# Patient Record
Sex: FEMALE | Race: WHITE
[De-identification: names, ages, dates, MRNs, and addresses within clinical notes are randomized per-mention and may not be internally consistent; named-entity substitution may affect disease eponyms.]

---

## 2020-10-30 ENCOUNTER — HOSPITAL ENCOUNTER (INPATIENT)
Dept: HOSPITAL 75 - IRF | Age: 75
LOS: 11 days | Discharge: HOME HEALTH | DRG: 92 | End: 2020-11-10
Attending: INTERNAL MEDICINE | Admitting: INTERNAL MEDICINE
Payer: MEDICARE

## 2020-10-30 VITALS — DIASTOLIC BLOOD PRESSURE: 72 MMHG | SYSTOLIC BLOOD PRESSURE: 137 MMHG

## 2020-10-30 VITALS — HEIGHT: 62.01 IN | BODY MASS INDEX: 25 KG/M2 | WEIGHT: 137.57 LBS

## 2020-10-30 VITALS — SYSTOLIC BLOOD PRESSURE: 123 MMHG | DIASTOLIC BLOOD PRESSURE: 65 MMHG

## 2020-10-30 DIAGNOSIS — Z87.891: ICD-10-CM

## 2020-10-30 DIAGNOSIS — I48.91: ICD-10-CM

## 2020-10-30 DIAGNOSIS — Z93.2: ICD-10-CM

## 2020-10-30 DIAGNOSIS — E11.9: ICD-10-CM

## 2020-10-30 DIAGNOSIS — Z79.4: ICD-10-CM

## 2020-10-30 DIAGNOSIS — Q60.0: ICD-10-CM

## 2020-10-30 DIAGNOSIS — R19.7: ICD-10-CM

## 2020-10-30 DIAGNOSIS — I10: ICD-10-CM

## 2020-10-30 DIAGNOSIS — E03.9: ICD-10-CM

## 2020-10-30 DIAGNOSIS — Z85.43: ICD-10-CM

## 2020-10-30 DIAGNOSIS — G72.89: Primary | ICD-10-CM

## 2020-10-30 DIAGNOSIS — E78.5: ICD-10-CM

## 2020-10-30 PROCEDURE — 80053 COMPREHEN METABOLIC PANEL: CPT

## 2020-10-30 PROCEDURE — 82962 GLUCOSE BLOOD TEST: CPT

## 2020-10-30 PROCEDURE — 87324 CLOSTRIDIUM AG IA: CPT

## 2020-10-30 PROCEDURE — 93005 ELECTROCARDIOGRAM TRACING: CPT

## 2020-10-30 PROCEDURE — 87449 NOS EACH ORGANISM AG IA: CPT

## 2020-10-30 PROCEDURE — 85025 COMPLETE CBC W/AUTO DIFF WBC: CPT

## 2020-10-30 PROCEDURE — 36415 COLL VENOUS BLD VENIPUNCTURE: CPT

## 2020-10-30 RX ADMIN — INSULIN ASPART SCH UNIT: 100 INJECTION, SOLUTION INTRAVENOUS; SUBCUTANEOUS at 21:46

## 2020-10-30 RX ADMIN — POLYETHYLENE GLYCOL (3350) SCH GM: 17 POWDER, FOR SOLUTION ORAL at 13:14

## 2020-10-30 RX ADMIN — DOCUSATE SODIUM SCH MG: 100 CAPSULE ORAL at 21:46

## 2020-10-30 RX ADMIN — SIMETHICONE SCH MG: 80 TABLET, CHEWABLE ORAL at 21:14

## 2020-10-30 RX ADMIN — SIMETHICONE SCH MG: 80 TABLET, CHEWABLE ORAL at 13:15

## 2020-10-30 RX ADMIN — DOCUSATE SODIUM SCH MG: 100 CAPSULE ORAL at 13:14

## 2020-10-30 RX ADMIN — ENOXAPARIN SODIUM SCH MG: 100 INJECTION SUBCUTANEOUS at 13:14

## 2020-10-30 RX ADMIN — INSULIN ASPART SCH UNIT: 100 INJECTION, SOLUTION INTRAVENOUS; SUBCUTANEOUS at 16:10

## 2020-10-30 RX ADMIN — CARVEDILOL SCH MG: 12.5 TABLET, FILM COATED ORAL at 21:15

## 2020-10-30 RX ADMIN — DOCUSATE SODIUM AND SENNOSIDES SCH EA: 8.6; 5 TABLET, FILM COATED ORAL at 21:46

## 2020-10-30 RX ADMIN — DOCUSATE SODIUM AND SENNOSIDES SCH EA: 8.6; 5 TABLET, FILM COATED ORAL at 13:14

## 2020-10-30 RX ADMIN — AMIODARONE HYDROCHLORIDE SCH MG: 200 TABLET ORAL at 21:15

## 2020-10-30 RX ADMIN — POLYETHYLENE GLYCOL (3350) SCH GM: 17 POWDER, FOR SOLUTION ORAL at 21:46

## 2020-10-30 NOTE — NUR
CM/SS ADMISSION

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  Patient 
presented to [facility] 9/21/20, diagnosed with colitis.  Patient was CODE BLUE 9/22 and 
reportedly pulseless for approximately 2 minutes, chest compressions performed.  Surgery for 
subtotal colectomy and ileostomy was completed with post op complications, a-fib with RVR, 
cardioversion secondary to hemodynamic instability, C. difficile colitis.  TPN for nutrition 
supplement, discontinued.



Patient resides home alone and her hope is to return there at discharge.  



PCP:  Lorenzo Leyva, CHIQUI

[facility]

[phone]



PHARMACY:  Not confirmed



INSURANCE:  Medicare and [employer]



DME:  Patient has 2 FWW, Rollator, and her shower has a built in seat.



BARRIERS TO DISCHARGE PLANNING:  Patient appears adequately insured for post hospital care 
needs.  She has a new ostomy, will pursue supplies through [employer].  Patient has 3 
children in close proximity.  Patient motivated to regain health and strength and return 
home.



CONTACTS:

Patient has 3 children and also listed her sister.



Liseth Falcon, Daughter, DPOA

[address] [place], [place]

[phone]



Gerardo Joy, Son

[address] [place], [place]  [address] [phone]



Albina Gomez, Daughter

[address] [place]  [address]

[phone]



Lora Middleton, Sister

[address] [place], [place]  

[phone]



Patient understands the purpose and process of the weekly patient care conference and that 
her first review will be Wednesday, November 4, 2020.

## 2020-10-30 NOTE — NUR
PT. IN SPECIAL ISOLATION FOR C-DIFF. 1ST C-DIFF RESULTS NEGATIVE.  NEED 2ND STOOL FOR C-DIFF 
COLLECTED(IN 48 HRS) 11/1/20 IN ORDER TO REMOVE PT. FROM SPECIAL ISOLATION AS STATED PER 
VICTORIA BOJORQUEZ RN (INFECTIOUS  CONTROL NURSE).

## 2020-10-30 NOTE — PHYSICAL THERAPY EVALUATION
PT Evaluation-General


Medical Diagnosis


Admission Date


Oct 30, 2020 at 10:40


Medical Diagnosis:  Myopathy


Onset Date:  Oct 30, 2020





Therapy Diagnosis


Therapy Diagnosis:  Impaired mobility and strength





Precautions


Precautions/Isolations:  Contact Isolation (c. diff)





Weight Bear Status


Right Lower Extremity:  Right


Full Weight Bearing


Left Lower Extremity:  Left


Full Weight Bearing





Referral


Physician:  Ronnie


Reason for Referral:  Evaluation/Treatment





Medical History


Pertinent Medical History:  Atrial Fib (with RVR), DM, HTN, Hypothroidism, Renal

Insufficiency (single kidney)


Additional Medical History


Ovarian Cancer





sub-total colectomy


kidney removal


appendectomy


cholecystectomy


hysterectomy


Current History


[facility] with abdominal pain; pt diagnosed 

with colitis and this has started a prolonged hospital stay. During 

hosptilization pt has coded and received subtotal colectomy and ileostomy and 

stefano-splint thick biopsy; postoperatively pt went into A. fib with RVR and 

underwent cardioversion secondary to hemodynamic instability; pt also diagnosed 

with C.diff. Pt has been seen by [facility] wound care for abdominal incision.


Reviewed History:  Yes





Social History


Home:  Single Level


Current Living Status:  Alone (children live nearby)


Entry Into Home:  Stairs With Railing


PT Steps Into Home:  3


PT Steps Inside Home:  0





Prior


Prior Level of Function


SCALE: Activities may be completed with or without assistive devices.





6-Indepedent-patient completes the activity by him/herself with no assistance 

from a helper.


5-Set-up or Clean-up Assistance-helper sets up or cleans up; patient completes 

activity. [place] assists only prior to or  


    following the activity.


4-Supervision or Touching Assistance-helper provides verbal cues and/or t

ouching/steadying and/or contact guard assistance as patient completes activity.

Assistance may be provided   


    throughout the activity or intermittently.


3-Partial/Moderate Assistance-helper does LESS THAN HALF the effort. [place] 

lifts, holds or supports trunk or limbs, but provides less than half the effort.


2-Substantial/Maximal Assistance-helper does MORE THAN HALF the effort. [place] 

lifts or holds trunk or limbs and provides more than half the effort.


0-Fjptzrwve-tnvcri does ALL the effort. Patient does none of the effort to 

complete the activity. Or, the assistance of 2 or more helpers is required for 

the patient to complete the  


    activity.


If activity was not attempted, code reason:


7-Patient Refused.


9-Not Applicable-not attempted and the patient did not perform the activity 

before the current illness, exacerbation or injury.


10-Not Attempted due to Environmental Limitations-(lack of equipment, weather 

restraints, etc.).


88-Not Attempted due to Medical Conditions or Safety Concerns.


Bed Mobility:  6


Transfers (B,C,W/C):  6


Gait:  6


Stairs:  6


Wheelchair Mobility:  9


Indoor Mobility (Ambulation):  Independent


Stairs:  Independent


Prior Devices Use:  None





PT Evaluation-Current


Subjective


Pt presents sitting upright in wheelchair. Pt agrees to PT. Pt reports no pain.





Pt/Family Goals


Gain strength to return home





Objective


Patient Orientation:  Person, Place, Time, Eyes Open, Situation


Attachments:  Colostomy/Ileostomy





ROM/Strength


ROM Lower Extremities


WFL


Strength Lower Extremities


R hip flexion 4/5


L hip flexion 3+/5


R knee ext 5/5


L knee ext 5/5


R knee flex 5/5


R knee flex 4+/5





Integumentary/Posture


Integumentary


refer to nursing notes


Bowel Incontinence:  No


Bladder Incontinence:  No


Posture


WFL





Neuromuscular


(Tone, Coordination, Reflexes)


No noticeable impairments





Sensory


Vision:  Wears Glasses


Hearing:  Functional


Sensation Right Lower Extremit:  Intact


Sensation Left Lower Extremity:  Intact


Sensation Lower Extremities


BLE sensation intact to light touch L2-S2





Transfers


Roll Left & Right (QC):  4


Sit to Lying (QC):  4


Lying to Sitting/Side of Bed(Q:  4


Sit to Stand (QC):  4


Chair/Bed-to-Chair Xfer(QC):  4


Toilet Transfer (QC):  4


Car Transfer (QC):  4


Pt able to complete bed mobility with SBA. Pt given CGA for sit to stand, chair 

to chair, toilet, and car transfers. Pt completes car transfers with step in-sit

down-bring other leg into car method; pt struggled to lift initial leg into 

vehicle and was instructed on the sit and then BLE swing in method to make 

things easier.





Gait


Does the Patient Walk?:  Yes


Mode of Locomotion:  Walk


Anticipated Mode of Locomotion:  Walk


Walk 10 feet (QC):  4


Walk 50 ft with 2 Turns(QC):  4


Walk 150 ft (QC):  4


Walking 10ft/uneven surface-QC:  4


Distance:  150'


Gait Assistive Device:  FWW


Comments/Gait Description


Pt is ambulating with step through pattern and receives CGA for 150' and over 

uneven surface.





Wheelchair Training


Does the Pt Use a Wheelchair?:  No


Wheel 50 ft with 2 turns (QC):  9


Wheel 150 ft (QC):  9





Stairs


#of Steps:  1


1 Step (curb) (QC):  4


4 Steps (QC):  88


12 Steps (QC):  88


Walking Assistive Device:  Walker


Pt able to ascend/descend one step steadily; pt utilized walker for UE weight 

support and therapist provided SBA.





Balance


Sitting Static:  Normal


Sitting Dynamic:  Normal


Standing Static:  Normal


 Standing Dynamic:  Normal


Picking up an Object (QC):  88





Treatment


Co-treated [id number] due to patient's limitation in strength, mobility, and 

coordination of LEs and UEs. PT focuses on patient's ambulation, mobility and 

transfers while OT focused on dressing, bathing, and ADLs.





Assessment/Needs


Pt is weak and fatigues easily with activity. Pt has large incision on abdomen 

but reports no precautions to bending or twisting. Pt has not used walker in 

PLOF but is able to maneuver AD in safe manner.


Rehab Potential:  Good





PT Short Term Goals


Short Term Goals


Time Frame:  2020


Roll Left & Right:  6


Sit to lyin


Lying to sitting on side of be:  6


Sit to stand:  6


Chair/bed-to-chair transfer:  5


4 steps:  4


Picking up objects:  4





PT Long Term Goals


Long Term Goals


PT Long Term Goals Time Frame:  2020


Roll Left & Right (QC):  6


Sit to Lying (QC):  6


Lying-Sitting on Side/Bed(QC):  6


Sit to Stand (QC):  6


Chair/Bed-to-Chair Xfer(QC):  6


Toilet Transfer (QC):  6


Car Transfer (QC):  6


Does the Patient Walk:  Yes


Walk 10 feet (QC):  6


Walk 50ft with 2 Turns (QC):  6


Walk 150 ft (QC):  6


Walking 10ft on Uneven Surface:  6


1 Step (curb) (QC):  6


4 Steps (QC):  6


12 Steps (QC):  6


Picking up an Object (QC):  6


Does the Pt use WC or Scooter?:  No


Wheel 50 feet with 2 turns (QC:  9


Wheel 150 feet:  9





PT Plan


Problem List


Problem List:  Activity Tolerance, Functional Strength, Safety, Balance, Gait, 

Transfer, Bed Mobility, ROM





Treatment/Plan


Treatment Plan:  Continue Plan of Care


Treatment Plan:  Bed Mobility, Education, Functional Activity Cherise, Functional 

Strength, Group Therapy, Gait, Safety, Therapeutic Exercise, Transfers


Treatment Duration:  2020


Frequency:  At least 5 of 7 days/Wk (IRF)


Estimated Hrs Per Day:  1.5 hours per day


Patient and/or Family Agrees t:  Yes





Safety Risks/Education


Patient Education:  Gait Training, Transfer Techniques, Steps, Correct Pos

itioning, Safety Issues


Teaching Recipient:  Patient


Teaching Methods:  Demonstration, Discussion


Response to Teaching:  Reinforcement Needed





Discharge Recommendations


Plan


Pt will work on bed mobility, balance and gait training, transfers, and 

therapeutic exercises.


Therapy Discharge Recommendati:  Home & Family





Time/GCodes


Time In:  1040


Time Out:  1150


Total Billed Treatment Time:  60


Total Billed Treatment


1 visit


EVL 10'


FA 50'





Pt eval [id number]; OT eval [id number]; Co-treated [id number] due to patient's 

limitation in strength, mobility, and coordination of LEs and UEs. PT focuses on

patient's ambulation, mobility and transfers while OT focused on dressing, 

bathing, and ADLs.











ERIC VALLE PT              Oct 30, 2020 12:03

## 2020-10-30 NOTE — NUR
THE MED REC WAS ENTERED USING THE DISCHARGE ORDERS FROM [facility] 



WHEN I GET DISCHARGE ORDERS FROM [facility] THEY PHYSICIAN WILL JANNET NEXT TO EACH MEDICATION 
IF THEY WANT THEM TO BE CONTINUED WITH A YES OR NO. THE FOLLOWING MEDICATIONS ARE LISTED ON 
THE DISCHARGE WITH A "NO" NEXT TO THEM SO THEY WERE NOT INCLUDED ON THE MED REC: ENOXAPARIN 
40MG, LANTUS (THERE IS AN ORDER CONTINUED FOR 8UNITS HS WHICH I DID INCLUDE), PROTONIX INJ, 
DEXTROSE 50% PRN.



AFTER THE MEDICATIONS ARE CONTINUED I WILL SPEAK WITH THE PT AND MAKE ANY CHANGES TO THE MED 
REC/NOTES IF NEEDED 

-------------------------------------------------------------------------------

Addendum: 11/03/20 at 1445 by GAGE LAU [facility]

-------------------------------------------------------------------------------

SPOKE WITH THE PT, WENT THRU THE EXT MED HISTORY, GOT A MEDICATION LIST FROM [facility] AND CALLED 
[facility] TO COMPLETE THE MED REC



MEDICATIONS THAT HAVE BEEN REMOVE DUE TO PT NOT TAKING PRIOR TO [facility]:

AMIODARONE 200MG

VITAMIN C 500MG

GENTAMICIN 0.1% OINT (SCHEDULED OR PRN)

HUMALOG 

LANTUS

MTV

NORCO 5/325MG



MEDICATIONS THAT WERE ADDED TO THE MED REC DUE TO PT TAKING PRIOR TO [facility]:

ACTOS 30MG

VICTOZA

PILOCARPINE 5MG

LEFLUNOMIDE 20MG

LEVOTHYROXINE 112MCG

FENOFIBRATE 160MG

AMITRIPTYLINE 100MG

ATORVASTATIN 40MG & 20MG (PT TAKES BOTH TO EQUAL 60MG DAILY)

PANTOPRAZOLE 40MG

OXYBUTYNIN 5MG- DIRECTIONS ARE 5MG TID HOWEVER PT ONLY TAKES 5MG BID



OTC MEDS:

ASPIRIN 81MG

ZYRTEC 10MG PRN

SIMETHICONE- [facility] HAS THIS AS A SCHEDULED MEDICATION HOWEVER AT HOME PT JUST USES PRN

CALCIUM W/ VIT D

FISH OIL

VITAMIN 

-------------------------------------------------------------------------------

Addendum: 11/04/20 at 1255 by GAGE LUA CPhT

-------------------------------------------------------------------------------

ON THE EVENING OF 11- MONICA FROM [facility] LEFT A MESSAGE LETTING ME KNOW THE PT WAS 
REQUESTING THAT I SPEAK WITH HER AGAIN BECAUSE SHE WAS AFRAID SHE GAVE ME INCORRECT 
INFORMATION.

TODAY WHEN I WENT AND SPOKE WITH THE PT SHE LET ME KNOW OF THE FOLLOWING CHANGES:

LEVOTHYROXINE- ACCORDING TO THE PT SHE TAKES 1 TAB DAILY EXCEPT ON MONDAYS SHE DOESNT TAKE A 
DOSE

ZYRTEC 10MG- PT SAYS SHE TAKES THIS BID, WHEN QUESTIONED TO MAKE SURE OF THESE DIRECTIONS, 
SHE BECAME ADAMANT THAT SHE TOOK IT TWICE DAILY



PT MENTIONED SHE TAKES ATORVASTATIN 20MG EVERY OTHER DAY, I LET HER KNOW THAT [facility] 
HAS THE DIRECTIONS AS ONCE DAILY- PT SHRUGGED AND ONCE AGAIN SAID SHE TAKES IT EVERY OTHER 
DAY. WHEN I VERIFIED LEFLUNOMIDE 20MG (DIRECTIONS PER JOE ARE 1 TAB EVERY OTHER DAY) PT 
SAYS SHE TAKES 1 TAB DAILY. WHEN I EXPLAIN THE DIRECTIONS FROM [facility] WITH THE QUAINTLY 
AND DAYS SUPPLIES GIVEN, PT ADMITS SHE MAY HAVE THEM MIXED UP. WHEN I ASKED THE PT ABOUT THE 
2 DIFFERENT STRENGTHS OF ATORVASTATIN SHE WAS UNAWARE OF THIS AND SAYS SHE JUST TAKES THE 
20MG. 

THE MED REC HAS BEEN UPDATED WITH ALL THE ABOVE CHANGES

## 2020-10-30 NOTE — OCCUPATIONAL THERAPY EVAL
OT Evaluation-General/PLF


Medical Diagnosis


Admission Date


Oct 30, 2020 at 10:40


Medical Diagnosis:  colitis with colectomy, debility


Onset Date:  Sep 21, 2020





Therapy Diagnosis


Therapy Diagnosis:  Decreased ADL status





Precautions


Precautions/Isolations:  Contact Isolation (C-diff hx), Standard Precautions





Referral


Physician:  Ronnie


Referral Reason:  Activity Tolerance, Self Care, Evaluation/Treatment, 

Strengthening/ROM





Medical History


Additional Medical History


HTN, DM, a fib with RVR, hypothyroidism, single kidney, ovarian Ca


Current History


 admits with abdominal pain and colitis


 code blue status with cardiac arrest/ resp failure during subtotal 

colectomy/ ileostomy; intubated with PNA


10/1 extubated


Reviewed History:  Yes





Social History


Home:  Single Level


Current Living Status:  Alone


Entry Into Home:  Stairs With Railing


 Steps Into Home:  1


 Steps Inside Home:  0





ADL-Prior Level of Function


SCALE: Activities may be completed with or without assistive devices.





6-Indepedent-patient completes the activity by him/herself with no assistance 

from a helper.


5-Set-up or Clean-up Assistance-helper sets up or cleans up; patient completes 

activity. [place] assists only prior to or  


    following the activity.


4-Supervision or Touching Assistance-helper provides verbal cues and/or 

touching/steadying and/or contact guard assistance as patient completes 

activity. Assistance may be provided   


    throughout the activity or intermittently.


3-Partial/Moderate Assistance-helper does LESS THAN HALF the effort. [place] 

lifts, holds or supports trunk or limbs, but provides less than half the effort.


2-Substantial/Maximal Assistance-helper does MORE THAN HALF the effort. [place] 

lifts or holds trunk or limbs and provides more than half the effort.


1-Crxsciqfy-ipsxee does ALL the effort. Patient does none of the effort to 

complete the activity. Or, the assistance of 2 or more helpers is required for 

the patient to complete the  


    activity.


If activity was not attempted, code reason:


7-Patient Refused.


9-Not Applicable-not attempted and the patient did not perform the activity 

before the current illness, exacerbation or injury.


10-Not Attempted due to Environmental Limitations-(lack of equipment, weather 

restraints, etc.).


88-Not Attempted due to Medical Conditions or Safety Concerns.


ADL PLOF Comments


Pt was IND without use of AE during I/ADLs.


Self Care:  Independent


Functional Cognition:  Independent


DME/Equipment:  Bath Chair, Grab Bars, Shower


Occupation:  retired


Drive Self:  Yes





OT Current Status


Subjective


Pt admits from outside hospital. Pt is accompanied by OT/ PT to ARU. Pt alert/ 

oriented, very pleasant, denies pain. Pt agreeable to OT/ PT co-treat. 


PT eval: [id number]


OT eval: [id number]


OT/ PT co-treat: [id number]: OT addresses UE movement, ADL status, problem 

solving; PT addresses LE movement, balance/ transfers. 





[id number] (25): Pt in recliner. States /10 pain abdomen. Pt states pain meds 

already administered. Continued at 7/10 post session.





Mental Status/Objective


Patient Orientation:  Normal For Age


Attachments:  Colostomy/Ileostomy





Current


Glasses/Contacts:  Yes


Hearing Aids:  No


Dentures/Partials:  Yes


Hand Dominance:  Right


Upper Extremity ROM


WFL BUE


Upper Extremity Coordination


WFL BUE


Upper Extremity Sensation


WFL BUE


Upper Extremity Strength


WFL BUE (4/5)


Edema:  none noted.





ADL-Treatment


Eating (QC):  6 (drinks with IND. per clinical judgment pt is IND with eating 

tasks.)


Oral Hygiene (QC):  6 (IND per clinical judgement)


Shower/Bathe Self (QC):  4 (CGA and cues for shower chair transfer. Pt able to 

reach all areas. SBA during showering/ CGA in stance. )


Upper Body Dressing (QC):  5 (s/u, denies bra donning. )


Lower Body Dressing (QC):  4 (threads BLE in sit witout AE, stands with CGA and 

hikes over hips.)


On/Off Footwear (QC):  3 (min A doffing knee high socks, pt completes sock 

donning with IND.)


Toileting Hygiene (QC):  4 (CGA in stance for bottom hygiene.)





Other Treatments


Pt completes sit to stand from w/c level with CGA, ambulates ~3 steps to w/c 

without AE/ with CGA. Pt is pushed to ARU, educated on OT role and ARU 

expectations. Pt completes PT eval/ OT eval. Pt's nurse notified of large 

abdominal wound. Nursing cares for and pt is wrapped completely prior to shower,

requires 3 minute rest break prior to stand/ showering tasks. Pt completes bed 

mob and sit to stand SBA, CGA ambulation and competes showering/ dressing as 

outlined. Pt states fatigue post-shower. Pt has good problem solving. Sits in 

recliner end of session, all needs met, call light in reach. 





Pt seen for individual tx in recliner post-PT. Pt states fatigue and 7/10 pain 

in abdomen. Pt states has already had pain pills. Pt agrees to UE fx activity 

tolerance/ strengthening activities. Pt completes 10 minutes continuous 

reaching/ fine motor task. No SOB. Pt given hand out and educated on UE 

theraband ex with yellow theraband. Pt completes 10 reps bilaterally of 

following ex: shoulder scaption, back flies, shoulder int/ext rotation, triceps/

biceps. Pt completes with 2 rest breaks with minimal SOB. pt is encouraged to 

take rest breaks and to complete ex 2-3 x per day over weekend with 10-15 reps 

each UE. Pt agrees, requests back to bed. Pt sit to stand with SBA and ambulates

to bed, sit to supine SBA. Pt adjusted in bed, all needs met, call light in 

reach.





Education


OT Patient Education:  Correct positioning, Exercise program, Home exercise 

program, Purpose of tx/functional activities, Rehab process, Safety issues, 

Transfer techniques


Teaching Recipient:  Patient


Teaching Methods:  Demonstration, Discussion


Response to Teaching:  Verbalize Understanding, Return Demonstration





OT Short Term Goals


Short Term Goals


Time Frame:  2020


Lower body dressin


Putting on/taking off footwear:  6





OT Long Term Goals


Long Term Goals


Time Frame:  2020


Eating (QC):  6


Oral Hygiene (QC):  6


Toileting Hygiene (QC):  6


Shower/Bathe Self (QC):  6


Upper Body Dressing (QC):  6


Lower Body Dressing (QC):  6


On/Off Footwear (QC):  6


Demonstrate ability to change colonoscopy bag with s/u-IND.


Additional Goals:  1-Demonstrate ADL Tasks, 2-Verbalize Understanding, 3-

ImproveStrength/Cherise


1=Demonstrate adherence to instructed precautions during ADL tasks.


2=Patient will verbalize/demonstrate understanding of assistive 

devices/modifications for ADL.


3=Patient will improve strength/tolerance for activity to enable patient to 

perform ADL's.





OT Education/Plan


Problem List/Assessment


Assessment:  Decreased Activ Tolerance, Impaired Funct Balance, Impaired I 

ADL's, Impaired Self-Care Skills





Discharge Recommendations


Plan/Recommendations:  Continue POC


Therapy Discharge Recommendati:  Home & Family





Treatment Plan/Plan of Care


Treatment,Training & Education:  Yes


Patient would benefit from OT for education, treatment and training to promote 

independence in ADL's, mobility, safety and/or upper extremity function for 

ADL's.


Plan of Care:  ADL Retraining, Functional Mobility, Group Exercise/Act as Ind, 

UE Funct Exercise/Act, UE Neuromus Re-Ed/Coord


Treatment Duration:  2020


Frequency:  At least 5 of 7 days/Wk (IRF)


Estimated Hrs Per Day:  1.5 hours per day


Agreement:  Yes


Rehab Potential:  Good





Time/GCodes


Start Time:  10:50 (1325)


Stop Time:  11:50 (1350)


Total Time Billed (hr/min):  85 (60+25)


Billed Treatment Time


PT eval: [id number]


OT eval: [id number]


OT/ PT co-treat: [id number]: OT addresses UE movement, ADL status, problem 

solving; PT addresses LE movement, balance/ transfers. 





1, EVM (10), ADL 3 (50)= 60





1, EX 2 = 25





Total time: 85











MARCIA KANG OTR                Oct 30, 2020 12:04

## 2020-10-30 NOTE — PM&R POST ADMISSION ASSESSMENT
PM&R HP


Date of Visit:  Oct 30, 2020


Time of Visit:  12:20


History of Present Illness


CC: Debility following colitis episode s/p ileostomy s/p cardiac arrest 20





HPI: This is a 75yoWF clinic patient of Steven Leyva who presented from [place] 

stay from 10/10/20 until today 10/30/20 to recover from long stay at [facility] 

following subtotal colectomy and placement of ileostomy and stefano-splint thick 

biopsy s/p AF w/RVR episode s/p cardioversion due to instability. C diff colitis

was diagnosed receiving Vanc IV and rectal enema and Flagyl. Patient had been on

TPN. Patient now eating a bit more but more loose stools so C diff sent out 

today which was negative so will monitor only. Abdominal wound will require 

wound care. PLOF independent and driving.








[facility] GINGER note:


Luke Crain MD


Date of Admission:2020


Date of Discharge:10/9/2020








Diagnosis  


 


 S/P colectomy 


 


 Debility 


 


 On total parenteral nutrition 


 


 Ileostomy status 


 


 Type 2 diabetes mellitus with hyperglycemia 


 


 Pleural effusion on right 


 


 Thrombocytopenia 


 


 Abnormal LFTs (liver function tests) 


 


 Pulmonary congestion 


 


 CKD (chronic kidney disease) stage 3, GFR 30-59 ml/min 


 


 Benign hypertension 


 


 Primary hypothyroidism 


 


 Mixed hyperlipidemia 


 


  


 


Resolved Hospital Problems  


 


 Diagnosis Date Resolved


 


 C. difficile colitis 10/09/2020


 


 Accelerated hypertension 10/09/2020


 


 Acute metabolic encephalopathy 10/09/2020


 


 HAP (hospital-acquired pneumonia) 10/09/2020


 


 Ischemia, bowel; distal small bowel and colon 10/09/2020


 


 Ischemic colitis 10/09/2020


 


 Ventilator dependence 10/09/2020


 


 Acute respiratory failure 10/09/2020


 


 Hyponatremia 10/09/2020


 


 Paroxysmal atrial fibrillation with rapid ventricular response 10/09/2020


 


 Hemodynamic instability 10/09/2020


 w


 Cardiac arrest 10/09/2020


 


 Severe sepsis with septic shock 10/09/2020








Dipti Palomino a 75 y.o.femalewho was admitted to [facility] on 

2020for evaluation and management of abdominal pain and diarrhea. She was

admitted for further evaluation. On the day of admission in the morning, 

patient fainted and CODE BLUE was called. Patient was found to be pulseless for

approximately 2 minutes, chest compressions were started. ABG showed metabolic 

acidosis, 1 ampoule of bicarb was given with 1 L of normal saline bolus. 

Patient was then transferred to ICU/TCU. ROSCwas achieved. Patient kept 

complaining of persistent abdominal pain and imaging showed edematous small 

bowel with free intraperitoneal fluid. She was hypotensive and was requiring 

vasopressor. General surgery, GI and infectious disease were consulted. She 

underwent subtotal colectomy, and ileostomy and perisplenic biopsy. In the 

postoperative phase she went into A. fib with RVR and then was cardioverted by 

Dr. Beth because of hemodynamic instability. During her time in the hospital she

was treated with IV meropenem, IV ciprofloxacin, IV Flagyl, vancomycin enemas 

for perirectal colitis secondary to C. difficile. During her hospital course 

she had worsening thrombocytopenia which was thought to be likely secondary to 

antibiotic use versus worsening infection. She progressed well and then was 

downgraded to general floor. Her stay was uneventful on the general floorand 

her confusion also resolved. She was then subsequently discharged to [facility] and was recommended to continue micafungin till 10/14, rectal 

vancomycin, IV Flagyl till 10/10 and doxycycline till 10/10 for possible 

pneumonia. She was recommended to follow-up with PCP, ID, general surgery and 

hematology after discharge. Repeat CBC, CMP, ESR and CRP weekly while on 

antibiotics. At the time of discharge patient was at her baseline 

mentation.It is recommended that thefollow-up provider at [facility] 

continue TPN and consult in-house nutritionist/dietitian for further 

recommendation. Amiodarone prescription was given as per cardiology and 

antibiotics/antifungal medications as per infectious disease service. 

Antibiotic medications were prescribed by infectious disease as per culture 

results.





Past Medical-Social-Family Hx


Past Med/Social Hx:  Reviewed Nursing Past Med/Soc Hx, Reviewed and Corrections 

made


Patient Social History


Marrital Status:  


Employed/Student:  retired


Alcohol Use:  Denies Use


Recreational Drug Use:  No


Smoking Status:  Former Smoker


Former Smoker, Quit:  Mar 30, 1985


Type Used:  Cigarettes


Physical Abuse Screen:  No


Sexual Abuse:  No


Recent Foreign Travel:  No


Contact w/other who traveled:  No


Recent Infectious Disease Expo:  No





Immunizations Up To Date


Date of Pneumonia Vaccine:  Oct 8, 2020


Date of Influenza Vaccine:  Oct 7, 2020





Past Medical History


Cardiac:  Atrial Fibrillation, Hypertension


Reproductive:  No


solitary kidney


Gastrointestinal:  Colitis


Endocrine:  Diabetes, Insulin dep, Hypothyroidsim


Cancer:  Ovarian


Did You Recieve Any Treatments:  Yes


What Type of Treatment Did You:  Surgical Intervention





Family History





Arthritis


  19 MOTHER


Cardiovascular disease


  19 MOTHER


Diabetes mellitus


  19 FATHER


  19 MOTHER





Self Care:  Independent


Functional Cognition:  Independent


Occupation:  retired


Drive Self:  Yes


Eatin (drinks with IND. per clinical judgment pt is IND with eating tasks.)


Oral Hygiene:  6 (IND per clinical judgement)


Shower/Bathe Self:  4 (CGA and cues for shower chair transfer. Pt able to reach 

all areas. SBA during showering/ CGA in stance. )


Upper Body Dressin (s/u, denies bra donning. )


Lower Body Dressin (threads BLE in sit witout AE, stands with CGA and hikes

over hips.)


On/Off Footwear:  3 (min A doffing knee high socks, pt completes sock donning 

with IND.)


Toileting Hygiene:  4 (CGA in stance for bottom hygiene.)





PM&R Allergy/Meds/Data Review


Allergies


Coded Allergies:  


     Penicillins (Verified  Allergy, Unknown, 10/30/20)


     raspberry (Verified  Allergy, Unknown, 10/30/20)





Home Medications


Scheduled


Amiodarone HCl (Amiodarone HCl), 200 MG PO BID, (Reported)


Amlodipine Besylate (Amlodipine Besylate), 10 MG PO DAILY, (Reported)


Ascorbate Calcium (Vitamin C), 500 MG PO DAILY, (Reported)


Aspirin (Aspirin), 81 MG PO DAILY, (Reported)


Carvedilol (Coreg), 25 MG PO BID, (Reported)


Cetirizine HCl (Cetirizine HCl), 10 MG PO DAILY, (Reported)


Gentamicin Sulfate (Gentamicin Sulfate), 1 APPLIC TP DAILY, (Reported)


Insulin Glargine,Hum.rec.anlog (Lantus), 8 UNIT SQ HS, (Reported)


Insulin Lispro (Insulin Lispro), 0-14 UNIT SQ ACHS, (Reported)


Multivitamin with Minerals (One Daily Plus Minerals), 1 EACH PO DAILY, 

(Reported)


Simethicone (Simethicone), 80 MG PO TID, (Reported)





Scheduled PRN


Gentamicin Sulfate (Gentamicin Sulfate), 1 APPLIC TP BID PRN for 

WOUNDS/INFECTIONS, (Reported)


Hydrocodone/Acetaminophen (Hydrocodone-Acetamin 5-325 mg), 1 EACH PO QID PRN for

PAIN-SEVERE (8-10), (Reported)


Tramadol HCl (Tramadol HCl), 100 MG PO TID PRN for PAIN-MODERATE (5-7), 

(Reported)





Discontinued Medications


Amitriptyline Hcl (Amitriptyline Hcl), 1 EACH PO HS, (Reported)


   Discontinued Reason: No Longer Taking


Calcium Carbonate/Vitamin D3 (Calcium 600 + D Caplet), 2 EACH PO DAILY, 

(Reported)


   Discontinued Reason: No Longer Taking


Carvedilol Phosphate (Coreg Cr), 25 MG PO DAILY, (Reported)


   Discontinued Reason: No Longer Taking


Cholecalciferol (Vitamin D), 2,000 UNIT PO BID, (Reported)


   Discontinued Reason: No Longer Taking


Ezetimibe/Simvastatin (Vytorin 10-20 Mg Tablet), 1 EACH PO DAILY, (Reported)


   Discontinued Reason: No Longer Taking


Ferrous Sulfate (Iron), 1 TAB PO DAILY, (Reported)


   Discontinued Reason: No Longer Taking


Hydrochlorothiazide (Hydrochlorothiazide), 1 EACH PO DAILY, (Reported)


   Discontinued Reason: No Longer Taking


Hydrocodone Bit/Acetaminophen (Vicodin 5-500 Tablet), 1-2 EACH PO Q4H-6H PRN, 

(Reported)


   Discontinued Reason: No Longer Taking


Insulin Determir (Levemir Pen), 15 UNITS SQ HS, (Reported)


   Discontinued Reason: No Longer Taking


Leflunomide (Leflunomide), 20 MG PO BID, (Reported)


   Discontinued Reason: No Longer Taking


Levothyroxine Sodium (Levothyroxine 112 Mcg Tab), 1 EACH PO DAILY, (Reported)


   Discontinued Reason: No Longer Taking


Liraglutide (Victoza 3-Devin), 18 UNITS SQ DAILY, (Reported)


   Discontinued Reason: No Longer Taking


Multivitamins (Multiple Vitamin), 1 TAB PO DAILY, (Reported)


   Discontinued Reason: No Longer Taking


Omega-3 Fatty Acids/Fish Oil (Fish Oil 1,200 Mg Softgel), 1 EACH PO DAILY, 

(Reported)


   Discontinued Reason: No Longer Taking


Pantoprazole Sodium (Protonix), 1 TAB PO DAILY, (Reported)


   Discontinued Reason: No Longer Taking


Pilocarpine (Salagen (Non-Formulary)), 5 MG PO QID, (Reported)


   Discontinued Reason: No Longer Taking


Pioglitazone Hcl/Metformin Hcl (Actoplus Met Xr 30-1,000 Mg Tb), 1 EACH PO 

DAILY, (Reported)


   Discontinued Reason: No Longer Taking


Potassium Chloride (Klor-Con 10), 10 MEQ PO TID, (Reported)


   Discontinued Reason: No Longer Taking


Terbinafine Hcl (Terbinafine Hcl), 250 MG PO DAILY, (Reported)


   Discontinued Reason: No Longer Taking


Tramadol Hcl (Tramadol Hcl), 50 MG PO Q4 PRN, (Reported)


   Discontinued Reason: No Longer Taking





Current Medications


Current Medications


Reviewed





Review of Systems


Constitutional:  see HPI, malaise, weakness


EENTM:  no symptoms reported


Respiratory:  no symptoms reported


Cardiovascular:  no symptoms reported


Gastrointestinal:  abdominal pain, diarrhea


Genitourinary:  no symptoms reported


Musculoskeletal:  no symptoms reported


Skin:  no symptoms reported


Psychiatric/Neurological:  No Symptoms Reported





Physical Exam


Physical Exam


Vital Signs





Vital Signs - First Documented








 10/30/20





 11:56


 


Temp 35.7


 


Pulse 71


 


Resp 18


 


B/P (MAP) 137/72


 


O2 Delivery Room Air


 


O2 Flow Rate 96.00





Capillary Refill :


Height, Weight, BMI


Height: '"


Weight: lbs. oz. kg; 161.29 BMI


Method:Stated


General Appearance:  No Apparent Distress, WD/WN, Chronically ill


Eyes:  Bilateral Eye Normal Inspection, Bilateral Eye PERRL


HEENT:  PERRL/EOMI, Normal ENT Inspection, Pharynx Normal


Neck:  Full Range of Motion, Normal Inspection, Non Tender, Supple, Carotid 

Bruit


Respiratory:  Chest Non Tender, Lungs Clear, Normal Breath Sounds, No Accessory 

Muscle Use, No Respiratory Distress


Cardiovascular:  Regular Rate, Rhythm, No Edema, No Gallop, No JVD, No Murmur, 

Normal Peripheral Pulses


Gastrointestinal:  Normal Bowel Sounds, No Organomegaly, No Pulsatile Mass, Non 

Tender, Soft


Back:  Normal Inspection, No CVA Tenderness, No Vertebral Tenderness


Extremity:  Normal Capillary Refill, Normal Inspection, Normal Range of Motion, 

Non Tender, No Calf Tenderness, No Pedal Edema


Neurologic/Psychiatric:  Alert, Oriented x3, No Motor/Sensory Deficits, Normal 

Mood/Affect, Abnormal Gait, Motor Weakness (generalized)


Skin:  Normal Color, Warm/Dry


Lymphatic:  No Adenopathy





PM&R Medical Assessment & Plan


REHAB/MEDICAL ASSESSMENT AND PLAN:





REHAB IMPAIRMENT GROUP: 


Debility





ETIOLOGIC DIAGNOSIS: 


Debility





The comorbidities that impact the patients function and/or functional outcome 

by: poor reserve, diarrhea, AF, Single kidney, h/o ovarian cancer





REHAB PLAN:


The patient is being admitted to our comprehensive inpatient rehabilitation 

facility and can tolerate the intensity of service consisting of at least:


      180 minutes of therapy a day, 5 out of 7 days a week 


    


Rehab treatment will consist of:  PT OT will focus on regaining function to 

return home with independence in ADL's and ambulatory ability





The patient/family has a good understanding of our discharge process and will 

benefit from an interdisciplinary inpatient rehabilitation program. The patient 

has potential to make improvement and is in need of at least two of the 

following multidisciplinary therapies including but not limited to physical, 

occupational, speech, and prosthetics and orthotics.  Additionally the patient 

will need services from respiratory, nutritional services, wound care, 

psychology, etc. (Customize this to each patient). Given the patients complex 

condition and risk of further medical complications, rehabilitation services 

cannot be safely or effectively provided at a lower level of care such as a 

skilled nursing facility.





BARRIERS TO DISCHARGE:


Poor reserve





ESTIMATED LOS:


7 days





DISPOSITION:


Home





RELEVANT CHANGES SINCE PREADMISSION SCREENING: 


I have compared the patients medical and functional status at the time of the 

preadmission screening and there are: 


      no changes





PROGNOSIS:


Good





REHABILITATION GOALS:  


1. PT OT will focus on regaining function to return home with independence in 

ADL's and ambulatory ability








All the above goals were reviewed with the patient and he/she is in agreement.


By signing this document, I acknowledge that I have personally performed a full 

physical examination on this patient within 24 hours of admission to this 

inpatient rehabilitation facility and have determined the patient to be able to 

tolerate the above course of treatment at an intensive level for a reasonable 

period of time. I will be completing a detailed individualized Plan of Care for 

this patient by day #4 of the patients stay based upon the Preadmission Screen,

the Post-Admission Evaluation, and the therapy evaluations.


Admission Dx/Comorbidities:  


(1) Myopathy


ICD Codes:  G72.9 - Myopathy, unspecified


(2) C. difficile colitis


ICD Codes:  A04.72 - Enterocolitis due to Clostridium difficile, not specified 

as recurrent


(3) Atrial fibrillation


ICD Codes:  I48.91 - Unspecified atrial fibrillation


(4) History of cardioversion


ICD Codes:  Z98.890 - Other specified postprocedural states


(5) Hypertension


ICD Codes:  I10 - Essential (primary) hypertension


(6) Diabetes mellitus


ICD Codes:  E11.9 - Type 2 diabetes mellitus without complications


(7) Hypothyroidism


ICD Codes:  E03.9 - Hypothyroidism, unspecified


(8) Solitary kidney


ICD Codes:  Q60.0 - Renal agenesis, unilateral


(9) Ovarian cancer in remission


ICD Codes:  Z85.43 - Personal history of malignant neoplasm of ovary


(10) Ileostomy in place


ICD Codes:  Z93.2 - Ileostomy status





Assessment/Plan


Assessment and Plan


Assess & Plan/Chief Complaint


Assessment:


Myopathy


AF w/RVR s/p cardioversion


Hypothyroidism


C diff colitis hx


Ovarian cancer hx


DM


HTN


Solitary kidney





Plan:


Home meds


IRF protocol


Insulin


Monitor stools











TAMMY HURST DO                Oct 30, 2020 12:18

## 2020-10-30 NOTE — ST COGNITIVE LINGUISTIC EVAL
Speech Evaluation-General


Medical Diagnosis


Myopathy


Onset Date:  Oct 30, 2020





Therapy Diagnosis


Therapy Diagnosis:  Cognitive-communication





Referral


Referring Physician:  Dr. Trujillo





Medical History


Pertinent Medical History:  Atrial Fib (with RVR), DM, HTN, Hypothroidism, Renal

Insufficiency (single kidney)


Reviewed History:  Yes





Social History


Current Living Status:  Alone (children live nearby)





Speech PLF-Current Status


Prior Level of Function





Patient lived home alone where she was independent. She has family who


live near by for support as needed.





Subjective


Patient was pleasant and cooperative with the cognitive assessment.





Language Eval: Auditory


Comprehends Simple Yes/No Ques:  Functional


Indent/Objects Multiple Fields:  Functional


Ident/Pics in Multiple Fields:  Functional


Follows 1-Step Commands:  Functional


Follows Complex Directions:  Functional


Follows General Conversations:  Functional





Language Eval: Verbal Language


Completes Spontaneous Greeting:  Functional


Produces Auto, Serial Info:  Functional


Imitates Simple Words/Phrases:  Functional


Word Finding:  Functional


Requests Basic Needs:  Functional


States Basic Personal Info:  Functional


Expresses Complex Ideas:  Functional





Objective Cognitive Domain


Attention:  WNL


Memory:  WNL


Problem Solving:  Functional


Executive Functions:  WNL


Visuospatial Skills:  WNL


Composite Severity Rating:  WNL


Clock Drawing Severity Rating:  WNL





Objective


Formal/Standardized Tests


[facility] Mental Status ([facility])


Results


28/30, within normal limits


Oral Motor/Speech Production


Within Normal Limits


Impression


Patient is a pleasant 74 y/o female who was admitted to the ARU for 

strengthening prior to returning home. The patient was given the SLUMS at 

bedside with a score of 28/30 obtained. The patient's score is within the normal

range of function. No further ST services are warranted at this time.





Speech Patient Assess


Expression of Ideas/Wants:  Expression (4)


Understanding Verbal Content:  Understands (4)


Brief Interview-Mental Status:  Yes


Repetition of Three Words:  Three (3)


Temporal Orientation: Year:  Correct (3)


Temporal Orientation: Month:  Accurate within 5 days(2)


Temporal Orientation: Day:  Correct (1)


Recall : Wear to say "Sock":  Yes,after cueing (1)


Recall : Color:  Yes, after cueing (1)


Recall : Bed:  Yes, no cue required (2)


Memory/Recall Ability:  Current season, That he or she is in a hsp/hsp unit





Speech-Plan


Patient/Family Goals


Patient/Family Goals:  


Patient plans on returning to her home with family support upon rehab


discharge.





Treatment Plan


Speech Therapy Treatment Plan:  Discontinue ST


Treatment Duration:  Oct 30, 2020


Frequency:  1 time per week


Estimated Hrs Per Day:  .25 hour per day


Rehab Potential:  Good


Barriers to Learning:  


None identified


Pt/Family Agrees to Plan:  Yes





Safety Risks/Education


Teaching Recipient:  Patient


Teaching Methods:  Discussion


Response to Teaching:  Verbalize Understanding


Education Topics Provided:  


Safety within her room and communication of wants/needs





Time


Speech Therapy Time In:  14:45


Speech Therapy Time Out:  15:00


Total Billed Time:  15


Billed Treatment Time


1, SPSNDCOMDEANN Rao            Oct 30, 2020 15:27

## 2020-10-30 NOTE — NUR
JOSE BARBER admitted to room 223-1, with an admitting diagnosis of MYOPATHY, on 
10/30/20 from [facility]  via [facility], accompanied by STAFF.JOSE BARBER 
introduced to surroundings, call light, bed controls, phone, TV, temperature control, 
lights, meal times, smoking policy, visitor policy, side rail policy, bathrooms and showers. 
 Patient Rights given to patient in the handbook.JOSE BARBER verbalizes understanding 
that [facility] is not responsible for the loss or damage to any personal effects or 
valuables that are kept in the patients posession during their hospitalization.  The 
following Patient Care Plans were discussed with the PT: Discharge Planning, PAIN 
CONTROL,PT/OT/ST, and WOUND CARE. JOSE BARBER verbalizes understanding of 
Interdisciplinary Patient Education. Patient and/or family were informed about the Rapid 
Response Team and its purpose. Patient received Patient Rights Booklet, which includes 
Privacy Act Statement and Data Collection Information Summary.

## 2020-10-30 NOTE — PHYSICAL THERAPY DAILY NOTE
PT Daily Note-Current


Subjective


Pt presents in recliner. PT agrees to PT. Pt reports 7/10 pain at abdominal 

incision.





Appearance


At conclusion of PT treatment pt returns to recliner; she has access to tray, 

call button, and all needs have been met.





Mental Status


Patient Orientation:  Person, Place, Time, Eyes Open, Situation


Attachments:  Colostomy/Ileostomy





Transfers


SCALE: Activities may be completed with or without assistive devices.





6-Indepedent-patient completes the activity by him/herself with no assistance 

from a helper.


5-Set-up or Clean-up Assistance-helper sets up or cleans up; patient completes 

activity. Troy assists only prior to or  


    following the activity.


4-Supervision or Touching Assistance-helper provides verbal cues and/or 

touching/steadying and/or contact guard assistance as patient completes 

activity. Assistance may be provided   


    throughout the activity or intermittently.


3-Partial/Moderate Assistance-helper does LESS THAN HALF the effort. Troy 

lifts, holds or supports trunk or limbs, but provides less than half the effort.


2-Substantial/Maximal Assistance-helper does MORE THAN HALF the effort. Troy 

lifts or holds trunk or limbs and provides more than half the effort.


5-Khomibyow-rjtprk does ALL the effort. Patient does none of the effort to 

complete the activity. Or, the assistance of 2 or more helpers is required for 

the patient to complete the  


    activity.


If activity was not attempted, code reason:


7-Patient Refused.


9-Not Applicable-not attempted and the patient did not perform the activity 

before the current illness, exacerbation or injury.


10-Not Attempted due to Environmental Limitations-(lack of equipment, weather 

restraints, etc.).


88-Not Attempted due to Medical Conditions or Safety Concerns.


Sit to Stand (QC):  4


Chair/Bed-to-Chair Xfer(QC):  4





Weight Bearing


Right Lower Extremity:  Right


Full Weight Bearing


Left Lower Extremity:  Left


Full Weight Bearing





Gait Training


Does the Patient Walk?:  Yes


Distance:  150'x2


Walk 10 feet (QC):  4


Walk 50 ft with 2 Turns(QC):  4


Walk 150 ft (QC):  4


Gait Persons Needed:  1


Gait Assistive Device:  FWW


SBA. Pt felt fatigued and required a seated rest break after approx 150'. Pt 

slight  LLE lag instead of fully clearly ground in swing stance.





Exercises


Seated Therapy Exercises:  Ankle pumps (HR/TR), Long arc quads, Hip flexion, Hip

abd/add


Seated Reps:  20





Treatments


Gait Training and LE strengthening





Assessment


Current Status:  Good Progress


Pt is fatigued by gait training but able to continue with seated exercises 

afterwards. Pt slightly drug LLE with ambulation but this did not cause any 

unsteadiness.





PT Short Term Goals


Short Term Goals


Time Frame:  2020


Roll Left & Right:  6


Sit to lyin


Lying to sitting on side of be:  6


Sit to stand:  6


Chair/bed-to-chair transfer:  5


4 steps:  4


Picking up objects:  4





PT Long Term Goals


Long Term Goals


PT Long Term Goals Time Frame:  2020


Roll Left & Right (QC):  6


Sit to Lying (QC):  6


Lying-Sitting on Side/Bed(QC):  6


Sit to Stand (QC):  6


Chair/Bed-to-Chair Xfer(QC):  6


Toilet Transfer (QC):  6


Car Transfer (QC):  6


Does the Patient Walk:  Yes


Walk 10 feet (QC):  6


Walk 50ft with 2 Turns (QC):  6


Walk 150 ft (QC):  6


Walking 10ft on Uneven Surface:  6


1 Step (curb) (QC):  6


4 Steps (QC):  6


12 Steps (QC):  6


Picking up an Object (QC):  6


Does the Pt use WC or Scooter?:  No


Wheel 50 feet with 2 turns (QC:  9


Wheel 150 feet:  9





PT Plan


Problem List


Problem List:  Activity Tolerance, Functional Strength, Safety, Balance, Gait, 

Transfer, Bed Mobility, ROM





Treatment/Plan


Treatment Plan:  Continue Plan of Care


Treatment Plan:  Bed Mobility, Education, Functional Activity Cherise, Functional 

Strength, Group Therapy, Gait, Safety, Therapeutic Exercise, Transfers


Treatment Duration:  2020


Frequency:  At least 5 of 7 days/Wk (IRF)


Estimated Hrs Per Day:  1.5 hours per day


Patient and/or Family Agrees t:  Yes





Safety Risks/Education


Patient Education:  Gait Training, Transfer Techniques, Correct Positioning, 

Safety Issues


Teaching Recipient:  Patient


Teaching Methods:  Demonstration, Discussion


Response to Teaching:  Reinforcement Needed





Time/GCodes


Time In:  1255


Time Out:  1320


Total Billed Treatment Time:  25


Total Billed Treatment


1 visit


GT 15'


EX 10'











ERIC VALLE PT              Oct 30, 2020 13:32

## 2020-10-31 VITALS — SYSTOLIC BLOOD PRESSURE: 155 MMHG | DIASTOLIC BLOOD PRESSURE: 68 MMHG

## 2020-10-31 VITALS — SYSTOLIC BLOOD PRESSURE: 125 MMHG | DIASTOLIC BLOOD PRESSURE: 59 MMHG

## 2020-10-31 VITALS — SYSTOLIC BLOOD PRESSURE: 137 MMHG | DIASTOLIC BLOOD PRESSURE: 67 MMHG

## 2020-10-31 LAB
ALBUMIN SERPL-MCNC: 3.7 GM/DL (ref 3.2–4.5)
ALP SERPL-CCNC: 110 U/L (ref 40–136)
ALT SERPL-CCNC: 30 U/L (ref 0–55)
BASOPHILS # BLD AUTO: 0.1 10^3/UL (ref 0–0.1)
BASOPHILS NFR BLD AUTO: 1 % (ref 0–10)
BILIRUB SERPL-MCNC: 0.3 MG/DL (ref 0.1–1)
BUN/CREAT SERPL: 26
CALCIUM SERPL-MCNC: 9.5 MG/DL (ref 8.5–10.1)
CHLORIDE SERPL-SCNC: 106 MMOL/L (ref 98–107)
CO2 SERPL-SCNC: 18 MMOL/L (ref 21–32)
CREAT SERPL-MCNC: 1.17 MG/DL (ref 0.6–1.3)
EOSINOPHIL # BLD AUTO: 0.7 10^3/UL (ref 0–0.3)
EOSINOPHIL NFR BLD AUTO: 6 % (ref 0–10)
GFR SERPLBLD BASED ON 1.73 SQ M-ARVRAT: 45 ML/MIN
GLUCOSE SERPL-MCNC: 107 MG/DL (ref 70–105)
HCT VFR BLD CALC: 32 % (ref 35–52)
HGB BLD-MCNC: 10.3 G/DL (ref 11.5–16)
LYMPHOCYTES # BLD AUTO: 1.7 10^3/UL (ref 1–4)
LYMPHOCYTES NFR BLD AUTO: 15 % (ref 12–44)
MANUAL DIFFERENTIAL PERFORMED BLD QL: NO
MCH RBC QN AUTO: 30 PG (ref 25–34)
MCHC RBC AUTO-ENTMCNC: 32 G/DL (ref 32–36)
MCV RBC AUTO: 94 FL (ref 80–99)
MONOCYTES # BLD AUTO: 0.7 10^3/UL (ref 0–1)
MONOCYTES NFR BLD AUTO: 6 % (ref 0–12)
NEUTROPHILS # BLD AUTO: 7.9 10^3/UL (ref 1.8–7.8)
NEUTROPHILS NFR BLD AUTO: 71 % (ref 42–75)
PLATELET # BLD: 309 10^3/UL (ref 130–400)
PMV BLD AUTO: 9.5 FL (ref 9–12.2)
POTASSIUM SERPL-SCNC: 4.7 MMOL/L (ref 3.6–5)
PROT SERPL-MCNC: 6.7 GM/DL (ref 6.4–8.2)
SODIUM SERPL-SCNC: 135 MMOL/L (ref 135–145)
WBC # BLD AUTO: 11.1 10^3/UL (ref 4.3–11)

## 2020-10-31 RX ADMIN — DOCUSATE SODIUM AND SENNOSIDES SCH EA: 8.6; 5 TABLET, FILM COATED ORAL at 20:04

## 2020-10-31 RX ADMIN — POLYETHYLENE GLYCOL (3350) SCH GM: 17 POWDER, FOR SOLUTION ORAL at 20:02

## 2020-10-31 RX ADMIN — OXYCODONE HYDROCHLORIDE AND ACETAMINOPHEN SCH MG: 500 TABLET ORAL at 06:25

## 2020-10-31 RX ADMIN — DOCUSATE SODIUM SCH MG: 100 CAPSULE ORAL at 09:19

## 2020-10-31 RX ADMIN — INSULIN ASPART SCH UNIT: 100 INJECTION, SOLUTION INTRAVENOUS; SUBCUTANEOUS at 06:46

## 2020-10-31 RX ADMIN — AMIODARONE HYDROCHLORIDE SCH MG: 200 TABLET ORAL at 21:31

## 2020-10-31 RX ADMIN — CARVEDILOL SCH MG: 12.5 TABLET, FILM COATED ORAL at 21:30

## 2020-10-31 RX ADMIN — CARVEDILOL SCH APPLIC: 25 TABLET, FILM COATED ORAL at 11:00

## 2020-10-31 RX ADMIN — INSULIN ASPART SCH UNIT: 100 INJECTION, SOLUTION INTRAVENOUS; SUBCUTANEOUS at 11:00

## 2020-10-31 RX ADMIN — POLYETHYLENE GLYCOL (3350) SCH GM: 17 POWDER, FOR SOLUTION ORAL at 09:20

## 2020-10-31 RX ADMIN — DOCUSATE SODIUM AND SENNOSIDES SCH EA: 8.6; 5 TABLET, FILM COATED ORAL at 09:20

## 2020-10-31 RX ADMIN — INSULIN ASPART SCH UNIT: 100 INJECTION, SOLUTION INTRAVENOUS; SUBCUTANEOUS at 21:41

## 2020-10-31 RX ADMIN — SIMETHICONE SCH MG: 80 TABLET, CHEWABLE ORAL at 13:59

## 2020-10-31 RX ADMIN — Medication SCH EA: at 06:25

## 2020-10-31 RX ADMIN — AMIODARONE HYDROCHLORIDE SCH MG: 200 TABLET ORAL at 09:20

## 2020-10-31 RX ADMIN — INSULIN ASPART SCH UNIT: 100 INJECTION, SOLUTION INTRAVENOUS; SUBCUTANEOUS at 16:19

## 2020-10-31 RX ADMIN — DOCUSATE SODIUM SCH MG: 100 CAPSULE ORAL at 20:02

## 2020-10-31 RX ADMIN — Medication SCH MG: at 09:20

## 2020-10-31 RX ADMIN — ENOXAPARIN SODIUM SCH MG: 100 INJECTION SUBCUTANEOUS at 06:25

## 2020-10-31 RX ADMIN — CARVEDILOL SCH MG: 12.5 TABLET, FILM COATED ORAL at 09:20

## 2020-10-31 RX ADMIN — ASPIRIN 81 MG CHEWABLE TABLET SCH MG: 81 TABLET CHEWABLE at 09:20

## 2020-10-31 RX ADMIN — SIMETHICONE SCH MG: 80 TABLET, CHEWABLE ORAL at 21:31

## 2020-10-31 RX ADMIN — LORATADINE SCH MG: 10 TABLET ORAL at 09:20

## 2020-10-31 RX ADMIN — SIMETHICONE SCH MG: 80 TABLET, CHEWABLE ORAL at 09:19

## 2020-10-31 NOTE — PHYSICAL THERAPY DAILY NOTE
PT Daily Note-Current


Subjective


Pt in bed upon arrival; agrees to PT tx. Pt refuses to sit up in chair d/t not 

wanting to sit up too long waiting for lunch.





Pain





   Numeric Pain Scale:  0-No Pain


   Location:  No Pain Reported





Mental Status


Patient Orientation:  Person, Place, Time, Situation


Attachments:  Colostomy/Ileostomy





Transfers


SCALE: Activities may be completed with or without assistive devices.





6-Indepedent-patient completes the activity by him/herself with no assistance 

from a helper.


5-Set-up or Clean-up Assistance-helper sets up or cleans up; patient completes a

ctivity. [place] assists only prior to or  


    following the activity.


4-Supervision or Touching Assistance-helper provides verbal cues and/or 

touching/steadying and/or contact guard assistance as patient completes 

activity. Assistance may be provided   


    throughout the activity or intermittently.


3-Partial/Moderate Assistance-helper does LESS THAN HALF the effort. [place] 

lifts, holds or supports trunk or limbs, but provides less than half the effort.


2-Substantial/Maximal Assistance-helper does MORE THAN HALF the effort. [place] 

lifts or holds trunk or limbs and provides more than half the effort.


3-Sunxyykxe-qlgqur does ALL the effort. Patient does none of the effort to 

complete the activity. Or, the assistance of 2 or more helpers is required for 

the patient to complete the  


    activity.


If activity was not attempted, code reason:


7-Patient Refused.


9-Not Applicable-not attempted and the patient did not perform the activity 

before the current illness, exacerbation or injury.


10-Not Attempted due to Environmental Limitations-(lack of equipment, weather 

restraints, etc.).


88-Not Attempted due to Medical Conditions or Safety Concerns.


Roll Left & Right (QC):  6


Sit to Lying (QC):  6


Lying to Sitting/Side of Bed(Q:  6


Sit to Stand (QC):  6


Toilet Transfer (QC):  6





Weight Bearing


Right Lower Extremity:  Right


Full Weight Bearing


Left Lower Extremity:  Left


Full Weight Bearing





Gait Training


Does the Patient Walk?:  Yes


Distance:  10'


Walk 10 feet (QC):  4


Gait Assistive Device:  FWW





Exercises


Supine Ex:  Ankle pumps, Quad Set, Glut sets, Heel Slides, Short Arc Quads, 

Straight leg raise, Hip abd/add


Supine Reps:  15 (x2)





Treatments


Supine ex completed. Pt requests to use the bathroom and empty colostomy bag. Pt

in bed w/ call light and bedside table w/in reach and all needs met at end of 

tx.





Assessment


Current Status:  Good Progress


Pt completes all ex w/out issue.





PT Short Term Goals


Short Term Goals


Time Frame:  2020


Roll Left & Right:  6


Sit to lyin


Lying to sitting on side of be:  6


Sit to stand:  6


Chair/bed-to-chair transfer:  5


4 steps:  4


Picking up objects:  4





PT Long Term Goals


Long Term Goals


PT Long Term Goals Time Frame:  2020


Roll Left & Right (QC):  6


Sit to Lying (QC):  6


Lying-Sitting on Side/Bed(QC):  6


Sit to Stand (QC):  6


Chair/Bed-to-Chair Xfer(QC):  6


Toilet Transfer (QC):  6


Car Transfer (QC):  6


Does the Patient Walk:  Yes


Walk 10 feet (QC):  6


Walk 50ft with 2 Turns (QC):  6


Walk 150 ft (QC):  6


Walking 10ft on Uneven Surface:  6


1 Step (curb) (QC):  6


4 Steps (QC):  6


12 Steps (QC):  6


Picking up an Object (QC):  6


Does the Pt use WC or Scooter?:  No


Wheel 50 feet with 2 turns (QC:  9


Wheel 150 feet:  9





PT Plan


Problem List


Problem List:  Activity Tolerance, Functional Strength, Safety, Gait, Transfer, 

Bed Mobility, ROM





Treatment/Plan


Treatment Plan:  Continue Plan of Care


Treatment Plan:  Bed Mobility, Education, Functional Activity Cherise, Functional 

Strength, Group Therapy, Gait, Safety, Therapeutic Exercise, Transfers


Treatment Duration:  2020


Frequency:  At least 5 of 7 days/Wk (IRF)


Estimated Hrs Per Day:  1.5 hours per day


Patient and/or Family Agrees t:  Yes





Safety Risks/Education


Patient Education:  Safety Issues


Teaching Recipient:  Patient


Teaching Methods:  Discussion


Response to Teaching:  Verbalize Understanding





Time/GCodes


Time In:  0951


Time Out:  1014


Total Billed Treatment Time:  23


Total Billed Treatment


1, FAx1 (8m), EX x1 (15)











SELENA ENRIQUE PTA           Oct 31, 2020 10:24

## 2020-10-31 NOTE — INDIVIDUALIZED PLAN OF CARE
Individualized Plan of Care


Rehab Nursing IPOC Order


Admission Date


Oct 30, 2020 at 10:40


Current Orders





Orders


Admission Order(Inpt,Obs,Sdc) (10/30/20 06:40)


Vital Signs: Per Unit Policy ( 08,16,00 (10/30/20 06:40)


Shahab Aguilar 09,21 (10/30/20 06:40)


Sequential Compression Device Q4H (10/30/20 06:40)


-Inpt Rehab Con (10/30/20 06:40)


Rehab Nursing Orders-Ipoc (10/30/20 06:40)


Physical Therapy Rehab Orders (10/30/20 06:40)


Occupational Therapy Rehab Ord (10/30/20 06:40)


Speech Therapy Rehab Orders (10/30/20 06:40)


Cbc With Automated Diff (10/31/20 06:00)


Comprehensive Metabolic Panel (10/31/20 06:00)


General/Regular (10/30/20 Breakfast)


Intake & Output 06,14,22 (10/30/20 06:40)


Precautions (Aru) (10/30/20 06:40)


Rehab-Intensity Of Therapy (10/30/20 06:40)


Alprazolam Tablet (Xanax Tablet) (10/30/20 06:45)


Calcium Carbonate Chew Tablet (Antacid C (10/30/20 06:45)


Diphenhydramine Tablet (Benadryl Tablet) (10/30/20 06:45)


Docusate Sodium Capsule (Colace Capsule) (10/30/20 09:00)


Docusate Sodium Capsule (Colace Capsule) (10/30/20 06:45)


Bisacodyl Suppository (Dulcolax Supposit (10/30/20 06:45)


Lactulose Oral Solution (Enulose Oral So (10/30/20 06:45)


Na Phos/Na Biphos Enema (Fleet Enema Guilherme (10/30/20 06:45)


Guaifenesin/Codeine Syrup (Robitussin Ac (10/30/20 06:45)


Loperamide Tablet (Imodium Tablet) (10/30/20 06:45)


Enoxaparin Injection (Lovenox Injection) (10/30/20 06:45)


Melatonin  Tablet (Melatonin Tablet) (10/30/20 06:45)


Polyethylene Glycol Powder Pkt (Miralax (10/30/20 09:00)


Ondansetron  Oral Dissolve Tab (Zofran (10/30/20 06:45)


Senna S Tablet (Senokot S Tablet) (10/30/20 09:00)


Initiate Admission Nursing Pro .admission (10/30/20 06:40)


Cho 60g/M 3snack ( [place]) (10/30/20 Lunch)


Admission Arrival Bed Request (10/30/20 10:40)


C Difficile Ag + Toxin A/B. (10/30/20 10:47)


Isolation Central Supply Req (10/30/20 10:47)


Amiodarone Tablet (Cordarone Tablet) (10/30/20 21:00)


Amlodipine Tablet (Norvasc Tablet) (10/31/20 09:00)


Aspirin Chewable Tablet (Baby Aspirin Ch (10/31/20 09:00)


Hydrocodone/Apap 5/325 Tablet (Lortab 5 (10/30/20 12:15)


Simethicone Tablet (Mylicon Chewable Tab (10/30/20 13:00)


Ascorbic Acid Tablet (Vitamin C Tablet) (10/31/20 07:00)


Carvedilol  Tablet (Coreg  Tablet) (10/30/20 21:00)


Loratadine Tablet (Claritin Tablet) (10/31/20 09:00)


(Nf) Gentamicin Sulfate (10/30/20 12:15)


(Nf) Gentamicin Sulfate (10/31/20 09:00)


Insulin Determir (Per Unit) (Levemir (Pe (10/30/20 21:00)


Therapeutic Multivitamin Tab (Vitamins, (10/31/20 07:00)


Insulin Aspart (Novolog) (Novolog (Charg (10/30/20 16:00)


Accucheck Achs ACHS (10/30/20 12:17)


Tramadol Tablet (Ultram Tablet) (10/30/20 12:23)


Tramadol Tablet (Ultram Tablet) (10/30/20 12:26)


Consult Cardiology (10/30/20 12:36)


Acetaminophen Tablet/Caplet (Tylenol  T (10/30/20 12:45)


Tramadol Tablet (Ultram Tablet) (10/30/20 12:45)


Ambulate 08,12,20 (10/30/20 12:47)


Sequential Compression Device Q4H (10/30/20 12:47)


Dvt/Vte Risk - Notifiy Physici Q4H (10/30/20 12:47)


Consult Wound Care Physician (10/30/20 13:34)


Ostomy Care (10/30/20 13:49)


Patient Visit (10/30/20 )


Pt Eval Low Complexity (10/30/20 )


Functional Activities, Ea 15 (10/30/20 )


Gait Training, Ea 15 Min (10/30/20 )


Exercise Therap, Ea 15 Min (10/30/20 )


Patient Visit (10/30/20 )


Speech Sound Lang Comp (10/30/20 )


Patient Visit (10/31/20 )


Functional Activities, Ea 15 (10/31/20 )


Exercise Therap, Ea 15 Min (10/31/20 )


Ekg Tracing (10/31/20 11:53)


Telemetry (10/31/20 11:53)


Telemetry Nursing Assessment ( (10/31/20 11:53)


Dressing Order (Intervention) BID (10/31/20 17:32)





Rehab Nursing Orders:  Ongoing Assess. of Function Status, Bowel Management, 

Disease Management & Educaiton, DVT Prophylaxis, Fall Prevention, 

Fluid/Electrolyte/Nutrition Mgmt, Infection Prevention, Medication Management & 

Education, Management of Risks & Complications, Management of Skin Intergrity, 

Nutrition Management, Pain Management, Patient/Family Support, Safety 

Management, Wound Management





Intensity of Therapy to be met


Patient to be seen:  Min.3h per day/5 of 7d





PT IPOC


Problem List:  Activity Tolerance, Functional Strength, Safety, Gait, Transfer, 

Bed Mobility, ROM


Treatment Plan:  Continue Plan of Care


Bed Mobility, Education, Functional Activity Cherise, Functional Strength, Group 

Therapy, Gait, Safety, Therapeutic Exercise, Transfers


Treatment Duration:  Nov 13, 2020


Frequency:  At least 5 of 7 days/Wk (IRF)


Estimated Hrs Per Day:  1.5 hours per day





OT IPOC


Problems:  Decreased Activ Tolerance, Impaired Funct Balance, Impaired I ADL's, 

Impaired Self-Care Skills


OT Treatment, Training and Edu:  Yes


Plan of Care:  ADL Retraining, Functional Mobility, Group Exercise/Act as Ind, 

UE Funct Exercise/Act, UE Neuromus Re-Ed/Coord


Treatment Duration:  Nov 13, 2020


Frequency:  At least 5 of 7 days/Wk (IRF)


Estimated Hrs Per Day:  1.5 hours per day





ST IPOC


Speech Therapy Treatment Plan:  Discontinue ST


Treatment Duration:  Oct 30, 2020


Frequency:  1 time per week


Estimated Hrs Per Day:  .25 hour per day





/Case Mgmt


/Case Managemen:  Discharge Planning





Dietitian/Nutritionist


Dietitian/Nutritionist to monitor nutritional status and make changes and/or 

recommendations as needed and work with speech pathology on dietary upgrades as 

the occur.





Physician IPOC


Medical Issues being managed closely and that require the 24 hour availability 

of a physician:





Recent severe medical issues requiring emergent colon resection now with AF and 

increased risk of decompensation


Medical Issues:  Bowel/Bladder Function, DVT Prophylaxis, Falls Precautions, 

Infection Protection, Pain Management


Brief Synthesis of Preadmission Screen, Post-Admission Evaluation, and Therapy 

Evaluations:





PT OT will focus on regaining function in order to handle all ADL's to go home 

and increase stamina


Medical Prognosis:  Good


Anticipated Length of Stay:  7 days











TAMMY HURST DO                Oct 31, 2020 11:25

## 2020-10-31 NOTE — WOUND CARE ASSESSMENT
Wound Care Assessment


Date Seen by Provider:  Oct 31, 2020


Time Seen by Provider:  13:40


Chief Complaint


Midline abdominal wound.


HPI


The patient is a 75 year old female with large open midline wound s/p 

exploratory laparotomy for peritonitis due to Clostridium difficile colitis.  

The wound base is clean, with some hypergranulation.  Will observe on saline 

dressings.  may benefit from Wound VAC.  Will follow.


Past Medical History:  Admits Diabetes Type II, Admits Heart Disease ([place] 

fibrillation, disuse myopathy.)


Smoking Status:  Former Smoker


Recreational Drug Use:  No


Alcohol Use:  Denies Use


Review of Systems


Pulmonary:  No Dyspnea


Cardiovascular:  No: Chest Pain





Exam





Vital Signs








  Date Time  Temp Pulse Resp B/P (MAP) Pulse Ox O2 Delivery O2 Flow Rate FiO2


 


10/31/20 12:48  58      


 


10/31/20 09:17    137/67 (90) 98 Room Air  


 


10/31/20 05:53 36.4  18     


 


10/30/20 11:56       96.00 





Capillary Refill : Less Than 3 Seconds


General Appearance:  no apparent distress


Cardiovascular:  regular rate, rhythm


Respiratory:  normal breath sounds


Gastrointestinal:  other (Midline abdominal wound  --  16.0 x 2.0 x 1.5 cm, base

100% granulating with hypergranulation.)





Results


Laboratory Tests


10/30/20 15:46: Glucometer 146H


10/30/20 21:12: Glucometer 147H


10/31/20 06:25: Glucometer 120H


10/31/20 06:43: 


White Blood Count 11.1H, Red Blood Count 3.43L, Hemoglobin 10.3L, Hematocrit 32L

, Mean Corpuscular Volume 94, Mean Corpuscular Hemoglobin 30, Mean Corpuscular 

Hemoglobin Concent 32, Red Cell Distribution Width 17.8H, Platelet Count 309, 

Mean Platelet Volume 9.5, Immature Granulocyte % (Auto) 1, Neutrophils (%) 

(Auto) 71, Lymphocytes (%) (Auto) 15, Monocytes (%) (Auto) 6, Eosinophils (%) 

(Auto) 6, Basophils (%) (Auto) 1, Neutrophils # (Auto) 7.9H, Lymphocytes # (Auto

) 1.7, Monocytes # (Auto) 0.7, Eosinophils # (Auto) 0.7H, Basophils # (Auto) 

0.1, Immature Granulocyte # (Auto) 0.1, Sodium Level 135, Potassium Level 4.7, 

Chloride Level 106, Carbon Dioxide Level 18L, Anion Gap 11, Blood Urea Nitrogen 

30H, Creatinine 1.17, Estimat Glomerular Filtration Rate 45, BUN/Creatinine 

Ratio 26, Glucose Level 107H, Calcium Level 9.5, Corrected Calcium 9.7, Total 

Bilirubin 0.3, Aspartate Amino Transf (AST/SGOT) 20, Alanine Aminotransferase 

(ALT/SGPT) 30, Alkaline Phosphatase 110, Total Protein 6.7, Albumin 3.7


10/31/20 12:01: Glucometer 93





Microbiology


10/30/20 C. difficile GDH Antigen & Toxins - Final, Complete


           





Microbiology


10/30/20 C. difficile GDH Antigen & Toxins - Final, Complete





Assessment/Plan/Dx


1.  Midline abdominal wound, s/p exploratory laparotomy for peritonitis.





2.  Diabetes mellitus.





3.  Atrial fibrillation.





4.  Disuse myopathy.





Plan: Continue saline dressings, may benefit from wound VAC.











TAM LEON MD             Oct 31, 2020 14:03

## 2020-10-31 NOTE — PM&R PROGRESS NOTE
Subjective


HPI/CC On Admission


Date Seen by Provider:  Oct 31, 2020


Time Seen by Provider:  11:30


Subjective/Events-last exam


Patient doing well


C diff negative and will recheck tomorrow then DC isolation


BM today


Dr Doyle consulted for wound care of abdomen


WBC 11


Dr Birch consulted for AF and OAC question


Checked meds and labs


Conferred with RN


Reviewed therapy notes





Review of Systems


General:  Fatigue





Objective


Exam


Vital Signs





Vital Signs








  Date Time  Temp Pulse Resp B/P (MAP) Pulse Ox O2 Delivery O2 Flow Rate FiO2


 


11/1/20 05:12 36.4 57 18 139/70 (93) 99 Room Air  


 


10/30/20 11:56       96.00 





Capillary Refill : Less Than 3 Seconds


General Appearance:  No Apparent Distress, WD/WN, Chronically ill


HEENT:  PERRL/EOMI, Normal ENT Inspection, Pharynx Normal


Neck:  Full Range of Motion, Normal Inspection, Non Tender, Supple, Carotid 

Bruit


Respiratory:  Chest Non Tender, Lungs Clear, Normal Breath Sounds, No Accessory 

Muscle Use, No Respiratory Distress


Cardiovascular:  Regular Rate, Rhythm, No Edema, No Gallop, No JVD, No Murmur, 

Normal Peripheral Pulses


Gastrointestinal:  Normal Bowel Sounds, No Organomegaly, No Pulsatile Mass, Non 

Tender, Soft


Back:  Normal Inspection, No CVA Tenderness, No Vertebral Tenderness


Extremity:  Normal Capillary Refill, Normal Inspection, Normal Range of Motion, 

Non Tender, No Calf Tenderness, No Pedal Edema


Neurologic/Psychiatric:  Alert, Oriented x3, No Motor/Sensory Deficits, Normal 

Mood/Affect, Abnormal Gait, Motor Weakness (generalized)


Skin:  Normal Color, Warm/Dry


Lymphatic:  No Adenopathy





Results/Procedures


Lab


Laboratory Tests


10/31/20 06:43








Patient resulted labs reviewed.





FIM


Transfers


Therapy Code Descriptions/Definitions 





Functional [place] Measure:


0=Not Assessed/NA        4=Minimal Assistance


1=Total Assistance        5=Supervision or Setup


2=Maximal Assistance  6=Modified [place]


3=Moderate Assistance 7=Complete [place]SCALE: Activities may be completed 

with or without assistive devices.





6-Indepedent-patient completes the activity by him/herself with no assistance 

from a helper.


5-Set-up or Clean-up Assistance-helper sets up or cleans up; patient completes 

activity. [place] assists only prior to or  


    following the activity.


4-Supervision or Touching Assistance-helper provides verbal cues and/or 

touching/steadying and/or contact guard assistance as patient completes 

activity. Assistance may be provided   


    throughout the activity or intermittently.


3-Partial/Moderate Assistance-helper does LESS THAN HALF the effort. [place] 

lifts, holds or supports trunk or limbs, but provides less than half the effort.


2-Substantial/Maximal Assistance-helper does MORE THAN HALF the effort. [place] 

lifts or holds trunk or limbs and provides more than half the effort.


7-Bmboapfpj-azdbbr does ALL the effort. Patient does none of the effort to compl

ete the activity. Or, the assistance of 2 or more helpers is required for the 

patient to complete the  


    activity.


If activity was not attempted, code reason:


7-Patient Refused.


9-Not Applicable-not attempted and the patient did not perform the activity 

before the current illness, exacerbation or injury.


10-Not Attempted due to Environmental Limitations-(lack of equipment, weather 

restraints, etc.).


88-Not Attempted due to Medical Conditions or Safety Concerns.


Roll Left to Right (QC):  6


Sit to Lying (QC):  6


Sit to Stand (QC):  6


Chair/Bed-to-Chair Xfer(QC):  4


Car Transfer (QC):  4





Gait Training


Does the Patient Walk?:  Yes


Distance:  10'


Walk 10 feet (QC):  4


Walk 50 ft with 2 Turns(QC):  4


Walk 150 ft (QC):  4


Walking 10ft/uneven surface-QC:  4


Gait Persons Needed:  1


Gait Assistive Device:  FWW





Wheelchair Training


Does the Pt Use a Wheelchair?:  No


Wheel 50 ft with 2 turns (QC):  9


Wheel 150 ft (QC):  9





Stair Training


#of Steps:  1


1 Step (curb) (QC):  4


4 Steps (QC):  88


12 Steps (QC):  88





Balance


Picking up an Object (QC):  88





ADL-Treatment


Eating (QC):  6 (drinks with IND. per clinical judgment pt is IND with eating 

tasks.)


Oral Hygiene (QC):  6 (IND per clinical judgement)


Shower/Bathe Self (QC):  4 (CGA and cues for shower chair transfer. Pt able to 

reach all areas. SBA during showering/ CGA in stance. )


Upper Body Dressing (QC):  5 (s/u, denies bra donning. )


Lower Body Dressing (QC):  4 (threads BLE in sit witout AE, stands with CGA and 

hikes over hips.)


On/Off Footwear (QC):  3 (min A doffing knee high socks, pt completes sock don

[place] with IND.)


Toileting Hygiene (QC):  4 (CGA in stance for bottom hygiene.)





Assessment/Plan


Assessment and Plan


Assess & Plan/Chief Complaint


Assessment:


Myopathy


AF w/RVR s/p cardioversion


Hypothyroidism


C diff colitis hx


Ovarian cancer hx


DM


HTN


Solitary kidney





Plan:


Home meds


IRF protocol


Insulin


Monitor stools





10/31/20:


Monitor loose stools


OAC question?


Dr Birch consult





(1) Myopathy


(2) C. difficile colitis


(3) Atrial fibrillation


(4) History of cardioversion


(5) Hypertension


(6) Diabetes mellitus


(7) Hypothyroidism


(8) Solitary kidney


(9) Ovarian cancer in remission


(10) Ileostomy in place











TAMMY HURST DO                Oct 31, 2020 11:25

## 2020-11-01 VITALS — SYSTOLIC BLOOD PRESSURE: 139 MMHG | DIASTOLIC BLOOD PRESSURE: 70 MMHG

## 2020-11-01 VITALS — SYSTOLIC BLOOD PRESSURE: 111 MMHG | DIASTOLIC BLOOD PRESSURE: 58 MMHG

## 2020-11-01 RX ADMIN — INSULIN ASPART SCH UNIT: 100 INJECTION, SOLUTION INTRAVENOUS; SUBCUTANEOUS at 21:21

## 2020-11-01 RX ADMIN — ENOXAPARIN SODIUM SCH MG: 100 INJECTION SUBCUTANEOUS at 06:16

## 2020-11-01 RX ADMIN — Medication SCH EA: at 06:16

## 2020-11-01 RX ADMIN — INSULIN ASPART SCH UNIT: 100 INJECTION, SOLUTION INTRAVENOUS; SUBCUTANEOUS at 15:18

## 2020-11-01 RX ADMIN — AMIODARONE HYDROCHLORIDE SCH MG: 200 TABLET ORAL at 21:20

## 2020-11-01 RX ADMIN — POLYETHYLENE GLYCOL (3350) SCH GM: 17 POWDER, FOR SOLUTION ORAL at 09:18

## 2020-11-01 RX ADMIN — ASPIRIN 81 MG CHEWABLE TABLET SCH MG: 81 TABLET CHEWABLE at 09:51

## 2020-11-01 RX ADMIN — LORATADINE SCH MG: 10 TABLET ORAL at 09:52

## 2020-11-01 RX ADMIN — SIMETHICONE SCH MG: 80 TABLET, CHEWABLE ORAL at 21:20

## 2020-11-01 RX ADMIN — Medication SCH MG: at 09:52

## 2020-11-01 RX ADMIN — POLYETHYLENE GLYCOL (3350) SCH GM: 17 POWDER, FOR SOLUTION ORAL at 21:15

## 2020-11-01 RX ADMIN — OXYCODONE HYDROCHLORIDE AND ACETAMINOPHEN SCH MG: 500 TABLET ORAL at 06:16

## 2020-11-01 RX ADMIN — SIMETHICONE SCH MG: 80 TABLET, CHEWABLE ORAL at 13:56

## 2020-11-01 RX ADMIN — DOCUSATE SODIUM SCH MG: 100 CAPSULE ORAL at 09:19

## 2020-11-01 RX ADMIN — CARVEDILOL SCH MG: 12.5 TABLET, FILM COATED ORAL at 09:52

## 2020-11-01 RX ADMIN — INSULIN ASPART SCH UNIT: 100 INJECTION, SOLUTION INTRAVENOUS; SUBCUTANEOUS at 11:52

## 2020-11-01 RX ADMIN — DOCUSATE SODIUM AND SENNOSIDES SCH EA: 8.6; 5 TABLET, FILM COATED ORAL at 21:15

## 2020-11-01 RX ADMIN — DOCUSATE SODIUM SCH MG: 100 CAPSULE ORAL at 21:15

## 2020-11-01 RX ADMIN — AMIODARONE HYDROCHLORIDE SCH MG: 200 TABLET ORAL at 09:52

## 2020-11-01 RX ADMIN — SIMETHICONE SCH MG: 80 TABLET, CHEWABLE ORAL at 09:51

## 2020-11-01 RX ADMIN — CARVEDILOL SCH MG: 12.5 TABLET, FILM COATED ORAL at 21:20

## 2020-11-01 RX ADMIN — DOCUSATE SODIUM AND SENNOSIDES SCH EA: 8.6; 5 TABLET, FILM COATED ORAL at 09:18

## 2020-11-01 RX ADMIN — INSULIN ASPART SCH UNIT: 100 INJECTION, SOLUTION INTRAVENOUS; SUBCUTANEOUS at 06:16

## 2020-11-01 NOTE — CONSULTATION-CARDIOLOGY
HPI-Cardiology


Cardiology Consultation


Date of Consultation


20


Date of Admission





Time Seen by Provider:  09:16


Indication:  transient atrial fibrillation





HPI


75-year-old lady with history of C. difficile colitis, had colectomy and 

ileostomy.  Had prolonged hospital stay at [facility] and a landmark.  

Transferred for rehabilitation.  During her hospital stay she was noted to have 

unstable atrial fibrillation underwent electrical cardioversion.  Did not 

receive oral anticoagulation.  Was transferred here and I was called for 

evaluation and management.  She is laying down in bed comfortably, denied any 

previous cardiac history.





Home Medications & Allergies


Allergies:  


Coded Allergies:  


     Penicillins (Verified  Allergy, Unknown, 10/30/20)


     raspberry (Verified  Allergy, Unknown, 10/30/20)


Home Medication List Reviewed:  Yes





PMH-Social-Family Hx


Patient Social History


Marital Status:  


Employed/Student:  retired


Alcohol Use:  Denies Use


Recreational Drug Use:  No


Smoking Status:  Former Smoker


Type Used:  Cigarettes


Recent Foreign Travel:  No


Recent Infectious Disease Expo:  No


Physical Abuse Screen:  No


Sexual Abuse:  No





Immunizations Up To Date


Date of Pneumonia Vaccine:  Oct 8, 2020


Date of Influenza Vaccine:  Oct 7, 2020





Past Medical History


Discussed below





Family Medical History


Family History:  


Arthritis


  19 MOTHER


Cardiovascular disease


  19 MOTHER


Diabetes mellitus


  19 FATHER


  19 MOTHER





Review of Systems-General


Review of Systems


Constitutional:  see HPI, malaise, weakness


EENTM:  no symptoms reported


Respiratory:  no symptoms reported, see HPI; No cough, No dyspnea on exertion, 

No hemoptysis, No orthopnea, No phlegm, No short of breath, No stridor, No 

wheezing, No other


Cardiovascular:  no symptoms reported, see HPI; No chest pain, No edema, No Hx 

of Intervention, No palpitations, No syncope, No vascular heart diseas, No other


Gastrointestinal:  abdominal pain, diarrhea


Genitourinary:  no symptoms reported, see HPI


Musculoskeletal:  no symptoms reported, see HPI


Skin:  no symptoms reported, see HPI


Psychiatric/Neurological:  No Symptoms Reported, See HPI





Reviewed Test Results


Reviewed Test Results


Lab





Laboratory Tests








Test


 10/31/20


12:01 10/31/20


15:53 10/31/20


21:23 20


06:15 Range/Units


 


 


Glucometer 93  179 H 131 H 117 H   MG/DL











Physical Exam


Physical Exam


Vital Signs





Vital Signs - First Documented








 10/30/20 10/30/20





 11:56 12:54


 


Temp 35.7 


 


Pulse 71 


 


Resp 18 


 


B/P (MAP) 137/72 


 


Pulse Ox  98


 


O2 Delivery Room Air 


 


O2 Flow Rate 96.00 





Capillary Refill : Less Than 3 Seconds


Height, Weight, BMI


Height: '"


Weight: lbs. oz. kg; 161.29 BMI


Method:Stated


General Appearance:  No Apparent Distress, WD/WN, Chronically ill


Eyes:  Bilateral Eye Normal Inspection, Bilateral Eye PERRL


HEENT:  PERRL/EOMI, Normal ENT Inspection, Pharynx Normal


Neck:  Full Range of Motion, Normal Inspection, Non Tender, Supple, Carotid 

Bruit


Respiratory:  Chest Non Tender, Lungs Clear, Normal Breath Sounds, No Accessory 

Muscle Use, No Respiratory Distress


Cardiovascular:  Regular Rate, Rhythm, No Edema, No Gallop, No JVD, No Murmur, 

Normal Peripheral Pulses


Gastrointestinal:  Normal Bowel Sounds, No Organomegaly, No Pulsatile Mass, Non 

Tender, Soft


Back:  Normal Inspection, No CVA Tenderness, No Vertebral Tenderness


Extremity:  Normal Capillary Refill, Normal Inspection, Normal Range of Motion, 

Non Tender, No Calf Tenderness, No Pedal Edema


Neurologic/Psychiatric:  Alert, Oriented x3, No Motor/Sensory Deficits, Normal 

Mood/Affect, Abnormal Gait, Motor Weakness (generalized)


Skin:  Normal Color, Warm/Dry


Lymphatic:  No Adenopathy





A/P-Cardiology


Admission Diagnosis


Atrial fibrillation


C. difficile colitis


Hypertension


Hyperlipidemia





Assessment/Plan


Transient atrial fibrillation during an acute illness required cardioversion due

to instability.  No other reported or documented history of atrial fibrillation.

 Not maintained on oral anticoagulation.  I will place her on telemetry and mon

itor for any other episodes of arrhythmia.  EKG showed normal sinus rhythm.





C. difficile colitis, history of colectomy and ileostomy.  Followed and managed 

by primary care team





Myopathy, receiving physical therapy





Hypertension, monitor blood pressure





Hyperlipidemia, monitor lipids next





Hypothyroidism, continue to monitor





Diabetes mellitus, followed and managed by primary care physician





History of solitary kidney, history of ovarian cancer





Clinical Quality Measures


DVT/VTE Risk/Contraindication:


Risk Factor Score Per Nursin


RFS Level Per Nursing on Admit:  4+=Very High











MELI BRITO MD              2020 9:19 am

## 2020-11-01 NOTE — PM&R PROGRESS NOTE
Subjective


HPI/CC On Admission


Date Seen by Provider:  Nov 1, 2020


Time Seen by Provider:  10:30


Subjective/Events-last exam


11/2/20:


C diff second specimen sent to lab


Pain pill given today and it helped


Lovenox maintained


No OAC recommended


Dr Birch appreciated








Patient doing well


C diff negative and will recheck tomorrow then DC isolation


BM today


Dr Doyle consulted for wound care of abdomen


WBC 11


Dr Birch consulted for AF and OAC question


Checked meds and labs


Conferred with RN


Reviewed therapy notes





Review of Systems


General:  Fatigue, Malaise





Objective


Exam


Vital Signs





Vital Signs








  Date Time  Temp Pulse Resp B/P (MAP) Pulse Ox O2 Delivery O2 Flow Rate FiO2


 


11/1/20 19:00  68      


 


11/1/20 17:32 37.0  18 111/58 (75) 98 Room Air  


 


10/30/20 11:56       96.00 





Capillary Refill : Less Than 3 Seconds


General Appearance:  No Apparent Distress, WD/WN, Chronically ill


HEENT:  PERRL/EOMI, Normal ENT Inspection, Pharynx Normal


Neck:  Full Range of Motion, Normal Inspection, Non Tender, Supple, Carotid 

Bruit


Respiratory:  Chest Non Tender, Lungs Clear, Normal Breath Sounds, No Accessory 

Muscle Use, No Respiratory Distress


Cardiovascular:  Regular Rate, Rhythm, No Edema, No Gallop, No JVD, No Murmur, 

Normal Peripheral Pulses


Gastrointestinal:  Normal Bowel Sounds, No Organomegaly, No Pulsatile Mass, Non 

Tender, Soft


Back:  Normal Inspection, No CVA Tenderness, No Vertebral Tenderness


Extremity:  Normal Capillary Refill, Normal Inspection, Normal Range of Motion, 

Non Tender, No Calf Tenderness, No Pedal Edema


Neurologic/Psychiatric:  Alert, Oriented x3, No Motor/Sensory Deficits, Normal 

Mood/Affect, Abnormal Gait, Motor Weakness (generalized)


Skin:  Normal Color, Warm/Dry


Lymphatic:  No Adenopathy





Results/Procedures


Lab


Patient resulted labs reviewed.





FIM


Transfers


Therapy Code Descriptions/Definitions 





Functional [place] Measure:


0=Not Assessed/NA        4=Minimal Assistance


1=Total Assistance        5=Supervision or Setup


2=Maximal Assistance  6=Modified [place]


3=Moderate Assistance 7=Complete IndependenceSCALE: Activities may be completed 

with or without assistive devices.





6-Indepedent-patient completes the activity by him/herself with no assistance 

from a helper.


5-Set-up or Clean-up Assistance-helper sets up or cleans up; patient completes 

activity. [place] assists only prior to or  


    following the activity.


4-Supervision or Touching Assistance-helper provides verbal cues and/or 

touching/steadying and/or contact guard assistance as patient completes activi

ty. Assistance may be provided   


    throughout the activity or intermittently.


3-Partial/Moderate Assistance-helper does LESS THAN HALF the effort. [place] 

lifts, holds or supports trunk or limbs, but provides less than half the effort.


2-Substantial/Maximal Assistance-helper does MORE THAN HALF the effort. [place] 

lifts or holds trunk or limbs and provides more than half the effort.


6-Gaozhotjd-qjcebf does ALL the effort. Patient does none of the effort to 

complete the activity. Or, the assistance of 2 or more helpers is required for 

the patient to complete the  


    activity.


If activity was not attempted, code reason:


7-Patient Refused.


9-Not Applicable-not attempted and the patient did not perform the activity 

before the current illness, exacerbation or injury.


10-Not Attempted due to Environmental Limitations-(lack of equipment, weather 

restraints, etc.).


88-Not Attempted due to Medical Conditions or Safety Concerns.


Roll Left to Right (QC):  6


Sit to Lying (QC):  6


Sit to Stand (QC):  6


Chair/Bed-to-Chair Xfer(QC):  4


Car Transfer (QC):  4





Gait Training


Does the Patient Walk?:  Yes


Distance:  10'


Walk 10 feet (QC):  4


Walk 50 ft with 2 Turns(QC):  4


Walk 150 ft (QC):  4


Walking 10ft/uneven surface-QC:  4


Gait Persons Needed:  1


Gait Assistive Device:  FWW





Wheelchair Training


Does the Pt Use a Wheelchair?:  No


Wheel 50 ft with 2 turns (QC):  9


Wheel 150 ft (QC):  9





Stair Training


#of Steps:  1


1 Step (curb) (QC):  4


4 Steps (QC):  88


12 Steps (QC):  88





Balance


Picking up an Object (QC):  88





ADL-Treatment


Eating (QC):  6 (drinks with IND. per clinical judgment pt is IND with eating 

tasks.)


Oral Hygiene (QC):  6 (IND per clinical judgement)


Shower/Bathe Self (QC):  4 (CGA and cues for shower chair transfer. Pt able to 

reach all areas. SBA during showering/ CGA in stance. )


Upper Body Dressing (QC):  5 (s/u, denies bra donning. )


Lower Body Dressing (QC):  4 (threads BLE in sit witout AE, stands with CGA and 

hikes over hips.)


On/Off Footwear (QC):  3 (min A doffing knee high socks, pt completes sock 

donning with IND.)


Toileting Hygiene (QC):  4 (CGA in stance for bottom hygiene.)





Assessment/Plan


Assessment and Plan


Assess & Plan/Chief Complaint


Assessment:


Myopathy


AF w/RVR s/p cardioversion


Hypothyroidism


C diff colitis hx


Ovarian cancer hx


DM


HTN


Solitary kidney





Plan:


Home meds


IRF protocol


Insulin


Monitor stools





10/31/20:


Monitor loose stools


OAC question?


Dr Birch consult





11/1/20:


Monitor pain


C diff last specimen sent to lab


Dr Birch appreciated





(1) Myopathy


(2) C. difficile colitis


(3) Atrial fibrillation


(4) History of cardioversion


(5) Hypertension


(6) Diabetes mellitus


(7) Hypothyroidism


(8) Solitary kidney


(9) Ovarian cancer in remission


(10) Ileostomy in place











TAMMY HURST DO                 Nov 1, 2020 12:19

## 2020-11-02 VITALS — SYSTOLIC BLOOD PRESSURE: 111 MMHG | DIASTOLIC BLOOD PRESSURE: 63 MMHG

## 2020-11-02 VITALS — DIASTOLIC BLOOD PRESSURE: 60 MMHG | SYSTOLIC BLOOD PRESSURE: 112 MMHG

## 2020-11-02 VITALS — SYSTOLIC BLOOD PRESSURE: 136 MMHG | DIASTOLIC BLOOD PRESSURE: 63 MMHG

## 2020-11-02 LAB
ALBUMIN SERPL-MCNC: 3.4 GM/DL (ref 3.2–4.5)
ALP SERPL-CCNC: 128 U/L (ref 40–136)
ALT SERPL-CCNC: 35 U/L (ref 0–55)
BASOPHILS # BLD AUTO: 0.1 10^3/UL (ref 0–0.1)
BASOPHILS NFR BLD AUTO: 1 % (ref 0–10)
BILIRUB SERPL-MCNC: 0.3 MG/DL (ref 0.1–1)
BUN/CREAT SERPL: 25
CALCIUM SERPL-MCNC: 9.3 MG/DL (ref 8.5–10.1)
CHLORIDE SERPL-SCNC: 110 MMOL/L (ref 98–107)
CO2 SERPL-SCNC: 18 MMOL/L (ref 21–32)
CREAT SERPL-MCNC: 1.12 MG/DL (ref 0.6–1.3)
EOSINOPHIL # BLD AUTO: 0.6 10^3/UL (ref 0–0.3)
EOSINOPHIL NFR BLD AUTO: 6 % (ref 0–10)
GFR SERPLBLD BASED ON 1.73 SQ M-ARVRAT: 48 ML/MIN
GLUCOSE SERPL-MCNC: 111 MG/DL (ref 70–105)
HCT VFR BLD CALC: 32 % (ref 35–52)
HGB BLD-MCNC: 10.1 G/DL (ref 11.5–16)
LYMPHOCYTES # BLD AUTO: 1.3 10^3/UL (ref 1–4)
LYMPHOCYTES NFR BLD AUTO: 13 % (ref 12–44)
MANUAL DIFFERENTIAL PERFORMED BLD QL: NO
MCH RBC QN AUTO: 30 PG (ref 25–34)
MCHC RBC AUTO-ENTMCNC: 32 G/DL (ref 32–36)
MCV RBC AUTO: 95 FL (ref 80–99)
MONOCYTES # BLD AUTO: 0.7 10^3/UL (ref 0–1)
MONOCYTES NFR BLD AUTO: 7 % (ref 0–12)
NEUTROPHILS # BLD AUTO: 7.6 10^3/UL (ref 1.8–7.8)
NEUTROPHILS NFR BLD AUTO: 73 % (ref 42–75)
PLATELET # BLD: 332 10^3/UL (ref 130–400)
PMV BLD AUTO: 9.8 FL (ref 9–12.2)
POTASSIUM SERPL-SCNC: 4.6 MMOL/L (ref 3.6–5)
PROT SERPL-MCNC: 6.2 GM/DL (ref 6.4–8.2)
SODIUM SERPL-SCNC: 138 MMOL/L (ref 135–145)
WBC # BLD AUTO: 10.3 10^3/UL (ref 4.3–11)

## 2020-11-02 RX ADMIN — MELATONIN 3 MG ORAL TABLET PRN MG: 3 TABLET ORAL at 21:20

## 2020-11-02 RX ADMIN — INSULIN ASPART SCH UNIT: 100 INJECTION, SOLUTION INTRAVENOUS; SUBCUTANEOUS at 17:11

## 2020-11-02 RX ADMIN — SIMETHICONE SCH MG: 80 TABLET, CHEWABLE ORAL at 08:49

## 2020-11-02 RX ADMIN — DOCUSATE SODIUM SCH MG: 100 CAPSULE ORAL at 09:14

## 2020-11-02 RX ADMIN — ASPIRIN 81 MG CHEWABLE TABLET SCH MG: 81 TABLET CHEWABLE at 08:49

## 2020-11-02 RX ADMIN — ENOXAPARIN SODIUM SCH MG: 100 INJECTION SUBCUTANEOUS at 06:24

## 2020-11-02 RX ADMIN — OXYCODONE HYDROCHLORIDE AND ACETAMINOPHEN SCH MG: 500 TABLET ORAL at 06:21

## 2020-11-02 RX ADMIN — INSULIN ASPART SCH UNIT: 100 INJECTION, SOLUTION INTRAVENOUS; SUBCUTANEOUS at 21:20

## 2020-11-02 RX ADMIN — INSULIN ASPART SCH UNIT: 100 INJECTION, SOLUTION INTRAVENOUS; SUBCUTANEOUS at 11:26

## 2020-11-02 RX ADMIN — LORATADINE SCH MG: 10 TABLET ORAL at 08:49

## 2020-11-02 RX ADMIN — CARVEDILOL SCH MG: 12.5 TABLET, FILM COATED ORAL at 21:20

## 2020-11-02 RX ADMIN — AMIODARONE HYDROCHLORIDE SCH MG: 200 TABLET ORAL at 08:49

## 2020-11-02 RX ADMIN — INSULIN ASPART SCH UNIT: 100 INJECTION, SOLUTION INTRAVENOUS; SUBCUTANEOUS at 06:00

## 2020-11-02 RX ADMIN — Medication SCH EA: at 06:21

## 2020-11-02 RX ADMIN — POLYETHYLENE GLYCOL (3350) SCH GM: 17 POWDER, FOR SOLUTION ORAL at 09:14

## 2020-11-02 RX ADMIN — POLYETHYLENE GLYCOL (3350) SCH GM: 17 POWDER, FOR SOLUTION ORAL at 21:20

## 2020-11-02 RX ADMIN — DOCUSATE SODIUM AND SENNOSIDES SCH EA: 8.6; 5 TABLET, FILM COATED ORAL at 09:14

## 2020-11-02 RX ADMIN — SIMETHICONE SCH MG: 80 TABLET, CHEWABLE ORAL at 14:18

## 2020-11-02 RX ADMIN — DOCUSATE SODIUM SCH MG: 100 CAPSULE ORAL at 21:19

## 2020-11-02 RX ADMIN — Medication SCH MG: at 08:49

## 2020-11-02 RX ADMIN — DOCUSATE SODIUM AND SENNOSIDES SCH EA: 8.6; 5 TABLET, FILM COATED ORAL at 21:19

## 2020-11-02 RX ADMIN — CARVEDILOL SCH MG: 12.5 TABLET, FILM COATED ORAL at 08:49

## 2020-11-02 RX ADMIN — SIMETHICONE SCH MG: 80 TABLET, CHEWABLE ORAL at 21:20

## 2020-11-02 RX ADMIN — AMIODARONE HYDROCHLORIDE SCH MG: 200 TABLET ORAL at 21:20

## 2020-11-02 NOTE — THERAPY GROUP DAILY NOTE
Therapy Daily Group Note


Patient Education Topic


Home Safety





Exercises


LE Seated Exercise, UE Exercise





Session


Ratio (pt:therapist):  4:1


Goal of Session:  Education on ARU Expectations, Home Safety Strategies, UE/LE 

Strengthing


Goal Met for this Session:  Yes


Pt Benefit of Group:  Contributions to Others, F/U Use of Strategies @Home, 

Increased Functional Safety, Increased Functional Strength, Improved Cognition, 

Recognition of Peers, Socialization





Other/Notes


Pt ambulated to therapy gym using FWW for OT/PT group.  Group consisted of 

introductions (name, place living, favorite thanksgiving food), socialization, 

seated UE/LE exercises, home safety trivia, ARU description and home safety 

education.  Pt introduced self appropriately and activity listened to peers.  Pt

was able to acknowledge understanding of educational topics by own personal 

experiences and strategies.  Pt tolerated UE/LE seated exercises and completed 1

set of 10 reps each.  Pt able to answer one question about home safety during 

trivia.  After session, pt lying in bed with call light/phone in reach.  All 

needs met in room.


Start Time:  13:00


Stop Time:  14:10


Total Billed Treatment Time:  70


Total Billed Treatment


1-GRP











RONEL DAILEY                Nov 2, 2020 14:24

## 2020-11-02 NOTE — CARDIOLOGY PROGRESS NOTE
Subjective


Date Seen by Provider:  2020


Time Seen by Provider:  08:39


Subjective/Events-last exam


Patient is sitting up in bed, denies any chest pain or palpitations.


Review of Systems


General:  No Chills, No Night Sweats, No Fatigue, No Malaise, No Appetite, No 

Other


HEENT:  No Head Aches, No Visual Changes, No Eye Pain, No Ear Pain, No 

Dysphasia, No Sinus Congestion, No Post Nasal Drip, No Sore Throat, No Other


Pulmonary:  No Dyspnea, No Cough, No Pleuritic Chest Pain, No Other


Cardiovascular:  No: Chest Pain, Palpitations, Orthopnea, Paroxysmal Noc. 

Dyspnea, Edema, Lt Headedness, Other





Objective-Cardiology


Exam


Last Set of Vital Signs





Vital Signs








 20





 21:15 06:15 06:35


 


Temp  36.6 


 


Pulse   60


 


Resp  20 


 


B/P (MAP)  111/63 (79) 


 


Pulse Ox  98 


 


O2 Delivery Room Air  


 


O2 Flow Rate 96.00  





Capillary Refill : Less Than 3 Seconds


I&O











Intake and Output 


 


 20





 00:00


 


Intake Total 1390 ml


 


Output Total 625 ml


 


Balance 765 ml


 


 


 


Intake Oral 1390 ml


 


Stool Total 625 ml


 


# Voids 8








General:  Alert, Oriented X3, Cooperative


HEENT:  Atraumatic, PERRLA


Neck:  Supple, No JVD, No Thyromegaly


Lungs:  Clear to Auscultation, Normal Air Movement


Heart:  Regular Rate, Normal S1, Normal S2, No Murmurs


Abdomen:  Normal Bowel Sounds, Soft, No Tenderness, No Hepatosplenomegaly, No 

Masses


Extremities:  No Clubbing, No Cyanosis, No Edema, Normal Pulses, No 

Tenderness/Swelling


Skin:  No Rashes, No Breakdown, No Significant Lesion


Neuro:  Normal Gait, Normal Speech, Strength at 5/5 X4 Ext, Normal Tone, 

Sensation Intact


Psych/Mental Status:  Mental Status NL, Mood NL





Results


Lab


Laboratory Tests


20 05:05














A/P-Cardiology


Admission Diagnosis


Atrial fibrillation


C. difficile colitis


Hypertension


Hyperlipidemia





Assessment/Plan


Transient atrial fibrillation during an acute illness required cardioversion due

to instability.  No other reported or documented history of atrial fibrillation.

 Not maintained on oral anticoagulation.  EKG showed normal sinus rhythm. 

Telemetry revealing SR. Continue on Amiodarone and continue to monitor. 





C. difficile colitis, history of colectomy and ileostomy.  Followed and managed 

by primary care team





Myopathy, receiving physical therapy





Hypertension, controlled, continue to monitor blood pressure





Hyperlipidemia, monitor lipids 





Hypothyroidism, continue to monitor





Diabetes mellitus, followed and managed by primary care physician





History of solitary kidney, history of ovarian cancer





Patient was seen and evaluated with Milagros, examination performed, management 

plan was discussed, agree with the current scribed note, I made few changes to 

the note using Italic font


Patient was seen at bedside, sitting comfortably, no new complaint


Continue to monitor telemetry.  No change in medication at this time





Clinical Quality Measures


DVT/VTE Risk/Contraindication:


Risk Factor Score Per Nursin


RFS Level Per Nursing on Admit:  4+=Very High











MILAGROS FARIAS   2020 08:41


MELI BRITO MD               2020 11:57

## 2020-11-02 NOTE — PM&R PROGRESS NOTE
Subjective


HPI/CC On Admission


Date Seen by Provider:  Nov 2, 2020


Time Seen by Provider:  10:00


Subjective/Events-last exam


11/2/20:


Hgb 10.1 


Will discontinue Benadryl and will take Claritin instead 


Only has one kidney, she is very particular about medications 


Isolation was discontinued because C-Diff was negative again 


Bowels are moving through the ileostomy 








11/1/20:


C diff second specimen sent to lab


Pain pill given today and it helped


Lovenox maintained


No OAC recommended


Dr Birch appreciated








Patient doing well


C diff negative and will recheck tomorrow then DC isolation


BM today


Dr Doyle consulted for wound care of abdomen


WBC 11


Dr Birch consulted for AF and OAC question


Checked meds and labs


Conferred with RN


Reviewed therapy notes





Review of Systems


General:  Fatigue, Malaise


Neurological:  Weakness





Objective


Exam


Vital Signs





Vital Signs








  Date Time  Temp Pulse Resp B/P (MAP) Pulse Ox O2 Delivery O2 Flow Rate FiO2


 


11/2/20 19:00  66      


 


11/2/20 17:39 37.0  18 136/63 (87) 98 Room Air  


 


11/1/20 21:15       96.00 





Capillary Refill : Less Than 3 Seconds


General Appearance:  No Apparent Distress, WD/WN, Chronically ill


HEENT:  PERRL/EOMI, Normal ENT Inspection, Pharynx Normal


Neck:  Full Range of Motion, Normal Inspection, Non Tender, Supple, Carotid 

Bruit


Respiratory:  Chest Non Tender, Lungs Clear, Normal Breath Sounds, No Accessory 

Muscle Use, No Respiratory Distress


Cardiovascular:  Regular Rate, Rhythm, No Edema, No Gallop, No JVD, No Murmur, 

Normal Peripheral Pulses


Gastrointestinal:  Normal Bowel Sounds, No Organomegaly, No Pulsatile Mass, Non 

Tender, Soft


Back:  Normal Inspection, No CVA Tenderness, No Vertebral Tenderness


Extremity:  Normal Capillary Refill, Normal Inspection, Normal Range of Motion, 

Non Tender, No Calf Tenderness, No Pedal Edema


Neurologic/Psychiatric:  Alert, Oriented x3, No Motor/Sensory Deficits, Normal 

Mood/Affect, Abnormal Gait, Motor Weakness (generalized)


Skin:  Normal Color, Warm/Dry


Lymphatic:  No Adenopathy





Results/Procedures


Lab


Laboratory Tests


11/2/20 05:05








Patient resulted labs reviewed.





FIM


Transfers


Therapy Code Descriptions/Definitions 





Functional [place] Measure:


0=Not Assessed/NA        4=Minimal Assistance


1=Total Assistance        5=Supervision or Setup


2=Maximal Assistance  6=Modified [place]


3=Moderate Assistance 7=Complete IndependenceSCALE: Activities may be completed 

with or without assistive devices.





6-Indepedent-patient completes the activity by him/herself with no assistance 

from a helper.


5-Set-up or Clean-up Assistance-helper sets up or cleans up; patient completes 

activity. [place] assists only prior to or  


    following the activity.


4-Supervision or Touching Assistance-helper provides verbal cues and/or 

touching/steadying and/or contact guard assistance as patient completes 

activity. Assistance may be provided   


    throughout the activity or intermittently.


3-Partial/Moderate Assistance-helper does LESS THAN HALF the effort. [place] 

lifts, holds or supports trunk or limbs, but provides less than half the effort.


2-Substantial/Maximal Assistance-helper does MORE THAN HALF the effort. [place] 

lifts or holds trunk or limbs and provides more than half the effort.


3-Mjdxukavq-hpqtds does ALL the effort. Patient does none of the effort to 

complete the activity. Or, the assistance of 2 or more helpers is required for 

the patient to complete the  


    activity.


If activity was not attempted, code reason:


7-Patient Refused.


9-Not Applicable-not attempted and the patient did not perform the activity 

before the current illness, exacerbation or injury.


10-Not Attempted due to Environmental Limitations-(lack of equipment, weather 

restraints, etc.).


88-Not Attempted due to Medical Conditions or Safety Concerns.


Roll Left to Right (QC):  6


Sit to Lying (QC):  6


Sit to Stand (QC):  6


Chair/Bed-to-Chair Xfer(QC):  4


Car Transfer (QC):  4





Gait Training


Does the Patient Walk?:  Yes


Distance:  10'


Walk 10 feet (QC):  4


Walk 50 ft with 2 Turns(QC):  4


Walk 150 ft (QC):  4


Walking 10ft/uneven surface-QC:  4


Gait Persons Needed:  1


Gait Assistive Device:  FWW





Wheelchair Training


Does the Pt Use a Wheelchair?:  No


Wheel 50 ft with 2 turns (QC):  9


Wheel 150 ft (QC):  9





Stair Training


#of Steps:  1


1 Step (curb) (QC):  4


4 Steps (QC):  88


12 Steps (QC):  88





Balance


Picking up an Object (QC):  88





ADL-Treatment


Eating (QC):  6 (drinks with IND. per clinical judgment pt is IND with eating 

tasks.)


Oral Hygiene (QC):  6 (IND per clinical judgement)


Shower/Bathe Self (QC):  4 (CGA and cues for shower chair transfer. Pt able to 

reach all areas. SBA during showering/ CGA in stance. )


Upper Body Dressing (QC):  5 (s/u, denies bra donning. )


Lower Body Dressing (QC):  4 (threads BLE in sit witout AE, stands with CGA and 

hikes over hips.)


On/Off Footwear (QC):  3 (min A doffing knee high socks, pt completes sock 

donning with IND.)


Toileting Hygiene (QC):  4 (CGA in stance for bottom hygiene.)





Assessment/Plan


Assessment and Plan


Assess & Plan/Chief Complaint


Assessment:


Myopathy


AF w/RVR s/p cardioversion


Hypothyroidism


C diff colitis hx


Ovarian cancer hx


DM


HTN


Solitary kidney





Plan:


Home meds


IRF protocol


Insulin


Monitor stools





10/31/20:


Monitor loose stools


OAC question?


Dr Birch consult





11/1/20:


Monitor pain


C diff last specimen sent to lab


Dr Birch appreciated





11/2/20:


Take out of isolation since C.diff negative


Monitor bowel function 


Pain control





(1) Myopathy


(2) C. difficile colitis


(3) Atrial fibrillation


(4) History of cardioversion


(5) Hypertension


(6) Diabetes mellitus


(7) Hypothyroidism


(8) Solitary kidney


(9) Ovarian cancer in remission


(10) Ileostomy in place











TAMMY HURST DO                 Nov 2, 2020 08:32

## 2020-11-02 NOTE — OCCUPATIONAL THER DAILY NOTE
OT Current Status-Daily Note


Subjective


Pt alert, lying in bed.  Pt stated that she was waiting on pain pill, c/o 8/10 

pain.  Nrsg brought meds and assessed pt.





Mental Status/Objective


Patient Orientation:  Person, Place, Time, Situation


Attachments:  Colostomy/Ileostomy, IV (midline)





ADL-Treatment


Pt agrees to shower.  Pt able to complete bed mobility independently using bed 

rails and HOB slightly raised.  Sit <--> stand is SBA.  Pt able to stand without

LOB while abdomen and IV site is covered for shower. Pt was able to don/doff 

UE/LE clothing. Pt ambulated to bathroom using FWW to perform upper and lower 

body washing with set up. Pt sit to stand to using grab bars to stabilize, 

cleanse buttocks and perineal area. Pt requires set up for dressing, used grab 

bars to stabilize while hiking briefs over hips. Pt ambulated to sink using FWW,

sink to stabilize while performing oral care, safety issue due to fatigue. Pt 

ambulated back to room using FWW, SBA. Pt stated wanting in bed call light/phone

in reach. All needs met.


Therapy Code Descriptions/Definitions 





Functional [place] Measure:


0=Not Assessed/NA        4=Minimal Assistance


1=Total Assistance        5=Supervision or Setup


2=Maximal Assistance  6=Modified [place]


3=Moderate Assistance 7=Complete IndependenceSCALE: Activities may be completed 

with or without assistive devices.





6-Indepedent-patient completes the activity by him/herself with no assistance 

from a helper.


5-Set-up or Clean-up Assistance-helper sets up or cleans up; patient completes 

activity. [place] assists only prior to or  


    following the activity.


4-Supervision or Touching Assistance-helper provides verbal cues and/or 

touching/steadying and/or contact guard assistance as patient completes 

activity. Assistance may be provided   


    throughout the activity or intermittently.


3-Partial/Moderate Assistance-helper does LESS THAN HALF the effort. [place] 

lifts, holds or supports trunk or limbs, but provides less than half the effort.


2-Substantial/Maximal Assistance-helper does MORE THAN HALF the effort. [place] 

lifts or holds trunk or limbs and provides more than half the effort.


3-Hnszxjpla-pmtrpp does ALL the effort. Patient does none of the effort to 

complete the activity. Or, the assistance of 2 or more helpers is required for 

the patient to complete the  


    activity.


If activity was not attempted, code reason:


7-Patient Refused.


9-Not Applicable-not attempted and the patient did not perform the activity 

before the current illness, exacerbation or injury.


10-Not Attempted due to Environmental Limitations-(lack of equipment, weather 

restraints, etc.).


88-Not Attempted due to Medical Conditions or Safety Concerns.


Oral Hygiene (QC):  6


Bathing Location:  L Arm, R Arm, L Upper Leg, R Upper Leg, L Lower Leg 

(including foot), R Lower Leg (including foot), Chest, Abdomen, Buttocks, 

Perineal Area


Shower/Bathe Self (QC):  5


Upper Body Dressing (QC):  5


Lower Body Dressing (QC):  5


On/Off Footwear:  5





OT Short Term Goals


Short Term Goals


Time Frame:  2020


Lower body dressin


Putting on/taking off footwear:  6





OT Long Term Goals


Long Term Goals


Time Frame:  2020


Eating (QC):  6


Oral Hygiene (QC):  6


Toileting Hygiene (QC):  6


Shower/Bathe Self (QC):  6


Upper Body Dressing (QC):  6


Lower Body Dressing (QC):  6


On/Off Footwear (QC):  6


Demonstrate ability to change colonoscopy bag with s/u-IND.


Additional Goals:  1-Demonstrate ADL Tasks, 2-Verbalize Understanding, 

3-ImproveStrength/[place]


1=Demonstrate adherence to instructed precautions during ADL tasks.


2=Patient will verbalize/demonstrate understanding of assistive 

devices/modifications for ADL.


3=Patient will improve strength/tolerance for activity to enable patient to perf

orm ADL's.





OT Education/Plan


Problem List/Assessment


Assessment:  Decreased Activ Tolerance, Impaired Self-Care Skills





Discharge Recommendations


Plan/Recommendations:  Continue POC





Treatment Plan/Plan of Care


Patient would benefit from OT for education, treatment and training to promote 

independence in ADL's, mobility, safety and/or upper extremity function for 

ADL's.


Plan of Care:  ADL Retraining, Functional Mobility, Group Exercise/Act as Ind, 

UE Funct Exercise/Act, UE Neuromus Re-Ed/Coord


Treatment Duration:  2020


Frequency:  At least 5 of 7 days/Wk (IRF)


Estimated Hrs Per Day:  1.5 hours per day


Agreement:  Yes


Rehab Potential:  Good





Time/GCodes


Start Time:  08:30


Stop Time:  09:30


Total Time Billed (hr/min):  60


Billed Treatment Time


1 visit- ADL 3 ( 40 mins), FA (20 mins)











RONEL DAILEY                2020 09:21

## 2020-11-03 VITALS — SYSTOLIC BLOOD PRESSURE: 120 MMHG | DIASTOLIC BLOOD PRESSURE: 62 MMHG

## 2020-11-03 VITALS — SYSTOLIC BLOOD PRESSURE: 133 MMHG | DIASTOLIC BLOOD PRESSURE: 64 MMHG

## 2020-11-03 VITALS — DIASTOLIC BLOOD PRESSURE: 54 MMHG | SYSTOLIC BLOOD PRESSURE: 103 MMHG

## 2020-11-03 RX ADMIN — SIMETHICONE SCH MG: 80 TABLET, CHEWABLE ORAL at 21:12

## 2020-11-03 RX ADMIN — LORATADINE SCH MG: 10 TABLET ORAL at 09:28

## 2020-11-03 RX ADMIN — MELATONIN 3 MG ORAL TABLET PRN MG: 3 TABLET ORAL at 21:12

## 2020-11-03 RX ADMIN — INSULIN ASPART SCH UNIT: 100 INJECTION, SOLUTION INTRAVENOUS; SUBCUTANEOUS at 11:00

## 2020-11-03 RX ADMIN — OXYCODONE HYDROCHLORIDE AND ACETAMINOPHEN SCH MG: 500 TABLET ORAL at 06:32

## 2020-11-03 RX ADMIN — CARVEDILOL SCH APPLIC: 25 TABLET, FILM COATED ORAL at 17:26

## 2020-11-03 RX ADMIN — INSULIN ASPART SCH UNIT: 100 INJECTION, SOLUTION INTRAVENOUS; SUBCUTANEOUS at 05:47

## 2020-11-03 RX ADMIN — INSULIN ASPART SCH UNIT: 100 INJECTION, SOLUTION INTRAVENOUS; SUBCUTANEOUS at 17:24

## 2020-11-03 RX ADMIN — AMIODARONE HYDROCHLORIDE SCH MG: 200 TABLET ORAL at 09:28

## 2020-11-03 RX ADMIN — CARVEDILOL SCH MG: 12.5 TABLET, FILM COATED ORAL at 09:28

## 2020-11-03 RX ADMIN — ENOXAPARIN SODIUM SCH MG: 100 INJECTION SUBCUTANEOUS at 06:32

## 2020-11-03 RX ADMIN — DOCUSATE SODIUM AND SENNOSIDES SCH EA: 8.6; 5 TABLET, FILM COATED ORAL at 19:37

## 2020-11-03 RX ADMIN — SIMETHICONE SCH MG: 80 TABLET, CHEWABLE ORAL at 09:28

## 2020-11-03 RX ADMIN — CARVEDILOL SCH APPLIC: 25 TABLET, FILM COATED ORAL at 17:27

## 2020-11-03 RX ADMIN — CARVEDILOL SCH APPLIC: 25 TABLET, FILM COATED ORAL at 17:28

## 2020-11-03 RX ADMIN — POLYETHYLENE GLYCOL (3350) SCH GM: 17 POWDER, FOR SOLUTION ORAL at 09:30

## 2020-11-03 RX ADMIN — CARVEDILOL SCH MG: 12.5 TABLET, FILM COATED ORAL at 21:12

## 2020-11-03 RX ADMIN — DOCUSATE SODIUM SCH MG: 100 CAPSULE ORAL at 19:37

## 2020-11-03 RX ADMIN — AMIODARONE HYDROCHLORIDE SCH MG: 200 TABLET ORAL at 21:12

## 2020-11-03 RX ADMIN — Medication SCH MG: at 09:28

## 2020-11-03 RX ADMIN — SIMETHICONE SCH MG: 80 TABLET, CHEWABLE ORAL at 13:32

## 2020-11-03 RX ADMIN — ASPIRIN 81 MG CHEWABLE TABLET SCH MG: 81 TABLET CHEWABLE at 09:28

## 2020-11-03 RX ADMIN — INSULIN ASPART SCH UNIT: 100 INJECTION, SOLUTION INTRAVENOUS; SUBCUTANEOUS at 21:13

## 2020-11-03 RX ADMIN — Medication SCH EA: at 06:32

## 2020-11-03 RX ADMIN — POLYETHYLENE GLYCOL (3350) SCH GM: 17 POWDER, FOR SOLUTION ORAL at 19:36

## 2020-11-03 RX ADMIN — DOCUSATE SODIUM AND SENNOSIDES SCH EA: 8.6; 5 TABLET, FILM COATED ORAL at 09:30

## 2020-11-03 RX ADMIN — DOCUSATE SODIUM SCH MG: 100 CAPSULE ORAL at 09:30

## 2020-11-03 NOTE — PHYSICAL THERAPY DAILY NOTE
PT Daily Note-Current


Subjective


Pt laying Supine in bed upon arrival.  Pt agrees to PT but reports fatigue this 

afternoon.





Pain





   Numeric Pain Scale:  4


   Location:  Right, Left


   Location Body Site:  Thigh


   Pain Description:  Tightness





Mental Status


Patient Orientation:  Person, Place, Time, Situation





Transfers


SCALE: Activities may be completed with or without assistive devices.





6-Indepedent-patient completes the activity by him/herself with no assistance 

from a helper.


5-Set-up or Clean-up Assistance-helper sets up or cleans up; patient completes 

activity. [place] assists only prior to or  


    following the activity.


4-Supervision or Touching Assistance-helper provides verbal cues and/or 

touching/steadying and/or contact guard assistance as patient completes 

activity. Assistance may be provided   


    throughout the activity or intermittently.


3-Partial/Moderate Assistance-helper does LESS THAN HALF the effort. [place] lift

s, holds or supports trunk or limbs, but provides less than half the effort.


2-Substantial/Maximal Assistance-helper does MORE THAN HALF the effort. [place] 

lifts or holds trunk or limbs and provides more than half the effort.


0-Fkznqszko-nesecl does ALL the effort. Patient does none of the effort to 

complete the activity. Or, the assistance of 2 or more helpers is required for 

the patient to complete the  


    activity.


If activity was not attempted, code reason:


7-Patient Refused.


9-Not Applicable-not attempted and the patient did not perform the activity 

before the current illness, exacerbation or injury.


10-Not Attempted due to Environmental Limitations-(lack of equipment, weather 

restraints, etc.).


88-Not Attempted due to Medical Conditions or Safety Concerns.





Weight Bearing


Right Lower Extremity:  Right


Full Weight Bearing


Left Lower Extremity:  Left


Full Weight Bearing





Exercises


Supine Ex:  Ankle pumps, Quad Set, Glut sets, Heel Slides, Straight leg raise, 

Hip abd/add


Supine Reps:  20





Treatments


Completes Supine Ex as well as PTA stretches tight hamstrings.  Pt resting with 

all needs met at end of tx, call light next to pt.





Assessment


Current Status:  Good Progress


Pt is gaining strength but is limited by tight hamstrings.





PT Short Term Goals


Short Term Goals


Time Frame:  2020


Roll Left & Right:  6


Sit to lyin


Lying to sitting on side of be:  6


Sit to stand:  6


Chair/bed-to-chair transfer:  5


4 steps:  4


Picking up objects:  4





PT Long Term Goals


Long Term Goals


PT Long Term Goals Time Frame:  2020


Roll Left & Right (QC):  6


Sit to Lying (QC):  6


Lying-Sitting on Side/Bed(QC):  6


Sit to Stand (QC):  6


Chair/Bed-to-Chair Xfer(QC):  6


Toilet Transfer (QC):  6


Car Transfer (QC):  6


Does the Patient Walk:  Yes


Walk 10 feet (QC):  6


Walk 50ft with 2 Turns (QC):  6


Walk 150 ft (QC):  6


Walking 10ft on Uneven Surface:  6


1 Step (curb) (QC):  6


4 Steps (QC):  6


12 Steps (QC):  6


Picking up an Object (QC):  6


Does the Pt use WC or Scooter?:  No


Wheel 50 feet with 2 turns (QC:  9


Wheel 150 feet:  9





PT Plan


Problem List


Problem List:  Activity Tolerance





Treatment/Plan


Treatment Plan:  Continue Plan of Care


Treatment Plan:  Bed Mobility, Education, Functional Activity Cherise, Functional 

Strength, Group Therapy, Gait, Safety, Therapeutic Exercise, Transfers


Treatment Duration:  2020


Frequency:  At least 5 of 7 days/Wk (IRF)


Estimated Hrs Per Day:  1.5 hours per day


Patient and/or Family Agrees t:  Yes





Safety Risks/Education


Patient Education:  Correct Positioning, Safety Issues


Teaching Recipient:  Patient


Teaching Methods:  Discussion


Response to Teaching:  Verbalize Understanding





Time/GCodes


Time In:  1300


Time Out:  1330


Total Billed Treatment Time:  30


Total Billed Treatment


1, EX x2 (30m)











MIRZA FRANCOIS PTA               Nov 3, 2020 13:29

## 2020-11-03 NOTE — PM&R PROGRESS NOTE
Subjective


HPI/CC On Admission


Date Seen by Provider:  Nov 3, 2020


Time Seen by Provider:  10:00


Subjective/Events-last exam


11/3/20:


Pt doing very well 


Dr. Doyle saw the wound and it is healing well 


Overall seems to be doing very well 


DC planned soon








11/2/20:


Hgb 10.1 


Will discontinue Benadryl and will take Claritin instead 


Only has one kidney, she is very particular about medications 


Isolation was discontinued because C-Diff was negative again 


Bowels are moving through the ileostomy 








11/1/20:


C diff second specimen sent to lab


Pain pill given today and it helped


Lovenox maintained


No OAC recommended


Dr Birch appreciated








Patient doing well


C diff negative and will recheck tomorrow then DC isolation


BM today


Dr Doyle consulted for wound care of abdomen


WBC 11


Dr Birch consulted for AF and OAC question


Checked meds and labs


Conferred with RN


Reviewed therapy notes





Review of Systems


General:  Fatigue, Malaise


Gastrointestinal:  Abdominal Pain





Objective


Exam


Vital Signs





Vital Signs








  Date Time  Temp Pulse Resp B/P (MAP) Pulse Ox O2 Delivery O2 Flow Rate FiO2


 


11/3/20 19:00  70      


 


11/3/20 16:30 37.0  16 120/62 (81) 100 Room Air  


 


11/1/20 21:15       96.00 





Capillary Refill : Less Than 3 Seconds


General Appearance:  No Apparent Distress, WD/WN, Chronically ill


HEENT:  PERRL/EOMI, Normal ENT Inspection, Pharynx Normal


Neck:  Full Range of Motion, Normal Inspection, Non Tender, Supple, Carotid 

Bruit


Respiratory:  Chest Non Tender, Lungs Clear, Normal Breath Sounds, No Accessory 

Muscle Use, No Respiratory Distress


Cardiovascular:  Regular Rate, Rhythm, No Edema, No Gallop, No JVD, No Murmur, 

Normal Peripheral Pulses


Gastrointestinal:  Normal Bowel Sounds, No Organomegaly, No Pulsatile Mass, Non 

Tender, Soft


Back:  Normal Inspection, No CVA Tenderness, No Vertebral Tenderness


Extremity:  Normal Capillary Refill, Normal Inspection, Normal Range of Motion, 

Non Tender, No Calf Tenderness, No Pedal Edema


Neurologic/Psychiatric:  Alert, Oriented x3, No Motor/Sensory Deficits, Normal 

Mood/Affect, Abnormal Gait, Motor Weakness (generalized)


Skin:  Normal Color, Warm/Dry


Lymphatic:  No Adenopathy





Results/Procedures


Lab


Patient resulted labs reviewed.





FIM


Transfers


Therapy Code Descriptions/Definitions 





Functional [place] Measure:


0=Not Assessed/NA        4=Minimal Assistance


1=Total Assistance        5=Supervision or Setup


2=Maximal Assistance  6=Modified [place]


3=Moderate Assistance 7=Complete IndependenceSCALE: Activities may be completed 

with or without assistive devices.





6-Indepedent-patient completes the activity by him/herself with no assistance 

from a helper.


5-Set-up or Clean-up Assistance-helper sets up or cleans up; patient completes 

activity. [place] assists only prior to or  


    following the activity.


4-Supervision or Touching Assistance-helper provides verbal cues and/or 

touching/steadying and/or contact guard assistance as patient completes 

activity. Assistance may be provided   


    throughout the activity or intermittently.


3-Partial/Moderate Assistance-helper does LESS THAN HALF the effort. [place] 

lifts, holds or supports trunk or limbs, but provides less than half the effort.


2-Substantial/Maximal Assistance-helper does MORE THAN HALF the effort. [place] 

lifts or holds trunk or limbs and provides more than half the effort.


4-Ykbprpegr-fmzqlg does ALL the effort. Patient does none of the effort to co

mplete the activity. Or, the assistance of 2 or more helpers is required for the

patient to complete the  


    activity.


If activity was not attempted, code reason:


7-Patient Refused.


9-Not Applicable-not attempted and the patient did not perform the activity 

before the current illness, exacerbation or injury.


10-Not Attempted due to Environmental Limitations-(lack of equipment, weather 

restraints, etc.).


88-Not Attempted due to Medical Conditions or Safety Concerns.


Roll Left to Right (QC):  6


Sit to Lying (QC):  6


Sit to Stand (QC):  6


Chair/Bed-to-Chair Xfer(QC):  6


Car Transfer (QC):  4





Gait Training


Does the Patient Walk?:  Yes


Distance:  10'


Walk 10 feet (QC):  4


Walk 50 ft with 2 Turns(QC):  4


Walk 150 ft (QC):  4


Walking 10ft/uneven surface-QC:  4


Gait Persons Needed:  1


Gait Assistive Device:  FWW





Wheelchair Training


Does the Pt Use a Wheelchair?:  No


Wheel 50 ft with 2 turns (QC):  9


Wheel 150 ft (QC):  9





Stair Training


#of Steps:  1


1 Step (curb) (QC):  4


4 Steps (QC):  88


12 Steps (QC):  88





Balance


Picking up an Object (QC):  88





ADL-Treatment


Eating (QC):  6 (drinks with IND. per clinical judgment pt is IND with eating 

tasks.)


Oral Hygiene (QC):  6


Bathing Location:  L Arm, R Arm, L Upper Leg, R Upper Leg, L Lower Leg 

(including foot), R Lower Leg (including foot), Chest, Abdomen, Buttocks, 

Perineal Area


Shower/Bathe Self (QC):  5


Upper Body Dressing (QC):  5


Lower Body Dressing (QC):  5


On/Off Footwear (QC):  5


Toileting Hygiene (QC):  4 (CGA in stance for bottom hygiene.)





Assessment/Plan


Assessment and Plan


Assess & Plan/Chief Complaint


Assessment:


Myopathy


AF w/RVR s/p cardioversion


Hypothyroidism


C diff colitis hx


Ovarian cancer hx


DM


HTN


Solitary kidney





Plan:


Home meds


IRF protocol


Insulin


Monitor stools





10/31/20:


Monitor loose stools


OAC question?


Dr Birch consult





11/1/20:


Monitor pain


C diff last specimen sent to lab


Dr Birch appreciated





11/2/20:


Take out of isolation since C.diff negative


Monitor bowel function 


Pain control





11/3/20:


Pain control


Lovenox


IR protocol





(1) Myopathy


(2) C. difficile colitis


(3) Atrial fibrillation


(4) History of cardioversion


(5) Hypertension


(6) Diabetes mellitus


(7) Hypothyroidism


(8) Solitary kidney


(9) Ovarian cancer in remission


(10) Ileostomy in place











TAMMY HURST DO                 Nov 3, 2020 08:56

## 2020-11-03 NOTE — CARDIOLOGY PROGRESS NOTE
Subjective


Date Seen by Provider:  Nov 3, 2020


Time Seen by Provider:  08:09


Subjective/Events-last exam


Patient is laying down in bed.  No new complaint


Review of Systems


General:  No Chills, No Night Sweats, No Fatigue, No Malaise, No Appetite, No 

Other


HEENT:  No Head Aches, No Visual Changes, No Eye Pain, No Ear Pain, No 

Dysphasia, No Sinus Congestion, No Post Nasal Drip, No Sore Throat, No Other


Pulmonary:  No Dyspnea, No Cough, No Pleuritic Chest Pain, No Other


Cardiovascular:  No: Chest Pain, Palpitations, Orthopnea, Paroxysmal Noc. 

Dyspnea, Edema, Lt Headedness, Other





Objective-Cardiology


Exam


Last Set of Vital Signs





Vital Signs








 11/1/20 11/3/20





 21:15 05:30


 


Temp  36.4


 


Pulse  60


 


Resp  16


 


B/P (MAP)  103/54 (70)


 


Pulse Ox  97


 


O2 Delivery  Room Air


 


O2 Flow Rate 96.00 





Capillary Refill : Less Than 3 Seconds


I&O











Intake and Output 


 


 11/3/20





 00:00


 


Intake Total 1520 ml


 


Output Total 300 ml


 


Balance 1220 ml


 


 


 


Intake Oral 1520 ml


 


Stool Total 300 ml


 


# Voids 7


 


# Bowel Movements 4








General:  Alert, Oriented X3, Cooperative


HEENT:  Atraumatic, PERRLA


Neck:  Supple, No JVD, No Thyromegaly


Lungs:  Clear to Auscultation, Normal Air Movement


Heart:  Regular Rate, Normal S1, Normal S2, No Murmurs


Abdomen:  Normal Bowel Sounds, Soft, No Tenderness, No Hepatosplenomegaly, No 

Masses


Extremities:  No Clubbing, No Cyanosis, No Edema, Normal Pulses, No 

Tenderness/Swelling


Skin:  No Rashes, No Breakdown, No Significant Lesion


Neuro:  Normal Gait, Normal Speech, Strength at 5/5 X4 Ext, Normal Tone, 

Sensation Intact


Psych/Mental Status:  Mental Status NL, Mood NL





A/P-Cardiology


Admission Diagnosis


Atrial fibrillation


C. difficile colitis


Hypertension


Hyperlipidemia





Assessment/Plan


Transient atrial fibrillation during an acute illness required cardioversion due

to instability.  No other reported or documented history of atrial fibrillation.

 Not maintained on oral anticoagulation.  EKG showed normal sinus rhythm. 

Telemetry revealing SR. Continue on Amiodarone and continue to monitor. 





C. difficile colitis, history of colectomy and ileostomy.  Followed and managed 

by primary care team





Myopathy, receiving physical therapy





Hypertension, controlled, continue to monitor blood pressure





Hyperlipidemia, monitor lipids 





Hypothyroidism, continue to monitor





Diabetes mellitus, followed and managed by primary care physician





History of solitary kidney, history of ovarian cancer





Clinical Quality Measures


DVT/VTE Risk/Contraindication:


Risk Factor Score Per Nursin


RFS Level Per Nursing on Admit:  4+=Very High











MELI BRITO MD               Nov 3, 2020 08:09

## 2020-11-03 NOTE — OCCUPATIONAL THER DAILY NOTE
OT Current Status-Daily Note


Subjective


Pt was in recliner upon arrival. Pt no c/o pain. Pt agreed to therapy.





Mental Status/Objective


Patient Orientation:  Person, Place, Time, Situation


Attachments:  Colostomy/Ileostomy, IV





ADL-Treatment


Pt refused sponge bath. Pt stated she wanted to brush teeth. Pt transferred to 

bathroom FWW to perform oral care, brushing hair using sink to stabilize. Pt 

transferred back to recliner to perform upper body dressing with set up.  Pt 

demonstrated threading feet into pants/briefs sit to stand using FWW to 

stabilize while hiking pants/briefs over hip by self after set up. Pt 

transferred back to restroom using FWW to use restroom, cleansed perineal area 

by self.


Therapy Code Descriptions/Definitions 





Functional [place] Measure:


0=Not Assessed/NA        4=Minimal Assistance


1=Total Assistance        5=Supervision or Setup


2=Maximal Assistance  6=Modified [place]


3=Moderate Assistance 7=Complete IndependenceSCALE: Activities may be completed 

with or without assistive devices.





6-Indepedent-patient completes the activity by him/herself with no assistance 

from a helper.


5-Set-up or Clean-up Assistance-helper sets up or cleans up; patient completes 

activity. [place] assists only prior to or  


    following the activity.


4-Supervision or Touching Assistance-helper provides verbal cues and/or 

touching/steadying and/or contact guard assistance as patient completes 

activity. Assistance may be provided   


    throughout the activity or intermittently.


3-Partial/Moderate Assistance-helper does LESS THAN HALF the effort. [place] 

lifts, holds or supports trunk or limbs, but provides less than half the effort.


2-Substantial/Maximal Assistance-helper does MORE THAN HALF the effort. [place] 

lifts or holds trunk or limbs and provides more than half the effort.


4-Ombxwljzu-mdcxlv does ALL the effort. Patient does none of the effort to 

complete the activity. Or, the assistance of 2 or more helpers is required for 

the patient to complete the  


    activity.


If activity was not attempted, code reason:


7-Patient Refused.


9-Not Applicable-not attempted and the patient did not perform the activity 

before the current illness, exacerbation or injury.


10-Not Attempted due to Environmental Limitations-(lack of equipment, weather 

restraints, etc.).


88-Not Attempted due to Medical Conditions or Safety Concerns.


Oral Hygiene (QC):  6


Upper Body Dressing (QC):  5


Lower Body Dressing (QC):  5


On/Off Footwear:  5





Other Treatment


Pt performed UE medium theraband exercise to work on B UE strengthening 

performing 2 sets of 10 reps completing 5/5 exercises, able to remember 2/5 

exercises by self. Rest breaks due to fatigue. Skilled instruction required for 

correct technique and modifications when needed.  Call light/phone in reach. All

needs met.





OT Short Term Goals


Short Term Goals


Time Frame:  2020


Lower body dressin


Putting on/taking off footwear:  6





OT Long Term Goals


Long Term Goals


Time Frame:  2020


Eating (QC):  6


Oral Hygiene (QC):  6


Toileting Hygiene (QC):  6


Shower/Bathe Self (QC):  6


Upper Body Dressing (QC):  6


Lower Body Dressing (QC):  6


On/Off Footwear (QC):  6


Demonstrate ability to change colonoscopy bag with s/u-IND.


Additional Goals:  1-Demonstrate ADL Tasks, 2-Verbalize Understanding, 3-

ImproveStrength/Cherise


1=Demonstrate adherence to instructed precautions during ADL tasks.


2=Patient will verbalize/demonstrate understanding of assistive 

devices/modifications for ADL.


3=Patient will improve strength/tolerance for activity to enable patient to 

perform ADL's.





OT Education/Plan


Problem List/Assessment


Assessment:  Decreased Activ Tolerance, Decreased UE Strength, Impaired Funct 

Balance, Impaired Self-Care Skills





Discharge Recommendations


Plan/Recommendations:  Continue POC





Treatment Plan/Plan of Care


Patient would benefit from OT for education, treatment and training to promote 

independence in ADL's, mobility, safety and/or upper extremity function for 

ADL's.


Plan of Care:  ADL Retraining, Functional Mobility, Group Exercise/Act as Ind, 

UE Funct Exercise/Act, UE Neuromus Re-Ed/Coord


Treatment Duration:  2020


Frequency:  At least 5 of 7 days/Wk (IRF)


Estimated Hrs Per Day:  1.5 hours per day


Agreement:  Yes


Rehab Potential:  Good





Time/GCodes


Start Time:  09:00


Stop Time:  10:00


Total Time Billed (hr/min):  60


Billed Treatment Time


1 visit- ADL 3 (40 mins), EX 2 (20 mins)











RONEL DAILEY                Nov 3, 2020 10:00

## 2020-11-03 NOTE — NUR
RD ASSESSMENT 



PMHx: afib; HTN; colitis; DM; hypothyroidism; CA(ovarian)



PT INTERACTION: Pt was awake and pleasant during nutrition assessment. Pt states current 
appetite is "off and on." Note avg PO intake 71% x4d, per chart review. Pt states following 
a low-potassium/intermittent fasting type of diet at home, and has no issues with 
chewing/swallowing food. Pt states no recent issues with nausea, vomiting, constipation, or 
diarrhea. Note pt currently has ileostomy, and has no current issues with output. Note last 
BM was was 11/3, and pt currently on bowel regimen of colace BID; senna BID; and miralax 
BID, per chart review. Pt states recent wt of 144# 2months ago. Note current wt of 136#, per 
chart review. Pt states current DM control is good. Note unable to determine recent HbA1c, 
per chart review. Pt states having questions about appropriate foods for her to eat. 



ABNORMAL NUTRITION-RELATED LAB VALUES

LOW:  Ca 6.2; 

HIGH: Cl 110; BUN 28; glu 111



Est. kcal needs: 1550 kcal | 25 kcal/kg  

Est. Pro needs:  62 g Pro | 1.0 g Pro/kg 



PES STATEMENT: Inadequate oral intake (NI-2.1) related to loss of appetite as evidenced by 
pt interview and avg PO intake 71% x4d. 



Food- and nutrition-related knowledge deficit (NB-1.1) related to lack of prior 
nutrition-related education as evidenced by pt interview. 



INTERVENTION:  

Continue with current diet order of CHO 60g/m 3snack diet. 

Pt may benefit from nutrition supplementation if PO intake declines. 

Discussed and provided diet education on DM management. Pt had questions about specific 
foods in her current diet. Discussed CHO counting, fiber content, and portion control. Pt 
verbalized understanding of information provided. Will follow-up on reinforcement of 
education. 

Will continue to follow and reassess as pt needs, intake, and status change. 





RAMOS Lew, MS RD LD

## 2020-11-03 NOTE — PHYSICAL THERAPY DAILY NOTE
PT Daily Note-Current


Subjective


Pt laying Supine in bed upon arrival.  Pt agrees to PT.  Pt reports not sleeping

well.





Pain





   Location:  No Pain Reported





Mental Status


Patient Orientation:  Person, Place, Situation





Transfers


SCALE: Activities may be completed with or without assistive devices.





6-Indepedent-patient completes the activity by him/herself with no assistance 

from a helper.


5-Set-up or Clean-up Assistance-helper sets up or cleans up; patient completes 

activity. [place] assists only prior to or  


    following the activity.


4-Supervision or Touching Assistance-helper provides verbal cues and/or 

touching/steadying and/or contact guard assistance as patient completes 

activity. Assistance may be provided   


    throughout the activity or intermittently.


3-Partial/Moderate Assistance-helper does LESS THAN HALF the effort. [place] 

lifts, holds or supports trunk or limbs, but provides less than half the effort.


2-Substantial/Maximal Assistance-helper does MORE THAN HALF the effort. [place] 

lifts or holds trunk or limbs and provides more than half the effort.


9-Imjhgzwjs-llzlvt does ALL the effort. Patient does none of the effort to 

complete the activity. Or, the assistance of 2 or more helpers is required for 

the patient to complete the  


    activity.


If activity was not attempted, code reason:


7-Patient Refused.


9-Not Applicable-not attempted and the patient did not perform the activity 

before the current illness, exacerbation or injury.


10-Not Attempted due to Environmental Limitations-(lack of equipment, weather 

restraints, etc.).


88-Not Attempted due to Medical Conditions or Safety Concerns.


Sit to Stand (QC):  5


Toilet Transfer (QC):  5





Weight Bearing


Right Lower Extremity:  Right


Full Weight Bearing


Left Lower Extremity:  Left


Full Weight Bearing





Gait Training


Does the Patient Walk?:  Yes


Distance:  150' x2


Walk 10 feet (QC):  5


Walk 50 ft with 2 Turns(QC):  5


Walk 150 ft (QC):  5


Gait Persons Needed:  1


Gait Assistive Device:  FWW





Wheelchair Training


Does the Pt Use a Wheelchair?:  No





Exercises


Seated Therapy Exercises:  Ankle pumps, Long arc quads, Hip flexion, Kicking 

activity, Hip abd/add


Seated Reps:  15


NuStep Minutes:  12


 NuStep Workload:  1





Treatments


TF to standing and uses BR.  Pt amb in hallway then uses NuStep for 12m at WL 1.

 After short RB, pt completes Seated EX before returning to room.  Pt resting in

recliner with all needs met, call light in hand.





Assessment


Current Status:  Good Progress


Pt walks with more normalized gait and reports decreased tightness of HC.





PT Short Term Goals


Short Term Goals


Time Frame:  2020


Roll Left & Right:  6


Sit to lyin


Lying to sitting on side of be:  6


Sit to stand:  6


Chair/bed-to-chair transfer:  5


4 steps:  4


Picking up objects:  4





PT Long Term Goals


Long Term Goals


PT Long Term Goals Time Frame:  2020


Roll Left & Right (QC):  6


Sit to Lying (QC):  6


Lying-Sitting on Side/Bed(QC):  6


Sit to Stand (QC):  6


Chair/Bed-to-Chair Xfer(QC):  6


Toilet Transfer (QC):  6


Car Transfer (QC):  6


Does the Patient Walk:  Yes


Walk 10 feet (QC):  6


Walk 50ft with 2 Turns (QC):  6


Walk 150 ft (QC):  6


Walking 10ft on Uneven Surface:  6


1 Step (curb) (QC):  6


4 Steps (QC):  6


12 Steps (QC):  6


Picking up an Object (QC):  6


Does the Pt use WC or Scooter?:  No


Wheel 50 feet with 2 turns (QC:  9


Wheel 150 feet:  9





PT Plan


Problem List


Problem List:  Activity Tolerance, Functional Strength





Treatment/Plan


Treatment Plan:  Continue Plan of Care


Treatment Plan:  Bed Mobility, Education, Functional Activity Cherise, Functional 

Strength, Group Therapy, Gait, Safety, Therapeutic Exercise, Transfers


Treatment Duration:  2020


Frequency:  At least 5 of 7 days/Wk (IRF)


Estimated Hrs Per Day:  1.5 hours per day


Patient and/or Family Agrees t:  Yes





Safety Risks/Education


Patient Education:  Gait Training, Transfer Techniques, Correct Positioning, 

Safety Issues


Teaching Recipient:  Patient


Teaching Methods:  Discussion


Response to Teaching:  Verbalize Understanding





Time/GCodes


Time In:  800


Time Out:  900


Total Billed Treatment Time:  60


Total Billed Treatment


1, GT (15m), FA (15m) & EX x2 (30m)











MIRZA FRANCOIS PTA               Nov 3, 2020 08:59

## 2020-11-03 NOTE — OCCUPATIONAL THER DAILY NOTE
OT Current Status-Daily Note


Subjective


Pt was in bed. Pt no c/o pain. Pt agreed to therapy.





Mental Status/Objective


Patient Orientation:  Person, Place, Time, Situation


Attachments:  Colostomy/Ileostomy, IV





ADL-Treatment


Therapy Code Descriptions/Definitions 





Functional [place] Measure:


0=Not Assessed/NA        4=Minimal Assistance


1=Total Assistance        5=Supervision or Setup


2=Maximal Assistance  6=Modified [place]


3=Moderate Assistance 7=Complete IndependenceSCALE: Activities may be completed 

with or without assistive devices.





6-Indepedent-patient completes the activity by him/herself with no assistance 

from a helper.


5-Set-up or Clean-up Assistance-helper sets up or cleans up; patient completes 

activity. [place] assists only prior to or  


    following the activity.


4-Supervision or Touching Assistance-helper provides verbal cues and/or 

touching/steadying and/or contact guard assistance as patient completes 

activity. Assistance may be provided   


    throughout the activity or intermittently.


3-Partial/Moderate Assistance-helper does LESS THAN HALF the effort. [place] 

lifts, holds or supports trunk or limbs, but provides less than half the effort.


2-Substantial/Maximal Assistance-helper does MORE THAN HALF the effort. [place] 

lifts or holds trunk or limbs and provides more than half the effort.


7-Alwktafyn-uvbdmq does ALL the effort. Patient does none of the effort to 

complete the activity. Or, the assistance of 2 or more helpers is required for 

the patient to complete the  


    activity.


If activity was not attempted, code reason:


7-Patient Refused.


9-Not Applicable-not attempted and the patient did not perform the activity 

before the current illness, exacerbation or injury.


10-Not Attempted due to Environmental Limitations-(lack of equipment, weather 

restraints, etc.).


88-Not Attempted due to Medical Conditions or Safety Concerns.





Other Treatment


Pt agreed to washing clothing. Pt used FWW to ambulate to closet to gather 

clothing, placed into basket on front of FWW. Pt ambulated with FWW down to 

laundry room performed washing clothing, education on safety in the laundry room

and how to use adaptive equipment. Pt ambulated back to room using FWW to work 

on finger strengthening for daily functional tasks. Pt had 1 lb weights placed 

on wrist while performing activity with weights close pins, completing 2 sets. 

Pt got back into bed call light/phone in reach. All needs met.





OT Short Term Goals


Short Term Goals


Time Frame:  2020


Lower body dressin


Putting on/taking off footwear:  6





OT Long Term Goals


Long Term Goals


Time Frame:  2020


Eating (QC):  6


Oral Hygiene (QC):  6


Toileting Hygiene (QC):  6


Shower/Bathe Self (QC):  6


Upper Body Dressing (QC):  6


Lower Body Dressing (QC):  6


On/Off Footwear (QC):  6


Demonstrate ability to change colonoscopy bag with s/u-IND.


Additional Goals:  1-Demonstrate ADL Tasks, 2-Verbalize Understanding, 3-

ImproveStrength/Cherise


1=Demonstrate adherence to instructed precautions during ADL tasks.


2=Patient will verbalize/demonstrate understanding of assistive devices

/modifications for ADL.


3=Patient will improve strength/tolerance for activity to enable patient to 

perform ADL's.





OT Education/Plan


Problem List/Assessment


Assessment:  Decreased Activ Tolerance, Decreased UE Strength, Impaired Funct 

Balance





Discharge Recommendations


Plan/Recommendations:  Continue POC





Treatment Plan/Plan of Care


Patient would benefit from OT for education, treatment and training to promote 

independence in ADL's, mobility, safety and/or upper extremity function for 

ADL's.


Plan of Care:  ADL Retraining, Functional Mobility, Group Exercise/Act as Ind, 

UE Funct Exercise/Act, UE Neuromus Re-Ed/Coord


Treatment Duration:  2020


Frequency:  At least 5 of 7 days/Wk (IRF)


Estimated Hrs Per Day:  1.5 hours per day


Agreement:  Yes


Rehab Potential:  Good





Time/GCodes


Start Time:  11:30


Stop Time:  12:00


Total Time Billed (hr/min):  30


Billed Treatment Time


1 visit- FA (15mins), EX (15mins)











RONEL DAILEY                Nov 3, 2020 12:01

## 2020-11-03 NOTE — WOUND CARE ASSESSMENT
Wound Care Assessment


Date Seen by Provider:  Nov 3, 2020


Time Seen by Provider:  07:45


Chief Complaint


Midline abdominal wound.


HPI


The patient is a 75 year old female with large open midline wound s/p 

exploratory laparotomy for peritonitis due to Clostridium difficile colitis.  

The wound base is clean, with some hypergranulation.  Will observe on saline 

dressings.  may benefit from Wound VAC.  Will follow.





11/3/20  Interval Note:  No c/o related to abdominal wound.  The dressing 

changes are not painful.  The wound is clean and closing.  Continue W->D saline 

dressings.


Past Medical History:  Admits Diabetes Type II, Admits Heart Disease ([place] 

fibrillation, disuse myopathy.)


Smoking Status:  Former Smoker


Recreational Drug Use:  No


Alcohol Use:  Denies Use


Review of Systems


Pulmonary:  No Dyspnea


Cardiovascular:  No: Chest Pain





Exam





Vital Signs








  Date Time  Temp Pulse Resp B/P (MAP) Pulse Ox O2 Delivery O2 Flow Rate FiO2


 


11/3/20 06:44  65      


 


11/3/20 05:30 36.4  16 103/54 (70) 97 Room Air  


 


11/1/20 21:15       96.00 





Capillary Refill : Less Than 3 Seconds


HEENT:  normal ENT inspection


Cardiovascular:  regular rate, rhythm


Respiratory:  lungs clear


Gastrointestinal:  other (Midline abdominal wall wound  --  14.0 x 2.2 x 2.0 cm,

base 100% granulation, clean, mod.s.s. drainage.)





Results


Laboratory Tests


11/2/20 16:54: Glucometer 137H


11/2/20 21:17: Glucometer 125H


11/3/20 05:23: Glucometer 134H





Microbiology


11/1/20 C. difficile GDH Antigen & Toxins - Final, Complete





Assessment/Plan/Dx


1.  Midline abdominal wound, s/p exploratory laparotomy for peritonitis.





2.  Diabetes mellitus.





3.  Atrial fibrillation.





4.  Disuse myopathy.





Plan: Improving, continue saline dressings.











TAM LEON MD              Nov 3, 2020 13:26

## 2020-11-04 VITALS — SYSTOLIC BLOOD PRESSURE: 129 MMHG | DIASTOLIC BLOOD PRESSURE: 59 MMHG

## 2020-11-04 VITALS — DIASTOLIC BLOOD PRESSURE: 68 MMHG | SYSTOLIC BLOOD PRESSURE: 144 MMHG

## 2020-11-04 RX ADMIN — SIMETHICONE SCH MG: 80 TABLET, CHEWABLE ORAL at 14:55

## 2020-11-04 RX ADMIN — Medication SCH EA: at 06:25

## 2020-11-04 RX ADMIN — CARVEDILOL SCH MG: 12.5 TABLET, FILM COATED ORAL at 20:28

## 2020-11-04 RX ADMIN — POLYETHYLENE GLYCOL (3350) SCH GM: 17 POWDER, FOR SOLUTION ORAL at 19:39

## 2020-11-04 RX ADMIN — POLYETHYLENE GLYCOL (3350) SCH GM: 17 POWDER, FOR SOLUTION ORAL at 11:09

## 2020-11-04 RX ADMIN — SIMETHICONE SCH MG: 80 TABLET, CHEWABLE ORAL at 20:28

## 2020-11-04 RX ADMIN — AMIODARONE HYDROCHLORIDE SCH MG: 200 TABLET ORAL at 20:28

## 2020-11-04 RX ADMIN — INSULIN ASPART SCH UNIT: 100 INJECTION, SOLUTION INTRAVENOUS; SUBCUTANEOUS at 20:28

## 2020-11-04 RX ADMIN — OXYCODONE HYDROCHLORIDE AND ACETAMINOPHEN SCH MG: 500 TABLET ORAL at 06:25

## 2020-11-04 RX ADMIN — MELATONIN 3 MG ORAL TABLET PRN MG: 3 TABLET ORAL at 22:43

## 2020-11-04 RX ADMIN — DOCUSATE SODIUM SCH MG: 100 CAPSULE ORAL at 19:39

## 2020-11-04 RX ADMIN — DOCUSATE SODIUM SCH MG: 100 CAPSULE ORAL at 11:08

## 2020-11-04 RX ADMIN — INSULIN ASPART SCH UNIT: 100 INJECTION, SOLUTION INTRAVENOUS; SUBCUTANEOUS at 11:09

## 2020-11-04 RX ADMIN — INSULIN ASPART SCH UNIT: 100 INJECTION, SOLUTION INTRAVENOUS; SUBCUTANEOUS at 06:49

## 2020-11-04 RX ADMIN — ASPIRIN 81 MG CHEWABLE TABLET SCH MG: 81 TABLET CHEWABLE at 08:11

## 2020-11-04 RX ADMIN — CARVEDILOL SCH MG: 12.5 TABLET, FILM COATED ORAL at 08:12

## 2020-11-04 RX ADMIN — DOCUSATE SODIUM AND SENNOSIDES SCH EA: 8.6; 5 TABLET, FILM COATED ORAL at 11:09

## 2020-11-04 RX ADMIN — DOCUSATE SODIUM AND SENNOSIDES SCH EA: 8.6; 5 TABLET, FILM COATED ORAL at 19:40

## 2020-11-04 RX ADMIN — INSULIN ASPART SCH UNIT: 100 INJECTION, SOLUTION INTRAVENOUS; SUBCUTANEOUS at 16:18

## 2020-11-04 RX ADMIN — AMIODARONE HYDROCHLORIDE SCH MG: 200 TABLET ORAL at 08:11

## 2020-11-04 RX ADMIN — Medication SCH MG: at 08:11

## 2020-11-04 RX ADMIN — CARVEDILOL SCH APPLIC: 25 TABLET, FILM COATED ORAL at 11:09

## 2020-11-04 RX ADMIN — ENOXAPARIN SODIUM SCH MG: 100 INJECTION SUBCUTANEOUS at 06:25

## 2020-11-04 RX ADMIN — LORATADINE SCH MG: 10 TABLET ORAL at 08:19

## 2020-11-04 RX ADMIN — SIMETHICONE SCH MG: 80 TABLET, CHEWABLE ORAL at 08:18

## 2020-11-04 NOTE — OCCUPATIONAL THER DAILY NOTE
OT Current Status-Daily Note


Subjective


Pt was in recliner upon arrival. Pt no c/o pain. Pt agreed to therapy.





Mental Status/Objective


Patient Orientation:  Person, Place, Time, Situation


Attachments:  Colostomy/Ileostomy, IV, Telemetry





ADL-Treatment


Pt agreed to taking shower. Pt ambulated independently with FWW to restroom. Pt 

stated to needed to use restroom, using grab bars to stabilize while cleansed 

perineal area by self. Pt used FWW to ambulate to shower using grab bars to 

stabilize while getting into shower. Pt able to wash upper/lower body. Pt used 

grab bars to stabilize in standing while cleansing buttocks and perineal area. 

Pt transferred from shower to chair in bathroom using FWW to don clothing. Pt 

was able to perform upper body dressing with set up. Pt threaded legs into 

pants/underwear using FWW to stabilize while hiking pants/underwear over hips by

self. Pt ambulated to sink using FWW using sink to stabilize while performing 

oral care, brushing hair needed rest break due to fatigue. Pt ambulated back to 

bed call light/phone in reach. All needs met.


Therapy Code Descriptions/Definitions 





Functional [place] Measure:


0=Not Assessed/NA        4=Minimal Assistance


1=Total Assistance        5=Supervision or Setup


2=Maximal Assistance  6=Modified [place]


3=Moderate Assistance 7=Complete IndependenceSCALE: Activities may be completed 

with or without assistive devices.





6-Indepedent-patient completes the activity by him/herself with no assistance 

from a helper.


5-Set-up or Clean-up Assistance-helper sets up or cleans up; patient completes 

activity. [place] assists only prior to or  


    following the activity.


4-Supervision or Touching Assistance-helper provides verbal cues and/or to

uching/steadying and/or contact guard assistance as patient completes activity. 

Assistance may be provided   


    throughout the activity or intermittently.


3-Partial/Moderate Assistance-helper does LESS THAN HALF the effort. [place] 

lifts, holds or supports trunk or limbs, but provides less than half the effort.


2-Substantial/Maximal Assistance-helper does MORE THAN HALF the effort. [place] 

lifts or holds trunk or limbs and provides more than half the effort.


3-Tepvxfxho-cjshpr does ALL the effort. Patient does none of the effort to 

complete the activity. Or, the assistance of 2 or more helpers is required for 

the patient to complete the  


    activity.


If activity was not attempted, code reason:


7-Patient Refused.


9-Not Applicable-not attempted and the patient did not perform the activity 

before the current illness, exacerbation or injury.


10-Not Attempted due to Environmental Limitations-(lack of equipment, weather 

restraints, etc.).


88-Not Attempted due to Medical Conditions or Safety Concerns.


Oral Hygiene (QC):  6


Bathing Location:  L Arm, R Arm, L Upper Leg, R Upper Leg, L Lower Leg (incl

uding foot), R Lower Leg (including foot), Chest, Abdomen, Buttocks, Perineal 

Area


Shower/Bathe Self (QC):  6


Upper Body Dressing (QC):  5


Lower Body Dressing (QC):  5


On/Off Footwear:  5


Toileting Hygiene (QC):  5


Toilet Transfer (QC):  5





OT Short Term Goals


Short Term Goals


Time Frame:  2020


Lower body dressin


Putting on/taking off footwear:  6





OT Long Term Goals


Long Term Goals


Time Frame:  2020


Eating (QC):  6


Oral Hygiene (QC):  6


Toileting Hygiene (QC):  6


Shower/Bathe Self (QC):  6


Upper Body Dressing (QC):  6


Lower Body Dressing (QC):  6


On/Off Footwear (QC):  6


Demonstrate ability to change colonoscopy bag with s/u-IND.


Additional Goals:  1-Demonstrate ADL Tasks, 2-Verbalize Understanding, 3-

ImproveStrength/Cherise


1=Demonstrate adherence to instructed precautions during ADL tasks.


2=Patient will verbalize/demonstrate understanding of assistive 

devices/modifications for ADL.


3=Patient will improve strength/tolerance for activity to enable patient to 

perform ADL's.





OT Education/Plan


Problem List/Assessment


Assessment:  Decreased Activ Tolerance, Decreased UE Strength, Impaired Funct 

Balance, Impaired Self-Care Skills





Discharge Recommendations


Plan/Recommendations:  Continue POC





Treatment Plan/Plan of Care


Patient would benefit from OT for education, treatment and training to promote 

independence in ADL's, mobility, safety and/or upper extremity function for 

ADL's.


Plan of Care:  ADL Retraining, Functional Mobility, Group Exercise/Act as Ind, 

UE Funct Exercise/Act, UE Neuromus Re-Ed/Coord


Treatment Duration:  2020


Frequency:  At least 5 of 7 days/Wk (IRF)


Estimated Hrs Per Day:  1.5 hours per day


Agreement:  Yes


Rehab Potential:  Good





Time/GCodes


Start Time:  09:00


Stop Time:  10:00


Total Time Billed (hr/min):  60


Billed Treatment Time


1 visit- ADL 4 (60 mins)











RONEL DAILEY                2020 10:00

## 2020-11-04 NOTE — PHYSICAL THERAPY DAILY NOTE
PT Daily Note-Current


Subjective


Pt laying Supine in bed upon arrival.  Nurse present give morning meds and 

addressing Col. bag that had leaked.  Pt agrees to PT.





Pain





   Location:  No Pain Reported





Mental Status


Patient Orientation:  Person, Place, Situation


Attachments:  Colostomy/Ileostomy





Transfers


SCALE: Activities may be completed with or without assistive devices.





6-Indepedent-patient completes the activity by him/herself with no assistance 

from a helper.


5-Set-up or Clean-up Assistance-helper sets up or cleans up; patient completes 

activity. [place] assists only prior to or  


    following the activity.


4-Supervision or Touching Assistance-helper provides verbal cues and/or 

touching/steadying and/or contact guard assistance as patient completes 

activity. Assistance may be provided   


    throughout the activity or intermittently.


3-Partial/Moderate Assistance-helper does LESS THAN HALF the effort. [place] 

lifts, holds or supports trunk or limbs, but provides less than half the effort.


2-Substantial/Maximal Assistance-helper does MORE THAN HALF the effort. [place] 

lifts or holds trunk or limbs and provides more than half the effort.


0-[place]uz[place]-egmgsy does ALL the effort. Patient does none of the effort to 

complete the activity. Or, the assistance of 2 or more helpers is required for 

the patient to complete the  


    activity.


If activity was not attempted, code reason:


7-Patient Refused.


9-Not Applicable-not attempted and the patient did not perform the activity 

before the current illness, exacerbation or injury.


10-Not Attempted due to Environmental Limitations-(lack of equipment, weather 

restraints, etc.).


88-Not Attempted due to Medical Conditions or Safety Concerns.


Lying to Sitting/Side of Bed(Q:  5


Sit to Stand (QC):  5


Chair/Bed-to-Chair Xfer(QC):  5





Weight Bearing


Right Lower Extremity:  Right


Full Weight Bearing


Left Lower Extremity:  Left


Full Weight Bearing





Gait Training


Does the Patient Walk?:  Yes


Distance:  150' x2


Walk 10 feet (QC):  5


Walk 50 ft with 2 Turns(QC):  5


Walk 150 ft (QC):  5


Gait Persons Needed:  1


Gait Assistive Device:  FWW





Wheelchair Training


Does the Pt Use a Wheelchair?:  No





Exercises


Supine Ex:  Ankle pumps, Quad Set, Glut sets, Heel Slides, Straight leg raise, 

Hip abd/add


Supine Reps:  15


NuStep Minutes:  15


 NuStep Workload:  1





Treatments


Pt takes morning meds and Col. bag is cleaned and and disposed of with new one 

attached.  Pt doffs/dons undergarment & gown.  Pt transfers to standing at sink 

to watch hands then amb. in hallway.  Pt uses NuStep for 15m at WL then takes 

short RB.  Pt completes Ex then returns to room at end of tx with all needs met,

call light in hand.





Assessment


Current Status:  Good Progress


Pt has improved with strength and activity tolerance.





PT Short Term Goals


Short Term Goals


Time Frame:  2020


Roll Left & Right:  6


Sit to lyin


Lying to sitting on side of be:  6


Sit to stand:  6


Chair/bed-to-chair transfer:  5


4 steps:  4


Picking up objects:  4





PT Long Term Goals


Long Term Goals


PT Long Term Goals Time Frame:  2020


Roll Left & Right (QC):  6


Sit to Lying (QC):  6


Lying-Sitting on Side/Bed(QC):  6


Sit to Stand (QC):  6


Chair/Bed-to-Chair Xfer(QC):  6


Toilet Transfer (QC):  6


Car Transfer (QC):  6


Does the Patient Walk:  Yes


Walk 10 feet (QC):  6


Walk 50ft with 2 Turns (QC):  6


Walk 150 ft (QC):  6


Walking 10ft on Uneven Surface:  6


1 Step (curb) (QC):  6


4 Steps (QC):  6


12 Steps (QC):  6


Picking up an Object (QC):  6


Does the Pt use WC or Scooter?:  No


Wheel 50 feet with 2 turns (QC:  9


Wheel 150 feet:  9





PT Plan


Treatment/Plan


Treatment Plan:  Continue Plan of Care


Treatment Plan:  Bed Mobility, Education, Functional Activity Cherise, Functional 

Strength, Group Therapy, Gait, Safety, Therapeutic Exercise, Transfers


Treatment Duration:  2020


Frequency:  At least 5 of 7 days/Wk (IRF)


Estimated Hrs Per Day:  1.5 hours per day


Patient and/or Family Agrees t:  Yes





Safety Risks/Education


Patient Education:  Correct Positioning, Safety Issues


Teaching Recipient:  Patient


Teaching Methods:  Discussion


Response to Teaching:  Verbalize Understanding





Time/GCodes


Time In:  800


Time Out:  900


Total Billed Treatment Time:  60


Total Billed Treatment


1, FA x2 (25m), EX (20m) & GT (15m)











MIRZA FRANCOIS PTA               2020 10:01

## 2020-11-04 NOTE — PM&R PROGRESS NOTE
Subjective


HPI/CC On Admission


Date Seen by Provider:  Nov 4, 2020


Time Seen by Provider:  08:45


Subjective/Events-last exam


11/4/20:


Discharge is planned on 11/10 


Wound care seeing her 


Redness around the stoma will be addressed 


Changing laxatives to prn 








11/3/20:


Pt doing very well 


Dr. Doyle saw the wound and it is healing well 


Overall seems to be doing very well 


DC planned soon








11/2/20:


Hgb 10.1 


Will discontinue Benadryl and will take Claritin instead 


Only has one kidney, she is very particular about medications 


Isolation was discontinued because C-Diff was negative again 


Bowels are moving through the ileostomy 








11/1/20:


C diff second specimen sent to lab


Pain pill given today and it helped


Lovenox maintained


No OAC recommended


Dr Birch appreciated








Patient doing well


C diff negative and will recheck tomorrow then DC isolation


BM today


Dr Doyle consulted for wound care of abdomen


WBC 11


Dr Birch consulted for AF and OAC question


Checked meds and labs


Conferred with RN


Reviewed therapy notes





Review of Systems


Gastrointestinal:  Abdominal Pain, Diarrhea





Objective


Exam


Vital Signs





Vital Signs








  Date Time  Temp Pulse Resp B/P (MAP) Pulse Ox O2 Delivery O2 Flow Rate FiO2


 


11/5/20 01:00  60      


 


11/4/20 20:10      Room Air  


 


11/4/20 17:29 37.0  16 144/68 (93) 98   


 


11/1/20 21:15       96.00 





Capillary Refill : Less Than 3 Seconds


General Appearance:  No Apparent Distress, WD/WN, Chronically ill


HEENT:  PERRL/EOMI, Normal ENT Inspection, Pharynx Normal


Neck:  Full Range of Motion, Normal Inspection, Non Tender, Supple, Carotid 

Bruit


Respiratory:  Chest Non Tender, Lungs Clear, Normal Breath Sounds, No Accessory 

Muscle Use, No Respiratory Distress


Cardiovascular:  Regular Rate, Rhythm, No Edema, No Gallop, No JVD, No Murmur, 

Normal Peripheral Pulses


Gastrointestinal:  Normal Bowel Sounds, No Organomegaly, No Pulsatile Mass, Non 

Tender, Soft


Back:  Normal Inspection, No CVA Tenderness, No Vertebral Tenderness


Extremity:  Normal Capillary Refill, Normal Inspection, Normal Range of Motion, 

Non Tender, No Calf Tenderness, No Pedal Edema


Neurologic/Psychiatric:  Alert, Oriented x3, No Motor/Sensory Deficits, Normal 

Mood/Affect, Abnormal Gait, Motor Weakness (generalized)


Skin:  Normal Color, Warm/Dry


Lymphatic:  No Adenopathy





Results/Procedures


Lab


Patient resulted labs reviewed.





FIM


Transfers


Therapy Code Descriptions/Definitions 





Functional [place] Measure:


0=Not Assessed/NA        4=Minimal Assistance


1=Total Assistance        5=Supervision or Setup


2=Maximal Assistance  6=Modified [place]


3=Moderate Assistance 7=Complete IndependenceSCALE: Activities may be completed 

with or without assistive devices.





6-Indepedent-patient completes the activity by him/herself with no assistance 

from a helper.


5-Set-up or Clean-up Assistance-helper sets up or cleans up; patient completes 

activity. [place] assists only prior to or  


    following the activity.


4-Supervision or Touching Assistance-helper provides verbal cues and/or 

touching/steadying and/or contact guard assistance as patient completes 

activity. Assistance may be provided   


    throughout the activity or intermittently.


3-Partial/Moderate Assistance-helper does LESS THAN HALF the effort. [place] 

lifts, holds or supports trunk or limbs, but provides less than half the effort.


2-Substantial/Maximal Assistance-helper does MORE THAN HALF the effort. [place] 

lifts or holds trunk or limbs and provides more than half the effort.


7-Kqcdhdynu-zjwgmq does ALL the effort. Patient does none of the effort to 

complete the activity. Or, the assistance of 2 or more helpers is required for 

the patient to complete the  


    activity.


If activity was not attempted, code reason:


7-Patient Refused.


9-Not Applicable-not attempted and the patient did not perform the activity bef

ore the current illness, exacerbation or injury.


10-Not Attempted due to Environmental Limitations-(lack of equipment, weather r

estraints, etc.).


88-Not Attempted due to Medical Conditions or Safety Concerns.


Roll Left to Right (QC):  6


Sit to Lying (QC):  6


Sit to Stand (QC):  5


Chair/Bed-to-Chair Xfer(QC):  6


Car Transfer (QC):  4





Gait Training


Does the Patient Walk?:  Yes


Distance:  150' x2


Walk 10 feet (QC):  5


Walk 50 ft with 2 Turns(QC):  5


Walk 150 ft (QC):  5


Walking 10ft/uneven surface-QC:  4


Gait Persons Needed:  1


Gait Assistive Device:  FWW





Wheelchair Training


Does the Pt Use a Wheelchair?:  No


Wheel 50 ft with 2 turns (QC):  9


Wheel 150 ft (QC):  9





Stair Training


#of Steps:  1


1 Step (curb) (QC):  4


4 Steps (QC):  88


12 Steps (QC):  88





Balance


Picking up an Object (QC):  88





ADL-Treatment


Eating (QC):  6 (drinks with IND. per clinical judgment pt is IND with eating 

tasks.)


Oral Hygiene (QC):  6


Bathing Location:  L Arm, R Arm, L Upper Leg, R Upper Leg, L Lower Leg 

(including foot), R Lower Leg (including foot), Chest, Abdomen, Buttocks, 

Perineal Area


Shower/Bathe Self (QC):  5


Upper Body Dressing (QC):  5


Lower Body Dressing (QC):  5


On/Off Footwear (QC):  5


Toileting Hygiene (QC):  4 (CGA in stance for bottom hygiene.)





Assessment/Plan


Assessment and Plan


Assess & Plan/Chief Complaint


Assessment:


Myopathy


AF w/RVR s/p cardioversion


Hypothyroidism


C diff colitis hx


Ovarian cancer hx


DM


HTN


Solitary kidney





Plan:


Home meds


IRF protocol


Insulin


Monitor stools





10/31/20:


Monitor loose stools


OAC question?


Dr Birch consult





11/1/20:


Monitor pain


C diff last specimen sent to lab


Dr Birch appreciated





11/2/20:


Take out of isolation since C.diff negative


Monitor bowel function 


Pain control





11/3/20:


Pain control


Lovenox


IRF protocol





11/4/20:


Laxatives prn


Monitor closely





(1) Myopathy


(2) C. difficile colitis


(3) Atrial fibrillation


(4) History of cardioversion


(5) Hypertension


(6) Diabetes mellitus


(7) Hypothyroidism


(8) Solitary kidney


(9) Ovarian cancer in remission


(10) Ileostomy in place











TAMMY HURST DO                 Nov 4, 2020 08:44

## 2020-11-04 NOTE — NUR
Discussion with Pt about colostomy. Gave handout and demonstrated 2-peice colostomy 
appliance.  questions answered. Pt and verbalize understanding. Explained to patient how to 
empty ostomy bag by herself.  Explained we will practice emptying it and taking care of the 
ostomy application this afternoon, when she is done with lunch. [employer] form filled out, faxed to [phone].

## 2020-11-04 NOTE — THERAPY GROUP DAILY NOTE
Therapy Daily Group Note


Patient Education Topic


Other List Below (transfers and adaptive equipment )





Exercises


LE Seated Exercise, UE Exercise





Session


Ratio (pt:therapist):  3:1


Goal of Session:  UE/LE Strengthing, Safety with Transfers, Use of Adaptive 

Equipment


Goal Met for this Session:  Yes


Pt Benefit of Group:  Contributions to Others, F/U Use of Strategies @Home, 

Increased Functional Safety, Increased Functional Strength, Improved Cognition, 

Recognition of Peers, Socialization





Other/Notes


Pt ambulated to [facility] using FWW. Introduction ( person, living now, 

what they looked forward to once leaving) socialization, seated UE/LE exercises,

educational topics adaptive equipment and  transfers. Pt was alert and 

participated giving response to group discussion. Pt was educated on safe 

transfers and how to use adaptive equipment properly by responding to discussion

giving feedback. Pt performed UE/LE exercises completing 8/8 exercises. Pt 

ambulated back to room using FWW in bed call light/phone in reach. All needs 

met.


Start Time:  13:00


Stop Time:  14:00


Total Billed Treatment Time:  60


Total Billed Treatment


1-GRP











RONEL DAILEY                Nov 4, 2020 14:18

## 2020-11-05 VITALS — DIASTOLIC BLOOD PRESSURE: 61 MMHG | SYSTOLIC BLOOD PRESSURE: 109 MMHG

## 2020-11-05 VITALS — SYSTOLIC BLOOD PRESSURE: 121 MMHG | DIASTOLIC BLOOD PRESSURE: 64 MMHG

## 2020-11-05 RX ADMIN — DOCUSATE SODIUM SCH MG: 100 CAPSULE ORAL at 19:48

## 2020-11-05 RX ADMIN — OXYCODONE HYDROCHLORIDE AND ACETAMINOPHEN SCH MG: 500 TABLET ORAL at 06:21

## 2020-11-05 RX ADMIN — INSULIN ASPART SCH UNIT: 100 INJECTION, SOLUTION INTRAVENOUS; SUBCUTANEOUS at 16:10

## 2020-11-05 RX ADMIN — MELATONIN 3 MG ORAL TABLET PRN MG: 3 TABLET ORAL at 20:41

## 2020-11-05 RX ADMIN — SIMETHICONE SCH MG: 80 TABLET, CHEWABLE ORAL at 20:41

## 2020-11-05 RX ADMIN — DOCUSATE SODIUM AND SENNOSIDES SCH EA: 8.6; 5 TABLET, FILM COATED ORAL at 08:30

## 2020-11-05 RX ADMIN — Medication SCH MG: at 08:29

## 2020-11-05 RX ADMIN — POLYETHYLENE GLYCOL (3350) SCH GM: 17 POWDER, FOR SOLUTION ORAL at 08:30

## 2020-11-05 RX ADMIN — DOCUSATE SODIUM SCH MG: 100 CAPSULE ORAL at 08:30

## 2020-11-05 RX ADMIN — LORATADINE SCH MG: 10 TABLET ORAL at 08:29

## 2020-11-05 RX ADMIN — AMIODARONE HYDROCHLORIDE SCH MG: 200 TABLET ORAL at 08:29

## 2020-11-05 RX ADMIN — ASPIRIN 81 MG CHEWABLE TABLET SCH MG: 81 TABLET CHEWABLE at 08:29

## 2020-11-05 RX ADMIN — DOCUSATE SODIUM AND SENNOSIDES SCH EA: 8.6; 5 TABLET, FILM COATED ORAL at 19:49

## 2020-11-05 RX ADMIN — POLYETHYLENE GLYCOL (3350) SCH GM: 17 POWDER, FOR SOLUTION ORAL at 19:48

## 2020-11-05 RX ADMIN — SODIUM HYPOCHLORITE SCH ML: 2.5 SOLUTION TOPICAL at 20:52

## 2020-11-05 RX ADMIN — CARVEDILOL SCH APPLIC: 25 TABLET, FILM COATED ORAL at 09:14

## 2020-11-05 RX ADMIN — SIMETHICONE SCH MG: 80 TABLET, CHEWABLE ORAL at 13:00

## 2020-11-05 RX ADMIN — INSULIN ASPART SCH UNIT: 100 INJECTION, SOLUTION INTRAVENOUS; SUBCUTANEOUS at 06:16

## 2020-11-05 RX ADMIN — INSULIN ASPART SCH UNIT: 100 INJECTION, SOLUTION INTRAVENOUS; SUBCUTANEOUS at 20:33

## 2020-11-05 RX ADMIN — Medication SCH EA: at 06:21

## 2020-11-05 RX ADMIN — AMIODARONE HYDROCHLORIDE SCH MG: 200 TABLET ORAL at 20:41

## 2020-11-05 RX ADMIN — ENOXAPARIN SODIUM SCH MG: 100 INJECTION SUBCUTANEOUS at 06:22

## 2020-11-05 RX ADMIN — SIMETHICONE SCH MG: 80 TABLET, CHEWABLE ORAL at 08:29

## 2020-11-05 RX ADMIN — CARVEDILOL SCH MG: 12.5 TABLET, FILM COATED ORAL at 08:29

## 2020-11-05 RX ADMIN — INSULIN ASPART SCH UNIT: 100 INJECTION, SOLUTION INTRAVENOUS; SUBCUTANEOUS at 11:22

## 2020-11-05 RX ADMIN — CARVEDILOL SCH MG: 12.5 TABLET, FILM COATED ORAL at 20:41

## 2020-11-05 NOTE — PM&R PROGRESS NOTE
Subjective


HPI/CC On Admission


Date Seen by Provider:  Nov 5, 2020


Time Seen by Provider:  11:00


Subjective/Events-last exam


11/5/20:


Ileostomy functioning well


Palak is trying an inverted wafer to prevent bag from falling off


Pain controlled








11/4/20:


Discharge is planned on 11/10 


Wound care seeing her 


Redness around the stoma will be addressed 


Changing laxatives to prn 








11/3/20:


Pt doing very well 


Dr. Doyle saw the wound and it is healing well 


Overall seems to be doing very well 


DC planned soon








11/2/20:


Hgb 10.1 


Will discontinue Benadryl and will take Claritin instead 


Only has one kidney, she is very particular about medications 


Isolation was discontinued because C-Diff was negative again 


Bowels are moving through the ileostomy 








11/1/20:


C diff second specimen sent to lab


Pain pill given today and it helped


Lovenox maintained


No OAC recommended


Dr Birch appreciated








Patient doing well


C diff negative and will recheck tomorrow then DC isolation


BM today


Dr Doyle consulted for wound care of abdomen


WBC 11


Dr Birch consulted for AF and OAC question


Checked meds and labs


Conferred with RN


Reviewed therapy notes





Review of Systems


Gastrointestinal:  Abdominal Pain





Objective


Exam


Vital Signs





Vital Signs








  Date Time  Temp Pulse Resp B/P (MAP) Pulse Ox O2 Delivery O2 Flow Rate FiO2


 


11/6/20 05:57 36.3 64 18 116/54 (74) 97 Room Air  


 


11/1/20 21:15       96.00 





Capillary Refill : Less Than 3 Seconds


General Appearance:  No Apparent Distress, WD/WN, Chronically ill


HEENT:  PERRL/EOMI, Normal ENT Inspection, Pharynx Normal


Neck:  Full Range of Motion, Normal Inspection, Non Tender, Supple, Carotid 

Bruit


Respiratory:  Chest Non Tender, Lungs Clear, Normal Breath Sounds, No Accessory 

Muscle Use, No Respiratory Distress


Cardiovascular:  Regular Rate, Rhythm, No Edema, No Gallop, No JVD, No Murmur, 

Normal Peripheral Pulses


Gastrointestinal:  Normal Bowel Sounds, No Organomegaly, No Pulsatile Mass, Non 

Tender, Soft


Back:  Normal Inspection, No CVA Tenderness, No Vertebral Tenderness


Extremity:  Normal Capillary Refill, Normal Inspection, Normal Range of Motion, 

Non Tender, No Calf Tenderness, No Pedal Edema


Neurologic/Psychiatric:  Alert, Oriented x3, No Motor/Sensory Deficits, Normal 

Mood/Affect, Abnormal Gait, Motor Weakness (generalized)


Skin:  Normal Color, Warm/Dry


Lymphatic:  No Adenopathy





Results/Procedures


Lab


Patient resulted labs reviewed.





FIM


Transfers


Therapy Code Descriptions/Definitions 





Functional [place] Measure:


0=Not Assessed/NA        4=Minimal Assistance


1=Total Assistance        5=Supervision or Setup


2=Maximal Assistance  6=Modified [place]


3=Moderate Assistance 7=Complete IndependenceSCALE: Activities may be completed 

with or without assistive devices.





6-Indepedent-patient completes the activity by him/herself with no assistance 

from a helper.


5-Set-up or Clean-up Assistance-helper sets up or cleans up; patient completes 

activity. [place] assists only prior to or  


    following the activity.


4-Supervision or Touching Assistance-helper provides verbal cues and/or 

touching/steadying and/or contact guard assistance as patient completes 

activity. Assistance may be provided   


    throughout the activity or intermittently.


3-Partial/Moderate Assistance-helper does LESS THAN HALF the effort. [place] 

lifts, holds or supports trunk or limbs, but provides less than half the effort.


2-Substantial/Maximal Assistance-helper does MORE THAN HALF the effort. [place] 

lifts or holds trunk or limbs and provides more than half the effort.


6-Egicxccfc-iataxe does ALL the effort. Patient does none of the effort to 

complete the activity. Or, the assistance of 2 or more helpers is required for 

the patient to complete the  


    activity.


If activity was not attempted, code reason:


7-Patient Refused.


9-Not Applicable-not attempted and the patient did not perform the activity 

before the current illness, exacerbation or injury.


10-Not Attempted due to Environmental Limitations-(lack of equipment, weather 

restraints, etc.).


88-Not Attempted due to Medical Conditions or Safety Concerns.


Roll Left to Right (QC):  6


Sit to Lying (QC):  6


Sit to Stand (QC):  5


Chair/Bed-to-Chair Xfer(QC):  5


Car Transfer (QC):  4





Gait Training


Does the Patient Walk?:  Yes


Distance:  150' x2


Walk 10 feet (QC):  5


Walk 50 ft with 2 Turns(QC):  5


Walk 150 ft (QC):  5


Walking 10ft/uneven surface-QC:  4


Gait Persons Needed:  1


Gait Assistive Device:  FWW





Wheelchair Training


Does the Pt Use a Wheelchair?:  No


Wheel 50 ft with 2 turns (QC):  9


Wheel 150 ft (QC):  9





Stair Training


#of Steps:  1


1 Step (curb) (QC):  4


4 Steps (QC):  88


12 Steps (QC):  88





Balance


Picking up an Object (QC):  88





ADL-Treatment


Eating (QC):  6 (drinks with IND. per clinical judgment pt is IND with eating 

tasks.)


Oral Hygiene (QC):  6


Bathing Location:  L Arm, R Arm, L Upper Leg, R Upper Leg, L Lower Leg 

(including foot), R Lower Leg (including foot), Chest, Abdomen, Buttocks, 

Perineal Area


Shower/Bathe Self (QC):  6


Upper Body Dressing (QC):  5


Lower Body Dressing (QC):  5


On/Off Footwear (QC):  5


Toileting Hygiene (QC):  5


Toilet Transfer (QC):  5





Assessment/Plan


Assessment and Plan


Assess & Plan/Chief Complaint


Assessment:


Myopathy


AF w/RVR s/p cardioversion


Hypothyroidism


C diff colitis hx


Ovarian cancer hx


DM


HTN


Solitary kidney





Plan:


Home meds


IRF protocol


Insulin


Monitor stools





10/31/20:


Monitor loose stools


OAC question?


Dr Birch consult





11/1/20:


Monitor pain


C diff last specimen sent to lab


Dr Birch appreciated





11/2/20:


Take out of isolation since C.diff negative


Monitor bowel function 


Pain control





11/3/20:


Pain control


Lovenox


IRF protocol





11/4/20:


Laxatives prn


Monitor closely





11/5/20:


Ileostomy bag ostomy care


Pain meds





(1) Myopathy


(2) C. difficile colitis


(3) Atrial fibrillation


(4) History of cardioversion


(5) Hypertension


(6) Diabetes mellitus


(7) Hypothyroidism


(8) Solitary kidney


(9) Ovarian cancer in remission


(10) Ileostomy in place











TAMMY HURST DO                 Nov 5, 2020 05:52

## 2020-11-05 NOTE — OCCUPATIONAL THER DAILY NOTE
OT Current Status-Daily Note


Subjective


Pt was in bed upon arrival. Pt stated colostomy bag was leaking. Pt no c/o pain.

Pt agreed to therapy.





Mental Status/Objective


Patient Orientation:  Person, Place, Time, Situation


Attachments:  Colostomy/Ileostomy, IV, Telemetry





ADL-Treatment


Pt ambulated using FWW to bathroom. Pt used grab bars to stabilize while getting

onto toilet by self. Pt used grab bars to stabilize while standing to cleanse 

perineal area. Pt ambulated back to bed, needing rest break. Pt colostomy bag 

was leaking nursing was notified. Pt was educated on colostomy bag care and how 

to change own bag out. Pt sat on EOB to demonstrating ability to complete 

colostomy bag care with verbal directions and cues. Pt took increase time due to

education throughout treatment. Nursing was in when finished with therapy. Call 

light/phone in reach. All needs met.


Therapy Code Descriptions/Definitions 





Functional [place] Measure:


0=Not Assessed/NA        4=Minimal Assistance


1=Total Assistance        5=Supervision or Setup


2=Maximal Assistance  6=Modified [place]


3=Moderate Assistance 7=Complete IndependenceSCALE: Activities may be completed 

with or without assistive devices.





6-Indepedent-patient completes the activity by him/herself with no assistance 

from a helper.


5-Set-up or Clean-up Assistance-helper sets up or cleans up; patient completes 

activity. [place] assists only prior to or  


    following the activity.


4-Supervision or Touching Assistance-helper provides verbal cues and/or 

touching/steadying and/or contact guard assistance as patient completes 

activity. Assistance may be provided   


    throughout the activity or intermittently.


3-Partial/Moderate Assistance-helper does LESS THAN HALF the effort. [place] 

lifts, holds or supports trunk or limbs, but provides less than half the effort.


2-Substantial/Maximal Assistance-helper does MORE THAN HALF the effort. [place] 

lifts or holds trunk or limbs and provides more than half the effort.


8-Rdmpulhtj-blngpk does ALL the effort. Patient does none of the effort to 

complete the activity. Or, the assistance of 2 or more helpers is required for 

the patient to complete the  


    activity.


If activity was not attempted, code reason:


7-Patient Refused.


9-Not Applicable-not attempted and the patient did not perform the activity 

before the current illness, exacerbation or injury.


10-Not Attempted due to Environmental Limitations-(lack of equipment, weather 

restraints, etc.).


88-Not Attempted due to Medical Conditions or Safety Concerns.


Toileting Hygiene (QC):  3


Toilet Transfer (QC):  6





Education


OT Patient Education:  Modified ADL techniques


Teaching Recipient:  Patient


Teaching Methods:  Demonstration, Discussion


Response to Teaching:  Verbalize Understanding, Return Demonstration, 

Reinforcement Needed





OT Short Term Goals


Short Term Goals


Time Frame:  2020


Lower body dressin


Putting on/taking off footwear:  6





OT Long Term Goals


Long Term Goals


Time Frame:  2020


Eating (QC):  6


Oral Hygiene (QC):  6


Toileting Hygiene (QC):  6


Shower/Bathe Self (QC):  6


Upper Body Dressing (QC):  6


Lower Body Dressing (QC):  6


On/Off Footwear (QC):  6


Demonstrate ability to change colonoscopy bag with s/u-IND.


Additional Goals:  1-Demonstrate ADL Tasks, 2-Verbalize Understanding, 3-

ImproveStrength/Cherise


1=Demonstrate adherence to instructed precautions during ADL tasks.


2=Patient will verbalize/demonstrate understanding of assistive 

devices/modifications for ADL.


3=Patient will improve strength/tolerance for activity to enable patient to 

perform ADL's.





OT Education/Plan


Problem List/Assessment


Assessment:  Decreased Activ Tolerance





Discharge Recommendations


Plan/Recommendations:  Continue POC





Treatment Plan/Plan of Care


Patient would benefit from OT for education, treatment and training to promote 

independence in ADL's, mobility, safety and/or upper extremity function for 

ADL's.


Plan of Care:  ADL Retraining, Functional Mobility, Group Exercise/Act as Ind, 

UE Funct Exercise/Act, UE Neuromus Re-Ed/Coord


Treatment Duration:  2020


Frequency:  At least 5 of 7 days/Wk (IRF)


Estimated Hrs Per Day:  1.5 hours per day


Agreement:  Yes


Rehab Potential:  Good





Time/GCodes


Start Time:  09:00


Stop Time:  10:00


Total Time Billed (hr/min):  60


Billed Treatment Time


1 visit- ADL (20 mins), FA 3 (40 mins)











RONEL DAILEY                2020 11:09

## 2020-11-05 NOTE — NUR
Discharge Planning/Care Team Conference



Discussed care team conference with patient and team's recommendation to discharge home 
Tuesday, 11/10/2020. Patient is in agreement with discharge plan and is willing to accept 
home health care nursing, PT and OT at discharge. Discharge plan for Tuesday, 11/10/2020.

## 2020-11-05 NOTE — OCCUPATIONAL THER DAILY NOTE
OT Current Status-Daily Note


Subjective


Pt was in bed. Pt no c/o pain. Pt agreed to therapy.





Mental Status/Objective


Patient Orientation:  Person, Place, Time, Situation


Attachments:  Colostomy/Ileostomy, IV, Telemetry





ADL-Treatment


Pt agreed to taking sponge bath. Pt ambulated to recliner using FWW. Pt washed 

upper/lower body with set up. Pt sit to stand using FWW to stabilize while 

standing to cleanse buttocks, perineal area. Pt ambulated to restroom to perform

oral care, brushing hair, rest break due to fatigue. Pt ambulated to recliner 

using FWW call light/phone in reach. All needs met.


Therapy Code Descriptions/Definitions 





Functional [place] Measure:


0=Not Assessed/NA        4=Minimal Assistance


1=Total Assistance        5=Supervision or Setup


2=Maximal Assistance  6=Modified [place]


3=Moderate Assistance 7=Complete IndependenceSCALE: Activities may be completed 

with or without assistive devices.





6-Indepedent-patient completes the activity by him/herself with no assistance 

from a helper.


5-Set-up or Clean-up Assistance-helper sets up or cleans up; patient completes 

activity. [place] assists only prior to or  


    following the activity.


4-Supervision or Touching Assistance-helper provides verbal cues and/or 

touching/steadying and/or contact guard assistance as patient completes 

activity. Assistance may be provided   


    throughout the activity or intermittently.


3-Partial/Moderate Assistance-helper does LESS THAN HALF the effort. [place] 

lifts, holds or supports trunk or limbs, but provides less than half the effort.


2-Substantial/Maximal Assistance-helper does MORE THAN HALF the effort. [place] 

lifts or holds trunk or limbs and provides more than half the effort.


3-Actxkpbme-rhfolk does ALL the effort. Patient does none of the effort to 

complete the activity. Or, the assistance of 2 or more helpers is required for 

the patient to complete the  


    activity.


If activity was not attempted, code reason:


7-Patient Refused.


9-Not Applicable-not attempted and the patient did not perform the activity 

before the current illness, exacerbation or injury.


10-Not Attempted due to Environmental Limitations-(lack of equipment, weather 

restraints, etc.).


88-Not Attempted due to Medical Conditions or Safety Concerns.


Oral Hygiene (QC):  6


Bathing Location:  L Arm, R Arm, L Upper Leg, R Upper Leg, L Lower Leg 

(including foot), R Lower Leg (including foot), Chest, Abdomen, Buttocks, 

Perineal Area


Shower/Bathe Self (QC):  5





OT Short Term Goals


Short Term Goals


Time Frame:  2020


Lower body dressin


Putting on/taking off footwear:  6





OT Long Term Goals


Long Term Goals


Time Frame:  2020


Eating (QC):  6


Oral Hygiene (QC):  6


Toileting Hygiene (QC):  6


Shower/Bathe Self (QC):  6


Upper Body Dressing (QC):  6


Lower Body Dressing (QC):  6


On/Off Footwear (QC):  6


Demonstrate ability to change colonoscopy bag with s/u-IND.


Additional Goals:  1-Demonstrate ADL Tasks, 2-Verbalize Understanding, 3-

ImproveStrength/Cherise


1=Demonstrate adherence to instructed precautions during ADL tasks.


2=Patient will verbalize/demonstrate understanding of assistive 

devices/modifications for ADL.


3=Patient will improve strength/tolerance for activity to enable patient to 

perform ADL's.





OT Education/Plan


Problem List/Assessment


Assessment:  Decreased Activ Tolerance, Decreased UE Strength, Impaired Funct 

Balance, Impaired Self-Care Skills





Discharge Recommendations


Plan/Recommendations:  Continue POC





Treatment Plan/Plan of Care


Patient would benefit from OT for education, treatment and training to promote 

independence in ADL's, mobility, safety and/or upper extremity function for 

ADL's.


Plan of Care:  ADL Retraining, Functional Mobility, Group Exercise/Act as Ind, 

UE Funct Exercise/Act, UE Neuromus Re-Ed/Coord


Treatment Duration:  2020


Frequency:  At least 5 of 7 days/Wk (IRF)


Estimated Hrs Per Day:  1.5 hours per day


Agreement:  Yes


Rehab Potential:  Good





Time/GCodes


Start Time:  11:30


Stop Time:  12:00


Total Time Billed (hr/min):  30


Billed Treatment Time


1 visit- ADL 2 (30 mins)











RONEL DAILEY                2020 12:03

## 2020-11-05 NOTE — NUR
ostomy leaking.  overnight RN states "leaked off and on throughout night."  previous wafer 
and bag removed. area cleansed.  rt side of stoma skin reddened with induration, stoma 
appears to be retracting.  periwound area cleansed with warm, soapy water, rinsed. dried.  
stoma powder applied to reddened, indurated area.  skin prep applied over.  large pedro luis ring 
applied close to stoma, smaller pedro luis ring applied to right outer stoma area that retracts.  
large pedro luis ring applied over both previous rings.  Convex wafer applied with slight 
pressure applied to skin at edge of stoma for producing protraction. upper, left and lower 
sides protracting well, right side continues to retract.  bag applied to wafer.  Education 
given throughout.  Ostomy education video shared with patient.  will con't to monitor.

## 2020-11-05 NOTE — PHYSICAL THERAPY DAILY NOTE
PT Daily Note-Current


Subjective


Pt laying Supine in bed with Wound Care Nurse upon arrival.  WC Nurse reviewing 

care for ILEO. bag.





Pain





   Location:  No Pain Reported





Mental Status


Patient Orientation:  Person, Place, Situation


Attachments:  Colostomy/Ileostomy, Other-See Comments (Telemetry)





Transfers


SCALE: Activities may be completed with or without assistive devices.





6-Indepedent-patient completes the activity by him/herself with no assistance 

from a helper.


5-Set-up or Clean-up Assistance-helper sets up or cleans up; patient completes 

activity. [place] assists only prior to or  


    following the activity.


4-Supervision or Touching Assistance-helper provides verbal cues and/or 

touching/steadying and/or contact guard assistance as patient completes 

activity. Assistance may be provided   


    throughout the activity or intermittently.


3-Partial/Moderate Assistance-helper does LESS THAN HALF the effort. [place] 

lifts, holds or supports trunk or limbs, but provides less than half the effort.


2-Substantial/Maximal Assistance-helper does MORE THAN HALF the effort. [place] 

lifts or holds trunk or limbs and provides more than half the effort.


3-Dwvrzbuau-aiwwyz does ALL the effort. Patient does none of the effort to 

complete the activity. Or, the assistance of 2 or more helpers is required for 

the patient to complete the  


    activity.


If activity was not attempted, code reason:


7-Patient Refused.


9-Not Applicable-not attempted and the patient did not perform the activity 

before the current illness, exacerbation or injury.


10-Not Attempted due to Environmental Limitations-(lack of equipment, weather 

restraints, etc.).


88-Not Attempted due to Medical Conditions or Safety Concerns.


Lying to Sitting/Side of Bed(Q:  5


Sit to Stand (QC):  5





Weight Bearing


Right Lower Extremity:  Right


Full Weight Bearing


Left Lower Extremity:  Left


Full Weight Bearing





Gait Training


Does the Patient Walk?:  Yes


Distance:  150' x2


Walk 10 feet (QC):  5


Walk 50 ft with 2 Turns(QC):  5


Walk 150 ft (QC):  5


Gait Persons Needed:  1


Gait Assistive Device:  FWW





Wheelchair Training


Does the Pt Use a Wheelchair?:  No





Exercises


Supine Ex:  Ankle pumps, Rolling, Hip abd/add


Supine Reps:  10


NuStep Minutes:  10


 NuStep Workload:  3





Treatments


WC Nurse finishes reviewing care for ILEO. bag.  TF to EOB then to standing.  

Amb in hallway followed by short RB.  Pt uses NuStep for 10m at [facility] 3 then short 

RB.  Amb. in hallway before returning to room to rest Supine in bed.





Assessment


Current Status:  Good Progress


Pt fatigues and needs occasional RB.  Pt is encouraged to widen ANETTE & point toes

forward instead of out.





PT Short Term Goals


Short Term Goals


Time Frame:  2020


Roll Left & Right:  6


Sit to lyin


Lying to sitting on side of be:  6


Sit to stand:  6


Chair/bed-to-chair transfer:  5


4 steps:  4


Picking up objects:  4





PT Long Term Goals


Long Term Goals


PT Long Term Goals Time Frame:  2020


Roll Left & Right (QC):  6


Sit to Lying (QC):  6


Lying-Sitting on Side/Bed(QC):  6


Sit to Stand (QC):  6


Chair/Bed-to-Chair Xfer(QC):  6


Toilet Transfer (QC):  6


Car Transfer (QC):  6


Does the Patient Walk:  Yes


Walk 10 feet (QC):  6


Walk 50ft with 2 Turns (QC):  6


Walk 150 ft (QC):  6


Walking 10ft on Uneven Surface:  6


1 Step (curb) (QC):  6


4 Steps (QC):  6


12 Steps (QC):  6


Picking up an Object (QC):  6


Does the Pt use WC or Scooter?:  No


Wheel 50 feet with 2 turns (QC:  9


Wheel 150 feet:  9





PT Plan


Problem List


Problem List:  Activity Tolerance, Functional Strength





Treatment/Plan


Treatment Plan:  Continue Plan of Care


Treatment Plan:  Bed Mobility, Education, Functional Activity Cherise, Functional 

Strength, Group Therapy, Gait, Safety, Therapeutic Exercise, Transfers


Treatment Duration:  2020


Frequency:  At least 5 of 7 days/Wk (IRF)


Estimated Hrs Per Day:  1.5 hours per day


Patient and/or Family Agrees t:  Yes





Safety Risks/Education


Patient Education:  Gait Training, Correct Positioning, Safety Issues


Teaching Recipient:  Patient


Teaching Methods:  Discussion


Response to Teaching:  Verbalize Understanding





Time/GCodes


Time In:  1000


Time Out:  1100


Total Billed Treatment Time:  60


Total Billed Treatment


1, FA (15m), GT (15m), EX x2 (30m)











MIRZA FRANCOIS PTA               2020 11:04

## 2020-11-05 NOTE — PHYSICAL THERAPY DAILY NOTE
PT Daily Note-Current


Subjective


Pt in BR upon arrival.  Pt agrees to PT.





Pain





   Numeric Pain Scale:  2


   Location:  Right


   Location Body Site:  Knee


   Pain Description:  Ache





Mental Status


Patient Orientation:  Person, Place, Time, Situation


Attachments:  Colostomy/Ileostomy





Transfers


SCALE: Activities may be completed with or without assistive devices.





6-Indepedent-patient completes the activity by him/herself with no assistance 

from a helper.


5-Set-up or Clean-up Assistance-helper sets up or cleans up; patient completes 

activity. [place] assists only prior to or  


    following the activity.


4-Supervision or Touching Assistance-helper provides verbal cues and/or 

touching/steadying and/or contact guard assistance as patient completes 

activity. Assistance may be provided   


    throughout the activity or intermittently.


3-Partial/Moderate Assistance-helper does LESS THAN HALF the effort. [place] 

lifts, holds or supports trunk or limbs, but provides less than half the effort.


2-Substantial/Maximal Assistance-helper does MORE THAN HALF the effort. [place] 

lifts or holds trunk or limbs and provides more than half the effort.


3-Aornmsphu-sskgmo does ALL the effort. Patient does none of the effort to 

complete the activity. Or, the assistance of 2 or more helpers is required for 

the patient to complete the  


    activity.


If activity was not attempted, code reason:


7-Patient Refused.


9-Not Applicable-not attempted and the patient did not perform the activity 

before the current illness, exacerbation or injury.


10-Not Attempted due to Environmental Limitations-(lack of equipment, weather 

restraints, etc.).


88-Not Attempted due to Medical Conditions or Safety Concerns.


Sit to Lying (QC):  5


Sit to Stand (QC):  5





Weight Bearing


Right Lower Extremity:  Right


Full Weight Bearing


Left Lower Extremity:  Left


Full Weight Bearing





Exercises


Supine Ex:  Ankle pumps, Quad Set, Glut sets, Heel Slides, Straight leg raise, 

Hip abd/add


Supine Reps:  20





Treatments


Pt finishes using BR and returns to bed.  TF to supine & completes Supine Ex.  

Pt resting with all needs met, call light in hand.





Assessment


Current Status:  Good Progress


Pt sohan. tx well.





PT Short Term Goals


Short Term Goals


Time Frame:  2020


Roll Left & Right:  6


Sit to lyin


Lying to sitting on side of be:  6


Sit to stand:  6


Chair/bed-to-chair transfer:  5


4 steps:  4


Picking up objects:  4





PT Long Term Goals


Long Term Goals


PT Long Term Goals Time Frame:  2020


Roll Left & Right (QC):  6


Sit to Lying (QC):  6


Lying-Sitting on Side/Bed(QC):  6


Sit to Stand (QC):  6


Chair/Bed-to-Chair Xfer(QC):  6


Toilet Transfer (QC):  6


Car Transfer (QC):  6


Does the Patient Walk:  Yes


Walk 10 feet (QC):  6


Walk 50ft with 2 Turns (QC):  6


Walk 150 ft (QC):  6


Walking 10ft on Uneven Surface:  6


1 Step (curb) (QC):  6


4 Steps (QC):  6


12 Steps (QC):  6


Picking up an Object (QC):  6


Does the Pt use WC or Scooter?:  No


Wheel 50 feet with 2 turns (QC:  9


Wheel 150 feet:  9





PT Plan


Problem List


Problem List:  Activity Tolerance





Treatment/Plan


Treatment Plan:  Continue Plan of Care


Treatment Plan:  Bed Mobility, Education, Functional Activity Cherise, Functional 

Strength, Group Therapy, Gait, Safety, Therapeutic Exercise, Transfers


Treatment Duration:  2020


Frequency:  At least 5 of 7 days/Wk (IRF)


Estimated Hrs Per Day:  1.5 hours per day


Patient and/or Family Agrees t:  Yes





Time/GCodes


Time In:  1300


Time Out:  1330


Total Billed Treatment Time:  30


Total Billed Treatment


1, FA (10m) & EX (20m)











MIRZA FRANCOIS PTA               2020 13:33

## 2020-11-05 NOTE — NUR
WOUND CARE RN IN ROOM CHANGING ILEOSTOMY WAFER AND BAG. 

-------------------------------------------------------------------------------

Addendum: 11/05/20 at 1312 by MADI MELGAR RN

-------------------------------------------------------------------------------

WOUND CARE RN ALSO CHANGED MID ABDOMINAL DRESSING.

## 2020-11-05 NOTE — NUR
CALL FROM DR. LEON WITH ORDERS TO CHANGE ABDOMINAL WOUND DRESSING CHANGE ORDERS TO AS 
FOLLOWS- PACK WOUND WITH GAUZE MOISTENED WITH DAKIN'S SOLUTION, COVER WITH ABD PAD, SECURE 
WITH MEDIPORE TAPE. CHANGE Q SHIFT!

## 2020-11-06 VITALS — SYSTOLIC BLOOD PRESSURE: 135 MMHG | DIASTOLIC BLOOD PRESSURE: 78 MMHG

## 2020-11-06 VITALS — DIASTOLIC BLOOD PRESSURE: 71 MMHG | SYSTOLIC BLOOD PRESSURE: 113 MMHG

## 2020-11-06 VITALS — DIASTOLIC BLOOD PRESSURE: 54 MMHG | SYSTOLIC BLOOD PRESSURE: 116 MMHG

## 2020-11-06 RX ADMIN — SIMETHICONE SCH MG: 80 TABLET, CHEWABLE ORAL at 20:57

## 2020-11-06 RX ADMIN — SODIUM HYPOCHLORITE SCH ML: 2.5 SOLUTION TOPICAL at 21:01

## 2020-11-06 RX ADMIN — AMIODARONE HYDROCHLORIDE SCH MG: 200 TABLET ORAL at 20:57

## 2020-11-06 RX ADMIN — INSULIN ASPART SCH UNIT: 100 INJECTION, SOLUTION INTRAVENOUS; SUBCUTANEOUS at 20:59

## 2020-11-06 RX ADMIN — LORATADINE SCH MG: 10 TABLET ORAL at 08:00

## 2020-11-06 RX ADMIN — ASPIRIN 81 MG CHEWABLE TABLET SCH MG: 81 TABLET CHEWABLE at 08:00

## 2020-11-06 RX ADMIN — POLYETHYLENE GLYCOL (3350) SCH GM: 17 POWDER, FOR SOLUTION ORAL at 09:34

## 2020-11-06 RX ADMIN — DOCUSATE SODIUM AND SENNOSIDES SCH EA: 8.6; 5 TABLET, FILM COATED ORAL at 09:34

## 2020-11-06 RX ADMIN — SODIUM HYPOCHLORITE SCH ML: 2.5 SOLUTION TOPICAL at 08:09

## 2020-11-06 RX ADMIN — CARVEDILOL SCH APPLIC: 25 TABLET, FILM COATED ORAL at 09:35

## 2020-11-06 RX ADMIN — ENOXAPARIN SODIUM SCH MG: 100 INJECTION SUBCUTANEOUS at 06:15

## 2020-11-06 RX ADMIN — Medication SCH EA: at 06:15

## 2020-11-06 RX ADMIN — SIMETHICONE SCH MG: 80 TABLET, CHEWABLE ORAL at 08:00

## 2020-11-06 RX ADMIN — SIMETHICONE SCH MG: 80 TABLET, CHEWABLE ORAL at 13:05

## 2020-11-06 RX ADMIN — MELATONIN 3 MG ORAL TABLET PRN MG: 3 TABLET ORAL at 20:57

## 2020-11-06 RX ADMIN — INSULIN ASPART SCH UNIT: 100 INJECTION, SOLUTION INTRAVENOUS; SUBCUTANEOUS at 06:03

## 2020-11-06 RX ADMIN — INSULIN ASPART SCH UNIT: 100 INJECTION, SOLUTION INTRAVENOUS; SUBCUTANEOUS at 11:20

## 2020-11-06 RX ADMIN — DOCUSATE SODIUM SCH MG: 100 CAPSULE ORAL at 08:00

## 2020-11-06 RX ADMIN — OXYCODONE HYDROCHLORIDE AND ACETAMINOPHEN SCH MG: 500 TABLET ORAL at 06:15

## 2020-11-06 RX ADMIN — AMIODARONE HYDROCHLORIDE SCH MG: 200 TABLET ORAL at 08:00

## 2020-11-06 RX ADMIN — CARVEDILOL SCH MG: 12.5 TABLET, FILM COATED ORAL at 20:56

## 2020-11-06 RX ADMIN — INSULIN ASPART SCH UNIT: 100 INJECTION, SOLUTION INTRAVENOUS; SUBCUTANEOUS at 16:01

## 2020-11-06 RX ADMIN — Medication SCH MG: at 07:58

## 2020-11-06 RX ADMIN — CARVEDILOL SCH MG: 12.5 TABLET, FILM COATED ORAL at 07:59

## 2020-11-06 NOTE — PM&R PROGRESS NOTE
Subjective


HPI/CC On Admission


Date Seen by Provider:  Nov 6, 2020


Time Seen by Provider:  11:45


Subjective/Events-last exam


11/6/20:


Pt doing pretty well


Ostomy stays in placed with inverted waver


Overall doing pretty well otherwise








11/5/20:


Ileostomy functioning well


Palak is trying an inverted wafer to prevent bag from falling off


Pain controlled








11/4/20:


Discharge is planned on 11/10 


Wound care seeing her 


Redness around the stoma will be addressed 


Changing laxatives to prn 








11/3/20:


Pt doing very well 


Dr. Doyle saw the wound and it is healing well 


Overall seems to be doing very well 


DC planned soon








11/2/20:


Hgb 10.1 


Will discontinue Benadryl and will take Claritin instead 


Only has one kidney, she is very particular about medications 


Isolation was discontinued because C-Diff was negative again 


Bowels are moving through the ileostomy 








11/1/20:


C diff second specimen sent to lab


Pain pill given today and it helped


Lovenox maintained


No OAC recommended


Dr Birch appreciated








Patient doing well


C diff negative and will recheck tomorrow then DC isolation


BM today


Dr Doyle consulted for wound care of abdomen


WBC 11


Dr Birch consulted for AF and OAC question


Checked meds and labs


Conferred with RN


Reviewed therapy notes





Review of Systems


Gastrointestinal:  Abdominal Pain





Objective


Exam


Vital Signs





Vital Signs








  Date Time  Temp Pulse Resp B/P (MAP) Pulse Ox O2 Delivery O2 Flow Rate FiO2


 


11/7/20 05:15 36.4 58 18 154/65 (94) 98 Room Air  


 


11/1/20 21:15       96.00 





Capillary Refill : Less Than 3 Seconds


General Appearance:  No Apparent Distress, WD/WN, Chronically ill


HEENT:  PERRL/EOMI, Normal ENT Inspection, Pharynx Normal


Neck:  Full Range of Motion, Normal Inspection, Non Tender, Supple, Carotid 

Bruit


Respiratory:  Chest Non Tender, Lungs Clear, Normal Breath Sounds, No Accessory 

Muscle Use, No Respiratory Distress


Cardiovascular:  Regular Rate, Rhythm, No Edema, No Gallop, No JVD, No Murmur, 

Normal Peripheral Pulses


Gastrointestinal:  Normal Bowel Sounds, No Organomegaly, No Pulsatile Mass, Non 

Tender, Soft


Back:  Normal Inspection, No CVA Tenderness, No Vertebral Tenderness


Extremity:  Normal Capillary Refill, Normal Inspection, Normal Range of Motion, 

Non Tender, No Calf Tenderness, No Pedal Edema


Neurologic/Psychiatric:  Alert, Oriented x3, No Motor/Sensory Deficits, Normal 

Mood/Affect, Abnormal Gait, Motor Weakness (generalized)


Skin:  Normal Color, Warm/Dry


Lymphatic:  No Adenopathy





Results/Procedures


Lab


Patient resulted labs reviewed.





FIM


Transfers


Therapy Code Descriptions/Definitions 





Functional [place] Measure:


0=Not Assessed/NA        4=Minimal Assistance


1=Total Assistance        5=Supervision or Setup


2=Maximal Assistance  6=Modified Dane


3=Moderate Assistance 7=Complete IndependenceSCALE: Activities may be completed 

with or without assistive devices.





6-Indepedent-patient completes the activity by him/herself with no assistance 

from a helper.


5-Set-up or Clean-up Assistance-helper sets up or cleans up; patient completes 

activity. [place] assists only prior to or  


    following the activity.


4-Supervision or Touching Assistance-helper provides verbal cues and/or 

touching/steadying and/or contact guard assistance as patient completes 

activity. Assistance may be provided   


    throughout the activity or intermittently.


3-Partial/Moderate Assistance-helper does LESS THAN HALF the effort. [place] 

lifts, holds or supports trunk or limbs, but provides less than half the effort.


2-Substantial/Maximal Assistance-helper does MORE THAN HALF the effort. [place] 

lifts or holds trunk or limbs and provides more than half the effort.


7-Cdyqlfqma-emurwe does ALL the effort. Patient does none of the effort to 

complete the activity. Or, the assistance of 2 or more helpers is required for 

the patient to complete the  


    activity.


If activity was not attempted, code reason:


7-Patient Refused.


9-Not Applicable-not attempted and the patient did not perform the activity 

before the current illness, exacerbation or injury.


10-Not Attempted due to Environmental Limitations-(lack of equipment, weather 

restraints, etc.).


88-Not Attempted due to Medical Conditions or Safety Concerns.


Roll Left to Right (QC):  6


Sit to Lying (QC):  5


Sit to Stand (QC):  5


Chair/Bed-to-Chair Xfer(QC):  5


Car Transfer (QC):  4





Gait Training


Does the Patient Walk?:  Yes


Distance:  150' x2


Walk 10 feet (QC):  5


Walk 50 ft with 2 Turns(QC):  5


Walk 150 ft (QC):  5


Walking 10ft/uneven surface-QC:  4


Gait Persons Needed:  1


Gait Assistive Device:  FWW





Wheelchair Training


Does the Pt Use a Wheelchair?:  No


Wheel 50 ft with 2 turns (QC):  9


Wheel 150 ft (QC):  9





Stair Training


#of Steps:  1


1 Step (curb) (QC):  4


4 Steps (QC):  88


12 Steps (QC):  88





Balance


Picking up an Object (QC):  88





ADL-Treatment


Eating (QC):  6 (drinks with IND. per clinical judgment pt is IND with eating 

tasks.)


Oral Hygiene (QC):  6


Bathing Location:  L Arm, R Arm, L Upper Leg, R Upper Leg, L Lower Leg 

(including foot), R Lower Leg (including foot), Chest, Abdomen, Buttocks, 

Perineal Area


Shower/Bathe Self (QC):  5


Upper Body Dressing (QC):  5


Lower Body Dressing (QC):  5


On/Off Footwear (QC):  5


Toileting Hygiene (QC):  3


Toilet Transfer (QC):  6





Assessment/Plan


Assessment and Plan


Assess & Plan/Chief Complaint


Assessment:


Myopathy


AF w/RVR s/p cardioversion


Hypothyroidism


C diff colitis hx


Ovarian cancer hx


DM


HTN


Solitary kidney





Plan:


Home meds


IRF protocol


Insulin


Monitor stools





10/31/20:


Monitor loose stools


OAC question?


Dr Birch consult





11/1/20:


Monitor pain


C diff last specimen sent to lab


Dr Birch appreciated





11/2/20:


Take out of isolation since C.diff negative


Monitor bowel function 


Pain control





11/3/20:


Pain control


Lovenox


IRF protocol





11/4/20:


Laxatives prn


Monitor closely





11/5/20:


Ileostomy bag ostomy care


Pain meds





11/6/20:


Ileostomy bag working well now


Pain control





(1) Myopathy


(2) C. difficile colitis


(3) Atrial fibrillation


(4) History of cardioversion


(5) Hypertension


(6) Diabetes mellitus


(7) Hypothyroidism


(8) Solitary kidney


(9) Ovarian cancer in remission


(10) Ileostomy in place











TAMMY HURST DO                 Nov 6, 2020 07:10

## 2020-11-06 NOTE — PHYSICAL THERAPY DAILY NOTE
PT Daily Note-Current


Subjective


Pt. feels better and hopes to go home Tues. Wants to try steps in the afternoon.

 Pt. comments on her left foot/toe sticking to the floor as she walks , this was

previous to this admit.





Pain





   Location:  No Pain Reported





Appearance


noted that Left foot in foot drop and right is also at nuetral and does not DF 

past that,





Mental Status


Patient Orientation:  Normal For Age


Attachments:  Other-See Comments (mask while out of room)





Transfers


SCALE: Activities may be completed with or without assistive devices.





6-Indepedent-patient completes the activity by him/herself with no assistance 

from a helper.


5-Set-up or Clean-up Assistance-helper sets up or cleans up; patient completes 

activity. [place] assists only prior to or  


    following the activity.


4-Supervision or Touching Assistance-helper provides verbal cues and/or 

touching/steadying and/or contact guard assistance as patient completes 

activity. Assistance may be provided   


    throughout the activity or intermittently.


3-Partial/Moderate Assistance-helper does LESS THAN HALF the effort. [place] 

lifts, holds or supports trunk or limbs, but provides less than half the effort.


2-Substantial/Maximal Assistance-helper does MORE THAN HALF the effort. [place] 

lifts or holds trunk or limbs and provides more than half the effort.


0-Jsvcwaoku-rjjzyx does ALL the effort. Patient does none of the effort to 

complete the activity. Or, the assistance of 2 or more helpers is required for 

the patient to complete the  


    activity.


If activity was not attempted, code reason:


7-Patient Refused.


9-Not Applicable-not attempted and the patient did not perform the activity 

before the current illness, exacerbation or injury.


10-Not Attempted due to Environmental Limitations-(lack of equipment, weather 

restraints, etc.).


88-Not Attempted due to Medical Conditions or Safety Concerns.


Roll Left & Right (QC):  6


Sit to Lying (QC):  6


Lying to Sitting/Side of Bed(Q:  6


Sit to Stand (QC):  6


Chair/Bed-to-Chair Xfer(QC):  6


Toilet Transfer (QC):  6





Weight Bearing


Right Lower Extremity:  Right


Full Weight Bearing


Left Lower Extremity:  Left


Full Weight Bearing





Gait Training


Does the Patient Walk?:  Yes


Walk 10 feet (QC):  6


Walk 50 ft with 2 Turns(QC):  6


Walk 150 ft (QC):  6


Gait Persons Needed:  1





Wheelchair Training


Does the Pt Use a Wheelchair?:  No





Exercises


Supine Ex:  Bridging, Ankle pumps, Quad Set, Rolling, Glut sets, Heel Slides, 

Short Arc Quads, Scooting, Straight leg raise, Hip abd/add


Supine Reps:  15


Seated Therapy Exercises:  Ankle pumps, Sit to stand, Long arc quads, Hip 

flexion, Hip abd/add


Seated Reps:  15


Pt. educated in use of gait belt for HC stretching in supine and sit position 1 

sec hold x 5. passive HC stretches done by this PTA as well as pt. stretches 

with gait belt, improved after


NuStep Minutes:  8


 NuStep Workload:  2





Assessment


Current Status:  Good Progress


pt. c/o discomfort of wearing mask, much of Rx done in room





PT Short Term Goals


Short Term Goals


Time Frame:  2020


Roll Left & Right:  6


Sit to lyin


Lying to sitting on side of be:  6


Sit to stand:  6


Chair/bed-to-chair transfer:  5


4 steps:  4


Picking up objects:  4





PT Long Term Goals


Long Term Goals


PT Long Term Goals Time Frame:  2020


Roll Left & Right (QC):  6


Sit to Lying (QC):  6


Lying-Sitting on Side/Bed(QC):  6


Sit to Stand (QC):  6


Chair/Bed-to-Chair Xfer(QC):  6


Toilet Transfer (QC):  6


Car Transfer (QC):  6


Does the Patient Walk:  Yes


Walk 10 feet (QC):  6


Walk 50ft with 2 Turns (QC):  6


Walk 150 ft (QC):  6


Walking 10ft on Uneven Surface:  6


1 Step (curb) (QC):  6


4 Steps (QC):  6


12 Steps (QC):  6


Picking up an Object (QC):  6


Does the Pt use WC or Scooter?:  No


Wheel 50 feet with 2 turns (QC:  9


Wheel 150 feet:  9





PT Plan


Treatment/Plan


Treatment Plan:  Continue Plan of Care


Treatment Plan:  Bed Mobility, Education, Functional Activity Cherise, Functional 

Strength, Group Therapy, Gait, Safety, Therapeutic Exercise, Transfers


Treatment Duration:  2020


Frequency:  At least 5 of 7 days/Wk (IRF)


Estimated Hrs Per Day:  1.5 hours per day


Patient and/or Family Agrees t:  Yes





Safety Risks/Education


Patient Education:  Gait Training, Transfer Techniques, Correct Positioning, 

Disease Process, Safety Issues


Teaching Recipient:  Patient


Teaching Methods:  Demonstration, Discussion


Response to Teaching:  Verbalize Understanding, Return Demonstration, 

Reinforcement Needed





Time/GCodes


Time In:  900


Time Out:  1000


Total Billed Treatment Time:  60


Total Billed Treatment


1,EX35m,GT25m











CAESAR EDWARD PTA              2020 11:07

## 2020-11-06 NOTE — PHYSICAL THERAPY DAILY NOTE
PT Daily Note-Current


Subjective


Agrees to Rx.





Pain





   Location:  No Pain Reported





Mental Status


Patient Orientation:  Normal For Age


Attachments:  Other-See Comments (mask while out of room, telemetry)





Transfers


SCALE: Activities may be completed with or without assistive devices.





6-Indepedent-patient completes the activity by him/herself with no assistance 

from a helper.


5-Set-up or Clean-up Assistance-helper sets up or cleans up; patient completes 

activity. [place] assists only prior to or  


    following the activity.


4-Supervision or Touching Assistance-helper provides verbal cues and/or touc

daisy/steadying and/or contact guard assistance as patient completes activity. 

Assistance may be provided   


    throughout the activity or intermittently.


3-Partial/Moderate Assistance-helper does LESS THAN HALF the effort. [place] 

lifts, holds or supports trunk or limbs, but provides less than half the effort.


2-Substantial/Maximal Assistance-helper does MORE THAN HALF the effort. [place] 

lifts or holds trunk or limbs and provides more than half the effort.


0-Ytrjzwfhf-ldyxmo does ALL the effort. Patient does none of the effort to 

complete the activity. Or, the assistance of 2 or more helpers is required for 

the patient to complete the  


    activity.


If activity was not attempted, code reason:


7-Patient Refused.


9-Not Applicable-not attempted and the patient did not perform the activity 

before the current illness, exacerbation or injury.


10-Not Attempted due to Environmental Limitations-(lack of equipment, weather 

restraints, etc.).


88-Not Attempted due to Medical Conditions or Safety Concerns.


in out bed and chair all indep





Weight Bearing


Right Lower Extremity:  Right


Full Weight Bearing


Left Lower Extremity:  Left


Full Weight Bearing





Gait Training


Gait Assistive Device:  FWW


175ft x 2 FWW CGA to SBA, better DF noted





Stair Training


 Stair Training: Handrails/:  2 handrails


#of Steps:  4


4 Steps (QC):  4


Stairs:  Pattern:  Step to


instructed in sequence with no LOB or incident





Exercises


Standing:  Hip Abduction, Hamstring curls, Heel/toe raises, Marching, Mini 

squats


Standing Reps:  15





Assessment


Current Status:  Good Progress





PT Short Term Goals


Short Term Goals


Time Frame:  2020


Roll Left & Right:  6


Sit to lyin


Lying to sitting on side of be:  6


Sit to stand:  6


Chair/bed-to-chair transfer:  5


4 steps:  4


Picking up objects:  4





PT Long Term Goals


Long Term Goals


PT Long Term Goals Time Frame:  2020


Roll Left & Right (QC):  6


Sit to Lying (QC):  6


Lying-Sitting on Side/Bed(QC):  6


Sit to Stand (QC):  6


Chair/Bed-to-Chair Xfer(QC):  6


Toilet Transfer (QC):  6


Car Transfer (QC):  6


Does the Patient Walk:  Yes


Walk 10 feet (QC):  6


Walk 50ft with 2 Turns (QC):  6


Walk 150 ft (QC):  6


Walking 10ft on Uneven Surface:  6


1 Step (curb) (QC):  6


4 Steps (QC):  6


12 Steps (QC):  6


Picking up an Object (QC):  6


Does the Pt use WC or Scooter?:  No


Wheel 50 feet with 2 turns (QC:  9


Wheel 150 feet:  9





PT Plan


Treatment/Plan


Treatment Plan:  Continue Plan of Care


Treatment Plan:  Bed Mobility, Education, Functional Activity Cherise, Functional 

Strength, Group Therapy, Gait, Safety, Therapeutic Exercise, Transfers


Treatment Duration:  2020


Frequency:  At least 5 of 7 days/Wk (IRF)


Estimated Hrs Per Day:  1.5 hours per day


Patient and/or Family Agrees t:  Yes





Safety Risks/Education


Patient Education:  Gait Training, Steps, Correct Positioning, Disease Process, 

Safety Issues


Teaching Recipient:  Patient


Teaching Methods:  Demonstration, Discussion


Response to Teaching:  Verbalize Understanding, Return Demonstration, 

Reinforcement Needed





Time/GCodes


Time In:  1340


Time Out:  1410


Total Billed Treatment Time:  30


Total Billed Treatment


1,FA15m,EX15m











CAESAR EDWARD PTA              2020 14:14

## 2020-11-06 NOTE — OCCUPATIONAL THER DAILY NOTE
OT Current Status-Daily Note


Subjective


Pt alert, lying in bed.  Pt agrees to therapy.  No c/o pain at this time.





Mental Status/Objective


Patient Orientation:  Person, Place, Time, Situation


Attachments:  Colostomy/Ileostomy, IV, Telemetry





ADL-Treatment


Pt declines shower.  Pt declines to don regular clothing, stating that she is 

worried about her colostomy bag will leak and get her clothing dirty.  Supine 

<--> EOB, independent.  Ambulating to bathroom using FWW, independent.  Transfer

to toilet using FWW and grabbars, independent.  Completing clothing 

manipulation, hygiene and emptying colostomy bag, independent using grabbar for 

stabilization when needed.  Pt stood at sink to complete oral care, 

independently using counter to stabilize.  Pt sat at sink, to complete sponge ba

th independently. Pt ambulated with FWW to bed to don shoes/socks.


Therapy Code Descriptions/Definitions 





Functional [place] Measure:


0=Not Assessed/NA        4=Minimal Assistance


1=Total Assistance        5=Supervision or Setup


2=Maximal Assistance  6=Modified [place]


3=Moderate Assistance 7=Complete IndependenceSCALE: Activities may be completed 

with or without assistive devices.





6-Indepedent-patient completes the activity by him/herself with no assistance 

from a helper.


5-Set-up or Clean-up Assistance-helper sets up or cleans up; patient completes 

activity. [place] assists only prior to or  


    following the activity.


4-Supervision or Touching Assistance-helper provides verbal cues and/or 

touching/steadying and/or contact guard assistance as patient completes 

activity. Assistance may be provided   


    throughout the activity or intermittently.


3-Partial/Moderate Assistance-helper does LESS THAN HALF the effort. [place] 

lifts, holds or supports trunk or limbs, but provides less than half the effort.


2-Substantial/Maximal Assistance-helper does MORE THAN HALF the effort. [place] 

lifts or holds trunk or limbs and provides more than half the effort.


9-Bxqfplgld-yfruyv does ALL the effort. Patient does none of the effort to 

complete the activity. Or, the assistance of 2 or more helpers is required for 

the patient to complete the  


    activity.


If activity was not attempted, code reason:


7-Patient Refused.


9-Not Applicable-not attempted and the patient did not perform the activity 

before the current illness, exacerbation or injury.


10-Not Attempted due to Environmental Limitations-(lack of equipment, weather 

restraints, etc.).


88-Not Attempted due to Medical Conditions or Safety Concerns.


Oral Hygiene (QC):  6


Shower/Bathe Self (QC):  5


Upper Body Dressing (QC):  5


Lower Body Dressing (QC):  5


On/Off Footwear:  6


Toileting Hygiene (QC):  6


Toilet Transfer (QC):  6





Other Treatment


Pt used FWW to ambulate to therapy gym.  Arm bike completed for 14 minutes with 

no resistance to increase activity tolerance for daily functional tasks.  Pt 

required multiple recovery breaks during arm bike exercise.  Pt completed 1 min 

stands 2x's during arm bike exercise. Pt played memory game while performing arm

bike to work on cognition giving verbal cues back of understanding, p

articipation. Pt ambulated back to room using FWW. Pt in bed call light/phone in

reach. All needs met.





OT Short Term Goals


Short Term Goals


Time Frame:  2020


Lower body dressin


Putting on/taking off footwear:  6





OT Long Term Goals


Long Term Goals


Time Frame:  2020


Eating (QC):  6


Oral Hygiene (QC):  6


Toileting Hygiene (QC):  6


Shower/Bathe Self (QC):  6


Upper Body Dressing (QC):  6


Lower Body Dressing (QC):  6


On/Off Footwear (QC):  6


Demonstrate ability to change colonoscopy bag with s/u-IND.


Additional Goals:  1-Demonstrate ADL Tasks, 2-Verbalize Understanding, 3-Imp

roveStrength/Cherise


1=Demonstrate adherence to instructed precautions during ADL tasks.


2=Patient will verbalize/demonstrate understanding of assistive 

devices/modifications for ADL.


3=Patient will improve strength/tolerance for activity to enable patient to 

perform ADL's.





OT Education/Plan


Problem List/Assessment


Assessment:  Decreased Activ Tolerance, Decreased UE Strength, Impaired Self-

Care Skills





Discharge Recommendations


Plan/Recommendations:  Continue POC





Treatment Plan/Plan of Care


Patient would benefit from OT for education, treatment and training to promote 

independence in ADL's, mobility, safety and/or upper extremity function for 

ADL's.


Plan of Care:  ADL Retraining, Functional Mobility, Group Exercise/Act as Ind, 

UE Funct Exercise/Act, UE Neuromus Re-Ed/Coord


Treatment Duration:  2020


Frequency:  At least 5 of 7 days/Wk (IRF)


Estimated Hrs Per Day:  1.5 hours per day


Agreement:  Yes


Rehab Potential:  Good





Time/GCodes


Start Time:  07:30


Stop Time:  09:00


Total Time Billed (hr/min):  90


Billed Treatment Time


1 visit- ADL 3 (40 mins), EX 2 (30mins), FA (20mins)











RONEL DAILEY                2020 07:55

## 2020-11-06 NOTE — NUR
ileostomy assessment today, covex wafer continues to hold in place without leaking.  Patient 
accepting of her new dynamic, emptying and cleaning ostomy bag as needed.  explained I will 
assist her in changing her wafer on Monday, so she will have some practice with that before 
going home on Tuesday.  Patient voices, "I've been watching videos about this on my phone."  




I contacted [facility] services to update patient's need for a convex wafer and 
adapt barrier rings to help seal ostomy contents away from skin.  Message left for return 
call.  Will reach out to [employer] on Monday again.

## 2020-11-07 VITALS — DIASTOLIC BLOOD PRESSURE: 61 MMHG | SYSTOLIC BLOOD PRESSURE: 123 MMHG

## 2020-11-07 VITALS — DIASTOLIC BLOOD PRESSURE: 65 MMHG | SYSTOLIC BLOOD PRESSURE: 154 MMHG

## 2020-11-07 VITALS — SYSTOLIC BLOOD PRESSURE: 156 MMHG | DIASTOLIC BLOOD PRESSURE: 70 MMHG

## 2020-11-07 RX ADMIN — CARVEDILOL SCH MG: 12.5 TABLET, FILM COATED ORAL at 08:45

## 2020-11-07 RX ADMIN — AMIODARONE HYDROCHLORIDE SCH MG: 200 TABLET ORAL at 08:45

## 2020-11-07 RX ADMIN — SIMETHICONE SCH MG: 80 TABLET, CHEWABLE ORAL at 21:08

## 2020-11-07 RX ADMIN — SIMETHICONE SCH MG: 80 TABLET, CHEWABLE ORAL at 08:44

## 2020-11-07 RX ADMIN — INSULIN ASPART SCH UNIT: 100 INJECTION, SOLUTION INTRAVENOUS; SUBCUTANEOUS at 11:00

## 2020-11-07 RX ADMIN — Medication SCH MG: at 08:45

## 2020-11-07 RX ADMIN — HYDROCODONE BITARTRATE AND ACETAMINOPHEN PRN TAB: 5; 325 TABLET ORAL at 21:11

## 2020-11-07 RX ADMIN — OXYCODONE HYDROCHLORIDE AND ACETAMINOPHEN SCH MG: 500 TABLET ORAL at 06:33

## 2020-11-07 RX ADMIN — SODIUM HYPOCHLORITE SCH ML: 2.5 SOLUTION TOPICAL at 21:13

## 2020-11-07 RX ADMIN — Medication SCH EA: at 06:33

## 2020-11-07 RX ADMIN — ENOXAPARIN SODIUM SCH MG: 100 INJECTION SUBCUTANEOUS at 06:33

## 2020-11-07 RX ADMIN — LORATADINE SCH MG: 10 TABLET ORAL at 08:44

## 2020-11-07 RX ADMIN — MELATONIN 3 MG ORAL TABLET PRN MG: 3 TABLET ORAL at 21:08

## 2020-11-07 RX ADMIN — CARVEDILOL SCH MG: 12.5 TABLET, FILM COATED ORAL at 21:07

## 2020-11-07 RX ADMIN — INSULIN ASPART SCH UNIT: 100 INJECTION, SOLUTION INTRAVENOUS; SUBCUTANEOUS at 06:00

## 2020-11-07 RX ADMIN — ASPIRIN 81 MG CHEWABLE TABLET SCH MG: 81 TABLET CHEWABLE at 08:43

## 2020-11-07 RX ADMIN — SIMETHICONE SCH MG: 80 TABLET, CHEWABLE ORAL at 14:03

## 2020-11-07 RX ADMIN — AMIODARONE HYDROCHLORIDE SCH MG: 200 TABLET ORAL at 21:07

## 2020-11-07 RX ADMIN — INSULIN ASPART SCH UNIT: 100 INJECTION, SOLUTION INTRAVENOUS; SUBCUTANEOUS at 16:00

## 2020-11-07 RX ADMIN — INSULIN ASPART SCH UNIT: 100 INJECTION, SOLUTION INTRAVENOUS; SUBCUTANEOUS at 21:00

## 2020-11-07 RX ADMIN — SODIUM HYPOCHLORITE SCH ML: 2.5 SOLUTION TOPICAL at 08:48

## 2020-11-07 RX ADMIN — CARVEDILOL SCH APPLIC: 25 TABLET, FILM COATED ORAL at 08:51

## 2020-11-07 NOTE — PHYSICAL THERAPY DAILY NOTE
PT Daily Note-Current


Subjective


Pt agreeable.  Pt denies pain, says she is going home Tuesday. Pt feels her (L) 

foot is getting a litte stronger since starting the DF ther ex and calf 

stretching.





Mental Status


Patient Orientation:  Person, Place, Situation





Transfers


SCALE: Activities may be completed with or without assistive devices.





6-Indepedent-patient completes the activity by him/herself with no assistance 

from a helper.


5-Set-up or Clean-up Assistance-helper sets up or cleans up; patient completes 

activity. [place] assists only prior to or  


    following the activity.


4-Supervision or Touching Assistance-helper provides verbal cues and/or 

touching/steadying and/or contact guard assistance as patient completes 

activity. Assistance may be provided   


    throughout the activity or intermittently.


3-Partial/Moderate Assistance-helper does LESS THAN HALF the effort. [place] 

lifts, holds or supports trunk or limbs, but provides less than half the effort.


2-Substantial/Maximal Assistance-helper does MORE THAN HALF the effort. [place] 

lifts or holds trunk or limbs and provides more than half the effort.


2-Mjtnhjenq-xctsfe does ALL the effort. Patient does none of the effort to 

complete the activity. Or, the assistance of 2 or more helpers is required for 

the patient to complete the  


    activity.


If activity was not attempted, code reason:


7-Patient Refused.


9-Not Applicable-not attempted and the patient did not perform the activity 

before the current illness, exacerbation or injury.


10-Not Attempted due to Environmental Limitations-(lack of equipment, weather 

restraints, etc.).


88-Not Attempted due to Medical Conditions or Safety Concerns.


All transfers mod (I)





Weight Bearing


Right Lower Extremity:  Right


Full Weight Bearing


Left Lower Extremity:  Left


Full Weight Bearing





Treatments


Pt transfers in/out of bed mod (I).  Pt donned slippers (I).  Pt amb with FWW 

and SBA x 350ft at slow steady speed.  Pt performed gastroc stretching with gait

belt followed by active DF in sitting, hooklying and supine x 20 reps each 

position.





Assessment


Current Status:  Good Progress


Pt progressing appropriately.  Pt demo good balance throughout.  Pt back to bed 

with call light and all needs met.





PT Short Term Goals


Short Term Goals


Time Frame:  2020


Roll Left & Right:  6


Sit to lyin


Lying to sitting on side of be:  6


Sit to stand:  6


Chair/bed-to-chair transfer:  5


4 steps:  4


Picking up objects:  4





PT Long Term Goals


Long Term Goals


PT Long Term Goals Time Frame:  2020


Roll Left & Right (QC):  6


Sit to Lying (QC):  6


Lying-Sitting on Side/Bed(QC):  6


Sit to Stand (QC):  6


Chair/Bed-to-Chair Xfer(QC):  6


Toilet Transfer (QC):  6


Car Transfer (QC):  6


Does the Patient Walk:  Yes


Walk 10 feet (QC):  6


Walk 50ft with 2 Turns (QC):  6


Walk 150 ft (QC):  6


Walking 10ft on Uneven Surface:  6


1 Step (curb) (QC):  6


4 Steps (QC):  6


12 Steps (QC):  6


Picking up an Object (QC):  6


Does the Pt use WC or Scooter?:  No


Wheel 50 feet with 2 turns (QC:  9


Wheel 150 feet:  9





PT Plan


Treatment/Plan


Treatment Plan:  Continue Plan of Care


Treatment Plan:  Bed Mobility, Education, Functional Activity Cherise, Functional 

Strength, Group Therapy, Gait, Safety, Therapeutic Exercise, Transfers


Treatment Duration:  2020


Frequency:  At least 5 of 7 days/Wk (IRF)


Estimated Hrs Per Day:  1.5 hours per day


Patient and/or Family Agrees t:  Yes





Time/GCodes


Time In:  1020


Time Out:  1040


Total Billed Treatment Time:  20


Total Billed Treatment


1, gait x 13', (Ther ex x 7')











MERLIN FRIED CPTA             2020 11:09

## 2020-11-07 NOTE — PM&R PROGRESS NOTE
Subjective


HPI/CC On Admission


Date Seen by Provider:  Nov 7, 2020


Time Seen by Provider:  10:15


Subjective/Events-last exam


11/7/20:


Ostomy leaking so tried another technique.


No pain reported


Wondering what time she can go home Tuesday.


Talked about low potassium diet.








11/6/20:


Pt doing pretty well


Ostomy stays in placed with inverted waver


Overall doing pretty well otherwise








11/5/20:


Ileostomy functioning well


[place] is trying an inverted wafer to prevent bag from falling off


Pain controlled








11/4/20:


Discharge is planned on 11/10 


Wound care seeing her 


Redness around the stoma will be addressed 


Changing laxatives to prn 








11/3/20:


Pt doing very well 


Dr. Doyle saw the wound and it is healing well 


Overall seems to be doing very well 


DC planned soon








11/2/20:


Hgb 10.1 


Will discontinue Benadryl and will take Claritin instead 


Only has one kidney, she is very particular about medications 


Isolation was discontinued because C-Diff was negative again 


Bowels are moving through the ileostomy 








11/1/20:


C diff second specimen sent to lab


Pain pill given today and it helped


Lovenox maintained


No OAC recommended


Dr Bicrh appreciated








Patient doing well


C diff negative and will recheck tomorrow then DC isolation


BM today


Dr Doyle consulted for wound care of abdomen


WBC 11


Dr Birch consulted for AF and OAC question


Checked meds and labs


Conferred with RN


Reviewed therapy notes





Review of Systems


Gastrointestinal:  Abdominal Pain





Objective


Exam


Vital Signs





Vital Signs








  Date Time  Temp Pulse Resp B/P (MAP) Pulse Ox O2 Delivery O2 Flow Rate FiO2


 


11/7/20 12:27  57      


 


11/7/20 09:00      Room Air  


 


11/7/20 05:15 36.4  18 154/65 (94) 98   


 


11/1/20 21:15       96.00 





Capillary Refill : Less Than 3 Seconds


General Appearance:  No Apparent Distress, WD/WN, Chronically ill


HEENT:  PERRL/EOMI, Normal ENT Inspection, Pharynx Normal


Neck:  Full Range of Motion, Normal Inspection, Non Tender, Supple, Carotid 

Bruit


Respiratory:  Chest Non Tender, Lungs Clear, Normal Breath Sounds, No Accessory 

Muscle Use, No Respiratory Distress


Cardiovascular:  Regular Rate, Rhythm, No Edema, No Gallop, No JVD, No Murmur, 

Normal Peripheral Pulses


Gastrointestinal:  Normal Bowel Sounds, No Organomegaly, No Pulsatile Mass, Non 

Tender, Soft


Back:  Normal Inspection, No CVA Tenderness, No Vertebral Tenderness


Extremity:  Normal Capillary Refill, Normal Inspection, Normal Range of Motion, 

Non Tender, No Calf Tenderness, No Pedal Edema


Neurologic/Psychiatric:  Alert, Oriented x3, No Motor/Sensory Deficits, Normal 

Mood/Affect, Abnormal Gait, Motor Weakness (generalized)


Skin:  Normal Color, Warm/Dry


Lymphatic:  No Adenopathy





Results/Procedures


Lab


Patient resulted labs reviewed.





FIM


Transfers


Therapy Code Descriptions/Definitions 





Functional [place] Measure:


0=Not Assessed/NA        4=Minimal Assistance


1=Total Assistance        5=Supervision or Setup


2=Maximal Assistance  6=Modified [place]


3=Moderate Assistance 7=Complete IndependenceSCALE: Activities may be completed 

with or without assistive devices.





6-Indepedent-patient completes the activity by him/herself with no assistance 

from a helper.


5-Set-up or Clean-up Assistance-helper sets up or cleans up; patient completes 

activity. [place] assists only prior to or  


    following the activity.


4-Supervision or Touching Assistance-helper provides verbal cues and/or 

touching/steadying and/or contact guard assistance as patient completes 

activity. Assistance may be provided   


    throughout the activity or intermittently.


3-Partial/Moderate Assistance-helper does LESS THAN HALF the effort. [place] 

lifts, holds or supports trunk or limbs, but provides less than half the effort.


2-Substantial/Maximal Assistance-helper does MORE THAN HALF the effort. [place] 

lifts or holds trunk or limbs and provides more than half the effort.


7-Vrerpphve-cqmhua does ALL the effort. Patient does none of the effort to 

complete the activity. Or, the assistance of 2 or more helpers is required for 

the patient to complete the  


    activity.


If activity was not attempted, code reason:


7-Patient Refused.


9-Not Applicable-not attempted and the patient did not perform the activity 

before the current illness, exacerbation or injury.


10-Not Attempted due to Environmental Limitations-(lack of equipment, weather 

restraints, etc.).


88-Not Attempted due to Medical Conditions or Safety Concerns.


Roll Left to Right (QC):  6


Sit to Lying (QC):  6


Sit to Stand (QC):  6


Chair/Bed-to-Chair Xfer(QC):  6


Car Transfer (QC):  4





Gait Training


Does the Patient Walk?:  Yes


Distance:  150' x2


Walk 10 feet (QC):  6


Walk 50 ft with 2 Turns(QC):  6


Walk 150 ft (QC):  6


Walking 10ft/uneven surface-QC:  4


Gait Persons Needed:  1


Gait Assistive Device:  FWW





Wheelchair Training


Does the Pt Use a Wheelchair?:  No


Wheel 50 ft with 2 turns (QC):  9


Wheel 150 ft (QC):  9





Stair Training


 Stair Training: Handrails/:  2 handrails


#of Steps:  4


1 Step (curb) (QC):  4


4 Steps (QC):  4


12 Steps (QC):  88


Stairs:  Pattern:  Step to





Balance


Picking up an Object (QC):  88





ADL-Treatment


Eating (QC):  6 (drinks with IND. per clinical judgment pt is IND with eating 

tasks.)


Oral Hygiene (QC):  6


Bathing Location:  L Arm, R Arm, L Upper Leg, R Upper Leg, L Lower Leg 

(including foot), R Lower Leg (including foot), Chest, Abdomen, Buttocks, 

Perineal Area


Shower/Bathe Self (QC):  5


Upper Body Dressing (QC):  5


Lower Body Dressing (QC):  5


On/Off Footwear (QC):  6


Toileting Hygiene (QC):  6


Toilet Transfer (QC):  6





Assessment/Plan


Assessment and Plan


Assess & Plan/Chief Complaint


Assessment:


Myopathy


AF w/RVR s/p cardioversion


Hypothyroidism


C diff colitis hx


Ovarian cancer hx


DM


HTN


Solitary kidney





Plan:


Home meds


IRF protocol


Insulin


Monitor stools





10/31/20:


Monitor loose stools


OAC question?


Dr Birch consult





11/1/20:


Monitor pain


C diff last specimen sent to lab


Dr Birch appreciated





11/2/20:


Take out of isolation since C.diff negative


Monitor bowel function 


Pain control





11/3/20:


Pain control


Lovenox


IRF protocol





11/4/20:


Laxatives prn


Monitor closely





11/5/20:


Ileostomy bag ostomy care


Pain meds





11/6/20:


Ileostomy bag working well now


Pain control





11/7/20:


Ostomy supplies to help stop leaking


Monitor pain





(1) Myopathy


(2) C. difficile colitis


(3) Atrial fibrillation


(4) History of cardioversion


(5) Hypertension


(6) Diabetes mellitus


(7) Hypothyroidism


(8) Solitary kidney


(9) Ovarian cancer in remission


(10) Ileostomy in place











TAMMY HURST DO                 Nov 7, 2020 06:18

## 2020-11-07 NOTE — NUR
Ostomy leaking.  Patient voices ready for shower.  Patient up to toilet and shower, uses 
walker with standby assist. While on toilet, patient empties ostomy bag with minimal 
assistance. IVs covered and sealed prior to showering.  Assisted patient with removal of 
ostomy wafer, bag, & adhesive ring for cleaning.  Previous incisional site and ostomy site 
left open for cleaning.  Patient states she's happy with how well her incisional site is 
closing up.

removal of convex ostomy wafer shows that stoma is beginning to push through and no longer 
retract. Patient does have large area to the right of the stoma where she has reddened skin 
irritation continuing from this morning's leak.  Area cleansed thoroughly in shower with and 
pat dry by patient after. stoma powder applied with skin prep to seal, adhesive barriers X3 
applied to build up seal around stoma. Convex wafer applied with slight pressure to seal 
area around stoma.  List of steps shared per patient request.

## 2020-11-08 VITALS — DIASTOLIC BLOOD PRESSURE: 72 MMHG | SYSTOLIC BLOOD PRESSURE: 139 MMHG

## 2020-11-08 VITALS — DIASTOLIC BLOOD PRESSURE: 65 MMHG | SYSTOLIC BLOOD PRESSURE: 129 MMHG

## 2020-11-08 VITALS — SYSTOLIC BLOOD PRESSURE: 116 MMHG | DIASTOLIC BLOOD PRESSURE: 63 MMHG

## 2020-11-08 VITALS — DIASTOLIC BLOOD PRESSURE: 69 MMHG | SYSTOLIC BLOOD PRESSURE: 113 MMHG

## 2020-11-08 RX ADMIN — INSULIN ASPART SCH UNIT: 100 INJECTION, SOLUTION INTRAVENOUS; SUBCUTANEOUS at 16:06

## 2020-11-08 RX ADMIN — SIMETHICONE SCH MG: 80 TABLET, CHEWABLE ORAL at 08:50

## 2020-11-08 RX ADMIN — CARVEDILOL SCH MG: 12.5 TABLET, FILM COATED ORAL at 21:28

## 2020-11-08 RX ADMIN — SIMETHICONE SCH MG: 80 TABLET, CHEWABLE ORAL at 21:27

## 2020-11-08 RX ADMIN — INSULIN ASPART SCH UNIT: 100 INJECTION, SOLUTION INTRAVENOUS; SUBCUTANEOUS at 06:00

## 2020-11-08 RX ADMIN — AMIODARONE HYDROCHLORIDE SCH MG: 200 TABLET ORAL at 21:00

## 2020-11-08 RX ADMIN — ASPIRIN 81 MG CHEWABLE TABLET SCH MG: 81 TABLET CHEWABLE at 08:50

## 2020-11-08 RX ADMIN — SODIUM HYPOCHLORITE SCH ML: 2.5 SOLUTION TOPICAL at 08:52

## 2020-11-08 RX ADMIN — ENOXAPARIN SODIUM SCH MG: 100 INJECTION SUBCUTANEOUS at 06:36

## 2020-11-08 RX ADMIN — CARVEDILOL SCH APPLIC: 25 TABLET, FILM COATED ORAL at 09:46

## 2020-11-08 RX ADMIN — SIMETHICONE SCH MG: 80 TABLET, CHEWABLE ORAL at 13:29

## 2020-11-08 RX ADMIN — SODIUM HYPOCHLORITE SCH ML: 2.5 SOLUTION TOPICAL at 21:42

## 2020-11-08 RX ADMIN — INSULIN ASPART SCH UNIT: 100 INJECTION, SOLUTION INTRAVENOUS; SUBCUTANEOUS at 11:00

## 2020-11-08 RX ADMIN — Medication SCH MG: at 08:50

## 2020-11-08 RX ADMIN — CARVEDILOL SCH MG: 12.5 TABLET, FILM COATED ORAL at 08:50

## 2020-11-08 RX ADMIN — INSULIN ASPART SCH UNIT: 100 INJECTION, SOLUTION INTRAVENOUS; SUBCUTANEOUS at 21:00

## 2020-11-08 RX ADMIN — AMIODARONE HYDROCHLORIDE SCH MG: 200 TABLET ORAL at 08:50

## 2020-11-08 RX ADMIN — HYDROCODONE BITARTRATE AND ACETAMINOPHEN PRN TAB: 5; 325 TABLET ORAL at 21:27

## 2020-11-08 RX ADMIN — OXYCODONE HYDROCHLORIDE AND ACETAMINOPHEN SCH MG: 500 TABLET ORAL at 06:36

## 2020-11-08 RX ADMIN — AMIODARONE HYDROCHLORIDE SCH MG: 200 TABLET ORAL at 21:28

## 2020-11-08 RX ADMIN — LORATADINE SCH MG: 10 TABLET ORAL at 08:50

## 2020-11-08 RX ADMIN — Medication SCH EA: at 06:36

## 2020-11-08 RX ADMIN — MELATONIN 3 MG ORAL TABLET PRN MG: 3 TABLET ORAL at 21:28

## 2020-11-08 NOTE — NUR
Dressing change to abd with wet to dry, Dakin solution fluffed gauze then ABD pad & Medipore 
tape, would pink minimal drainage, pt tolerated well

## 2020-11-08 NOTE — NUR
fsbs 146 no ss insulin req, c/o abd pain level 7/10 on numeric scale, Lortab 5 1 tab given 
with melatonin 3 mg for sleep

## 2020-11-08 NOTE — PM&R PROGRESS NOTE
Subjective


HPI/CC On Admission


Date Seen by Provider:  Nov 8, 2020


Time Seen by Provider:  07:20


Subjective/Events-last exam


11/8/20:


Has some questions about her ileostomy


Ostomy is inverting


Ileostomy now leaking since paste placed around the ostomy site








11/7/20:


Ostomy leaking so tried another technique.


No pain reported


Wondering what time she can go home Tuesday.


Talked about low potassium diet.








11/6/20:


Pt doing pretty well


Ostomy stays in placed with inverted waver


Overall doing pretty well otherwise








11/5/20:


Ileostomy functioning well


Palak is trying an inverted wafer to prevent bag from falling off


Pain controlled








11/4/20:


Discharge is planned on 11/10 


Wound care seeing her 


Redness around the stoma will be addressed 


Changing laxatives to prn 








11/3/20:


Pt doing very well 


Dr. Doyle saw the wound and it is healing well 


Overall seems to be doing very well 


DC planned soon








11/2/20:


Hgb 10.1 


Will discontinue Benadryl and will take Claritin instead 


Only has one kidney, she is very particular about medications 


Isolation was discontinued because C-Diff was negative again 


Bowels are moving through the ileostomy 








11/1/20:


C diff second specimen sent to lab


Pain pill given today and it helped


Lovenox maintained


No OAC recommended


Dr Birch appreciated








Patient doing well


C diff negative and will recheck tomorrow then DC isolation


BM today


Dr Doyle consulted for wound care of abdomen


WBC 11


Dr Birch consulted for AF and OAC question


Checked meds and labs


Conferred with RN


Reviewed therapy notes





Review of Systems


General:  Fatigue


Gastrointestinal:  Abdominal Pain





Objective


Exam


Vital Signs





Vital Signs








  Date Time  Temp Pulse Resp B/P (MAP) Pulse Ox O2 Delivery O2 Flow Rate FiO2


 


11/9/20 01:00  60      


 


11/8/20 21:30      Room Air  


 


11/8/20 17:00 36.8  18 116/63 (80) 98   





Capillary Refill : Less Than 3 Seconds


General Appearance:  No Apparent Distress, WD/WN, Chronically ill


HEENT:  PERRL/EOMI, Normal ENT Inspection, Pharynx Normal


Neck:  Full Range of Motion, Normal Inspection, Non Tender, Supple, Carotid 

Bruit


Respiratory:  Chest Non Tender, Lungs Clear, Normal Breath Sounds, No Accessory 

Muscle Use, No Respiratory Distress


Cardiovascular:  Regular Rate, Rhythm, No Edema, No Gallop, No JVD, No Murmur, 

Normal Peripheral Pulses


Gastrointestinal:  Normal Bowel Sounds, No Organomegaly, No Pulsatile Mass, Non 

Tender, Soft


Back:  Normal Inspection, No CVA Tenderness, No Vertebral Tenderness


Extremity:  Normal Capillary Refill, Normal Inspection, Normal Range of Motion, 

Non Tender, No Calf Tenderness, No Pedal Edema


Neurologic/Psychiatric:  Alert, Oriented x3, No Motor/Sensory Deficits, Normal 

Mood/Affect, Abnormal Gait, Motor Weakness (generalized)


Skin:  Normal Color, Warm/Dry


Lymphatic:  No Adenopathy





Results/Procedures


Lab


Patient resulted labs reviewed.





FIM


Transfers


Therapy Code Descriptions/Definitions 





Functional [place] Measure:


0=Not Assessed/NA        4=Minimal Assistance


1=Total Assistance        5=Supervision or Setup


2=Maximal Assistance  6=Modified [place]


3=Moderate Assistance 7=Complete IndependenceSCALE: Activities may be completed 

with or without assistive devices.





6-Indepedent-patient completes the activity by him/herself with no assistance 

from a helper.


5-Set-up or Clean-up Assistance-helper sets up or cleans up; patient completes 

activity. [place] assists only prior to or  


    following the activity.


4-Supervision or Touching Assistance-helper provides verbal cues and/or 

touching/steadying and/or contact guard assistance as patient completes 

activity. Assistance may be provided   


    throughout the activity or intermittently.


3-Partial/Moderate Assistance-helper does LESS THAN HALF the effort. [place] 

lifts, holds or supports trunk or limbs, but provides less than half the effort.


2-Substantial/Maximal Assistance-helper does MORE THAN HALF the effort. [place] 

lifts or holds trunk or limbs and provides more than half the effort.


2-Iwbgzxgjh-lheeoo does ALL the effort. Patient does none of the effort to 

complete the activity. Or, the assistance of 2 or more helpers is required for 

the patient to complete the  


    activity.


If activity was not attempted, code reason:


7-Patient Refused.


9-Not Applicable-not attempted and the patient did not perform the activity 

before the current illness, exacerbation or injury.


10-Not Attempted due to Environmental Limitations-(lack of equipment, weather 

restraints, etc.).


88-Not Attempted due to Medical Conditions or Safety Concerns.


Roll Left to Right (QC):  6


Sit to Lying (QC):  6


Sit to Stand (QC):  6


Chair/Bed-to-Chair Xfer(QC):  6


Car Transfer (QC):  4





Gait Training


Does the Patient Walk?:  Yes


Distance:  150' x2


Walk 10 feet (QC):  6


Walk 50 ft with 2 Turns(QC):  6


Walk 150 ft (QC):  6


Walking 10ft/uneven surface-QC:  4


Gait Persons Needed:  1


Gait Assistive Device:  FWW





Wheelchair Training


Does the Pt Use a Wheelchair?:  No


Wheel 50 ft with 2 turns (QC):  9


Wheel 150 ft (QC):  9





Stair Training


 Stair Training: Handrails/:  2 handrails


#of Steps:  4


1 Step (curb) (QC):  4


4 Steps (QC):  4


12 Steps (QC):  88


Stairs:  Pattern:  Step to





Balance


Picking up an Object (QC):  88





ADL-Treatment


Eating (QC):  6 (drinks with IND. per clinical judgment pt is IND with eating 

tasks.)


Oral Hygiene (QC):  6


Bathing Location:  L Arm, R Arm, L Upper Leg, R Upper Leg, L Lower Leg 

(including foot), R Lower Leg (including foot), Chest, Abdomen, Buttocks, 

Perineal Area


Shower/Bathe Self (QC):  5


Upper Body Dressing (QC):  5


Lower Body Dressing (QC):  5


On/Off Footwear (QC):  6


Toileting Hygiene (QC):  6


Toilet Transfer (QC):  6





Assessment/Plan


Assessment and Plan


Assess & Plan/Chief Complaint


Assessment:


Myopathy


AF w/RVR s/p cardioversion


Hypothyroidism


C diff colitis hx


Ovarian cancer hx


DM


HTN


Solitary kidney





Plan:


Home meds


IRF protocol


Insulin


Monitor stools





10/31/20:


Monitor loose stools


OAC question?


Dr Birch consult





11/1/20:


Monitor pain


C diff last specimen sent to lab


Dr Birch appreciated





11/2/20:


Take out of isolation since C.diff negative


Monitor bowel function 


Pain control





11/3/20:


Pain control


Lovenox


IRF protocol





11/4/20:


Laxatives prn


Monitor closely





11/5/20:


Ileostomy bag ostomy care


Pain meds





11/6/20:


Ileostomy bag working well now


Pain control





11/7/20:


Ostomy supplies to help stop leaking


Monitor pain





11/8/20:


Ileostomy management


Check labs in am


DC Tuesday


Reviewed [facility] records and she will need to see Dr Berny Mitchell for f/u for revi

sachin of ostomy





(1) Myopathy


(2) C. difficile colitis


(3) Atrial fibrillation


(4) History of cardioversion


(5) Hypertension


(6) Diabetes mellitus


(7) Hypothyroidism


(8) Solitary kidney


(9) Ovarian cancer in remission


(10) Ileostomy in place











TAMMY HURST DO                 Nov 8, 2020 17:19

## 2020-11-09 VITALS — SYSTOLIC BLOOD PRESSURE: 141 MMHG | DIASTOLIC BLOOD PRESSURE: 73 MMHG

## 2020-11-09 VITALS — DIASTOLIC BLOOD PRESSURE: 65 MMHG | SYSTOLIC BLOOD PRESSURE: 134 MMHG

## 2020-11-09 VITALS — SYSTOLIC BLOOD PRESSURE: 114 MMHG | DIASTOLIC BLOOD PRESSURE: 63 MMHG

## 2020-11-09 LAB
ALBUMIN SERPL-MCNC: 3.3 GM/DL (ref 3.2–4.5)
ALP SERPL-CCNC: 130 U/L (ref 40–136)
ALT SERPL-CCNC: 36 U/L (ref 0–55)
BASOPHILS # BLD AUTO: 0.1 10^3/UL (ref 0–0.1)
BASOPHILS NFR BLD AUTO: 1 % (ref 0–10)
BILIRUB SERPL-MCNC: 0.3 MG/DL (ref 0.1–1)
BUN/CREAT SERPL: 23
CALCIUM SERPL-MCNC: 9.3 MG/DL (ref 8.5–10.1)
CHLORIDE SERPL-SCNC: 109 MMOL/L (ref 98–107)
CO2 SERPL-SCNC: 18 MMOL/L (ref 21–32)
CREAT SERPL-MCNC: 1.14 MG/DL (ref 0.6–1.3)
EOSINOPHIL # BLD AUTO: 0.6 10^3/UL (ref 0–0.3)
EOSINOPHIL NFR BLD AUTO: 7 % (ref 0–10)
GFR SERPLBLD BASED ON 1.73 SQ M-ARVRAT: 47 ML/MIN
GLUCOSE SERPL-MCNC: 101 MG/DL (ref 70–105)
HCT VFR BLD CALC: 31 % (ref 35–52)
HGB BLD-MCNC: 9.9 G/DL (ref 11.5–16)
LYMPHOCYTES # BLD AUTO: 1.2 10^3/UL (ref 1–4)
LYMPHOCYTES NFR BLD AUTO: 13 % (ref 12–44)
MANUAL DIFFERENTIAL PERFORMED BLD QL: NO
MCH RBC QN AUTO: 30 PG (ref 25–34)
MCHC RBC AUTO-ENTMCNC: 32 G/DL (ref 32–36)
MCV RBC AUTO: 94 FL (ref 80–99)
MONOCYTES # BLD AUTO: 0.8 10^3/UL (ref 0–1)
MONOCYTES NFR BLD AUTO: 9 % (ref 0–12)
NEUTROPHILS # BLD AUTO: 6.1 10^3/UL (ref 1.8–7.8)
NEUTROPHILS NFR BLD AUTO: 70 % (ref 42–75)
PLATELET # BLD: 352 10^3/UL (ref 130–400)
PMV BLD AUTO: 9.7 FL (ref 9–12.2)
POTASSIUM SERPL-SCNC: 4.3 MMOL/L (ref 3.6–5)
PROT SERPL-MCNC: 6 GM/DL (ref 6.4–8.2)
SODIUM SERPL-SCNC: 138 MMOL/L (ref 135–145)
WBC # BLD AUTO: 8.8 10^3/UL (ref 4.3–11)

## 2020-11-09 RX ADMIN — MELATONIN 3 MG ORAL TABLET PRN MG: 3 TABLET ORAL at 21:23

## 2020-11-09 RX ADMIN — SODIUM HYPOCHLORITE SCH ML: 2.5 SOLUTION TOPICAL at 08:30

## 2020-11-09 RX ADMIN — SIMETHICONE SCH MG: 80 TABLET, CHEWABLE ORAL at 12:51

## 2020-11-09 RX ADMIN — ENOXAPARIN SODIUM SCH MG: 100 INJECTION SUBCUTANEOUS at 06:46

## 2020-11-09 RX ADMIN — CARVEDILOL SCH MG: 12.5 TABLET, FILM COATED ORAL at 21:25

## 2020-11-09 RX ADMIN — SIMETHICONE SCH MG: 80 TABLET, CHEWABLE ORAL at 21:23

## 2020-11-09 RX ADMIN — CARVEDILOL SCH APPLIC: 25 TABLET, FILM COATED ORAL at 09:00

## 2020-11-09 RX ADMIN — ASPIRIN 81 MG CHEWABLE TABLET SCH MG: 81 TABLET CHEWABLE at 08:29

## 2020-11-09 RX ADMIN — SIMETHICONE SCH MG: 80 TABLET, CHEWABLE ORAL at 08:28

## 2020-11-09 RX ADMIN — Medication SCH MG: at 08:29

## 2020-11-09 RX ADMIN — INSULIN ASPART SCH UNIT: 100 INJECTION, SOLUTION INTRAVENOUS; SUBCUTANEOUS at 15:30

## 2020-11-09 RX ADMIN — INSULIN ASPART SCH UNIT: 100 INJECTION, SOLUTION INTRAVENOUS; SUBCUTANEOUS at 20:20

## 2020-11-09 RX ADMIN — OXYCODONE HYDROCHLORIDE AND ACETAMINOPHEN SCH MG: 500 TABLET ORAL at 06:47

## 2020-11-09 RX ADMIN — INSULIN ASPART SCH UNIT: 100 INJECTION, SOLUTION INTRAVENOUS; SUBCUTANEOUS at 11:09

## 2020-11-09 RX ADMIN — Medication SCH EA: at 06:46

## 2020-11-09 RX ADMIN — HYDROCODONE BITARTRATE AND ACETAMINOPHEN PRN TAB: 5; 325 TABLET ORAL at 21:23

## 2020-11-09 RX ADMIN — AMIODARONE HYDROCHLORIDE SCH MG: 200 TABLET ORAL at 08:28

## 2020-11-09 RX ADMIN — SODIUM HYPOCHLORITE SCH ML: 2.5 SOLUTION TOPICAL at 21:40

## 2020-11-09 RX ADMIN — AMIODARONE HYDROCHLORIDE SCH MG: 200 TABLET ORAL at 21:23

## 2020-11-09 RX ADMIN — CARVEDILOL SCH MG: 12.5 TABLET, FILM COATED ORAL at 08:29

## 2020-11-09 RX ADMIN — LORATADINE SCH MG: 10 TABLET ORAL at 08:29

## 2020-11-09 RX ADMIN — INSULIN ASPART SCH UNIT: 100 INJECTION, SOLUTION INTRAVENOUS; SUBCUTANEOUS at 06:28

## 2020-11-09 NOTE — PROGRESS NOTE
BRINA RUTLEDGE MED STUDENT 11/9/20 1122:


Progress Note


This patient was admitted on 10/30 following an extended hospital stay at 

another facility. On 9/22 she presented with abdominal pain and diarrhea. She 

then later fainted and was a code blue. She was resuscitated and had an extended

hospital stay from 10/10-10/30 where she also had a colectomy. Since then she 

has been on the recovery unit. She also received a course of abx for C.diff. She

feels she has made great strides in her mobility and is happy with her progress.

She has had some problems with her ostomy but she is being sent home with an ap

pointment to see the surgeon to try to find a solution.





Supervisory-Addendum Brief


Verification & Attestation


Participated in pt care:  history, physical


Personally performed:  exam, history


Care discussed with:  other


Procedures:  n/a








CAROLYNN HURST DO 11/10/20 0511:


Supervisory-Addendum Brief


Verification & Attestation


Participated in pt care:  history, MDM, physical


Personally performed:  exam, history, MDM, supervision of care


Care discussed with:  Medical Student


Procedures:  n/a


Results interpretation:  Verified all documentation


Verification and Attestation of Medical Student E/M Service





A medical student performed and documented this service in my presence. I 

reviewed and verified all information documented by the medical student and made

modifications to such information, when appropriate. I personally performed the 

physical exam and medical decision making. 





 Carolynn Hurst, Nov 10, 2020,05:11


  











BRINA RUTLEDGE MED STUDENT       Nov 9, 2020 11:22


CAROLYNN HURST DO                Nov 10, 2020 05:11

## 2020-11-09 NOTE — NUR
Lissa Rep, This RN, and CHERYL meet with patient. Reassurance given regarding ample supplies 
at home and extra numbers to call if needing further assistance with ostomy supplies.  
Lissa mckeon explains, patient has been started through the [employer], so a box 
arrived within 48hours of 11/4/20 with all of the supplies she will need for making sure she 
gets the correct fit and supplies once she is home.  Home Health will be visiting with 
patient, and patient has Lissa Mckeon's number and this RN's number if she has any further 
questions or issues once she is home. Educational videos through smart phone code also 
given.  Patient acknowledges products and support.

## 2020-11-09 NOTE — PHYSICAL THERAPY DAILY NOTE
PT Daily Note-Current


Subjective


Pt. agrees to Rx and wants to be up ad preet in room.





Pain





   Location:  No Pain Reported





Mental Status


Patient Orientation:  Normal For Age


Attachments:  Colostomy/Ileostomy





Transfers


SCALE: Activities may be completed with or without assistive devices.





6-Indepedent-patient completes the activity by him/herself with no assistance 

from a helper.


5-Set-up or Clean-up Assistance-helper sets up or cleans up; patient completes 

activity. [place] assists only prior to or  


    following the activity.


4-Supervision or Touching Assistance-helper provides verbal cues and/or t

ouching/steadying and/or contact guard assistance as patient completes activity.

Assistance may be provided   


    throughout the activity or intermittently.


3-Partial/Moderate Assistance-helper does LESS THAN HALF the effort. [place] 

lifts, holds or supports trunk or limbs, but provides less than half the effort.


2-Substantial/Maximal Assistance-helper does MORE THAN HALF the effort. [place] 

lifts or holds trunk or limbs and provides more than half the effort.


1-Jadwkwevw-hwbdnb does ALL the effort. Patient does none of the effort to 

complete the activity. Or, the assistance of 2 or more helpers is required for 

the patient to complete the  


    activity.


If activity was not attempted, code reason:


7-Patient Refused.


9-Not Applicable-not attempted and the patient did not perform the activity 

before the current illness, exacerbation or injury.


10-Not Attempted due to Environmental Limitations-(lack of equipment, weather 

restraints, etc.).


88-Not Attempted due to Medical Conditions or Safety Concerns.


TRFs in out bed and chair and toilet mod I





Weight Bearing


Right Lower Extremity:  Right


Full Weight Bearing


Left Lower Extremity:  Left


Full Weight Bearing





Gait Training


Does the Patient Walk?:  Yes


Gait Assistive Device:  FWW


FWW mod I 150 ft x 2





Exercises


instructed again in use of gait belt to self stretch her left HC, x5 with 15 sec

hold





Treatments


Rx emphasis on indep and up ad preet safety status practicing management of bed 

side table and where to store FWW for safe close proximity when she gets up to 

toilet and be up and about.  Pt chose to leave bthrm door open and reach for 

lightswitch indep rather than close the door and deal with the awkward reaching 

then backing up walking etc. Pt. demonstrated indep in this session, up graded 

to up ad preet status





Assessment


Current Status:  Good Progress





PT Short Term Goals


Short Term Goals


Time Frame:  2020


Roll Left & Right:  6


Sit to lyin


Lying to sitting on side of be:  6


Sit to stand:  6


Chair/bed-to-chair transfer:  5


4 steps:  4


Picking up objects:  4





PT Long Term Goals


Long Term Goals


PT Long Term Goals Time Frame:  2020


Roll Left & Right (QC):  6


Sit to Lying (QC):  6


Lying-Sitting on Side/Bed(QC):  6


Sit to Stand (QC):  6


Chair/Bed-to-Chair Xfer(QC):  6


Toilet Transfer (QC):  6


Car Transfer (QC):  6


Does the Patient Walk:  Yes


Walk 10 feet (QC):  6


Walk 50ft with 2 Turns (QC):  6


Walk 150 ft (QC):  6


Walking 10ft on Uneven Surface:  6


1 Step (curb) (QC):  6


4 Steps (QC):  6


12 Steps (QC):  6


Picking up an Object (QC):  6


Does the Pt use WC or Scooter?:  No


Wheel 50 feet with 2 turns (QC:  9


Wheel 150 feet:  9





PT Plan


Treatment/Plan


Treatment Plan:  Continue Plan of Care


Treatment Plan:  Bed Mobility, Education, Functional Activity Cherise, Functional 

Strength, Group Therapy, Gait, Safety, Therapeutic Exercise, Transfers


Treatment Duration:  2020


Frequency:  At least 5 of 7 days/Wk (IRF)


Estimated Hrs Per Day:  1.5 hours per day


Patient and/or Family Agrees t:  Yes





Safety Risks/Education


Patient Education:  Gait Training, Transfer Techniques, Correct Positioning, 

Safety Issues


Teaching Recipient:  Patient


Teaching Methods:  Demonstration, Discussion


Response to Teaching:  Verbalize Understanding, Return Demonstration, 

Reinforcement Needed





Time/GCodes


Time In:  1400


Time Out:  1430


Total Billed Treatment Time:  30


Total Billed Treatment


1,FA20m,EX10m











CAESAR EDWARD PTA              2020 14:29

## 2020-11-09 NOTE — OCCUPATIONAL THER DAILY NOTE
OT Current Status-Daily Note


Subjective


Pt resting in bed, woke easily to name.  Pt agrees to therapy.  No c/o pain at 

this time.





Mental Status/Objective


Patient Orientation:  Person, Place, Time, Situation


Attachments:  Colostomy/Ileostomy, IV





ADL-Treatment


Pt agrees to shower.  Bed mobility, independent.  Using FWW to retrieve 

clothing, pt transports to bathroom.  Pt ambulated around room gathering 

supplies, placing in walker basket and taking to bathroom.  Transfer onto 

toilet, independently using FWW.  Completed toileting and colostomy care, 

independently.  Pt ambulated to sink, stood at sink to soak dentures and wash 

hands.  Transferred to shower using FWW, grabbar and shower bench independently.

 Pt completed shower independently using grabbar, hand held shower and shower 

bench.  Pt transferred out of the shower as sat to complete all dressing, FWW 

used in standing when hiking pants over hips.  Pt then ambulated to sink to 

complete oral care independently.  Pt ambulated to bed and completed bed 

mobility independently.  Discussed pt's bathroom, has ~2" lip to step over to 

get into shower then a built in shower seat.  Nrsg in room after session to c

mare pt's dressing.  Call light/phone in reach.  All needs met in room.


Therapy Code Descriptions/Definitions 





Functional [place] Measure:


0=Not Assessed/NA        4=Minimal Assistance


1=Total Assistance        5=Supervision or Setup


2=Maximal Assistance  6=Modified [place]


3=Moderate Assistance 7=Complete IndependenceSCALE: Activities may be completed 

with or without assistive devices.





6-Indepedent-patient completes the activity by him/herself with no assistance 

from a helper.


5-Set-up or Clean-up Assistance-helper sets up or cleans up; patient completes 

activity. [place] assists only prior to or  


    following the activity.


4-Supervision or Touching Assistance-helper provides verbal cues and/or 

touching/steadying and/or contact guard assistance as patient completes 

activity. Assistance may be provided   


    throughout the activity or intermittently.


3-Partial/Moderate Assistance-helper does LESS THAN HALF the effort. [place] 

lifts, holds or supports trunk or limbs, but provides less than half the effort.


2-Substantial/Maximal Assistance-helper does MORE THAN HALF the effort. [place] 

lifts or holds trunk or limbs and provides more than half the effort.


7-Qgeebkalb-sgrvzm does ALL the effort. Patient does none of the effort to 

complete the activity. Or, the assistance of 2 or more helpers is required for 

the patient to complete the  


    activity.


If activity was not attempted, code reason:


7-Patient Refused.


9-Not Applicable-not attempted and the patient did not perform the activity 

before the current illness, exacerbation or injury.


10-Not Attempted due to Environmental Limitations-(lack of equipment, weather 

restraints, etc.).


88-Not Attempted due to Medical Conditions or Safety Concerns.


Eating (QC):  6 (Using clinical judgment, pt able to complete independently.)


Oral Hygiene (QC):  6


Shower/Bathe Self (QC):  6


Upper Body Dressing (QC):  6


Lower Body Dressing (QC):  6


On/Off Footwear:  6


Toileting Hygiene (QC):  6


Toilet Transfer (QC):  6





OT Short Term Goals


Short Term Goals


Time Frame:  2020


Lower body dressin


Putting on/taking off footwear:  6





OT Long Term Goals


Long Term Goals


Time Frame:  2020


Eating (QC):  6 (met)


Oral Hygiene (QC):  6 (met)


Toileting Hygiene (QC):  6 (met)


Shower/Bathe Self (QC):  6 (et)


Upper Body Dressing (QC):  6 (met)


Lower Body Dressing (QC):  6 (met)


On/Off Footwear (QC):  6 (me)


Demonstrate ability to change colonoscopy bag with s/u-IND.


Additional Goals:  1-Demonstrate ADL Tasks, 2-Verbalize Understanding, 3-Im

proveStrength/Cherise


1=Demonstrate adherence to instructed precautions during ADL tasks.


2=Patient will verbalize/demonstrate understanding of assistive 

devices/modifications for ADL.


3=Patient will improve strength/tolerance for activity to enable patient to 

perform ADL's.





OT Education/Plan


Problem List/Assessment


Assessment:  Impaired Self-Care Skills





Discharge Recommendations


Plan/Recommendations:  Continue POC (Discharge date 11/10/2020)


Therapy Discharge Recommendati:  Home & Family, Post Acute PT, Post Acute OT





Treatment Plan/Plan of Care


Patient would benefit from OT for education, treatment and training to promote 

independence in ADL's, mobility, safety and/or upper extremity function for 

ADL's.


Plan of Care:  ADL Retraining, Functional Mobility, Group Exercise/Act as Ind, 

UE Funct Exercise/Act, UE Neuromus Re-Ed/Coord


Treatment Duration:  2020


Frequency:  At least 5 of 7 days/Wk (IRF)


Estimated Hrs Per Day:  1.5 hours per day


Agreement:  Yes


Rehab Potential:  Good





Time/GCodes


Start Time:  08:00


Stop Time:  09:30


Total Time Billed (hr/min):  90


Billed Treatment Time


1 visit-ADL 5 (75 min)  FA 1 (15 min)











RONEL DAILEY                2020 09:29

## 2020-11-09 NOTE — PHYSICAL THERAPY DAILY NOTE
PT Daily Note-Current


Subjective


Pt. explains that she will be alone at home alone except for 1st 2 days.  Pt. 

states she has not yet completely managed her colostomy independently





Pain





   Location:  No Pain Reported





Mental Status


Patient Orientation:  Normal For Age


Attachments:  Colostomy/Ileostomy





Transfers


SCALE: Activities may be completed with or without assistive devices.





6-Indepedent-patient completes the activity by him/herself with no assistance 

from a helper.


5-Set-up or Clean-up Assistance-helper sets up or cleans up; patient completes 

activity. [place] assists only prior to or  


    following the activity.


4-Supervision or Touching Assistance-helper provides verbal cues and/or 

touching/steadying and/or contact guard assistance as patient completes 

activity. Assistance may be provided   


    throughout the activity or intermittently.


3-Partial/Moderate Assistance-helper does LESS THAN HALF the effort. [place] 

lifts, holds or supports trunk or limbs, but provides less than half the effort.


2-Substantial/Maximal Assistance-helper does MORE THAN HALF the effort. [place] 

lifts or holds trunk or limbs and provides more than half the effort.


2-Kqjziawpr-nxtocc does ALL the effort. Patient does none of the effort to compl

ete the activity. Or, the assistance of 2 or more helpers is required for the 

patient to complete the  


    activity.


If activity was not attempted, code reason:


7-Patient Refused.


9-Not Applicable-not attempted and the patient did not perform the activity 

before the current illness, exacerbation or injury.


10-Not Attempted due to Environmental Limitations-(lack of equipment, weather 

restraints, etc.).


88-Not Attempted due to Medical Conditions or Safety Concerns.


Roll Left & Right (QC):  6


Sit to Lying (QC):  6


Lying to Sitting/Side of Bed(Q:  6


Sit to Stand (QC):  6


Chair/Bed-to-Chair Xfer(QC):  6


Toilet Transfer (QC):  6


Car Transfer (QC):  6





Weight Bearing


Right Lower Extremity:  Right


Full Weight Bearing


Left Lower Extremity:  Left


Full Weight Bearing





Gait Training


Does the Patient Walk?:  Yes


Walk 10 feet (QC):  6


Walk 50 ft with 2 Turns(QC):  6


Walk 150 ft (QC):  6


Walking 10ft/uneven surface-QC:  6


Gait Persons Needed:  1


Gait Assistive Device:  FWW





Stair Training


 Stair Training: Handrails/:  2 handrails


#of Steps:  4


1 Step (curb) (QC):  4


4 Steps (QC):  4


12 Steps (QC):  7


Stairs:  Pattern:  Step to


discussed how helper assist would manage FWW up or down steps before pt. then 

CGA to pt. during steps in home situation





Balance


Picking up an Object (QC):  88





Exercises


Supine Ex:  Ankle pumps, Quad Set, Rolling, Glut sets, Heel Slides, Scooting, 

Straight leg raise, Hip abd/add


Supine Reps:  15


NuStep Minutes:  10


 NuStep Workload:  2





Assessment


Current Status:  Good Progress





PT Short Term Goals


Short Term Goals


Time Frame:  2020


Roll Left & Right:  6


Sit to lyin


Lying to sitting on side of be:  6


Sit to stand:  6


Chair/bed-to-chair transfer:  5


4 steps:  4


Picking up objects:  4





PT Long Term Goals


Long Term Goals


PT Long Term Goals Time Frame:  2020


Roll Left & Right (QC):  6


Sit to Lying (QC):  6


Lying-Sitting on Side/Bed(QC):  6


Sit to Stand (QC):  6


Chair/Bed-to-Chair Xfer(QC):  6


Toilet Transfer (QC):  6


Car Transfer (QC):  6


Does the Patient Walk:  Yes


Walk 10 feet (QC):  6


Walk 50ft with 2 Turns (QC):  6


Walk 150 ft (QC):  6


Walking 10ft on Uneven Surface:  6


1 Step (curb) (QC):  6


4 Steps (QC):  6


12 Steps (QC):  6


Picking up an Object (QC):  6


Does the Pt use WC or Scooter?:  No


Wheel 50 feet with 2 turns (QC:  9


Wheel 150 feet:  9





PT Plan


Treatment/Plan


Treatment Plan:  Continue Plan of Care


Treatment Plan:  Bed Mobility, Education, Functional Activity Cherise, Functional 

Strength, Group Therapy, Gait, Safety, Therapeutic Exercise, Transfers


Treatment Duration:  2020


Frequency:  At least 5 of 7 days/Wk (IRF)


Estimated Hrs Per Day:  1.5 hours per day


Patient and/or Family Agrees t:  Yes





Safety Risks/Education


Patient Education:  Gait Training, Transfer Techniques, Steps, Correct 

Positioning, Disease Process, Safety Issues


Teaching Recipient:  Patient


Teaching Methods:  Demonstration, Discussion


Response to Teaching:  Verbalize Understanding, Return Demonstration, 

Reinforcement Needed





Time/GCodes


Time In:  1000


Time Out:  1100


Total Billed Treatment Time:  60


Total Billed Treatment


1,FA30m,EX10m,GT20m











CAESAR EDWARD PTA              2020 10:53

## 2020-11-09 NOTE — PM&R PROGRESS NOTE
Subjective


HPI/CC On Admission


Date Seen by Provider:  Nov 9, 2020


Time Seen by Provider:  09:00


Subjective/Events-last exam


11/9/20:


Dr. Berny Mitchell will see her for hair colostomy as an outpatient 


No more leaking from the colostomy bag 


Hgb is stable at 9.9








11/8/20:


Has some questions about her ileostomy


Ostomy is inverting


Ileostomy now leaking since paste placed around the ostomy site








11/7/20:


Ostomy leaking so tried another technique.


No pain reported


Wondering what time she can go home Tuesday.


Talked about low potassium diet.








11/6/20:


Pt doing pretty well


Ostomy stays in placed with inverted waver


Overall doing pretty well otherwise








11/5/20:


Ileostomy functioning well


Palak is trying an inverted wafer to prevent bag from falling off


Pain controlled








11/4/20:


Discharge is planned on 11/10 


Wound care seeing her 


Redness around the stoma will be addressed 


Changing laxatives to prn 








11/3/20:


Pt doing very well 


Dr. Doyle saw the wound and it is healing well 


Overall seems to be doing very well 


DC planned soon








11/2/20:


Hgb 10.1 


Will discontinue Benadryl and will take Claritin instead 


Only has one kidney, she is very particular about medications 


Isolation was discontinued because C-Diff was negative again 


Bowels are moving through the ileostomy 








11/1/20:


C diff second specimen sent to lab


Pain pill given today and it helped


Lovenox maintained


No OAC recommended


Dr Birch appreciated








Patient doing well


C diff negative and will recheck tomorrow then DC isolation


BM today


Dr Doyle consulted for wound care of abdomen


WBC 11


Dr Birch consulted for AF and OAC question


Checked meds and labs


Conferred with RN


Reviewed therapy notes





Review of Systems


Gastrointestinal:  Abdominal Pain





Objective


Exam


Vital Signs





Vital Signs








  Date Time  Temp Pulse Resp B/P (MAP) Pulse Ox O2 Delivery O2 Flow Rate FiO2


 


11/10/20 01:00  56      


 


11/9/20 20:00      Room Air  


 


11/9/20 16:12 36.8  20 114/63 ([phone] 99   





Capillary Refill : Less Than 3 Seconds


General Appearance:  No Apparent Distress, WD/WN, Chronically ill


HEENT:  PERRL/EOMI, Normal ENT Inspection, Pharynx Normal


Neck:  Full Range of Motion, Normal Inspection, Non Tender, Supple, Carotid 

Bruit


Respiratory:  Chest Non Tender, Lungs Clear, Normal Breath Sounds, No Accessory 

Muscle Use, No Respiratory Distress


Cardiovascular:  Regular Rate, Rhythm, No Edema, No Gallop, No JVD, No Murmur, 

Normal Peripheral Pulses


Gastrointestinal:  Normal Bowel Sounds, No Organomegaly, No Pulsatile Mass, Non 

Tender, Soft


Back:  Normal Inspection, No CVA Tenderness, No Vertebral Tenderness


Extremity:  Normal Capillary Refill, Normal Inspection, Normal Range of Motion, 

Non Tender, No Calf Tenderness, No Pedal Edema


Neurologic/Psychiatric:  Alert, Oriented x3, No Motor/Sensory Deficits, Normal 

Mood/Affect, Abnormal Gait, Motor Weakness (generalized)


Skin:  Normal Color, Warm/Dry


Lymphatic:  No Adenopathy





Results/Procedures


Lab


Laboratory Tests


11/9/20 05:48








Patient resulted labs reviewed.





FIM


Transfers


Therapy Code Descriptions/Definitions 





Functional [place] Measure:


0=Not Assessed/NA        4=Minimal Assistance


1=Total Assistance        5=Supervision or Setup


2=Maximal Assistance  6=Modified [place]


3=Moderate Assistance 7=Complete IndependenceSCALE: Activities may be completed 

with or without assistive devices.





6-Indepedent-patient completes the activity by him/herself with no assistance 

from a helper.


5-Set-up or Clean-up Assistance-helper sets up or cleans up; patient completes 

activity. [place] assists only prior to or  


    following the activity.


4-Supervision or Touching Assistance-helper provides verbal cues and/or touc

daisy/steadying and/or contact guard assistance as patient completes activity. 

Assistance may be provided   


    throughout the activity or intermittently.


3-Partial/Moderate Assistance-helper does LESS THAN HALF the effort. [place] 

lifts, holds or supports trunk or limbs, but provides less than half the effort.


2-Substantial/Maximal Assistance-helper does MORE THAN HALF the effort. [place] 

lifts or holds trunk or limbs and provides more than half the effort.


5-Cbulhbune-vwxqfb does ALL the effort. Patient does none of the effort to 

complete the activity. Or, the assistance of 2 or more helpers is required for 

the patient to complete the  


    activity.


If activity was not attempted, code reason:


7-Patient Refused.


9-Not Applicable-not attempted and the patient did not perform the activity be

fore the current illness, exacerbation or injury.


10-Not Attempted due to Environmental Limitations-(lack of equipment, weather 

restraints, etc.).


88-Not Attempted due to Medical Conditions or Safety Concerns.


Roll Left to Right (QC):  6


Sit to Lying (QC):  6


Sit to Stand (QC):  6


Chair/Bed-to-Chair Xfer(QC):  6


Car Transfer (QC):  4





Gait Training


Does the Patient Walk?:  Yes


Distance:  150' x2


Walk 10 feet (QC):  6


Walk 50 ft with 2 Turns(QC):  6


Walk 150 ft (QC):  6


Walking 10ft/uneven surface-QC:  4


Gait Persons Needed:  1


Gait Assistive Device:  FWW





Wheelchair Training


Does the Pt Use a Wheelchair?:  No


Wheel 50 ft with 2 turns (QC):  9


Wheel 150 ft (QC):  9





Stair Training


 Stair Training: Handrails/:  2 handrails


#of Steps:  4


1 Step (curb) (QC):  4


4 Steps (QC):  4


12 Steps (QC):  88


Stairs:  Pattern:  Step to





Balance


Picking up an Object (QC):  88





ADL-Treatment


Eating (QC):  6 (drinks with IND. per clinical judgment pt is IND with eating 

tasks.)


Oral Hygiene (QC):  6


Bathing Location:  L Arm, R Arm, L Upper Leg, R Upper Leg, L Lower Leg 

(including foot), R Lower Leg (including foot), Chest, Abdomen, Buttocks, 

Perineal Area


Shower/Bathe Self (QC):  5


Upper Body Dressing (QC):  5


Lower Body Dressing (QC):  5


On/Off Footwear (QC):  6


Toileting Hygiene (QC):  6


Toilet Transfer (QC):  6





Assessment/Plan


Assessment and Plan


Assess & Plan/Chief Complaint


Assessment:


Myopathy


AF w/RVR s/p cardioversion


Hypothyroidism


C diff colitis hx


Ovarian cancer hx


DM


HTN


Solitary kidney





Plan:


Home meds


IRF protocol


Insulin


Monitor stools





10/31/20:


Monitor loose stools


OAC question?


Dr Birch consult





11/1/20:


Monitor pain


C diff last specimen sent to lab


Dr Birch appreciated





11/2/20:


Take out of isolation since C.diff negative


Monitor bowel function 


Pain control





11/3/20:


Pain control


Lovenox


IRF protocol





11/4/20:


Laxatives prn


Monitor closely





11/5/20:


Ileostomy bag ostomy care


Pain meds





11/6/20:


Ileostomy bag working well now


Pain control





11/7/20:


Ostomy supplies to help stop leaking


Monitor pain





11/8/20:


Ileostomy management


Check labs in am


DC Tuesday


[facility] and she will need to see Dr Berny Mitchell for f/u for 

revision of ostomy





11/9/20:


DC tomorrow


Dr Mitchell f/u appt





(1) Myopathy


(2) C. difficile colitis


(3) Atrial fibrillation


(4) History of cardioversion


(5) Hypertension


(6) Diabetes mellitus


(7) Hypothyroidism


(8) Solitary kidney


(9) Ovarian cancer in remission


(10) Ileostomy in place











TAMMY HURST DO                 Nov 9, 2020 08:58

## 2020-11-09 NOTE — NUR
CM/SS DISCHARGE PLANNING

Patient will discharge home tomorrow as planned.



HH:  Reviewed [employer] agencies in patient's service area, patient chose [facility] as provider.  Referral completed for RN PT OT, await confirmation they can provide.  
Patient has new ostomy, supplies are ordered through [employer] and representative is coming 
today to see patient to insure she leaves with adequate initial equipment.



DME:  Patient confirms she needs no new equipment for home use.



Patient's daughter will pick her up tomorrow, her other daughter intends to stay a few 
nights with her until she is comfortable being alone.  Patient indicates good family support 
from her children.



IMM2 discussed, patient has no intention to appeal.  She has participated in all phases of 
planning and is readying herself with education and training for ostomy management.



Patient targets a late a.m. departure.



Unit RN aware and has already made all follow up physician appointments including PCP, 
surgeon Dr. Berny Mitchell at [facility], and [facility] Dr. Doyle.



Finalize in a.m.

## 2020-11-10 VITALS — DIASTOLIC BLOOD PRESSURE: 59 MMHG | SYSTOLIC BLOOD PRESSURE: 117 MMHG

## 2020-11-10 VITALS — SYSTOLIC BLOOD PRESSURE: 117 MMHG | DIASTOLIC BLOOD PRESSURE: 59 MMHG

## 2020-11-10 VITALS — SYSTOLIC BLOOD PRESSURE: 121 MMHG | DIASTOLIC BLOOD PRESSURE: 56 MMHG

## 2020-11-10 RX ADMIN — Medication SCH EA: at 06:19

## 2020-11-10 RX ADMIN — OXYCODONE HYDROCHLORIDE AND ACETAMINOPHEN SCH MG: 500 TABLET ORAL at 06:19

## 2020-11-10 RX ADMIN — LORATADINE SCH MG: 10 TABLET ORAL at 08:05

## 2020-11-10 RX ADMIN — AMIODARONE HYDROCHLORIDE SCH MG: 200 TABLET ORAL at 08:05

## 2020-11-10 RX ADMIN — INSULIN ASPART SCH UNIT: 100 INJECTION, SOLUTION INTRAVENOUS; SUBCUTANEOUS at 06:17

## 2020-11-10 RX ADMIN — SIMETHICONE SCH MG: 80 TABLET, CHEWABLE ORAL at 08:05

## 2020-11-10 RX ADMIN — SODIUM HYPOCHLORITE SCH APPLIC: 2.5 SOLUTION TOPICAL at 08:06

## 2020-11-10 RX ADMIN — Medication SCH MG: at 08:05

## 2020-11-10 RX ADMIN — CARVEDILOL SCH APPLIC: 25 TABLET, FILM COATED ORAL at 08:06

## 2020-11-10 RX ADMIN — CARVEDILOL SCH MG: 12.5 TABLET, FILM COATED ORAL at 08:05

## 2020-11-10 RX ADMIN — ENOXAPARIN SODIUM SCH MG: 100 INJECTION SUBCUTANEOUS at 06:19

## 2020-11-10 RX ADMIN — ASPIRIN 81 MG CHEWABLE TABLET SCH MG: 81 TABLET CHEWABLE at 08:05

## 2020-11-10 RX ADMIN — INSULIN ASPART SCH UNIT: 100 INJECTION, SOLUTION INTRAVENOUS; SUBCUTANEOUS at 11:29

## 2020-11-10 NOTE — NUR
CALL TO [facility]. OK TO DC HOME. ORDERS TO F/U WITH DR. BRITO IN APPROXIMATELY 1 MONTH. F/U 
APPOITNMENT SCHEDULED FOR 12/1/20 AT 09:00 AM.

## 2020-11-10 NOTE — DISCHARGE SUMMARY
Diagnosis/Chief Complaint


Date of Admission


Oct 30, 2020 at 10:40


Date of Discharge





Discharge Date:  Nov 10, 2020


Discharge Diagnosis


Assessment:


Myopathy


AF w/RVR s/p cardioversion


Hypothyroidism


C diff colitis hx


Ovarian cancer hx


DM


HTN


Solitary kidney


Ileostomy





Plan:


Home meds


IRF protocol


Insulin


Monitor stools





10/31/20:


Monitor loose stools


OAC question?


Dr Birch consult





20:


Monitor pain


C diff last specimen sent to lab


Dr Birch appreciated





20:


Take out of isolation since C.diff negative


Monitor bowel function 


Pain control





11/3/20:


Pain control


Lovenox


IRF protocol





20:


Laxatives prn


Monitor closely





20:


Ileostomy bag ostomy care


Pain meds





20:


Ileostomy bag working well now


Pain control





20:


Ostomy supplies to help stop leaking


Monitor pain





20:


Ileostomy management


Check labs in am


DC Tuesday


Reviewed [facility] records and she will need to see Dr Berny Mitchell for f/u for 

revision of ostomy





20:


DC tomorrow


Dr Mitchell f/u appt





(1) Myopathy


(2) C. difficile colitis


(3) Atrial fibrillation


(4) History of cardioversion


(5) Hypertension


(6) Diabetes mellitus


(7) Hypothyroidism


(8) Solitary kidney


(9) Ovarian cancer in remission


(10) Ileostomy in place





Discharge Summary


Discharge Physical Examination


Allergies:  


Coded Allergies:  


     Penicillins (Verified  Allergy, Unknown, 10/30/20)


     raspberry (Verified  Allergy, Unknown, 10/30/20)


Vitals & I&Os





Vital Signs








  Date Time  Temp Pulse Resp B/P (MAP) Pulse Ox O2 Delivery O2 Flow Rate FiO2


 


11/10/20 11:30 36.4 65 20 117/59 97 Room Air  








General Appearance:  Alert, Oriented X3, Cooperative


Respiratory:  Clear to Auscultation


Cardiovascular:  Regular Rate


Neuro:  Normal Gait, Normal Speech, Strength at 5/5 X4 Ext


Psych/Mental Status:  Mental Status NL





Hospital Course


Was the Problem List Reviewed?:  Yes


Hospital course: 


Pt had an uneventful 12 day hospital course after she was admitted from [facility] after a significant hospital stay which was critical, resulting in an 

urgent sub-total colectomy and requiring cardioversion X2 due to AFIB after 

syncopal episode which caused an ICU hospital course, but she did very well, she

had no significant compromise during her hospital stay, she remains stable, 

ostomy nurse helped her with her ostomy bag. She will see Dr. Mitchell at [facility] in 

close follow-up along with her PCP Steven Leyva and I did review all of her home 

medication and she was placed back on her home meds without issues. She was 

deemed stable for discharge having completed therapies requirements.


Labs (last 24 hrs)


Laboratory Tests


10/30/20 15:46: Glucometer 146H


10/30/20 21:12: Glucometer 147H


10/31/20 06:25: Glucometer 120H


10/31/20 06:43: 


White Blood Count 11.1H, Red Blood Count 3.43L, Hemoglobin 10.3L, Hematocrit 32L

, Mean Corpuscular Volume 94, Mean Corpuscular Hemoglobin 30, Mean Corpuscular 

Hemoglobin Concent 32, Red Cell Distribution Width 17.8H, Platelet Count 309, 

Mean Platelet Volume 9.5, Immature Granulocyte % (Auto) 1, Neutrophils (%) 

(Auto) 71, Lymphocytes (%) (Auto) 15, Monocytes (%) (Auto) 6, Eosinophils (%) 

(Auto) 6, Basophils (%) (Auto) 1, Neutrophils # (Auto) 7.9H, Lymphocytes # 

(Auto) 1.7, Monocytes # (Auto) 0.7, Eosinophils # (Auto) 0.7H, Basophils # 

(Auto) 0.1, Immature Granulocyte # (Auto) 0.1, Sodium Level 135, Potassium Level

4.7, Chloride Level 106, Carbon Dioxide Level 18L, Anion Gap 11, Blood Urea 

Nitrogen 30H, Creatinine 1.17, Estimat Glomerular Filtration Rate 45, 

BUN/Creatinine Ratio 26, Glucose Level 107H, Calcium Level 9.5, Corrected 

Calcium 9.7, Total Bilirubin 0.3, Aspartate Amino Transf (AST/SGOT) 20, Alanine 

Aminotransferase (ALT/SGPT) 30, Alkaline Phosphatase 110, Total Protein 6.7, 

Albumin 3.7


10/31/20 12:01: Glucometer 93


10/31/20 15:53: Glucometer 179H


10/31/20 21:23: Glucometer 131H


20 06:15: Glucometer 117H


20 11:48: Glucometer 97


20 15:17: Glucometer 157H


20 20:09: Glucometer 235H


20 05:05: 


White Blood Count 10.3, Red Blood Count 3.34L, Hemoglobin 10.1L, Hematocrit 32L,

Mean Corpuscular Volume 95, Mean Corpuscular Hemoglobin 30, Mean Corpuscular 

Hemoglobin Concent 32, Red Cell Distribution Width 17.4H, Platelet Count 332, 

Mean Platelet Volume 9.8, Immature Granulocyte % (Auto) 1, Neutrophils (%) 

(Auto) 73, Lymphocytes (%) (Auto) 13, Monocytes (%) (Auto) 7, Eosinophils (%) 

(Auto) 6, Basophils (%) (Auto) 1, Neutrophils # (Auto) 7.6, Lymphocytes # (Auto)

1.3, Monocytes # (Auto) 0.7, Eosinophils # (Auto) 0.6H, Basophils # (Auto) 0.1, 

Immature Granulocyte # (Auto) 0.1, Sodium Level 138, Potassium Level 4.6, 

Chloride Level 110H, Carbon Dioxide Level 18L, Anion Gap 10, Blood Urea Nitrogen

28H, Creatinine 1.12, Estimat Glomerular Filtration Rate 48, BUN/Creatinine 

Ratio 25, Glucose Level 111H, Calcium Level 9.3, Corrected Calcium 9.8, Total 

Bilirubin 0.3, Aspartate Amino Transf (AST/SGOT) 30, Alanine Aminotransferase 

(ALT/SGPT) 35, Alkaline Phosphatase 128, Total Protein 6.2L, Albumin 3.4


20 05:13: Glucometer 116H


20 10:57: Glucometer 123H


20 16:54: Glucometer 137H


20 21:17: Glucometer 125H


11/3/20 05:23: Glucometer 134H


11/3/20 10:52: Glucometer 114H


11/3/20 15:39: Glucometer 182H


11/3/20 20:19: Glucometer 107


20 05:30: Glucometer 86


20 10:50: Glucometer 110


20 15:08: Glucometer 132H


20 20:07: Glucometer 189H


20 05:31: Glucometer 90


20 10:45: Glucometer 96


20 15:59: Glucometer 97


20 20:31: Glucometer 145H


20 05:51: Glucometer 115H


20 10:43: Glucometer 107


20 15:22: Glucometer 124H


20 20:12: Glucometer 155H


20 05:45: Glucometer 110


20 11:31: Glucometer 120H


20 16:54: Glucometer 85


20 20:27: Glucometer 115H


20 05:41: Glucometer 85


20 11:37: Glucometer 112H


20 15:44: Glucometer 116H


20 20:23: Glucometer 146H


20 05:48: 


White Blood Count 8.8, Red Blood Count 3.35L, Hemoglobin 9.9L, Hematocrit 31L, 

Mean Corpuscular Volume 94, Mean Corpuscular Hemoglobin 30, Mean Corpuscular 

Hemoglobin Concent 32, Red Cell Distribution Width 16.1H, Platelet Count 352, 

Mean Platelet Volume 9.7, Immature Granulocyte % (Auto) 0, Neutrophils (%) 

(Auto) 70, Lymphocytes (%) (Auto) 13, Monocytes (%) (Auto) 9, Eosinophils (%) 

(Auto) 7, Basophils (%) (Auto) 1, Neutrophils # (Auto) 6.1, Lymphocytes # (Auto)

1.2, Monocytes # (Auto) 0.8, Eosinophils # (Auto) 0.6H, Basophils # (Auto) 0.1, 

Immature Granulocyte # (Auto) 0.0, Sodium Level 138, Potassium Level 4.3, 

Chloride Level 109H, Carbon Dioxide Level 18L, Anion Gap 11, Blood Urea Nitrogen

26H, Creatinine 1.14, Estimat Glomerular Filtration Rate 47, BUN/Creatinine Ra

cheryle 23, Glucose Level 101, Calcium Level 9.3, Corrected Calcium 9.9, Total 

Bilirubin 0.3, Aspartate Amino Transf (AST/SGOT) 26, Alanine Aminotransferase 

(ALT/SGPT) 36, Alkaline Phosphatase 130, Total Protein 6.0L, Albumin 3.3


20 10:56: Glucometer 133H


20 15:23: Glucometer 127H


20 20:10: Glucometer 153H


11/10/20 06:14: Glucometer 138H


11/10/20 10:49: Glucometer 94





Microbiology


20 C. difficile GDH Antigen & Toxins - Final, Complete


          





Pending Labs





Microbiology








 Date/Time


Source Procedure


Growth Status





 


 20 11:00


Stool C. difficile GDH Antigen & Toxins - Final Complete


 


 10/30/20 11:30


Stool C. difficile GDH Antigen & Toxins - Final Complete





Laboratory Tests


10/30/20 15:46: Glucometer 146


10/30/20 21:12: Glucometer 147


10/31/20 06:25: Glucometer 120


10/31/20 06:43: 


White Blood Count 11.1, Red Blood Count 3.43, Hemoglobin 10.3, Hematocrit 32, 

Mean Corpuscular Volume 94, Mean Corpuscular Hemoglobin 30, Mean Corpuscular 

Hemoglobin Concent 32, Red Cell Distribution Width 17.8, Platelet Count 309, 

Mean Platelet Volume 9.5, Immature Granulocyte % (Auto) 1, Neutrophils (%) 

(Auto) 71, Lymphocytes (%) (Auto) 15, Monocytes (%) (Auto) 6, Eosinophils (%) 

(Auto) 6, Basophils (%) (Auto) 1, Neutrophils # (Auto) 7.9, Lymphocytes # (Auto)

1.7, Monocytes # (Auto) 0.7, Eosinophils # (Auto) 0.7, Basophils # (Auto) 0.1, 

Immature Granulocyte # (Auto) 0.1, Sodium Level 135, Potassium Level 4.7, 

Chloride Level 106, Carbon Dioxide Level 18, Anion Gap 11, Blood Urea Nitrogen 

30, Creatinine 1.17, Estimat Glomerular Filtration Rate 45, BUN/Creatinine Ratio

26, Glucose Level 107, Calcium Level 9.5, Corrected Calcium 9.7, Total Bilirubin

0.3, Aspartate Amino Transf (AST/SGOT) 20, Alanine Aminotransferase (ALT/SGPT) 

30, Alkaline Phosphatase 110, Total Protein 6.7, Albumin 3.7


10/31/20 12:01: Glucometer 93


10/31/20 15:53: Glucometer 179


10/31/20 21:23: Glucometer 131


20 06:15: Glucometer 117


20 11:48: Glucometer 97


20 15:17: Glucometer 157


20 20:09: Glucometer 235


20 05:05: 


White Blood Count 10.3, Red Blood Count 3.34, Hemoglobin 10.1, Hematocrit 32, 

Mean Corpuscular Volume 95, Mean Corpuscular Hemoglobin 30, Mean Corpuscular 

Hemoglobin Concent 32, Red Cell Distribution Width 17.4, Platelet Count 332, 

Mean Platelet Volume 9.8, Immature Granulocyte % (Auto) 1, Neutrophils (%) 

(Auto) 73, Lymphocytes (%) (Auto) 13, Monocytes (%) (Auto) 7, Eosinophils (%) 

(Auto) 6, Basophils (%) (Auto) 1, Neutrophils # (Auto) 7.6, Lymphocytes # (Auto)

1.3, Monocytes # (Auto) 0.7, Eosinophils # (Auto) 0.6, Basophils # (Auto) 0.1, 

Immature Granulocyte # (Auto) 0.1, Sodium Level 138, Potassium Level 4.6, 

Chloride Level 110, Carbon Dioxide Level 18, Anion Gap 10, Blood Urea Nitrogen 

28, Creatinine 1.12, Estimat Glomerular Filtration Rate 48, BUN/Creatinine Ratio

25, Glucose Level 111, Calcium Level 9.3, Corrected Calcium 9.8, Total Bilirubin

0.3, Aspartate Amino Transf (AST/SGOT) 30, Alanine Aminotransferase (ALT/SGPT) 

35, Alkaline Phosphatase 128, Total Protein 6.2, Albumin 3.4


20 05:13: Glucometer 116


20 10:57: Glucometer 123


20 16:54: Glucometer 137


20 21:17: Glucometer 125


11/3/20 05:23: Glucometer 134


11/3/20 10:52: Glucometer 114


11/3/20 15:39: Glucometer 182


11/3/20 20:19: Glucometer 107


20 05:30: Glucometer 86


20 10:50: Glucometer 110


20 15:08: Glucometer 132


20 20:07: Glucometer 189


20 05:31: Glucometer 90


20 10:45: Glucometer 96


20 15:59: Glucometer 97


20 20:31: Glucometer 145


20 05:51: Glucometer 115


20 10:43: Glucometer 107


20 15:22: Glucometer 124


20 20:12: Glucometer 155


20 05:45: Glucometer 110


20 11:31: Glucometer 120


20 16:54: Glucometer 85


20 20:27: Glucometer 115


20 05:41: Glucometer 85


20 11:37: Glucometer 112


20 15:44: Glucometer 116


20 20:23: Glucometer 146


20 05:48: 


White Blood Count 8.8, Red Blood Count 3.35, Hemoglobin 9.9, Hematocrit 31, Mean

Corpuscular Volume 94, Mean Corpuscular Hemoglobin 30, Mean Corpuscular 

Hemoglobin Concent 32, Red Cell Distribution Width 16.1, Platelet Count 352, 

Mean Platelet Volume 9.7, Immature Granulocyte % (Auto) 0, Neutrophils (%) 

(Auto) 70, Lymphocytes (%) (Auto) 13, Monocytes (%) (Auto) 9, Eosinophils (%) 

(Auto) 7, Basophils (%) (Auto) 1, Neutrophils # (Auto) 6.1, Lymphocytes # (Auto)

1.2, Monocytes # (Auto) 0.8, Eosinophils # (Auto) 0.6, Basophils # (Auto) 0.1, 

Immature Granulocyte # (Auto) 0.0, Sodium Level 138, Potassium Level 4.3, 

Chloride Level 109, Carbon Dioxide Level 18, Anion Gap 11, Blood Urea Nitrogen 

26, Creatinine 1.14, Estimat Glomerular Filtration Rate 47, BUN/Creatinine Ratio

23, Glucose Level 101, Calcium Level 9.3, Corrected Calcium 9.9, Total Bilirubin

0.3, Aspartate Amino Transf (AST/SGOT) 26, Alanine Aminotransferase (ALT/SGPT) 

36, Alkaline Phosphatase 130, Total Protein 6.0, Albumin 3.3


20 10:56: Glucometer 133


20 15:23: Glucometer 127


20 20:10: Glucometer 153


11/10/20 06:14: Glucometer 138


11/10/20 10:49: Glucometer 94








Discharge


Home Medications:





Active Scripts


Active


Hydrocodone-Acetamin 5-325 mg (Hydrocodone/Acetaminophen) 1 Each Tablet 1 Tab PO

QID PRN


Carvedilol 12.5 Mg Tablet 25 Mg PO BID


Amiodarone HCl 200 Mg Tablet 200 Mg PO BID


Reported


Iron (Ferrous Sulfate) 325 Mg Tablet 325 Mg PO BID


Vitamin D3 (Cholecalciferol (Vitamin D3)) 125 Mcg Tablet 125 Mcg PO DAILY


Fish Oil 1,200 mg Fish Oil (Fish Oil/Dha/Epa) 1 Each Capsule 2 Each PO BID


Calcium 600 + Vit D 200 Tablet (Calcium Carbonate/Vitamin D3) 1 Each Tablet 1 

Each PO BID


Leflunomide 20 Mg Tablet 20 Mg PO Q48H


Pantoprazole Sodium 40 Mg Tablet.dr 40 Mg PO DAILY


Atorvastatin Calcium 20 Mg Tablet 20 Mg PO DAILY


Amitriptyline HCl 100 Mg Tablet 100 Mg PO HS


Fenofibrate 160 Mg Tablet 160 Mg PO DAILY


Levothyroxine Sodium 112 Mcg Tablet 112 Mcg PO ,,WE,THU,FR,SA


     TAKES 1 TAB EVERYDAY EXPECT 


Salagen (Pilocarpine) 5 Mg Tab 10 Mg PO BID


     TAKES 2 (5MG) TABS


Victoza 3-Devin (Liraglutide) 0.6 Mg/0.1 Ml Pen.injctr 1.8 Mg SQ DAILY


Pioglitazone HCl 30 Mg Tablet 30 Mg PO DAILY


Oxybutynin Chloride 5 Mg Tablet 5 Mg PO BID


Gas Relief 80 (Simethicone) 80 Mg Tab.chew 80 Mg PO UD PRN


Aspirin 81 Mg Tab.chew 81 Mg PO DAILY


Tramadol HCl 50 Mg Tablet 100 Mg PO TID PRN


Cetirizine HCl 10 Mg Tablet 10 Mg PO BID


Amlodipine Besylate 10 Mg Tablet 10 Mg PO DAILY





Instructions to patient/family


Please see electronic discharge instructions given to patient.





Diagnosis/Problems


Diagnosis/Problems





(1) Myopathy


(2) C. difficile colitis


(3) Atrial fibrillation


(4) History of cardioversion


(5) Hypertension


(6) Diabetes mellitus


(7) Hypothyroidism


(8) Solitary kidney


(9) Ovarian cancer in remission


(10) Ileostomy in place





Clinical Quality Measures


DVT/VTE Risk/Contraindication:


Risk Factor Score Per Nursin


RFS Level Per Nursing on Admit:  4+=Very High











TAMMY HURST DO                Nov 10, 2020 06:15

## 2020-11-10 NOTE — NUR
CM/SS DISCHARGE

Patient discharged home as planned.



HHC:  Finalized with [facility] for RN PT OT.  Agency advised to review VAISHALI 
notes from [facility] regarding education/demonstration/supplies for ostomy, 
provided her phone contact for assigned agency RN to have direct contact as needed.



DME:  [employer] representative here yesterday at patient bedside along with [facility] and 
writer to reiterate and advise about ostomy supply placement and use.  Indications are that 
supply shipment was left at patient former vacant address but was not found there, patient 
and daughter are partnering with [facility] to track package current whereabouts.  Patient will 
be sent home with adequate supplies for any interim needs.



Patient has self-coordinated her family transport home.



Unit RN aware of all plans and timelines, she is finalizing followup appointments and 
advising patient of discharge orders/instructions.

## 2020-11-10 NOTE — THERAPY TEAM DISCHARGE SUMMARY
Therapy Discharge Summary


Discharge Recommendations


Date of Discharge








Physical Therapy


Patient came to rehab with myopathy.  Upon evaluation patient performed bed 

mobility with SBA, supine <-> sit CGA, sit <-> stand CGA, transfers and car 

transfer CGA, ambulated 150' with a rolling walker with CGA (including 50' with 

at least 2 turns of 90 degrees and 10' over an uneven surface), and went up and 

down 1 step using a rolling walker with SBA.  Patient has been performing bed 

mobility and transfer training, balance and endurance training, functional 

strengthening, stair training, gait training, and education.  Patient has made 

good progress and has met her long term goals except for stairs.  Now, patient 

performs bed mobility and transfers with independence, ambulates 150' with a 

rolling walker with independence (including 50' with at least 2 turns of 90 

degrees and 10' over an uneven surface), and can go up and down 4 steps using 2 

handrails with CGA.  Patient is being discharged from this facility today and 

will be discharged from PT at this time.





Occupational Therapy


Impaired Self-Care Skills





PT Long Term Goals


Long Term Goals


PT Long Term Goals Time Frame:  Nov 13, 2020


Roll Left to Right (QC):  6


Sit to Lying (QC):  6


Lying-Sitting on Side/Bed(QC):  6


Sit to Stand (QC):  6


Chair/Bed-to-Chair Xfer(QC):  6


Car Transfer (QC):  6


Does the Patient Walk:  Yes


Walk 10 feet (QC):  6


Walk 10ft-Uneven Surface(QC):  6


Walk 50ft with 2 Turns (QC):  6


Walk 150 ft (QC):  6


Does the Pt use WC or Scooter?:  No


Wheel 50 feet with 2 turns (QC:  9


1 Step (curb) (QC):  6


4 Steps (QC):  6


12 Steps (QC):  6


Picking up an Object (QC):  6





OT Long Term Goals


Long Term Goals


Time Frame:  Nov 13, 2020


Eating (QC):  6 (met)


Oral Hygiene (QC):  6 (met)


Shower/Bathe Self (QC):  6 (et)


Upper Body Dressing (QC):  6 (met)


Lower Body Dressing (QC):  6 (met)


On/Off Footwear (QC):  6 (me)


Toileting Hygiene (QC):  6 (met)


Toilet/Commode Transfer (QC):  6


Demonstrate ability to change colonoscopy bag with s/u-IND.


Additional Goals:  1-Demonstrate ADL Tasks, 2-Verbalize Understanding, 3-Impro

veStrength/Cherise


1=Demonstrate adherence to instructed precautions during ADL tasks.


2=Patient will verbalize/demonstrate understanding of assistive 

devices/modifications for ADL.


3=Patient will improve strength/tolerance for activity to enable patient to 

perform ADL's.











GORDON HOLCOMB PT                Nov 10, 2020 08:16

## 2020-11-11 NOTE — THERAPY TEAM DISCHARGE SUMMARY
Therapy Discharge Summary


Discharge Recommendations


Date of Discharge


Nov 10, 2020 at 11:43





Occupational Therapy


Pt admitted to ARU with dx of colitis with colectomy, debility. At OF, pt was 

independent wtih all ADLs without AE. Upon admission to ARU, pt was independent 

with eating and oral hygiene, required CGA with showering, setup upper body 

dressing, CGA lower body dressing, min A footwear, and CGA toileting. OT txs 

focus on increasing BUE strength and functional endurance, and increasing safety

and independence with ADLs and functional mobility. At discharge, pt was 

independent with all ADLs, meeting all goals. Pt discharged from facility, thus 

d/c from OT.


Impaired Self-Care Skills





PT Long Term Goals


Long Term Goals


PT Long Term Goals Time Frame:  Nov 13, 2020


Roll Left to Right (QC):  6


Sit to Lying (QC):  6


Lying-Sitting on Side/Bed(QC):  6


Sit to Stand (QC):  6


Chair/Bed-to-Chair Xfer(QC):  6


Car Transfer (QC):  6


Does the Patient Walk:  Yes


Walk 10 feet (QC):  6


Walk 10ft-Uneven Surface(QC):  6


Walk 50ft with 2 Turns (QC):  6


Walk 150 ft (QC):  6


Does the Pt use WC or Scooter?:  No


Wheel 50 feet with 2 turns (QC:  9


1 Step (curb) (QC):  6


4 Steps (QC):  6


12 Steps (QC):  6


Picking up an Object (QC):  6





OT Long Term Goals


Long Term Goals


Time Frame:  Nov 13, 2020


Eating (QC):  6 (met)


Oral Hygiene (QC):  6 (met)


Shower/Bathe Self (QC):  6 (et)


Upper Body Dressing (QC):  6 (met)


Lower Body Dressing (QC):  6 (met)


On/Off Footwear (QC):  6 (me)


Toileting Hygiene (QC):  6 (met)


Toilet/Commode Transfer (QC):  6 (met)


Demonstrate ability to change colonoscopy bag with s/u-IND.


Additional Goals:  1-Demonstrate ADL Tasks, 2-Verbalize Understanding, 3-

ImproveStrength/Cherise


1=Demonstrate adherence to instructed precautions during ADL tasks.


2=Patient will verbalize/demonstrate understanding of assistive 

devices/modifications for ADL.


3=Patient will improve strength/tolerance for activity to enable patient to 

perform ADL's.











EWELINA KEYS OT          Nov 11, 2020 14:34

## 2020-11-17 ENCOUNTER — HOSPITAL ENCOUNTER (OUTPATIENT)
Dept: HOSPITAL 75 - WOUNDCARE | Age: 75
End: 2020-11-17
Attending: SURGERY
Payer: MEDICARE

## 2020-11-17 DIAGNOSIS — S31.102A: Primary | ICD-10-CM

## 2020-11-17 DIAGNOSIS — L98.492: ICD-10-CM

## 2020-11-17 DIAGNOSIS — K65.9: ICD-10-CM

## 2020-11-17 DIAGNOSIS — I96: ICD-10-CM

## 2020-11-17 PROCEDURE — 99212 OFFICE O/P EST SF 10 MIN: CPT

## 2020-11-25 ENCOUNTER — HOSPITAL ENCOUNTER (OUTPATIENT)
Dept: HOSPITAL 75 - LAB | Age: 75
End: 2020-11-25
Attending: SURGERY
Payer: MEDICARE

## 2020-11-25 ENCOUNTER — HOSPITAL ENCOUNTER (OUTPATIENT)
Dept: HOSPITAL 75 - WOUNDCARE | Age: 75
End: 2020-11-25
Attending: SURGERY
Payer: MEDICARE

## 2020-11-25 DIAGNOSIS — I96: ICD-10-CM

## 2020-11-25 DIAGNOSIS — E44.0: ICD-10-CM

## 2020-11-25 DIAGNOSIS — L98.492: ICD-10-CM

## 2020-11-25 DIAGNOSIS — K65.9: ICD-10-CM

## 2020-11-25 DIAGNOSIS — S31.102A: Primary | ICD-10-CM

## 2020-11-25 DIAGNOSIS — S31.102A: ICD-10-CM

## 2020-11-25 DIAGNOSIS — D64.9: ICD-10-CM

## 2020-11-25 DIAGNOSIS — D64.9: Primary | ICD-10-CM

## 2020-11-25 LAB
%HYPO/RBC NFR BLD AUTO: SLIGHT %
ALBUMIN SERPL-MCNC: 3.6 GM/DL (ref 3.2–4.5)
ALP SERPL-CCNC: 56 U/L (ref 40–136)
ALT SERPL-CCNC: 19 U/L (ref 0–55)
ANISOCYTOSIS BLD QL SMEAR: SLIGHT
BASOPHILS # BLD AUTO: 0 10^3/UL (ref 0–0.1)
BASOPHILS NFR BLD AUTO: 0 % (ref 0–10)
BASOPHILS NFR BLD MANUAL: 0 %
BILIRUB SERPL-MCNC: 0.3 MG/DL (ref 0.1–1)
BUN/CREAT SERPL: 14
CALCIUM SERPL-MCNC: 9.9 MG/DL (ref 8.5–10.1)
CHLORIDE SERPL-SCNC: 98 MMOL/L (ref 98–107)
CO2 SERPL-SCNC: 24 MMOL/L (ref 21–32)
CREAT SERPL-MCNC: 2.37 MG/DL (ref 0.6–1.3)
EOSINOPHIL # BLD AUTO: 0.2 10^3/UL (ref 0–0.3)
EOSINOPHIL NFR BLD AUTO: 2 % (ref 0–10)
EOSINOPHIL NFR BLD MANUAL: 1 %
GFR SERPLBLD BASED ON 1.73 SQ M-ARVRAT: 20 ML/MIN
GLUCOSE SERPL-MCNC: 153 MG/DL (ref 70–105)
HCT VFR BLD CALC: 33 % (ref 35–52)
HGB BLD-MCNC: 10.2 G/DL (ref 11.5–16)
LYMPHOCYTES # BLD AUTO: 0.7 10^3/UL (ref 1–4)
LYMPHOCYTES NFR BLD AUTO: 8 % (ref 12–44)
MANUAL DIFFERENTIAL PERFORMED BLD QL: YES
MCH RBC QN AUTO: 28 PG (ref 25–34)
MCHC RBC AUTO-ENTMCNC: 31 G/DL (ref 32–36)
MCV RBC AUTO: 92 FL (ref 80–99)
MONOCYTES # BLD AUTO: 0.6 10^3/UL (ref 0–1)
MONOCYTES NFR BLD AUTO: 6 % (ref 0–12)
MONOCYTES NFR BLD: 4 %
NEUTROPHILS # BLD AUTO: 8 10^3/UL (ref 1.8–7.8)
NEUTROPHILS NFR BLD AUTO: 83 % (ref 42–75)
NEUTS BAND NFR BLD MANUAL: 85 %
NEUTS BAND NFR BLD: 1 %
PLATELET # BLD: 381 10^3/UL (ref 130–400)
PMV BLD AUTO: 9.7 FL (ref 9–12.2)
POIKILOCYTOSIS BLD QL SMEAR: SLIGHT
POLYCHROMASIA BLD QL SMEAR: SLIGHT
POTASSIUM SERPL-SCNC: 5.6 MMOL/L (ref 3.6–5)
PROT SERPL-MCNC: 7.1 GM/DL (ref 6.4–8.2)
SODIUM SERPL-SCNC: 133 MMOL/L (ref 135–145)
VARIANT LYMPHS NFR BLD MANUAL: 1 %
VARIANT LYMPHS NFR BLD MANUAL: 8 %
WBC # BLD AUTO: 9.6 10^3/UL (ref 4.3–11)

## 2020-11-25 PROCEDURE — 11042 DBRDMT SUBQ TIS 1ST 20SQCM/<: CPT

## 2020-11-25 PROCEDURE — 80053 COMPREHEN METABOLIC PANEL: CPT

## 2020-11-25 PROCEDURE — 36415 COLL VENOUS BLD VENIPUNCTURE: CPT

## 2020-11-25 PROCEDURE — 84134 ASSAY OF PREALBUMIN: CPT

## 2020-11-25 PROCEDURE — 85027 COMPLETE CBC AUTOMATED: CPT

## 2020-11-25 PROCEDURE — 85007 BL SMEAR W/DIFF WBC COUNT: CPT

## 2020-12-28 ENCOUNTER — HOSPITAL ENCOUNTER (OUTPATIENT)
Dept: HOSPITAL 75 - WOUNDCARE | Age: 75
End: 2020-12-28
Attending: SURGERY
Payer: MEDICARE

## 2020-12-28 DIAGNOSIS — E11.622: ICD-10-CM

## 2020-12-28 DIAGNOSIS — L98.492: Primary | ICD-10-CM

## 2020-12-28 DIAGNOSIS — E44.1: ICD-10-CM

## 2020-12-28 DIAGNOSIS — I96: ICD-10-CM

## 2020-12-28 PROCEDURE — 99212 OFFICE O/P EST SF 10 MIN: CPT

## 2020-12-29 ENCOUNTER — HOSPITAL ENCOUNTER (EMERGENCY)
Dept: HOSPITAL 75 - ER | Age: 75
Discharge: HOME | End: 2020-12-29
Payer: MEDICARE

## 2020-12-29 VITALS — BODY MASS INDEX: 23.9 KG/M2 | HEIGHT: 61.97 IN | WEIGHT: 129.85 LBS

## 2020-12-29 VITALS — SYSTOLIC BLOOD PRESSURE: 167 MMHG | DIASTOLIC BLOOD PRESSURE: 76 MMHG

## 2020-12-29 DIAGNOSIS — E03.9: ICD-10-CM

## 2020-12-29 DIAGNOSIS — Z85.43: ICD-10-CM

## 2020-12-29 DIAGNOSIS — E11.9: ICD-10-CM

## 2020-12-29 DIAGNOSIS — I48.91: ICD-10-CM

## 2020-12-29 DIAGNOSIS — Z79.890: ICD-10-CM

## 2020-12-29 DIAGNOSIS — K21.9: ICD-10-CM

## 2020-12-29 DIAGNOSIS — Z83.3: ICD-10-CM

## 2020-12-29 DIAGNOSIS — Z82.49: ICD-10-CM

## 2020-12-29 DIAGNOSIS — Z87.891: ICD-10-CM

## 2020-12-29 DIAGNOSIS — Z79.82: ICD-10-CM

## 2020-12-29 DIAGNOSIS — E78.00: ICD-10-CM

## 2020-12-29 DIAGNOSIS — Z88.0: ICD-10-CM

## 2020-12-29 DIAGNOSIS — I10: ICD-10-CM

## 2020-12-29 DIAGNOSIS — L98.491: Primary | ICD-10-CM

## 2020-12-29 DIAGNOSIS — Z82.61: ICD-10-CM

## 2020-12-29 PROCEDURE — 99282 EMERGENCY DEPT VISIT SF MDM: CPT

## 2020-12-29 NOTE — ED INTEGUMENTARY GENERAL
General


Chief Complaint:  Catheter/Drain/Tube Problems


Stated Complaint:  BLEEDING FROM STOMA


Nursing Triage Note:  


Pt to ED in wheelchair.  Pt reports having a stoma placed September 22, 2020 and




reports problems ever since.  Pt c/o stoma leaking and irritated skin under bag.




 Pt's skin reddened and there is breakdown.  Pt sees Dr. Leon.  Pt reports 


multiple falls recently.


Source:  patient





History of Present Illness


Date Seen by Provider:  Dec 29, 2020


Time Seen by Provider:  22:10


Initial Comments


PT ARRIVES VIA POV FROM HOME, WANTS WHEELCHAIR ON ARRIVAL


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 

DIFFICILE COLITIS--SURGERY WAS DONE AT [facility] BY DR. BEAVERS. PT STATES SHE 

WAS SENT HERE  FOR REHAB, BUT HAS BEEN HOME FOR MONTHS


C/O ONGOING SKIN BREAKDOWN AROUND STOMA, AND HAS BEEN SEEING DR. LEON IN WOUND 

CARE FOR THIS PROBLEM AS WELL AS DELAYED HEALING WITH MIDLINE SURGICAL INCISION 

--SURGICAL INCISION IS ALMOST COMPLETELY HEALED. 


STATES SHE HAS BEEN HAVING SOME BLEEDING FROM THE SKIN AROUND THE STOMA


NO INCREASED PAIN


NO INCREASED REDNESS OR SWELLING AROUND THE SITE, OR DRAINAGE FROM WOUNDS


NO BLOOD IN STOOL, AND STOOL IS NORMAL CONSISTENCY


NO FEVER


NO ACTUAL ABDOMINAL PAIN 


NO NAUSEA/VOMITING. 





PT STATES SHE USES STOMA POWDER WHEN SHE CHANGES THE STOMA


STATES SHE WAS USING A BARRIER RING AT STOMA SITE--STATES SHE RAN OUT ON 

CHRISTMAS, AND HAS NOT ATTEMPTED TO GET MORE--STATES "THEY SENT ME HOME WITH A 

COUPLE WHEN I GOT OUT OF THE HOSPITAL" 


STATES SHE WAS USING "SKIN PREP"--THEN LATER STATES SHE HAS NOT USED ANY SINCE 

SHE GOT OUT OF THE HOSPITAL--STATES THEY JUST USED IT WHEN SHE WAS IN THE 

HOSPITAL, AND HAS NOT ATTEMPTED TO GET ANY TO USE AT HOME





SAW DR. LEON YESTERDAY FOR THIS PROBLEM--NO CHANGE IN TREATMENT, PER PT


SAW DR. MABRY AT [facility] TODAY FOR THIS PROBLEM AT 1700--"THEY TOLD ME 

TO COME HERE IF IT GOT WORSE" 


PT STATES SHE CALLED DR. BEAVERS, HER SURGEON AT [facility] AND STATES SHE WAS TOLD SHE 

DID NOT NEED TO FOLLOW UP 





PT STATES THAT PROBLEM TONIGHT IS NOT ANY DIFFERENT TONIGHT.





Allergies and Home Medications


Allergies


Coded Allergies:  


     Penicillins (Verified  Allergy, Unknown, 10/30/20)


     raspberry (Verified  Allergy, Unknown, 10/30/20)





Home Medications


Amiodarone HCl 200 Mg Tablet, 200 MG PO BID


   Prescribed by: TAMMY HURST on 11/10/20 0558


Amitriptyline HCl 100 Mg Tablet, 100 MG PO HS, (Reported)


Amlodipine Besylate 10 Mg Tablet, 10 MG PO DAILY, (Reported)


Aspirin 81 Mg Tab.chew, 81 MG PO DAILY, (Reported)


Atorvastatin Calcium 20 Mg Tablet, 20 MG PO DAILY, (Reported)


Calcium Carbonate/Vitamin D3 1 Each Tablet, 1 EACH PO BID, (Reported)


Carvedilol 12.5 Mg Tablet, 25 MG PO BID


   Prescribed by: TAMMY HURST on 11/10/20 0558


Cetirizine HCl 10 Mg Tablet, 10 MG PO BID, (Reported)


Cholecalciferol (Vitamin D3) 125 Mcg Tablet, 125 MCG PO DAILY, (Reported)


Fenofibrate 160 Mg Tablet, 160 MG PO DAILY, (Reported)


Ferrous Sulfate 325 Mg Tablet, 325 MG PO BID, (Reported)


Fish Oil/Dha/Epa 1 Each Capsule, 2 EACH PO BID, (Reported)


Hydrocodone/Acetaminophen 1 Each Tablet, 1 TAB PO QID PRN for PAIN-SEVERE (8-10)


   Prescribed by: TAMMY HURST on 11/10/20 0558


Leflunomide 20 Mg Tablet, 20 MG PO Q48H, (Reported)


Levothyroxine Sodium 112 Mcg Tablet, 112 MCG PO WHITE,TU,WE,THU,FR,SA, (Reported)


   TAKES 1 TAB EVERYDAY EXPECT MONDAYS 


Liraglutide 0.6 Mg/0.1 Ml Pen.injctr, 1.8 MG SQ DAILY, (Reported)


Oxybutynin Chloride 5 Mg Tablet, 5 MG PO BID, (Reported)


Pantoprazole Sodium 40 Mg Tablet.dr, 40 MG PO DAILY, (Reported)


Pilocarpine 5 Mg Tab, 10 MG PO BID, (Reported)


   TAKES 2 (5MG) TABS 


Pioglitazone HCl 30 Mg Tablet, 30 MG PO DAILY, (Reported)


Simethicone 80 Mg Tab.chew, 80 MG PO UD PRN for GAS, (Reported)


Tramadol HCl 50 Mg Tablet, 100 MG PO TID PRN for PAIN-MODERATE (5-7), (Reported)





Patient Home Medication List


Home Medication List Reviewed:  Yes





Review of Systems


Review of Systems


Constitutional:  no symptoms reported


Respiratory:  no symptoms reported


Cardiovascular:  no symptoms reported


Gastrointestinal:  see HPI


Genitourinary:  no symptoms reported


Musculoskeletal:  no symptoms reported


Skin:  see HPI


Psychiatric/Neurological:  No Symptoms Reported





Past Medical-Social-Family Hx


Past Med/Social Hx:  Reviewed and Corrections made


Patient Social History


Alcohol Use:  Denies Use


Recreational Drug Use:  No


Smoking Status:  Former Smoker


Type Used:  Cigarettes


Former Smoker, Quit:  Mar 30, 1985


2nd Hand Smoke Exposure:  No


Recent Foreign Travel:  No


Contact w/Someone Who Travel:  No


Recent Infectious Disease Expo:  No





Immunizations Up To Date


Date of Pneumonia Vaccine:  Oct 8, 2020


Date of Influenza Vaccine:  Oct 7, 2020





Seasonal Allergies


Seasonal Allergies:  No





Past Medical History


Surgeries:  Yes


Abdominal, Bowel Surgery, Cardiac, Gallbladder, Hysterectomy, Nephrectomy, 

Oophorectomy


Respiratory:  No


Cardiac:  Yes (AFIB/RVR/CARDIOVERSION 09/2020; )


Atrial Fibrillation, High Cholesterol, Hypertension


Neurological:  No


Reproductive Disorders:  Yes (OVARIAN CANCER)


GYN History:  Hysterectomy, Menopausal


Sexually Transmitted Disease:  No


HIV/AIDS:  No


Genitourinary:  Yes (HAS ONLY 1 KIDNEY (WHEN 16 YRS OLD HAD REMOVED CAUSE BLOOD 

VESSELS WRAPPED )


Bladder Infection


Gastrointestinal:  Yes (PERMANENT ILEOSTOMY/COMPLETE COLECTOMY 09/2020;MOSER)


Colitis, Gastroesophageal Reflux, Liver Disease/Jaundice, C-Diff, Gall Bladder 

Disease


Musculoskeletal:  Yes (RA/OSTEOARTHRITIS)


Arthritis, Rheumatoid Arthritis


Endocrine:  Yes


Hypothyroidsim, Diabetes, Non-Insulin dep


HEENT:  No


Cancer:  Yes


Ovarian


Did You Recieve Any Treatments:  Yes


What Type of Treatment Did You:  Surgical Intervention





S/P OPEN TOTAL ABDOMINAL HYSTERECTOMY WITH BILATERAL SALPINGO-OOPHORECTOMY


1972


Psychosocial:  No


Integumentary:  Yes (DELAYED HEALING OF SURGICAL WOUND; PERISTOMAL SKIN BREAKDO

WN)


Blood Disorders:  No


Adverse Reaction/Blood Tranf:  No





Family Medical History





Arthritis


  19 MOTHER


Cardiovascular disease


  19 MOTHER


Diabetes mellitus


  19 FATHER


  19 MOTHER





PAST MEDICAL /SURGICAL HISTORY:


-09/2020--HAD SEVERE C. DIFFICILE COLITIS--ADMITTED TO [facility]


09/22/20. PT HAD SYNCOPAL EPISODE WITH LOSS OF PULSE AND CPR/RESUSCITATION


FOR APPROXIMATELY 2 MINUTES WITH ROSC.


DURING THAT STAY SHE HAD ATRIAL FIBRILLATION WITH RVR THAT REQUIRED


CARDIOVERSION


PT UNDERWENT SUBTOTAL COLECTOMY WITH PERMANENT ILEOSTOMY. 


PT REPORTS THAT SHE WAS ON VENTILATOR FOR 10 DAYS


PT WAS TRANSFERRED FROM [facility] TO [facility], AND WAS THERE FROM


10/10/20-10/30/20, THEN TRANSFERRED HERE FOR REHAB FROM 10/30/20-11/10/20.


SEEING WOUND CARE FOR DELAYED HEALING OF MIDLINE SURGICAL WOUND AND SKIN


BREAKDOWN AROUND STOMA.





ADDITIONAL PAST SURGICAL HISTORY:


-TONSILLECTOMY 1960


-APPENDECTOMY 1961


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


-OVARIAN CYSTECTOMY 1966


-OPEN TOTAL ABDOMINAL HYSTERECTOMY / BILATARAL SALPINGO-OOPHORECTOMY 1972


FOR OVARIAN CANCER


-COCCYX EXCISION 1983


-LAPAROSCOPIC CHOLECYSTECOMY 1997


-RIGHT NECK LIPOMA REMOVAL 01/2012 BY DR. CRAWLEY


-SEBACEOUS CYST OF ABDOMINAL WALL REMOVED 01/2012 BY DR. CRAWLEY


-SUBTOTAL COLECTOMY WITH PERMANENT ILEOSTOMY 09/2020 AT [facility] BY


DR. BEAVERS





Physical Exam


Vital Signs





Vital Signs - First Documented








 12/29/20





 21:55


 


Temp 36.4


 


Pulse 88


 


Resp 18


 


B/P (MAP) 167/76 (106)


 


Pulse Ox 100


 


O2 Delivery Room Air





Capillary Refill : Less Than 3 Seconds


General Appearance:  WD/WN, no apparent distress


Respiratory:  normal breath sounds


Gastrointestinal:  soft, other (STOMA TO RIGHT MID ABDOMEN, WITH NORMAL STOOL IN

BAG. MIDLINE INCISION WITH NEAR-COMPLETE HEALING OF INCISION.   PERISTOMAL SKIN 

WITH MUCH BREAKDOWN AND SUPERFICIAL EXCORIATIONS, WITH VERY SCANT AMOUT OF BLOOD

ON GAUZE FROM VERY SUPERFICAL MACERATED SKIN JUST INFERIOR TO STOMA. NO ACTIVE 

BLEEDING. NO SIGNS OF INFECTION, NO PURULENT DRAINAGE. NO STREAKS. PT HAS LARGE 

OLD BRUISE TO RIGHT ABDOMEN. )


Extremities:  normal inspection


Neurologic/Psychiatric:  no motor/sensory deficits, alert, normal mood/affect





Progress/Results/Core Measures


Results/Orders


Vital Signs/I&O











 12/29/20





 21:55


 


Temp 36.4


 


Pulse 88


 


Resp 18


 


B/P (MAP) 167/76 (106)


 


Pulse Ox 100


 


O2 Delivery Room Air














Blood Pressure Mean:                    106











Progress


Progress Note :  


Progress Note


ALLEVYN NON-ADHESIVE HYDROCELLULAR FOAM DRESSING PLACED AROUND STOMA, AFTER 

CLEANING AND APPLYING SKIN PREP TO AREA. 


CLING GAUZE WRAPPED AROUND ABDOMEN AND USED TO HOLD DRESSING IN PLACE, NO TAPE 

OR ADHESIVE WAS USED.





Departure


Impression





   Primary Impression:  


   chronic peristomal skin breakdown


Disposition:  01 HOME, SELF-CARE


Condition:  Stable





Departure-Patient Inst.


Referrals:  


TAM CHATTERJEE (PCP)


Primary Care Physician








TAM LEON MD


Patient Instructions:  How to Care for Your Ostomy, Adult, Ileostomy Care





Add. Discharge Instructions:  


CONTINUE CURRENT MEDICATIONS AS PRESCRIBED





FOLLOW UP WITH DR. LOEN TOMORROW FOR FURTHER CARE





All discharge instructions reviewed with patient and/or family. Voiced 

understanding.











SELVIN BULLARD DO                 Dec 29, 2020 22:32

## 2021-01-19 ENCOUNTER — HOSPITAL ENCOUNTER (OUTPATIENT)
Dept: HOSPITAL 75 - WOUNDCARE | Age: 76
End: 2021-01-19
Attending: SURGERY
Payer: MEDICARE

## 2021-01-19 DIAGNOSIS — E44.1: ICD-10-CM

## 2021-01-19 DIAGNOSIS — E11.622: ICD-10-CM

## 2021-01-19 DIAGNOSIS — L98.492: Primary | ICD-10-CM

## 2021-01-19 PROCEDURE — 99212 OFFICE O/P EST SF 10 MIN: CPT

## 2021-02-15 ENCOUNTER — HOSPITAL ENCOUNTER (OUTPATIENT)
Dept: HOSPITAL 75 - WOUNDCARE | Age: 76
End: 2021-02-15
Attending: SURGERY
Payer: MEDICARE

## 2021-02-15 DIAGNOSIS — L98.492: Primary | ICD-10-CM

## 2021-02-15 DIAGNOSIS — I96: ICD-10-CM

## 2021-02-15 DIAGNOSIS — E11.622: ICD-10-CM

## 2021-02-15 DIAGNOSIS — E44.1: ICD-10-CM

## 2021-02-15 PROCEDURE — 99213 OFFICE O/P EST LOW 20 MIN: CPT

## 2021-03-01 ENCOUNTER — HOSPITAL ENCOUNTER (OUTPATIENT)
Dept: HOSPITAL 75 - WOUNDCARE | Age: 76
End: 2021-03-01
Attending: SURGERY
Payer: MEDICARE

## 2021-03-01 DIAGNOSIS — L98.492: Primary | ICD-10-CM

## 2021-03-01 DIAGNOSIS — E11.622: ICD-10-CM

## 2021-03-01 DIAGNOSIS — E44.1: ICD-10-CM

## 2021-03-01 PROCEDURE — 99212 OFFICE O/P EST SF 10 MIN: CPT

## 2021-04-08 ENCOUNTER — HOSPITAL ENCOUNTER (OUTPATIENT)
Dept: HOSPITAL 75 - RAD | Age: 76
End: 2021-04-08
Attending: SURGERY
Payer: MEDICARE

## 2021-04-08 DIAGNOSIS — L02.211: Primary | ICD-10-CM

## 2021-04-08 PROCEDURE — 74176 CT ABD & PELVIS W/O CONTRAST: CPT

## 2021-04-08 NOTE — DIAGNOSTIC IMAGING REPORT
PROCEDURE: CT abdomen and pelvis without contrast.



TECHNIQUE: Multiple contiguous axial images were obtained through

the abdomen and pelvis without the use of intravenous contrast.

Auto Exposure Controls were utilized during the CT exam to meet

ALARA standards for radiation dose reduction. 



INDICATION: Abdominal wall abscess.



No prior studies are available for comparison.



The lung bases are clear. The liver is unremarkable. Gallbladder

is surgically absent. There is no biliary ductal dilatation. The

pancreas and spleen are unremarkable. No adrenal mass is

detected. There is a solitary left kidney. Aorta is calcified but

nonaneurysmal. There is an ostomy in the right lower quadrant.

There is thickening of the midline at anterior abdominal wall

likely from patient's incision. There is an area of minimal low

density within a triangular-shaped collection in the anterior

abdominal wall midline measuring 3.2 x 2.5 cm. A small

postoperative fluid collection or abscess cannot be entirely

excluded. No gas within the collection is seen. Intra-abdominal

bowel loops are normal caliber. There is no obstruction. Bladder

is unremarkable.



IMPRESSION: Midline anterior abdominal wall fluid collection or

phlegmon, as described. No other significant abnormality is

detected.



Dictated by: 



  Dictated on workstation # [id number]

## 2021-04-09 ENCOUNTER — HOSPITAL ENCOUNTER (OUTPATIENT)
Dept: HOSPITAL 75 - SDC | Age: 76
Discharge: HOME | End: 2021-04-09
Attending: SURGERY
Payer: MEDICARE

## 2021-04-09 VITALS — DIASTOLIC BLOOD PRESSURE: 90 MMHG | SYSTOLIC BLOOD PRESSURE: 129 MMHG

## 2021-04-09 VITALS — SYSTOLIC BLOOD PRESSURE: 139 MMHG | DIASTOLIC BLOOD PRESSURE: 70 MMHG

## 2021-04-09 VITALS — SYSTOLIC BLOOD PRESSURE: 126 MMHG | DIASTOLIC BLOOD PRESSURE: 69 MMHG

## 2021-04-09 VITALS — DIASTOLIC BLOOD PRESSURE: 77 MMHG | SYSTOLIC BLOOD PRESSURE: 127 MMHG

## 2021-04-09 DIAGNOSIS — L02.211: Primary | ICD-10-CM

## 2021-04-09 LAB
APTT BLD: 32 SEC (ref 24–35)
HCT VFR BLD CALC: 31 % (ref 35–52)
HGB BLD-MCNC: 9.5 G/DL (ref 11.5–16)
INR PPP: 1 (ref 0.8–1.4)
MCH RBC QN AUTO: 27 PG (ref 25–34)
MCHC RBC AUTO-ENTMCNC: 31 G/DL (ref 32–36)
MCV RBC AUTO: 87 FL (ref 80–99)
PLATELET # BLD: 260 10^3/UL (ref 130–400)
PMV BLD AUTO: 10.9 FL (ref 9–12.2)
PROTHROMBIN TIME: 13.7 SEC (ref 12.2–14.7)
WBC # BLD AUTO: 8.7 10^3/UL (ref 4.3–11)

## 2021-04-09 PROCEDURE — 36415 COLL VENOUS BLD VENIPUNCTURE: CPT

## 2021-04-09 PROCEDURE — 87205 SMEAR GRAM STAIN: CPT

## 2021-04-09 PROCEDURE — 87070 CULTURE OTHR SPECIMN AEROBIC: CPT

## 2021-04-09 PROCEDURE — 85027 COMPLETE CBC AUTOMATED: CPT

## 2021-04-09 PROCEDURE — 85610 PROTHROMBIN TIME: CPT

## 2021-04-09 PROCEDURE — 87077 CULTURE AEROBIC IDENTIFY: CPT

## 2021-04-09 PROCEDURE — 87186 SC STD MICRODIL/AGAR DIL: CPT

## 2021-04-09 PROCEDURE — 85730 THROMBOPLASTIN TIME PARTIAL: CPT

## 2021-04-09 PROCEDURE — 77012 CT SCAN FOR NEEDLE BIOPSY: CPT

## 2021-04-09 PROCEDURE — 87075 CULTR BACTERIA EXCEPT BLOOD: CPT

## 2021-04-09 NOTE — DIAGNOSTIC IMAGING REPORT
INDICATION: ABD WALL ABSCESS



TECHNIQUE: All CT scans use one or more of the following dose

optimizing techniques: automated exposure control, MA and/or KvP

adjustment based on patient size and exam type or iterative

reconstruction. 



Patient brought to the CT suite placed on table in the supine

position. Axial imaging through abdomen was performed to evaluate

appropriate entry site. Anterior abdomen was then prepped and

draped in the usual sterile fashion. Small amount of 1% lidocaine

was utilized for local anesthesia. A Yueh needle was advanced

into the midline anterior abdominal wall fluid collection. Only

approximately 2 to 3 mL of bloody fluid could be removed. The

needle was repositioned at multiple locations and only small

amount of fluid was obtained. Therefore a drain could not be

placed.



IMPRESSION: Midline anterior abdominal wall fluid collection

aspiration, as described.



Dictated by: 



  Dictated on workstation # [id number]

## 2021-04-19 ENCOUNTER — HOSPITAL ENCOUNTER (OUTPATIENT)
Dept: HOSPITAL 75 - LAB | Age: 76
End: 2021-04-19
Attending: INTERNAL MEDICINE
Payer: MEDICARE

## 2021-04-19 DIAGNOSIS — N39.0: Primary | ICD-10-CM

## 2021-04-19 LAB
ALBUMIN SERPL-MCNC: 4 GM/DL (ref 3.2–4.5)
APTT PPP: YELLOW S
BACTERIA #/AREA URNS HPF: (no result) /HPF
BILIRUB UR QL STRIP: NEGATIVE
BUN/CREAT SERPL: 19
CALCIUM SERPL-MCNC: 9.7 MG/DL (ref 8.5–10.1)
CHLORIDE SERPL-SCNC: 100 MMOL/L (ref 98–107)
CO2 SERPL-SCNC: 20 MMOL/L (ref 21–32)
CREAT SERPL-MCNC: 2.48 MG/DL (ref 0.6–1.3)
CREAT UR-MCNC: 21 MG/DL (ref 30–125)
FIBRINOGEN PPP-MCNC: (no result) MG/DL
GFR SERPLBLD BASED ON 1.73 SQ M-ARVRAT: 19 ML/MIN
GLUCOSE SERPL-MCNC: 95 MG/DL (ref 70–105)
GLUCOSE UR STRIP-MCNC: NEGATIVE MG/DL
HCT VFR BLD CALC: 30 % (ref 35–52)
HGB BLD-MCNC: 9.5 G/DL (ref 11.5–16)
KETONES UR QL STRIP: NEGATIVE
LEUKOCYTE ESTERASE UR QL STRIP: (no result)
MCH RBC QN AUTO: 27 PG (ref 25–34)
MCHC RBC AUTO-ENTMCNC: 31 G/DL (ref 32–36)
MCV RBC AUTO: 86 FL (ref 80–99)
NITRITE UR QL STRIP: NEGATIVE
PH UR STRIP: 5.5 [PH] (ref 5–9)
PHOSPHATE SERPL-MCNC: 4.2 MG/DL (ref 2.3–4.7)
PLATELET # BLD: 233 10^3/UL (ref 130–400)
PMV BLD AUTO: 10.3 FL (ref 9–12.2)
POTASSIUM SERPL-SCNC: 4.6 MMOL/L (ref 3.6–5)
PROT UR QL STRIP: NEGATIVE
PROT UR-MCNC: < 6 MG/DL (ref 6–12)
RBC #/AREA URNS HPF: (no result) /HPF
SODIUM SERPL-SCNC: 130 MMOL/L (ref 135–145)
SP GR UR STRIP: <=1.005 (ref 1.02–1.02)
SQUAMOUS #/AREA URNS HPF: (no result) /HPF
URATE SERPL-MCNC: 7.7 MG/DL (ref 2.6–7.2)
WBC # BLD AUTO: 5.7 10^3/UL (ref 4.3–11)
WBC #/AREA URNS HPF: (no result) /HPF

## 2021-04-19 PROCEDURE — 81000 URINALYSIS NONAUTO W/SCOPE: CPT

## 2021-04-19 PROCEDURE — 36415 COLL VENOUS BLD VENIPUNCTURE: CPT

## 2021-04-19 PROCEDURE — 84156 ASSAY OF PROTEIN URINE: CPT

## 2021-04-19 PROCEDURE — 85027 COMPLETE CBC AUTOMATED: CPT

## 2021-04-19 PROCEDURE — 87088 URINE BACTERIA CULTURE: CPT

## 2021-04-19 PROCEDURE — 84550 ASSAY OF BLOOD/URIC ACID: CPT

## 2021-04-19 PROCEDURE — 80069 RENAL FUNCTION PANEL: CPT

## 2021-04-19 PROCEDURE — 82570 ASSAY OF URINE CREATININE: CPT

## 2021-04-22 VITALS — DIASTOLIC BLOOD PRESSURE: 85 MMHG | SYSTOLIC BLOOD PRESSURE: 175 MMHG

## 2021-04-22 RX ADMIN — SODIUM CHLORIDE SCH MLS/HR: 900 INJECTION INTRAVENOUS at 16:35

## 2021-04-23 VITALS — SYSTOLIC BLOOD PRESSURE: 139 MMHG | DIASTOLIC BLOOD PRESSURE: 83 MMHG

## 2021-04-23 RX ADMIN — SODIUM CHLORIDE SCH MLS/HR: 900 INJECTION INTRAVENOUS at 12:55

## 2021-04-24 VITALS — SYSTOLIC BLOOD PRESSURE: 109 MMHG | DIASTOLIC BLOOD PRESSURE: 60 MMHG

## 2021-04-24 RX ADMIN — SODIUM CHLORIDE SCH MLS/HR: 900 INJECTION INTRAVENOUS at 09:15

## 2021-04-25 VITALS — DIASTOLIC BLOOD PRESSURE: 77 MMHG | SYSTOLIC BLOOD PRESSURE: 134 MMHG

## 2021-04-25 RX ADMIN — SODIUM CHLORIDE SCH MLS/HR: 900 INJECTION INTRAVENOUS at 09:15

## 2021-04-26 VITALS — SYSTOLIC BLOOD PRESSURE: 130 MMHG | DIASTOLIC BLOOD PRESSURE: 68 MMHG

## 2021-04-26 RX ADMIN — SODIUM CHLORIDE SCH MLS/HR: 900 INJECTION INTRAVENOUS at 09:28

## 2021-04-27 VITALS — SYSTOLIC BLOOD PRESSURE: 104 MMHG | DIASTOLIC BLOOD PRESSURE: 70 MMHG

## 2021-04-27 RX ADMIN — SODIUM CHLORIDE SCH MLS/HR: 900 INJECTION INTRAVENOUS at 09:39

## 2021-04-28 VITALS — SYSTOLIC BLOOD PRESSURE: 96 MMHG | DIASTOLIC BLOOD PRESSURE: 63 MMHG

## 2021-04-28 RX ADMIN — SODIUM CHLORIDE SCH MLS/HR: 900 INJECTION INTRAVENOUS at 09:14

## 2021-04-29 VITALS — DIASTOLIC BLOOD PRESSURE: 65 MMHG | SYSTOLIC BLOOD PRESSURE: 128 MMHG

## 2021-04-29 RX ADMIN — SODIUM CHLORIDE SCH MLS/HR: 900 INJECTION INTRAVENOUS at 09:16

## 2021-04-30 VITALS — SYSTOLIC BLOOD PRESSURE: 150 MMHG | DIASTOLIC BLOOD PRESSURE: 75 MMHG

## 2021-04-30 RX ADMIN — SODIUM CHLORIDE SCH MLS/HR: 900 INJECTION INTRAVENOUS at 10:01

## 2021-05-01 ENCOUNTER — HOSPITAL ENCOUNTER (OUTPATIENT)
Dept: HOSPITAL 75 - SDC | Age: 76
Discharge: HOME | End: 2021-05-01
Attending: SURGERY
Payer: MEDICARE

## 2021-05-01 VITALS — WEIGHT: 129.85 LBS | BODY MASS INDEX: 23.9 KG/M2 | HEIGHT: 61.81 IN

## 2021-05-01 VITALS — DIASTOLIC BLOOD PRESSURE: 67 MMHG | SYSTOLIC BLOOD PRESSURE: 115 MMHG

## 2021-05-01 DIAGNOSIS — Z45.2: Primary | ICD-10-CM

## 2021-05-01 PROCEDURE — 76937 US GUIDE VASCULAR ACCESS: CPT

## 2021-05-01 PROCEDURE — 36569 INSJ PICC 5 YR+ W/O IMAGING: CPT

## 2021-05-01 PROCEDURE — 96374 THER/PROPH/DIAG INJ IV PUSH: CPT

## 2021-05-01 RX ADMIN — SODIUM CHLORIDE SCH MLS/HR: 900 INJECTION INTRAVENOUS at 08:42

## 2021-05-04 ENCOUNTER — HOSPITAL ENCOUNTER (OUTPATIENT)
Dept: HOSPITAL 75 - CARD | Age: 76
End: 2021-05-04
Attending: INTERNAL MEDICINE
Payer: MEDICARE

## 2021-05-04 DIAGNOSIS — I25.10: ICD-10-CM

## 2021-05-04 DIAGNOSIS — I11.9: Primary | ICD-10-CM

## 2021-05-04 DIAGNOSIS — I35.8: ICD-10-CM

## 2021-05-04 DIAGNOSIS — E78.2: ICD-10-CM

## 2021-05-04 LAB
ALBUMIN SERPL-MCNC: 4.2 GM/DL (ref 3.2–4.5)
ALP SERPL-CCNC: 47 U/L (ref 40–136)
ALT SERPL-CCNC: 25 U/L (ref 0–55)
BILIRUB SERPL-MCNC: 0.2 MG/DL (ref 0.1–1)
BUN/CREAT SERPL: 19
CALCIUM SERPL-MCNC: 10.8 MG/DL (ref 8.5–10.1)
CHLORIDE SERPL-SCNC: 100 MMOL/L (ref 98–107)
CHOLEST SERPL-MCNC: 177 MG/DL (ref ?–200)
CO2 SERPL-SCNC: 25 MMOL/L (ref 21–32)
CREAT SERPL-MCNC: 2.27 MG/DL (ref 0.6–1.3)
GFR SERPLBLD BASED ON 1.73 SQ M-ARVRAT: 21 ML/MIN
GLUCOSE SERPL-MCNC: 143 MG/DL (ref 70–105)
HDLC SERPL-MCNC: 87 MG/DL (ref 40–60)
POTASSIUM SERPL-SCNC: 4.7 MMOL/L (ref 3.6–5)
PROT SERPL-MCNC: 7.6 GM/DL (ref 6.4–8.2)
SODIUM SERPL-SCNC: 137 MMOL/L (ref 135–145)
TRIGL SERPL-MCNC: 86 MG/DL (ref ?–150)
VLDLC SERPL CALC-MCNC: 17 MG/DL (ref 5–40)

## 2021-05-04 PROCEDURE — 80053 COMPREHEN METABOLIC PANEL: CPT

## 2021-05-04 PROCEDURE — 36415 COLL VENOUS BLD VENIPUNCTURE: CPT

## 2021-05-04 PROCEDURE — 93306 TTE W/DOPPLER COMPLETE: CPT

## 2021-05-04 PROCEDURE — 80061 LIPID PANEL: CPT

## 2021-05-18 ENCOUNTER — HOSPITAL ENCOUNTER (OUTPATIENT)
Dept: HOSPITAL 75 - SDC | Age: 76
End: 2021-05-18
Attending: SURGERY
Payer: MEDICARE

## 2021-05-18 VITALS — DIASTOLIC BLOOD PRESSURE: 88 MMHG | SYSTOLIC BLOOD PRESSURE: 140 MMHG

## 2021-05-18 VITALS — BODY MASS INDEX: 22.72 KG/M2 | WEIGHT: 129.85 LBS | HEIGHT: 63.39 IN

## 2021-05-18 DIAGNOSIS — Z45.2: Primary | ICD-10-CM

## 2021-05-18 PROCEDURE — 99211 OFF/OP EST MAY X REQ PHY/QHP: CPT

## 2021-06-01 ENCOUNTER — HOSPITAL ENCOUNTER (OUTPATIENT)
Dept: HOSPITAL 75 - PREOP | Age: 76
Discharge: HOME | End: 2021-06-01
Attending: SURGERY
Payer: MEDICARE

## 2021-06-01 VITALS — BODY MASS INDEX: 23.59 KG/M2 | HEIGHT: 62.01 IN | WEIGHT: 129.85 LBS

## 2021-06-01 DIAGNOSIS — Z01.818: Primary | ICD-10-CM

## 2021-06-07 ENCOUNTER — HOSPITAL ENCOUNTER (OUTPATIENT)
Dept: HOSPITAL 75 - ENDO | Age: 76
Discharge: HOME | End: 2021-06-07
Attending: SURGERY
Payer: MEDICARE

## 2021-06-07 VITALS — SYSTOLIC BLOOD PRESSURE: 128 MMHG | DIASTOLIC BLOOD PRESSURE: 63 MMHG

## 2021-06-07 VITALS — SYSTOLIC BLOOD PRESSURE: 136 MMHG | DIASTOLIC BLOOD PRESSURE: 84 MMHG

## 2021-06-07 VITALS — HEIGHT: 61.81 IN | BODY MASS INDEX: 23.53 KG/M2 | WEIGHT: 127.87 LBS

## 2021-06-07 VITALS — SYSTOLIC BLOOD PRESSURE: 144 MMHG | DIASTOLIC BLOOD PRESSURE: 92 MMHG

## 2021-06-07 VITALS — DIASTOLIC BLOOD PRESSURE: 69 MMHG | SYSTOLIC BLOOD PRESSURE: 147 MMHG

## 2021-06-07 DIAGNOSIS — Z90.49: ICD-10-CM

## 2021-06-07 DIAGNOSIS — K94.19: Primary | ICD-10-CM

## 2021-06-07 DIAGNOSIS — Z87.891: ICD-10-CM

## 2021-06-07 DIAGNOSIS — L02.211: ICD-10-CM

## 2021-06-07 DIAGNOSIS — Z79.84: ICD-10-CM

## 2021-06-07 DIAGNOSIS — E11.9: ICD-10-CM

## 2021-06-07 DIAGNOSIS — L58.9: ICD-10-CM

## 2021-06-07 DIAGNOSIS — Z90.710: ICD-10-CM

## 2021-06-07 DIAGNOSIS — I48.20: ICD-10-CM

## 2021-06-07 DIAGNOSIS — Z90.89: ICD-10-CM

## 2021-06-07 DIAGNOSIS — N18.9: ICD-10-CM

## 2021-06-07 DIAGNOSIS — E03.9: ICD-10-CM

## 2021-06-07 DIAGNOSIS — Z79.82: ICD-10-CM

## 2021-06-07 DIAGNOSIS — K21.9: ICD-10-CM

## 2021-06-07 DIAGNOSIS — Z79.899: ICD-10-CM

## 2021-06-07 DIAGNOSIS — Z98.0: ICD-10-CM

## 2021-06-07 DIAGNOSIS — Z79.890: ICD-10-CM

## 2021-06-07 NOTE — PROGRESS NOTE-POST OPERATIVE
Post-Operative Progess Note


Surgeon (s)/Assistant (s)


Surgeon


LAURENCE MORRELL DO


Assistant:  none





Pre-Operative Diagnosis


disuse of rectum, check regarding possible reanastomosis





Post-Operative Diagnosis





same colon looked ok





Procedure & Operative Findings


Date of Procedure


6/7/21


Procedure Performed/Findings


After informed consent was obtained, the patient was brought to the endoscopy


suite and placed in the bed in the left lateral decubitus position.  She  was


administered IV sedation by the anesthesiologist, who then monitored her  vitals

the entire


time, heart rate, blood pressure and pulse ox and the scope was inserted,


started the flexible sigmoidoscopy.  Pushed in to about 10-12 cm to get to


the end of her colon; which was probably sigmoid/rectum,  saw what looked mostly


like normal colon, mildly friable because of disuse.  I also noted some


minimal internal hemorrhoids and took  pictures of all of this.  The patient 


tolerated the procedure and she recovered in the endoscopy suite.


Anesthesia Type


IV sedation by Anesthesia





Estimated Blood Loss


Estimated blood loss (mL):  scant





Specimens/Packing


Specimens Removed


none











LAURENCE MORRELL DO                Jun 7, 2021 15:14

## 2021-06-07 NOTE — PROGRESS NOTE-PRE OPERATIVE
Pre-Operative Progress Note


H&P Reviewed


The H&P was reviewed, patient examined and no changes noted.


Time Seen by Provider:  14:26


Date H&P Reviewed:  Jun 7, 2021


Time H&P Reviewed:  14:26


Pre-Operative Diagnosis:  disuse of rectum, check regarding possible reanasto

LAURENCE Plata DO                Jun 7, 2021 14:29

## 2021-06-07 NOTE — ENDOSCOPY DISCHARGE INSTRUCT
Endo Procedure/Findings


Findings


1.:  Normal


2.:  Internal Hemorrhoids





Discharge Instructions


-


Activity: You might feel a little sleepy until tomorrow.  This is due to the 

medicine you received to relax you.





Until tomorrow, you should:  


   NOT drive a car, operate machinery or power tools.


   NOT drink any alcoholic beverages.


   NOT make any important decisions or sign importortant papers.





Do not return to work until tomorrow, unless otherwise instructed. Resume 

previous activities tomorrow.





Diet: Start by taking liquids.  If you tolerate liquids, advance to solid food.


1.:  Colonoscopy in 1 year





Notify Physician


-


If you experience excessive bleeding, unusual abdominal pain, fever, or chest 

pain, contact your doctor immediately.











LAURENCE OMRRELL DO                Jun 7, 2021 15:14

## 2021-06-07 NOTE — ANESTHESIA-GENERAL POST-OP
MAC


Patient Condition


Mental Status/LOC:  Same as Preop


Cardiovascular:  Satisfactory


Nausea/Vomiting:  Absent


Respiratory:  Satisfactory


Pain:  Controlled


Complications:  Absent





Post Op Complications


Complications


None





Follow Up Care/Instructions


Patient Instructions


None needed.





Anesthesiology Discharge Order


Discharge Order


Patient is doing well, no complaints, stable vital signs, no apparent adverse 

anesthesia problems.











DORINDA BLOOM DO          Jun 7, 2021 15:16

## 2021-07-21 ENCOUNTER — HOSPITAL ENCOUNTER (OUTPATIENT)
Dept: HOSPITAL 75 - PREOP | Age: 76
Discharge: HOME | End: 2021-07-21
Attending: SURGERY
Payer: MEDICARE

## 2021-07-21 VITALS — WEIGHT: 124.12 LBS | HEIGHT: 62.01 IN | BODY MASS INDEX: 22.55 KG/M2

## 2021-07-21 DIAGNOSIS — Z01.818: Primary | ICD-10-CM

## 2021-07-28 ENCOUNTER — HOSPITAL ENCOUNTER (INPATIENT)
Dept: HOSPITAL 75 - 4TH | Age: 76
LOS: 2 days | Discharge: HOME | DRG: 330 | End: 2021-07-30
Attending: SURGERY | Admitting: SURGERY
Payer: MEDICARE

## 2021-07-28 VITALS — DIASTOLIC BLOOD PRESSURE: 47 MMHG | SYSTOLIC BLOOD PRESSURE: 100 MMHG

## 2021-07-28 VITALS — SYSTOLIC BLOOD PRESSURE: 114 MMHG | DIASTOLIC BLOOD PRESSURE: 53 MMHG

## 2021-07-28 VITALS — DIASTOLIC BLOOD PRESSURE: 74 MMHG | SYSTOLIC BLOOD PRESSURE: 165 MMHG

## 2021-07-28 VITALS — SYSTOLIC BLOOD PRESSURE: 154 MMHG | DIASTOLIC BLOOD PRESSURE: 68 MMHG

## 2021-07-28 VITALS — DIASTOLIC BLOOD PRESSURE: 53 MMHG | SYSTOLIC BLOOD PRESSURE: 110 MMHG

## 2021-07-28 VITALS — DIASTOLIC BLOOD PRESSURE: 43 MMHG | SYSTOLIC BLOOD PRESSURE: 92 MMHG

## 2021-07-28 VITALS — DIASTOLIC BLOOD PRESSURE: 77 MMHG | SYSTOLIC BLOOD PRESSURE: 144 MMHG

## 2021-07-28 VITALS — SYSTOLIC BLOOD PRESSURE: 109 MMHG | DIASTOLIC BLOOD PRESSURE: 56 MMHG

## 2021-07-28 VITALS — SYSTOLIC BLOOD PRESSURE: 139 MMHG | DIASTOLIC BLOOD PRESSURE: 60 MMHG

## 2021-07-28 VITALS — BODY MASS INDEX: 22.55 KG/M2 | WEIGHT: 124.12 LBS | HEIGHT: 62.01 IN

## 2021-07-28 VITALS — SYSTOLIC BLOOD PRESSURE: 123 MMHG | DIASTOLIC BLOOD PRESSURE: 50 MMHG

## 2021-07-28 DIAGNOSIS — E03.9: ICD-10-CM

## 2021-07-28 DIAGNOSIS — K66.0: ICD-10-CM

## 2021-07-28 DIAGNOSIS — K21.9: ICD-10-CM

## 2021-07-28 DIAGNOSIS — E11.22: ICD-10-CM

## 2021-07-28 DIAGNOSIS — Z43.2: Primary | ICD-10-CM

## 2021-07-28 DIAGNOSIS — I12.9: ICD-10-CM

## 2021-07-28 DIAGNOSIS — Z90.5: ICD-10-CM

## 2021-07-28 DIAGNOSIS — N18.9: ICD-10-CM

## 2021-07-28 DIAGNOSIS — I48.20: ICD-10-CM

## 2021-07-28 DIAGNOSIS — L02.211: ICD-10-CM

## 2021-07-28 PROCEDURE — 82565 ASSAY OF CREATININE: CPT

## 2021-07-28 PROCEDURE — 88304 TISSUE EXAM BY PATHOLOGIST: CPT

## 2021-07-28 PROCEDURE — 88307 TISSUE EXAM BY PATHOLOGIST: CPT

## 2021-07-28 PROCEDURE — 36415 COLL VENOUS BLD VENIPUNCTURE: CPT

## 2021-07-28 PROCEDURE — 87081 CULTURE SCREEN ONLY: CPT

## 2021-07-28 RX ADMIN — CLINDAMYCIN IN 5 PERCENT DEXTROSE SCH MLS/HR: 12 INJECTION, SOLUTION INTRAVENOUS at 21:51

## 2021-07-28 RX ADMIN — METRONIDAZOLE SCH MLS/HR: 5 INJECTION, SOLUTION INTRAVENOUS at 17:59

## 2021-07-28 RX ADMIN — ACETAMINOPHEN SCH MG: 500 TABLET ORAL at 21:51

## 2021-07-28 RX ADMIN — CLINDAMYCIN IN 5 PERCENT DEXTROSE SCH MLS/HR: 12 INJECTION, SOLUTION INTRAVENOUS at 16:47

## 2021-07-28 RX ADMIN — ACETAMINOPHEN SCH MG: 500 TABLET ORAL at 16:46

## 2021-07-28 RX ADMIN — KETOROLAC TROMETHAMINE SCH MG: 15 INJECTION, SOLUTION INTRAMUSCULAR; INTRAVENOUS at 17:58

## 2021-07-28 RX ADMIN — SODIUM CHLORIDE, SODIUM LACTATE, POTASSIUM CHLORIDE, AND CALCIUM CHLORIDE PRN MLS/HR: 600; 310; 30; 20 INJECTION, SOLUTION INTRAVENOUS at 11:50

## 2021-07-28 RX ADMIN — SODIUM CHLORIDE, SODIUM LACTATE, POTASSIUM CHLORIDE, AND CALCIUM CHLORIDE PRN MLS/HR: 600; 310; 30; 20 INJECTION, SOLUTION INTRAVENOUS at 08:55

## 2021-07-28 RX ADMIN — SODIUM CHLORIDE, SODIUM LACTATE, POTASSIUM CHLORIDE, AND CALCIUM CHLORIDE SCH MLS/HR: 600; 310; 30; 20 INJECTION, SOLUTION INTRAVENOUS at 16:47

## 2021-07-28 NOTE — PROGRESS NOTE-PRE OPERATIVE
Pre-Operative Progress Note


H&P Reviewed


The H&P was reviewed, patient examined and no changes noted.


Time Seen by Provider:  08:13


Date H&P Reviewed:  Jul 28, 2021


Time H&P Reviewed:  08:13


Pre-Operative Diagnosis:  Ileostomy Status, Abd abscess











LAURENCE MORRELL DO               Jul 28, 2021 08:16

## 2021-07-28 NOTE — PROGRESS NOTE-POST OPERATIVE
Post-Operative Progess Note


Surgeon (s)/Assistant (s)


Surgeon


LARUENCE MORRELL DO


Assistant:  José Miguel





Pre-Operative Diagnosis


Ileostomy Status, Abd abscess





Post-Operative Diagnosis





same plus extensive adhesions





Procedure & Operative Findings


Date of Procedure


7/28/21


Procedure Performed/Findings


1) Ileostomy take down and reversal with ileal-rectal anastomosis


2) Extensive OPAL


3) Excision of fistula and fistula tract


Anesthesia Type


GET





Estimated Blood Loss


Estimated blood loss (mL):  less than 50ml





Specimens/Packing


Specimens Removed


portion of TI and portion of rectum


fistula tract and cavity











LAURENCE MORRELL DO               Jul 28, 2021 13:14

## 2021-07-29 VITALS — SYSTOLIC BLOOD PRESSURE: 109 MMHG | DIASTOLIC BLOOD PRESSURE: 54 MMHG

## 2021-07-29 VITALS — SYSTOLIC BLOOD PRESSURE: 116 MMHG | DIASTOLIC BLOOD PRESSURE: 56 MMHG

## 2021-07-29 VITALS — SYSTOLIC BLOOD PRESSURE: 140 MMHG | DIASTOLIC BLOOD PRESSURE: 63 MMHG

## 2021-07-29 VITALS — SYSTOLIC BLOOD PRESSURE: 104 MMHG | DIASTOLIC BLOOD PRESSURE: 66 MMHG

## 2021-07-29 VITALS — DIASTOLIC BLOOD PRESSURE: 59 MMHG | SYSTOLIC BLOOD PRESSURE: 96 MMHG

## 2021-07-29 VITALS — DIASTOLIC BLOOD PRESSURE: 84 MMHG | SYSTOLIC BLOOD PRESSURE: 144 MMHG

## 2021-07-29 VITALS — SYSTOLIC BLOOD PRESSURE: 126 MMHG | DIASTOLIC BLOOD PRESSURE: 56 MMHG

## 2021-07-29 PROCEDURE — 0DBP0ZZ EXCISION OF RECTUM, OPEN APPROACH: ICD-10-PCS | Performed by: SURGERY

## 2021-07-29 PROCEDURE — 0DNW0ZZ RELEASE PERITONEUM, OPEN APPROACH: ICD-10-PCS | Performed by: SURGERY

## 2021-07-29 PROCEDURE — 0DBB0ZZ EXCISION OF ILEUM, OPEN APPROACH: ICD-10-PCS | Performed by: SURGERY

## 2021-07-29 RX ADMIN — ACETAMINOPHEN SCH MG: 500 TABLET ORAL at 21:30

## 2021-07-29 RX ADMIN — KETOROLAC TROMETHAMINE SCH MG: 15 INJECTION, SOLUTION INTRAMUSCULAR; INTRAVENOUS at 12:16

## 2021-07-29 RX ADMIN — SODIUM CHLORIDE, SODIUM LACTATE, POTASSIUM CHLORIDE, AND CALCIUM CHLORIDE SCH MLS/HR: 600; 310; 30; 20 INJECTION, SOLUTION INTRAVENOUS at 02:56

## 2021-07-29 RX ADMIN — PANTOPRAZOLE SODIUM SCH MG: 40 INJECTION, POWDER, FOR SOLUTION INTRAVENOUS at 08:12

## 2021-07-29 RX ADMIN — ACETAMINOPHEN SCH MG: 500 TABLET ORAL at 05:16

## 2021-07-29 RX ADMIN — SODIUM CHLORIDE, SODIUM LACTATE, POTASSIUM CHLORIDE, AND CALCIUM CHLORIDE SCH MLS/HR: 600; 310; 30; 20 INJECTION, SOLUTION INTRAVENOUS at 16:52

## 2021-07-29 RX ADMIN — ENOXAPARIN SODIUM SCH MG: 30 INJECTION SUBCUTANEOUS at 12:17

## 2021-07-29 RX ADMIN — METRONIDAZOLE SCH MLS/HR: 5 INJECTION, SOLUTION INTRAVENOUS at 02:57

## 2021-07-29 RX ADMIN — ACETAMINOPHEN SCH MG: 500 TABLET ORAL at 12:17

## 2021-07-29 RX ADMIN — KETOROLAC TROMETHAMINE SCH MG: 15 INJECTION, SOLUTION INTRAMUSCULAR; INTRAVENOUS at 23:35

## 2021-07-29 RX ADMIN — SODIUM CHLORIDE, SODIUM LACTATE, POTASSIUM CHLORIDE, AND CALCIUM CHLORIDE SCH MLS/HR: 600; 310; 30; 20 INJECTION, SOLUTION INTRAVENOUS at 02:57

## 2021-07-29 RX ADMIN — KETOROLAC TROMETHAMINE SCH MG: 15 INJECTION, SOLUTION INTRAMUSCULAR; INTRAVENOUS at 18:08

## 2021-07-29 RX ADMIN — KETOROLAC TROMETHAMINE SCH MG: 15 INJECTION, SOLUTION INTRAMUSCULAR; INTRAVENOUS at 00:19

## 2021-07-29 RX ADMIN — SODIUM CHLORIDE, SODIUM LACTATE, POTASSIUM CHLORIDE, AND CALCIUM CHLORIDE SCH MLS/HR: 600; 310; 30; 20 INJECTION, SOLUTION INTRAVENOUS at 08:12

## 2021-07-29 RX ADMIN — KETOROLAC TROMETHAMINE SCH MG: 15 INJECTION, SOLUTION INTRAMUSCULAR; INTRAVENOUS at 06:10

## 2021-07-29 NOTE — ANESTHESIA-GENERAL POST-OP
General


Patient Condition


Mental Status/LOC:  Same as Preop


Cardiovascular:  Satisfactory


Nausea/Vomiting:  Absent


Respiratory:  Satisfactory


Pain:  Controlled


Complications:  Absent





Post Op Complications


Complications


None





Follow Up Care/Instructions


Patient Instructions


None needed.





Anesthesia/Patient Condition


Patient Condition


Patient is doing well, no complaints, stable vital signs, no apparent adverse 

anesthesia problems.   


No complications reported per nursing.











CARLITA HUBBARD CRNA              Jul 29, 2021 11:03

## 2021-07-29 NOTE — PROGRESS NOTE - SURGERY
Subjective


Time Seen by a Provider:  10:24


Subjective/Events-last exam


Pt seen and examined, states pain is better today and rating it a 3.  Pt had a 

BM yesterday and one this morning.  She ate about 50% of her food yesterday.


Review of Systems


General:  No Chills, No Night Sweats


Pulmonary:  No Dyspnea, No Cough


Cardiovascular:  No: Chest Pain, Palpitations


Gastrointestinal:  Abdominal Pain; No: Nausea, Vomiting





Objective


Exam





Vital Signs








  Date Time  Temp Pulse Resp B/P (MAP) Pulse Ox O2 Delivery O2 Flow Rate FiO2


 


7/29/21 09:00      Room Air  


 


7/29/21 08:12 35.9 77 20 96/59 (71) 98 Room Air  


 


7/29/21 04:55 36.8 70 18 109/54 (72) 97 Room Air  


 


7/29/21 00:55 37.4 78 18 126/56 (79) 98 Room Air  


 


7/28/21 20:03      Room Air  


 


7/28/21 19:15 36.6 77 20 165/74 (104) 99 Room Air  


 


7/28/21 15:28 35.7 64 16 154/68 (96) 98 Room Air  


 


7/28/21 14:16  58 16 139/60 (86)  Room Air  


 


7/28/21 14:15     96 Room Air  


 


7/28/21 14:00 36.2  16 123/50 (74) 96 Room Air  


 


7/28/21 13:59      Room Air  


 


7/28/21 13:50   14 109/56 (73) 100 OxyMask 1 


 


7/28/21 13:50      OxyMask 1 


 


7/28/21 13:40      OxyMask 2 


 


7/28/21 13:40   16 110/53 (72) 100 OxyMask 2 


 


7/28/21 13:30   14 114/53 (73) 100 OxyMask 2 


 


7/28/21 13:25      OxyMask 5 


 


7/28/21 13:20   12 100/47 (64) 100 OxyMask 3 


 


7/28/21 13:10 36.3  21 92/43 (59) 99  10 


 


7/28/21 13:10      OxyMask 10 














I & O 


 


 7/29/21





 06:59


 


Intake Total 1575 ml


 


Output Total 845 ml


 


Balance 730 ml





Capillary Refill : Less Than 3 Seconds


General Appearance:  No Apparent Distress, WD/WN


Respiratory:  Lungs Clear, Normal Breath Sounds, No Accessory Muscle Use, No 

Respiratory Distress


Cardiovascular:  Regular Rate, Rhythm, No Murmur


Gastrointestinal:  soft, tenderness (mild at incisions), other (incisions c/d/i)





Results


Lab


Laboratory Tests


7/28/21 17:00: Creatinine 1.92H





Assessment/Plan


S/P Ileostomy reversal





Pt encouraged to ambulate, increase PO intake and use IS.  Will increase to a 

soft diet.











LAURENCE MORRELL DO               Jul 29, 2021 10:38

## 2021-07-29 NOTE — OPERATIVE REPORT
DATE OF SERVICE:  07/28/2021



PREOPERATIVE DIAGNOSIS:

Ileostomy status, abdominal abscess.



POSTOPERATIVE DIAGNOSES:

Ileostomy status, abdominal abscess plus extensive adhesions.



PROCEDURES PERFORMED:

1.  Ileostomy takedown and reversal with ileorectal anastomosis.

2.  Extensive lysis of adhesions.

3.  Excision of fistula and fistula tract.



SURGEON:

Bonilla Galicia DO.



FIRST ASSISTANT:

Leonel Valdez DO.



ANESTHESIA:

General endotracheal tube.



SPECIMEN:

1.  Portion of terminal ileum and portion of rectum.

2.  Fistula tract and cavity.



BLOOD LOSS:

Less than 50 mL.



FLUIDS:

Per anesthesia.



POSTOPERATIVE CONDITION:

Stable.



INDICATION FOR PROCEDURE:

The patient is a 75-year-old female, who has an ileostomy.  She has been having

multiple problems with it.  Pt was very malnourished.  It took a while, we were

able to finally get her to gain some weight, then she developed a fistula.  This
fistula

has been draining off and on (in midline); however, during the four days prior 
to this 

surgery she states the fistula had not drained.  It was right in the midline.  
We were 

thinking about attempting a

laparoscopic takedown.



FINDINGS:

The patient had extensive adhesions.  The fistula tract did not go into the

belly.  It just looked like it was a fistula abscess around a suture.  This

was removed and ileorectal anastomosis was performed to reverse her ileostomy.



PROCEDURE NOTE:

After informed consent was obtained, the patient was brought to the operating

room and placed on the table in a lithotomy position.  She was sterilely prepped

and draped in a normal fashion.  I started with an incision in the left upper

quadrant, infiltrated the skin with local, made an incision with 11 blade,

carried down through the skin into the subcutaneous tissue, deepened down to

subcutaneous tissue with Bovie electrocautery down to fascia.  Fascia was

incised with Bovie electrocautery, bluntly spread the muscle apart, went through

the posterior fascia and into the abdomen, placed 12mm trocar port and then

attempted to create pneumoperitoneum.  I got in and there were multiple 
adhesions,

but it was hard to tell where I was.  It felt like we were in the abdominal 
cavity, but again

could not see, tried bluntly with the scope to create an opening, actually got

up into the left upper quadrant, could see a good space, but in the abdomen

could not.  At this point, I then elected to go through the midline and do the 
procedure

open, made an incision, carried down through the skin and subcutaneous tissue 
with #10 blade,

deepened down to subcutaneous tissue with Bovie electrocautery down to fascia. 

Fascia was carefully incised, started superiorly, able to get in and then gently

pushing some of the intestine away with the blunt dissection, started increasing

this incision down the midline through the fascia with a Bovie electrocautery as

well as with some sharp dissection with Metzenbaum scissors, careful because

there were adhesions all over the abdomen.  There were so many adhesions, it

took over an hour just to get the adhesions down, so we could free everything up

in the abdomen and find everything.  We were able to free up the midline using

Metzenbaum scissors, gently pushed the intestine away, freed up the right upper

quadrant and the left upper quadrant, then down the left side, then down around

the ileostomy and then down into the pelvis.  When we were performing the

opening of the midline, we encountered the fistula tract and necrotic tissue.  
We

encircled this with a circular incision and took this all the way down right on 
to

the fascia and found a Ti-Cron suture, looked like this was a suture abscess,

was able to take this off, did take a small portion of the fascia with this

fistula tract and cavity to remove all of this.  We then started running the 
small

intestine using the Metzenbaum scissors to take down the adhesions as well as

some blunt dissection, carefully delivering all the small intestine into the

midline and up out of the pelvis and then running this from the ileostomy all

the way to ligament of Treitz, freeing all of the intestine out.  Once this was

done, I packed this superiorly and then able to start dissecting out the rectal

stump, felt like there was a rectal stump with a little bit of a sigmoid.  Once

this was freed up, then elected to do a [id number] anastomosis.  Again, this 
took about an

hour and 15 minutes or more to getting freed up, so we could finally see where

we are going, at this point, then we made an elliptical incision, the ileostomy

had been sewn shut prior to the case, made an elliptical incision around this

through the skin down into the subcutaneous tissue, then deepened down to

subcutaneous tissue with Bovie electrocautery down to the opening and then

pushed the terminal ileum back through into the abdomen, then brought this

terminal ileum side by side next to the rectosigmoid area.  I made a defect in

the antimesenteric border of the terminal ileum and then defect in the tinea of

the distal rectum or sigmoid colon and used a JOHNNY-75 clamped push pull through

the side, so there was a side-to-side functional end-to-end anastomosis, clamped

and fired, thereby transecting and creating opening and then used a TA 60 to

come across the portion of terminal ileum and the distal rectal or sigmoid

stump, held for 30 seconds, fired and then cut this distal portion off with a

sharp curved Mayos.  This specimen was then passed off the table.  This laid in

nicely.  We copiously irrigated with normal saline.  After we made sure the

anastomosis looked good, we could feel good opening between the terminal ileum

and the rectum and then we copiously irrigated.  We suctioned all this ran the

bowel again, it looked good.  It was not twisted and at this point, I then

elected to close the midline incision.  First, we closed the ileostomy incision,

closed with 0 Vicryl, two figure-of-eight sutures were used to close this, then

we closed the midline incision with a #1 double stranded PDS suture running from

the inferior portion to the superior portion tying to itself and then closed the

left upper quadrant incision, closing the fascia with 0 Vicryl figure-of-eight

suture and then closed all incisions on the skin with staples.  Area was cleaned

and dried, dressings placed.  The patient tolerated this procedure.  Sponge and

needle counts were correct at the end of the case and she was transferred to

recovery room in a stable condition.  Dr. Valdez assisted in this case helping

to make incisions, close incisions, identify the anatomy and hold anatomy out of

the way as well as to help perform the anastomosis.





Job ID: [id number]

DocumentID: [id number]

Dictated Date:  07/29/2021 17:35:56

Transcription Date: 07/29/2021 20:07:43

Dictated By: DO MONIQUE GARCIA

## 2021-07-30 VITALS — DIASTOLIC BLOOD PRESSURE: 73 MMHG | SYSTOLIC BLOOD PRESSURE: 114 MMHG

## 2021-07-30 VITALS — SYSTOLIC BLOOD PRESSURE: 138 MMHG | DIASTOLIC BLOOD PRESSURE: 78 MMHG

## 2021-07-30 VITALS — DIASTOLIC BLOOD PRESSURE: 78 MMHG | SYSTOLIC BLOOD PRESSURE: 138 MMHG

## 2021-07-30 VITALS — SYSTOLIC BLOOD PRESSURE: 162 MMHG | DIASTOLIC BLOOD PRESSURE: 70 MMHG

## 2021-07-30 VITALS — DIASTOLIC BLOOD PRESSURE: 76 MMHG | SYSTOLIC BLOOD PRESSURE: 128 MMHG

## 2021-07-30 RX ADMIN — KETOROLAC TROMETHAMINE SCH MG: 15 INJECTION, SOLUTION INTRAMUSCULAR; INTRAVENOUS at 13:09

## 2021-07-30 RX ADMIN — ACETAMINOPHEN SCH MG: 500 TABLET ORAL at 06:09

## 2021-07-30 RX ADMIN — ENOXAPARIN SODIUM SCH MG: 30 INJECTION SUBCUTANEOUS at 13:10

## 2021-07-30 RX ADMIN — ACETAMINOPHEN SCH MG: 500 TABLET ORAL at 13:10

## 2021-07-30 RX ADMIN — KETOROLAC TROMETHAMINE SCH MG: 15 INJECTION, SOLUTION INTRAMUSCULAR; INTRAVENOUS at 06:09

## 2021-07-30 RX ADMIN — PANTOPRAZOLE SODIUM SCH MG: 40 INJECTION, POWDER, FOR SOLUTION INTRAVENOUS at 08:57

## 2021-07-30 NOTE — DISCHARGE INST-SURGICAL
Discharge Inst-Surgical


Depart Medication/Instructions


New, Converted or Re-Newed RX:  Transmitted to Pharmacy


Patient Instructions


Follow up Appt:


Make appointment for 1 week. [phone]





Instructions:


No lifting greater than 20 pounds.


No strenuous activity. 


May shower in 24 hours, no tub bath or soaking.


Use incentive spirometer at home as directed.


No Smoking





Skin/Wound Care:


May remove bandages in am.  You need to leave the staples in place and come in 

to office to have them removed. 





Symptoms to Report:


Appetite Changes, Extremity Discoloration, Numbness/Tingling, Swelling 

Increased, Bleeding Excessive, Eyesight Changes, Pain Increased, Urine Color 

Change, Constipation(Persistent), Fever over 101 degree F, Pain/Pressure in 

chest, Urinating Difficulty, Cough Up/Vomit Blood, Heart Beat Irreg/Pounding, 

Pain/Pressure in jaw, Cramps in feet or legs, Lightheadedness, Pain/Pressure in 

shoulder, Diarrhea(Persistent), Memory Changes Suddenly, Questions/Concerns, 

Weight gain consecutive days, Dizziness/Fainting, Nausea/Vomiting, Shortness of 

Breath, Weight gain over 2 pounds








If questions or concerns contact your physician 


Or seek help at emergency department.





Activity


Activity as Tolerated:  Yes


Activity Instructions:  Avoid Stress to Incision


Driving Instructions:  No Driving/Refer to Dr. Kirk


Discharge Diet:  No Restrictions


Diet After 24 Hours:  Clear Liquid if Nauseous


If Any Problems/Questions/Issu:  Contact Your Physician, Go to Emergency Room





Skin/Wound Care


Infection Signs and Symptoms:  Increased Redness, Foul Odor of Wound, Increased 

Drainage, Skin Itchy or Has a Rash, Increased Swelling, Temperature Above 101  F


Bathing Instructions:  Shower


Stitches/Staples/Dermabond Dis:  Care of LAURENCE Chance DO               Jul 30, 2021 15:12

## 2021-08-02 ENCOUNTER — HOSPITAL ENCOUNTER (EMERGENCY)
Dept: HOSPITAL 75 - ER | Age: 76
Discharge: HOME | End: 2021-08-02
Payer: MEDICARE

## 2021-08-02 VITALS — SYSTOLIC BLOOD PRESSURE: 112 MMHG | DIASTOLIC BLOOD PRESSURE: 56 MMHG

## 2021-08-02 VITALS — BODY MASS INDEX: 22.84 KG/M2 | HEIGHT: 61.97 IN | WEIGHT: 124.12 LBS

## 2021-08-02 DIAGNOSIS — I10: ICD-10-CM

## 2021-08-02 DIAGNOSIS — Z79.899: ICD-10-CM

## 2021-08-02 DIAGNOSIS — E11.9: ICD-10-CM

## 2021-08-02 DIAGNOSIS — Z79.82: ICD-10-CM

## 2021-08-02 DIAGNOSIS — Z79.890: ICD-10-CM

## 2021-08-02 DIAGNOSIS — G89.18: Primary | ICD-10-CM

## 2021-08-02 DIAGNOSIS — K21.9: ICD-10-CM

## 2021-08-02 DIAGNOSIS — E78.00: ICD-10-CM

## 2021-08-02 DIAGNOSIS — E03.9: ICD-10-CM

## 2021-08-02 LAB
ALBUMIN SERPL-MCNC: 3.7 GM/DL (ref 3.2–4.5)
ALP SERPL-CCNC: 78 U/L (ref 40–136)
ALT SERPL-CCNC: 26 U/L (ref 0–55)
APTT PPP: YELLOW S
BACTERIA #/AREA URNS HPF: NEGATIVE /HPF
BASOPHILS # BLD AUTO: 0 10^3/UL (ref 0–0.1)
BASOPHILS NFR BLD AUTO: 0 % (ref 0–10)
BILIRUB SERPL-MCNC: 0.8 MG/DL (ref 0.1–1)
BILIRUB UR QL STRIP: NEGATIVE
BUN/CREAT SERPL: 17
CALCIUM SERPL-MCNC: 9.8 MG/DL (ref 8.5–10.1)
CHLORIDE SERPL-SCNC: 104 MMOL/L (ref 98–107)
CO2 SERPL-SCNC: 20 MMOL/L (ref 21–32)
CREAT SERPL-MCNC: 2.13 MG/DL (ref 0.6–1.3)
EOSINOPHIL # BLD AUTO: 0.1 10^3/UL (ref 0–0.3)
EOSINOPHIL NFR BLD AUTO: 1 % (ref 0–10)
FIBRINOGEN PPP-MCNC: (no result) MG/DL
GFR SERPLBLD BASED ON 1.73 SQ M-ARVRAT: 23 ML/MIN
GLUCOSE SERPL-MCNC: 128 MG/DL (ref 70–105)
GLUCOSE UR STRIP-MCNC: NEGATIVE MG/DL
HCT VFR BLD CALC: 30 % (ref 35–52)
HGB BLD-MCNC: 9.5 G/DL (ref 11.5–16)
KETONES UR QL STRIP: NEGATIVE
LEUKOCYTE ESTERASE UR QL STRIP: NEGATIVE
LIPASE SERPL-CCNC: 10 U/L (ref 8–78)
LYMPHOCYTES # BLD AUTO: 0.7 10^3/UL (ref 1–4)
LYMPHOCYTES NFR BLD AUTO: 8 % (ref 12–44)
MANUAL DIFFERENTIAL PERFORMED BLD QL: YES
MCH RBC QN AUTO: 28 PG (ref 25–34)
MCHC RBC AUTO-ENTMCNC: 32 G/DL (ref 32–36)
MCV RBC AUTO: 88 FL (ref 80–99)
MONOCYTES # BLD AUTO: 0.5 10^3/UL (ref 0–1)
MONOCYTES NFR BLD AUTO: 5 % (ref 0–12)
MONOCYTES NFR BLD: 7 %
NEUTROPHILS # BLD AUTO: 8.1 10^3/UL (ref 1.8–7.8)
NEUTROPHILS NFR BLD AUTO: 86 % (ref 42–75)
NEUTS BAND NFR BLD MANUAL: 79 %
NEUTS BAND NFR BLD: 7 %
NITRITE UR QL STRIP: NEGATIVE
PH UR STRIP: 5.5 [PH] (ref 5–9)
PLATELET # BLD: 234 10^3/UL (ref 130–400)
PMV BLD AUTO: 9.9 FL (ref 9–12.2)
POTASSIUM SERPL-SCNC: 5 MMOL/L (ref 3.6–5)
PROT SERPL-MCNC: 7 GM/DL (ref 6.4–8.2)
PROT UR QL STRIP: NEGATIVE
RBC #/AREA URNS HPF: (no result) /HPF
RBC MORPH BLD: NORMAL
RENAL EPI CELLS #/AREA URNS HPF: (no result) /HPF
SODIUM SERPL-SCNC: 139 MMOL/L (ref 135–145)
SP GR UR STRIP: 1.02 (ref 1.02–1.02)
SQUAMOUS #/AREA URNS HPF: (no result) /HPF
VARIANT LYMPHS NFR BLD MANUAL: 7 %
WBC # BLD AUTO: 9.5 10^3/UL (ref 4.3–11)
WBC #/AREA URNS HPF: (no result) /HPF

## 2021-08-02 PROCEDURE — 74176 CT ABD & PELVIS W/O CONTRAST: CPT

## 2021-08-02 PROCEDURE — 96376 TX/PRO/DX INJ SAME DRUG ADON: CPT

## 2021-08-02 PROCEDURE — 85007 BL SMEAR W/DIFF WBC COUNT: CPT

## 2021-08-02 PROCEDURE — 36415 COLL VENOUS BLD VENIPUNCTURE: CPT

## 2021-08-02 PROCEDURE — 83690 ASSAY OF LIPASE: CPT

## 2021-08-02 PROCEDURE — 85027 COMPLETE CBC AUTOMATED: CPT

## 2021-08-02 PROCEDURE — 96374 THER/PROPH/DIAG INJ IV PUSH: CPT

## 2021-08-02 PROCEDURE — 81000 URINALYSIS NONAUTO W/SCOPE: CPT

## 2021-08-02 PROCEDURE — 51702 INSERT TEMP BLADDER CATH: CPT

## 2021-08-02 PROCEDURE — 80053 COMPREHEN METABOLIC PANEL: CPT

## 2021-08-02 NOTE — ED GENERAL
General


Chief Complaint:  Post OP Complications/Pain


Stated Complaint:  ABD PAIN


Source of Information:  Patient


Exam Limitations:  No Limitations


 (JANIYA TATE)





History of Present Illness


Date Seen by Provider:  Aug 2, 2021


Time Seen by Provider:  16:56


Initial Comments


ER with reports of abdominal pain that is lower abdomen.  On  she had 

ileostomy takedown with reversal of the ileorectal anastomosis.  History of C. 

difficile and chronic atrial fibrillation.  She also only has 1 kidney she 

states.  She denies nausea or vomiting.  She denies fevers or chills.


Timing/Duration:  1-2 Days


Severity:  Moderate


 (JANIYA TATE)





Allergies and Home Medications


Allergies


Coded Allergies:  


     Penicillins (Verified  Allergy, Unknown, 21)


     raspberry (Verified  Allergy, Unknown, 21)





Home Medications


Amitriptyline HCl 100 Mg Tablet, 100 MG PO HS, (Reported)


Amlodipine Besylate 10 Mg Tablet, 10 MG PO DAILY, (Reported)


Aspirin 81 Mg Tab.chew, 81 MG PO DAILY, (Reported)


Atorvastatin Calcium 20 Mg Tablet, 20 MG PO DAILY, (Reported)


Carvedilol 12.5 Mg Tablet, 12.5 MG PO BID, (Reported)


Cetirizine HCl 10 Mg Tablet, 10 MG PO BID, (Reported)


Fenofibrate 160 Mg Tablet, 160 MG PO DAILY, (Reported)


Ferrous Sulfate 325 Mg Tablet, 325 MG PO BID, (Reported)


Fish Oil/Dha/Epa 1 Each Capsule, 2 EACH PO BID, (Reported)


Hydrocodone/Acetaminophen 1 Each Tablet, 1 TAB PO Q4H PRN for PAIN-MODERATE (5-

7)


   Prescribed by: JANIYA TATE on 21


Leflunomide 20 Mg Tablet, 20 MG PO Q48H, (Reported)


Levothyroxine Sodium 112 Mcg Tablet, 112 MCG PO WHITE,TU,WE,THU,FR,SA, (Reported)


   TAKES 1 TAB EVERYDAY EXPECT  


Pantoprazole Sodium 40 Mg Tablet.dr, 40 MG PO DAILY, (Reported)


Pilocarpine 5 Mg Tab, 10 MG PO BID, (Reported)


   TAKES 2 (5MG) TABS 


Pioglitazone HCl 30 Mg Tablet, 30 MG PO DAILY, (Reported)


Simethicone 80 Mg Tab.chew, 80 MG PO UD PRN for GAS, (Reported)


Sodium Bicarbonate 650 Mg Tablet, 650 MG PO BID, (Reported)


Tramadol HCl 50 Mg Tablet, 50 MG PO QID


   Prescribed by: LAURENCE MORRELL on 21 3011





Patient Home Medication List


Home Medication List Reviewed:  Yes


 (JANIYA TATE)





Review of Systems


Review of Systems


Constitutional:  see HPI


EENTM:  see HPI


Respiratory:  no symptoms reported


Cardiovascular:  no symptoms reported


Gastrointestinal:  abdominal pain


Genitourinary:  no symptoms reported


Musculoskeletal:  no symptoms reported


Skin:  no symptoms reported


Psychiatric/Neurological:  No Symptoms Reported


Hematologic/Lymphatic:  No Symptoms Reported


Immunological/Allergic:  no symptoms reported (JANIYA TATE)





Past Medical-Social-Family Hx


Seasonal Allergies


Seasonal Allergies:  No


 (JANIYA TATE)





Past Medical History


Surgeries:  Yes


Abdominal, Bowel Surgery, Cardiac, Gallbladder, Hysterectomy, Nephrectomy, 

Oophorectomy, Tonsillectomy


Respiratory:  No


Currently Using CPAP:  No


Cardiac:  Yes (AFIB/RVR/CARDIOVERSION 2020; )


Atrial Fibrillation, High Cholesterol, Hypertension


Neurological:  No


Reproductive Disorders:  Yes (OVARIAN CANCER)


GYN History:  Hysterectomy, Menopausal


Sexually Transmitted Disease:  No


HIV/AIDS:  No


Genitourinary:  Yes (HAS ONLY 1 KIDNEY (WHEN 16 YRS OLD HAD REMOVED CAUSE BLOOD 

VESSELS WRAPPED )


Bladder Infection


Gastrointestinal:  Yes (PERMANENT ILEOSTOMY/COMPLETE COLECTOMY 2020;[facility])


Colitis, Gastroesophageal Reflux, Liver Disease/Jaundice, C-Diff, Gall Bladder 

Disease


Musculoskeletal:  Yes (RA/OSTEOARTHRITIS)


Arthritis, Rheumatoid Arthritis


Endocrine:  Yes


Hypothyroidsim, Diabetes, Non-Insulin dep


HEENT:  No


Cancer:  Yes


Ovarian


Did You Recieve Any Treatments:  Yes


What Type of Treatment Did You:  Surgical Intervention


Psychosocial:  No


Integumentary:  Yes (DELAYED HEALING OF SURGICAL WOUND; PERISTOMAL SKIN 

BREAKDOWN)


Blood Disorders:  No


Adverse Reaction/Blood Tranf:  No


 (JANIYA TATE)





Family Medical History





Arthritis


  19 MOTHER


Cardiovascular disease


  19 MOTHER


Diabetes mellitus


  19 FATHER


  19 MOTHER





PAST MEDICAL /SURGICAL HISTORY:


-2020--HAD SEVERE C. DIFFICILE COLITIS--ADMITTED TO [facility]


20. PT HAD SYNCOPAL EPISODE WITH LOSS OF PULSE AND CPR/RESUSCITATION


FOR APPROXIMATELY 2 MINUTES WITH ROSC.


DURING THAT STAY SHE HAD ATRIAL FIBRILLATION WITH RVR THAT REQUIRED


CARDIOVERSION


PT UNDERWENT SUBTOTAL COLECTOMY WITH PERMANENT ILEOSTOMY. 


PT REPORTS THAT SHE WAS ON VENTILATOR FOR 10 DAYS


PT WAS TRANSFERRED FROM [facility] TO hospitals, AND WAS THERE FROM


10/10/20-10/30/20, THEN TRANSFERRED HERE FOR REHAB FROM 10/30/20-11/10/20.


SEEING WOUND CARE FOR DELAYED HEALING OF MIDLINE SURGICAL WOUND AND SKIN


BREAKDOWN AROUND STOMA.





ADDITIONAL PAST SURGICAL HISTORY:


-TONSILLECTOMY 


-APPENDECTOMY 


-RIGHT NEPHRECTOMY  FOR CONGENITAL HYPOPLASTIC KIDNEY


-OVARIAN CYSTECTOMY 


-OPEN TOTAL ABDOMINAL HYSTERECTOMY / BILATARAL SALPINGO-OOPHORECTOMY 


FOR OVARIAN CANCER


-COCCYX EXCISION 


-LAPAROSCOPIC CHOLECYSTECOMY 


-RIGHT NECK LIPOMA REMOVAL 2012 BY DR. CRAWLEY


-SEBACEOUS CYST OF ABDOMINAL WALL REMOVED 2012 BY DR. CRAWLEY


-SUBTOTAL COLECTOMY WITH PERMANENT ILEOSTOMY 2020 AT [facility] BY


DR. BEAVERS


 (JANIYA ATTE)





Physical Exam


Vital Signs





Vital Signs - First Documented








 21





 16:28 19:50


 


Temp 37.3 


 


Pulse 89 


 


Resp 20 


 


B/P (MAP) 125/79 (94) 


 


Pulse Ox 100 


 


O2 Delivery  Room Air








 (ANDRA MCKAY MD)


Vital Signs


Capillary Refill :  


 (JANIYA TATE)


Height, Weight, BMI


Height: '"


Weight: lbs. oz. kg; 22.69 BMI


Method:Stated


General Appearance:  No Apparent Distress, WD/WN


Eyes:  Bilateral Eye Normal Inspection, Bilateral Eye PERRL


Neck:  Full Range of Motion, Normal Inspection


Respiratory:  No Accessory Muscle Use, No Respiratory Distress


Gastrointestinal:  Soft, Other (Abdomen is flat and soft, tender to palpation.  

Incisions are intact.  There is no surrounding erythema.  There is some 

seropurulent drainage from the inferior aspect of the midline abdominal 

incision.)


Extremity:  Normal Capillary Refill, Normal Inspection


Neurologic/Psychiatric:  Alert, Oriented x3


Skin:  Normal Color, Warm/Dry (JANIYA TATE)





Progress/Results/Core Measures


Suspected Sepsis


SIRS


Temperature: 


Pulse:  


Respiratory Rate: 


 


Laboratory Tests


21 16:36: White Blood Count 9.5


Blood Pressure  / 


Mean: 


 


Laboratory Tests


21 16:36: 


Creatinine 2.13H, Platelet Count 234, Total Bilirubin 0.8


 (JANIYA TATE)





Results/Orders


Lab Results





Laboratory Tests








Test


 21


16:36 21


19:05 Range/Units


 


 


White Blood Count


 9.5 


 


 4.3-11.0


10^3/uL


 


Red Blood Count


 3.40 L


 


 3.80-5.11


10^6/uL


 


Hemoglobin 9.5 L  11.5-16.0  g/dL


 


Hematocrit 30 L  35-52  %


 


Mean Corpuscular Volume 88   80-99  fL


 


Mean Corpuscular Hemoglobin 28   25-34  pg


 


Mean Corpuscular Hemoglobin


Concent 32 


 


 32-36  g/dL





 


Red Cell Distribution Width 14.6 H  10.0-14.5  %


 


Platelet Count


 234 


 


 130-400


10^3/uL


 


Mean Platelet Volume 9.9   9.0-12.2  fL


 


Immature Granulocyte % (Auto) 0    %


 


Neutrophils (%) (Auto) 86 H  42-75  %


 


Lymphocytes (%) (Auto) 8 L  12-44  %


 


Monocytes (%) (Auto) 5   0-12  %


 


Eosinophils (%) (Auto) 1   0-10  %


 


Basophils (%) (Auto) 0   0-10  %


 


Neutrophils # (Auto)


 8.1 H


 


 1.8-7.8


10^3/uL


 


Lymphocytes # (Auto)


 0.7 L


 


 1.0-4.0


10^3/uL


 


Monocytes # (Auto)


 0.5 


 


 0.0-1.0


10^3/uL


 


Eosinophils # (Auto)


 0.1 


 


 0.0-0.3


10^3/uL


 


Basophils # (Auto)


 0.0 


 


 0.0-0.1


10^3/uL


 


Immature Granulocyte # (Auto)


 0.0 


 


 0.0-0.1


10^3/uL


 


Neutrophils % (Manual) 79    %


 


Lymphocytes % (Manual) 7    %


 


Monocytes % (Manual) 7    %


 


Band Neutrophils 7    %


 


Blood Morphology Comment NORMAL    


 


Sodium Level 139   135-145  MMOL/L


 


Potassium Level 5.0   3.6-5.0  MMOL/L


 


Chloride Level 104     MMOL/L


 


Carbon Dioxide Level 20 L  21-32  MMOL/L


 


Anion Gap 15 H  5-14  MMOL/L


 


Blood Urea Nitrogen 37 H  7-18  MG/DL


 


Creatinine


 2.13 H


 


 0.60-1.30


MG/DL


 


Estimat Glomerular Filtration


Rate 23 


 


  





 


BUN/Creatinine Ratio 17    


 


Glucose Level 128 H    MG/DL


 


Calcium Level 9.8   8.5-10.1  MG/DL


 


Corrected Calcium 10.0   8.5-10.1  MG/DL


 


Total Bilirubin 0.8   0.1-1.0  MG/DL


 


Aspartate Amino Transf


(AST/SGOT) 16 


 


 5-34  U/L





 


Alanine Aminotransferase


(ALT/SGPT) 26 


 


 0-55  U/L





 


Alkaline Phosphatase 78     U/L


 


Total Protein 7.0   6.4-8.2  GM/DL


 


Albumin 3.7   3.2-4.5  GM/DL


 


Lipase 10   8-78  U/L


 


Urine Color  YELLOW   


 


Urine Clarity  SL CLOUDY   


 


Urine pH  5.5  5-9  


 


Urine Specific Gravity  1.020  1.016-1.022  


 


Urine Protein  NEGATIVE  NEGATIVE  


 


Urine Glucose (UA)  NEGATIVE  NEGATIVE  


 


Urine Ketones  NEGATIVE  NEGATIVE  


 


Urine Nitrite  NEGATIVE  NEGATIVE  


 


Urine Bilirubin  NEGATIVE  NEGATIVE  


 


Urine Urobilinogen  0.2  < = 1.0  MG/DL


 


Urine Leukocyte Esterase  NEGATIVE  NEGATIVE  


 


Urine RBC (Auto)  NEGATIVE  NEGATIVE  


 


Urine RBC  0-2   /HPF


 


Urine WBC  0-2   /HPF


 


Urine Squamous Epithelial


Cells 


 NONE 


  /HPF





 


Urine Renal Epithelial Cells  NONE   /HPF


 


Urine Crystals  NONE   /LPF


 


Urine Bacteria  NEGATIVE   /HPF


 


Urine Casts  NONE   /LPF


 


Urine Mucus  NEGATIVE   /LPF


 


Urine Culture Indicated  NO   





 (ANDRA MCKAY MD)


Medications Given in ED





Current Medications








 Medications  Dose


 Ordered  Sig/Dianne


 Route  Start Time


 Stop Time Status Last Admin


Dose Admin


 


 Acetaminophen/


 Hydrocodone Bitart  1 ea  Q4H  PRN


 PO  21 19:15


 8/3/21 03:14 DC 21 19:48


1 EA





 (ANDRA MCKAY MD)


Vital Signs/I&O











 21





 16:28 19:50


 


Temp 37.3 


 


Pulse 89 76


 


Resp 20 20


 


B/P (MAP) 125/79 (94) 112/56


 


Pulse Ox 100 97


 


O2 Delivery  Room Air








 (ANDRA MCKAY MD)


Vital Signs/I&O


Capillary Refill :  


 (JANIYA TATE)





Diagnostic Imaging





   Diagonstic Imaging:  CT


Comments


NAME:   SUNILJOSE E


[facility] REC#:   [id number]


ACCOUNT#:   [id number]


PT STATUS:   REG ER


:   1945


PHYSICIAN:   JANIYA TAET


ADMIT DATE:   21/ER


                                   ***Draft***


Date of Exam:08/02/21





CT ABDOMEN/PELVIS WO








CT ABDOMEN/PELVIS WO





TECHNIQUE: Unenhanced CT imaging of the abdomen and pelvis was


performed. 2-D reformats are created and submitted for


interpretation. Automatic exposure controls were utilized to


optimize patient dose.





INDICATION: Abdominal pain. Ileostomy reversal five days ago.





COMPARISON: 2021.





FINDINGS: Evaluation of the abdominal viscera is mildly limited


without contrast.





Lower chest: Subpleural groundglass opacities are partially


imaged in the left lower lobe and are age-indeterminate.





Peritoneum: There is a small amount of free intraperitoneal air


which would be within expected limits for recent laparotomy.


Trace free fluid. 





Liver and biliary system: Unenhanced liver is normal.


Cholecystectomy. No biliary duct dilatation.





Spleen and Pancreas: Spleen is normal.  Unenhanced pancreas is


grossly normal. 





Adrenals: Normal.





 tract: Right nephrectomy. No left renal or ureteral stones. No


obstructive uropathy on the left. Urinary bladder is partially


filled without wall thickening. Hysterectomy.





GI tract: Stomach is decompressed. No dilated loops of bowel are


present. Lack of IV contrast limits assessment for bowel wall


enhancement. In the left lower quadrant, there are fluid and


air-filled loops of bowel. It is difficult to ascertain if there


is possible fluid collection between the bowel loops in the left


lower quadrant.





Vasculature and Lymph nodes: Normal caliber aorta with moderate


atherosclerotic plaquing. No abdominal or pelvic lymphadenopathy.





Musculoskeletal: No concerning osseous lesion. 





IMPRESSION:


1. Recent surgical changes from ileostomy reversal and


laparotomy. There is a small amount of free intraperitoneal air


which is within expected limits for recent laparotomy.


2. Due to the lack of contrast, it was difficult to ascertain if


there is a fluid collection in the left lower quadrant associated


with the fluid and air-filled bowel loops. If patient has


clinical symptoms of possible infection, then follow-up CT with


IV contrast and water-soluble oral contrast is suggested to


better evaluate for potential fluid collection/abscess in the


left lower quadrant.





  Dictated on workstation # [id number]








Dict:   21 [id number]


Trans:   21 1756


AS6 [id number]





Interpreted by:     TJ DELACRUZ MD


Electronically signed by:  


 (JANIYA TATE)





Departure


Communication (Admissions)


Family Conversation


I spoke with Dr. Valdez and then Dr. Morrell.  He agrees to follow-up with her in

the clinic this week.  She has an appointment on Thursday.  I will switch her 

pain medication from tramadol/Tylenol to hydrocodone.


NAME:   JOSE BARBER


[facility] REC#:   [id number]


ACCOUNT#:   [id number]


PT STATUS:   REG ER


:   1945


PHYSICIAN:   JANIYA TATE


ADMIT DATE:   21/ER


                                   ***Draft***


Date of Exam:21





CT ABDOMEN/PELVIS WO








CT ABDOMEN/PELVIS WO





TECHNIQUE: Unenhanced CT imaging of the abdomen and pelvis was


performed. 2-D reformats are created and submitted for


interpretation. Automatic exposure controls were utilized to


optimize patient dose.





INDICATION: Abdominal pain. Ileostomy reversal five days ago.





COMPARISON: 2021.





FINDINGS: Evaluation of the abdominal viscera is mildly limited


without contrast.





Lower chest: Subpleural groundglass opacities are partially


imaged in the left lower lobe and are age-indeterminate.





Peritoneum: There is a small amount of free intraperitoneal air


which would be within expected limits for recent laparotomy.


Trace free fluid. 





Liver and biliary system: Unenhanced liver is normal.


Cholecystectomy. No biliary duct dilatation.





Spleen and Pancreas: Spleen is normal.  Unenhanced pancreas is


grossly normal. 





Adrenals: Normal.





 tract: Right nephrectomy. No left renal or ureteral stones. No


obstructive uropathy on the left. Urinary bladder is partially


filled without wall thickening. Hysterectomy.





GI tract: Stomach is decompressed. No dilated loops of bowel are


present. Lack of IV contrast limits assessment for bowel wall


enhancement. In the left lower quadrant, there are fluid and


air-filled loops of bowel. It is difficult to ascertain if there


is possible fluid collection between the bowel loops in the left


lower quadrant.





Vasculature and Lymph nodes: Normal caliber aorta with moderate


atherosclerotic plaquing. No abdominal or pelvic lymphadenopathy.





Musculoskeletal: No concerning osseous lesion. 





IMPRESSION:


1. Recent surgical changes from ileostomy reversal and


laparotomy. There is a small amount of free intraperitoneal air


which is within expected limits for recent laparotomy.


2. Due to the lack of contrast, it was difficult to ascertain if


there is a fluid collection in the left lower quadrant associated


with the fluid and air-filled bowel loops. If patient has


clinical symptoms of possible infection, then follow-up CT with


IV contrast and water-soluble oral contrast is suggested to


better evaluate for potential fluid collection/abscess in the


left lower quadrant.





  Dictated on workstation # [id number]








Dict:   21 1744


Trans:   21 1756


[facility] [id number]





Interpreted by:     TJ DELACRUZ MD


Electronically signed by:  


 (JANIYA TATE)





Impression





   Primary Impression:  


   Postoperative pain


Disposition:  01 HOME, SELF-CARE


Condition:  Stable





Departure-Patient Inst.


Decision time for Depature:  19:14


 (JANIYA TATE)


Referrals:  


TAM CHATTERJEE (PCP/Family)


Primary Care Physician


Patient Instructions:  Acute Pain, Adult


Scripts


Hydrocodone/Acetaminophen (Hydrocodone-Acetamin 5-325 mg) 1 Each Tablet


1 TAB PO Q4H PRN for PAIN-MODERATE (5-7), #14 TAB


   Prov: JANIYA TATE         21








ATTENDING PHYSICIAN NOTE:


I was physically present as attending physician in the emergency department 

during the care of this patient, but I was not directly involved in the decision

making or delivery of care for this patient. 


 (ANDRA MCAKY MD)











JANIYA TATE              Aug 2, 2021 16:57


ANDRA MCKAY MD         Aug 3, 2021 06:38

## 2021-08-02 NOTE — DIAGNOSTIC IMAGING REPORT
CT ABDOMEN/PELVIS WO



TECHNIQUE: Unenhanced CT imaging of the abdomen and pelvis was

performed. 2-D reformats are created and submitted for

interpretation. Automatic exposure controls were utilized to

optimize patient dose.



INDICATION: Abdominal pain. Ileostomy reversal five days ago.



COMPARISON: 04/08/2021.



FINDINGS: Evaluation of the abdominal viscera is mildly limited

without contrast.



Lower chest: Subpleural groundglass opacities are partially

imaged in the left lower lobe and are age-indeterminate.



Peritoneum: There is a small amount of free intraperitoneal air

which would be within expected limits for recent laparotomy.

Trace free fluid. 



Liver and biliary system: Unenhanced liver is normal.

Cholecystectomy. No biliary duct dilatation.



Spleen and Pancreas: Spleen is normal.  Unenhanced pancreas is

grossly normal. 



Adrenals: Normal.



 tract: Right nephrectomy. No left renal or ureteral stones. No

obstructive uropathy on the left. Urinary bladder is partially

filled without wall thickening. Hysterectomy.



GI tract: Stomach is decompressed. No dilated loops of bowel are

present. Lack of IV contrast limits assessment for bowel wall

enhancement. In the left lower quadrant, there are fluid and

air-filled loops of bowel. It is difficult to ascertain if there

is possible fluid collection between the bowel loops in the left

lower quadrant.



Vasculature and Lymph nodes: Normal caliber aorta with moderate

atherosclerotic plaquing. No abdominal or pelvic lymphadenopathy.



Musculoskeletal: No concerning osseous lesion. 



IMPRESSION:

1. Recent surgical changes from ileostomy reversal and

laparotomy. There is a small amount of free intraperitoneal air

which is within expected limits for recent laparotomy.

2. Due to the lack of contrast, it was difficult to ascertain if

there is a fluid collection in the left lower quadrant associated

with the fluid and air-filled bowel loops. If patient has

clinical symptoms of possible infection, then follow-up CT with

IV contrast and water-soluble oral contrast is suggested to

better evaluate for potential fluid collection/abscess in the

left lower quadrant.



Dictated by: 



  Dictated on workstation # [id number]

## 2021-10-04 ENCOUNTER — HOSPITAL ENCOUNTER (OUTPATIENT)
Dept: HOSPITAL 75 - CARD | Age: 76
End: 2021-10-04
Attending: INTERNAL MEDICINE
Payer: MEDICARE

## 2021-10-04 VITALS — BODY MASS INDEX: 21.91 KG/M2 | WEIGHT: 119.05 LBS | HEIGHT: 61.81 IN

## 2021-10-04 VITALS — SYSTOLIC BLOOD PRESSURE: 154 MMHG | DIASTOLIC BLOOD PRESSURE: 81 MMHG

## 2021-10-04 DIAGNOSIS — I10: Primary | ICD-10-CM

## 2021-10-04 DIAGNOSIS — I25.10: ICD-10-CM

## 2021-10-04 PROCEDURE — 78452 HT MUSCLE IMAGE SPECT MULT: CPT

## 2021-10-04 PROCEDURE — 93017 CV STRESS TEST TRACING ONLY: CPT

## 2021-10-04 NOTE — CARDIOLOGY STRESS TEST REPORT
Stress Test Report


Date of Procedure/Referring:


Date of Procedure:  Oct 4, 2021


PCP


Meli Birch MD


Admitting Physician


Lorenzo Leyva





Indications:


HTN





Baseline Heart Rate:


72





Baseline Blood Pressure:


Blood Pressure Systolic:  154


Blood Pressure Diastolic:  81


Baseline Vitals





Vital Signs








  Date Time  Temp Pulse Resp B/P (MAP) Pulse Ox O2 Delivery O2 Flow Rate FiO2


 


10/4/21 09:23  72 18 154/81 (105) 98 Room Air  











Baseline EKG:


Baseline EKG:  NSR





Summary


After explaining the procedure to the patient, she  signed a consent and then 

brought to the stress nuclear laboratory.


Patient received 0.4 mg Lexiscan for stress test, ECG, heart rate and blood 

pressure were monitored continuously.  Resting and stress dose of radio tracer 

were injected, imaging was acquired and reviewed in short axis, horizontal long 

axis and vertical long axis views.


TID:  1.14


SSS:  4


SDS:  2


EF:  62


1.  Patient tolerated Lexiscan well


2.  No significant ischemia or infarction on SPECT images


3.  Normal left ventricular size, EF 62%











MELI BIRCH MD               Oct 4, 2021 12:52

## 2022-07-14 ENCOUNTER — HOSPITAL ENCOUNTER (OUTPATIENT)
Dept: HOSPITAL 75 - ER | Age: 77
Setting detail: OBSERVATION
LOS: 1 days | Discharge: NURSING, REHAB OR LONG-TERM CARE | End: 2022-07-15
Attending: INTERNAL MEDICINE | Admitting: INTERNAL MEDICINE
Payer: MEDICARE

## 2022-07-14 VITALS — SYSTOLIC BLOOD PRESSURE: 165 MMHG | DIASTOLIC BLOOD PRESSURE: 81 MMHG

## 2022-07-14 VITALS — SYSTOLIC BLOOD PRESSURE: 205 MMHG | DIASTOLIC BLOOD PRESSURE: 92 MMHG

## 2022-07-14 VITALS — BODY MASS INDEX: 18.83 KG/M2 | HEIGHT: 62.01 IN | WEIGHT: 103.62 LBS

## 2022-07-14 VITALS — DIASTOLIC BLOOD PRESSURE: 85 MMHG | SYSTOLIC BLOOD PRESSURE: 145 MMHG

## 2022-07-14 DIAGNOSIS — M62.82: Primary | ICD-10-CM
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
## 2022-07-14 LAB
ALBUMIN SERPL-MCNC: 3.2 GM/DL (ref 3.2–4.5)
ALP SERPL-CCNC: 61 U/L (ref 40–136)
ALT SERPL-CCNC: 65 U/L (ref 0–55)
BASOPHILS # BLD AUTO: 0.1 10^3/UL (ref 0–0.1)
BASOPHILS NFR BLD AUTO: 2 % (ref 0–10)
BILIRUB SERPL-MCNC: 0.5 MG/DL (ref 0.1–1)
BUN/CREAT SERPL: 9
CALCIUM SERPL-MCNC: 8.5 MG/DL (ref 8.5–10.1)
CHLORIDE SERPL-SCNC: 105 MMOL/L (ref 98–107)
CO2 SERPL-SCNC: 14 MMOL/L (ref 21–32)
CREAT SERPL-MCNC: 0.93 MG/DL (ref 0.6–1.3)
EOSINOPHIL # BLD AUTO: 0.2 10^3/UL (ref 0–0.3)
EOSINOPHIL NFR BLD AUTO: 3 % (ref 0–10)
GFR SERPLBLD BASED ON 1.73 SQ M-ARVRAT: 64 ML/MIN
GLUCOSE SERPL-MCNC: 58 MG/DL (ref 70–105)
HCT VFR BLD CALC: 38 % (ref 35–52)
HGB BLD-MCNC: 11.9 G/DL (ref 11.5–16)
LYMPHOCYTES # BLD AUTO: 1.1 10^3/UL (ref 1–4)
LYMPHOCYTES NFR BLD AUTO: 19 % (ref 12–44)
MANUAL DIFFERENTIAL PERFORMED BLD QL: NO
MCH RBC QN AUTO: 29 PG (ref 25–34)
MCHC RBC AUTO-ENTMCNC: 32 G/DL (ref 32–36)
MCV RBC AUTO: 91 FL (ref 80–99)
MONOCYTES # BLD AUTO: 0.4 10^3/UL (ref 0–1)
MONOCYTES NFR BLD AUTO: 8 % (ref 0–12)
NEUTROPHILS # BLD AUTO: 3.9 10^3/UL (ref 1.8–7.8)
NEUTROPHILS NFR BLD AUTO: 68 % (ref 42–75)
PLATELET # BLD: 283 10^3/UL (ref 130–400)
PMV BLD AUTO: 10.1 FL (ref 9–12.2)
POTASSIUM SERPL-SCNC: 3.6 MMOL/L (ref 3.6–5)
PROT SERPL-MCNC: 7.4 GM/DL (ref 6.4–8.2)
SODIUM SERPL-SCNC: 140 MMOL/L (ref 135–145)
WBC # BLD AUTO: 5.7 10^3/UL (ref 4.3–11)

## 2022-07-14 PROCEDURE — 94760 N-INVAS EAR/PLS OXIMETRY 1: CPT

## 2022-07-14 PROCEDURE — 80053 COMPREHEN METABOLIC PANEL: CPT

## 2022-07-14 PROCEDURE — 73523 X-RAY EXAM HIPS BI 5/> VIEWS: CPT

## 2022-07-14 PROCEDURE — 82947 ASSAY GLUCOSE BLOOD QUANT: CPT

## 2022-07-14 PROCEDURE — 96372 THER/PROPH/DIAG INJ SC/IM: CPT

## 2022-07-14 PROCEDURE — 86141 C-REACTIVE PROTEIN HS: CPT

## 2022-07-14 PROCEDURE — 36415 COLL VENOUS BLD VENIPUNCTURE: CPT

## 2022-07-14 PROCEDURE — 96361 HYDRATE IV INFUSION ADD-ON: CPT

## 2022-07-14 PROCEDURE — 72125 CT NECK SPINE W/O DYE: CPT

## 2022-07-14 PROCEDURE — 93005 ELECTROCARDIOGRAM TRACING: CPT

## 2022-07-14 PROCEDURE — 72128 CT CHEST SPINE W/O DYE: CPT

## 2022-07-14 PROCEDURE — 97162 PT EVAL MOD COMPLEX 30 MIN: CPT

## 2022-07-14 PROCEDURE — 82550 ASSAY OF CK (CPK): CPT

## 2022-07-14 PROCEDURE — 70450 CT HEAD/BRAIN W/O DYE: CPT

## 2022-07-14 PROCEDURE — 85025 COMPLETE CBC W/AUTO DIFF WBC: CPT

## 2022-07-14 PROCEDURE — 99284 EMERGENCY DEPT VISIT MOD MDM: CPT

## 2022-07-14 PROCEDURE — 73030 X-RAY EXAM OF SHOULDER: CPT

## 2022-07-14 RX ADMIN — SENNOSIDES SCH MG: 8.6 TABLET, FILM COATED ORAL at 20:09

## 2022-07-14 RX ADMIN — DOCUSATE SODIUM SCH MG: 100 CAPSULE ORAL at 20:08

## 2022-07-14 RX ADMIN — DEXTROSE MONOHYDRATE AND SODIUM CHLORIDE SCH MLS/HR: 5; .9 INJECTION, SOLUTION INTRAVENOUS at 17:50

## 2022-07-14 NOTE — DIAGNOSTIC IMAGING REPORT
PROCEDURE: CT thoracic spine without contrast.



TECHNIQUE: Multiple axial computerized tomography images were

obtained from the base of the thoracic spine to the vertex

without intravenous contrast. Auto Exposure Controls were

utilized during the CT exam to meet ALARA standards for radiation

dose reduction. 



INDICATION: Fall and back pain.



FINDINGS: Curvature and alignment of the thoracic spine is

normal. Vertebral body heights are maintained. No fractures are

seen. The bony spinal canal appears to be patent. Paraspinous

tissues are unremarkable.



IMPRESSION: No acute bony abnormality is detected.



Dictated by: 



  Dictated on workstation # [id number]

## 2022-07-14 NOTE — CONSULTATION - SURGERY
SANGITA ORTEGA 7/14/22 1621:


History of Present Illness


History of Present Illness


Patient Consulted On(krishna/time)


7/14/22


 16:13


Date Seen by Provider:  Jul 14, 2022


Time Seen by Provider:  15:04


Reason for Visit:  Fall


History of Present Illness


Ms. Joy is a 76 year old female who presented to the ED after a fall. She 

remained on the ground from 1800 yesterday to around 1100 this morning. Patient 

denies a LOC preceding or after her fall. General surgery was consulted for an 

abdominal drain that was placed around 2-3 weeks ago. She states the drains was 

placed for drainage of an abscess in her abdomen. Patient was at [facility] recently and was discharged yesterday. She denies any abdominal pain at

this time; she reports pain in her hip from her fall. She denies any nausea or 

vomiting. Patient had an ileostomy takedown and fistula tract repaired by Dr. Morrell last July.





Allergies and Home Medications


Allergies


Coded Allergies:  


     Penicillins (Verified  Allergy, Unknown, 4/22/21)


     raspberry (Verified  Allergy, Unknown, 4/22/21)





Patient Home Medication List


Amitriptyline HCl (Amitriptyline HCl) 100 Mg Tablet, 100 MG PO HS, (Reported)


   Entered as Reported by: GAGE LAU on 11/3/20 1433


Amlodipine Besylate (Amlodipine Besylate) 10 Mg Tablet, 10 MG PO DAILY, 

(Reported)


   Entered as Reported by: GAGE LAU on 10/30/20 1109


Aspirin (Aspirin) 81 Mg Tab.chew, 81 MG PO DAILY, (Reported)


   Entered as Reported by: GAGE LAU on 10/30/20 1110


Atorvastatin Calcium (Atorvastatin Calcium) 20 Mg Tablet, 20 MG PO DAILY, 

(Reported)


   Entered as Reported by: GAGE LAU on 11/3/20 1433


Carvedilol (Carvedilol) 12.5 Mg Tablet, 12.5 MG PO BID, (Reported)


   Entered as Reported by: JULIE A IBEH on 6/7/21 1503


Cetirizine HCl (Cetirizine HCl) 10 Mg Tablet, 10 MG PO BID, (Reported)


   Entered as Reported by: GAGE LAU on 10/30/20 1109


Fenofibrate (Fenofibrate) 160 Mg Tablet, 160 MG PO DAILY, (Reported)


   Entered as Reported by: GAGE LAU on 11/3/20 1433


Ferrous Sulfate (Iron) 325 Mg Tablet, 325 MG PO BID, (Reported)


   Entered as Reported by: GAGE LAU on 11/4/20 1256


Fish Oil/Dha/Epa (Fish Oil 1,200 mg Fish Oil) 1 Each Capsule, 2 EACH PO BID, 

(Reported)


   Entered as Reported by: GAGE LAU on 11/3/20 1448


Hydrocodone/Acetaminophen (Hydrocodone-Acetamin 5-325 mg) 1 Each Tablet, 1 TAB 

PO Q4H PRN for PAIN-MODERATE (5-7)


   Prescribed by: JANIYA TATE on 8/2/21 1915


Leflunomide (Leflunomide) 20 Mg Tablet, 20 MG PO Q48H, (Reported)


   Entered as Reported by: GAGE LAU on 11/3/20 1434


Levothyroxine Sodium (Levothyroxine Sodium) 112 Mcg Tablet, 112 MCG PO WHITE,T

U,WE,THU,FR,SA, (Reported)


   Entered as Reported by: GAGE LAU on 11/3/20 1433


Pantoprazole Sodium (Pantoprazole Sodium) 40 Mg Tablet.dr, 40 MG PO DAILY, 

(Reported)


   Entered as Reported by: GAGE LAU on 11/3/20 1433


Pilocarpine (Salagen) 5 Mg Tab, 10 MG PO BID, (Reported)


   Entered as Reported by: GAGE LAU on 11/3/20 1433


Pioglitazone HCl (Pioglitazone HCl) 30 Mg Tablet, 30 MG PO DAILY, (Reported)


   Entered as Reported by: GAGE LAU on 11/3/20 1433


Simethicone (Gas Relief 80) 80 Mg Tab.chew, 80 MG PO UD PRN for GAS, (Reported)


   Entered as Reported by: GAGE LAU on 11/3/20 1433


Sodium Bicarbonate (Sodium Bicarbonate) 650 Mg Tablet, 650 MG PO BID, (Reported)


   Entered as Reported by: JUDY CRESPO on 6/1/21 1307


Tramadol HCl (Tramadol HCl) 50 Mg Tablet, 50 MG PO QID


   Prescribed by: LAURENCE MORRELL on 7/30/21 1509





Past Medical-Social-Family Hx


Patient Social History


Smoking Status:  Former Smoker


Former Smoker, Quit:  Mar 30, 1985


Type Used:  Cigarettes


2nd Hand Smoke Exposure:  No


Recent Hopitalizations:  No


Alcohol Use?:  No


Have you traveled recently?:  No





Immunizations Up To Date


Date of Pneumonia Vaccine:  Oct 8, 2020


Date of Influenza Vaccine:  Oct 7, 2020





Seasonal Allergies


Seasonal Allergies:  No





Surgeries


History of Surgeries:  Yes


Surgeries:  Abdominal, Bowel Surgery, Cardiac, Gallbladder, Hysterectomy, 

Nephrectomy, Oophorectomy, Tonsillectomy





Respiratory


History of Respiratory Disorde:  No





Cardiovascular


History of Cardiac Disorders:  Yes (AFIB/RVR/CARDIOVERSION 09/2020; )


Cardiac Disorders:  Atrial Fibrillation, High Cholesterol, Hypertension





Neurological


History of Neurological Disord:  No





Reproductive System


Hx Reproductive Disorders:  Yes (OVARIAN CANCER)


Sexually Transmitted Disease:  No


HIV/AIDS:  No


GYN History:  Hysterectomy, Menopausal





Genitourinary


History of Genitourinary Disor:  Yes (HAS ONLY 1 KIDNEY (WHEN 16 YRS OLD HAD 

REMOVED CAUSE BLOOD VESSELS WRAPPED )


Genitourinary Disorders:  Bladder Infection





Gastrointestinal


History of Gastrointestinal Di:  Yes (PERMANENT ILEOSTOMY/COMPLETE COLECTOMY 

09/2020;MOSER)


Gastrointestinal Disorders:  Colitis, Gastroesophageal Reflux, Liver 

Disease/Jaundice, C-Diff, Gall Bladder Disease





Musculoskeletal


History of Musculoskeletal Dis:  Yes (RA/OSTEOARTHRITIS)


Musculoskeletal Disorders:  Arthritis, Rheumatoid Arthritis





Endocrine


History of Endocrine Disorders:  Yes


Endocrine Disorders:  Hypothyroidsim, Diabetes, Non-Insulin dep





HEENT


History of HEENT Disorders:  No





Cancer


History of Cancer:  Yes


Cancer:  Ovarian





Psychosocial


History of Psychiatric Problem:  No





Integumentary


History of Skin or Integumenta:  Yes (DELAYED HEALING OF SURGICAL WOUND; 

PERISTOMAL SKIN BREAKDOWN)





Blood Transfusions


History of Blood Disorders:  No


Adverse Reaction to a Blood Tr:  No





Family Medical History


Family Medial History:  


Arthritis


  19 MOTHER


Cardiovascular disease


  19 MOTHER


Diabetes mellitus


  19 FATHER


  19 MOTHER





Review of Systems-General


Constitutional:  No chills, No dizziness, No fever


Respiratory:  No cough, No dyspnea on exertion


Cardiovascular:  No chest pain, No palpitations


Gastrointestinal:  No abdominal pain, No nausea, No vomiting


Musculoskeletal:  joint pain, muscle pain





Physical Exam-General Problems


Physical Exam


Vital Signs





Vital Signs - First Documented








 7/14/22





 12:32


 


Temp 37.3


 


Pulse 119


 


Resp 22


 


B/P (MAP) 150/91 (110)


 


Pulse Ox 100


 


O2 Delivery Room Air





Capillary Refill : Less Than 3 Seconds


General Appearance:  no apparent distress, thin


HEENT:  PERRL/EOMI; No pale conjunctivae (R), No pale conjunctivae (L)


Neck:  non-tender, supple


Respiratory:  chest non-tender, lungs clear, normal breath sounds, no 

respiratory distress, no accessory muscle use


Cardiovascular:  normal peripheral pulses, regular rate, rhythm, no edema


Peripheral Pulses:  2+ Radial Pulses (R), 2+ Radial Pulses (L)


Gastrointestinal:  normal bowel sounds, non tender, soft, other (Drain in 

ventral abdomen)


Extremities:  non-tender, no pedal edema


Neurologic/Psychiatric:  no motor/sensory deficits, alert, normal mood/affect, 

oriented x 3


Skin:  normal color, ecchymosis (Right hip)





Data Review


Labs


Laboratory Tests


7/14/22 13:05: 


White Blood Count 5.7, Red Blood Count 4.15, Hemoglobin 11.9, Hematocrit 38, 

Mean Corpuscular Volume 91, Mean Corpuscular Hemoglobin 29, Mean Corpuscular 

Hemoglobin Concent 32, Red Cell Distribution Width 18.7H, Platelet Count 283, 

Mean Platelet Volume 10.1, Immature Granulocyte % (Auto) 0, Neutrophils (%) 

(Auto) 68, Lymphocytes (%) (Auto) 19, Monocytes (%) (Auto) 8, Eosinophils (%) 

(Auto) 3, Basophils (%) (Auto) 2, Neutrophils # (Auto) 3.9, Lymphocytes # (Auto)

1.1, Monocytes # (Auto) 0.4, Eosinophils # (Auto) 0.2, Basophils # (Auto) 0.1, 

Immature Granulocyte # (Auto) 0.0, Sodium Level 140, Potassium Level 3.6, 

Chloride Level 105, Carbon Dioxide Level 14L, Anion Gap 21H, Blood Urea Nitrogen

8, Creatinine 0.93, Estimat Glomerular Filtration Rate 64, BUN/Creatinine Ratio 

9, Glucose Level 58*L, Calcium Level 8.5, Corrected Calcium 9.1, Total Bilirubin

0.5, Aspartate Amino Transf (AST/SGOT) 215H, Alanine Aminotransferase (ALT/SGPT)

65H, Alkaline Phosphatase 61, Total Creatine Kinase 186H, C-Reactive Protein 

High Sensitivity 3.02H, Total Protein 7.4, Albumin 3.2


7/14/22 14:48: Glucometer 98





Assessment/Plan


Fall


Weakness and debility


Abdominal drain


   In ventral abdomen


Transaminitis


   AST:ALT of > 3:1


Elevated CK


   Likely 2/2 prolonged time spent on the ground after her fall





Plan:





Admit to the floor


Continue with IV fluids and labs


Consider CT abdomen and pelvis to learn more about the purpose of the drain in 

place


No urgent surgical intervention noted at this time





JUSTO GONZALEZ DO 7/14/22 2235:


History of Present Illness


Patient is a 76-year-old female who presents emergency department after fall.  

Patient fell approximately 6 PM yesterday evening and was found by Emma this 

morning around 11 AM.  Patient denies any loss of consciousness.  Patient states

that she just got out of [facility] yesterday.  She states that approximately 3 

weeks ago that she had a drain placed in her abdomen.  This was first to a bulb 

suction and then just to being open she states.  She is not having any pain 

around the area.  She has minimal drainage from the area.  She had an ileostomy 

takedown with anastomosis approximately 1 year ago.  She states that she had 

been ill a few times over the last year but was not having any significant 

issues until she had this abscess formation.  She is unsure why the drain was 

placed.  This was done at outside hospital.  Patient feeling extremely weak.  

Family at bedside also trying to provide information as well.





Allergies and Home Medications


Allergies


Coded Allergies:  


     Penicillins (Verified  Allergy, Unknown, 4/22/21)


     raspberry (Verified  Allergy, Unknown, 4/22/21)





Patient Home Medication List


Home Medication List Reviewed:  Yes


Amitriptyline HCl (Amitriptyline HCl) 100 Mg Tablet, 100 MG PO HS, (Reported)


   Entered as Reported by: GAGE LAU on 11/3/20 1433


Amlodipine Besylate (Amlodipine Besylate) 10 Mg Tablet, 10 MG PO DAILY, 

(Reported)


   Entered as Reported by: GAGE LAU on 10/30/20 1109


Aspirin (Aspirin) 81 Mg Tab.chew, 81 MG PO DAILY, (Reported)


   Entered as Reported by: GAGE LAU on 10/30/20 1110


Atorvastatin Calcium (Atorvastatin Calcium) 20 Mg Tablet, 20 MG PO DAILY, 

(Reported)


   Entered as Reported by: GAGE LAU on 11/3/20 1433


Carvedilol (Carvedilol) 12.5 Mg Tablet, 12.5 MG PO BID, (Reported)


   Entered as Reported by: JULIE A IBEH on 6/7/21 1503


Cetirizine HCl (Cetirizine HCl) 10 Mg Tablet, 10 MG PO BID, (Reported)


   Entered as Reported by: GAGE LAU on 10/30/20 1109


Fenofibrate (Fenofibrate) 160 Mg Tablet, 160 MG PO DAILY, (Reported)


   Entered as Reported by: GAGE LAU on 11/3/20 1433


Ferrous Sulfate (Iron) 325 Mg Tablet, 325 MG PO BID, (Reported)


   Entered as Reported by: GAGE LAU on 11/4/20 1256


Fish Oil/Dha/Epa (Fish Oil 1,200 mg Fish Oil) 1 Each Capsule, 2 EACH PO BID, 

(Reported)


   Entered as Reported by: GAGE LAU on 11/3/20 1448


Hydrocodone/Acetaminophen (Hydrocodone-Acetamin 5-325 mg) 1 Each Tablet, 1 TAB 

PO Q4H PRN for PAIN-MODERATE (5-7)


   Prescribed by: JANIYA TATE on 8/2/21 1915


Leflunomide (Leflunomide) 20 Mg Tablet, 20 MG PO Q48H, (Reported)


   Entered as Reported by: GAGE LAU on 11/3/20 1434


Levothyroxine Sodium (Levothyroxine Sodium) 112 Mcg Tablet, 112 MCG PO 

WHITE,TU,WE,THU,FR,SA, (Reported)


   Entered as Reported by: GAGE LAU on 11/3/20 1433


Pantoprazole Sodium (Pantoprazole Sodium) 40 Mg Tablet.dr, 40 MG PO DAILY, (Rep

orted)


   Entered as Reported by: GAGE LAU on 11/3/20 1433


Pilocarpine (Salagen) 5 Mg Tab, 10 MG PO BID, (Reported)


   Entered as Reported by: GAGE LAU on 11/3/20 1433


Pioglitazone HCl (Pioglitazone HCl) 30 Mg Tablet, 30 MG PO DAILY, (Reported)


   Entered as Reported by: GAGE LAU on 11/3/20 1433


Simethicone (Gas Relief 80) 80 Mg Tab.chew, 80 MG PO UD PRN for GAS, (Reported)


   Entered as Reported by: GAGE LAU on 11/3/20 1433


Sodium Bicarbonate (Sodium Bicarbonate) 650 Mg Tablet, 650 MG PO BID, (Reported)


   Entered as Reported by: JUDY CRESPO on 6/1/21 1307


Tramadol HCl (Tramadol HCl) 50 Mg Tablet, 50 MG PO QID


   Prescribed by: LAURENCE MORRELL on 7/30/21 1509





Past Medical-Social-Family Hx


Patient Social History


Smoking Status:  Former Smoker





Surgeries


History of Surgeries:  Yes (Abdominal, Bowel Surgery, Cardiac, Gallbladder, 

Hysterectomy, Nephrectomy, )





Reviewed Nursing Assessment


Reviewed/Agree w Nursing PMH:  Yes





Family Medical History


Significant Family History:  No Pertinent Family Hx


Family Medial History:  


Arthritis


  19 MOTHER


Cardiovascular disease


  19 MOTHER


Diabetes mellitus


  19 FATHER


  19 MOTHER





Review of Systems-General


Constitutional:  No chills, No dizziness, No fever


EENTM:  No blurred vision, No double vision


Respiratory:  No cough, No dyspnea on exertion


Cardiovascular:  No chest pain, No palpitations


Gastrointestinal:  No abdominal pain, No nausea, No vomiting


Genitourinary:  No decreased output, No discharge


Musculoskeletal:  joint pain, muscle pain


Skin:  No change in color, No change in hair/nails


Psychiatric/Neurological:  Denies Anxiety, Denies Depressed, Denies Emotional 

Problems


All Other Systems Reviewed


Negative Unless Noted:  Yes (Negative excepted noted.)





Physical Exam-General Problems


Physical Exam


General Appearance:  no apparent distress, thin


HEENT:  PERRL/EOMI; No pale conjunctivae (R), No pale conjunctivae (L)


Neck:  non-tender, supple


Respiratory:  chest non-tender, no respiratory distress, no accessory muscle use


Cardiovascular:  normal peripheral pulses, regular rate, rhythm


Gastrointestinal:  non tender, soft, other (Drain in ventral abdomen, very 

minimal succus appearing drainage)


Rectal:  deferred


Back:  no CVA tenderness, no vertebral tenderness


Extremities:  non-tender, no pedal edema


Neurologic/Psychiatric:  alert, normal mood/affect, oriented x 3


Skin:  normal color, ecchymosis (Right hip)


Lymphatic:  no adenopathy





Assessment/Plan


Fall


Weakness and debility


Abdominal drain


   In ventral abdomen


Possible enterocutaneous fistula


Transaminitis


Elevated CK-Likely 2/2 prolonged time spent on the ground after her fall


Patient with no acute fractures or head trauma by radiology imaging.  Patient 

with a drained we will try to obtain records to determine exact placement and 

reasoning, from [facility].  questionable enterocutaneous fistula to this area or 

this was to drain the abscess.  No surrounding signs of infection no fluctuance 

or erythema around the area so I feel less likely related to infection at this 

time.  She has plans to see Dr. Morrell next week.  If cannot obtain records and 

has changes to the abdomen would consider CT scan of the abdomen pelvis with 

oral contrast.  Medical management at this time.





Supervisory-Addendum Brief


Verification & Attestation


Participated in pt care:  history, MDM, physical


Personally performed:  exam, history, MDM, supervision of care


Care discussed with:  Medical Student


Procedures:  n/a


Results interpretation:  Verified all documentation


Verification and Attestation of Medical Student E/M Service





A medical student performed and documented this service in my presence. I 

reviewed and verified all information documented by the medical student and made

modifications to such information, when appropriate. I personally performed the 

physical exam and medical decision making. 





 Justo Gonzalez, Jul 14, 2022,22:35











SANGITA ORTEGA                 Jul 14, 2022 16:21


JUSTO GONZALEZ DO              Jul 14, 2022 22:35

## 2022-07-14 NOTE — DIAGNOSTIC IMAGING REPORT
INDICATION: Fall with hip pain.



TIME OF EXAM: 01:38 p.m.



TECHNIQUE: AP view of the pelvis and two views of each hip were

obtained.



FINDINGS: Femoroacetabular alignment is normal bilaterally. Both

femoral heads and necks appear to be intact. Rami are intact. A

drainage catheter overlies midline pelvis.



IMPRESSION: No acute bony abnormality is detected.



Dictated by: 



  Dictated on workstation # [id number]

## 2022-07-14 NOTE — ED FALL/INJURY
General


Chief Complaint:  Trauma-Non Activation


Stated Complaint:  FALL


Nursing Triage Note:  


pt to  by [facility] ems with cc of a same level fall, head,neck, bi lat shoulder, 


and pelvis pain. denies loc, c collar in place on arrival. states she fell about




1800 hrs last night, to  in er at 1229. states she just tripped over her feet.


Source:  patient


Exam Limitations:  no limitations





History of Present Illness


Date Seen by Provider:  2022


Time Seen by Provider:  12:30


Initial Comments


Patient to the ER by EMS with chief complaint of fall from the same level st

riking the back of her head with a knot on her head and some pain in her neck 

and her midline back between her shoulder blades.  She fell 1800 yesterday 

evening, 18 hours ago she is not sure if she had loss of consciousness.  She is 

not on a blood thinner.  She is 1 day out of [place] for antibiotics which 

finished up the day prior.  She had an abscess collection that was being drained

by an anterior ventral drain putting out very little per patient.  The drain 

tube is not in place and the patient states she did not recall it being pulled 

out.  She is not having any nausea or vomiting but she has having some pain in 

bilateral shoulders and hips.  Surgery was performed by Dr. Morrell:  patient 

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  The fistula

tract had some abscess formation around it.  Patient was found by [facility] this morning.  She was too weak to get up on her own and her life alert 

pendant was either not charged or the device the sense of the signal was 

unplugged.  This is unclear.





Allergies and Home Medications


Allergies


Coded Allergies:  


     Penicillins (Verified  Allergy, Unknown, 21)


     raspberry (Verified  Allergy, Unknown, 21)





Patient Home Medication List


Home Medication List Reviewed:  Yes


Amitriptyline HCl (Amitriptyline HCl) 100 Mg Tablet, 100 MG PO HS, (Reported)


   Entered as Reported by: GAGE LAU on 11/3/20 1433


Amlodipine Besylate (Amlodipine Besylate) 10 Mg Tablet, 10 MG PO DAILY, 

(Reported)


   Entered as Reported by: GAGE LAU on 10/30/20 1109


Aspirin (Aspirin) 81 Mg Tab.chew, 81 MG PO DAILY, (Reported)


   Entered as Reported by: GAGE LAU on 10/30/20 1110


Atorvastatin Calcium (Atorvastatin Calcium) 20 Mg Tablet, 20 MG PO DAILY, 

(Reported)


   Entered as Reported by: GAGE LAU on 11/3/20 1433


Carvedilol (Carvedilol) 12.5 Mg Tablet, 12.5 MG PO BID, (Reported)


   Entered as Reported by: JULIE A IBEH on 21 1503


Cetirizine HCl (Cetirizine HCl) 10 Mg Tablet, 10 MG PO BID, (Reported)


   Entered as Reported by: GAGE LAU on 10/30/20 1109


Fenofibrate (Fenofibrate) 160 Mg Tablet, 160 MG PO DAILY, (Reported)


   Entered as Reported by: GAGE LAU on 11/3/20 1433


Ferrous Sulfate (Iron) 325 Mg Tablet, 325 MG PO BID, (Reported)


   Entered as Reported by: GAGE LAU on 20 1256


Fish Oil/Dha/Epa (Fish Oil 1,200 mg Fish Oil) 1 Each Capsule, 2 EACH PO BID, 

(Reported)


   Entered as Reported by: GAGE LAU on 11/3/20 1448


Hydrocodone/Acetaminophen (Hydrocodone-Acetamin 5-325 mg) 1 Each Tablet, 1 TAB 

PO Q4H PRN for PAIN-MODERATE (5-7)


   Prescribed by: JANIYA TATE on 21 191


Leflunomide (Leflunomide) 20 Mg Tablet, 20 MG PO Q48H, (Reported)


   Entered as Reported by: GAGE LAU on 11/3/20 1434


Levothyroxine Sodium (Levothyroxine Sodium) 112 Mcg Tablet, 112 MCG PO 

WHITE,TU,WE,THU,FR,SA, (Reported)


   Entered as Reported by: GAGE LAU on 11/3/20 1433


Pantoprazole Sodium (Pantoprazole Sodium) 40 Mg Tablet.dr, 40 MG PO DAILY, 

(Reported)


   Entered as Reported by: GAGE LAU on 11/3/20 1433


Pilocarpine (Salagen) 5 Mg Tab, 10 MG PO BID, (Reported)


   Entered as Reported by: GAGE LAU on 11/3/20 1433


Pioglitazone HCl (Pioglitazone HCl) 30 Mg Tablet, 30 MG PO DAILY, (Reported)


   Entered as Reported by: GAGE LAU on 11/3/20 1433


Simethicone (Gas Relief 80) 80 Mg Tab.chew, 80 MG PO UD PRN for GAS, (Reported)


   Entered as Reported by: GAGE LAU on 11/3/20 1433


Sodium Bicarbonate (Sodium Bicarbonate) 650 Mg Tablet, 650 MG PO BID, (Reported)


   Entered as Reported by: JUDY CRESPO on 21 1307


Tramadol HCl (Tramadol HCl) 50 Mg Tablet, 50 MG PO QID


   Prescribed by: LAURENCE MORRELL on 21 1509





Review of Systems


Review of Systems


Constitutional:  No chills, No diaphoresis


Eyes:  Denies Blindness, Denies Blurred Vision


Ears, Nose, Mouth, Throat:  denies ear pain, denies ear discharge


Respiratory:  No cough, No phlegm, No short of breath


Cardiovascular:  No chest pain, No edema


Gastrointestinal:  No abdominal pain, No nausea, No vomiting


Genitourinary:  No discharge, No dysuria


Musculoskeletal:  back pain; No joint pain





All Other Systems Reviewed


Negative Unless Noted:  Yes





Past Medical-Social-Family Hx


Patient Social History


Tobacco Use?:  No


Use of E-Cig and/or Vaping dev:  No





Seasonal Allergies


Seasonal Allergies:  No





Past Medical History


Surgeries:  Yes


Abdominal, Bowel Surgery, Cardiac, Gallbladder, Hysterectomy, Nephrectomy, 

Oophorectomy, Tonsillectomy


Respiratory:  No


Currently Using CPAP:  No


Cardiac:  Yes (AFIB/RVR/CARDIOVERSION 2020; )


Atrial Fibrillation, High Cholesterol, Hypertension


Neurological:  No


Reproductive Disorders:  Yes (OVARIAN CANCER)


GYN History:  Hysterectomy, Menopausal


Sexually Transmitted Disease:  No


HIV/AIDS:  No


Genitourinary:  Yes (HAS ONLY 1 KIDNEY (WHEN 16 YRS OLD HAD REMOVED CAUSE BLOOD 

VESSELS WRAPPED )


Bladder Infection


Gastrointestinal:  Yes (PERMANENT ILEOSTOMY/COMPLETE COLECTOMY 2020;[facility])


Colitis, Gastroesophageal Reflux, Liver Disease/Jaundice, C-Diff, Gall Bladder 

Disease


Musculoskeletal:  Yes (RA/OSTEOARTHRITIS)


Arthritis, Rheumatoid Arthritis


Endocrine:  Yes


Hypothyroidsim, Diabetes, Non-Insulin dep


HEENT:  No


Cancer:  Yes


Ovarian


Did You Recieve Any Treatments:  Yes


What Type of Treatment Did You:  Surgical Intervention


Psychosocial:  No


Integumentary:  Yes (DELAYED HEALING OF SURGICAL WOUND; PERISTOMAL SKIN 

BREAKDOWN)


Blood Disorders:  No


Adverse Reaction/Blood Tranf:  No





Family Medical History





Arthritis


  19 MOTHER


Cardiovascular disease


  19 MOTHER


Diabetes mellitus


  19 FATHER


  19 MOTHER





PAST MEDICAL /SURGICAL HISTORY:


-2020--HAD SEVERE C. DIFFICILE COLITIS--ADMITTED TO [facility]


20. PT HAD SYNCOPAL EPISODE WITH LOSS OF PULSE AND CPR/RESUSCITATION


FOR APPROXIMATELY 2 MINUTES WITH ROSC.


DURING THAT STAY SHE HAD ATRIAL FIBRILLATION WITH RVR THAT REQUIRED


CARDIOVERSION


PT UNDERWENT SUBTOTAL COLECTOMY WITH PERMANENT ILEOSTOMY. 


PT REPORTS THAT SHE WAS ON VENTILATOR FOR 10 DAYS


PT WAS TRANSFERRED FROM [facility] TO [facility], AND WAS THERE FROM


10/10/20-10/30/20, THEN TRANSFERRED HERE FOR REHAB FROM 10/30/20-11/10/20.


SEEING WOUND CARE FOR DELAYED HEALING OF MIDLINE SURGICAL WOUND AND SKIN


BREAKDOWN AROUND STOMA.





ADDITIONAL PAST SURGICAL HISTORY:


-TONSILLECTOMY 


-APPENDECTOMY 


-RIGHT NEPHRECTOMY  FOR CONGENITAL HYPOPLASTIC KIDNEY


-OVARIAN CYSTECTOMY 


-OPEN TOTAL ABDOMINAL HYSTERECTOMY / BILATARAL SALPINGO-OOPHORECTOMY 


FOR OVARIAN CANCER


-COCCYX EXCISION 


-LAPAROSCOPIC CHOLECYSTECOMY 


-RIGHT NECK LIPOMA REMOVAL 2012 BY DR. CRAWLEY


-SEBACEOUS CYST OF ABDOMINAL WALL REMOVED 2012 BY DR. CRAWLEY


-SUBTOTAL COLECTOMY WITH PERMANENT ILEOSTOMY 2020 AT [facility] BY


DR. BEAVERS





Physical Exam


Vital Signs





Vital Signs - First Documented








 22





 12:32


 


Temp 37.3


 


Pulse 119


 


Resp 22


 


B/P (MAP) 150/91 (110)


 


Pulse Ox 100


 


O2 Delivery Room Air





Capillary Refill : Less Than 3 Seconds


Height, Weight, BMI


Height: '"


Weight: lbs. oz. kg; 19.00 BMI


Method:Stated


General Appearance:  WD/WN, no apparent distress


HEENT:  PERRL/EOMI, pharynx normal


Neck:  full range of motion, supple, normal inspection


Cardiovascular:  normal peripheral pulses, regular rate, rhythm, no edema, 

tachycardia (120)


Respiratory:  lungs clear, normal breath sounds, no respiratory distress, no 

accessory muscle use


Gastrointestinal:  normal bowel sounds, non tender, soft, other (Clean dry open 

fistula ventral line below the umbilicus)


Back:  normal inspection, vertebral tenderness (Midline vertebral tenderness 

without deformity, crepitus or step-off between the shoulder blades.)


Extremities:  normal range of motion, non-tender, normal capillary refill


Neurologic/Psychiatric:  alert, normal mood/affect, oriented x 3


Skin:  normal color, warm/dry





[place] Coma Score


Best Eye Response:  (4) Open Spontaneously


Best Verbal Response:  (5) Oriented


Best Motor Response:  (6) Obeys Commands


Lena Total:  15





Progress/Results/Core Measures


Results/Orders


Lab Results





Laboratory Tests








Test


 22


13:05 22


14:48 Range/Units


 


 


White Blood Count


 5.7 


 


 4.3-11.0


10^3/uL


 


Red Blood Count


 4.15 


 


 3.80-5.11


10^6/uL


 


Hemoglobin 11.9   11.5-16.0  g/dL


 


Hematocrit 38   35-52  %


 


Mean Corpuscular Volume 91   80-99  fL


 


Mean Corpuscular Hemoglobin 29   25-34  pg


 


Mean Corpuscular Hemoglobin


Concent 32 


 


 32-36  g/dL





 


Red Cell Distribution Width 18.7 H  10.0-14.5  %


 


Platelet Count


 283 


 


 130-400


10^3/uL


 


Mean Platelet Volume 10.1   9.0-12.2  fL


 


Immature Granulocyte % (Auto) 0    %


 


Neutrophils (%) (Auto) 68   42-75  %


 


Lymphocytes (%) (Auto) 19   12-44  %


 


Monocytes (%) (Auto) 8   0-12  %


 


Eosinophils (%) (Auto) 3   0-10  %


 


Basophils (%) (Auto) 2   0-10  %


 


Neutrophils # (Auto)


 3.9 


 


 1.8-7.8


10^3/uL


 


Lymphocytes # (Auto)


 1.1 


 


 1.0-4.0


10^3/uL


 


Monocytes # (Auto)


 0.4 


 


 0.0-1.0


10^3/uL


 


Eosinophils # (Auto)


 0.2 


 


 0.0-0.3


10^3/uL


 


Basophils # (Auto)


 0.1 


 


 0.0-0.1


10^3/uL


 


Immature Granulocyte # (Auto)


 0.0 


 


 0.0-0.1


10^3/uL


 


Sodium Level 140   135-145  MMOL/L


 


Potassium Level 3.6   3.6-5.0  MMOL/L


 


Chloride Level 105     MMOL/L


 


Carbon Dioxide Level 14 L  21-32  MMOL/L


 


Anion Gap 21 H  5-14  MMOL/L


 


Blood Urea Nitrogen 8   7-18  MG/DL


 


Creatinine


 0.93 


 


 0.60-1.30


MG/DL


 


Estimat Glomerular Filtration


Rate 64 


 


  





 


BUN/Creatinine Ratio 9    


 


Glucose Level 58 *L    MG/DL


 


Calcium Level 8.5   8.5-10.1  MG/DL


 


Corrected Calcium 9.1   8.5-10.1  MG/DL


 


Total Bilirubin 0.5   0.1-1.0  MG/DL


 


Aspartate Amino Transf


(AST/SGOT) 215 H


 


 5-34  U/L





 


Alanine Aminotransferase


(ALT/SGPT) 65 H


 


 0-55  U/L





 


Alkaline Phosphatase 61     U/L


 


Total Creatine Kinase 186 H    U/L


 


C-Reactive Protein High


Sensitivity 3.02 H


 


 0.00-0.50


MG/DL


 


Total Protein 7.4   6.4-8.2  GM/DL


 


Albumin 3.2   3.2-4.5  GM/DL


 


Glucometer  98    MG/DL








My Orders





Orders - EDNA VELAZQUEZ


Ekg Tracing (22 12:43)


Ed Iv/Invasive Line Start (22 12:41)


Ns Iv 1000 Ml (Sodium Chloride 0.9%) (22 12:45)


Fentanyl  Inj (Sublimaze Injection) (22 12:45)


Ct Head/Cervical Spine Wo (22 12:41)


Ct Thoracic Spine Wo (22 12:41)


Pelvis/Shila Hips 5> Views (22 12:41)


Cbc With Automated Diff (22 12:41)


Comprehensive Metabolic Panel (22 12:41)


Hs C Reactive Protein (22 12:41)


Shoulder, Bilateral, 2 Views (22 12:55)


D50w (Emergency) Syringe (Dextrose 50% 5 (22 14:00)


Creatine Kinase (22 14:03)


Accucheck Stat ONCE (22 14:44)


Ua Culture If Indicated (22 14:46)





Medications Given in ED





Current Medications








 Medications  Dose


 Ordered  Sig/Dianne


 Route  Start Time


 Stop Time Status Last Admin


Dose Admin


 


 Dextrose  25 ml  ONCE  ONCE


 IV  22 14:00


 22 14:01 DC 22 13:57


25 ML


 


 Fentanyl Citrate  50 mcg  ONCE  ONCE


 IVP  22 12:45


 22 12:49 DC 22 13:12


50 MCG


 


 Sodium Chloride  1,000 ml @ 


 0 mls/hr  Q0M ONCE


 IV  22 12:45


 22 12:49 DC 22 13:12


1,000 MLS/HR








Vital Signs/I&O











 22





 12:32 13:12


 


Temp 37.3 37.3


 


Pulse 119 


 


Resp 22 


 


B/P (MAP) 150/91 (110) 


 


Pulse Ox 100 


 


O2 Delivery Room Air 














Blood Pressure Mean:                    110











Progress


Progress Note :  


   Time:  12:53


Progress Note


Plan to get CT of the head, C-spine, thoracic spine, plain films of the 

bilateral hips and shoulders.  Labs.  If her labs are unremarkable then we will 

not need to go further into her abdominal cavity as she is not having any 

complaints from there today.


Initial ECG Impression Date:  2022


Initial ECG Impression Time:  12:47


Initial ECG Rate:  117


Initial ECG Rhythm:  S.Tach


Initial ECG Intervals:  Normal


Initial ECG Impression:  Normal


Comment


Sinus tachycardia without clinically relevant ST changes





Diagnostic Imaging





   Diagonstic Imaging:  CT


   Plain Films/CT/US/NM/MRI:  c-spine, head


Comments


NAME:   JOSE BARBER


[facility] REC#:   [id number]


ACCOUNT#:   [id number]


PT STATUS:   REG ER


:   1945


PHYSICIAN:   EDNA VELAZQUEZ MD


ADMIT DATE:   22/ER


                                   ***Draft***


Date of Exam:22





CT HEAD/CERVICAL SPINE WO








PROCEDURE: CT head and CT cervical spine without contrast.





TECHNIQUE: Multiple contiguous axial images were obtained through


the brain and cervical spine without the use of intravenous


contrast. Sagittal and coronal reformations through the cervical


spine were then performed. Auto Exposure Controls were utilized


during the CT exam to meet ALARA standards for radiation dose


reduction. 





INDICATION:  Fall with head and neck pain.





COMPARISON: No prior studies are available for comparison.





CT HEAD:





The ventricles and sulci are consistent with the patient's age.


No sulcal effacement or midline shift is identified. No acute


intra-axial or extra-axial hemorrhage is detected. Cisterns are


patent. Visualized paranasal sinuses are clear.





IMPRESSION: 


1. No acute intracranial process is detected.





CT CERVICAL SPINE:





Curvature and alignment of the cervical spine is normal. No


fracture or subluxation is identified. The prevertebral tissues


are within normal limits. Odontoid is intact.





IMPRESSION: 


1. No acute bony abnormality is detected.











 





  Dictated on workstation # [id number]








Dict:   22 1344


Trans:   22 1349


[facility] [id number]





Interpreted by:     AIME JEWELL MD


Electronically signed by:


   Reviewed:  Reviewed by Me








   Diagonstic Imaging:  CT


   Plain Films/CT/US/NM/MRI:  other (Thoracic spine)


Comments


NAME:   JOSE BARBER


[facility] REC#:   [id number]


ACCOUNT#:   [id number]


PT STATUS:   REG ER


:   1945


PHYSICIAN:   EDNA VELAZQUEZ MD


ADMIT DATE:   22/ER


                                   ***Draft***


Date of Exam:22





CT THORACIC SPINE WO








PROCEDURE: CT thoracic spine without contrast.





TECHNIQUE: Multiple axial computerized tomography images were


obtained from the base of the thoracic spine to the vertex


without intravenous contrast. Auto Exposure Controls were


utilized during the CT exam to meet ALARA standards for radiation


dose reduction. 





INDICATION: Fall and back pain.





FINDINGS: Curvature and alignment of the thoracic spine is


normal. Vertebral body heights are maintained. No fractures are


seen. The bony spinal canal appears to be patent. Paraspinous


tissues are unremarkable.





IMPRESSION: No acute bony abnormality is detected.





  Dictated on workstation # [id number]








Dict:   22 1402


Trans:   22 1416


 [id number]





Interpreted by:     AIME JEWLEL MD


Electronically signed by:


   Reviewed:  Reviewed by Me








   Diagonstic Imaging:  Xray


   Plain Films/CT/US/NM/MRI:  other (Bilateral shoulders)


Comments


                 [facility] [place], [place]





NAME:   JOSE BARBER [facility] REC#:   [id number]


ACCOUNT#:   [id number]


PT STATUS:   REG ER


:   1945


PHYSICIAN:   EDNA VELAZQUEZ MD


ADMIT DATE:   22/ER


                                   ***Draft***


Date of Exam:22





SHOULDER, BILATERAL, 2 VIEWS








INDICATION: Fall with bilateral shoulder pain.





TIME OF EXAM: 1:42 p.m.





FINDINGS: Glenohumeral and acromioclavicular alignment are normal


bilaterally. Acromiohumeral space is normal bilaterally. No


fracture or dislocation is identified.





IMPRESSION: No acute abnormality is detected.





  Dictated on workstation # [id number]








Dict:   22 1402


Trans:   22 1406


 [id number]





Interpreted by:     AIME JEWELL MD


Electronically signed by:


   Reviewed:  Reviewed by Me








   Diagonstic Imaging:  Xray


   Plain Films/CT/US/NM/MRI:  pelvis, hip (Bilateral)


Comments


                 [facility] [place], [place]





NAME:   JOSE BARBER


[facility] REC#:   [id number]


ACCOUNT#:   [id number]


PT STATUS:   REG ER


:   1945


PHYSICIAN:   EDNA VELAZQUEZ MD


ADMIT DATE:   22/ER


                                   ***Draft***


Date of Exam:22





PELVIS/SHILA HIPS 5> VIEWS








INDICATION: Fall with hip pain.





TIME OF EXAM: 01:38 p.m.





TECHNIQUE: AP view of the pelvis and two views of each hip were


obtained.





FINDINGS: Femoroacetabular alignment is normal bilaterally. Both


femoral heads and necks appear to be intact. Rami are intact. A


drainage catheter overlies midline pelvis.





IMPRESSION: No acute bony abnormality is detected.





  Dictated on workstation # [id number]








Dict:   22 1401


Trans:   22 1407


[id number]





Interpreted by:     AIME JEWELL MD


Electronically signed by:


   Reviewed:  Reviewed by Me





Departure


Communication (Admissions)


Time/Spoke to Admitting Phy:  14:55


Discussed the case with Dr. Trujillo who agrees to observe the patient for IV 

fluids with a surgical consult.  She will put in queued orders.


Time/Spoke to Consulting Phy:  15:00


Discussed the case with Dr. Valdez and he agrees to consult for general surgery.

 He will come see the patient





Impression





   Primary Impression:  


   Hypoglycemia


   Additional Impressions:  


   Rhabdomyolysis


   Qualified Codes:  T79.6XXA - Traumatic ischemia of muscle, initial encounter


   Fall


   Qualified Codes:  W19.XXXA - Unspecified fall, initial encounter


   Dehydration


   Physical debility


Disposition:   ADMITTED AS INPATIENT


Condition:  Stable





Admissions


Decision to Admit Reason:  Admit from ER (General)


Decision to Admit/Date:  2022


Time/Decision to Admit Time:  14:49





Departure-Patient Inst.


Referrals:  


TAM CHATTERJEE (PCP/Family)


Primary Care Physician











EDNA VELAZQUEZ                 2022 12:54

## 2022-07-14 NOTE — DIAGNOSTIC IMAGING REPORT
PROCEDURE: CT head and CT cervical spine without contrast.



TECHNIQUE: Multiple contiguous axial images were obtained through

the brain and cervical spine without the use of intravenous

contrast. Sagittal and coronal reformations through the cervical

spine were then performed. Auto Exposure Controls were utilized

during the CT exam to meet ALARA standards for radiation dose

reduction. 



INDICATION:  Fall with head and neck pain.



COMPARISON: No prior studies are available for comparison.



CT HEAD:



The ventricles and sulci are consistent with the patient's age.

No sulcal effacement or midline shift is identified. No acute

intra-axial or extra-axial hemorrhage is detected. Cisterns are

patent. Visualized paranasal sinuses are clear.



IMPRESSION: 

1. No acute intracranial process is detected.



CT CERVICAL SPINE:



Curvature and alignment of the cervical spine is normal. No

fracture or subluxation is identified. The prevertebral tissues

are within normal limits. Odontoid is intact.



IMPRESSION: 

1. No acute bony abnormality is detected.







 



Dictated by: 



  Dictated on workstation # [id number]

## 2022-07-14 NOTE — DIAGNOSTIC IMAGING REPORT
INDICATION: Fall with bilateral shoulder pain.



TIME OF EXAM: 1:42 p.m.



FINDINGS: Glenohumeral and acromioclavicular alignment are normal

bilaterally. Acromiohumeral space is normal bilaterally. No

fracture or dislocation is identified.



IMPRESSION: No acute abnormality is detected.



Dictated by: 



  Dictated on workstation # [id number]

## 2022-07-15 ENCOUNTER — HOSPITAL ENCOUNTER (INPATIENT)
Dept: HOSPITAL 75 - IRF | Age: 77
Discharge: ANOTHER HOSPITAL | DRG: 947 | End: 2022-07-15
Attending: INTERNAL MEDICINE | Admitting: INTERNAL MEDICINE
Payer: MEDICARE

## 2022-07-15 ENCOUNTER — HOSPITAL ENCOUNTER (INPATIENT)
Dept: HOSPITAL 75 - ICU | Age: 77
LOS: 6 days | Discharge: NURSING, REHAB OR LONG-TERM CARE | DRG: 177 | End: 2022-07-21
Attending: INTERNAL MEDICINE | Admitting: INTERNAL MEDICINE
Payer: MEDICARE

## 2022-07-15 VITALS — DIASTOLIC BLOOD PRESSURE: 82 MMHG | SYSTOLIC BLOOD PRESSURE: 123 MMHG

## 2022-07-15 VITALS — DIASTOLIC BLOOD PRESSURE: 85 MMHG | SYSTOLIC BLOOD PRESSURE: 112 MMHG

## 2022-07-15 VITALS — SYSTOLIC BLOOD PRESSURE: 142 MMHG | DIASTOLIC BLOOD PRESSURE: 96 MMHG

## 2022-07-15 VITALS — SYSTOLIC BLOOD PRESSURE: 138 MMHG | DIASTOLIC BLOOD PRESSURE: 87 MMHG

## 2022-07-15 VITALS — WEIGHT: 107.37 LBS | BODY MASS INDEX: 19.51 KG/M2 | HEIGHT: 62.01 IN

## 2022-07-15 VITALS — WEIGHT: 103.62 LBS | HEIGHT: 62.01 IN | BODY MASS INDEX: 18.83 KG/M2

## 2022-07-15 VITALS — SYSTOLIC BLOOD PRESSURE: 113 MMHG | DIASTOLIC BLOOD PRESSURE: 70 MMHG

## 2022-07-15 VITALS — DIASTOLIC BLOOD PRESSURE: 92 MMHG | SYSTOLIC BLOOD PRESSURE: 163 MMHG

## 2022-07-15 VITALS — DIASTOLIC BLOOD PRESSURE: 87 MMHG | SYSTOLIC BLOOD PRESSURE: 138 MMHG

## 2022-07-15 DIAGNOSIS — U07.1: ICD-10-CM

## 2022-07-15 DIAGNOSIS — I10: ICD-10-CM

## 2022-07-15 DIAGNOSIS — Z90.5: ICD-10-CM

## 2022-07-15 DIAGNOSIS — E78.00: ICD-10-CM

## 2022-07-15 DIAGNOSIS — Z82.61: ICD-10-CM

## 2022-07-15 DIAGNOSIS — G72.9: ICD-10-CM

## 2022-07-15 DIAGNOSIS — E11.22: ICD-10-CM

## 2022-07-15 DIAGNOSIS — E86.0: ICD-10-CM

## 2022-07-15 DIAGNOSIS — M06.9: ICD-10-CM

## 2022-07-15 DIAGNOSIS — Z87.891: ICD-10-CM

## 2022-07-15 DIAGNOSIS — M19.91: ICD-10-CM

## 2022-07-15 DIAGNOSIS — Z83.3: ICD-10-CM

## 2022-07-15 DIAGNOSIS — D63.8: ICD-10-CM

## 2022-07-15 DIAGNOSIS — K21.9: ICD-10-CM

## 2022-07-15 DIAGNOSIS — U07.1: Primary | ICD-10-CM

## 2022-07-15 DIAGNOSIS — Z91.018: ICD-10-CM

## 2022-07-15 DIAGNOSIS — Z88.0: ICD-10-CM

## 2022-07-15 DIAGNOSIS — I25.5: ICD-10-CM

## 2022-07-15 DIAGNOSIS — N18.31: ICD-10-CM

## 2022-07-15 DIAGNOSIS — I21.A1: ICD-10-CM

## 2022-07-15 DIAGNOSIS — E11.65: ICD-10-CM

## 2022-07-15 DIAGNOSIS — Z82.49: ICD-10-CM

## 2022-07-15 DIAGNOSIS — I13.0: ICD-10-CM

## 2022-07-15 DIAGNOSIS — E03.9: ICD-10-CM

## 2022-07-15 DIAGNOSIS — I48.91: ICD-10-CM

## 2022-07-15 DIAGNOSIS — I50.21: ICD-10-CM

## 2022-07-15 DIAGNOSIS — Z85.43: ICD-10-CM

## 2022-07-15 DIAGNOSIS — Z73.0: ICD-10-CM

## 2022-07-15 DIAGNOSIS — K63.2: ICD-10-CM

## 2022-07-15 DIAGNOSIS — R53.81: Primary | ICD-10-CM

## 2022-07-15 DIAGNOSIS — E87.2: ICD-10-CM

## 2022-07-15 DIAGNOSIS — E11.9: ICD-10-CM

## 2022-07-15 LAB
ALBUMIN SERPL-MCNC: 2.3 GM/DL (ref 3.2–4.5)
ALBUMIN SERPL-MCNC: 2.4 GM/DL (ref 3.2–4.5)
ALBUMIN SERPL-MCNC: 2.9 GM/DL (ref 3.2–4.5)
ALP SERPL-CCNC: 40 U/L (ref 40–136)
ALP SERPL-CCNC: 44 U/L (ref 40–136)
ALP SERPL-CCNC: 58 U/L (ref 40–136)
ALT SERPL-CCNC: 38 U/L (ref 0–55)
ALT SERPL-CCNC: 40 U/L (ref 0–55)
ALT SERPL-CCNC: 53 U/L (ref 0–55)
APTT PPP: YELLOW S
BACTERIA #/AREA URNS HPF: NEGATIVE /HPF
BASOPHILS # BLD AUTO: 0 10^3/UL (ref 0–0.1)
BASOPHILS # BLD AUTO: 0.1 10^3/UL (ref 0–0.1)
BASOPHILS NFR BLD AUTO: 1 % (ref 0–10)
BASOPHILS NFR BLD AUTO: 1 % (ref 0–10)
BILIRUB SERPL-MCNC: 0.3 MG/DL (ref 0.1–1)
BILIRUB SERPL-MCNC: 0.4 MG/DL (ref 0.1–1)
BILIRUB SERPL-MCNC: 0.4 MG/DL (ref 0.1–1)
BILIRUB UR QL STRIP: NEGATIVE
BLD SMEAR INTERP: YES
BUN/CREAT SERPL: 6
BUN/CREAT SERPL: 7
BUN/CREAT SERPL: 7
CALCIUM SERPL-MCNC: 6.7 MG/DL (ref 8.5–10.1)
CALCIUM SERPL-MCNC: 6.8 MG/DL (ref 8.5–10.1)
CALCIUM SERPL-MCNC: 7.6 MG/DL (ref 8.5–10.1)
CHLORIDE SERPL-SCNC: 109 MMOL/L (ref 98–107)
CHLORIDE SERPL-SCNC: 111 MMOL/L (ref 98–107)
CHLORIDE SERPL-SCNC: 111 MMOL/L (ref 98–107)
CK MB SERPL-MCNC: 6.2 NG/ML (ref ?–6.6)
CK SERPL-CCNC: 239 U/L (ref 29–168)
CO2 SERPL-SCNC: 12 MMOL/L (ref 21–32)
CO2 SERPL-SCNC: 14 MMOL/L (ref 21–32)
CO2 SERPL-SCNC: 15 MMOL/L (ref 21–32)
CREAT SERPL-MCNC: 0.83 MG/DL (ref 0.6–1.3)
CREAT SERPL-MCNC: 0.84 MG/DL (ref 0.6–1.3)
CREAT SERPL-MCNC: 0.86 MG/DL (ref 0.6–1.3)
EOSINOPHIL # BLD AUTO: 0 10^3/UL (ref 0–0.3)
EOSINOPHIL # BLD AUTO: 0.1 10^3/UL (ref 0–0.3)
EOSINOPHIL NFR BLD AUTO: 0 % (ref 0–10)
EOSINOPHIL NFR BLD AUTO: 1 % (ref 0–10)
FIBRINOGEN PPP-MCNC: CLEAR MG/DL
GFR SERPLBLD BASED ON 1.73 SQ M-ARVRAT: 70 ML/MIN
GFR SERPLBLD BASED ON 1.73 SQ M-ARVRAT: 72 ML/MIN
GFR SERPLBLD BASED ON 1.73 SQ M-ARVRAT: 73 ML/MIN
GLUCOSE SERPL-MCNC: 123 MG/DL (ref 70–105)
GLUCOSE SERPL-MCNC: 137 MG/DL (ref 70–105)
GLUCOSE SERPL-MCNC: 153 MG/DL (ref 70–105)
GLUCOSE UR STRIP-MCNC: NEGATIVE MG/DL
GRAN CASTS #/AREA URNS LPF: (no result) /LPF
HCT VFR BLD CALC: 29 % (ref 35–52)
HCT VFR BLD CALC: 41 % (ref 35–52)
HGB BLD-MCNC: 12.3 G/DL (ref 11.5–16)
HGB BLD-MCNC: 8.9 G/DL (ref 11.5–16)
HYALINE CASTS #/AREA URNS LPF: (no result) /LPF
KETONES UR QL STRIP: NEGATIVE
LEUKOCYTE ESTERASE UR QL STRIP: NEGATIVE
LYMPHOCYTES # BLD AUTO: 0.7 10^3/UL (ref 1–4)
LYMPHOCYTES # BLD AUTO: 1 10^3/UL (ref 1–4)
LYMPHOCYTES NFR BLD AUTO: 14 % (ref 12–44)
LYMPHOCYTES NFR BLD AUTO: 16 % (ref 12–44)
MANUAL DIFFERENTIAL PERFORMED BLD QL: NO
MANUAL DIFFERENTIAL PERFORMED BLD QL: NO
MCH RBC QN AUTO: 29 PG (ref 25–34)
MCH RBC QN AUTO: 29 PG (ref 25–34)
MCHC RBC AUTO-ENTMCNC: 30 G/DL (ref 32–36)
MCHC RBC AUTO-ENTMCNC: 31 G/DL (ref 32–36)
MCV RBC AUTO: 94 FL (ref 80–99)
MCV RBC AUTO: 95 FL (ref 80–99)
MONOCYTES # BLD AUTO: 0.3 10^3/UL (ref 0–1)
MONOCYTES # BLD AUTO: 0.3 10^3/UL (ref 0–1)
MONOCYTES NFR BLD AUTO: 5 % (ref 0–12)
MONOCYTES NFR BLD AUTO: 7 % (ref 0–12)
NEUTROPHILS # BLD AUTO: 3.6 10^3/UL (ref 1.8–7.8)
NEUTROPHILS # BLD AUTO: 4.8 10^3/UL (ref 1.8–7.8)
NEUTROPHILS NFR BLD AUTO: 77 % (ref 42–75)
NEUTROPHILS NFR BLD AUTO: 77 % (ref 42–75)
NITRITE UR QL STRIP: NEGATIVE
PH UR STRIP: 6 [PH] (ref 5–9)
PLATELET # BLD: 163 10^3/UL (ref 130–400)
PLATELET # BLD: 284 10^3/UL (ref 130–400)
PMV BLD AUTO: 10.2 FL (ref 9–12.2)
PMV BLD AUTO: 10.7 FL (ref 9–12.2)
POTASSIUM SERPL-SCNC: 3.2 MMOL/L (ref 3.6–5)
POTASSIUM SERPL-SCNC: 3.7 MMOL/L (ref 3.6–5)
POTASSIUM SERPL-SCNC: 4.5 MMOL/L (ref 3.6–5)
PROT SERPL-MCNC: 5.2 GM/DL (ref 6.4–8.2)
PROT SERPL-MCNC: 5.4 GM/DL (ref 6.4–8.2)
PROT SERPL-MCNC: 7.1 GM/DL (ref 6.4–8.2)
PROT UR QL STRIP: NEGATIVE
RBC #/AREA URNS HPF: (no result) /HPF
SODIUM SERPL-SCNC: 139 MMOL/L (ref 135–145)
SODIUM SERPL-SCNC: 140 MMOL/L (ref 135–145)
SODIUM SERPL-SCNC: 140 MMOL/L (ref 135–145)
SP GR UR STRIP: 1.01 (ref 1.02–1.02)
WBC # BLD AUTO: 4.6 10^3/UL (ref 4.3–11)
WBC # BLD AUTO: 6.3 10^3/UL (ref 4.3–11)
WBC #/AREA URNS HPF: (no result) /HPF

## 2022-07-15 PROCEDURE — 36600 WITHDRAWAL OF ARTERIAL BLOOD: CPT

## 2022-07-15 PROCEDURE — 84484 ASSAY OF TROPONIN QUANT: CPT

## 2022-07-15 PROCEDURE — 71045 X-RAY EXAM CHEST 1 VIEW: CPT

## 2022-07-15 PROCEDURE — 83735 ASSAY OF MAGNESIUM: CPT

## 2022-07-15 PROCEDURE — 74176 CT ABD & PELVIS W/O CONTRAST: CPT

## 2022-07-15 PROCEDURE — 82805 BLOOD GASES W/O2 SATURATION: CPT

## 2022-07-15 PROCEDURE — 93005 ELECTROCARDIOGRAM TRACING: CPT

## 2022-07-15 PROCEDURE — 82728 ASSAY OF FERRITIN: CPT

## 2022-07-15 PROCEDURE — 36415 COLL VENOUS BLD VENIPUNCTURE: CPT

## 2022-07-15 PROCEDURE — 83550 IRON BINDING TEST: CPT

## 2022-07-15 PROCEDURE — 84100 ASSAY OF PHOSPHORUS: CPT

## 2022-07-15 PROCEDURE — 81000 URINALYSIS NONAUTO W/SCOPE: CPT

## 2022-07-15 PROCEDURE — 83540 ASSAY OF IRON: CPT

## 2022-07-15 PROCEDURE — 87636 SARSCOV2 & INF A&B AMP PRB: CPT

## 2022-07-15 PROCEDURE — 80053 COMPREHEN METABOLIC PANEL: CPT

## 2022-07-15 PROCEDURE — 87081 CULTURE SCREEN ONLY: CPT

## 2022-07-15 PROCEDURE — 83605 ASSAY OF LACTIC ACID: CPT

## 2022-07-15 PROCEDURE — 8E0ZXY6 ISOLATION: ICD-10-PCS | Performed by: INTERNAL MEDICINE

## 2022-07-15 PROCEDURE — 82607 VITAMIN B-12: CPT

## 2022-07-15 PROCEDURE — 82947 ASSAY GLUCOSE BLOOD QUANT: CPT

## 2022-07-15 PROCEDURE — 85025 COMPLETE CBC W/AUTO DIFF WBC: CPT

## 2022-07-15 PROCEDURE — 87040 BLOOD CULTURE FOR BACTERIA: CPT

## 2022-07-15 PROCEDURE — 85379 FIBRIN DEGRADATION QUANT: CPT

## 2022-07-15 PROCEDURE — 84145 PROCALCITONIN (PCT): CPT

## 2022-07-15 PROCEDURE — 94760 N-INVAS EAR/PLS OXIMETRY 1: CPT

## 2022-07-15 PROCEDURE — 82553 CREATINE MB FRACTION: CPT

## 2022-07-15 PROCEDURE — 93306 TTE W/DOPPLER COMPLETE: CPT

## 2022-07-15 PROCEDURE — 82306 VITAMIN D 25 HYDROXY: CPT

## 2022-07-15 RX ADMIN — SENNOSIDES SCH MG: 8.6 TABLET, FILM COATED ORAL at 09:01

## 2022-07-15 RX ADMIN — DEXTROSE MONOHYDRATE AND SODIUM CHLORIDE SCH MLS/HR: 5; .9 INJECTION, SOLUTION INTRAVENOUS at 06:34

## 2022-07-15 RX ADMIN — DOCUSATE SODIUM SCH MG: 100 CAPSULE ORAL at 20:18

## 2022-07-15 RX ADMIN — POTASSIUM CHLORIDE SCH MLS/HR: 200 INJECTION, SOLUTION INTRAVENOUS at 19:54

## 2022-07-15 RX ADMIN — DOCUSATE SODIUM SCH MG: 100 CAPSULE ORAL at 09:01

## 2022-07-15 RX ADMIN — ENOXAPARIN SODIUM SCH MG: 100 INJECTION SUBCUTANEOUS at 18:03

## 2022-07-15 RX ADMIN — POTASSIUM CHLORIDE SCH MLS/HR: 200 INJECTION, SOLUTION INTRAVENOUS at 22:21

## 2022-07-15 RX ADMIN — SODIUM CHLORIDE SCH MLS/HR: 900 INJECTION, SOLUTION INTRAVENOUS at 18:03

## 2022-07-15 RX ADMIN — SENNOSIDES SCH MG: 8.6 TABLET, FILM COATED ORAL at 20:19

## 2022-07-15 NOTE — PHYSICAL THERAPY EVALUATION
PT Evaluation-General


Medical Diagnosis


Admission Date


Jul 14, 2022 at 15:45


Medical Diagnosis:  dehydration/fall/hypoglycemia


Onset Date:  Jul 14, 2022





Therapy Diagnosis


Therapy Diagnosis:  generalized weakness/debility





Precautions


Precautions/Isolations:  Fall Prevention, Standard Precautions





Referral


Physician:  Ronnie


Reason for Referral:  Evaluation/Treatment





Medical History


Pertinent Medical History:  Atrial Fib, DM, HTN, Hypothroidism, Renal 

Insufficiency


Additional Medical History


ovarian cancer


Current History


EMS secondary patient tripped over her own feet resulting in a fall.


Reviewed History:  Yes





Social History


Home:  Single Level


Current Living Status:  Alone


Entry Into Home:  Stairs With Railing


PT Steps Into Home:  4





Prior


Prior Level of Function


SCALE: Activities may be completed with or without assistive devices.





6-Indepedent-patient completes the activity by him/herself with no assistance 

from a helper.


5-Set-up or Clean-up Assistance-helper sets up or cleans up; patient completes 

activity. [place] assists only prior to or  


    following the activity.


4-Supervision or Touching Assistance-helper provides verbal cues and/or 

touching/steadying and/or contact guard assistance as patient completes 

activity. Assistance may be provided   


    throughout the activity or intermittently.


3-Partial/Moderate Assistance-helper does LESS THAN HALF the effort. [place] 

lifts, holds or supports trunk or limbs, but provides less than half the effort.


2-Substantial/Maximal Assistance-helper does MORE THAN HALF the effort. [place] 

lifts or holds trunk or limbs and provides more than half the effort.


4-Terszkerz-ofcjch does ALL the effort. Patient does none of the effort to 

complete the activity. Or, the assistance of 2 or more helpers is required for 

the patient to complete the  


    activity.


If activity was not attempted, code reason:


7-Patient Refused.


9-Not Applicable-not attempted and the patient did not perform the activity 

before the current illness, exacerbation or injury.


10-Not Attempted due to Environmental Limitations-(lack of equipment, weather 

restraints, etc.).


88-Not Attempted due to Medical Conditions or Safety Concerns.


Bed Mobility:  6


Transfers (B,C,W/C):  6


Gait:  6


Stairs:  6


Indoor Mobility (Ambulation):  Independent


Stairs:  Independent


Prior Devices Use:  Walker (4WW)





PT Evaluation-Current


Subjective


Patient reluctantly agrees to PT.





Objective


Patient Orientation:  Normal For Age


Attachments:  IV





ROM/Strength


ROM Lower Extremities


bilateral LE WFL


Strength Lower Extremities


3/5 grossly bilateral LE all planes





Integumentary/Posture


Integumentary


refer to nursing notes


Bowel Incontinence:  No


Bladder Incontinence:  No


Posture


WFL





Neuromuscular


(Tone, Coordination, Reflexes)


grossly intact





Sensory


Vision:  Wears Glasses


Hearing:  Functional





Transfers


Sit to Stand (QC):  4


Toilet Transfer (QC):  4





Gait


Does the Patient Walk?:  Yes


Mode of Locomotion:  Walk


Anticipated Mode of Locomotion:  Walk


Walk 10 feet (QC):  4


Walk 50 ft with 2 Turns(QC):  4


Walk 150 ft (QC):  4


Distance:  150'


Gait Assistive Device:  FWW


Comments/Gait Description


slow, steady with no deviation





Balance


Sitting Static:  Normal


Sitting Dynamic:  Normal


Standing Static:  Good


 Standing Dynamic:  Good





Assessment/Needs


Patient does fatigue with minimal activity and will benefit from skilled PT to 

address functional strength and mobility to ensure safe return to home at 

maximum LOF.


Rehab Potential:  Fair





PT Long Term Goals


Long Term Goals


PT Long Term Goals Time Frame:  Jul 23, 2022


Roll Left & Right (QC):  6


Sit to Lying (QC):  6


Lying-Sitting on Side/Bed(QC):  6


Sit to Stand (QC):  6


Chair/Bed-to-Chair Xfer(QC):  6


Toilet Transfer (QC):  6


Walk 10 feet (QC):  6


Walk 50ft with 2 Turns (QC):  6


Walk 150 ft (QC):  6





PT Plan


Problem List


Problem List:  Activity Tolerance, Functional Strength, Safety, Gait, Transfer





Treatment/Plan


Treatment Plan:  Continue Plan of Care


Treatment Plan:  Bed Mobility, Education, Functional Activity Cherise, Functional 

Strength, Gait, Safety, Therapeutic Exercise, Transfers


Treatment Duration:  Jul 23, 2022


Frequency:  6 times per week


Estimated Hrs Per Day:  .25 hour per day


Patient and/or Family Agrees t:  Yes





Time/GCodes


Time In:  911


Time Out:  922


Total Billed Treatment Time:  11


Total Billed Treatment


1 visit


EVMod 11 min











ERIC VALLE PT              Jul 15, 2022 09:48

## 2022-07-15 NOTE — DIAGNOSTIC IMAGING REPORT
INDICATION:  76-year-old female, fever. Recent trauma, fall with

back pain.



TECHNIQUE:  Single view chest 12:38 PM.



CORRELATION STUDY:  None



FINDINGS: 

The heart size, mediastinal configuration and pulmonary

vascularity are within normal limits.  

The lungs are clear with no consolidating infiltrate. There is no

significant effusion or pneumothorax. 



IMPRESSION: 

1. Negative appearing portable chest.



Dictated by: 



  Dictated on workstation # [id number]

## 2022-07-15 NOTE — PM&R POST ADMISSION ASSESSMENT
PM&R HP


Date of Visit:  Jul 15, 2022


Time of Visit:  11:45


History of Present Illness


CC: Debility 





HPI: This is a 76 yr old WF who was transferred to in-patient rehab for debility

and a fall. She was found to have Covid when high fever occurred with 

tachycardia. IV fluids initiated for lactic acid of 2.8 and potassium of 3.2. We

will provide supportive care and place in isolation for Covid-19 infection.





Past Medical-Social-Family Hx


Past Med/Social Hx:  Reviewed Nursing Past Med/Soc Hx, Reviewed and Corrections 

made


Patient Social History


Marrital Status:  single


Former Smoker, Quit:  Mar 30, 1985


Type Used:  Cigarettes


2nd Hand Smoke Exposure:  No


Recent Hopitalizations:  No





Immunizations Up To Date


Date of Pneumonia Vaccine:  Oct 8, 2020


Date of Influenza Vaccine:  Oct 7, 2020





Seasonal Allergies


Seasonal Allergies:  No





Past Medical History


Surgeries:  Abdominal, Bowel Surgery, Cardiac, Gallbladder, Hysterectomy, Nephr

ectomy, Oophorectomy, Tonsillectomy


Currently Using CPAP:  No


Cardiac:  Atrial Fibrillation, High Cholesterol, Hypertension


Reproductive:  Yes (OVARIAN CANCER)


Sexually Transmitted Disease:  No


HIV/AIDS:  No


Hysterectomy, Menopausal


Genitourinary:  Bladder Infection


solitary kidney


Gastrointestinal:  Colitis, Gastroesophageal Reflux, Liver Disease/Jaundice, 

C-Diff, Gall Bladder Disease


Musculoskeletal:  Arthritis, Rheumatoid Arthritis


Endocrine:  Hypothyroidsim, Diabetes, Non-Insulin dep


Cancer:  Ovarian


Did You Recieve Any Treatments:  Yes


What Type of Treatment Did You:  Surgical Intervention


History of Blood Disorders:  No


Adverse Reaction to Blood Ferrari:  No





Family History





Arthritis


  19 MOTHER


Cardiovascular disease


  19 MOTHER


Diabetes mellitus


  19 FATHER


  19 MOTHER


No Pertinent Family Hx





PAST MEDICAL /SURGICAL HISTORY:


-09/2020--HAD SEVERE C. DIFFICILE COLITIS--ADMITTED TO [facility]


09/22/20. PT HAD SYNCOPAL EPISODE WITH LOSS OF PULSE AND CPR/RESUSCITATION


FOR APPROXIMATELY 2 MINUTES WITH ROSC.


DURING THAT STAY SHE HAD ATRIAL FIBRILLATION WITH [facility] THAT REQUIRED


CARDIOVERSION


PT UNDERWENT SUBTOTAL COLECTOMY WITH PERMANENT ILEOSTOMY. 


PT REPORTS THAT SHE WAS ON VENTILATOR FOR 10 DAYS


PT WAS TRANSFERRED FROM [facility] TO [facility], AND WAS THERE FROM


10/10/20-10/30/20, THEN TRANSFERRED HERE FOR REHAB FROM 10/30/20-11/10/20.


SEEING WOUND CARE FOR DELAYED HEALING OF MIDLINE SURGICAL WOUND AND SKIN


BREAKDOWN AROUND STOMA.





ADDITIONAL PAST SURGICAL HISTORY:


-TONSILLECTOMY 1960


-APPENDECTOMY 1961


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


-OVARIAN CYSTECTOMY 1966


-OPEN TOTAL ABDOMINAL HYSTERECTOMY / BILATARAL SALPINGO-OOPHORECTOMY 1972


FOR OVARIAN CANCER


-COCCYX EXCISION 1983


-LAPAROSCOPIC CHOLECYSTECOMY 1997


-RIGHT NECK LIPOMA REMOVAL 01/2012 BY DR. CRAWLEY


-SEBACEOUS CYST OF ABDOMINAL WALL REMOVED 01/2012 BY DR. CRAWLEY


-SUBTOTAL COLECTOMY WITH PERMANENT ILEOSTOMY 09/2020 AT [facility] BY


DR. BEAVERS





Occupation:  retired





PM&R Allergy/Meds/Data Review


Allergies


Coded Allergies:  


     Penicillins (Verified  Allergy, Unknown, 4/22/21)


     raspberry (Verified  Allergy, Unknown, 4/22/21)





Home Medications


Scheduled


Amitriptyline HCl (Amitriptyline HCl), 100 MG PO HS, (Reported)


Atorvastatin Calcium (Atorvastatin Calcium), 40 MG PO 1700, (Reported)


Carvedilol (Carvedilol), 25 MG PO BID, (Reported)


Cetirizine HCl (Cetirizine HCl), 10 MG PO DAILY, (Reported)


Cholecalciferol (Vitamin D3) (Vitamin D3), 25 MCG PO DAILY, (Reported)


Docusate Sodium (Docusate Sodium), 100 MG PO BID, (Reported)


Famotidine (Famotidine), 20 MG PO DAILY, (Reported)


Fenofibrate (Fenofibrate), 160 MG PO HS, (Reported)


Ferrous Sulfate (Iron), 325 MG PO BID, (Reported)


Latanoprost (Xalatan), 1 DROP OU HS, (Reported)


Leflunomide (Leflunomide), 20 MG PO Q48H, (Reported)


Levothyroxine Sodium (Levothyroxine Sodium), 125 MCG PO DAILY, (Reported)


Nystatin (Nystatin), 5 ML PO QID, (Reported)


Oxybutynin Chloride (Oxybutynin Chloride), 5 MG PO BID, (Reported)


Saccharomyces Boulardii (Florastor), 250 MG PO DAILY, (Reported)


Timolol Maleate (Timolol Maleate 0.5%), 1 DROP OU HS, (Reported)





Scheduled PRN


Acetaminophen (Tylenol Arthritis), 650 MG PO Q6H PRN for PAIN-MILD (1-4) OR 

TEMPATURE, (Reported)





Discontinued Medications


Amlodipine Besylate (Amlodipine Besylate), 10 MG PO DAILY, (Reported)


   Discontinued Reason: No Longer Taking


Aspirin (Aspirin), 81 MG PO DAILY, (Reported)


   Discontinued Reason: No Longer Taking


Atorvastatin Calcium (Atorvastatin Calcium), 20 MG PO DAILY, (Reported)


   Discontinued Reason: Prescription changed


Carvedilol (Carvedilol), 12.5 MG PO BID, (Reported)


   Discontinued Reason: Prescription changed


Fish Oil/Dha/Epa (Fish Oil 1,200 mg Fish Oil), 2 EACH PO BID, (Reported)


   Discontinued Reason: No Longer Taking


Hydrocodone/Acetaminophen (Hydrocodone-Acetamin 5-325 mg), 1 TAB PO Q4H PRN for 

PAIN-MODERATE (5-7)


   Discontinued Reason: No Longer Taking


Levothyroxine Sodium (Levothyroxine Sodium), 112 MCG PO WHITE,TU,WE,THU,FR,SA, 

(Reported)


   Discontinued Reason: No Longer Taking


Pantoprazole Sodium (Pantoprazole Sodium), 40 MG PO DAILY, (Reported)


   Discontinued Reason: No Longer Taking


Pilocarpine (Salagen), 10 MG PO BID, (Reported)


   Discontinued Reason: No Longer Taking


Pioglitazone HCl (Pioglitazone HCl), 30 MG PO DAILY, (Reported)


   Discontinued Reason: No Longer Taking


Simethicone (Gas Relief 80), 80 MG PO UD PRN for GAS, (Reported)


   Discontinued Reason: No Longer Taking


Sodium Bicarbonate (Sodium Bicarbonate), 650 MG PO BID, (Reported)


   Discontinued Reason: No Longer Taking


Tramadol HCl (Tramadol HCl), 50 MG PO QID


   Discontinued Reason: No Longer Taking





Current Medications


Current Medications


Reviewed





Review of Systems


Constitutional:  fever, malaise





Physical Exam


Physical Exam


Vital Signs


Capillary Refill :


Height, Weight, BMI


Height: '"


Weight: lbs. oz. kg; 18.94 BMI


Method:Stated


General Appearance:  Chronically ill, Mild Distress, Thin


Neck:  Full Range of Motion, Normal Inspection, Non Tender, Supple, Carotid 

Bruit


Respiratory:  Chest Non Tender, Lungs Clear, Normal Breath Sounds, No Accessory 

Muscle Use, No Respiratory Distress


Cardiovascular:  Regular Rate, Rhythm, Tachycardia


Neurologic/Psychiatric:  Alert, Oriented x3, No Motor/Sensory Deficits, Normal 

Mood/Affect





PM&R Medical Assessment & Plan


REHAB/MEDICAL ASSESSMENT AND PLAN:





REHAB IMPAIRMENT GROUP: 


[ ]





ETIOLOGIC DIAGNOSIS: 


[ (condition that led to rehab admission) ]





The comorbidities that impact the patients function and/or functional outcome 

by: [ ]





REHAB PLAN:


The patient is being admitted to our comprehensive inpatient rehabilitation 

facility and can tolerate the intensity of service consisting of at least:


      180 minutes of therapy a day, 5 out of 7 days a week 


    


Rehab treatment will consist of:   [ (write brief focus that includes physician,

 rehab nursing and therapies/modalitiesIPOC will have more specifics) ]





The patient/family has a good understanding of our discharge process and will 

benefit from an interdisciplinary inpatient rehabilitation program. The patient 

has potential to make improvement and is in need of at least two of the 

following multidisciplinary therapies including but not limited to physical, 

occupational, speech, and prosthetics and orthotics.  Additionally the patient 

will need services from respiratory, nutritional services, wound care, 

psychology, etc. (Customize this to each patient). Given the patients complex 

condition and risk of further medical complications, rehabilitation services 

cannot be safely or effectively provided at a lower level of care such as a 

skilled nursing facility.





BARRIERS TO DISCHARGE:


[ ]





ESTIMATED LOS:


[ ]





DISPOSITION:


[ ]





RELEVANT CHANGES SINCE PREADMISSION SCREENING: 


I have compared the patients medical and functional status at the time of the 

preadmission screening and there are: 


      [no changes]


      [changes as follows: ____________________________________ (if there is a 

discrepancy between the IGC/Etiologic stated on the PAS, address/clarify this as

 well) ]  





PROGNOSIS:


[ ]





REHABILITATION GOALS:  


1. [ (specific to the patient) ]








All the above goals were reviewed with the patient and he/she is in agreement.


By signing this document, I acknowledge that I have personally performed a full 

physical examination on this patient within 24 hours of admission to this 

inpatient rehabilitation facility and have determined the patient to be able to 

tolerate the above course of treatment at an intensive level for a reasonable 

period of time. I will be completing a detailed individualized Plan of Care for 

this patient by day #4 of the patients stay based upon the Preadmission Screen,

the Post-Admission Evaluation, and the therapy evaluations.


Admission Dx/Comorbidities:  


(1) COVID


ICD Codes:  U07.1 - COVID-19











TAMMY HURST DO                Jul 15, 2022 10:54

## 2022-07-15 NOTE — OCCUPATIONAL THERAPY EVAL
OT Evaluation-General/PLF


Medical Diagnosis


Admission Date


Jul 15, 2022 at 11:35


Medical Diagnosis:  dehydration


Onset Date:  2022





Therapy Diagnosis


Therapy Diagnosis:  reduced adl status





Precautions


Precautions/Isolations:  Airborne Isolation, Contact Isolation, Fall Prevention





Referral


Physician:  Ronnie Morris Reason:  Evaluation/Treatment





Medical History


Pertinent Medical History:  Atrial Fib, DM, HTN, Hypothroidism, Renal 

Insufficiency


Current History


Pt presented to ER after falling over her own feet. She was recently discharged 

from [facility] (for an abscess and anterior ventral drain) and was only home for 

1 day. Per patient, she lives alone in a single story home. She reports being 

indep with adls and iadls. She was using a 4WW at baseline.


Reviewed History:  Yes





Social History


Home:  Single Level


Current Living Status:  Alone


Entry Into Home:  Stairs With Railing


 Steps Into Home:  4





ADL-Prior Level of Function


SCALE: Activities may be completed with or without assistive devices.





6-Indepedent-patient completes the activity by him/herself with no assistance 

from a helper.


5-Set-up or Clean-up Assistance-helper sets up or cleans up; patient completes 

activity. [place] assists only prior to or  


    following the activity.


4-Supervision or Touching Assistance-helper provides verbal cues and/or 

touching/steadying and/or contact guard assistance as patient completes 

activity. Assistance may be provided   


    throughout the activity or intermittently.


3-Partial/Moderate Assistance-helper does LESS THAN HALF the effort. [place] 

lifts, holds or supports trunk or limbs, but provides less than half the effort.


2-Substantial/Maximal Assistance-helper does MORE THAN HALF the effort. [place] 

lifts or holds trunk or limbs and provides more than half the effort.


5-Gieqphddh-wyhljy does ALL the effort. Patient does none of the effort to 

complete the activity. Or, the assistance of 2 or more helpers is required for 

the patient to complete the  


    activity.


If activity was not attempted, code reason:


7-Patient Refused.


9-Not Applicable-not attempted and the patient did not perform the activity 

before the current illness, exacerbation or injury.


10-Not Attempted due to Environmental Limitations-(lack of equipment, weather 

restraints, etc.).


88-Not Attempted due to Medical Conditions or Safety Concerns.


Self Care:  Independent


Functional Cognition:  Independent


DME/Equipment:  Bath Bench (built in seat ), Grab Bars, Tub/Shower


Drive Self:  Yes





OT Current Status


Subjective


Pt denies pain but does report not feeling well. Low grade fever and resting HR 

in 140's. RN notified.





Appearance


Pt left sitting in recliner, all needs within reach.





Mental Status/Objective


Patient Orientation:  Person, Confused, Place


Attachments:  IV





Current


Glasses/Contacts:  Yes


Hearing Aids:  No


Dentures/Partials:  No


Hand Dominance:  Right


Upper Extremity ROM


WNL


Upper Extremity Strength


3+/5 grossly





ADL-Treatment


Eating (QC):  4


Oral Hygiene (QC):  4 (per clinical judgment)


Shower/Bathe Self (QC):  3 (per clinical judgment)


Upper Body Dressing (QC):  10 (pt reports family will be bringing in clothing)


Lower Body Dressing (QC):  2 (max a to thread BLE's into brief. Min-mod a to 

stand. Balance worsens when removing unilateral UE support. Mod a to pull up 

over hips.)


On/Off Footwear (QC):  2 (max a due to impaired flexibility, poor endurance, 

strength, and increased SOB when bending.)


Toileting Hygiene (QC):  1 (koo catheter)


Pt sitting in recliner at OT arrival. She reports not feeling well. Vitals 

taken. Low grade fever, resting HR in the 140's, Oxygen: 99%, and BP: 113/70. RN

notified. HR increases into 150's after minimal activity. Significant time to 

return into 140's. Pt later tested for COVID and found to be positive. EKG 

ordered, results pending.





Education


OT Patient Education:  Correct positioning, Energy conservation, Purpose of 

tx/functional activities, Safety issues, Transfer techniques


Teaching Recipient:  Patient


Teaching Methods:  Demonstration, Discussion


Response to Teaching:  Verbalize Understanding, Return Demonstration, 

Reinforcement Needed





OT Short Term Goals


Short Term Goals


Time Frame:  2022


Eatin


Oral hygiene:  5


Toileting hygiene:  3


Shower/bathe self:  3


Upper body dressing:  3


Lower body dressing:  3


Putting on/taking off footwear:  3





OT Long Term Goals


Long Term Goals


Time Frame:  2022


Eating (QC):  6


Oral Hygiene (QC):  6


Toileting Hygiene (QC):  6


Shower/Bathe Self (QC):  5


Upper Body Dressing (QC):  5


Lower Body Dressing (QC):  5


On/Off Footwear (QC):  5


1=Demonstrate adherence to instructed precautions during ADL tasks.


2=Patient will verbalize/demonstrate understanding of assistive 

devices/modifications for ADL.


3=Patient will improve strength/tolerance for activity to enable patient to 

perform ADL's.





OT Education/Plan


Problem List/Assessment


Assessment:  Decreased Activ Tolerance, Decreased Safety Aware, Decreased UE 

Strength, Impaired Cognition, Impaired Funct Balance, Impaired I ADL's, Impaired

Self-Care Skills





Discharge Recommendations


Plan/Recommendations:  Continue POC


Therapy Discharge Recommendati:  Other, See Comments (continue to asses )





Treatment Plan/Plan of Care


Treatment,Training & Education:  Yes


Patient would benefit from OT for education, treatment and training to promote 

independence in ADL's, mobility, safety and/or upper extremity function for 

ADL's.


Plan of Care:  ADL Retraining, Cognitive Retraining, Concurrent Therapy, 

Functional Mobility, Group Exercise/Act as Ind, UE Funct Exercise/Act


Treatment Duration:  2022


Frequency:  Modified Program (IRF) (15/7)


Estimated Hrs Per Day:  1.5 hours per day (75-90 min/day )


Agreement:  Yes


Rehab Potential:  Fair





Time/GCodes


Start Time:  11:50


Stop Time:  12:15


Total Time Billed (hr/min):  25


Billed Treatment Time


1st visit


EVM (10 min)


ADL (15 min)











Mackenzie De Oliveira OT                Jul 15, 2022 12:54

## 2022-07-15 NOTE — SHORT STAY SUMMARY
History of Present Illness


History of Present Illness


Reason for visit/HPI


CC: Debility with fall





HPI: This is a 76 yr old female. She just discharged from [facility] 

after a long course after an ileostomy revision. She presented to the ER with 

complaints of fallen on the floor and laid there for 18 hours. Home health nurse

is the one that found her. CPK was only slightly elevated. She is doing well 

now. IV fluids. She will go down to in-patient rehab.


Date of Admission


Jul 14, 2022 at 15:45


Date of Discharge


7/15/22


Time Seen by Provider:  09:00


Attending Physician


Lorenzo Leyva


Admitting Physician


Admitting Physician:


Carolynn Trujillo DO 








Attending Physician:


Carolynn Trujillo DO


Consult








Allergies and Home Medications


Allergies


Coded Allergies:  


     Penicillins (Verified  Allergy, Unknown, 4/22/21)


     raspberry (Verified  Allergy, Unknown, 4/22/21)





Patient Home Medication List


Home Medication List Reviewed:  Yes


Acetaminophen (Tylenol Arthritis) 650 Mg Tablet.er, 650 MG PO Q6H PRN for PAIN-

MILD (1-4) OR TEMPATURE, (Reported)


   Entered as Reported by: GAGE LAU on 7/15/22 1145


Amitriptyline HCl (Amitriptyline HCl) 100 Mg Tablet, 100 MG PO HS, (Reported)


   Entered as Reported by: GAGE LAU on 11/3/20 1433


Atorvastatin Calcium (Atorvastatin Calcium) 40 Mg Tablet, 40 MG PO 1700, 

(Reported)


   Entered as Reported by: GAGE LAU on 7/15/22 1145


Carvedilol (Carvedilol) 25 Mg Tablet, 25 MG PO BID, (Reported)


   Entered as Reported by: GAGE LAU on 7/15/22 1145


Cetirizine HCl (Cetirizine HCl) 10 Mg Tablet, 10 MG PO DAILY, (Reported)


   Entered as Reported by: GAGE LAU on 10/30/20 1109


Cholecalciferol (Vitamin D3) (Vitamin D3) 25 Mcg (1000 Unit) Capsule, 25 MCG PO 

DAILY, (Reported)


   Entered as Reported by: GAGE LAU on 7/15/22 1145


Docusate Sodium (Docusate Sodium) 100 Mg Tablet, 100 MG PO BID, (Reported)


   Entered as Reported by: GAGE LAU on 7/15/22 1145


Famotidine (Famotidine) 20 Mg Tablet, 20 MG PO DAILY, (Reported)


   Entered as Reported by: GAGE LAU on 7/15/22 1145


Fenofibrate (Fenofibrate) 160 Mg Tablet, 160 MG PO HS, (Reported)


   Entered as Reported by: GAGE LAU on 11/3/20 1433


Ferrous Sulfate (Iron) 325 Mg Tablet, 325 MG PO BID, (Reported)


   Entered as Reported by: GAGE LAU on 11/4/20 1256


Latanoprost (Xalatan) 0.005 % Drops, 1 DROP OU HS, (Reported)


   Entered as Reported by: GAGE LAU on 7/15/22 1145


Leflunomide (Leflunomide) 20 Mg Tablet, 20 MG PO Q48H, (Reported)


   Entered as Reported by: GAGE LAU on 11/3/20 1434


Levothyroxine Sodium (Levothyroxine Sodium) 125 Mcg Tablet, 125 MCG PO DAILY, 

(Reported)


   Entered as Reported by: GAGE LAU on 7/15/22 1145


Nystatin (Nystatin) 100,000 Unit/Ml Oral.susp, 5 ML PO QID, (Reported)


   Entered as Reported by: GAGE LAU on 7/15/22 1145


Oxybutynin Chloride (Oxybutynin Chloride) 5 Mg Tablet, 5 MG PO BID, (Reported)


   Entered as Reported by: GAGE LAU on 7/15/22 1145


Saccharomyces Boulardii (Florastor) 250 Mg Capsule, 250 MG PO DAILY, (Reported)


   Entered as Reported by: GAGE LAU on 7/15/22 1145


Timolol Maleate (Timolol Maleate 0.5%) 0.5 % Drops, 1 DROP OU HS, (Reported)


   Entered as Reported by: GAGE LAU on 7/15/22 1145


Discontinued Medications


Amlodipine Besylate (Amlodipine Besylate) 10 Mg Tablet, 10 MG PO DAILY, 

(Reported)


   Discontinued Reason: No Longer Taking


   Entered as Reported by: GAGE LAU on 10/30/20 1109


Aspirin (Aspirin) 81 Mg Tab.chew, 81 MG PO DAILY, (Reported)


   Discontinued Reason: No Longer Taking


   Entered as Reported by: GAGE LAU on 10/30/20 1110


Atorvastatin Calcium (Atorvastatin Calcium) 20 Mg Tablet, 20 MG PO DAILY, 

(Reported)


   Discontinued Reason: Prescription changed


   Entered as Reported by: GAGE LAU on 11/3/20 1433


Carvedilol (Carvedilol) 12.5 Mg Tablet, 12.5 MG PO BID, (Reported)


   Discontinued Reason: Prescription changed


   Entered as Reported by: JULIE A IBEH on 6/7/21 1503


Fish Oil/Dha/Epa (Fish Oil 1,200 mg Fish Oil) 1 Each Capsule, 2 EACH PO BID, 

(Reported)


   Discontinued Reason: No Longer Taking


   Entered as Reported by: GAGE LAU on 11/3/20 1448


Hydrocodone/Acetaminophen (Hydrocodone-Acetamin 5-325 mg) 1 Each Tablet, 1 TAB 

PO Q4H PRN for PAIN-MODERATE (5-7)


   Discontinued Reason: No Longer Taking


   Prescribed by: JANIYA TATE on 8/2/21 1915


Levothyroxine Sodium (Levothyroxine Sodium) 112 Mcg Tablet, 112 MCG PO 

WHITE,TU,WE,THU,FR,SA, (Reported)


   Discontinued Reason: No Longer Taking


   Entered as Reported by: GAGE LAU on 11/3/20 1433


Pantoprazole Sodium (Pantoprazole Sodium) 40 Mg Tablet.dr, 40 MG PO DAILY, 

(Reported)


   Discontinued Reason: No Longer Taking


   Entered as Reported by: GAGE LAU on 11/3/20 1433


Pilocarpine (Salagen) 5 Mg Tab, 10 MG PO BID, (Reported)


   Discontinued Reason: No Longer Taking


   Entered as Reported by: GAGE LAU on 11/3/20 1433


Pioglitazone HCl (Pioglitazone HCl) 30 Mg Tablet, 30 MG PO DAILY, (Reported)


   Discontinued Reason: No Longer Taking


   Entered as Reported by: GAGE LAU on 11/3/20 1433


Simethicone (Gas Relief 80) 80 Mg Tab.chew, 80 MG PO UD PRN for GAS, (Reported)


   Discontinued Reason: No Longer Taking


   Entered as Reported by: GAGE LAU on 11/3/20 1433


Sodium Bicarbonate (Sodium Bicarbonate) 650 Mg Tablet, 650 MG PO BID, (Reported)


   Discontinued Reason: No Longer Taking


   Entered as Reported by: JUDY CRESPO on 6/1/21 1307


Tramadol HCl (Tramadol HCl) 50 Mg Tablet, 50 MG PO QID


   Discontinued Reason: No Longer Taking


   Prescribed by: LAURENCE MORRELL on 7/30/21 4909





Past Medical-Social-Family Hx


Patient Social History


Marrital Status:  single


Employed/Student:  retired


Smoking Status:  Former Smoker


Former Smoker, Quit:  Mar 30, 1985


2nd Hand Smoke Exposure:  No


Recent Hopitalizations:  No


Have you traveled recently?:  No


Alcohol Use?:  No


Pt feels they are or have been:  No


Tobacco type used:  Cigarettes





Immunizations Up To Date


Date of Pneumonia Vaccine:  Oct 8, 2020


Date of Influenza Vaccine:  Oct 7, 2020





Seasonal Allergies


Seasonal Allergies:  No





Surgeries


Yes (Abdominal, Bowel Surgery, Cardiac, Gallbladder, Hysterectomy, Nephrectomy, 

)


Abdominal, Bowel Surgery, Cardiac, Gallbladder, Hysterectomy, Nephrectomy, 

Oophorectomy, Tonsillectomy





Respiratory


No


Currently Using CPAP:  No





Cardiovascular


Yes (AFIB/RVR/CARDIOVERSION 09/2020; )


Atrial Fibrillation, High Cholesterol, Hypertension





Neurological


No





Reproductive System


Hx Reproductive Disorders:  Yes (OVARIAN CANCER)


Sexually Transmitted Disease:  No


HIV/AIDS:  No


GYN History:  Hysterectomy, Menopausal





Genitourinary


Yes (HAS ONLY 1 KIDNEY (WHEN 16 YRS OLD HAD REMOVED CAUSE BLOOD VESSELS WRAPPED 

)


Bladder Infection





Gastrointestinal


Yes (PERMANENT ILEOSTOMY/COMPLETE COLECTOMY 09/2020;MOSER)


Colitis, Gastroesophageal Reflux, Liver Disease/Jaundice, C-Diff, Gall Bladder 

Disease





Musculoskeletal


Yes (RA/OSTEOARTHRITIS)


Arthritis, Rheumatoid Arthritis





Endocrine


History of Endocrine Disorders:  Yes


Endocrine Disorders:  Hypothyroidsim, Diabetes, Non-Insulin dep





HEENT


History of HEENT Disorders:  No





Cancer


Yes


Ovarian


Did You Recieve Any Treatments:  Yes


Type of Treatment:  Surgical Intervention





Psychosocial


History of Psychiatric Problem:  No





Integumentary


History of Skin or Integumenta:  Yes (DELAYED HEALING OF SURGICAL WOUND; 

PERISTOMAL SKIN BREAKDOWN)





Blood Transfusions


History of Blood Disorders:  No


Adverse Reaction to a Blood Tr:  No





Reviewed Nursing Assessment


Reviewed/Agree w Nursing PMH:  Yes





Family Medical History


Significant Family History:  No Pertinent Family Hx


Other Significan Family Hx:  


PAST MEDICAL /SURGICAL HISTORY:


-09/2020--HAD SEVERE C. DIFFICILE COLITIS--ADMITTED TO [facility]


09/22/20. PT HAD SYNCOPAL EPISODE WITH LOSS OF PULSE AND CPR/RESUSCITATION


FOR APPROXIMATELY 2 MINUTES WITH ROSC.


DURING THAT STAY SHE HAD ATRIAL FIBRILLATION WITH RVR THAT REQUIRED


CARDIOVERSION


PT UNDERWENT SUBTOTAL COLECTOMY WITH PERMANENT ILEOSTOMY. 


PT REPORTS THAT SHE WAS ON VENTILATOR FOR 10 DAYS


PT WAS TRANSFERRED FROM [facility] TO [facility], AND WAS THERE FROM


10/10/20-10/30/20, THEN TRANSFERRED HERE FOR REHAB FROM 10/30/20-11/10/20.


SEEING WOUND CARE FOR DELAYED HEALING OF MIDLINE SURGICAL WOUND AND SKIN


BREAKDOWN AROUND STOMA.





ADDITIONAL PAST SURGICAL HISTORY:


-TONSILLECTOMY 1960


-APPENDECTOMY 1961


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


-OVARIAN CYSTECTOMY 1966


-OPEN TOTAL ABDOMINAL HYSTERECTOMY / BILATARAL SALPINGO-OOPHORECTOMY 1972


FOR OVARIAN CANCER


-COCCYX EXCISION 1983


-LAPAROSCOPIC CHOLECYSTECOMY 1997


-RIGHT NECK LIPOMA REMOVAL 01/2012 BY DR. CRAWLEY


-SEBACEOUS CYST OF ABDOMINAL WALL REMOVED 01/2012 BY DR. CRAWLEY


-SUBTOTAL COLECTOMY WITH PERMANENT ILEOSTOMY 09/2020 AT [facility] BY


DR. BEAVERS


Family Hx:  


Arthritis


  19 MOTHER


Cardiovascular disease


  19 MOTHER


Diabetes mellitus


  19 FATHER


  19 MOTHER





Review of Systems


Constitutional:  see HPI, malaise, weakness


EENTM:  no symptoms reported


Respiratory:  no symptoms reported


Cardiovascular:  no symptoms reported


Gastrointestinal:  no symptoms reported


Genitourinary:  no symptoms reported


Musculoskeletal:  no symptoms reported


Skin:  no symptoms reported


Psychiatric/Neurological:  Weakness





All Other Systems Reviewed


Negative Unless Noted:  Yes





Physical Exam


Vital Signs





Vital Signs - First Documented








 7/14/22





 12:32


 


Temp 37.3


 


Pulse 119


 


Resp 22


 


B/P (MAP) 150/91 (110)


 


Pulse Ox 100


 


O2 Delivery Room Air





Capillary Refill : Less Than 3 Seconds


Height, Weight, BMI


Height: '"


Weight: lbs. oz. kg; 18.94 BMI


Method:Stated


General Appearance:  No Apparent Distress, Chronically ill, Thin


Neck:  Normal Inspection, Non Tender


Respiratory:  Chest Non Tender, Lungs Clear, Normal Breath Sounds, No Accessory 

Muscle Use, No Respiratory Distress


Cardiovascular:  Regular Rate, Rhythm, No Edema, No Gallop, No JVD, No Murmur, 

Normal Peripheral Pulses


Neurologic/Psychiatric:  Alert, Oriented x3, No Motor/Sensory Deficits, Normal 

Mood/Affect





Short Stay Diagnosis


Discharge Diagnosis-Short Stay


Admission Diagnosis:  


Fall


Found down for 18 hours


Rhabdomyolysis


Recent long course at [place]


Final Discharge Diagnosis:  


Fall


Found down for 18 hours


Rhabdomyolysis


Recent long course at [place]





Conclusion


Labs


Laboratory Tests


7/14/22 13:05: 


White Blood Count 5.7, Red Blood Count 4.15, Hemoglobin 11.9, Hematocrit 38, 

Mean Corpuscular Volume 91, Mean Corpuscular Hemoglobin 29, Mean Corpuscular 

Hemoglobin Concent 32, Red Cell Distribution Width 18.7H, Platelet Count 283, 

Mean Platelet Volume 10.1, Immature Granulocyte % (Auto) 0, Neutrophils (%) 

(Auto) 68, Lymphocytes (%) (Auto) 19, Monocytes (%) (Auto) 8, Eosinophils (%) 

(Auto) 3, Basophils (%) (Auto) 2, Neutrophils # (Auto) 3.9, Lymphocytes # (Auto)

1.1, Monocytes # (Auto) 0.4, Eosinophils # (Auto) 0.2, Basophils # (Auto) 0.1, 

Immature Granulocyte # (Auto) 0.0, Sodium Level 140, Potassium Level 3.6, 

Chloride Level 105, Carbon Dioxide Level 14L, Anion Gap 21H, Blood Urea Nitrogen

8, Creatinine 0.93, Estimat Glomerular Filtration Rate 64, BUN/Creatinine Ratio 

9, Glucose Level 58*L, Calcium Level 8.5, Corrected Calcium 9.1, Total Bilirubin

0.5, Aspartate Amino Transf (AST/SGOT) 215H, Alanine Aminotransferase (ALT/SGPT)

65H, Alkaline Phosphatase 61, Total Creatine Kinase 186H, C-Reactive Protein 

High Sensitivity 3.02H, Total Protein 7.4, Albumin 3.2


7/14/22 14:48: Glucometer 98


7/14/22 17:46: Glucometer 65L


7/14/22 18:47: Glucometer 94


7/14/22 19:40: Glucometer 88


7/14/22 20:21: Glucometer 105


7/14/22 21:10: Glucometer 122H


7/14/22 22:33: Glucometer 127H


7/14/22 23:16: Glucometer 140H


7/15/22 00:17: Glucometer 123H


7/15/22 02:02: Glucometer 103


7/15/22 03:11: Glucometer 97


7/15/22 04:09: Glucometer 149H


7/15/22 05:38: Glucometer 70


7/15/22 06:28: Glucometer 74


7/15/22 08:49: 


White Blood Count 6.3, Red Blood Count 4.29, Hemoglobin 12.3, Hematocrit 41, 

Mean Corpuscular Volume 95, Mean Corpuscular Hemoglobin 29, Mean Corpuscular 

Hemoglobin Concent 30L, Red Cell Distribution Width 19.3H, Platelet Count 284, 

Mean Platelet Volume 10.7, Immature Granulocyte % (Auto) 0, Neutrophils (%) 

(Auto) 77H, Lymphocytes (%) (Auto) 16, Monocytes (%) (Auto) 5, Eosinophils (%) 

(Auto) 1, Basophils (%) (Auto) 1, Neutrophils # (Auto) 4.8, Lymphocytes # (Auto)

1.0, Monocytes # (Auto) 0.3, Eosinophils # (Auto) 0.1, Basophils # (Auto) 0.1, 

Immature Granulocyte # (Auto) 0.0, Sodium Level 140, Potassium Level 4.5, 

Chloride Level 109H, Carbon Dioxide Level 12L, Anion Gap 19H, Blood Urea 

Nitrogen 5L, Creatinine 0.83, Estimat Glomerular Filtration Rate 73, 

BUN/Creatinine Ratio 6, Glucose Level 123H, Calcium Level 7.6L, Corrected 

Calcium 8.5, Total Bilirubin 0.4, Aspartate Amino Transf (AST/SGOT) 126H, 

Alanine Aminotransferase (ALT/SGPT) 53, Alkaline Phosphatase 58, Total Creatine 

Kinase 239H, Total Protein 7.1, Albumin 2.9L, Smear Scan YES


Conclusion/Plan


Tx to CAROLYNN ANGELES DO                Jul 15, 2022 10:10

## 2022-07-15 NOTE — ST COGNITIVE LINGUISTIC EVAL
Speech Evaluation-General


Medical Diagnosis


Dehydration


Onset Date:  Jul 14, 2022





Therapy Diagnosis


Therapy Diagnosis:  Impaired Neurocognitive Function





Precautions


Precautions:  Fall


Precautions/Isolations:  Fall Prevention, Standard Precautions





Referral


Referring Physician:  Dr. Trujillo


Reason for Referral:  Evaluation/Treatment





Medical History


Pertinent Medical History:  Atrial Fib, DM, HTN, Hypothroidism, Renal 

Insufficiency


Current History


The patient is a 76 year-old female with a past medical history of atrial 

fibrillation, DM, hypothyroidism, and renal insufficiency, who presented to 

[facility] after "falling over her own feet." The patient 

does have an extensive bowel medical history.


Reviewed History:  Yes





Social History


Current Living Status:  Alone





Speech PLF-Current Status


Prior Level of Function





The patient reported increased confusion, intermittently. The patient


denied challenges with her speech, language, or swallowing.





Subjective


The patient was seated upright in her recliner, awake and alert upon entrance to

her room by the clinician. The patient greeted the clinician appropriately and 

was agreeable to participation in the cognitive linguistic assessment.





Language Eval: Auditory


Comprehends Simple Yes/No Ques:  Functional


Indent/Objects Multiple Fields:  Functional


Ident/Pics in Multiple Fields:  Functional


Follows 1-Step Commands:  Functional


Follows General Conversations:  Functional





Language Eval: Verbal Language


Completes Spontaneous Greeting:  Functional


Produces Auto, Serial Info:  Functional


Imitates Simple Words/Phrases:  Functional


Word Finding:  Mild


Requests Basic Needs:  Functional


States Basic Personal Info:  Functional





Cognitive


Patient Orientation


The patient was independently oriented to self, location, month, day of the 

week, date, and year.





Objective Cognitive Domain


Attention:  Mild


Memory:  Mild


Problem Solving:  Mild


Composite Severity Rating:  Mild





Objective


Formal/Standardized Tests


[employer] Mental Status Exam (UMS)


Results


The patient initiated the UMS, however, due to time constraints the assessment

was unable to be completed. At this time, the patient has displayed an error 

with simple subtraction, an error with generative naming, and an error with digi

t repetition in reverse.


Oral Motor/Speech Production


The patient does not display dysarthria or apraxia of speech at this time. The 

patient is 100% intelligible in known and unknown contexts. The patient does 

display a weak, breathy vocal quality with reduced intensity levels.


Impression


Due to the patient's self report and results of the partially completed 

evaluation, the clinician suspects mild neurocognitive impairments.





Speech Patient Assess


Expression of Ideas/Wants:  Exhibits (3)


Understanding Verbal Content:  Usually Understands (3)


Brief Interview-Mental Status:  Yes


Repetition of Three Words:  Three (3)


Temporal Orientation: Year:  Correct (3)


Temporal Orientation: Month:  Accurate within 5 days(2)


Temporal Orientation: Day:  Correct (1)


Recall : Wear to say "Sock":  Yes, no cue required (2)


Recall : Color:  Yes, after cueing (1)


Recall : Bed:  Yes,after cueing (1)


Memory/Recall Ability:  Current season, That he or she is in a hsp/hsp unit





Speech Short Term Goals


Short Term Goals


Short Term Goals


1. The patient will demonstrate 80% accuracy with memory exercises, 

independently.


Time Frame-STG:  Five Days.





Speech Long Term Goals


Long Term Goals


1. The patient will demonstrate improved cognitive linguistic skills for safe 

discharge to the least restrictive environment.


Time Frame:  Two Weeks.





Speech-Plan


Treatment Plan


Speech Therapy Treatment Plan:  Continue Plan of Care


Treatment Duration:  Jul 15, 2022


Frequency:  Modified Program (IRF) (Four to five times per day.)


Estimated Hrs Per Day:  .5 hour per day


Rehab Potential:  Fair





Safety Risks/Education


Teaching Recipient:  Patient


Teaching Methods:  Discussion


Response to Teaching:  Verbalize Understanding


Education Topics Provided:  


Results, Plan of Care, Skilled Speech Pathology





Time


Speech Therapy Time In:  12:15


Speech Therapy Time Out:  12:45


Total Billed Time:  30


Billed Treatment Time


1, CELIO SIMS [address]               Jul 15, 2022 13:35

## 2022-07-15 NOTE — DISCHARGE SUMMARY
Diagnosis/Chief Complaint


Date of Admission


Jul 15, 2022 at 11:35


Date of Discharge





Discharge Diagnosis


COVID requiring transfer to ICU





Discharge Summary


Discharge Physical Examination


Allergies:  


Coded Allergies:  


     Penicillins (Verified  Allergy, Unknown, 4/22/21)


     raspberry (Verified  Allergy, Unknown, 4/22/21)


Vitals & I&Os





Vital Signs








  Date Time  Temp Pulse Resp B/P (MAP) Pulse Ox O2 Delivery O2 Flow Rate FiO2


 


7/15/22 18:23 38.9 132 22 123/82 99 Room Air  











Hospital Course


Was the Problem List Reviewed?:  Yes


2 hours course after admitted and noted to have new fever and I ordered septic 

w/u and COVID swab was positive with tachy 142 so moved to ICU


Labs (last 24 hrs)


Laboratory Tests


7/15/22 12:54: 


Influenza Type A (RT-PCR) Not Detected, Influenza Type B (RT-PCR) Not Detected, 

SARS-CoV-2 RNA (RT-PCR) DetectedH


7/15/22 13:12: 


White Blood Count 4.6, Red Blood Count 3.06L, Hemoglobin 8.9#L, Hematocrit 29L, 

Mean Corpuscular Volume 94, Mean Corpuscular Hemoglobin 29, Mean Corpuscular 

Hemoglobin Concent 31L, Red Cell Distribution Width 19.1H, Platelet Count 163, 

Mean Platelet Volume 10.2, Immature Granulocyte % (Auto) 0, Neutrophils (%) 

(Auto) 77H, Lymphocytes (%) (Auto) 14, Monocytes (%) (Auto) 7, Eosinophils (%) 

(Auto) 0, Basophils (%) (Auto) 1, Neutrophils # (Auto) 3.6, Lymphocytes # (Auto)

0.7L, Monocytes # (Auto) 0.3, Eosinophils # (Auto) 0.0, Basophils # (Auto) 0.0, 

Immature Granulocyte # (Auto) 0.0, Lactic Acid Level 2.81*H


7/15/22 13:24: 


Sodium Level 139, Potassium Level 3.2L, Chloride Level 111H, Carbon Dioxide 

Level 14L, Anion Gap 14, Blood Urea Nitrogen 6L, Creatinine 0.86, Estimat 

Glomerular Filtration Rate 70, BUN/Creatinine Ratio 7, Glucose Level 153H, 

Calcium Level 6.7L, Corrected Calcium 8.0L, Total Bilirubin 0.4, Aspartate Amino

Transf (AST/SGOT) 93H, Alanine Aminotransferase (ALT/SGPT) 40, Alkaline Sung

sphatase 44, Total Protein 5.4L, Albumin 2.4L, Procalcitonin 0.24H


7/15/22 13:30: 


Urine Color YELLOW, Urine Clarity CLEAR, Urine pH 6.0, Urine Specific Gravity 

1.010L, Urine Protein NEGATIVE, Urine Glucose (UA) NEGATIVE, Urine Ketones 

NEGATIVE, Urine Nitrite NEGATIVE, Urine Bilirubin NEGATIVE, Urine Urobilinogen 

0.2, Urine Leukocyte Esterase NEGATIVE, Urine RBC (Auto) NEGATIVE, Urine RBC 

NONE, Urine WBC RARE, Urine Crystals NONE, Urine Bacteria NEGATIVE, Urine Casts 

PRESENT, Urine Hyaline Casts 0-2H, Urine Granular Casts 0-2H, Urine Mucus 

NEGATIVE, Urine Culture Indicated NO


7/15/22 15:25: Lactic Acid Level 3.48*H


7/15/22 15:40: Glucometer 220H





Pending Labs


Laboratory Tests


7/15/22 12:54: 


Influenza Type A (RT-PCR) Not Detected, Influenza Type B (RT-PCR) Not Detected, 

SARS-CoV-2 RNA (RT-PCR) Detected


7/15/22 13:12: 


White Blood Count 4.6, Red Blood Count 3.06, Hemoglobin 8.9, Hematocrit 29, Mean

Corpuscular Volume 94, Mean Corpuscular Hemoglobin 29, Mean Corpuscular 

Hemoglobin Concent 31, Red Cell Distribution Width 19.1, Platelet Count 163, 

Mean Platelet Volume 10.2, Immature Granulocyte % (Auto) 0, Neutrophils (%) 

(Auto) 77, Lymphocytes (%) (Auto) 14, Monocytes (%) (Auto) 7, Eosinophils (%) 

(Auto) 0, Basophils (%) (Auto) 1, Neutrophils # (Auto) 3.6, Lymphocytes # (Auto)

0.7, Monocytes # (Auto) 0.3, Eosinophils # (Auto) 0.0, Basophils # (Auto) 0.0, 

Immature Granulocyte # (Auto) 0.0, Lactic Acid Level 2.81


7/15/22 13:24: 


Sodium Level 139, Potassium Level 3.2, Chloride Level 111, Carbon Dioxide Level 

14, Anion Gap 14, Blood Urea Nitrogen 6, Creatinine 0.86, Estimat Glomerular 

Filtration Rate 70, BUN/Creatinine Ratio 7, Glucose Level 153, Calcium Level 

6.7, Corrected Calcium 8.0, Total Bilirubin 0.4, Aspartate Amino Transf 

(AST/SGOT) 93, Alanine Aminotransferase (ALT/SGPT) 40, Alkaline Phosphatase 44, 

Total Protein 5.4, Albumin 2.4, Procalcitonin 0.24


7/15/22 13:30: 


Urine Color YELLOW, Urine Clarity CLEAR, Urine pH 6.0, Urine Specific Gravity 

1.010, Urine Protein NEGATIVE, Urine Glucose (UA) NEGATIVE, Urine Ketones 

NEGATIVE, Urine Nitrite NEGATIVE, Urine Bilirubin NEGATIVE, Urine Urobilinogen 

0.2, Urine Leukocyte Esterase NEGATIVE, Urine RBC (Auto) NEGATIVE, Urine RBC 

NONE, Urine WBC RARE, Urine Crystals NONE, Urine Bacteria NEGATIVE, Urine Casts 

PRESENT, Urine Hyaline Casts 0-2, Urine Granular Casts 0-2, Urine Mucus 

NEGATIVE, Urine Culture Indicated NO


7/15/22 15:25: Lactic Acid Level 3.48


7/15/22 15:40: Glucometer 220








Discharge


Home Medications:





Active Scripts


Active


Reported


Tylenol Arthritis (Acetaminophen) 650 Mg Tablet.er 650 Mg PO Q6H PRN


Vitamin D3 (Cholecalciferol (Vitamin D3)) 25 Mcg (1000 Unit) Capsule 25 Mcg PO 

DAILY


Timolol Maleate 0.5% (Timolol Maleate) 0.5 % Drops 1 Drop OU HS


Florastor (Saccharomyces Boulardii) 250 Mg Capsule 250 Mg PO DAILY


Oxybutynin Chloride 5 Mg Tablet 5 Mg PO BID


Nystatin 100,000 Unit/Ml Oral.susp 5 Ml PO QID


     SWISH AND SWALLOW


Levothyroxine Sodium 125 Mcg Tablet 125 Mcg PO DAILY


Xalatan (Latanoprost) 0.005 % Drops 1 Drop OU HS


Famotidine 20 Mg Tablet 20 Mg PO DAILY


Docusate Sodium 100 Mg Tablet 100 Mg PO BID


     HOLD FOR DIARRHEA


Carvedilol 25 Mg Tablet 25 Mg PO BID


     HOLD FOR SBP LESS THAN 100


Atorvastatin Calcium 40 Mg Tablet 40 Mg PO 1700


Iron (Ferrous Sulfate) 325 Mg Tablet 325 Mg PO BID


Leflunomide 20 Mg Tablet 20 Mg PO Q48H


Amitriptyline HCl 100 Mg Tablet 100 Mg PO HS


Fenofibrate 160 Mg Tablet 160 Mg PO HS


Cetirizine HCl 10 Mg Tablet 10 Mg PO DAILY





Instructions to patient/family


Please see electronic discharge instructions given to patient.











TAMMY HURST DO                Jul 15, 2022 16:41

## 2022-07-15 NOTE — PHYSICAL THERAPY EVALUATION
PT Evaluation-General


Medical Diagnosis


Admission Date


Jul 15, 2022 at 11:35


Medical Diagnosis:  dehydration/fall/hypoglycemia


Onset Date:  2022





Therapy Diagnosis


Therapy Diagnosis:  impaired mobility, strength, endurance





Referral


Physician:  Carolynn Trujillo DO


Reason for Referral:  Evaluation/Treatment





Medical History


Pertinent Medical History:  Atrial Fib, DM, HTN, Hypothroidism, Renal 

Insufficiency


Reviewed History:  Yes





Social History


Home:  Single Level


Current Living Status:  Alone


Entry Into Home:  Stairs With Railing


PT Steps Into Home:  4





Prior


Prior Level of Function


SCALE: Activities may be completed with or without assistive devices.





6-Indepedent-patient completes the activity by him/herself with no assistance 

from a helper.


5-Set-up or Clean-up Assistance-helper sets up or cleans up; patient completes 

activity. Henley assists only prior to or  


    following the activity.


4-Supervision or Touching Assistance-helper provides verbal cues and/or 

touching/steadying and/or contact guard assistance as patient completes 

activity. Assistance may be provided   


    throughout the activity or intermittently.


3-Partial/Moderate Assistance-helper does LESS THAN HALF the effort. Henley 

lifts, holds or supports trunk or limbs, but provides less than half the effort.


2-Substantial/Maximal Assistance-helper does MORE THAN HALF the effort. Henley 

lifts or holds trunk or limbs and provides more than half the effort.


3-Juzlabvao-hlbokv does ALL the effort. Patient does none of the effort to 

complete the activity. Or, the assistance of 2 or more helpers is required for 

the patient to complete the  


    activity.


If activity was not attempted, code reason:


7-Patient Refused.


9-Not Applicable-not attempted and the patient did not perform the activity 

before the current illness, exacerbation or injury.


10-Not Attempted due to Environmental Limitations-(lack of equipment, weather 

restraints, etc.).


88-Not Attempted due to Medical Conditions or Safety Concerns.


Bed Mobility:  6


Transfers (B,C,W/C):  6


Gait:  6


Stairs:  6


Indoor Mobility (Ambulation):  Independent


Stairs:  Independent


Prior Devices Use:  Walker





PT Evaluation-Current


Subjective


Patient in bed pre tx, agrees to PT, has no complaints of pain.  Patient states 

she needs to use the restroom, has incontinent BM along the way, has to change 

her brief after sitting on the toilet and clean up the floor (therapist does 

this).





Pt/Family Goals


to be independent at home





Objective


Patient Orientation:  Person, Place, Situation


Attachments:  IV





ROM/Strength


ROM Lower Extremities


WNL


Strength Lower Extremities


grossly 3+/5 BLE





Sensory


Hearing:  Functional


Sensation Right Lower Extremit:  Intact


Sensation Left Lower Extremity:  Intact





Transfers


Roll Left & Right (QC):  6


Sit to Lying (QC):  6


Lying to Sitting/Side of Bed(Q:  6


Sit to Stand (QC):  3


Chair/Bed-to-Chair Xfer(QC):  4


Toilet Transfer (QC):  4


Car Transfer (QC):  4


Patient performs rolling and supine <-> sit with independence, sit <-> stand min

assist, transfers and car transfer CGA, cues for safety and positioning.





Gait


Does the Patient Walk?:  Yes


Mode of Locomotion:  Walk


Anticipated Mode of Locomotion:  Walk


Walk 10 feet (QC):  4


Walk 50 ft with 2 Turns(QC):  4


Walk 150 ft (QC):  88


Walking 10ft/uneven surface-QC:  4


Distance:  50'


Gait Assistive Device:  FWW


Comments/Gait Description


Patient ambulates 50' with a rolling walker with CGA (including 50' with at 

least 2 turns of 90 degrees and 10' over an uneven surface), patient had to stop

ambulating due to fatigue and she says she is SOB, patient has O2 sat of 99% and

HR was 148bpm, nurse notified.





Wheelchair Training


Wheel 50 ft with 2 turns (QC):  9


Wheel 150 ft (QC):  9





Stairs


1 Step (curb) (QC):  88


4 Steps (QC):  88


12 Steps (QC):  88





Balance


Sitting Static:  Normal


Sitting Dynamic:  Normal


Standing Static:  Fair


 Standing Dynamic:  Fair


Picking up an Object (QC):  88





Assessment/Needs


Patient in recliner post tx with nurse call, has OT right after PT and  she is 

in room to start.


Rehab Potential:  Fair





PT Short Term Goals


Short Term Goals


Time Frame:  Aug 5, 2022


Roll Left & Right:  6


Sit to lyin


Lying to sitting on side of be:  6


Sit to stand:  4


Chair/bed-to-chair transfer:  4


Walk 10 feet:  4


Walk 50 feet with two turns:  4


Walk 150 feet:  4





PT Long Term Goals


Long Term Goals


PT Long Term Goals Time Frame:  Aug 5, 2022


Roll Left & Right (QC):  6


Sit to Lying (QC):  6


Lying-Sitting on Side/Bed(QC):  6


Sit to Stand (QC):  6


Chair/Bed-to-Chair Xfer(QC):  6


Toilet Transfer (QC):  6


Car Transfer (QC):  6


Does the Patient Walk:  Yes


Walk 10 feet (QC):  6


Walk 50ft with 2 Turns (QC):  6


Walk 150 ft (QC):  6


Walking 10ft on Uneven Surface:  6


1 Step (curb) (QC):  4 (CGA)


4 Steps (QC):  4 (CGA)


12 Steps (QC):  88


Picking up an Object (QC):  6


Wheel 50 feet with 2 turns (QC:  9


Wheel 150 feet:  9





PT Plan


Problem List


Problem List:  Activity Tolerance, Functional Strength, Safety, Balance, Gait, 

Transfer, Bed Mobility, ROM





Treatment/Plan


Treatment Plan:  Continue Plan of Care


Treatment Plan:  Bed Mobility, Education, Functional Activity Cherise, Functional 

Strength, Group Therapy, Gait, Safety, Therapeutic Exercise, Transfers


Treatment Duration:  Aug 5, 2022


Frequency:  At least 5 of 7 days/Wk (IRF)


Estimated Hrs Per Day:  1.5 hours per day


Patient and/or Family Agrees t:  Yes





Safety Risks/Education


Patient Education:  Gait Training, Transfer Techniques, Correct Positioning, 

Safety Issues


Teaching Recipient:  Patient


Teaching Methods:  Demonstration, Discussion


Response to Teaching:  Reinforcement Needed





Discharge Recommendations


Plan


Patient will perform bed mobility and transfer training, balance and endurance 

training, functional strengthening, stair training, gait training, and 

education, to improve functional mobility and independence at home.


Therapy Discharge Recommendati:  Scheduled Assistance, Home & Family, Post Acute

PT





Time/GCodes


Time In:  1110


Time Out:  1150


Total Billed Treatment Time:  40


Total Billed Treatment


1 visit


EVM 10'


FA 30'











GORDON HOLCOMB PT                Jul 15, 2022 12:45

## 2022-07-15 NOTE — HISTORY & PHYSICAL
History of Present Illness


HPI/Chief Complaint


CC: Fever with tachycardia of 140





HPI: This is a very complex 76yoWF who had been admitted Thursday night due to 

fall and found down at home for 18 hours after HH nurse found her (she had no 

phone access and her life alert had been turned off) following a 3 week hospital

course at [facility] (6/22/2022-7/14/2022) due to [facility] stay on 6/16/22 

due to confusion from sepsis from pelvic abscess required drainage by IR due to 

ileostomy reversal leak and subsequent abscess formation. She has a h/o subtotal

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 

ischemic bowel and that was performed by Dr Beavers. The recent ileostomy reversal 

was performed by Dr Galicia July 2021. She was admitted for observation and IVF 

on med-surg and promptly moved to IRF due to weakness and need to strengthen 

prior to DC but shortly after she arrived in IRF she was found to have a fever 

of 38.6 and tachycardia of 142 and sepsis w/u initiated and found to have COVID 

infection with lactic acidosis not due to sepsis so the decision was made to 

move to ICU. She is vaccinated and boosted.


Source:  patient


Date Seen


7/15/22


Time Seen by a Provider:  18:00


Attending Physician


Lorenzo Leyva


PCP


Admitting Physician:


Carolynn Hurst DO 








Attending Physician:


Carolynn Hurst DO


Referring Physician





Date of Admission


Jul 15, 2022 at 16:45





Home Medications & Allergies


Home Medications


Reviewed patient Home Medication Reconciliation performed by pharmacy medication

reconciliations technician and/or nursing.


Patients Allergies have been reviewed.





Allergies





Allergies


Coded Allergies


  Penicillins (Verified Allergy, Unknown, 4/22/21)


  raspberry (Verified Allergy, Unknown, 4/22/21)








Past Medical-Social-Family Hx


Past Med/Social Hx:  Reviewed Nursing Past Med/Soc Hx, Reviewed and Corrections 

made


Patient Social History


Marrital Status:  single


Employed/Student:  retired


Alcohol Use:  Denies Use


Smoking Status:  Never a Smoker


Former Smoker, Quit:  Mar 30, 1985


Type Used:  Cigarettes


2nd Hand Smoke Exposure:  No


Recent Hopitalizations:  No





Immunizations Up To Date


Date of Pneumonia Vaccine:  Oct 8, 2020


Date of Influenza Vaccine:  Oct 7, 2020





Seasonal Allergies


Seasonal Allergies:  No





Past Medical History


Surgeries:  Abdominal, Bowel Surgery, Cardiac, Gallbladder, Hysterectomy, 

Nephrectomy, Oophorectomy, Tonsillectomy


Currently Using CPAP:  No


Cardiac:  Atrial Fibrillation, High Cholesterol, Hypertension


Reproductive:  Yes (OVARIAN CANCER)


Sexually Transmitted Disease:  No


HIV/AIDS:  No


Hysterectomy, Menopausal


Genitourinary:  Bladder Infection


solitary kidney


Gastrointestinal:  Colitis, Gastroesophageal Reflux, Liver Disease/Jaundice, C-

Diff, Gall Bladder Disease


Musculoskeletal:  Arthritis, Rheumatoid Arthritis


Endocrine:  Hypothyroidsim, Diabetes, Non-Insulin dep


Cancer:  Ovarian


Did You Recieve Any Treatments:  Yes


What Type of Treatment Did You:  Surgical Intervention


History of Blood Disorders:  No


Adverse Reaction to Blood Ferrari:  No





Family History





Arthritis


  19 MOTHER


Cardiovascular disease


  19 MOTHER


Diabetes mellitus


  19 FATHER


  19 MOTHER


No Pertinent Family Hx





PAST MEDICAL /SURGICAL HISTORY:


-09/2020--HAD SEVERE C. DIFFICILE COLITIS--ADMITTED TO [facility]


09/22/20. PT HAD SYNCOPAL EPISODE WITH LOSS OF PULSE AND CPR/RESUSCITATION


FOR APPROXIMATELY 2 MINUTES WITH ROSC.


DURING THAT STAY SHE HAD ATRIAL FIBRILLATION WITH [facility] THAT REQUIRED


CARDIOVERSION


PT UNDERWENT SUBTOTAL COLECTOMY WITH PERMANENT ILEOSTOMY. 


PT REPORTS THAT SHE WAS ON VENTILATOR FOR 10 DAYS


PT WAS TRANSFERRED FROM [facility] TO [facility], AND WAS THERE FROM


10/10/20-10/30/20, THEN TRANSFERRED HERE FOR REHAB FROM 10/30/20-11/10/20.


SEEING WOUND CARE FOR DELAYED HEALING OF MIDLINE SURGICAL WOUND AND SKIN


BREAKDOWN AROUND STOMA.





ADDITIONAL PAST SURGICAL HISTORY:


-TONSILLECTOMY 1960


-APPENDECTOMY 1961


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


-OVARIAN CYSTECTOMY 1966


-OPEN TOTAL ABDOMINAL HYSTERECTOMY / BILATARAL SALPINGO-OOPHORECTOMY 1972


FOR OVARIAN CANCER


-COCCYX EXCISION 1983


-LAPAROSCOPIC CHOLECYSTECOMY 1997


-RIGHT NECK LIPOMA REMOVAL 01/2012 BY DR. CRAWLEY


-SEBACEOUS CYST OF ABDOMINAL WALL REMOVED 01/2012 BY DR. CRAWLEY


-SUBTOTAL COLECTOMY WITH PERMANENT ILEOSTOMY 09/2020 AT [facility] BY


DR. BEAVERS





Review of Systems


Constitutional:  see HPI, dizziness, fever, malaise, weakness


EENTM:  no symptoms reported


Respiratory:  no symptoms reported


Cardiovascular:  no symptoms reported


Gastrointestinal:  no symptoms reported


Genitourinary:  no symptoms reported


Musculoskeletal:  no symptoms reported


Skin:  no symptoms reported


Psychiatric/Neurological:  No Symptoms Reported


All Other Systems Reviewed


Negative Unless Noted:  Yes





Physical Exam


Physical Exam


Vital Signs





Vital Signs - First Documented








 7/15/22 7/15/22 7/15/22





 18:00 18:53 19:32


 


Temp  38.9 


 


Pulse 118  


 


B/P (MAP) 112/85  


 


Pulse Ox 99  


 


O2 Delivery Room Air  


 


O2 Flow Rate   0.00


 


FiO2  21 





Capillary Refill :


Height, Weight, BMI


Height: '"


Weight: lbs. oz. kg; 18.94 BMI


Method:Stated


General Appearance:  WD/WN, Anxious, Chronically ill, Mild Distress, Thin


Eyes:  Bilateral Eye Normal Inspection, Bilateral Eye PERRL


HEENT:  PERRL/EOMI, Normal ENT Inspection, Pharynx Normal


Neck:  Full Range of Motion, Normal Inspection, Non Tender, Supple, Carotid 

Bruit


Respiratory:  Chest Non Tender, Lungs Clear, Normal Breath Sounds, No Accessory 

Muscle Use, No Respiratory Distress


Cardiovascular:  Regular Rate, Rhythm, No Edema, No Gallop, No JVD, No Murmur, 

Normal Peripheral Pulses, Tachycardia


Gastrointestinal:  Normal Bowel Sounds, No Organomegaly, No Pulsatile Mass, Non 

Tender, Soft


Back:  Normal Inspection, No CVA Tenderness, No Vertebral Tenderness


Extremity:  Normal Capillary Refill, Normal Inspection, Normal Range of Motion, 

Non Tender, No Calf Tenderness, No Pedal Edema


Neurologic/Psychiatric:  Alert, Oriented x3, No Motor/Sensory Deficits, Normal 

Mood/Affect


Skin:  Normal Color, Warm/Dry


Lymphatic:  No Adenopathy





Results


Results/Procedures


Labs


Laboratory Tests


7/15/22 18:28








7/16/22 04:35








Patient resulted labs reviewed.





Assessment/Plan


Admission Diagnosis


Assessment:


COVID infection in vaccinated and 1 booster 


Metabolic acidosis


Tachycardia


Fever


Weakness


Found down at home for 18 hours 7/14/22


Recent pelvic abscess s/p IR drain at [facility] 6/16/22


Recent ileostomy reversal Dr Galicia 7/2022


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


HTN


HLP


AF


CKD Stage 3a


s/p nephrectomy as a child


DM2





Plan:


ICU


IVF


Monitor HR


EKG


Admission Status:  Inpatient Order (span 2 midnights)


Reason for Inpatient Admission:  


COVID with fever and tachy





Diagnosis/Problems


Diagnosis/Problems





(1) COVID


(2) Physical debility


Status:  Acute


(3) Rhabdomyolysis


Status:  Acute


(4) Hypoglycemia


Status:  Acute


(5) Dehydration


Status:  Acute


(6) Fall


Status:  Acute


(7) Fistula


(8) Ovarian cancer in remission


(9) Solitary kidney


(10) Myopathy


(11) Hypothyroidism


(12) Diabetes mellitus


(13) Atrial fibrillation











CAROLYNN HURST DO                Jul 15, 2022 17:22

## 2022-07-15 NOTE — TELE-ICU PROGRESS NOTE
Subjective


Date Seen by a Provider:  Jul 15, 2022


Time Seen by a Provider:  18:24


Subjective/Events-last exam


Available chart/vitals/labs/images reviewed.


Video assessment done using telemetry ICU camera, rest of exam as per RN.


Discussion with the RN, exam as per RN.





Hospital course


She is 76-year-old  female with past medical history of for subtotal 

colectomy due to C. difficile colitis in September 2020 latter complicated by 

pelvic abscess requiring IR placement of a drainage.  He apparently she was 

admitted at the [facility] from [address] 8/2/2022 to 7/14/2022 due to severe 

deconditioning followed by admission to [facility] stay on 6/16/2022 due to 

confusion and sepsis.  Reportedly she had a fall on Thursday that is on 

7/14/2022 and laid down on the floor at home for about 18 hours and later found 

by home health nurse and called ambulance.  She is found to be very weak and had

a lactic acidosis and initially admitted to medical surgical floor and promptly 

moved to the intensive care unit due to severe weakness and metabolic acidosis. 

She is now being hydrated with IV fluids and a KCl is being replaced.  She is a 

poor historian but she is in no acute respiratory distress upon my video visit. 

I have discussed with the ICU RN and obtained some history.  He is also found to

be COVID19 PCR positive.





Sepsis Event


Evaluation


Height, Weight, BMI


Height: '"


Weight: lbs. oz. kg; 18.94 BMI


Method:Stated





Exam


Exam


Patient acknowledged, consented, and participated in this virtual visit which 

was conducted using real time audio/video


Vital Signs








  Date Time  Temp Pulse Resp B/P (MAP) Pulse Ox O2 Delivery O2 Flow Rate FiO2


 


7/15/22 18:00  118  112/85 99 Room Air  








Height & Weight


Height: '"


Weight: lbs. oz. kg; 18.94 BMI


Method:Stated


General Appearance:  Chronically ill


Other comments


pe per rn





Assessment/Plan


Assessment/Plan


1.  Severe weakness associated with a fever and lactic acidosis.  Needs to rule 

out sepsis.


2.  Dehydration


3.  COVID19 infection.


4.  Anemia of chronic disease


5.  Hyperglycemia.





Recommendations


1.  We will get blood cultures and initiate sepsis work-up


2.  Broad-spectrum IV antibiotics


3.  Hydrate patient with normal saline


4.  Monitor lactate level.


5.  DVT prophylaxis and ulcer prophylaxis.


6.  Reviewed with ICU RN.


Critical Care:  Critically Ill Patient


Time spent with patient (mins):  25











SUMAN OROURKE MD                Jul 15, 2022 18:30

## 2022-07-15 NOTE — PROGRESS NOTE - SURGERY
SANGITA ORTEGA 7/15/22 0747:


Subjective


Date Seen by a Provider:  Jul 15, 2022


Time Seen by a Provider:  06:58


Subjective/Events-last exam


Ms. Joy is being followed for a drain in her ventral abdomen. This morning

she has complaints of pain on her posterior head where she thinks she hit her 

head when she fell. She also endorses subjective fever and chills. She denies 

any abdominal pain or drainage from her drain. She is currently on a liquid diet

and would like to be advanced if tolerated.


Review of Systems


General:  Chills; No Fatigue; Other


Pulmonary:  No Dyspnea, No Cough


Cardiovascular:  No: Chest Pain, Palpitations


Gastrointestinal:  No: Nausea, Vomiting, Abdominal Pain


Neurological:  No: Weakness, Confusion





Objective


Exam





Vital Signs








  Date Time  Temp Pulse Resp B/P (MAP) Pulse Ox O2 Delivery O2 Flow Rate FiO2


 


7/15/22 07:00  134      


 


7/15/22 03:13 36.3 83 18 163/92 (115) 100 Room Air  


 


7/15/22 01:00  103      


 


7/14/22 23:13 37.0 98 18 145/85 (105) 98 Room Air  


 


7/14/22 20:38 37.2       


 


7/14/22 20:08 38.5       


 


7/14/22 19:35 38.5 110 18 165/81 (109) 100 Room Air  


 


7/14/22 19:29  116      


 


7/14/22 18:25 36.9 130   93   


 


7/14/22 17:26 36.9 131 18 205/92 (129) 93 Room Air  


 


7/14/22 17:00      Room Air  


 


7/14/22 16:56 37.2 115 20 138/82 100 Room Air  


 


7/14/22 13:12 37.3       


 


7/14/22 12:32 37.3 119 22 150/91 (110) 100 Room Air  














I & O 


 


 7/15/22





 07:00


 


Intake Total 2650 ml


 


Balance 2650 ml





Capillary Refill : Less Than 3 Seconds


General Appearance:  No Apparent Distress, Thin


HEENT:  PERRL/EOMI, Moist Mucous Membranes


Neck:  Non Tender, Supple


Respiratory:  Chest Non Tender, Lungs Clear, Normal Breath Sounds, No Accessory 

Muscle Use, No Respiratory Distress


Cardiovascular:  Regular Rate, Rhythm, No Edema, No Murmur, Normal Peripheral 

Pulses


Peripheral Pulses:  2+ Radial Pulses (R), 2+ Radial Pulses (L)


Gastrointestinal:  non tender, soft, other (Drain in ventral abdomen, very 

minimal succus appearing drainage)


Extremity:  Non Tender, No Pedal Edema


Neurologic/Psychiatric:  Alert, Oriented x3, No Motor/Sensory Deficits, Normal 

Mood/Affect, CNs II-XII Norm as Tested


Skin:  Normal Color, Warm/Dry





Results


Lab


Laboratory Tests


7/14/22 13:05: 


White Blood Count 5.7, Red Blood Count 4.15, Hemoglobin 11.9, Hematocrit 38, 

Mean Corpuscular Volume 91, Mean Corpuscular Hemoglobin 29, Mean Corpuscular 

Hemoglobin Concent 32, Red Cell Distribution Width 18.7H, Platelet Count 283, 

Mean Platelet Volume 10.1, Immature Granulocyte % (Auto) 0, Neutrophils (%) 

(Auto) 68, Lymphocytes (%) (Auto) 19, Monocytes (%) (Auto) 8, Eosinophils (%) 

(Auto) 3, Basophils (%) (Auto) 2, Neutrophils # (Auto) 3.9, Lymphocytes # (Auto)

1.1, Monocytes # (Auto) 0.4, Eosinophils # (Auto) 0.2, Basophils # (Auto) 0.1, 

Immature Granulocyte # (Auto) 0.0, Sodium Level 140, Potassium Level 3.6, 

Chloride Level 105, Carbon Dioxide Level 14L, Anion Gap 21H, Blood Urea Nitrogen

8, Creatinine 0.93, Estimat Glomerular Filtration Rate 64, BUN/Creatinine Ratio 

9, Glucose Level 58*L, Calcium Level 8.5, Corrected Calcium 9.1, Total Bilirubin

0.5, Aspartate Amino Transf (AST/SGOT) 215H, Alanine Aminotransferase (ALT/SGPT)

65H, Alkaline Phosphatase 61, Total Creatine Kinase 186H, C-Reactive Protein 

High Sensitivity 3.02H, Total Protein 7.4, Albumin 3.2


7/14/22 14:48: Glucometer 98


7/14/22 17:46: Glucometer 65L


7/14/22 18:47: Glucometer 94


7/14/22 19:40: Glucometer 88


7/14/22 20:21: Glucometer 105


7/14/22 21:10: Glucometer 122H


7/14/22 22:33: Glucometer 127H


7/14/22 23:16: Glucometer 140H


7/15/22 00:17: Glucometer 123H


7/15/22 02:02: Glucometer 103


7/15/22 03:11: Glucometer 97


7/15/22 04:09: Glucometer 149H


7/15/22 05:38: Glucometer 70


7/15/22 06:28: Glucometer 74





Assessment/Plan


Fall


Weakness and debility


Abdominal drain


   In ventral abdomen


Transaminitis


   AST:ALT of > 3:1


Elevated CK


   Likely 2/2 prolonged time spent on the ground after her fall





Plan:





Admit to the floor


Continue with IV fluids and labs


Consider CT abdomen and pelvis if abdominal pathology or changes are noted. 

Records requested from [facility] in [place], [place]


No urgent surgical intervention noted at this time


Consider advancing to soft diet





JUSTO GONZALEZ DO 7/15/22 1548:


Subjective


Subjective/Events-last exam


Patient with headache. No abdominal pain. With fever today. Slight chills.  

Tolerating diet.  Records received from [facility]  drain through fistula causing an 

abscess. Denies n/v sweats shortness of breath of  chest pain.





Objective


Exam


General Appearance:  No Apparent Distress, Thin


HEENT:  PERRL/EOMI, Normal ENT Inspection


Neck:  Non Tender, Supple


Respiratory:  Chest Non Tender, No Accessory Muscle Use, No Respiratory Distress


Cardiovascular:  Regular Rate, Rhythm, No JVD


Gastrointestinal:  non tender, soft, other (Drain in ventral abdomen, very 

minimal succus appearing drainage at midline at skin opening no signs of 

infection)


Extremity:  Non Tender


Neurologic/Psychiatric:  Alert, Oriented x3, Normal Mood/Affect


Skin:  Normal Color, Warm/Dry


Lymphatic:  No Adenopathy





Assessment/Plan


Fall


Covid


Weakness and debility


Abdominal drain


   In ventral abdomen


Transaminitis


   AST:ALT of > 3:1


Elevated CK


   Likely 2/2 prolonged time spent on the ground after her fall





Records reviewed drain through fistula into abscess, can keep in place.  place 

ostomy appliance around to control drainage.


does not appear to be infected currently and has follow up with Dr. Galicia.


Will sign off call if needed.





Supervisory-Addendum Brief


Verification & Attestation


Participated in pt care:  history, MDM, physical


Personally performed:  exam, history, MDM, supervision of care


Care discussed with:  Medical Student


Procedures:  n/a


Results interpretation:  Verified all documentation


Verification and Attestation of Medical Student E/M Service





A medical student performed and documented this service in my presence. I 

reviewed and verified all information documented by the medical student and made

modifications to such information, when appropriate. I personally performed the 

physical exam and medical decision making. 





 Justo Gonzalez, Jul 15, 2022,15:48











SANGITA ORTEGA                 Jul 15, 2022 07:47


JUSTO GONZALEZ DO              Jul 15, 2022 15:48

## 2022-07-16 LAB
ALBUMIN SERPL-MCNC: 2.5 GM/DL (ref 3.2–4.5)
ALP SERPL-CCNC: 44 U/L (ref 40–136)
ALT SERPL-CCNC: 38 U/L (ref 0–55)
ARTERIAL PATENCY WRIST A: (no result)
BASE EXCESS STD BLDA CALC-SCNC: -11.9 MMOL/L (ref -2.5–2.5)
BASOPHILS # BLD AUTO: 0.1 10^3/UL (ref 0–0.1)
BASOPHILS NFR BLD AUTO: 1 % (ref 0–10)
BDY SITE: (no result)
BILIRUB SERPL-MCNC: 0.4 MG/DL (ref 0.1–1)
BODY TEMPERATURE: 98.6
BUN/CREAT SERPL: 10
CALCIUM SERPL-MCNC: 6.9 MG/DL (ref 8.5–10.1)
CHLORIDE SERPL-SCNC: 113 MMOL/L (ref 98–107)
CO2 BLDA CALC-SCNC: 12.5 MMOL/L (ref 21–31)
CO2 SERPL-SCNC: 12 MMOL/L (ref 21–32)
CREAT SERPL-MCNC: 0.83 MG/DL (ref 0.6–1.3)
EOSINOPHIL # BLD AUTO: 0 10^3/UL (ref 0–0.3)
EOSINOPHIL NFR BLD AUTO: 0 % (ref 0–10)
GFR SERPLBLD BASED ON 1.73 SQ M-ARVRAT: 73 ML/MIN
GLUCOSE SERPL-MCNC: 92 MG/DL (ref 70–105)
HCT VFR BLD CALC: 34 % (ref 35–52)
HGB BLD-MCNC: 11 G/DL (ref 11.5–16)
INHALED O2 FLOW RATE: (no result) L/MIN
LYMPHOCYTES # BLD AUTO: 1.9 10^3/UL (ref 1–4)
LYMPHOCYTES NFR BLD AUTO: 30 % (ref 12–44)
MAGNESIUM SERPL-MCNC: 1.8 MG/DL (ref 1.6–2.4)
MANUAL DIFFERENTIAL PERFORMED BLD QL: NO
MCH RBC QN AUTO: 29 PG (ref 25–34)
MCHC RBC AUTO-ENTMCNC: 32 G/DL (ref 32–36)
MCV RBC AUTO: 91 FL (ref 80–99)
MONOCYTES # BLD AUTO: 0.3 10^3/UL (ref 0–1)
MONOCYTES NFR BLD AUTO: 5 % (ref 0–12)
NEUTROPHILS # BLD AUTO: 4 10^3/UL (ref 1.8–7.8)
NEUTROPHILS NFR BLD AUTO: 63 % (ref 42–75)
PCO2 BLDA: 19 MMHG (ref 35–45)
PH BLDA: 7.41 [PH] (ref 7.37–7.43)
PHOSPHATE SERPL-MCNC: 1.4 MG/DL (ref 2.3–4.7)
PLATELET # BLD: 219 10^3/UL (ref 130–400)
PMV BLD AUTO: 10.7 FL (ref 9–12.2)
PO2 BLDA: 58 MMHG (ref 79–93)
POTASSIUM SERPL-SCNC: 4.2 MMOL/L (ref 3.6–5)
PROT SERPL-MCNC: 5.8 GM/DL (ref 6.4–8.2)
SAO2 % BLDA FROM PO2: 91 % (ref 94–100)
SODIUM SERPL-SCNC: 137 MMOL/L (ref 135–145)
VENTILATION MODE VENT: NO
WBC # BLD AUTO: 6.4 10^3/UL (ref 4.3–11)

## 2022-07-16 RX ADMIN — MAGNESIUM SULFATE IN DEXTROSE SCH MLS/HR: 10 INJECTION, SOLUTION INTRAVENOUS at 06:16

## 2022-07-16 RX ADMIN — POTASSIUM CHLORIDE SCH MEQ: 1500 TABLET, EXTENDED RELEASE ORAL at 06:05

## 2022-07-16 RX ADMIN — DOCUSATE SODIUM SCH MG: 100 CAPSULE ORAL at 20:32

## 2022-07-16 RX ADMIN — INSULIN ASPART SCH UNIT: 100 INJECTION, SOLUTION INTRAVENOUS; SUBCUTANEOUS at 20:55

## 2022-07-16 RX ADMIN — SENNOSIDES SCH MG: 8.6 TABLET, FILM COATED ORAL at 20:32

## 2022-07-16 RX ADMIN — DOCUSATE SODIUM SCH MG: 100 CAPSULE ORAL at 09:01

## 2022-07-16 RX ADMIN — MAGNESIUM SULFATE IN DEXTROSE SCH MLS/HR: 10 INJECTION, SOLUTION INTRAVENOUS at 09:01

## 2022-07-16 RX ADMIN — MAGNESIUM SULFATE IN DEXTROSE SCH MLS/HR: 10 INJECTION, SOLUTION INTRAVENOUS at 06:04

## 2022-07-16 RX ADMIN — SODIUM CHLORIDE SCH MLS/HR: 900 INJECTION, SOLUTION INTRAVENOUS at 03:02

## 2022-07-16 RX ADMIN — SENNOSIDES SCH MG: 8.6 TABLET, FILM COATED ORAL at 09:01

## 2022-07-16 RX ADMIN — SACUBITRIL AND VALSARTAN SCH TAB: 24; 26 TABLET, FILM COATED ORAL at 20:55

## 2022-07-16 RX ADMIN — SODIUM BICARBONATE SCH MLS/HR: 84 INJECTION INTRAVENOUS at 16:36

## 2022-07-16 RX ADMIN — ENOXAPARIN SODIUM SCH MG: 100 INJECTION SUBCUTANEOUS at 16:29

## 2022-07-16 RX ADMIN — SODIUM BICARBONATE SCH MLS/HR: 84 INJECTION INTRAVENOUS at 09:05

## 2022-07-16 RX ADMIN — POTASSIUM CHLORIDE SCH MLS/HR: 200 INJECTION, SOLUTION INTRAVENOUS at 06:04

## 2022-07-16 NOTE — DIAGNOSTIC IMAGING REPORT
INDICATION: Central line placement 



EXAMINATION: Chest 07/16/2022 



COMPARISON: 07/16/2022 5:28 a.m..



FINDINGS:



There is a left-sided central line with the tip in the SVC. Heart

and pulmonary vasculature normal. There is bibasal atelectasis or

infiltrate. No effusions. No pneumothorax.



IMPRESSION:

1. Bibasilar atelectasis versus infiltrate.



Dictated by: 



  Dictated on workstation # [id number]

## 2022-07-16 NOTE — CONSULTATION-CARDIOLOGY
HPI-Cardiology


Cardiology Consultation:


Date of Consultation


7/16/22


Time Seen by a Provider:  13:30


Date of Admission





Attending Physician


Lorenzo Leyva


Admitting Physician


Admitting Physician:


Carolynn Trujillo DO 








Attending Physician:


Carolynn Trujillo DO


Consulting Physician


STANTON VALENZUELA MD, MA, FACP, FACC, FSCAI, CCDS





Physician requesting consult: Dr Trujillo





Primary cardiologist: Dr Birch





HPI:


Chief Complaint:


Reason for Card consultation: Tachycardia





75 yo woman admitted to Dr Trujillo on 7/15/22. She has a h/o subtotal colectomy 

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 

Had ileostomy reversan in July 2021. Had admission to [facility] on 6/16/22 

for pelvice abscess and sepsis. Had al long hospitalization at [facility] (6/22-

7/14/22) for treatment of deconditioning. Admitted to this hosp because of fall 

at home and being down for 18 hours beferor the  nurse found her. She has been

diagnosed with Covid 19 during this admission. Reports gen malaise and weakness.

Reports some shortness of breath. Reports mild, transthoracic discomfort w/o 

radiation and w/o associated symptoms, present for over a day, w/o aggravating 

or relieving factors.





Review of Systems-Cardiology


Review of Systems


Constitutional:  malaise; No weight loss, No weight gain


Eyes:  No vision change


Ears/Nose/Throat:  No ear discharge, No nasal drainage, No recent hearing loss


Respiratory:  As described under HPI


Cardiovascular:  As described under HPI


Gastrointestinal:  No nausea, No other


Genitourinary:  No dysuria


Musculoskeletal:  back pain (chronic)


Skin:  No rash, No ulcerations


Psychiatric/Neurological:  No seizure, No focal weakness, No syncope


Hematologic:  No bleeding abnormalities





All Other Systems Reviewed


Negative Unless Noted:  Yes





PMH-Social-Family Hx


Patient Social History


Marrital Status:  single


Employed/Student:  retired


Smoking Status:  Never a Smoker


2nd Hand Smoke Exposure:  No


Alcohol Use?:  No


Pt feels they are or have been:  No





Immunizations Up To Date


Date of Pneumonia Vaccine:  Oct 8, 2020


Date of Influenza Vaccine:  Oct 7, 2020





Past Medical History


PMH


As described under Assessment.





Family Medical History


Family History:  


Arthritis


  19 MOTHER


Cardiovascular disease


  19 MOTHER


Diabetes mellitus


  19 FATHER


  19 MOTHER





Allergies and Home Medications


Allergies


Coded Allergies:  


     Penicillins (Verified  Allergy, Unknown, 4/22/21)


     raspberry (Verified  Allergy, Unknown, 4/22/21)





Patient Home Medication List


Home Medication List Reviewed:  Yes


Acetaminophen (Tylenol Arthritis) 650 Mg Tablet.er, 650 MG PO Q6H PRN for PAIN-

MILD (1-4) OR TEMPATURE, (Reported)


   Entered as Reported by: GAGE LAU on 7/15/22 1145


Amitriptyline HCl (Amitriptyline HCl) 100 Mg Tablet, 100 MG PO HS, (Reported)


   Entered as Reported by: GAGE LAU on 11/3/20 1433


Atorvastatin Calcium (Atorvastatin Calcium) 40 Mg Tablet, 40 MG PO 1700, 

(Reported)


   Entered as Reported by: GAGE LAU on 7/15/22 1145


Carvedilol (Carvedilol) 25 Mg Tablet, 25 MG PO BID, (Reported)


   Entered as Reported by: GAGE LAU on 7/15/22 1145


Cetirizine HCl (Cetirizine HCl) 10 Mg Tablet, 10 MG PO DAILY, (Reported)


   Entered as Reported by: GAGE LAU on 10/30/20 1109


Cholecalciferol (Vitamin D3) (Vitamin D3) 25 Mcg (1000 Unit) Capsule, 25 MCG PO 

DAILY, (Reported)


   Entered as Reported by: GAGE LAU on 7/15/22 1145


Docusate Sodium (Docusate Sodium) 100 Mg Tablet, 100 MG PO BID, (Reported)


   Entered as Reported by: GAGE LAU on 7/15/22 1145


Famotidine (Famotidine) 20 Mg Tablet, 20 MG PO DAILY, (Reported)


   Entered as Reported by: GAGE LAU on 7/15/22 1145


Fenofibrate (Fenofibrate) 160 Mg Tablet, 160 MG PO HS, (Reported)


   Entered as Reported by: GAGE LAU on 11/3/20 1433


Ferrous Sulfate (Iron) 325 Mg Tablet, 325 MG PO BID, (Reported)


   Entered as Reported by: GAGE LAU on 11/4/20 1256


Latanoprost (Xalatan) 0.005 % Drops, 1 DROP OU HS, (Reported)


   Entered as Reported by: GAGE LAU on 7/15/22 1145


Leflunomide (Leflunomide) 20 Mg Tablet, 20 MG PO Q48H, (Reported)


   Entered as Reported by: GAGE LAU on 11/3/20 1434


Levothyroxine Sodium (Levothyroxine Sodium) 125 Mcg Tablet, 125 MCG PO DAILY, 

(Reported)


   Entered as Reported by: GAGE LAU on 7/15/22 1145


Nystatin (Nystatin) 100,000 Unit/Ml Oral.susp, 5 ML PO QID, (Reported)


   Entered as Reported by: GAGE LAU on 7/15/22 1145


Oxybutynin Chloride (Oxybutynin Chloride) 5 Mg Tablet, 5 MG PO BID, (Reported)


   Entered as Reported by: GAGE LAU on 7/15/22 1145


Saccharomyces Boulardii (Florastor) 250 Mg Capsule, 250 MG PO DAILY, (Reported)


   Entered as Reported by: GAGE LAU on 7/15/22 1145


Timolol Maleate (Timolol Maleate 0.5%) 0.5 % Drops, 1 DROP OU HS, (Reported)


   Entered as Reported by: GAGE LAU on 7/15/22 1145


Discontinued Medications


Amlodipine Besylate (Amlodipine Besylate) 10 Mg Tablet, 10 MG PO DAILY, 

(Reported)


   Discontinued Reason: No Longer Taking


   Entered as Reported by: GAGE LAU on 10/30/20 1109


Aspirin (Aspirin) 81 Mg Tab.chew, 81 MG PO DAILY, (Reported)


   Discontinued Reason: No Longer Taking


   Entered as Reported by: GAGE LAU on 10/30/20 1110


Atorvastatin Calcium (Atorvastatin Calcium) 20 Mg Tablet, 20 MG PO DAILY, 

(Reported)


   Discontinued Reason: Prescription changed


   Entered as Reported by: GAGE LAU on 11/3/20 1433


Carvedilol (Carvedilol) 12.5 Mg Tablet, 12.5 MG PO BID, (Reported)


   Discontinued Reason: Prescription changed


   Entered as Reported by: JULIE A IBEH on 6/7/21 1503


Fish Oil/Dha/Epa (Fish Oil 1,200 mg Fish Oil) 1 Each Capsule, 2 EACH PO BID, 

(Reported)


   Discontinued Reason: No Longer Taking


   Entered as Reported by: GAGE LAU on 11/3/20 1448


Hydrocodone/Acetaminophen (Hydrocodone-Acetamin 5-325 mg) 1 Each Tablet, 1 TAB 

PO Q4H PRN for PAIN-MODERATE (5-7)


   Discontinued Reason: No Longer Taking


   Prescribed by: JANIYA TATE on 8/2/21 1915


Levothyroxine Sodium (Levothyroxine Sodium) 112 Mcg Tablet, 112 MCG PO 

WHITE,TU,WE,THU,FR,SA, (Reported)


   Discontinued Reason: No Longer Taking


   Entered as Reported by: GAGE LAU on 11/3/20 1433


Pantoprazole Sodium (Pantoprazole Sodium) 40 Mg Tablet.dr, 40 MG PO DAILY, 

(Reported)


   Discontinued Reason: No Longer Taking


   Entered as Reported by: GAGE LAU on 11/3/20 1433


Pilocarpine (Salagen) 5 Mg Tab, 10 MG PO BID, (Reported)


   Discontinued Reason: No Longer Taking


   Entered as Reported by: GAGE LAU on 11/3/20 1433


Pioglitazone HCl (Pioglitazone HCl) 30 Mg Tablet, 30 MG PO DAILY, (Reported)


   Discontinued Reason: No Longer Taking


   Entered as Reported by: GAGE LAU on 11/3/20 1433


Simethicone (Gas Relief 80) 80 Mg Tab.chew, 80 MG PO UD PRN for GAS, (Reported)


   Discontinued Reason: No Longer Taking


   Entered as Reported by: GAGE LAU on 11/3/20 1433


Sodium Bicarbonate (Sodium Bicarbonate) 650 Mg Tablet, 650 MG PO BID, (Reported)


   Discontinued Reason: No Longer Taking


   Entered as Reported by: JUDY CRESPO on 6/1/21 1307


Tramadol HCl (Tramadol HCl) 50 Mg Tablet, 50 MG PO QID


   Discontinued Reason: No Longer Taking


   Prescribed by: LAURENCE MORRELL on 7/30/21 1509





Physical Exam-Cardiology


Physical Exam


Vital Signs/I&O











 7/16/22 7/16/22 7/16/22 7/16/22





 04:00 04:00 05:00 06:00


 


Pulse 118  121 123


 


Resp 26  25 30


 


B/P (MAP) 123/91  134/97 128/93


 


Pulse Ox 93 100 97 89


 


O2 Delivery Room Air Room Air Room Air Room Air





 7/16/22 7/16/22 7/16/22 7/16/22





 07:00 07:00 07:54 08:00


 


Temp   36.7 


 


Pulse 121 125  128


 


Resp 26   27


 


B/P (MAP) 117/88   128/92


 


Pulse Ox 100   95


 


O2 Delivery Room Air   Room Air


 


    





 7/16/22 7/16/22 7/16/22 7/16/22





 09:00 09:00 10:00 11:00


 


Pulse  129 126 122


 


Resp  29 25 25


 


B/P (MAP)  120/84 112/74 120/82


 


Pulse Ox  91 93 91


 


O2 Delivery Room Air Room Air Room Air Room Air





 7/16/22 7/16/22 7/16/22 7/16/22





 12:00 12:40 12:52 13:00


 


Pulse 102  105 110


 


Resp 25   16


 


B/P (MAP) 104/81   114/78


 


Pulse Ox 92   91


 


O2 Delivery Room Air Room Air  Room Air





 7/16/22 7/16/22  





 14:00 15:00  


 


Pulse 120 116  


 


Resp  23  


 


B/P (MAP) 118/87 123/86  


 


Pulse Ox 94 95  


 


O2 Delivery Room Air Room Air  














 7/16/22





 00:00


 


Intake Total 45 ml


 


Output Total 550 ml


 


Balance -505 ml





Capillary Refill :


Constitutional:  AAO x 3, well-developed, other (thin appearing)


HEENT:  PERRL


Neck:  carotid pulses are 2 + bilaterally


Respiratory:  No accessory muscle use; other (fair to good, bilateral air entry)


Cardiovascular:  regular rate-rhythm, S1 and S2, systolic murmur (soft CLIFTON at 

card base)


Gastrointestinal:  No tender; soft; No guarding, No rebound; audible bowel 

sounds


Extremities:  No clubbing, No cyanosis, No significant edema


Neurologic/Psychiatric:  oriented x 3, other (moves all limbs equally)


Skin:  warm/dry; No rash, No ulcerations





Data Review


Labs


Laboratory Tests


7/15/22 18:28: 


D-Dimer 1.94H, Sodium Level 140, Potassium Level 3.7, Chloride Level 111H, 

Carbon Dioxide Level 15L, Anion Gap 14, Blood Urea Nitrogen 6L, Creatinine 0.84,

Estimat Glomerular Filtration Rate 72, BUN/Creatinine Ratio 7, Glucose Level 

137H, Lactic Acid Level 2.99*H, Calcium Level 6.8L, Corrected Calcium 8.2L, 

Total Bilirubin 0.3, Aspartate Amino Transf (AST/SGOT) 84H, Alanine Aminotra

nsferase (ALT/SGPT) 38, Alkaline Phosphatase 40, Creatine Kinase MB 6.2, Total 

Protein 5.2L, Albumin 2.3L


7/16/22 04:35: 


Sodium Level 137, Potassium Level 4.2, Chloride Level 113H, Carbon Dioxide Level

12L, Anion Gap 12, Blood Urea Nitrogen 8, Creatinine 0.83, Estimat Glomerular 

Filtration Rate 73, BUN/Creatinine Ratio 10, Glucose Level 92, Lactic Acid Level

2.16*H, Calcium Level 6.9L, Corrected Calcium 8.1L, Total Bilirubin 0.4, Asparta

te Amino Transf (AST/SGOT) 75H, Alanine Aminotransferase (ALT/SGPT) 38, Alkaline

Phosphatase 44, Total Protein 5.8L, Albumin 2.5L, White Blood Count 6.4, Red 

Blood Count 3.77L, Hemoglobin 11.0#L, Hematocrit 34L, Mean Corpuscular Volume 

91, Mean Corpuscular Hemoglobin 29, Mean Corpuscular Hemoglobin Concent 32, Red 

Cell Distribution Width 19.0H, Platelet Count 219, Mean Platelet Volume 10.7, 

Immature Granulocyte % (Auto) 0, Neutrophils (%) (Auto) 63, Lymphocytes (%) 

(Auto) 30, Monocytes (%) (Auto) 5, Eosinophils (%) (Auto) 0, Basophils (%) 

(Auto) 1, Neutrophils # (Auto) 4.0, Lymphocytes # (Auto) 1.9, Monocytes # (Auto)

0.3, Eosinophils # (Auto) 0.0, Basophils # (Auto) 0.1, Immature Granulocyte # 

(Auto) 0.0, Phosphorus Level 1.4L, Magnesium Level 1.8


7/16/22 05:25: 


Blood Gas Puncture Site LEFT RADIAL, Blood Gas Patient Temperature 98.6, 

Arterial Blood pH 7.41, Arterial Blood Partial Pressure CO2 19*L, Arterial Blood

Partial Pressure O2 58L, Arterial Blood HCO3 12*L, Arterial Blood Total CO2 

12.5L, Arterial Blood Oxygen Saturation 91L, Arterial Blood Base Excess -11.9L, 

Amor Test YES-POS, Blood Gas Ventilator Setting NO, Blood Gas Inspired Oxygen 

ROOM AIR


7/16/22 07:42: Glucometer 107


7/16/22 11:42: Glucometer 159H


7/16/22 14:35: 


Lactic Acid Level 1.53, Troponin I 1.634*H





Laboratory Tests


7/15/22 18:28








7/16/22 04:35











A/P-Cardiology


Assessment/Admission Diagnosis





Ac systolic CHF (HFrEF) due to ischemic cardiomyopathy


- Echo on 7/16/22: LVEF 20-25%, anteroseptal and apical akinesis, mod 

enlargement of LA, grade 2 diastolic dysfunction, mild MR, PASP 35-40 mmHg





Mild troponin elevation


- likely type 2 MI due to ac systolic CHF





Covid-19





Gen weakness and malaise after long hospitalization of pelvic abscess and sepsis

(June - July 2022)





Transient episode of atrial fibrillation in November 2020 treated with 

electrical cardioversion by Dr Birch. No recurrence reported. Follows with Dr Birch





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 

ileostomy reversal in 2021





H/o hypertension and hyperlipidemia





Diabetes mellitus II, followed and managed by primary care physician





History of solitary kidney and history of ovarian cancer.





Hypothyroidism, followed and managed by primary care physician





Rheumatoid arthritis and osteoarthritis





Carotid artery stenosis followed by Dr Jeffers





Discussion and Recomendations





* ASA


* Heart failure meds (see orders)


* DVT prophylaxis with enoxaparin


* Monitor labs closely


* Dr Trujillo managing acute Covid-19


* Further recs based on hosp course











STANTON VALENZUELA MD FACP FACC CCDS   Jul 16, 2022 15:37

## 2022-07-16 NOTE — CONSULTATION REPORT
DATE OF SERVICE:  07/16/2022



HISTORY OF PRESENT ILLNESS:

The patient is a 76-year-old female with an extensive past medical history.  She

had a significant Clostridium difficile colic causing toxic megacolon requiring

a subtotal colectomy and an end ileostomy.  This was done at [facility]. 

She then underwent a reversal of the colostomy.  She then did develop a pelvic

abscess, which was drained by interventional radiology.  She also did develop a

fistula as well.  She was admitted for weakness and found to be COVID positive. 

She also has a very poor peripheral venous circulation and is febrile and

tachycardic and will require a central venous catheter.



PAST MEDICAL HISTORY:

Clostridium difficile toxic megacolon, gastroesophageal reflux disease,

degenerative joint disease, rheumatoid arthritis, diabetes, hypothyroid, history

of ovarian cancer, atrial fibrillation, hypercholesterolemia, and hypertension.



PAST SURGICAL HISTORY:

Exploratory laparotomy, subtotal colectomy, colostomy reversal, laparoscopic

cholecystectomy, total hysterectomy, nephrectomy, and tonsillectomy.



ALLERGIES:

PENICILLIN.



MEDICATIONS:

Amitriptyline 100 mg daily, atorvastatin 40 mg daily, carvedilol 25 mg daily,

cetirizine 10 mg daily, famotidine 20 mg daily, fenofibrate 160 mg daily, iron

325 mg b.i.d., latanoprost eyedrops daily, leflunomide 20 mg every other day,

levothyroxine 125 mcg daily, and oxybutynin 5 mg b.i.d.



SOCIAL HISTORY:

Negative smoke and negative alcohol.



FAMILY HISTORY:

Noncontributory.



REVIEW OF SYSTEMS:

This a slightly thin-appearing female, currently in no acute distress.  She is

not experiencing any shortness of breath or difficulty in breathing.  No cough

or sputum production.  She is experiencing no nausea, vomiting; however, does

not have much of an appetite.  She states that she does have bowel movements and

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  She is having some

fevers and has lost weight since her colon resection.



PHYSICAL EXAMINATION:

VITAL SIGNS:  Temperature 38.9, blood pressure 104/81, pulse 102, respirations

25, and pulse ox 92% on room air.

CHEST:  Few scattered rales bilaterally.

HEART:  Regular and no murmurs.

EXTREMITIES:  No lower extremity edema and negative Homans sign.

HEENT:  No scleral icterus.

NECK:  No cervical lymphadenopathy.

ABDOMEN:  Soft, nontender, and nondistended.

SKIN:  Warm and dry.



LABORATORY DATA:

WBC 6.4, hemoglobin 11.0, hematocrit 34, and platelets 219.  BUN 8 and

creatinine 0.83.  Lactic acid 2.16.



ASSESSMENT AND PLAN:

A 76-year-old female with COVID-19, weakness, debility, and respiratory failure.

 She has a very poor peripheral venous circulation and will require central

venous catheter, which we will proceed with.  She also does have a fistula at

the area of the previous ileorectal anastomosis with an indwelling catheter.  At

this time, there is minimal drainage and our recommendation is to remove the

catheter in hopes of allowing the fistula to close on its own without any

surgical intervention.





Job ID: [id number]

DocumentID: [id number]

Dictated Date:  07/16/2022 12:42:00

Transcription Date: 07/16/2022 13:31:32

Dictated By: BRIAN CRAWLEY MD

## 2022-07-16 NOTE — DIAGNOSTIC IMAGING REPORT
INDICATION: Covid-19 positive.



TIME OF EXAM: 5:28 AM



Correlation is made with prior chest from one day earlier.



FINDINGS: Heart size stable. Interstitial changes are slightly

more prominent on today's study when compared with yesterday. No

effusion or pneumothorax is seen.



IMPRESSION: Mild increase in interstitial changes when compared

with examination one day earlier.



Dictated by: 



  Dictated on workstation # [id number]

## 2022-07-16 NOTE — OPERATIVE REPORT
DATE OF SERVICE:  07/16/2022



ATTENDING PRIMARY CARE PHYSICIAN:

MIKA Brown.



ATTENDING PHYSICIAN:

Dr. Trujillo.



INDICATIONS FOR PROCEDURE:

The patient is a 76-year-old female with multiple medical history.  She has had

a history of Clostridium difficile, toxic megacolon requiring a subtotal

colectomy.  This was later reversed.  She was very weak and was in long-term

acute care hospital for some amount of time.  She again became weak and fell at

home.  She was found to be COVID positive.  She is tachycardic and has very poor

peripheral venous circulation and will require a central venous catheter.



DESCRIPTION OF PROCEDURE:

Chest and neck were prepped and draped in a standard surgical fashion.  A 1%

lidocaine was used to anesthetize the left subclavian region.  The left

subclavian vein was then cannulated with drawing of venous blood and the

guidewire was then inserted without any resistance.  Cannulating needle was

removed and a skin incision was made using 11 blade.  A tract was then created

using a venous dilator and through this opening, a triple lumen central venous

catheter was placed over the guidewire using the Seldinger technique.  The

guidewire was removed and all three ports tolu venous blood and saline pushed in

without any resistance.  Catheter was then sutured to the skin using interrupted

3-0 silk sutures, cleaned, and covered with Op-Site.



The patient tolerated the procedure well.  We will get a post procedure chest

x-ray.





Job ID: [id number]

DocumentID: [id number]

Dictated Date:  07/16/2022 12:44:22

Transcription Date: 07/16/2022 16:23:52

Dictated By: BRIAN CRAWLEY MD

## 2022-07-16 NOTE — TELE-ICU PROGRESS NOTE
Subjective


Date Seen by a Provider:  Jul 16, 2022


Time Seen by a Provider:  08:55


Subjective/Events-last exam





Available chart/vitals/labs/images reviewed.


Video assessment done using telemetry ICU camera, rest of exam as per RN.


Discussion with the RN, exam as per RN.





Hospital course





She looks comfortable today.  Denies any chest pain but she has a sinus 

tachycardia for which we have done an EKG and it showed anterolateral ischemia. 

Cardiology consultation is requested and echo ordered.  Hemodynamically 

improving.  She is on a bicarbonate drip per nongap metabolic acidosis.  Lactic 

acid is trending down.  Hyperglycemia improving





Sepsis Event


Evaluation


Height, Weight, BMI


Height: '"


Weight: lbs. oz. kg; 20.27 BMI


Method:Stated





Focused Exam


Lactate Level


7/15/22 18:28: Lactic Acid Level 2.99*H


7/16/22 04:35: Lactic Acid Level 2.16*H





Exam


Exam


Patient acknowledged, consented, and participated in this virtual visit which 

was conducted using real time audio/video


Vital Signs








  Date Time  Temp Pulse Resp B/P (MAP) Pulse Ox O2 Delivery O2 Flow Rate FiO2


 


7/16/22 08:00  128 27 128/92 95 Room Air  


 


7/16/22 07:54 36.7       


 


7/16/22 07:00  125      


 


7/16/22 07:00  121 26 117/88 100 Room Air  


 


7/16/22 06:00  123 30 128/93 89 Room Air  


 


7/16/22 05:00  121 25 134/97 97 Room Air  


 


7/16/22 04:00     100 Room Air  


 


7/16/22 04:00  118 26 123/91 93 Room Air  


 


7/16/22 03:00  116 17 108/67 97 Room Air  


 


7/16/22 02:00  90 20 116/78 96 Room Air  


 


7/16/22 01:00  113 32 120/90 98 Room Air  


 


7/16/22 01:00  110      


 


7/16/22 00:00  115  119/89  Room Air  


 


7/15/22 23:59     100 Room Air  


 


7/15/22 23:00  113  115/86 90 Room Air  


 


7/15/22 22:00  116 16 111/78 100 Room Air  


 


7/15/22 21:00  130 22 120/84 100 Room Air  


 


7/15/22 20:00     100 Room Air  


 


7/15/22 20:00  115 19 109/86 98 Room Air  


 


7/15/22 19:32     100 Room Air 0.00 


 


7/15/22 19:00  118      


 


7/15/22 19:00  115 19  99 Room Air  


 


7/15/22 18:53 38.9 118   99   21


 


7/15/22 18:45     99 Room Air  


 


7/15/22 18:00  118  112/85 99 Room Air  














I & O 


 


 7/16/22





 07:00


 


Intake Total 470 ml


 


Output Total 775 ml


 


Balance -305 ml








Height & Weight


Height: '"


Weight: lbs. oz. kg; 20.27 BMI


Method:Stated


General Appearance:  Chronically ill


HEENT:  PERRL/EOMI, Normal ENT Inspection, Pharynx Normal


Neck:  Full Range of Motion, Normal Inspection, Non Tender, Supple, Carotid 

Bruit


Respiratory:  Chest Non Tender, Lungs Clear, Normal Breath Sounds, No Accessory 

Muscle Use, No Respiratory Distress


Cardiovascular:  Regular Rate, Rhythm, No Edema, No Gallop, No JVD, No Murmur, 

Normal Peripheral Pulses, Tachycardia


Extremity:  Normal Capillary Refill, Normal Inspection, Normal Range of Motion, 

Non Tender, No Calf Tenderness, No Pedal Edema


Neurologic/Psychiatric:  Alert, Oriented x3, No Motor/Sensory Deficits, Normal 

Mood/Affect


Skin:  Normal Color, Warm/Dry


Lymphatic:  No Adenopathy


Other comments


PE PER RN





Results


Lab


Laboratory Tests


7/15/22 18:28








7/16/22 04:35











Assessment/Plan


Assessment/Plan


1.  Severe weakness associated with a fever and lactic acidosis.  Needs to rule 

out sepsis.


2.  Dehydration IMPROVING.


3.  COVID19 infection.


4.  Anemia of chronic disease


5.  Hyperglycemia improving.


6. possible NSTEMI





Recommendations


1.  continue bicarb drip


2.  Broad-spectrum IV antibiotics


3.  Hydrate patient with bicarb drip


4.  Monitor lactate level.


5.  DVT prophylaxis and ulcer prophylaxis.


6.  cardilogy consult, check troponin level.


7. check echo now. 


8. replace phosphate


Critical Care:  Critically Ill Patient


Time spent with patient (mins):  25











SUMAN OROURKE MD                Jul 16, 2022 08:59

## 2022-07-16 NOTE — PROGRESS NOTE
Subjective


Date Seen by a Provider:  Jul 16, 2022


Time Seen by a Provider:  12:00


Subjective/Events-last exam


Patient doing about the same


No fever noted


Cough is beginning


CXR appears to have some transition to COVID PNA?


Consult Dr Zarate for central line


Consult Dr See for chest pain


Review of Systems


General:  Fatigue, Malaise


Pulmonary:  Dyspnea, Cough


Cardiovascular:  Chest Pain





Focused Exam


Lactate Level


7/15/22 18:28: Lactic Acid Level 2.99*H


7/16/22 04:35: Lactic Acid Level 2.16*H





Objective


Exam


Last Set of Vital Signs





Vital Signs








  Date Time  Temp Pulse Resp B/P (MAP) Pulse Ox O2 Delivery O2 Flow Rate FiO2


 


7/16/22 12:00  102 25 104/81 92 Room Air  


 


7/16/22 07:54 36.7       


 


7/15/22 19:32       0.00 


 


7/15/22 18:53        21





Capillary Refill :


I&O











Intake and Output 


 


 7/16/22





 00:00


 


Intake Total 45 ml


 


Output Total 550 ml


 


Balance -505 ml


 


 


 


Intake Oral 45 ml


 


Output Urine Total 200 ml


 


Urine/Stool Mix 350 ml


 


Daily Weight Change No








General:  Alert, Oriented X3, Cooperative, No Acute Distress


Lungs:  Other (diminished)


Heart:  Other (tachy)


Psych/Mental Status:  Mental Status NL, Mood NL





Results


Lab


Laboratory Tests


7/15/22 18:28: 


D-Dimer 1.94H, Sodium Level 140, Potassium Level 3.7, Chloride Level 111H, 

Carbon Dioxide Level 15L, Anion Gap 14, Blood Urea Nitrogen 6L, Creatinine 0.84,

Estimat Glomerular Filtration Rate 72, BUN/Creatinine Ratio 7, Glucose Level 

137H, Lactic Acid Level 2.99*H, Calcium Level 6.8L, Corrected Calcium 8.2L, T

otal Bilirubin 0.3, Aspartate Amino Transf (AST/SGOT) 84H, Alanine 

Aminotransferase (ALT/SGPT) 38, Alkaline Phosphatase 40, Creatine Kinase MB 6.2,

Total Protein 5.2L, Albumin 2.3L


7/16/22 04:35: 


Sodium Level 137, Potassium Level 4.2, Chloride Level 113H, Carbon Dioxide Level

12L, Anion Gap 12, Blood Urea Nitrogen 8, Creatinine 0.83, Estimat Glomerular 

Filtration Rate 73, BUN/Creatinine Ratio 10, Glucose Level 92, Lactic Acid Level

2.16*H, Calcium Level 6.9L, Corrected Calcium 8.1L, Total Bilirubin 0.4, 

Aspartate Amino Transf (AST/SGOT) 75H, Alanine Aminotransferase (ALT/SGPT) 38, 

Alkaline Phosphatase 44, Total Protein 5.8L, Albumin 2.5L, White Blood Count 

6.4, Red Blood Count 3.77L, Hemoglobin 11.0#L, Hematocrit 34L, Mean Corpuscular 

Volume 91, Mean Corpuscular Hemoglobin 29, Mean Corpuscular Hemoglobin Concent 

32, Red Cell Distribution Width 19.0H, Platelet Count 219, Mean Platelet Volume 

10.7, Immature Granulocyte % (Auto) 0, Neutrophils (%) (Auto) 63, Lymphocytes 

(%) (Auto) 30, Monocytes (%) (Auto) 5, Eosinophils (%) (Auto) 0, Basophils (%) 

(Auto) 1, Neutrophils # (Auto) 4.0, Lymphocytes # (Auto) 1.9, Monocytes # (Auto)

0.3, Eosinophils # (Auto) 0.0, Basophils # (Auto) 0.1, Immature Granulocyte # 

(Auto) 0.0, Phosphorus Level 1.4L, Magnesium Level 1.8


7/16/22 05:25: 


Blood Gas Puncture Site LEFT RADIAL, Blood Gas Patient Temperature 98.6, 

Arterial Blood pH 7.41, Arterial Blood Partial Pressure CO2 19*L, Arterial Blood

Partial Pressure O2 58L, Arterial Blood HCO3 12*L, Arterial Blood Total CO2 

12.5L, Arterial Blood Oxygen Saturation 91L, Arterial Blood Base Excess -11.9L, 

Amor Test YES-POS, Blood Gas Ventilator Setting NO, Blood Gas Inspired Oxygen 

ROOM AIR


7/16/22 07:42: Glucometer 107





Assessment/Plan


Assessment/Plan


Assess & Plan/Chief Complaint


Assessment:


COVID infection in vaccinated and 1 booster 


Metabolic acidosis


Tachycardia


Fever


Weakness


Found down at home for 18 hours 7/14/22


Recent pelvic abscess s/p IR drain at [facility] 6/16/22


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


HTN


HLP


AF


CKD Stage 3a


s/p nephrectomy as a child


DM2


Poor vascular access central line placed by Dr Zarate


Chest pain consulting Dr See





Plan:


ICU


IVF


Monitor HR


EKG


Consult Dr Zarate for central line and Dr See for chest pain and AF





Diagnosis/Problems


Diagnosis/Problems





(1) COVID


(2) Physical debility


Status:  Acute


(3) Rhabdomyolysis


Status:  Acute


(4) Hypoglycemia


Status:  Acute


(5) Dehydration


Status:  Acute


(6) Fall


Status:  Acute


(7) Fistula


(8) Ovarian cancer in remission


(9) Solitary kidney


(10) Myopathy


(11) Hypothyroidism


(12) Diabetes mellitus


(13) Atrial fibrillation











TAMMY HURST DO                Jul 16, 2022 12:42

## 2022-07-17 LAB
ALBUMIN SERPL-MCNC: 2 GM/DL (ref 3.2–4.5)
ALP SERPL-CCNC: 36 U/L (ref 40–136)
ALT SERPL-CCNC: 25 U/L (ref 0–55)
ARTERIAL PATENCY WRIST A: (no result)
BASE EXCESS STD BLDA CALC-SCNC: -2.6 MMOL/L (ref -2.5–2.5)
BASOPHILS # BLD AUTO: 0 10^3/UL (ref 0–0.1)
BASOPHILS NFR BLD AUTO: 1 % (ref 0–10)
BDY SITE: (no result)
BILIRUB SERPL-MCNC: 0.4 MG/DL (ref 0.1–1)
BODY TEMPERATURE: 36.9
BUN/CREAT SERPL: 12
CALCIUM SERPL-MCNC: 6.2 MG/DL (ref 8.5–10.1)
CHLORIDE SERPL-SCNC: 108 MMOL/L (ref 98–107)
CO2 BLDA CALC-SCNC: 20.8 MMOL/L (ref 21–31)
CO2 SERPL-SCNC: 20 MMOL/L (ref 21–32)
CREAT SERPL-MCNC: 0.77 MG/DL (ref 0.6–1.3)
EOSINOPHIL # BLD AUTO: 0 10^3/UL (ref 0–0.3)
EOSINOPHIL NFR BLD AUTO: 1 % (ref 0–10)
GFR SERPLBLD BASED ON 1.73 SQ M-ARVRAT: 80 ML/MIN
GLUCOSE SERPL-MCNC: 68 MG/DL (ref 70–105)
HCT VFR BLD CALC: 27 % (ref 35–52)
HGB BLD-MCNC: 9 G/DL (ref 11.5–16)
INHALED O2 FLOW RATE: (no result) L/MIN
LYMPHOCYTES # BLD AUTO: 1.6 10^3/UL (ref 1–4)
LYMPHOCYTES NFR BLD AUTO: 29 % (ref 12–44)
MAGNESIUM SERPL-MCNC: 1.6 MG/DL (ref 1.6–2.4)
MANUAL DIFFERENTIAL PERFORMED BLD QL: NO
MCH RBC QN AUTO: 29 PG (ref 25–34)
MCHC RBC AUTO-ENTMCNC: 33 G/DL (ref 32–36)
MCV RBC AUTO: 88 FL (ref 80–99)
MONOCYTES # BLD AUTO: 0.3 10^3/UL (ref 0–1)
MONOCYTES NFR BLD AUTO: 5 % (ref 0–12)
NEUTROPHILS # BLD AUTO: 3.5 10^3/UL (ref 1.8–7.8)
NEUTROPHILS NFR BLD AUTO: 64 % (ref 42–75)
PCO2 BLDA: 24 MMHG (ref 35–45)
PH BLDA: 7.52 [PH] (ref 7.37–7.43)
PHOSPHATE SERPL-MCNC: 1.6 MG/DL (ref 2.3–4.7)
PLATELET # BLD: 165 10^3/UL (ref 130–400)
PMV BLD AUTO: 10.5 FL (ref 9–12.2)
PO2 BLDA: 93 MMHG (ref 79–93)
POTASSIUM SERPL-SCNC: 3.2 MMOL/L (ref 3.6–5)
PROT SERPL-MCNC: 4.9 GM/DL (ref 6.4–8.2)
SAO2 % BLDA FROM PO2: 97 % (ref 94–100)
SODIUM SERPL-SCNC: 138 MMOL/L (ref 135–145)
VENTILATION MODE VENT: NO
WBC # BLD AUTO: 5.5 10^3/UL (ref 4.3–11)

## 2022-07-17 RX ADMIN — SENNOSIDES SCH MG: 8.6 TABLET, FILM COATED ORAL at 21:04

## 2022-07-17 RX ADMIN — POTASSIUM CHLORIDE SCH MEQ: 1500 TABLET, EXTENDED RELEASE ORAL at 05:33

## 2022-07-17 RX ADMIN — INSULIN ASPART SCH UNIT: 100 INJECTION, SOLUTION INTRAVENOUS; SUBCUTANEOUS at 05:01

## 2022-07-17 RX ADMIN — INSULIN ASPART SCH UNIT: 100 INJECTION, SOLUTION INTRAVENOUS; SUBCUTANEOUS at 21:03

## 2022-07-17 RX ADMIN — DEXTROSE MONOHYDRATE ONE ML: 25 INJECTION, SOLUTION INTRAVENOUS at 19:23

## 2022-07-17 RX ADMIN — INSULIN ASPART SCH UNIT: 100 INJECTION, SOLUTION INTRAVENOUS; SUBCUTANEOUS at 13:25

## 2022-07-17 RX ADMIN — DOCUSATE SODIUM SCH MG: 100 CAPSULE ORAL at 21:04

## 2022-07-17 RX ADMIN — MAGNESIUM SULFATE IN DEXTROSE SCH MLS/HR: 10 INJECTION, SOLUTION INTRAVENOUS at 05:34

## 2022-07-17 RX ADMIN — INSULIN ASPART SCH UNIT: 100 INJECTION, SOLUTION INTRAVENOUS; SUBCUTANEOUS at 16:38

## 2022-07-17 RX ADMIN — POTASSIUM CHLORIDE SCH MLS/HR: 200 INJECTION, SOLUTION INTRAVENOUS at 05:34

## 2022-07-17 RX ADMIN — MAGNESIUM SULFATE IN DEXTROSE SCH MLS/HR: 10 INJECTION, SOLUTION INTRAVENOUS at 06:06

## 2022-07-17 RX ADMIN — SODIUM BICARBONATE SCH MLS/HR: 84 INJECTION INTRAVENOUS at 03:07

## 2022-07-17 RX ADMIN — INSULIN ASPART SCH UNIT: 100 INJECTION, SOLUTION INTRAVENOUS; SUBCUTANEOUS at 23:45

## 2022-07-17 RX ADMIN — FUROSEMIDE SCH MG: 40 TABLET ORAL at 08:46

## 2022-07-17 RX ADMIN — DEXTROSE MONOHYDRATE ONE ML: 25 INJECTION, SOLUTION INTRAVENOUS at 19:13

## 2022-07-17 RX ADMIN — ENOXAPARIN SODIUM SCH MG: 100 INJECTION SUBCUTANEOUS at 17:24

## 2022-07-17 RX ADMIN — MAGNESIUM SULFATE IN DEXTROSE SCH MLS/HR: 10 INJECTION, SOLUTION INTRAVENOUS at 06:07

## 2022-07-17 RX ADMIN — SPIRONOLACTONE SCH MG: 25 TABLET ORAL at 08:46

## 2022-07-17 RX ADMIN — SACUBITRIL AND VALSARTAN SCH TAB: 24; 26 TABLET, FILM COATED ORAL at 21:04

## 2022-07-17 RX ADMIN — SENNOSIDES SCH MG: 8.6 TABLET, FILM COATED ORAL at 08:47

## 2022-07-17 RX ADMIN — SACUBITRIL AND VALSARTAN SCH TAB: 24; 26 TABLET, FILM COATED ORAL at 08:46

## 2022-07-17 RX ADMIN — INSULIN ASPART SCH UNIT: 100 INJECTION, SOLUTION INTRAVENOUS; SUBCUTANEOUS at 01:05

## 2022-07-17 RX ADMIN — DOCUSATE SODIUM SCH MG: 100 CAPSULE ORAL at 08:46

## 2022-07-17 RX ADMIN — ASPIRIN 81 MG CHEWABLE TABLET SCH MG: 81 TABLET CHEWABLE at 08:46

## 2022-07-17 RX ADMIN — INSULIN ASPART SCH UNIT: 100 INJECTION, SOLUTION INTRAVENOUS; SUBCUTANEOUS at 08:41

## 2022-07-17 RX ADMIN — SODIUM BICARBONATE SCH MLS/HR: 84 INJECTION INTRAVENOUS at 17:24

## 2022-07-17 NOTE — TELE-ICU PROGRESS NOTE
Subjective


Date Seen by a Provider:  Jul 17, 2022


Time Seen by a Provider:  08:15


Subjective/Events-last exam


This virtual visit was conducted using real time audio/video.


Thank you for asking us to see this patient for Covid infection. Also anterolat.

isch., debilitation.


Recent events: none overnight. Getting out of bed.








PE: VSS.  O2 sat 97% on RA.





HEENT: No obvious masses, adenopathy or JVD.


              Chest: Basal crackles on auscultation.


              CV: RRR S1 S2 No murmur or added sounds.


              Abd: Non-tender. Bowel sounds Y.


              : Unremarkable. [place] N.


              CNS/psychiatric: Grossly intact. No obvious focal findings.


              Extremities:  No edema. Capillary refill < 3 seconds.


              Skin: unremarkable.





Results: Decreased Hb 8.9, Alb 2,4.  CXR: clear.


Available chart/ vitals / labs / images reviewed.


Video assessment done using teleICU camera, rest of exam as per RN.





A/P:  


 Critical Care: critically ill patient. Cont. abx, bicarb., Lov.





Discussed with ANDREAS Morales. Asked RN to reach out to eICU if any questions or 

concerns later. 


Time spent with patient/coordination of care with other health professionals 

(mins): 20





Sepsis Event


Evaluation


Height, Weight, BMI


Height: '"


Weight: lbs. oz. kg; 20.27 BMI


Method:Stated





Focused Exam


Lactate Level


7/16/22 04:35: Lactic Acid Level 2.16*H


7/16/22 14:35: Lactic Acid Level 1.53


7/17/22 04:15: Lactic Acid Level 1.07





Exam


Exam


Patient acknowledged, consented, and participated in this virtual visit which 

was conducted using real time audio/video


Vital Signs








  Date Time  Temp Pulse Resp B/P (MAP) Pulse Ox O2 Delivery O2 Flow Rate FiO2


 


7/17/22 08:00  104 10  90 Room Air  


 


7/17/22 08:00 36.4       


 


7/17/22 07:00  103 20 100/62 91 Room Air  


 


7/17/22 07:00  101      


 


7/17/22 06:00  101 23 113/81 98 Room Air  


 


7/17/22 05:00  100 14 112/78 99 Room Air  


 


7/17/22 04:00 36.2       


 


7/17/22 04:00  109 20 99/66 97 Room Air  


 


7/17/22 04:00      Room Air  


 


7/17/22 03:00  100 28 90/67 94 Room Air  


 


7/17/22 02:00  104 22 98/67 96 Room Air  


 


7/17/22 01:00  107 23 107/78 96 Room Air  


 


7/17/22 01:00  104      


 


7/17/22 00:00  106 23 100/76 95 Room Air  


 


7/16/22 23:59      Room Air  


 


7/16/22 23:00  109 33 110/76 98 Room Air  


 


7/16/22 22:00  111 21 113/82 98 Room Air  


 


7/16/22 21:00  113 15 119/83 95 Room Air  


 


7/16/22 20:00      Room Air  


 


7/16/22 20:00  118 22 115/80 97 Room Air  


 


7/16/22 20:00 36.1       


 


7/16/22 19:36     100 Room Air 0.00 


 


7/16/22 19:00  123      


 


7/16/22 19:00  117 16 123/81 96 Room Air  


 


7/16/22 18:00  112 21 124/91 95 Room Air  


 


7/16/22 17:00  105 26 121/88 94 Room Air  


 


7/16/22 16:40 36.2       


 


7/16/22 16:25      Room Air  


 


7/16/22 16:00  115 10 120/82 99 Room Air  


 


7/16/22 15:00  116 23 123/86 95 Room Air  


 


7/16/22 14:00  120  118/87 94 Room Air  


 


7/16/22 13:00  110 16 114/78 91 Room Air  


 


7/16/22 12:52  105      


 


7/16/22 12:40      Room Air  


 


7/16/22 12:00  102 25 104/81 92 Room Air  


 


7/16/22 11:00  122 25 120/82 91 Room Air  


 


7/16/22 10:00  126 25 112/74 93 Room Air  














I & O 


 


 7/17/22





 07:00


 


Intake Total 3335 ml


 


Output Total 825 ml


 


Balance 2510 ml








Height & Weight


Height: '"


Weight: lbs. oz. kg; 20.27 BMI


Method:Stated


General Appearance:  Chronically ill


HEENT:  PERRL/EOMI, Normal ENT Inspection, Pharynx Normal


Neck:  Full Range of Motion, Normal Inspection, Non Tender, Supple, Carotid 

Bruit


Respiratory:  Chest Non Tender, Lungs Clear, Normal Breath Sounds, No Accessory 

Muscle Use, No Respiratory Distress


Cardiovascular:  Regular Rate, Rhythm, No Edema, No Gallop, No JVD, No Murmur, 

Normal Peripheral Pulses, Tachycardia


Extremity:  Normal Capillary Refill, Normal Inspection, Normal Range of Motion, 

Non Tender, No Calf Tenderness, No Pedal Edema


Neurologic/Psychiatric:  Alert, Oriented x3, No Motor/Sensory Deficits, Normal 

Mood/Affect


Skin:  Normal Color, Warm/Dry


Lymphatic:  No Adenopathy





Results


Lab


Laboratory Tests


7/15/22 18:28








7/16/22 04:35








7/17/22 04:15











Assessment/Plan


Assessment/Plan


See free text.


Critical Care:  Critically Ill Patient











LUCIAN EDUARDO MD          Jul 17, 2022 09:15

## 2022-07-17 NOTE — PROGRESS NOTE
Subjective


Date Seen by a Provider:  Jul 17, 2022


Time Seen by a Provider:  11:00


Subjective/Events-last exam


Patient is doing better


No hypoxia noted


EF on ECHO 20-25% ischemic type


Type II MI noted


Fistula remains


Remains in isolation


No pain reported


Review of Systems


General:  Fatigue, Malaise


Pulmonary:  Dyspnea





Focused Exam


Lactate Level


7/16/22 04:35: Lactic Acid Level 2.16*H


7/16/22 14:35: Lactic Acid Level 1.53


7/17/22 04:15: Lactic Acid Level 1.07


Lactic Acid Level





Laboratory Tests








Test


 7/17/22


04:15


 


Lactic Acid Level


 1.07 MMOL/L


(0.50-2.00)











Objective


Exam


Last Set of Vital Signs





Vital Signs








  Date Time  Temp Pulse Resp B/P (MAP) Pulse Ox O2 Delivery O2 Flow Rate FiO2


 


7/17/22 06:00  101 23 113/81 98 Room Air  


 


7/17/22 04:00 36.2       


 


7/16/22 19:36       0.00 


 


7/15/22 18:53        21





Capillary Refill :


I&O











Intake and Output 


 


 7/17/22





 00:00


 


Intake Total 3435 ml


 


Output Total 625 ml


 


Balance 2810 ml


 


 


 


Intake Oral 975 ml


 


IV Total 2460 ml


 


Output Urine Total 400 ml


 


Urine/Stool Mix 225 ml


 


# Voids 2


 


# Bowel Movements 5








General:  Alert, Oriented X3, Cooperative, No Acute Distress


Lungs:  Clear to Auscultation, Normal Air Movement


Heart:  Regular Rate, Normal S1, Normal S2, No Murmurs


Psych/Mental Status:  Mental Status NL, Mood NL





Results


Lab


Laboratory Tests


7/16/22 07:42: Glucometer 107


7/16/22 11:42: Glucometer 159H


7/16/22 14:35: 


Lactic Acid Level 1.53, Troponin I 1.634*H


7/16/22 16:09: Glucometer 215H


7/16/22 20:28: Glucometer 233H


7/17/22 00:50: Glucometer 97


7/17/22 04:13: Glucometer 70


7/17/22 04:15: 


White Blood Count 5.5, Red Blood Count 3.11L, Hemoglobin 9.0L, Hematocrit 27L, 

Mean Corpuscular Volume 88, Mean Corpuscular Hemoglobin 29, Mean Corpuscular 

Hemoglobin Concent 33, Red Cell Distribution Width 18.7H, Platelet Count 165, 

Mean Platelet Volume 10.5, Immature Granulocyte % (Auto) 0, Neutrophils (%) 

(Auto) 64, Lymphocytes (%) (Auto) 29, Monocytes (%) (Auto) 5, Eosinophils (%) 

(Auto) 1, Basophils (%) (Auto) 1, Neutrophils # (Auto) 3.5, Lymphocytes # (Auto)

1.6, Monocytes # (Auto) 0.3, Eosinophils # (Auto) 0.0, Basophils # (Auto) 0.0, 

Immature Granulocyte # (Auto) 0.0, Sodium Level 138, Potassium Level 3.2L, 

Chloride Level 108H, Carbon Dioxide Level 20L, Anion Gap 10, Blood Urea Nitrogen

9, Creatinine 0.77, Estimat Glomerular Filtration Rate 80, BUN/Creatinine Ratio 

12, Glucose Level 68L, Lactic Acid Level 1.07, Calcium Level 6.2L, Corrected 

Calcium 7.8L, Phosphorus Level 1.6L, Magnesium Level 1.6, Total Bilirubin 0.4, 

Aspartate Amino Transf (AST/SGOT) 45H, Alanine Aminotransferase (ALT/SGPT) 25, 

Alkaline Phosphatase 36L, Troponin I 1.231*H, Total Protein 4.9L, Albumin 2.0L


7/17/22 05:30: 


Blood Gas Puncture Site L RAD, Blood Gas Patient Temperature 36.9, Arterial 

Blood pH 7.52H, Arterial Blood Partial Pressure CO2 24L, Arterial Blood Partial 

Pressure O2 93, Arterial Blood HCO3 20L, Arterial Blood Total CO2 20.8L, 

Arterial Blood Oxygen Saturation 97, Arterial Blood Base Excess -2.6L, Amor 

Test YES-POS, Blood Gas Ventilator Setting NO, Blood Gas Inspired Oxygen ROOM 

AIR





Assessment/Plan


Assessment/Plan


Assess & Plan/Chief Complaint


Assessment:


COVID infection in vaccinated and 1 booster 


Metabolic acidosis


Tachycardia


Fever


Weakness


Found down at home for 18 hours 7/14/22


Recent pelvic abscess s/p IR drain at [facility] 6/16/22


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


HTN


HLP


AF


CKD Stage 3a


s/p nephrectomy as a child


DM2


Poor vascular access central line placed by Dr Zarate


Chest pain consulting Dr See


Type II MI


Cardiomyopathy EF 20-25%





Plan:


ICU


IVF


Monitor HR


EKG


Consult Dr Zarate for central line and Dr See for chest pain and AF





Diagnosis/Problems


Diagnosis/Problems





(1) COVID


(2) Physical debility


Status:  Acute


(3) Rhabdomyolysis


Status:  Acute


(4) Hypoglycemia


Status:  Acute


(5) Dehydration


Status:  Acute


(6) Fall


Status:  Acute


(7) Fistula


(8) Ovarian cancer in remission


(9) Solitary kidney


(10) Myopathy


(11) Hypothyroidism


(12) Diabetes mellitus


(13) Atrial fibrillation











TAMMY HURST DO                Jul 17, 2022 06:49

## 2022-07-17 NOTE — DIAGNOSTIC IMAGING REPORT
INDICATION: Dyspnea.



Comparison is made with prior exam of 07/16/2022.



FINDINGS: There is mild cardiomegaly. Some venous congestion.

There is no  pleural effusion or pneumothorax. The mediastinum is

unremarkable. Central venous catheter has its tip in superior

vena cava.



IMPRESSION: Mild central pulmonary venous congestion.



Dictated by: 



  Dictated on workstation # [id number]

## 2022-07-17 NOTE — PROGRESS NOTE - CARDIOLOGY
Cardiology SOAP Progress Note


Subjective:


Notes gen weakness and malaise 


Does not report focal weakness


Denies shortness of breath at rest


Denies cp or palp


No syncope since admission


States did not have syncope at home (at time of admission); just was weak and 

fell





Objective:


I&O/Vital Signs











 7/17/22 7/17/22 7/17/22 7/17/22





 03:00 04:00 04:00 04:00


 


Temp    36.2


 


Pulse 100  109 


 


Resp 28  20 


 


B/P (MAP) 90/67  99/66 


 


Pulse Ox 94  97 


 


O2 Delivery Room Air Room Air Room Air 


 


    





 7/17/22 7/17/22 7/17/22 7/17/22





 05:00 06:00 07:00 07:00


 


Pulse 100 101 101 103


 


Resp 14 23  20


 


B/P (MAP) 112/78 113/81  100/62


 


Pulse Ox 99 98  91


 


O2 Delivery Room Air Room Air  Room Air





 7/17/22 7/17/22 7/17/22 7/17/22





 08:00 08:00 08:40 09:00


 


Temp 36.4   


 


Pulse  104  89


 


Resp  10  23


 


B/P (MAP)    179/87


 


Pulse Ox  90  92


 


O2 Delivery  Room Air Room Air Room Air


 


    





 7/17/22 7/17/22 7/17/22 7/17/22





 10:00 11:00 11:32 11:55


 


Temp   36.0 


 


Pulse 104 92  


 


Resp 30 14  


 


Pulse Ox 92 97  


 


O2 Delivery Room Air Room Air  Room Air


 


    





 7/17/22 7/17/22 7/17/22 7/17/22





 12:00 13:00 13:00 14:00


 


Pulse 92 93 83 96


 


Resp 24 12  22


 


B/P (MAP) 91/63   


 


Pulse Ox 99 97  98


 


O2 Delivery Room Air Room Air  Room Air














 7/17/22





 00:00


 


Intake Total 910 ml


 


Output Total 400 ml


 


Balance 510 ml








Constitutional:  AAO x 3, well-developed, other (thin appearing)


Respiratory:  No accessory muscle use; other (fair to good, bilateral air entry)


Cardiovascular:  regular rate-rhythm, S1 and S2, systolic murmur (soft [place] at 

card base)


Gastrointestional:  No tender; soft; No guarding, No rebound; audible bowel 

sounds


Extremities:  No clubbing, No cyanosis, No significant edema


Neurologic/Psychiatric:  oriented x 3, other (moves all limbs equally)


Skin:  warm/dry; No rash, No ulcerations





Results/Procedures:


Labs


Laboratory Tests


7/16/22 14:35: 


Lactic Acid Level 1.53, Troponin I 1.634*H


7/16/22 16:09: Glucometer 215H


7/16/22 20:28: Glucometer 233H


7/17/22 00:50: Glucometer 97


7/17/22 04:13: Glucometer 70


7/17/22 04:15: 


White Blood Count 5.5, Red Blood Count 3.11L, Hemoglobin 9.0L, Hematocrit 27L, 

Mean Corpuscular Volume 88, Mean Corpuscular Hemoglobin 29, Mean Corpuscular 

Hemoglobin Concent 33, Red Cell Distribution Width 18.7H, Platelet Count 165, 

Mean Platelet Volume 10.5, Immature Granulocyte % (Auto) 0, Neutrophils (%) 

(Auto) 64, Lymphocytes (%) (Auto) 29, Monocytes (%) (Auto) 5, Eosinophils (%) 

(Auto) 1, Basophils (%) (Auto) 1, Neutrophils # (Auto) 3.5, Lymphocytes # (Auto)

1.6, Monocytes # (Auto) 0.3, Eosinophils # (Auto) 0.0, Basophils # (Auto) 0.0, 

Immature Granulocyte # (Auto) 0.0, Sodium Level 138, Potassium Level 3.2L, 

Chloride Level 108H, Carbon Dioxide Level 20L, Anion Gap 10, Blood Urea Nitrogen

9, Creatinine 0.77, Estimat Glomerular Filtration Rate 80, BUN/Creatinine Ratio 

12, Glucose Level 68L, Lactic Acid Level 1.07, Calcium Level 6.2L, Corrected 

Calcium 7.8L, Phosphorus Level 1.6L, Magnesium Level 1.6, Total Bilirubin 0.4, 

Aspartate Amino Transf (AST/SGOT) 45H, Alanine Aminotransferase (ALT/SGPT) 25, 

Alkaline Phosphatase 36L, Troponin I 1.231*H, Total Protein 4.9L, Albumin 2.0L


7/17/22 05:30: 


Blood Gas Puncture Site L RAD, Blood Gas Patient Temperature 36.9, Arterial 

Blood pH 7.52H, Arterial Blood Partial Pressure CO2 24L, Arterial Blood Partial 

Pressure O2 93, Arterial Blood HCO3 20L, Arterial Blood Total CO2 20.8L, 

Arterial Blood Oxygen Saturation 97, Arterial Blood Base Excess -2.6L, Amor 

Test YES-POS, Blood Gas Ventilator Setting NO, Blood Gas Inspired Oxygen ROOM 

AIR


7/17/22 06:54: Glucometer 117H


7/17/22 07:47: Glucometer 100


7/17/22 11:47: Glucometer 206H





Microbiology


7/15/22 MRSA Screen - Final, Complete


          MRSA not isolated





Laboratory Tests


7/15/22 18:28








7/16/22 04:35








7/17/22 04:15











A/P:


Assessment:





Ac systolic CHF (HFrEF) due to ischemic cardiomyopathy


- Echo on 7/16/22: LVEF 20-25%, anteroseptal and apical akinesis, mod 

enlargement of LA, grade 2 diastolic dysfunction, mild MR, PASP 35-40 mmHg





Mild troponin elevation


- likely type 2 MI due to ac systolic CHF





Covid-19





Gen weakness and malaise after long hospitalization of pelvic abscess and sepsis

(June - July 2022)





Transient episode of atrial fibrillation in November 2020 treated with 

electrical cardioversion by Dr Birch. No recurrence reported. Follows with Dr Birch





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 

ileostomy reversal in 2021





H/o hypertension and hyperlipidemia





Diabetes mellitus II, followed and managed by primary care physician





History of solitary kidney and history of ovarian cancer.





Hypothyroidism, followed and managed by primary care physician





Rheumatoid arthritis and osteoarthritis





Carotid artery stenosis followed by Dr Jeffers


Plan:





* Continue ASA


* Continue heart failure meds (see orders)


* DVT prophylaxis with enoxaparin


* Replenish electrolytes


* Monitor labs closely


* Dr Trujillo managing acute Covid-19


* I discussed her CV issues with Ms. STANTON Saucedo MD FACP FAC CCDS   Jul 17, 2022 14:17

## 2022-07-18 VITALS — DIASTOLIC BLOOD PRESSURE: 68 MMHG | SYSTOLIC BLOOD PRESSURE: 97 MMHG

## 2022-07-18 VITALS — SYSTOLIC BLOOD PRESSURE: 106 MMHG | DIASTOLIC BLOOD PRESSURE: 73 MMHG

## 2022-07-18 VITALS — SYSTOLIC BLOOD PRESSURE: 99 MMHG | DIASTOLIC BLOOD PRESSURE: 57 MMHG

## 2022-07-18 LAB
ALBUMIN SERPL-MCNC: 1.9 GM/DL (ref 3.2–4.5)
ALP SERPL-CCNC: 39 U/L (ref 40–136)
ALT SERPL-CCNC: 22 U/L (ref 0–55)
ARTERIAL PATENCY WRIST A: (no result)
BASE EXCESS STD BLDA CALC-SCNC: 2.5 MMOL/L (ref -2.5–2.5)
BASOPHILS # BLD AUTO: 0 10^3/UL (ref 0–0.1)
BASOPHILS NFR BLD AUTO: 1 % (ref 0–10)
BDY SITE: (no result)
BILIRUB SERPL-MCNC: 0.4 MG/DL (ref 0.1–1)
BODY TEMPERATURE: 36.8
BUN/CREAT SERPL: 13
CALCIUM SERPL-MCNC: 6.1 MG/DL (ref 8.5–10.1)
CHLORIDE SERPL-SCNC: 104 MMOL/L (ref 98–107)
CO2 BLDA CALC-SCNC: 26.4 MMOL/L (ref 21–31)
CO2 SERPL-SCNC: 23 MMOL/L (ref 21–32)
CREAT SERPL-MCNC: 0.79 MG/DL (ref 0.6–1.3)
EOSINOPHIL # BLD AUTO: 0.1 10^3/UL (ref 0–0.3)
EOSINOPHIL NFR BLD AUTO: 2 % (ref 0–10)
GFR SERPLBLD BASED ON 1.73 SQ M-ARVRAT: 78 ML/MIN
GLUCOSE SERPL-MCNC: 89 MG/DL (ref 70–105)
HCT VFR BLD CALC: 26 % (ref 35–52)
HGB BLD-MCNC: 8.6 G/DL (ref 11.5–16)
INHALED O2 FLOW RATE: (no result) L/MIN
LYMPHOCYTES # BLD AUTO: 1.5 10^3/UL (ref 1–4)
LYMPHOCYTES NFR BLD AUTO: 35 % (ref 12–44)
MAGNESIUM SERPL-MCNC: 1.9 MG/DL (ref 1.6–2.4)
MANUAL DIFFERENTIAL PERFORMED BLD QL: NO
MCH RBC QN AUTO: 29 PG (ref 25–34)
MCHC RBC AUTO-ENTMCNC: 33 G/DL (ref 32–36)
MCV RBC AUTO: 88 FL (ref 80–99)
MONOCYTES # BLD AUTO: 0.2 10^3/UL (ref 0–1)
MONOCYTES NFR BLD AUTO: 5 % (ref 0–12)
NEUTROPHILS # BLD AUTO: 2.5 10^3/UL (ref 1.8–7.8)
NEUTROPHILS NFR BLD AUTO: 58 % (ref 42–75)
PCO2 BLDA: 32 MMHG (ref 35–45)
PH BLDA: 7.51 [PH] (ref 7.37–7.43)
PHOSPHATE SERPL-MCNC: 1.2 MG/DL (ref 2.3–4.7)
PLATELET # BLD: 122 10^3/UL (ref 130–400)
PMV BLD AUTO: 10.8 FL (ref 9–12.2)
PO2 BLDA: 57 MMHG (ref 79–93)
POTASSIUM SERPL-SCNC: 3.4 MMOL/L (ref 3.6–5)
PROT SERPL-MCNC: 4.6 GM/DL (ref 6.4–8.2)
SAO2 % BLDA FROM PO2: 92 % (ref 94–100)
SODIUM SERPL-SCNC: 136 MMOL/L (ref 135–145)
VENTILATION MODE VENT: NO
WBC # BLD AUTO: 4.3 10^3/UL (ref 4.3–11)

## 2022-07-18 RX ADMIN — SENNOSIDES SCH MG: 8.6 TABLET, FILM COATED ORAL at 21:43

## 2022-07-18 RX ADMIN — MAGNESIUM SULFATE IN DEXTROSE SCH MLS/HR: 10 INJECTION, SOLUTION INTRAVENOUS at 05:13

## 2022-07-18 RX ADMIN — INSULIN ASPART SCH UNIT: 100 INJECTION, SOLUTION INTRAVENOUS; SUBCUTANEOUS at 12:21

## 2022-07-18 RX ADMIN — POTASSIUM CHLORIDE SCH MLS/HR: 200 INJECTION, SOLUTION INTRAVENOUS at 05:13

## 2022-07-18 RX ADMIN — INSULIN ASPART SCH UNIT: 100 INJECTION, SOLUTION INTRAVENOUS; SUBCUTANEOUS at 21:30

## 2022-07-18 RX ADMIN — SENNOSIDES SCH MG: 8.6 TABLET, FILM COATED ORAL at 07:41

## 2022-07-18 RX ADMIN — INSULIN ASPART SCH UNIT: 100 INJECTION, SOLUTION INTRAVENOUS; SUBCUTANEOUS at 21:46

## 2022-07-18 RX ADMIN — SPIRONOLACTONE SCH MG: 25 TABLET ORAL at 08:49

## 2022-07-18 RX ADMIN — ENOXAPARIN SODIUM SCH MG: 100 INJECTION SUBCUTANEOUS at 18:27

## 2022-07-18 RX ADMIN — INSULIN ASPART SCH UNIT: 100 INJECTION, SOLUTION INTRAVENOUS; SUBCUTANEOUS at 16:39

## 2022-07-18 RX ADMIN — DOCUSATE SODIUM SCH MG: 100 CAPSULE ORAL at 21:43

## 2022-07-18 RX ADMIN — SODIUM BICARBONATE SCH MLS/HR: 84 INJECTION INTRAVENOUS at 05:56

## 2022-07-18 RX ADMIN — SACUBITRIL AND VALSARTAN SCH TAB: 24; 26 TABLET, FILM COATED ORAL at 21:30

## 2022-07-18 RX ADMIN — SACUBITRIL AND VALSARTAN SCH TAB: 24; 26 TABLET, FILM COATED ORAL at 08:49

## 2022-07-18 RX ADMIN — ASPIRIN 81 MG CHEWABLE TABLET SCH MG: 81 TABLET CHEWABLE at 08:49

## 2022-07-18 RX ADMIN — INSULIN ASPART SCH UNIT: 100 INJECTION, SOLUTION INTRAVENOUS; SUBCUTANEOUS at 03:35

## 2022-07-18 RX ADMIN — DOCUSATE SODIUM SCH MG: 100 CAPSULE ORAL at 07:41

## 2022-07-18 RX ADMIN — FUROSEMIDE SCH MG: 40 TABLET ORAL at 08:48

## 2022-07-18 RX ADMIN — POTASSIUM CHLORIDE SCH MEQ: 1500 TABLET, EXTENDED RELEASE ORAL at 05:14

## 2022-07-18 RX ADMIN — INSULIN ASPART SCH UNIT: 100 INJECTION, SOLUTION INTRAVENOUS; SUBCUTANEOUS at 08:48

## 2022-07-18 NOTE — PROGRESS NOTE - CARDIOLOGY
Cardiology SOAP Progress Note


Subjective:


Sitting up in bed


No c/o CP


Feels breathing is better today


No c/o palpitations





Objective:


I&O/Vital Signs











 22





 23:11 03:22 03:54 06:32


 


Temp 37.2 37.0  


 


Pulse 101 105 97 98


 


Resp 18 18  


 


B/P (MAP) 100/67 (78) 110/75 (87)  


 


Pulse Ox 99 98  


 


O2 Delivery Room Air Room Air  


 


    





 22





 07:15 07:38 08:28 08:39


 


Temp  37.6  


 


Pulse  102  99


 


Resp  18  


 


B/P (MAP)  103/64 (77)  


 


Pulse Ox  97 96 96


 


O2 Delivery Room Air Room Air Room Air 


 


FiO2    21














 22





 00:00


 


Intake Total 680 ml


 


Balance 680 ml








Constitutional:  AAO x 3, well-developed, other (thin appearing)


Respiratory:  No accessory muscle use; other (fair to good, bilateral air entry,

diminished bases)


Cardiovascular:  regular rate-rhythm, S1 and S2, systolic murmur (soft [place] at 

card base)


Gastrointestional:  No tender; soft; No guarding, No rebound; audible bowel 

sounds


Extremities:  No clubbing, No cyanosis, No significant edema


Neurologic/Psychiatric:  oriented x 3, other (moves all limbs equally)


Skin:  warm/dry; No rash, No ulcerations





Results/Procedures:


Labs


Laboratory Tests


22 11:04: Glucometer 242H


22 15:22: Glucometer 155H


22 20:32: Glucometer 162H


22 03:19: Glucometer 92


22 05:08: 


White Blood Count 5.8, Red Blood Count 3.19L, Hemoglobin 9.2L, Hematocrit 28L, 

Mean Corpuscular Volume 89, Mean Corpuscular Hemoglobin 29, Mean Corpuscular 

Hemoglobin Concent 32, Red Cell Distribution Width 17.7H, Platelet Count 152, 

Mean Platelet Volume 11.5, Immature Granulocyte % (Auto) 0, Neutrophils (%) 

(Auto) 61, Lymphocytes (%) (Auto) 34, Monocytes (%) (Auto) 4, Eosinophils (%) 

(Auto) 1, Basophils (%) (Auto) 0, Neutrophils # (Auto) 3.6, Lymphocytes # (Auto)

2.0, Monocytes # (Auto) 0.2, Eosinophils # (Auto) 0.0, Basophils # (Auto) 0.0, 

Immature Granulocyte # (Auto) 0.0, Sodium Level 139, Potassium Level 3.9, 

Chloride Level 104, Carbon Dioxide Level 21, Anion Gap 14, Blood Urea Nitrogen 

11, Creatinine 1.20, Estimat Glomerular Filtration Rate 47, BUN/Creatinine Ratio

9, Glucose Level 107H, Calcium Level 6.2L, Corrected Calcium 7.6L, Magnesium 

Level 1.7, Total Bilirubin 0.4, Aspartate Amino Transf (AST/SGOT) 32, Alanine 

Aminotransferase (ALT/SGPT) 18, Alkaline Phosphatase 56, Total Protein 5.4L, 

Albumin 2.3L





Microbiology


7/15/22 MRSA Screen - Final, Complete


          MRSA not isolated





Procedures


NAME:   JOSE BARBER


[facility] REC#:   [id number]


ACCOUNT#:   [id number]


PT STATUS:   ADM IN


:   1945


PHYSICIAN:   TAMMY HURST DO


ADMIT DATE:   07/15/22/ICU


***Signed***


Date of Exam:22





CHEST 1 VIEW, AP/PA ONLY








EXAM: CHEST 1 VIEW, AP/PA ONLY





INDICATION: COVID positive. Pneumonia.





COMPARISON: 2022.





FINDINGS: Left subclavian CVC tip mid SVC.  Normal heart size and


central pulmonary vascularity. Atelectasis or infiltrate right


lung base has progressed since the prior exam. No pleural


effusion or pneumothorax. No acute osseous findings.





IMPRESSION: Interval progression of atelectasis or infiltrate


right lung base.





Dictated by: 





  Dictated on workstation # [id number]








Dict:   [id number]


Trans:   [id number]


CV [id number]





Interpreted by:     HARDEEP GAVIN MD


Electronically signed by: HARDEEP GAVIN MD [id number]





A/P:


Assessment:





Ac systolic CHF (HFrEF) due to ischemic cardiomyopathy


- Echo on 22: LVEF 20-25%, anteroseptal and apical akinesis, mod e

nlargement of LA, grade 2 diastolic dysfunction, mild MR, PASP 35-40 mmHg





Mild troponin elevation


- likely type 2 MI due to ac systolic CHF





Covid-19





Gen weakness and malaise after long hospitalization of pelvic abscess and sepsis

( - 2022)





Transient episode of atrial fibrillation in 2020 treated with 

electrical cardioversion by Dr Birch. No recurrence reported. Follows with Dr Birch





History of colectomy and ileostomy in  for C. difficile colitis; subsequent 

ileostomy reversal in 





H/o hypertension and hyperlipidemia





Diabetes mellitus II, followed and managed by primary care physician





History of solitary kidney and history of ovarian cancer.





Hypothyroidism, followed and managed by primary care physician





Rheumatoid arthritis and osteoarthritis





Carotid artery stenosis followed by Dr Jeffers


Plan:





* Continue ASA


* Continue heart failure meds - titrate as tolerated


* DVT prophylaxis with enoxaparin


* Replenish electrolytes


* Monitor labs closely


* Dr Hurst managing acute Covid-19


* I discussed her CV issues with Ms. ALEYDA Baker           2022 09:33

## 2022-07-18 NOTE — THERAPY TEAM DISCHARGE SUMMARY
Therapy Discharge Summary


Discharge Recommendations


Date of Discharge


Jul 15, 2022 at 17:30





Physical Therapy


Patient came to rehab with dehydration/fall/hypoglycemia.  Upon evaluation 

patient performs rolling and supine <-> sit with independence, sit <-> stand min

assist, transfers and car transfer CGA, ambulates 50' with a rolling walker with

CGA (including 50' with at least 2 turns of 90 degrees and 10' over an uneven 

surface).  Patient was discharged from rehab that same day due to medical 

complications.  Patient will be discharged from PT at this time.


Roll Left to Right (QC):  6


Sit to Lying (QC):  6


Lying to Sitting/Side of Bed(Q:  6


Sit to Stand (QC):  3


Chair/Bed-to-Chair Xfer(QC):  4


Toilet Transfer (QC):  4


Car Transfer (QC):  4


Does the Patient Walk:  Yes


Mode of Locomotion:  Walk


Anticipated Mode of Locomotion:  Walk


Walk 10 feet (QC):  4


Walk 50 ft with 2 Turns(QC):  4


Walk 150 ft (QC):  88


Walking 10ft on uneven surface:  4


Distance:  50'


Gait Assistive Device:  FWW


Wheel 50 ft with 2 turns (QC):  9


Wheel 150 ft (QC):  9


1 Step (curb) (QC):  88


4 Steps (QC):  88


12 Steps (QC):  88


Balance Sitting Static:  Normal


Balance Sitting Dynamic:  Normal


Balance-Standing Static:  Fair


Picking up an Object (QC):  88





Occupational Therapy


Decreased Activ Tolerance, Decreased Safety Aware, Decreased UE Strength, 

Impaired Cognition, Impaired Funct Balance, Impaired I ADL's, Impaired Self-Care

Skills


Eating (QC):  4


Oral Hygiene (QC):  4 (per clinical judgment)


Shower/Bathe Self (QC):  3 (per clinical judgment)


Upper Body Dressing (QC):  10 (pt reports family will be bringing in clothing)


Lower Body Dressing (QC):  2 (max a to thread BLE's into brief. Min-mod a to 

stand. Balance worsens when removing unilateral UE support. Mod a to pull up 

over hips.)


On/Off Footwear (QC):  2 (max a due to impaired flexibility, poor endurance, 

strength, and increased SOB when bending.)


Toileting Hygiene (QC):  1 (koo catheter)





PT Long Term Goals


Long Term Goals


PT Long Term Goals Time Frame:  Aug 5, 2022


Roll Left to Right (QC):  6


Sit to Lying (QC):  6


Lying-Sitting on Side/Bed(QC):  6


Sit to Stand (QC):  6


Chair/Bed-to-Chair Xfer(QC):  6


Car Transfer (QC):  6


Does the Patient Walk:  Yes


Walk 10 feet (QC):  6


Walk 10ft-Uneven Surface(QC):  6


Walk 50ft with 2 Turns (QC):  6


Walk 150 ft (QC):  6


Wheel 50 feet with 2 turns (QC:  9


1 Step (curb) (QC):  4 (CGA)


4 Steps (QC):  4 (CGA)


12 Steps (QC):  88


Picking up an Object (QC):  6





OT Long Term Goals


Long Term Goals


Time Frame:  Jul 30, 2022


Eating (QC):  6


Oral Hygiene (QC):  6


Shower/Bathe Self (QC):  5


Upper Body Dressing (QC):  5


Lower Body Dressing (QC):  5


On/Off Footwear (QC):  5


Toileting Hygiene (QC):  6


Toilet/Commode Transfer (QC):  6


1=Demonstrate adherence to instructed precautions during ADL tasks.


2=Patient will verbalize/demonstrate understanding of assistive devices/modifi

cations for ADL.


3=Patient will improve strength/tolerance for activity to enable patient to 

perform ADL's.





Speech Long Term Goals


Long Term Goals


1. The patient will demonstrate improved cognitive linguistic skills for safe 

discharge to the least restrictive environment.


Time Frame:  Two Weeks.











GORDON HOLCOMB PT                Jul 18, 2022 15:36

## 2022-07-18 NOTE — PHYSICAL THERAPY EVALUATION
PT Evaluation-General


Medical Diagnosis


Admission Date


Jul 15, 2022 at 16:45


Medical Diagnosis:  Covid


Onset Date:  Jul 15, 2022





Therapy Diagnosis


Therapy Diagnosis:  debility/weakness





Precautions


Precautions/Isolations:  Airborne Isolation, Contact Isolation, Droplet 

Isolation, Fall Prevention





Referral


Physician:  Ronnie


Reason for Referral:  Evaluation/Treatment





Medical History


Pertinent Medical History:  Atrial Fib, DM, HTN, Hypothroidism, Renal 

Insufficiency


Current History


Transferred to ICU from ARU due to fever, tachycardia and Covid


Reviewed History:  Yes





Social History


Home:  Single Level





Prior


Prior Level of Function


SCALE: Activities may be completed with or without assistive devices.





6-Indepedent-patient completes the activity by him/herself with no assistance 

from a helper.


5-Set-up or Clean-up Assistance-helper sets up or cleans up; patient completes 

activity. [place] assists only prior to or  


    following the activity.


4-Supervision or Touching Assistance-helper provides verbal cues and/or 

touching/steadying and/or contact guard assistance as patient completes 

activity. Assistance may be provided   


    throughout the activity or intermittently.


3-Partial/Moderate Assistance-helper does LESS THAN HALF the effort. [place] 

lifts, holds or supports trunk or limbs, but provides less than half the effort.


2-Substantial/Maximal Assistance-helper does MORE THAN HALF the effort. [place] 

lifts or holds trunk or limbs and provides more than half the effort.


9-Keoxqzxrt-puzoqv does ALL the effort. Patient does none of the effort to 

complete the activity. Or, the assistance of 2 or more helpers is required for 

the patient to complete the  


    activity.


If activity was not attempted, code reason:


7-Patient Refused.


9-Not Applicable-not attempted and the patient did not perform the activity 

before the current illness, exacerbation or injury.


10-Not Attempted due to Environmental Limitations-(lack of equipment, weather 

restraints, etc.).


88-Not Attempted due to Medical Conditions or Safety Concerns.


Bed Mobility:  6


Transfers (B,C,W/C):  6


Gait:  6


Stairs:  6


Indoor Mobility (Ambulation):  Independent


Stairs:  Independent





PT Evaluation-Current


Subjective


Patient reports she is feeling so much better.  Agrees to therapy.





Objective


Patient Orientation:  Normal For Age


Attachments:  IV





ROM/Strength


ROM Lower Extremities


bilateral LE WFL


Strength Lower Extremities


3+/5 grossly bilateral LE





Integumentary/Posture


Bowel Incontinence:  No


Bladder Incontinence:  No


Posture


WFL





Neuromuscular


(Tone, Coordination, Reflexes)


grossly intact





Sensory


Vision:  Wears Glasses


Hearing:  Functional





Transfers


Lying to Sitting/Side of Bed(Q:  4


Sit to Stand (QC):  4


Chair/Bed-to-Chair Xfer(QC):  4





Gait


Mode of Locomotion:  Walk


Anticipated Mode of Locomotion:  Walk


Walk 10 feet (QC):  4


Walk 50 ft with 2 Turns(QC):  4


Walk 150 ft (QC):  88


Distance:  60'


Gait Assistive Device:  FWW


Comments/Gait Description


safe and functional with no deviation





Balance


Sitting Static:  Normal


Sitting Dynamic:  Normal


Standing Static:  Good


 Standing Dynamic:  Good





Assessment/Needs


76 y.o. female, will be seen by skilled PT to address functional strength and 

mobility to improve current LOF to safely return to home at maximum LOF.


Rehab Potential:  Fair





PT Long Term Goals


Long Term Goals


PT Long Term Goals Time Frame:  Jul 30, 2022


Roll Left & Right (QC):  6


Sit to Lying (QC):  6


Lying-Sitting on Side/Bed(QC):  6


Sit to Stand (QC):  6


Chair/Bed-to-Chair Xfer(QC):  6


Toilet Transfer (QC):  6


Walk 10 feet (QC):  6


Walk 50ft with 2 Turns (QC):  6


Walk 150 ft (QC):  6





PT Plan


Problem List


Problem List:  Activity Tolerance, Functional Strength, Safety, Balance, Gait, 

Transfer





Treatment/Plan


Treatment Plan:  Continue Plan of Care


Treatment Plan:  Bed Mobility, Education, Functional Activity Cherise, Functional 

Strength, Gait, Safety, Therapeutic Exercise, Transfers


Treatment Duration:  Jul 30, 2022


Frequency:  6 times per week


Estimated Hrs Per Day:  .25 hour per day


Patient and/or Family Agrees t:  Yes





Time/GCodes


Time In:  1321


Time Out:  1331


Total Billed Treatment Time:  10


Total Billed Treatment


1 visit


EVModC 10 min











ERIC VALLE PT              Jul 18, 2022 13:55

## 2022-07-18 NOTE — PROGRESS NOTE
ELDON BLACKBURN A MED STUDENT 7/18/22 1226:


Subjective


Date Seen by a Provider:  Jul 18, 2022


Time Seen by a Provider:  08:00


Subjective/Events-last exam


Pt lying in bed comfortably this morning. Pt states she is feeling better today 

and has more of an appetite. She reports she is having liquid BMs daily. Pt 

denies any concerns at this time.


Review of Systems


General:  No Chills, No Fatigue


HEENT:  No Head Aches, No Visual Changes


Pulmonary:  No Dyspnea, No Cough


Cardiovascular:  No: Chest Pain, Palpitations


Gastrointestinal:  No: Nausea, Vomiting


Genitourinary:  No Dysuria, No Frequency


Musculoskeletal:  No: neck pain, shoulder pain


Neurological:  No: Weakness, Numbness





Focused Exam


Lactate Level


7/16/22 04:35: Lactic Acid Level 2.16*H


7/16/22 14:35: Lactic Acid Level 1.53


7/17/22 04:15: Lactic Acid Level 1.07





Objective


Exam


Last Set of Vital Signs





Vital Signs








  Date Time  Temp Pulse Resp B/P (MAP) Pulse Ox O2 Delivery O2 Flow Rate FiO2


 


7/18/22 09:00  100 15 107/76 96 Room Air  


 


7/18/22 08:00 37.4       


 


7/16/22 19:36       0.00 


 


7/15/22 18:53        21





Capillary Refill :


I&O











Intake and Output 


 


 7/18/22





 00:00


 


Intake Total 1925 ml


 


Output Total 425 ml


 


Balance 1500 ml


 


 


 


Intake Oral 1925 ml


 


Output Urine Total 425 ml


 


# Voids 10


 


# Bowel Movements 13








General:  Alert, Oriented X3


HEENT:  Atraumatic, PERRLA


Neck:  Supple, No JVD


Lungs:  Other (Coarse breath sounds bilaterally)


Heart:  Regular Rate, Normal S1, Normal S2


Abdomen:  Normal Bowel Sounds, Other (ileostomy bag with drain)


Extremities:  No Clubbing, No Cyanosis, No Edema


Skin:  No Rashes, No Breakdown


Neuro:  Normal Speech, Normal Tone


Psych/Mental Status:  Mental Status NL





Results


Lab


Laboratory Tests


7/17/22 16:23: Glucometer 100


7/17/22 19:02: Glucometer 26*L


7/17/22 19:43: Glucometer 249H


7/17/22 22:05: Glucometer 178H


7/17/22 23:20: Glucometer 110


7/18/22 03:22: Glucometer 82


7/18/22 04:35: 


White Blood Count 4.3, Red Blood Count 2.95L, Hemoglobin 8.6L, Hematocrit 26L, 

Mean Corpuscular Volume 88, Mean Corpuscular Hemoglobin 29, Mean Corpuscular 

Hemoglobin Concent 33, Red Cell Distribution Width 18.4H, Platelet Count 122L, 

Mean Platelet Volume 10.8, Immature Granulocyte % (Auto) 0, Neutrophils (%) 

(Auto) 58, Lymphocytes (%) (Auto) 35, Monocytes (%) (Auto) 5, Eosinophils (%) 

(Auto) 2, Basophils (%) (Auto) 1, Neutrophils # (Auto) 2.5, Lymphocytes # (Auto)

1.5, Monocytes # (Auto) 0.2, Eosinophils # (Auto) 0.1, Basophils # (Auto) 0.0, 

Immature Granulocyte # (Auto) 0.0, Sodium Level 136, Potassium Level 3.4L, 

Chloride Level 104, Carbon Dioxide Level 23, Anion Gap 9, Blood Urea Nitrogen 

10, Creatinine 0.79, Estimat Glomerular Filtration Rate 78, BUN/Creatinine Ratio

13, Glucose Level 89, Calcium Level 6.1L, Corrected Calcium 7.8L, Phosphorus 

Level 1.2L, Magnesium Level 1.9, Total Bilirubin 0.4, Aspartate Amino Transf 

(AST/SGOT) 39H, Alanine Aminotransferase (ALT/SGPT) 22, Alkaline Phosphatase 39L

, Total Protein 4.6L, Albumin 1.9L


7/18/22 05:05: 


Blood Gas Puncture Site RIGHT RADIAL, Blood Gas Patient Temperature 36.8, 

Arterial Blood pH 7.51H, Arterial Blood Partial Pressure CO2 32L, Arterial Blood

Partial Pressure O2 57L, Arterial Blood HCO3 26, Arterial Blood Total CO2 26.4, 

Arterial Blood Oxygen Saturation 92L, Arterial Blood Base Excess 2.5, Amor Test

YES-POS, Blood Gas Ventilator Setting NO, Blood Gas Inspired Oxygen ROOM AIR


7/18/22 08:46: Glucometer 106





Microbiology


7/15/22 MRSA Screen - Final, Complete


          MRSA not isolated





Assessment/Plan


Assessment/Plan


Assess & Plan/Chief Complaint


COVID infection with vaccination and 1 booster


   -Central line placed


   -ABG 7.51/32/57


   -CXR showed interval progression of atelectasis or infiltrate in Right lung 

base


   -Transfer to 4th floor


S/p ileostomy reversal with fistula and abscess


   -General surgery following, appreciate their recs


   -Drain still in place from [facility] IR 6/16


   -Levofloxacin


CHF


   -Echo EF 20-25%


   -Lasix and spironolactone


Type II MI


   -Cardiology following, appreciate their recs


Afib


   -Lovenox 


HTN


   -Well controlled currently


Diabetes


   -SSI


CKD3a


   -BUN/Cr wnl


Recent hospitalization at [facility] 6/22-7/14 following stay at [facility] 6/16 for 

pelvic abscess drainage. 





Diet: Regular


DVT prophylaxis: Lovenox and SCDs





CAROLYNN HURST DO 7/19/22 0847:


Subjective


Review of Systems


General:  Fatigue, Malaise


Pulmonary:  Dyspnea


Cardiovascular:  Chest Pain





Objective


Exam


General:  Alert, Oriented X3, Cooperative, No Acute Distress


Lungs:  Clear to Auscultation, Normal Air Movement


Heart:  Regular Rate, Normal S1, Normal S2, No Murmurs


Psych/Mental Status:  Mental Status NL, Mood NL





Assessment/Plan


Assessment/Plan


Assess & Plan/Chief Complaint


Moved to fourth floor


Supportive care





Supervisory-Addendum Brief


Verification & Attestation


Participated in pt care:  history, MDM, physical


Personally performed:  exam, history, MDM, supervision of care


Care discussed with:  Medical Student


Procedures:  n/a


Results interpretation:  Verified all documentation


Verification and Attestation of Medical Student E/M Service





A medical student performed and documented this service in my presence. I 

reviewed and verified all information documented by the medical student and made

modifications to such information, when appropriate. I personally performed the 

physical exam and medical decision making. 





 Carolynn Hurst Jul 19, 2022,08:47











ELDON BLACKBURN MED STUDENT    Jul 18, 2022 12:26


CAROLYNN HURST DO                Jul 19, 2022 08:47

## 2022-07-18 NOTE — OCCUPATIONAL THERAPY EVAL
OT Evaluation-General/PLF


Medical Diagnosis


Admission Date


Jul 15, 2022 at 16:45


Medical Diagnosis:  Covid


Onset Date:  Jul 15, 2022





Therapy Diagnosis


Therapy Diagnosis:  decreased ADL status





Precautions


Precautions/Isolations:  Airborne Isolation, Contact Isolation, Droplet 

Isolation, Fall Prevention





Referral


Physician:  Ronnie





Medical History


Pertinent Medical History:  Atrial Fib, DM, HTN, Hypothroidism, Renal Insuf

ficiency


Additional Medical History


afib, HTN, ovarian cancer, solitary kidney, RA, DM, hypothyroidism,


Current History


ED 7/14 d/t fall (18 hours down), recent 3 week course at [facility] (6/22-7/14 

following stay at [facility] for pelvic abscess drainage 6/16). transferred to ARU, 

found to have fever, tachycardia, sepsis and tested positive for COVID. Pt 

transferred to ICU for higher level of care.





Social History


Home:  Single Level


Current Living Status:  Alone





ADL-Prior Level of Function


SCALE: Activities may be completed with or without assistive devices.





6-Indepedent-patient completes the activity by him/herself with no assistance 

from a helper.


5-Set-up or Clean-up Assistance-helper sets up or cleans up; patient completes 

activity. [place] assists only prior to or  


    following the activity.


4-Supervision or Touching Assistance-helper provides verbal cues and/or 

touching/steadying and/or contact guard assistance as patient completes 

activity. Assistance may be provided   


    throughout the activity or intermittently.


3-Partial/Moderate Assistance-helper does LESS THAN HALF the effort. Hartwick 

lifts, holds or supports trunk or limbs, but provides less than half the effort.


2-Substantial/Maximal Assistance-helper does MORE THAN HALF the effort. [place]wick 

lifts or holds trunk or limbs and provides more than half the effort.


6-Ncxsxhgga-lgmqgj does ALL the effort. Patient does none of the effort to 

complete the activity. Or, the assistance of 2 or more helpers is required for 

the patient to complete the  


    activity.


If activity was not attempted, code reason:


7-Patient Refused.


9-Not Applicable-not attempted and the patient did not perform the activity 

before the current illness, exacerbation or injury.


10-Not Attempted due to Environmental Limitations-(lack of equipment, weather 

restraints, etc.).


88-Not Attempted due to Medical Conditions or Safety Concerns.


ADL PLOF Comments


Pt reports IND with ADLs and functional mobility at PLOF, using 4WW


Self Care:  Independent


Functional Cognition:  Independent


DME/Equipment:  Bath Bench, Tub/Shower





OT Current Status


Subjective


Pt in bed, agreeable to OT Tx.





Mental Status/Objective


Patient Orientation:  Person, Place


Attachments:  IV





Current


Upper Extremity ROM


WFL


Upper Extremity Strength


grossly 3+/5





ADL-Treatment


Eating (QC):  6 (Per nursing report.)





Other Treatments


Pt in bed, transferred supine to sit EOB, SBA. Pt used FWW to perform functional

mobility around her room, SBA, 60'. Pt transferred to recliner. Post tx, pt in 

recliner, call light in reach and all needs met.





Education


OT Patient Education:  Correct positioning, Energy conservation, Modified ADL 

techniques, Progress toward Goal/Update tx plan, Purpose of tx/functional 

activities, Rehab process


Teaching Recipient:  Patient


Teaching Methods:  Discussion


Response to Teaching:  Verbalize Understanding





OT Long Term Goals


Long Term Goals


Time Frame:  Jul 29, 2022


Eating (QC):  6


Oral Hygiene (QC):  6


Toileting Hygiene (QC):  6


Shower/Bathe Self (QC):  6


Upper Body Dressing (QC):  6


Lower Body Dressing (QC):  6


On/Off Footwear (QC):  6


Additional Goals:  1-Demonstrate ADL Tasks, 2-Verbalize Understanding, 3-

ImproveStrength/Cherise


1=Demonstrate adherence to instructed precautions during ADL tasks.


2=Patient will verbalize/demonstrate understanding of assistive 

devices/modifications for ADL.


3=Patient will improve strength/tolerance for activity to enable patient to 

perform ADL's.





OT Education/Plan


Problem List/Assessment


Assessment:  Decreased Activ Tolerance, Decreased UE Strength, Impaired Funct 

Balance, Impaired I ADL's, Impaired Self-Care Skills


Pt would benefit from skilled OT services in order to increase BUE Strength and 

activity tolerance, and increase safety and independence with ADLs and 

functional mobility in order to maximize LOF for safe return home.





Discharge Recommendations


Plan/Recommendations:  Continue POC





Treatment Plan/Plan of Care


Patient would benefit from OT for education, treatment and training to promote 

independence in ADL's, mobility, safety and/or upper extremity function for 

ADL's.


Plan of Care:  ADL Retraining, Functional Mobility, UE Funct Exercise/Act


Treatment Duration:  Jul 29, 2022


Frequency:  3 times per week (3-5 times per week)


Estimated Hrs Per Day:  .25 hour per day


Agreement:  Yes


Rehab Potential:  Fair





Time/GCodes


Start Time:  13:24


Stop Time:  13:35


Total Time Billed (hr/min):  11


Billed Treatment Time


1, EWELINA PLATA OT          Jul 18, 2022 14:13

## 2022-07-18 NOTE — PROGRESS NOTE - CARDIOLOGY
Cardiology SOAP Progress Note


Subjective:


Gen weakness and malaise


No cp


Shortness of breath better


No n/v/d





Objective:


I&O/Vital Signs











 7/18/22 7/18/22 7/18/22 7/18/22





 03:40 04:00 04:00 05:00


 


Temp 36.4   


 


Pulse  104  104


 


Resp  31  15


 


B/P (MAP)  108/75  108/74


 


Pulse Ox  91  97


 


O2 Delivery  Room Air Room Air Room Air


 


    





 7/18/22 7/18/22 7/18/22 7/18/22





 06:00 07:00 07:00 08:00


 


Pulse 104 109 111 


 


Resp 16 22  


 


B/P (MAP) 114/79 108/74  


 


Pulse Ox 97 99  


 


O2 Delivery Room Air Room Air  Room Air





 7/18/22 7/18/22 7/18/22 7/18/22





 08:00 08:00 09:00 10:00


 


Temp 37.4   


 


Pulse  91 100 110


 


Resp  28 15 26


 


B/P (MAP)  109/67 107/76 88/60


 


Pulse Ox  96 96 88


 


O2 Delivery  Room Air Room Air Room Air


 


    





 7/18/22 7/18/22 7/18/22 7/18/22





 11:00 12:00 12:00 12:00


 


Temp   35.6 


 


Pulse 89 90  


 


Resp 27 16  


 


B/P (MAP) 87/62 90/71  


 


Pulse Ox 97 97  


 


O2 Delivery Room Air Room Air  Room Air


 


    





 7/18/22 7/18/22 7/18/22 





 13:00 14:35 15:22 


 


Temp  36.4 36.9 


 


Pulse 72 95 92 


 


Resp  18 18 


 


B/P (MAP)  103/68 97/68 (78) 


 


Pulse Ox  97 96 


 


O2 Delivery  Room Air Room Air 














 7/18/22





 00:00


 


Intake Total 1250 ml


 


Balance 1250 ml








Constitutional:  AAO x 3, well-developed, other (thin appearing)


Respiratory:  No accessory muscle use; other (fair to good, bilateral air entry,

diminished bases)


Cardiovascular:  regular rate-rhythm, S1 and S2, systolic murmur (soft CL[place] at 

card base)


Gastrointestional:  No tender; soft; No guarding, No rebound; audible bowel 

sounds


Extremities:  No clubbing, No cyanosis, No significant edema


Neurologic/Psychiatric:  oriented x 3, other (moves all limbs equally)


Skin:  warm/dry; No rash, No ulcerations





Results/Procedures:


Labs


Laboratory Tests


7/17/22 16:23: Glucometer 100


7/17/22 19:02: Glucometer 26*L


7/17/22 19:43: Glucometer 249H


7/17/22 22:05: Glucometer 178H


7/17/22 23:20: Glucometer 110


7/18/22 03:22: Glucometer 82


7/18/22 04:35: 


White Blood Count 4.3, Red Blood Count 2.95L, Hemoglobin 8.6L, Hematocrit 26L, 

Mean Corpuscular Volume 88, Mean Corpuscular Hemoglobin 29, Mean Corpuscular 

Hemoglobin Concent 33, Red Cell Distribution Width 18.4H, Platelet Count 122L, 

Mean Platelet Volume 10.8, Immature Granulocyte % (Auto) 0, Neutrophils (%) 

(Auto) 58, Lymphocytes (%) (Auto) 35, Monocytes (%) (Auto) 5, Eosinophils (%) 

(Auto) 2, Basophils (%) (Auto) 1, Neutrophils # (Auto) 2.5, Lymphocytes # (Auto)

1.5, Monocytes # (Auto) 0.2, Eosinophils # (Auto) 0.1, Basophils # (Auto) 0.0, 

Immature Granulocyte # (Auto) 0.0, Sodium Level 136, Potassium Level 3.4L, 

Chloride Level 104, Carbon Dioxide Level 23, Anion Gap 9, Blood Urea Nitrogen 

10, Creatinine 0.79, Estimat Glomerular Filtration Rate 78, BUN/Creatinine Ratio

13, Glucose Level 89, Calcium Level 6.1L, Corrected Calcium 7.8L, Phosphorus 

Level 1.2L, Magnesium Level 1.9, Total Bilirubin 0.4, Aspartate Amino Transf 

(AST/SGOT) 39H, Alanine Aminotransferase (ALT/SGPT) 22, Alkaline Phosphatase 39L

, Total Protein 4.6L, Albumin 1.9L


7/18/22 05:05: 


Blood Gas Puncture Site RIGHT RADIAL, Blood Gas Patient Temperature 36.8, 

Arterial Blood pH 7.51H, Arterial Blood Partial Pressure CO2 32L, Arterial Blood

Partial Pressure O2 57L, Arterial Blood HCO3 26, Arterial Blood Total CO2 26.4, 

Arterial Blood Oxygen Saturation 92L, Arterial Blood Base Excess 2.5, Amor Test

YES-POS, Blood Gas Ventilator Setting NO, Blood Gas Inspired Oxygen ROOM AIR


7/18/22 08:46: Glucometer 106





Microbiology


7/15/22 MRSA Screen - Final, Complete


          MRSA not isolated








A/P:


Assessment:





Ac systolic CHF (HFrEF) due to ischemic cardiomyopathy


- Echo on 7/16/22: LVEF 20-25%, anteroseptal and apical akinesis, mod 

enlargement of LA, grade 2 diastolic dysfunction, mild MR, PASP 35-40 mmHg





Mild troponin elevation


- likely type 2 MI due to ac systolic CHF





Covid-19





Gen weakness and malaise after long hospitalization of pelvic abscess and sepsis

(June - July 2022)





Transient episode of atrial fibrillation in November 2020 treated with 

electrical cardioversion by Dr Birch. No recurrence reported. Follows with Dr Birch





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 

ileostomy reversal in 2021





H/o hypertension and hyperlipidemia





Diabetes mellitus II, followed and managed by primary care physician





History of solitary kidney and history of ovarian cancer.





Hypothyroidism, followed and managed by primary care physician





Rheumatoid arthritis and osteoarthritis





Carotid artery stenosis followed by Dr Jeffers


Plan:





* Continue ASA


* Continue heart failure meds - titrate as tolerated


* DVT prophylaxis with enoxaparin


* Replenish electrolytes


* Monitor labs closely


* Dr Trujillo managing acute Covid-19


* I discussed her CV issues with Ms. Joy








We saw patient as a team (myself and KARYN Alcantar). To reduce exposure of the team to

Covid, only one member examines patient each day. Today's PE carried out by STANTON Vásquez MD FACP FAC CCDS   Jul 18, 2022 15:36

## 2022-07-18 NOTE — THERAPY TEAM DISCHARGE SUMMARY
Therapy Discharge Summary


Discharge Recommendations


Date of Discharge


Jul 15, 2022 at 17:30





Physical Therapy


Roll Left to Right (QC):  6


Sit to Lying (QC):  6


Lying to Sitting/Side of Bed(Q:  6


Sit to Stand (QC):  3


Chair/Bed-to-Chair Xfer(QC):  4


Toilet Transfer (QC):  4


Car Transfer (QC):  4


Does the Patient Walk:  Yes


Mode of Locomotion:  Walk


Anticipated Mode of Locomotion:  Walk


Walk 10 feet (QC):  4


Walk 50 ft with 2 Turns(QC):  4


Walk 150 ft (QC):  88


Walking 10ft on uneven surface:  4


Distance:  50'


Gait Assistive Device:  FWW


Wheel 50 ft with 2 turns (QC):  9


Wheel 150 ft (QC):  9


1 Step (curb) (QC):  88


4 Steps (QC):  88


12 Steps (QC):  88


Balance Sitting Static:  Normal


Balance Sitting Dynamic:  Normal


Balance-Standing Static:  Fair


Picking up an Object (QC):  88





Occupational Therapy


Pt admitted to ARU with dehydration. At time of evaluation she was dependent for

toileting, max a for footwear and lower body dressing, mod a for bathing, and 

sba for oral care and eating. Pt did not meet any of her long term goals due to 

decline in medical status and transfer to higher level of care (on day 1 of 

rehab stay). Pt will be discharged from OT at this time.


Decreased Activ Tolerance, Decreased Safety Aware, Decreased UE Strength, 

Impaired Cognition, Impaired Funct Balance, Impaired I ADL's, Impaired Self-Care

Skills


Eating (QC):  4


Oral Hygiene (QC):  4 (per clinical judgment)


Shower/Bathe Self (QC):  3 (per clinical judgment)


Upper Body Dressing (QC):  10 (pt reports family will be bringing in clothing)


Lower Body Dressing (QC):  2 (max a to thread BLE's into brief. Min-mod a to 

stand. Balance worsens when removing unilateral UE support. Mod a to pull up 

over hips.)


On/Off Footwear (QC):  2 (max a due to impaired flexibility, poor endurance, 

strength, and increased SOB when bending.)


Toileting Hygiene (QC):  1 (koo catheter)





PT Long Term Goals


Long Term Goals


PT Long Term Goals Time Frame:  Aug 5, 2022


Roll Left to Right (QC):  6


Sit to Lying (QC):  6


Lying-Sitting on Side/Bed(QC):  6


Sit to Stand (QC):  6


Chair/Bed-to-Chair Xfer(QC):  6


Car Transfer (QC):  6


Does the Patient Walk:  Yes


Walk 10 feet (QC):  6


Walk 10ft-Uneven Surface(QC):  6


Walk 50ft with 2 Turns (QC):  6


Walk 150 ft (QC):  6


Wheel 50 feet with 2 turns (QC:  9


1 Step (curb) (QC):  4 (CGA)


4 Steps (QC):  4 (CGA)


12 Steps (QC):  88


Picking up an Object (QC):  6





OT Long Term Goals


Long Term Goals


Time Frame:  Jul 30, 2022


Eating (QC):  6


Oral Hygiene (QC):  6


Shower/Bathe Self (QC):  5


Upper Body Dressing (QC):  5


Lower Body Dressing (QC):  5


On/Off Footwear (QC):  5


Toileting Hygiene (QC):  6


Toilet/Commode Transfer (QC):  6


1=Demonstrate adherence to instructed precautions during ADL tasks.


2=Patient will verbalize/demonstrate understanding of assistive 

devices/modifications for ADL.


3=Patient will improve strength/tolerance for activity to enable patient to 

perform ADL's.





Speech Long Term Goals


Long Term Goals


1. The patient will demonstrate improved cognitive linguistic skills for safe 

discharge to the least restrictive environment.


Time Frame:  Two Weeks.











Mackenzie De Oliveira OT                Jul 18, 2022 12:27

## 2022-07-18 NOTE — DIAGNOSTIC IMAGING REPORT
EXAM: CHEST 1 VIEW, AP/PA ONLY



INDICATION: COVID positive. Pneumonia.



COMPARISON: 07/17/2022.



FINDINGS: Left subclavian CVC tip mid SVC.  Normal heart size and

central pulmonary vascularity. Atelectasis or infiltrate right

lung base has progressed since the prior exam. No pleural

effusion or pneumothorax. No acute osseous findings.



IMPRESSION: Interval progression of atelectasis or infiltrate

right lung base.



Dictated by: 



  Dictated on workstation # [id number]

## 2022-07-19 VITALS — SYSTOLIC BLOOD PRESSURE: 101 MMHG | DIASTOLIC BLOOD PRESSURE: 66 MMHG

## 2022-07-19 VITALS — SYSTOLIC BLOOD PRESSURE: 98 MMHG | DIASTOLIC BLOOD PRESSURE: 56 MMHG

## 2022-07-19 VITALS — DIASTOLIC BLOOD PRESSURE: 64 MMHG | SYSTOLIC BLOOD PRESSURE: 96 MMHG

## 2022-07-19 VITALS — DIASTOLIC BLOOD PRESSURE: 53 MMHG | SYSTOLIC BLOOD PRESSURE: 93 MMHG

## 2022-07-19 VITALS — SYSTOLIC BLOOD PRESSURE: 93 MMHG | DIASTOLIC BLOOD PRESSURE: 55 MMHG

## 2022-07-19 VITALS — SYSTOLIC BLOOD PRESSURE: 100 MMHG | DIASTOLIC BLOOD PRESSURE: 67 MMHG

## 2022-07-19 VITALS — DIASTOLIC BLOOD PRESSURE: 63 MMHG | SYSTOLIC BLOOD PRESSURE: 97 MMHG

## 2022-07-19 LAB
ALBUMIN SERPL-MCNC: 2.2 GM/DL (ref 3.2–4.5)
ALP SERPL-CCNC: 53 U/L (ref 40–136)
ALT SERPL-CCNC: 24 U/L (ref 0–55)
BASOPHILS # BLD AUTO: 0 10^3/UL (ref 0–0.1)
BASOPHILS NFR BLD AUTO: 0 % (ref 0–10)
BILIRUB SERPL-MCNC: 0.4 MG/DL (ref 0.1–1)
BUN/CREAT SERPL: 10
CALCIUM SERPL-MCNC: 6.2 MG/DL (ref 8.5–10.1)
CHLORIDE SERPL-SCNC: 103 MMOL/L (ref 98–107)
CO2 SERPL-SCNC: 22 MMOL/L (ref 21–32)
CREAT SERPL-MCNC: 1 MG/DL (ref 0.6–1.3)
EOSINOPHIL # BLD AUTO: 0.1 10^3/UL (ref 0–0.3)
EOSINOPHIL NFR BLD AUTO: 1 % (ref 0–10)
GFR SERPLBLD BASED ON 1.73 SQ M-ARVRAT: 59 ML/MIN
GLUCOSE SERPL-MCNC: 61 MG/DL (ref 70–105)
HCT VFR BLD CALC: 29 % (ref 35–52)
HGB BLD-MCNC: 9.5 G/DL (ref 11.5–16)
LYMPHOCYTES # BLD AUTO: 1.3 10^3/UL (ref 1–4)
LYMPHOCYTES NFR BLD AUTO: 24 % (ref 12–44)
MAGNESIUM SERPL-MCNC: 1.7 MG/DL (ref 1.6–2.4)
MANUAL DIFFERENTIAL PERFORMED BLD QL: NO
MCH RBC QN AUTO: 29 PG (ref 25–34)
MCHC RBC AUTO-ENTMCNC: 32 G/DL (ref 32–36)
MCV RBC AUTO: 89 FL (ref 80–99)
MONOCYTES # BLD AUTO: 0.2 10^3/UL (ref 0–1)
MONOCYTES NFR BLD AUTO: 3 % (ref 0–12)
NEUTROPHILS # BLD AUTO: 3.8 10^3/UL (ref 1.8–7.8)
NEUTROPHILS NFR BLD AUTO: 71 % (ref 42–75)
PLATELET # BLD: 142 10^3/UL (ref 130–400)
PMV BLD AUTO: 11.7 FL (ref 9–12.2)
POTASSIUM SERPL-SCNC: 3.8 MMOL/L (ref 3.6–5)
PROT SERPL-MCNC: 5.1 GM/DL (ref 6.4–8.2)
SODIUM SERPL-SCNC: 138 MMOL/L (ref 135–145)
WBC # BLD AUTO: 5.3 10^3/UL (ref 4.3–11)

## 2022-07-19 RX ADMIN — FUROSEMIDE SCH MG: 40 TABLET ORAL at 09:48

## 2022-07-19 RX ADMIN — INSULIN ASPART SCH UNIT: 100 INJECTION, SOLUTION INTRAVENOUS; SUBCUTANEOUS at 20:50

## 2022-07-19 RX ADMIN — MELATONIN 3 MG ORAL TABLET PRN MG: 3 TABLET ORAL at 23:15

## 2022-07-19 RX ADMIN — DOCUSATE SODIUM SCH MG: 100 CAPSULE ORAL at 13:34

## 2022-07-19 RX ADMIN — SACUBITRIL AND VALSARTAN SCH TAB: 24; 26 TABLET, FILM COATED ORAL at 09:47

## 2022-07-19 RX ADMIN — INSULIN ASPART SCH UNIT: 100 INJECTION, SOLUTION INTRAVENOUS; SUBCUTANEOUS at 05:59

## 2022-07-19 RX ADMIN — SPIRONOLACTONE SCH MG: 25 TABLET ORAL at 09:47

## 2022-07-19 RX ADMIN — SENNOSIDES SCH MG: 8.6 TABLET, FILM COATED ORAL at 09:47

## 2022-07-19 RX ADMIN — ASPIRIN 81 MG CHEWABLE TABLET SCH MG: 81 TABLET CHEWABLE at 09:47

## 2022-07-19 RX ADMIN — ENOXAPARIN SODIUM SCH MG: 100 INJECTION SUBCUTANEOUS at 17:59

## 2022-07-19 RX ADMIN — SACUBITRIL AND VALSARTAN SCH TAB: 24; 26 TABLET, FILM COATED ORAL at 21:04

## 2022-07-19 RX ADMIN — INSULIN ASPART SCH UNIT: 100 INJECTION, SOLUTION INTRAVENOUS; SUBCUTANEOUS at 16:22

## 2022-07-19 RX ADMIN — DOCUSATE SODIUM SCH MG: 100 CAPSULE ORAL at 20:51

## 2022-07-19 RX ADMIN — INSULIN ASPART SCH UNIT: 100 INJECTION, SOLUTION INTRAVENOUS; SUBCUTANEOUS at 12:58

## 2022-07-19 RX ADMIN — SENNOSIDES SCH MG: 8.6 TABLET, FILM COATED ORAL at 20:51

## 2022-07-19 NOTE — PROGRESS NOTE
ELDON BLACKBURN A MED STUDENT 7/19/22 1013:


Subjective


Date Seen by a Provider:  Jul 19, 2022


Time Seen by a Provider:  08:00


Subjective/Events-last exam


Pt lying in bed this morning, reporting she has intermittent sharp chest pain 

underneath her left breast. Pt reports she has never had pain like this before. 

Pt has no other concerns.


Review of Systems


General:  No Chills, No Fatigue


HEENT:  No Head Aches, No Visual Changes


Pulmonary:  No Dyspnea, No Cough


Cardiovascular:  Chest Pain; No: Palpitations


Gastrointestinal:  No: Nausea, Vomiting


Genitourinary:  No Dysuria, No Frequency


Musculoskeletal:  No: neck pain, shoulder pain


Neurological:  No: Weakness, Numbness





Focused Exam


Lactate Level


7/16/22 14:35: Lactic Acid Level 1.53


7/17/22 04:15: Lactic Acid Level 1.07





Objective


Exam


Last Set of Vital Signs





Vital Signs








  Date Time  Temp Pulse Resp B/P (MAP) Pulse Ox O2 Delivery O2 Flow Rate FiO2


 


7/19/22 07:31 36.8 104 18 96/64 (75) 96 Room Air  


 


7/16/22 19:36       0.00 


 


7/15/22 18:53        21





Capillary Refill :


I&O











Intake and Output 


 


 7/19/22





 00:00


 


Intake Total 900 ml


 


Balance 900 ml


 


 


 


Intake Oral 800 ml


 


IV Total 100 ml


 


# Voids 6


 


# Bowel Movements 4








General:  Alert, Oriented X3


HEENT:  Atraumatic, PERRLA


Neck:  Supple, No JVD


Lungs:  Clear to Auscultation, Normal Air Movement


Heart:  No Murmurs, Other (irregularly irregular)


Abdomen:  Normal Bowel Sounds, Soft, Other (ileostomy with drain)


Extremities:  No Clubbing, No Cyanosis


Skin:  No Rashes, No Breakdown


Neuro:  Normal Speech, Normal Tone


Psych/Mental Status:  Mental Status NL





Results


Lab


Laboratory Tests


7/19/22 05:12: Glucometer 58*L


7/19/22 05:15: 


White Blood Count 5.3, Red Blood Count 3.29L, Hemoglobin 9.5L, Hematocrit 29L, 

Mean Corpuscular Volume 89, Mean Corpuscular Hemoglobin 29, Mean Corpuscular 

Hemoglobin Concent 32, Red Cell Distribution Width 17.9H, Platelet Count 142, 

Mean Platelet Volume 11.7, Immature Granulocyte % (Auto) 0, Neutrophils (%) 

(Auto) 71, Lymphocytes (%) (Auto) 24, Monocytes (%) (Auto) 3, Eosinophils (%) 

(Auto) 1, Basophils (%) (Auto) 0, Neutrophils # (Auto) 3.8, Lymphocytes # (Auto)

1.3, Monocytes # (Auto) 0.2, Eosinophils # (Auto) 0.1, Basophils # (Auto) 0.0, 

Immature Granulocyte # (Auto) 0.0, Sodium Level 138, Potassium Level 3.8, 

Chloride Level 103, Carbon Dioxide Level 22, Anion Gap 13, Blood Urea Nitrogen 

10, Creatinine 1.00, Estimat Glomerular Filtration Rate 59, BUN/Creatinine Ratio

10, Glucose Level 61L, Calcium Level 6.2L, Corrected Calcium 7.6L, Magnesium 

Level 1.7, Total Bilirubin 0.4, Aspartate Amino Transf (AST/SGOT) 40H, Alanine 

Aminotransferase (ALT/SGPT) 24, Alkaline Phosphatase 53, Total Protein 5.1L, 

Albumin 2.2L





Microbiology


7/15/22 MRSA Screen - Final, Complete


          MRSA not isolated





Assessment/Plan


Assessment/Plan


Assess & Plan/Chief Complaint


COVID infection with vaccination and 1 booster


   -Central line placed


   -No oxygen required at this time


   -Currently on isolation for 10 days making her an unlikely candidate for IRF


S/p ileostomy reversal with fistula and abscess


   -General surgery following, appreciate their recs


   -Drain still in place from [facility] 6/16


   -Levofloxacin


CHF


   -Echo EF 20-25%


   -Lasix and spironolactone


Type II MI


   -Cardiology following, appreciate their recs


   -Telemetry


Afib


   -Lovenox 


HTN


   -Well controlled currently


Diabetes


   -SSI


CKD3a


   -BUN/Cr wnl


Recent hospitalization at [place] 6/22-7/14 following stay at [facility] 6/16 for 

pelvic abscess drainage. 





Diet: Regular


DVT prophylaxis: Lovenox and SCDs





CAROLYNN HURST DO 7/20/22 0637:


Assessment/Plan


Assessment/Plan


Assess & Plan/Chief Complaint


Monitor lung function and O2 sat


Chest pain management per Dr See





Supervisory-Addendum Brief


Verification & Attestation


Participated in pt care:  history, MDM, physical


Personally performed:  exam, history, MDM, supervision of care


Care discussed with:  Medical Student


Procedures:  n/a


Results interpretation:  Verified all documentation


Verification and Attestation of Medical Student E/M Service





A medical student performed and documented this service in my presence. I re

viewed and verified all information documented by the medical student and made 

modifications to such information, when appropriate. I personally performed the 

physical exam and medical decision making. 





 Carolynn Hurst, Jul 20, 2022,06:37











ELDON BLACKBURN MED STUDENT    Jul 19, 2022 10:13


CAROLYNN HURST DO                Jul 20, 2022 06:37

## 2022-07-19 NOTE — PHYSICIAN QUERY CLARIFICATION
PQ-Intro New Diagnosis


Admission/Discharge


Admission Date: Jul 15, 2022 at 11:35 


Discharge Date:  Jul 15, 2022 at 17:30


 Dr. Trujillo,


The medical record reflects the following clinical scenario:





History/Risk Factors:


Debility, fall





Clinical Findings:


strength lower extremities - 3+/5 BLE, fatigue





Treatment:


IP Rehab





Question:  What condition best reflects the above clinical scenario? What is the

condition that led to Rehab admission?


Please document a response in the Progress Noter or Discharge Summary.





1. Rhabdomyolosis





2. Dehydration





3. Other, with explanation of the clinical findings.





4. Clinically undetermined, no explanation for the clinical findings.





PHYSICIAN RESPONSE


What condition reflects above:  Other, explanation/clinical finding (myopathy)


In responding to this query, please exercise your independent professional 

judgment.  The purpose of this communication is to more accurately reflect the 

complexity of your patients condition. The fact that a question is asked does 

not imply that any particular answer is desired or expected.  





Thank you for your timely response to this clarification.      


   


Requestors name: Nasima   





Phone # [phone]








THIS PHYSICIAN QUERY FORM IS A PERMANENT PART OF THE MEDICAL RECORD











NASIMA KIM                 Jul 19, 2022 13:31


TAMMY TRUJILLO DO                Jul 19, 2022 19:49

## 2022-07-19 NOTE — PHYSICAL THERAPY DAILY NOTE
PT Daily Note-Current


Subjective


Patient reports she is feeling worse today than yesterday.  Agrees to PT.





Mental Status


Patient Orientation:  Normal For Age





Transfers


SCALE: Activities may be completed with or without assistive devices.





6-Indepedent-patient completes the activity by him/herself with no assistance 

from a helper.


5-Set-up or Clean-up Assistance-helper sets up or cleans up; patient completes 

activity. [place] assists only prior to or  


    following the activity.


4-Supervision or Touching Assistance-helper provides verbal cues and/or 

touching/steadying and/or contact guard assistance as patient completes 

activity. Assistance may be provided   


    throughout the activity or intermittently.


3-Partial/Moderate Assistance-helper does LESS THAN HALF the effort. [place] 

lifts, holds or supports trunk or limbs, but provides less than half the effort.


2-Substantial/Maximal Assistance-helper does MORE THAN HALF the effort. [place] 

lifts or holds trunk or limbs and provides more than half the effort.


7-[id number]-[id number] does ALL the effort. Patient does none of the effort to 

complete the activity. Or, the assistance of 2 or more helpers is required for 

the patient to complete the  


    activity.


If activity was not attempted, code reason:


7-Patient Refused.


9-Not Applicable-not attempted and the patient did not perform the activity 

before the current illness, exacerbation or injury.


10-Not Attempted due to Environmental Limitations-(lack of equipment, weather 

restraints, etc.).


88-Not Attempted due to Medical Conditions or Safety Concerns.


Sit to Stand (QC):  3


Chair/Bed-to-Chair Xfer(QC):  3


Toilet Transfer (QC):  3





Gait Training


Distance:  100' in room


Walk 10 feet (QC):  4


Walk 50 ft with 2 Turns(QC):  4


Gait Assistive Device:  FWW


slow, slightly unsteady





Exercises


Seated Therapy Exercises:  Ankle pumps, Long arc quads


Seated Reps:  12





Assessment


Patient tolerates minimal activity due to not feeling well and extreme fatigue. 

PT to increase activity as tolerated by patient.





PT Long Term Goals


Long Term Goals


PT Long Term Goals Time Frame:  Jul 30, 2022


Roll Left & Right (QC):  6


Sit to Lying (QC):  6


Lying-Sitting on Side/Bed(QC):  6


Sit to Stand (QC):  6


Chair/Bed-to-Chair Xfer(QC):  6


Toilet Transfer (QC):  6


Walk 10 feet (QC):  6


Walk 50ft with 2 Turns (QC):  6


Walk 150 ft (QC):  6





PT Plan


Treatment/Plan


Treatment Plan:  Continue Plan of Care


Treatment Plan:  Bed Mobility, Education, Functional Activity Cherise, Functional 

Strength, Gait, Safety, Therapeutic Exercise, Transfers


Treatment Duration:  Jul 30, 2022


Frequency:  6 times per week


Estimated Hrs Per Day:  .25 hour per day


Patient and/or Family Agrees t:  Yes





Time/GCodes


Time In:  810


Time Out:  813


Total Billed Treatment Time:  13


Total Billed Treatment


1 visit


FA 13 min











ERIC VALLE PT              Jul 19, 2022 09:20

## 2022-07-19 NOTE — OCCUPATIONAL THER DAILY NOTE
OT Current Status-Daily Note


Subjective


Pt alert, sitting in recliner.  Pt agrees to therapy.  No c/o pain only fatigue.





Mental Status/Objective


Patient Orientation:  Person, Place, Time, Situation


Attachments:  Colostomy/Ileostomy, Telemetry





ADL-Treatment


Pt able to ambulate to bathroom using FWW with CGA for safety.  Set up for 

footwear, slide on sandals. Standing at sink with SBA for safety, pt able to 

complete oral care independently.  Pt fatigued quickly and requested to lay in 

bed.  Bed mobility independent.  After session, pt lying in bed with call 

light/phone in reach.  All needs met.


Therapy Code Descriptions/Definitions 





Functional [place] Measure:


0=Not Assessed/NA        4=Minimal Assistance


1=Total Assistance        5=Supervision or Setup


2=Maximal Assistance  6=Modified [place]


3=Moderate Assistance 7=Complete IndependenceSCALE: Activities may be completed 

with or without assistive devices.





6-Indepedent-patient completes the activity by him/herself with no assistance 

from a helper.


5-Set-up or Clean-up Assistance-helper sets up or cleans up; patient completes 

activity. [place] assists only prior to or  


    following the activity.


4-Supervision or Touching Assistance-helper provides verbal cues and/or 

touching/steadying and/or contact guard assistance as patient completes 

activity. Assistance may be provided   


    throughout the activity or intermittently.


3-Partial/Moderate Assistance-helper does LESS THAN HALF the effort. [place] 

lifts, holds or supports trunk or limbs, but provides less than half the effort.


2-Substantial/Maximal Assistance-helper does MORE THAN HALF the effort. [place] 

lifts or holds trunk or limbs and provides more than half the effort.


9-[id number]-lxwamk does ALL the effort. Patient does none of the effort to 

complete the activity. Or, the assistance of 2 or more helpers is required for 

the patient to complete the  


    activity.


If activity was not attempted, code reason:


7-Patient Refused.


9-Not Applicable-not attempted and the patient did not perform the activity 

before the current illness, exacerbation or injury.


10-Not Attempted due to Environmental Limitations-(lack of equipment, weather 

restraints, etc.).


88-Not Attempted due to Medical Conditions or Safety Concerns.





OT Long Term Goals


Long Term Goals


Time Frame:  Jul 29, 2022


Eating (QC):  6


Oral Hygiene (QC):  6


Toileting Hygiene (QC):  6


Shower/Bathe Self (QC):  6


Upper Body Dressing (QC):  6


Lower Body Dressing (QC):  6


On/Off Footwear (QC):  6


Additional Goals:  1-Demonstrate ADL Tasks, 2-Verbalize Understanding, 3-

ImproveStrength/Cherise


1=Demonstrate adherence to instructed precautions during ADL tasks.


2=Patient will verbalize/demonstrate understanding of assistive 

devices/modifications for ADL.


3=Patient will improve strength/tolerance for activity to enable patient to 

perform ADL's.





OT Education/Plan


Problem List/Assessment


Assessment:  Decreased Activ Tolerance


Pt would benefit from skilled OT services in order to increase BUE Strength and 

activity tolerance, and increase safety and independence with ADLs and 

functional mobility in order to maximize LOF for safe return home.





Discharge Recommendations


Plan/Recommendations:  Continue POC





Treatment Plan/Plan of Care


Patient would benefit from OT for education, treatment and training to promote 

independence in ADL's, mobility, safety and/or upper extremity function for 

ADL's.


Plan of Care:  ADL Retraining, Functional Mobility, UE Funct Exercise/Act


Treatment Duration:  Jul 29, 2022


Frequency:  3 times per week (3-5 times per week)


Estimated Hrs Per Day:  .25 hour per day


Agreement:  Yes


Rehab Potential:  Fair





Time/GCodes


Start Time:  13:05


Stop Time:  13:20


Total Time Billed (hr/min):  15


Billed Treatment Time


1 visit-ADL 1(15 min)











RONEL DAILEY               Jul 19, 2022 13:38

## 2022-07-19 NOTE — PROGRESS NOTE - CARDIOLOGY
Cardiology SOAP Progress Note


Subjective:


Gen weakness and malaise


Intermittent pleuritic discomfort of the L chest, mild to mod, lasted a few min 

this am, no recurrence


Gets short of breath with mild exertion


No palp or syncope


No swelling





Objective:


I&O/Vital Signs











 7/19/22 7/19/22 7/19/22 7/19/22





 06:46 07:31 09:00 11:06


 


Temp  36.8  36.2


 


Pulse 117 104  105


 


Resp  18  18


 


B/P (MAP)  96/64 (75)  93/55 (68)


 


Pulse Ox  96  98


 


O2 Delivery  Room Air Room Air Room Air


 


    





 7/19/22 7/19/22 7/19/22 





 12:37 14:34 15:19 


 


Temp   37.2 


 


Pulse 102  94 


 


Resp   18 


 


B/P (MAP)  98/56 (70) 101/66 (78) 


 


Pulse Ox   98 


 


O2 Delivery   Room Air 














 7/19/22





 00:00


 


Intake Total 540 ml


 


Balance 540 ml








Constitutional:  AAO x 3, well-developed, other (thin appearing)


Respiratory:  No accessory muscle use; other (fair to good, bilateral air entry,

diminished bases)


Cardiovascular:  regular rate-rhythm, S1 and S2, systolic murmur (soft [place] at 

card base)


Gastrointestional:  No tender; soft; No guarding, No rebound; audible bowel 

sounds


Extremities:  No clubbing, No cyanosis, No significant edema


Neurologic/Psychiatric:  oriented x 3, other (moves all limbs equally)


Skin:  warm/dry; No rash, No ulcerations





Results/Procedures:


Labs


Laboratory Tests


7/19/22 05:12: Glucometer 58*L


7/19/22 05:15: 


White Blood Count 5.3, Red Blood Count 3.29L, Hemoglobin 9.5L, Hematocrit 29L, 

Mean Corpuscular Volume 89, Mean Corpuscular Hemoglobin 29, Mean Corpuscular 

Hemoglobin Concent 32, Red Cell Distribution Width 17.9H, Platelet Count 142, 

Mean Platelet Volume 11.7, Immature Granulocyte % (Auto) 0, Neutrophils (%) 

(Auto) 71, Lymphocytes (%) (Auto) 24, Monocytes (%) (Auto) 3, Eosinophils (%) 

(Auto) 1, Basophils (%) (Auto) 0, Neutrophils # (Auto) 3.8, Lymphocytes # (Auto)

1.3, Monocytes # (Auto) 0.2, Eosinophils # (Auto) 0.1, Basophils # (Auto) 0.0, 

Immature Granulocyte # (Auto) 0.0, Sodium Level 138, Potassium Level 3.8, 

Chloride Level 103, Carbon Dioxide Level 22, Anion Gap 13, Blood Urea Nitrogen 

10, Creatinine 1.00, Estimat Glomerular Filtration Rate 59, BUN/Creatinine Ratio

10, Glucose Level 61L, Calcium Level 6.2L, Corrected Calcium 7.6L, Magnesium 

Level 1.7, Total Bilirubin 0.4, Aspartate Amino Transf (AST/SGOT) 40H, Alanine 

Aminotransferase (ALT/SGPT) 24, Alkaline Phosphatase 53, Total Protein 5.1L, 

Albumin 2.2L


7/19/22 11:04: Glucometer 242H


7/19/22 15:22: Glucometer 155H





Microbiology


7/15/22 MRSA Screen - Final, Complete


          MRSA not isolated





Laboratory Tests


7/18/22 04:35








7/19/22 05:15











A/P:


Assessment:





Ac systolic CHF (HFrEF) due to ischemic cardiomyopathy


- Echo on 7/16/22: LVEF 20-25%, anteroseptal and apical akinesis, mod 

enlargement of LA, grade 2 diastolic dysfunction, mild MR, PASP 35-40 mmHg





Mild troponin elevation


- likely type 2 MI due to ac systolic CHF





Covid-19





Marked gen weakness and frailty after long hospitalization of pelvic abscess and

sepsis (June - July 2022)





Transient episode of atrial fibrillation in November 2020 treated with 

electrical cardioversion by Dr Birhc. No recurrence reported. Follows with Dr Birch





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 

ileostomy reversal in 2021





H/o hypertension and hyperlipidemia





Diabetes mellitus II, followed and managed by primary care physician





History of solitary kidney and history of ovarian cancer.





Hypothyroidism, followed and managed by primary care physician





Rheumatoid arthritis and osteoarthritis





Carotid artery stenosis followed by Dr Jeffers


Plan:





* Management remains complex


* Given multiple comorbidities (including marked frailty) and given patient's 

  own desire to be managed conservatively at this time, we are continuing 

  conservative therapy


* Titrate heart failure meds as tolerated. Dosage reduction today due to 

  relatively low bp


* DVT prophylaxis with enoxaparin


* Monitor labs closely and replenish lytes as needed


* Dr Trujillo managing acute Covid-19


* I discussed her CV issues with Ms. STANTON Saucedo MD FACP FAC CCDS   Jul 19, 2022 18:08

## 2022-07-20 VITALS — DIASTOLIC BLOOD PRESSURE: 57 MMHG | SYSTOLIC BLOOD PRESSURE: 90 MMHG

## 2022-07-20 VITALS — DIASTOLIC BLOOD PRESSURE: 63 MMHG | SYSTOLIC BLOOD PRESSURE: 94 MMHG

## 2022-07-20 VITALS — DIASTOLIC BLOOD PRESSURE: 96 MMHG | SYSTOLIC BLOOD PRESSURE: 112 MMHG

## 2022-07-20 VITALS — SYSTOLIC BLOOD PRESSURE: 98 MMHG | DIASTOLIC BLOOD PRESSURE: 62 MMHG

## 2022-07-20 VITALS — SYSTOLIC BLOOD PRESSURE: 103 MMHG | DIASTOLIC BLOOD PRESSURE: 64 MMHG

## 2022-07-20 VITALS — DIASTOLIC BLOOD PRESSURE: 75 MMHG | SYSTOLIC BLOOD PRESSURE: 110 MMHG

## 2022-07-20 LAB
ALBUMIN SERPL-MCNC: 2.3 GM/DL (ref 3.2–4.5)
ALP SERPL-CCNC: 56 U/L (ref 40–136)
ALT SERPL-CCNC: 18 U/L (ref 0–55)
BASOPHILS # BLD AUTO: 0 10^3/UL (ref 0–0.1)
BASOPHILS NFR BLD AUTO: 0 % (ref 0–10)
BILIRUB SERPL-MCNC: 0.4 MG/DL (ref 0.1–1)
BUN/CREAT SERPL: 9
CALCIUM SERPL-MCNC: 6.2 MG/DL (ref 8.5–10.1)
CHLORIDE SERPL-SCNC: 104 MMOL/L (ref 98–107)
CO2 SERPL-SCNC: 21 MMOL/L (ref 21–32)
CREAT SERPL-MCNC: 1.2 MG/DL (ref 0.6–1.3)
EOSINOPHIL # BLD AUTO: 0 10^3/UL (ref 0–0.3)
EOSINOPHIL NFR BLD AUTO: 1 % (ref 0–10)
GFR SERPLBLD BASED ON 1.73 SQ M-ARVRAT: 47 ML/MIN
GLUCOSE SERPL-MCNC: 107 MG/DL (ref 70–105)
HCT VFR BLD CALC: 28 % (ref 35–52)
HGB BLD-MCNC: 9.2 G/DL (ref 11.5–16)
LYMPHOCYTES # BLD AUTO: 2 10^3/UL (ref 1–4)
LYMPHOCYTES NFR BLD AUTO: 34 % (ref 12–44)
MAGNESIUM SERPL-MCNC: 1.7 MG/DL (ref 1.6–2.4)
MANUAL DIFFERENTIAL PERFORMED BLD QL: NO
MCH RBC QN AUTO: 29 PG (ref 25–34)
MCHC RBC AUTO-ENTMCNC: 32 G/DL (ref 32–36)
MCV RBC AUTO: 89 FL (ref 80–99)
MONOCYTES # BLD AUTO: 0.2 10^3/UL (ref 0–1)
MONOCYTES NFR BLD AUTO: 4 % (ref 0–12)
NEUTROPHILS # BLD AUTO: 3.6 10^3/UL (ref 1.8–7.8)
NEUTROPHILS NFR BLD AUTO: 61 % (ref 42–75)
PLATELET # BLD: 152 10^3/UL (ref 130–400)
PMV BLD AUTO: 11.5 FL (ref 9–12.2)
POTASSIUM SERPL-SCNC: 3.9 MMOL/L (ref 3.6–5)
PROT SERPL-MCNC: 5.4 GM/DL (ref 6.4–8.2)
SODIUM SERPL-SCNC: 139 MMOL/L (ref 135–145)
WBC # BLD AUTO: 5.8 10^3/UL (ref 4.3–11)

## 2022-07-20 RX ADMIN — SACUBITRIL AND VALSARTAN SCH TAB: 24; 26 TABLET, FILM COATED ORAL at 09:04

## 2022-07-20 RX ADMIN — ASPIRIN 81 MG CHEWABLE TABLET SCH MG: 81 TABLET CHEWABLE at 09:04

## 2022-07-20 RX ADMIN — INSULIN ASPART SCH UNIT: 100 INJECTION, SOLUTION INTRAVENOUS; SUBCUTANEOUS at 21:32

## 2022-07-20 RX ADMIN — SPIRONOLACTONE SCH MG: 25 TABLET ORAL at 09:04

## 2022-07-20 RX ADMIN — MELATONIN 3 MG ORAL TABLET PRN MG: 3 TABLET ORAL at 21:44

## 2022-07-20 RX ADMIN — FUROSEMIDE SCH MG: 40 TABLET ORAL at 09:04

## 2022-07-20 RX ADMIN — SENNOSIDES SCH MG: 8.6 TABLET, FILM COATED ORAL at 21:45

## 2022-07-20 RX ADMIN — INSULIN ASPART SCH UNIT: 100 INJECTION, SOLUTION INTRAVENOUS; SUBCUTANEOUS at 12:34

## 2022-07-20 RX ADMIN — INSULIN ASPART SCH UNIT: 100 INJECTION, SOLUTION INTRAVENOUS; SUBCUTANEOUS at 06:08

## 2022-07-20 RX ADMIN — DOCUSATE SODIUM SCH MG: 100 CAPSULE ORAL at 21:45

## 2022-07-20 RX ADMIN — SACUBITRIL AND VALSARTAN SCH TAB: 24; 26 TABLET, FILM COATED ORAL at 21:45

## 2022-07-20 RX ADMIN — DOCUSATE SODIUM SCH MG: 100 CAPSULE ORAL at 09:34

## 2022-07-20 RX ADMIN — ENOXAPARIN SODIUM SCH MG: 100 INJECTION SUBCUTANEOUS at 16:58

## 2022-07-20 RX ADMIN — INSULIN ASPART SCH UNIT: 100 INJECTION, SOLUTION INTRAVENOUS; SUBCUTANEOUS at 16:58

## 2022-07-20 RX ADMIN — SENNOSIDES SCH MG: 8.6 TABLET, FILM COATED ORAL at 09:34

## 2022-07-20 NOTE — OCCUPATIONAL THER DAILY NOTE
OT Current Status-Daily Note


Subjective


Pt alert, lying in bed.  Nrsg and wound care in room changing colostomy.  Pt 

agrees to therapy.





Mental Status/Objective


Patient Orientation:  Person, Place, Time, Situation


Attachments:  Colostomy/Ileostomy, IV





ADL-Treatment


Therapy Code Descriptions/Definitions 





Functional [place] Measure:


0=Not Assessed/NA        4=Minimal Assistance


1=Total Assistance        5=Supervision or Setup


2=Maximal Assistance  6=Modified [place]


3=Moderate Assistance 7=Complete IndependenceSCALE: Activities may be completed 

with or without assistive devices.





6-Indepedent-patient completes the activity by him/herself with no assistance 

from a helper.


5-Set-up or Clean-up Assistance-helper sets up or cleans up; patient completes 

activity. [place] assists only prior to or  


    following the activity.


4-Supervision or Touching Assistance-helper provides verbal cues and/or 

touching/steadying and/or contact guard assistance as patient completes 

activity. Assistance may be provided   


    throughout the activity or intermittently.


3-Partial/Moderate Assistance-helper does LESS THAN HALF the effort. [place] 

lifts, holds or supports trunk or limbs, but provides less than half the effort.


2-Substantial/Maximal Assistance-helper does MORE THAN HALF the effort. [place] 

lifts or holds trunk or limbs and provides more than half the effort.


2-Taoijntxr-liowej does ALL the effort. Patient does none of the effort to 

complete the activity. Or, the assistance of 2 or more helpers is required for 

the patient to complete the  


    activity.


If activity was not attempted, code reason:


7-Patient Refused.


9-Not Applicable-not attempted and the patient did not perform the activity 

before the current illness, exacerbation or injury.


10-Not Attempted due to Environmental Limitations-(lack of equipment, weather 

restraints, etc.).


88-Not Attempted due to Medical Conditions or Safety Concerns.





Other Treatment


Independent bed mobility.  Assisted wound care and nrsg with pt while colostomy 

was changed.  Pt tolerated 3 B UE exercises 15 reps each with recovery break in 

between each exercise to increase UE strength for daily functional tasks.  

Skilled instruction required for correct tech.  Pt ended session due to 

increased fatigue.  After session, pt lying in bed with call light/phone in 

reach.  All needs met in room.





OT Long Term Goals


Long Term Goals


Time Frame:  Jul 29, 2022


Eating (QC):  6


Oral Hygiene (QC):  6


Toileting Hygiene (QC):  6


Shower/Bathe Self (QC):  6


Upper Body Dressing (QC):  6


Lower Body Dressing (QC):  6


On/Off Footwear (QC):  6


Additional Goals:  1-Demonstrate ADL Tasks, 2-Verbalize Understanding, 3-

ImproveStrength/Cherise


1=Demonstrate adherence to instructed precautions during ADL tasks.


2=Patient will verbalize/demonstrate understanding of assistive 

devices/modifications for ADL.


3=Patient will improve strength/tolerance for activity to enable patient to 

perform ADL's.





OT Education/Plan


Problem List/Assessment


Assessment:  Decreased Activ Tolerance, Decreased UE Strength, Impaired Self-

Care Skills


Pt would benefit from skilled OT services in order to increase BUE Strength and 

activity tolerance, and increase safety and independence with ADLs and 

functional mobility in order to maximize LOF for safe return home.





Discharge Recommendations


Plan/Recommendations:  Continue POC





Treatment Plan/Plan of Care


Patient would benefit from OT for education, treatment and training to promote 

independence in ADL's, mobility, safety and/or upper extremity function for 

ADL's.


Plan of Care:  ADL Retraining, Functional Mobility, UE Funct Exercise/Act


Treatment Duration:  Jul 29, 2022


Frequency:  3 times per week (3-5 times per week)


Estimated Hrs Per Day:  .25 hour per day


Agreement:  Yes


Rehab Potential:  Fair





Time/GCodes


Start Time:  11:00


Stop Time:  11:23


Total Time Billed (hr/min):  23


Billed Treatment Time


1 visit-FA 1 (13 min)  EX 1 (10 min)











RONEL DAILEY               Jul 20, 2022 13:26

## 2022-07-20 NOTE — PROGRESS NOTE - CARDIOLOGY
Cardiology SOAP Progress Note


Subjective:


No cp or palp or syncope or shortness of breath


Gen weakness and malaise present - improving very slowly


No n/v/d





Objective:


I&O/Vital Signs











 7/20/22 7/20/22 7/20/22 7/20/22





 06:32 07:15 07:38 08:28


 


Temp   37.6 


 


Pulse 98  102 


 


Resp   18 


 


B/P (MAP)   103/64 (77) 


 


Pulse Ox   97 96


 


O2 Delivery  Room Air Room Air Room Air


 


    





 7/20/22 7/20/22 7/20/22 





 08:39 11:36 12:30 


 


Temp  36.8  


 


Pulse 99 90 91 


 


Resp  18  


 


B/P (MAP)  94/63 (73)  


 


Pulse Ox 96 98  


 


O2 Delivery  Room Air  


 


FiO2 21   














 7/20/22





 00:00


 


Intake Total 680 ml


 


Balance 680 ml








Constitutional:  AAO x 3, well-developed, other (thin appearing)


Respiratory:  No accessory muscle use; other (fair to good, bilateral air entry,

diminished bases)


Cardiovascular:  regular rate-rhythm, S1 and S2, systolic murmur (soft [place] at 

card base)


Gastrointestional:  No tender; soft; No guarding, No rebound; audible bowel 

sounds, other (ileostomy)


Extremities:  No clubbing, No cyanosis, No significant edema


Neurologic/Psychiatric:  oriented x 3, other (moves all limbs equally)


Skin:  warm/dry; No rash, No ulcerations





Results/Procedures:


Labs


Laboratory Tests


7/19/22 20:32: Glucometer 162H


7/20/22 03:19: Glucometer 92


7/20/22 05:08: 


White Blood Count 5.8, Red Blood Count 3.19L, Hemoglobin 9.2L, Hematocrit 28L, 

Mean Corpuscular Volume 89, Mean Corpuscular Hemoglobin 29, Mean Corpuscular 

Hemoglobin Concent 32, Red Cell Distribution Width 17.7H, Platelet Count 152, 

Mean Platelet Volume 11.5, Immature Granulocyte % (Auto) 0, Neutrophils (%) 

(Auto) 61, Lymphocytes (%) (Auto) 34, Monocytes (%) (Auto) 4, Eosinophils (%) 

(Auto) 1, Basophils (%) (Auto) 0, Neutrophils # (Auto) 3.6, Lymphocytes # (Auto)

2.0, Monocytes # (Auto) 0.2, Eosinophils # (Auto) 0.0, Basophils # (Auto) 0.0, 

Immature Granulocyte # (Auto) 0.0, Sodium Level 139, Potassium Level 3.9, 

Chloride Level 104, Carbon Dioxide Level 21, Anion Gap 14, Blood Urea Nitrogen 

11, Creatinine 1.20, Estimat Glomerular Filtration Rate 47, BUN/Creatinine Ratio

9, Glucose Level 107H, Calcium Level 6.2L, Corrected Calcium 7.6L, Magnesium 

Level 1.7, Total Bilirubin 0.4, Aspartate Amino Transf (AST/SGOT) 32, Alanine 

Aminotransferase (ALT/SGPT) 18, Alkaline Phosphatase 56, Total Protein 5.4L, 

Albumin 2.3L


7/20/22 11:34: Glucometer 161H





Microbiology


7/15/22 MRSA Screen - Final, Complete


          MRSA not isolated





Laboratory Tests


7/19/22 05:15








7/20/22 05:08











A/P:


Assessment:





Ac systolic CHF (HFrEF) due to ischemic cardiomyopathy


- Echo on 7/16/22: LVEF 20-25%, anteroseptal and apical akinesis, mod 

enlargement of LA, grade 2 diastolic dysfunction, mild MR, PASP 35-40 mmHg





Mild troponin elevation


- likely type 2 MI due to ac systolic CHF





Covid-19





Marked gen weakness and frailty after long hospitalization of pelvic abscess and

sepsis (June - July 2022)





Transient episode of atrial fibrillation in November 2020 treated with 

electrical cardioversion by Dr Birch. No recurrence reported. Follows with Dr Birch





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 

ileostomy reversal in 2021





H/o hypertension and hyperlipidemia





Diabetes mellitus II, followed and managed by primary care physician





History of solitary kidney and history of ovarian cancer.





Hypothyroidism, followed and managed by primary care physician





Rheumatoid arthritis and osteoarthritis





Carotid artery stenosis followed by Dr Jeffers


Plan:





* Management remains complex


* Given multiple comorbidities (including marked frailty) and given patient's 

  own desire to be managed conservatively at this time, we are continuing 

  conservative therapy


* Titrate heart failure meds as tolerated


* DVT prophylaxis with enoxaparin


* Monitor labs closely and replenish lytes as needed


* Dr Trujillo managing acute Covid-19


* Management of ileostomy per Dr. Galicia


* Dr. Shaffer will be covering Cardiology











STANTON VALENZUELA MD FACP [facility]   Jul 20, 2022 16:20

## 2022-07-20 NOTE — PROGRESS NOTE - CARDIOLOGY
Cardiology SOAP Progress Note


Subjective:


In bed


No c/o SOB, palpitations or CP


C/O ileostomy leakage





Objective:


I&O/Vital Signs











 7/20/22 7/20/22 7/20/22 7/20/22





 06:32 07:15 07:38 08:28


 


Temp   37.6 


 


Pulse 98  102 


 


Resp   18 


 


B/P (MAP)   103/64 (77) 


 


Pulse Ox   97 96


 


O2 Delivery  Room Air Room Air Room Air


 


    





 7/20/22 7/20/22 7/20/22 





 08:39 11:36 12:30 


 


Temp  36.8  


 


Pulse 99 90 91 


 


Resp  18  


 


B/P (MAP)  94/63 (73)  


 


Pulse Ox 96 98  


 


O2 Delivery  Room Air  


 


FiO2 21   














 7/20/22





 00:00


 


Intake Total 680 ml


 


Balance 680 ml








Constitutional:  AAO x 3, well-developed, other (thin appearing)


Respiratory:  No accessory muscle use; other (fair to good, bilateral air entry,

diminished bases)


Cardiovascular:  regular rate-rhythm, S1 and S2, systolic murmur (soft [place] at 

card base)


Gastrointestional:  No tender; soft; No guarding, No rebound; audible bowel 

sounds, other (ileostomy)


Extremities:  No clubbing, No cyanosis, No significant edema


Neurologic/Psychiatric:  oriented x 3, other (moves all limbs equally)


Skin:  warm/dry; No rash, No ulcerations





Results/Procedures:


Labs


Laboratory Tests


7/19/22 20:32: Glucometer 162H


7/20/22 03:19: Glucometer 92


7/20/22 05:08: 


White Blood Count 5.8, Red Blood Count 3.19L, Hemoglobin 9.2L, Hematocrit 28L, 

Mean Corpuscular Volume 89, Mean Corpuscular Hemoglobin 29, Mean Corpuscular 

Hemoglobin Concent 32, Red Cell Distribution Width 17.7H, Platelet Count 152, 

Mean Platelet Volume 11.5, Immature Granulocyte % (Auto) 0, Neutrophils (%) 

(Auto) 61, Lymphocytes (%) (Auto) 34, Monocytes (%) (Auto) 4, Eosinophils (%) 

(Auto) 1, Basophils (%) (Auto) 0, Neutrophils # (Auto) 3.6, Lymphocytes # (Auto)

2.0, Monocytes # (Auto) 0.2, Eosinophils # (Auto) 0.0, Basophils # (Auto) 0.0, 

Immature Granulocyte # (Auto) 0.0, Sodium Level 139, Potassium Level 3.9, 

Chloride Level 104, Carbon Dioxide Level 21, Anion Gap 14, Blood Urea Nitrogen 

11, Creatinine 1.20, Estimat Glomerular Filtration Rate 47, BUN/Creatinine Ratio

9, Glucose Level 107H, Calcium Level 6.2L, Corrected Calcium 7.6L, Magnesium 

Level 1.7, Total Bilirubin 0.4, Aspartate Amino Transf (AST/SGOT) 32, Alanine 

Aminotransferase (ALT/SGPT) 18, Alkaline Phosphatase 56, Total Protein 5.4L, 

Albumin 2.3L


7/20/22 11:34: Glucometer 161H





Microbiology


7/15/22 MRSA Screen - Final, Complete


          MRSA not isolated








A/P:


Assessment:





Ac systolic CHF (HFrEF) due to ischemic cardiomyopathy


- Echo on 7/16/22: LVEF 20-25%, anteroseptal and apical akinesis, mod 

enlargement of LA, grade 2 diastolic dysfunction, mild MR, PASP 35-40 mmHg





Mild troponin elevation


- likely type 2 MI due to ac systolic CHF





Covid-19





Marked gen weakness and frailty after long hospitalization of pelvic abscess and

sepsis (June - July 2022)





Transient episode of atrial fibrillation in November 2020 treated with 

electrical cardioversion by Dr Birch. No recurrence reported. Follows with Dr Birch





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 

ileostomy reversal in 2021





H/o hypertension and hyperlipidemia





Diabetes mellitus II, followed and managed by primary care physician





History of solitary kidney and history of ovarian cancer.





Hypothyroidism, followed and managed by primary care physician





Rheumatoid arthritis and osteoarthritis





Carotid artery stenosis followed by Dr Jeffers


Plan:





* Management remains complex


* Given multiple comorbidities (including marked frailty) and given patient's 

  own desire to be managed conservatively at this time, we are continuing 

  conservative therapy


* Titrate heart failure meds as tolerated


* DVT prophylaxis with enoxaparin


* Monitor labs closely and replenish lytes as needed


* Dr Trujillo managing acute Covid-19


* Management of ileostomy per Dr. Galicia


* Dr. Shaffer will be covering cardiology care











ALEYDA CRAIN           Jul 20, 2022 10:55

## 2022-07-20 NOTE — THERAPY TEAM DISCHARGE SUMMARY
Therapy Discharge Summary


Discharge Recommendations


Date of Discharge


Jul 15, 2022 at 17:30





Physical Therapy


Roll Left to Right (QC):  6


Sit to Lying (QC):  6


Lying to Sitting/Side of Bed(Q:  6


Sit to Stand (QC):  3


Chair/Bed-to-Chair Xfer(QC):  4


Toilet Transfer (QC):  4


Car Transfer (QC):  4


Does the Patient Walk:  Yes


Mode of Locomotion:  Walk


Anticipated Mode of Locomotion:  Walk


Walk 10 feet (QC):  4


Walk 50 ft with 2 Turns(QC):  4


Walk 150 ft (QC):  88


Walking 10ft on uneven surface:  4


Distance:  50'


Gait Assistive Device:  FWW


Wheel 50 ft with 2 turns (QC):  9


Wheel 150 ft (QC):  9


1 Step (curb) (QC):  88


4 Steps (QC):  88


12 Steps (QC):  88


Balance Sitting Static:  Normal


Balance Sitting Dynamic:  Normal


Balance-Standing Static:  Fair


Picking up an Object (QC):  88





Occupational Therapy


Decreased Activ Tolerance, Decreased Safety Aware, Decreased UE Strength, 

Impaired Cognition, Impaired Funct Balance, Impaired I ADL's, Impaired Self-Care

Skills


Eating (QC):  4


Oral Hygiene (QC):  4 (per clinical judgment)


Shower/Bathe Self (QC):  3 (per clinical judgment)


Upper Body Dressing (QC):  10 (pt reports family will be bringing in clothing)


Lower Body Dressing (QC):  2 (max a to thread BLE's into brief. Min-mod a to 

stand. Balance worsens when removing unilateral UE support. Mod a to pull up 

over hips.)


On/Off Footwear (QC):  2 (max a due to impaired flexibility, poor endurance, 

strength, and increased SOB when bending.)


Toileting Hygiene (QC):  1 (koo catheter)





Speech-Language Pathology


The patient was discharged to acute hospital services prior to the initiation of

skilled speech pathology. Speech pathology will discharge the patient at this 

time.





PT Long Term Goals


Long Term Goals


PT Long Term Goals Time Frame:  Aug 5, 2022


Roll Left to Right (QC):  6


Sit to Lying (QC):  6


Lying-Sitting on Side/Bed(QC):  6


Sit to Stand (QC):  6


Chair/Bed-to-Chair Xfer(QC):  6


Car Transfer (QC):  6


Does the Patient Walk:  Yes


Walk 10 feet (QC):  6


Walk 10ft-Uneven Surface(QC):  6


Walk 50ft with 2 Turns (QC):  6


Walk 150 ft (QC):  6


Wheel 50 feet with 2 turns (QC:  9


1 Step (curb) (QC):  4 (CGA)


4 Steps (QC):  4 (CGA)


12 Steps (QC):  88


Picking up an Object (QC):  6





OT Long Term Goals


Long Term Goals


Time Frame:  Jul 30, 2022


Eating (QC):  6


Oral Hygiene (QC):  6


Shower/Bathe Self (QC):  5


Upper Body Dressing (QC):  5


Lower Body Dressing (QC):  5


On/Off Footwear (QC):  5


Toileting Hygiene (QC):  6


Toilet/Commode Transfer (QC):  6


1=Demonstrate adherence to instructed precautions during ADL tasks.


2=Patient will verbalize/demonstrate understanding of assistive devices/m

odifications for ADL.


3=Patient will improve strength/tolerance for activity to enable patient to 

perform ADL's.





Speech Long Term Goals


Long Term Goals


1. The patient will demonstrate improved cognitive linguistic skills for safe 

discharge to the least restrictive environment.


Time Frame:  Two Weeks.











EVA CAMACHO               Jul 20, 2022 11:17

## 2022-07-20 NOTE — PROGRESS NOTE - SURGERY
Subjective


Time Seen by a Provider:  18:33


Subjective/Events-last exam


I was asked by Dr. Trujillo to see pt and make determination regarding "drainage 

tube".





Pt is known to me she had a ileostomy that she hated and wanted to get it 

reversed.  She had been told by other surgeons it couldn't be done and she asked

if I would consider doing it.  I told her that I thought it was possible and 

would try for her if she wanted.


Reversal was done and pt was doing well, gaining weight.  Apparently 2-3 months 

ago she started feeling sick and went to [facility] in [place], found to have 

intra-abdominal abscess and probable fistula.  She had drain placed and was 

there for a while and then at 


[place].  Now she is in  in [place].  Nurse states that the drainage from 

midline abdominal wall looks pretty to close to her BMs.  I asked her what the 

surgeons from [facility] planned; she said they didn't.


Review of Systems


General:  Fatigue, Malaise, Other (weight loss, down to 99lbs)


HEENT:  No Visual Changes


Pulmonary:  No Dyspnea, No Cough


Cardiovascular:  No: Chest Pain, Palpitations


Gastrointestinal:  Abdominal Pain, Melena; No: Nausea, Vomiting


Neurological:  Weakness, Incoordination





Objective


Exam





Vital Signs








  Date Time  Temp Pulse Resp B/P (MAP) Pulse Ox O2 Delivery O2 Flow Rate FiO2


 


7/20/22 16:29 36.8 93 18 112/96 (101) 95 Room Air  


 


7/20/22 12:30  91      


 


7/20/22 11:36 36.8 90 18 94/63 (73) 98 Room Air  


 


7/20/22 08:39  99   96   21


 


7/20/22 08:28     96 Room Air  


 


7/20/22 07:38 37.6 102 18 103/64 (77) 97 Room Air  


 


7/20/22 07:15      Room Air  


 


7/20/22 06:32  98      


 


7/20/22 03:54  97      


 


7/20/22 03:22 37.0 105 18 110/75 (87) 98 Room Air  


 


7/19/22 23:11 37.2 101 18 100/67 (78) 99 Room Air  


 


7/19/22 21:00      Room Air  


 


7/19/22 19:41 37.5 107 20 97/63 (74) 97 Room Air  














I & O 


 


 7/20/22





 07:00


 


Intake Total 1080 ml


 


Output Total 30 ml


 


Balance 1050 ml





Capillary Refill :


General Appearance:  Chronically ill, Mild Distress


HEENT:  PERRL/EOMI, Pharynx Normal


Respiratory:  Chest Non Tender, Lungs Clear, Normal Breath Sounds, No Accessory 

Muscle Use, No Respiratory Distress


Cardiovascular:  Regular Rate, Rhythm, No Murmur


Gastrointestinal:  soft, no organomegaly, other (midline opening with ostomy bag

covering it and drain coming out)


Extremity:  No Calf Tenderness, No Pedal Edema


Neurologic/Psychiatric:  Alert, Oriented x3


Skin:  Warm/Dry, Pallor





Results


Lab


Laboratory Tests


7/19/22 20:32: Glucometer 162H


7/20/22 03:19: Glucometer 92


7/20/22 05:08: 


White Blood Count 5.8, Red Blood Count 3.19L, Hemoglobin 9.2L, Hematocrit 28L, 

Mean Corpuscular Volume 89, Mean Corpuscular Hemoglobin 29, Mean Corpuscular 

Hemoglobin Concent 32, Red Cell Distribution Width 17.7H, Platelet Count 152, 

Mean Platelet Volume 11.5, Immature Granulocyte % (Auto) 0, Neutrophils (%) 

(Auto) 61, Lymphocytes (%) (Auto) 34, Monocytes (%) (Auto) 4, Eosinophils (%) 

(Auto) 1, Basophils (%) (Auto) 0, Neutrophils # (Auto) 3.6, Lymphocytes # (Auto)

2.0, Monocytes # (Auto) 0.2, Eosinophils # (Auto) 0.0, Basophils # (Auto) 0.0, 

Immature Granulocyte # (Auto) 0.0, Sodium Level 139, Potassium Level 3.9, 

Chloride Level 104, Carbon Dioxide Level 21, Anion Gap 14, Blood Urea Nitrogen 

11, Creatinine 1.20, Estimat Glomerular Filtration Rate 47, BUN/Creatinine Ratio

9, Glucose Level 107H, Calcium Level 6.2L, Corrected Calcium 7.6L, Magnesium 

Level 1.7, Total Bilirubin 0.4, Aspartate Amino Transf (AST/SGOT) 32, Alanine 

Aminotransferase (ALT/SGPT) 18, Alkaline Phosphatase 56, Total Protein 5.4L, 

Albumin 2.3L


7/20/22 11:34: Glucometer 161H


7/20/22 16:35: Glucometer 156H





Microbiology


7/15/22 MRSA Screen - Final, Complete


          MRSA not isolated





Assessment/Plan


S/p ileostomy reversal with fistula and abscess


   -Drain still in place from [facility] 6/16, on Levofloxacin.  I ordered a CT 

(and viewed images myself) which did not show obvious intra-abdominal fluid 

collection and the drain appeared to be all subQ (above fascia).  However, it 

appears to be putting out the same


             output as BM.  I don't think the drain tube is doing anything, but 

want to go over the CT with Radiologist tomorrow.  Will most likely pull tube 

and leave ostomy in place.  I did talk to pt about TPN for 6 months to try and 

get the fistula output to slow down or stop


             and then possibly surgery could be done.  Surgery with high output 

fistula is nearly impossible.  She understood and we will discuss more tomorrow.







COVID infection with vaccination and 1 booster


   -Central line placed


   -No oxygen required at this time


   -Currently on isolation for 10 days making her an unlikely candidate for IRF 

at this time


CHF


   -Echo EF 20-25%


   -Lasix and spironolactone


Type II MI


   


Afib


   -Lovenox 


HTN


   -Well controlled currently


Diabetes


   -SSI


CKD3a


   -BUN/Cr wnl


Recent hospitalization at [place] 6/22-7/14 following stay at [facility] 6/16 for 

pelvic abscess drainage. 





Diet: Regular


DVT prophylaxis: Lovenox and SCDs











LAURENCE MORRELL DO               Jul 20, 2022 19:36

## 2022-07-20 NOTE — PHYSICAL THERAPY DAILY NOTE
PT Daily Note-Current


Subjective


Pt in recliner and agrees to PT. Pt says she is feeling better than yesterday.





Mental Status


Patient Orientation:  Person, Confused, Place





Transfers


SCALE: Activities may be completed with or without assistive devices.





6-Indepedent-patient completes the activity by him/herself with no assistance 

from a helper.


5-Set-up or Clean-up Assistance-helper sets up or cleans up; patient completes 

activity. Dorena assists only prior to or  


    following the activity.


4-Supervision or Touching Assistance-helper provides verbal cues and/or 

touching/steadying and/or contact guard assistance as patient completes 

activity. Assistance may be provided   


    throughout the activity or intermittently.


3-Partial/Moderate Assistance-helper does LESS THAN HALF the effort. Dorena 

lifts, holds or supports trunk or limbs, but provides less than half the effort.


2-Substantial/Maximal Assistance-helper does MORE THAN HALF the effort. Dorena 

lifts or holds trunk or limbs and provides more than half the effort.


4-Hbpkmxhgc-szbihq does ALL the effort. Patient does none of the effort to 

complete the activity. Or, the assistance of 2 or more helpers is required for 

the patient to complete the  


    activity.


If activity was not attempted, code reason:


7-Patient Refused.


9-Not Applicable-not attempted and the patient did not perform the activity 

before the current illness, exacerbation or injury.


10-Not Attempted due to Environmental Limitations-(lack of equipment, weather 

restraints, etc.).


88-Not Attempted due to Medical Conditions or Safety Concerns.


Sit to Stand (QC):  4





Gait Training


Does the Patient Walk?:  Yes


Distance:  50' x 2


Walk 10 feet (QC):  4


Walk 50 ft with 2 Turns(QC):  4


Gait Persons Needed:  1


Gait Assistive Device:  FWW





Exercises


Seated Therapy Exercises:  Ankle pumps, Sit to stand (4), Long arc quads


Seated Reps:  20





Treatments


Pt TFs back to recliner following treatment w/ all needs met and call light 

nearby.





Assessment


Current Status:  Good Progress


Pt becomes fatigued following amb of 50' and requires rest break but then is 

able to amb another 50' following rest break. Required cues for hand and foot 

placement during TFs.





PT Long Term Goals


Long Term Goals


PT Long Term Goals Time Frame:  Jul 30, 2022


Roll Left & Right (QC):  6


Sit to Lying (QC):  6


Lying-Sitting on Side/Bed(QC):  6


Sit to Stand (QC):  6


Chair/Bed-to-Chair Xfer(QC):  6


Toilet Transfer (QC):  6


Walk 10 feet (QC):  6


Walk 50ft with 2 Turns (QC):  6


Walk 150 ft (QC):  6





PT Plan


Problem List


Problem List:  Activity Tolerance, Functional Strength





Treatment/Plan


Treatment Plan:  Continue Plan of Care


Treatment Plan:  Bed Mobility, Education, Functional Activity Cherise, Functional 

Strength, Gait, Safety, Therapeutic Exercise, Transfers


Treatment Duration:  Jul 30, 2022


Frequency:  6 times per week


Estimated Hrs Per Day:  .25 hour per day


Patient and/or Family Agrees t:  Yes





Safety Risks/Education


Patient Education:  Transfer Techniques, Correct Positioning


Teaching Recipient:  Patient


Teaching Methods:  Discussion


Response to Teaching:  Return Demonstration





Time/GCodes


Time In:  1333


Time Out:  1351


Total Billed Treatment Time:  14


Total Billed Treatment


1, Ex











AVELINO WEEKS PTA                Jul 20, 2022 14:09

## 2022-07-20 NOTE — DIAGNOSTIC IMAGING REPORT
EXAMINATION: CT abdomen and pelvis without contrast.



TECHNIQUE: Multiple contiguous axial images were obtained through

the abdomen and pelvis without the use of intravenous contrast.

All CT scans use one or more of the following dose optimizing

techniques: automated exposure control, MA and/or KvP adjustment

based on patient size and exam type or iterative reconstruction. 



HISTORY: Ileostomy reversal with leak. Decreased drainage.



COMPARISON: 08/02/2021



FINDINGS: 



Lung bases: There is a small right pleural effusion with adjacent

atelectasis or consolidation.



Solid organs: The liver is normal. The gallbladder is surgically

absent. There is no biliary ductal dilation. Pancreas is normal. 

Spleen is normal. Adrenal glands are normal. The right kidney is

nonvisualized and may be surgically absent. Left kidney is

unremarkable without visualized calculus or hydronephrosis.



Bowel: Surgical changes of the small bowel and colon. No bowel

obstruction.  



Peritoneum: There is a percutaneous drainage catheter within the

midline abdominal incision. No significant fluid air component is

seen. Evaluation is limited secondary to lack of IV contrast. No

obvious loculated fluid collection or free air within the

abdomen. No suspicious lymphadenopathy. 



Vasculature: Calcification of the aorta without aneurysm.



Musculoskeletal: No suspicious osseous lesion or compression

fracture.



Pelvis: The uterus is surgically absent. No adnexal mass. The

urinary bladder is normal.



IMPRESSION:



1. Percutaneous drainage catheter within the midline abdominal

incision without obvious fluid collection.

2. No other acute abnormality in the abdomen or pelvis.



Dictated by: 



  Dictated on workstation # [id number]

## 2022-07-20 NOTE — PROGRESS NOTE
ELDON BLACKBURN A MED STUDENT 7/20/22 1121:


Subjective


Date Seen by a Provider:  Jul 20, 2022


Time Seen by a Provider:  08:30


Subjective/Events-last exam


Pt lying in bed comfortably this morning. Pt reports she woke up to her 

colostomy bag leaking this morning. Nurse reports this happened three times 

yesterday as well. Pt would like to know when the drain is going to come out. Pt

has some concerns of numbness in her feet and hands, but this has been ongoing 

for quite some time.


Review of Systems


General:  No Chills, No Fatigue


HEENT:  No Head Aches, No Visual Changes


Pulmonary:  No Dyspnea, No Cough


Cardiovascular:  No: Chest Pain, Palpitations


Gastrointestinal:  No: Nausea, Vomiting


Genitourinary:  No Dysuria, No Frequency


Musculoskeletal:  No: neck pain, shoulder pain


Neurological:  Weakness, Numbness





Objective


Exam


Last Set of Vital Signs





Vital Signs








  Date Time  Temp Pulse Resp B/P (MAP) Pulse Ox O2 Delivery O2 Flow Rate FiO2


 


7/20/22 08:39  99   96   21


 


7/20/22 08:28      Room Air  


 


7/20/22 07:38 37.6  18 103/64 (77)    


 


7/16/22 19:36       0.00 





Capillary Refill :


I&O











Intake and Output 


 


 7/20/22





 00:00


 


Intake Total 930 ml


 


Balance 930 ml


 


 


 


Intake Oral 930 ml


 


# Voids 5


 


# Bowel Movements 3








General:  Alert, Oriented X3, Cooperative


HEENT:  Atraumatic, PERRLA


Neck:  Supple, No JVD


Lungs:  Clear to Auscultation, Normal Air Movement


Heart:  Other (irregularly irregular)


Abdomen:  Normal Bowel Sounds, Soft


Extremities:  No Edema, Normal Pulses


Skin:  No Rashes, No Breakdown


Neuro:  Normal Speech, Normal Tone


Psych/Mental Status:  Mental Status NL





Results


Lab


Laboratory Tests


7/19/22 15:22: Glucometer 155H


7/19/22 20:32: Glucometer 162H


7/20/22 03:19: Glucometer 92


7/20/22 05:08: 


White Blood Count 5.8, Red Blood Count 3.19L, Hemoglobin 9.2L, Hematocrit 28L, 

Mean Corpuscular Volume 89, Mean Corpuscular Hemoglobin 29, Mean Corpuscular 

Hemoglobin Concent 32, Red Cell Distribution Width 17.7H, Platelet Count 152, M

abiodun Platelet Volume 11.5, Immature Granulocyte % (Auto) 0, Neutrophils (%) 

(Auto) 61, Lymphocytes (%) (Auto) 34, Monocytes (%) (Auto) 4, Eosinophils (%) 

(Auto) 1, Basophils (%) (Auto) 0, Neutrophils # (Auto) 3.6, Lymphocytes # (Auto)

2.0, Monocytes # (Auto) 0.2, Eosinophils # (Auto) 0.0, Basophils # (Auto) 0.0, 

Immature Granulocyte # (Auto) 0.0, Sodium Level 139, Potassium Level 3.9, 

Chloride Level 104, Carbon Dioxide Level 21, Anion Gap 14, Blood Urea Nitrogen 

11, Creatinine 1.20, Estimat Glomerular Filtration Rate 47, BUN/Creatinine Ratio

9, Glucose Level 107H, Calcium Level 6.2L, Corrected Calcium 7.6L, Magnesium 

Level 1.7, Total Bilirubin 0.4, Aspartate Amino Transf (AST/SGOT) 32, Alanine 

Aminotransferase (ALT/SGPT) 18, Alkaline Phosphatase 56, Total Protein 5.4L, 

Albumin 2.3L





Microbiology


7/15/22 MRSA Screen - Final, Complete


          MRSA not isolated





Assessment/Plan


Assessment/Plan


Assess & Plan/Chief Complaint


COVID infection with vaccination and 1 booster


   -Central line placed


   -No oxygen required at this time


   -Currently on isolation for 10 days making her an unlikely candidate for IRF 

at this time


S/p ileostomy reversal with fistula and abscess


   -General surgery consulted again for possible drain removal


   -Drain still in place from [facility] IR 6/16


   -Levofloxacin


CHF


   -Echo EF 20-25%


   -Lasix and spironolactone


Type II MI


   -Cardiology following, appreciate their recs


   -Telemetry


Afib


   -Lovenox 


HTN


   -Well controlled currently


Diabetes


   -SSI


CKD3a


   -BUN/Cr wnl


Recent hospitalization at [place] 6/22-7/14 following stay at [facility] 6/16 for 

pelvic abscess drainage. 





Diet: Regular


DVT prophylaxis: Lovenox and SCDs





CAROLYNN HURST DO 7/20/22 2113:


Subjective


Subjective/Events-last exam


Pt is doing about the same


No more chest pain


Heart rate is better


She is too weak to go home so we'll need to evaluate that


She will be in isolation until Monday


Review of Systems


General:  Fatigue, Malaise





Objective


Exam


General:  Alert, Oriented X3, Cooperative, No Acute Distress


Lungs:  Clear to Auscultation, Normal Air Movement


Heart:  Regular Rate, Normal S1, Normal S2, No Murmurs


Psych/Mental Status:  Mental Status NL, Mood NL





Assessment/Plan


Assessment/Plan


Assess & Plan/Chief Complaint


PT OT


Monitor O2





Supervisory-Addendum Brief


Verification & Attestation


Participated in pt care:  history, MDM, physical


Personally performed:  exam, history, MDM, supervision of care


Care discussed with:  Medical Student


Procedures:  n/a


Results interpretation:  Verified all documentation


Verification and Attestation of Medical Student E/M Service





A medical student performed and documented this service in my presence. I re

viewed and verified all information documented by the medical student and made 

modifications to such information, when appropriate. I personally performed the 

physical exam and medical decision making. 





 Carolynn Hurst, Jul 20, 2022,21:13











ELDON BLACKBURN MED STUDENT    Jul 20, 2022 11:21


CAROLYNN HURST DO                Jul 20, 2022 21:13

## 2022-07-21 ENCOUNTER — HOSPITAL ENCOUNTER (INPATIENT)
Dept: HOSPITAL 75 - IRF | Age: 77
LOS: 8 days | Discharge: HOME HEALTH | DRG: 91 | End: 2022-07-29
Attending: INTERNAL MEDICINE | Admitting: INTERNAL MEDICINE
Payer: MEDICARE

## 2022-07-21 VITALS — WEIGHT: 105.6 LBS | HEIGHT: 62.01 IN | BODY MASS INDEX: 19.19 KG/M2

## 2022-07-21 VITALS — DIASTOLIC BLOOD PRESSURE: 71 MMHG | SYSTOLIC BLOOD PRESSURE: 108 MMHG

## 2022-07-21 VITALS — SYSTOLIC BLOOD PRESSURE: 114 MMHG | DIASTOLIC BLOOD PRESSURE: 75 MMHG

## 2022-07-21 VITALS — DIASTOLIC BLOOD PRESSURE: 53 MMHG | SYSTOLIC BLOOD PRESSURE: 96 MMHG

## 2022-07-21 VITALS — DIASTOLIC BLOOD PRESSURE: 65 MMHG | SYSTOLIC BLOOD PRESSURE: 104 MMHG

## 2022-07-21 VITALS — DIASTOLIC BLOOD PRESSURE: 67 MMHG | SYSTOLIC BLOOD PRESSURE: 96 MMHG

## 2022-07-21 DIAGNOSIS — Z82.49: ICD-10-CM

## 2022-07-21 DIAGNOSIS — U09.9: ICD-10-CM

## 2022-07-21 DIAGNOSIS — N18.31: ICD-10-CM

## 2022-07-21 DIAGNOSIS — Z85.43: ICD-10-CM

## 2022-07-21 DIAGNOSIS — I13.0: ICD-10-CM

## 2022-07-21 DIAGNOSIS — E46: ICD-10-CM

## 2022-07-21 DIAGNOSIS — Z82.61: ICD-10-CM

## 2022-07-21 DIAGNOSIS — E78.2: ICD-10-CM

## 2022-07-21 DIAGNOSIS — I50.41: ICD-10-CM

## 2022-07-21 DIAGNOSIS — K21.9: ICD-10-CM

## 2022-07-21 DIAGNOSIS — E03.9: ICD-10-CM

## 2022-07-21 DIAGNOSIS — K63.2: ICD-10-CM

## 2022-07-21 DIAGNOSIS — I42.9: ICD-10-CM

## 2022-07-21 DIAGNOSIS — Z83.3: ICD-10-CM

## 2022-07-21 DIAGNOSIS — G72.89: Primary | ICD-10-CM

## 2022-07-21 DIAGNOSIS — M06.9: ICD-10-CM

## 2022-07-21 DIAGNOSIS — Z90.5: ICD-10-CM

## 2022-07-21 DIAGNOSIS — M19.91: ICD-10-CM

## 2022-07-21 DIAGNOSIS — Z87.891: ICD-10-CM

## 2022-07-21 DIAGNOSIS — E11.22: ICD-10-CM

## 2022-07-21 DIAGNOSIS — I48.91: ICD-10-CM

## 2022-07-21 DIAGNOSIS — Z88.0: ICD-10-CM

## 2022-07-21 LAB
ALBUMIN SERPL-MCNC: 2.1 GM/DL (ref 3.2–4.5)
ALP SERPL-CCNC: 51 U/L (ref 40–136)
ALT SERPL-CCNC: 17 U/L (ref 0–55)
BASOPHILS # BLD AUTO: 0 10^3/UL (ref 0–0.1)
BASOPHILS NFR BLD AUTO: 1 % (ref 0–10)
BILIRUB SERPL-MCNC: 0.4 MG/DL (ref 0.1–1)
BUN/CREAT SERPL: 11
CALCIUM SERPL-MCNC: 6 MG/DL (ref 8.5–10.1)
CHLORIDE SERPL-SCNC: 106 MMOL/L (ref 98–107)
CO2 SERPL-SCNC: 21 MMOL/L (ref 21–32)
CREAT SERPL-MCNC: 0.97 MG/DL (ref 0.6–1.3)
EOSINOPHIL # BLD AUTO: 0.1 10^3/UL (ref 0–0.3)
EOSINOPHIL NFR BLD AUTO: 2 % (ref 0–10)
GFR SERPLBLD BASED ON 1.73 SQ M-ARVRAT: 61 ML/MIN
GLUCOSE SERPL-MCNC: 80 MG/DL (ref 70–105)
HCT VFR BLD CALC: 25 % (ref 35–52)
HGB BLD-MCNC: 8.3 G/DL (ref 11.5–16)
LYMPHOCYTES # BLD AUTO: 1 10^3/UL (ref 1–4)
LYMPHOCYTES NFR BLD AUTO: 32 % (ref 12–44)
MAGNESIUM SERPL-MCNC: 1.6 MG/DL (ref 1.6–2.4)
MANUAL DIFFERENTIAL PERFORMED BLD QL: NO
MCH RBC QN AUTO: 30 PG (ref 25–34)
MCHC RBC AUTO-ENTMCNC: 33 G/DL (ref 32–36)
MCV RBC AUTO: 89 FL (ref 80–99)
MONOCYTES # BLD AUTO: 0.2 10^3/UL (ref 0–1)
MONOCYTES NFR BLD AUTO: 7 % (ref 0–12)
NEUTROPHILS # BLD AUTO: 1.9 10^3/UL (ref 1.8–7.8)
NEUTROPHILS NFR BLD AUTO: 58 % (ref 42–75)
PLATELET # BLD: 116 10^3/UL (ref 130–400)
PMV BLD AUTO: 11.9 FL (ref 9–12.2)
POTASSIUM SERPL-SCNC: 3.4 MMOL/L (ref 3.6–5)
PROT SERPL-MCNC: 4.8 GM/DL (ref 6.4–8.2)
SODIUM SERPL-SCNC: 138 MMOL/L (ref 135–145)
WBC # BLD AUTO: 3.2 10^3/UL (ref 4.3–11)

## 2022-07-21 PROCEDURE — 82947 ASSAY GLUCOSE BLOOD QUANT: CPT

## 2022-07-21 PROCEDURE — 36415 COLL VENOUS BLD VENIPUNCTURE: CPT

## 2022-07-21 PROCEDURE — 80053 COMPREHEN METABOLIC PANEL: CPT

## 2022-07-21 PROCEDURE — 85025 COMPLETE CBC W/AUTO DIFF WBC: CPT

## 2022-07-21 PROCEDURE — 8E0ZXY6 ISOLATION: ICD-10-PCS | Performed by: INTERNAL MEDICINE

## 2022-07-21 PROCEDURE — 80048 BASIC METABOLIC PNL TOTAL CA: CPT

## 2022-07-21 RX ADMIN — ENOXAPARIN SODIUM SCH MG: 30 INJECTION SUBCUTANEOUS at 21:52

## 2022-07-21 RX ADMIN — DOCUSATE SODIUM SCH MG: 100 CAPSULE ORAL at 10:01

## 2022-07-21 RX ADMIN — INSULIN ASPART SCH UNIT: 100 INJECTION, SOLUTION INTRAVENOUS; SUBCUTANEOUS at 11:14

## 2022-07-21 RX ADMIN — SACUBITRIL AND VALSARTAN SCH TAB: 24; 26 TABLET, FILM COATED ORAL at 10:02

## 2022-07-21 RX ADMIN — DOCUSATE SODIUM AND SENNOSIDES SCH EA: 8.6; 5 TABLET, FILM COATED ORAL at 19:44

## 2022-07-21 RX ADMIN — ANTACID TABLETS SCH MG: 500 TABLET, CHEWABLE ORAL at 13:38

## 2022-07-21 RX ADMIN — SENNOSIDES SCH MG: 8.6 TABLET, FILM COATED ORAL at 10:02

## 2022-07-21 RX ADMIN — DOCUSATE SODIUM SCH MG: 100 CAPSULE ORAL at 19:44

## 2022-07-21 RX ADMIN — ENOXAPARIN SODIUM SCH MG: 100 INJECTION SUBCUTANEOUS at 13:38

## 2022-07-21 RX ADMIN — ASPIRIN 81 MG CHEWABLE TABLET SCH MG: 81 TABLET CHEWABLE at 10:02

## 2022-07-21 RX ADMIN — FUROSEMIDE SCH MG: 40 TABLET ORAL at 10:02

## 2022-07-21 RX ADMIN — ANTACID TABLETS PRN MG: 500 TABLET, CHEWABLE ORAL at 17:31

## 2022-07-21 RX ADMIN — SENNOSIDES SCH MG: 8.6 TABLET, FILM COATED ORAL at 20:55

## 2022-07-21 RX ADMIN — INSULIN ASPART SCH UNIT: 100 INJECTION, SOLUTION INTRAVENOUS; SUBCUTANEOUS at 05:31

## 2022-07-21 RX ADMIN — ANTACID TABLETS SCH MG: 500 TABLET, CHEWABLE ORAL at 13:39

## 2022-07-21 RX ADMIN — POLYETHYLENE GLYCOL (3350) SCH GM: 17 POWDER, FOR SOLUTION ORAL at 19:44

## 2022-07-21 RX ADMIN — SPIRONOLACTONE SCH MG: 25 TABLET ORAL at 10:02

## 2022-07-21 RX ADMIN — INSULIN ASPART SCH UNIT: 100 INJECTION, SOLUTION INTRAVENOUS; SUBCUTANEOUS at 21:53

## 2022-07-21 RX ADMIN — ANTACID TABLETS SCH MG: 500 TABLET, CHEWABLE ORAL at 21:53

## 2022-07-21 RX ADMIN — SACUBITRIL AND VALSARTAN SCH TAB: 24; 26 TABLET, FILM COATED ORAL at 21:53

## 2022-07-21 NOTE — PROGRESS NOTE
ELDON BLACKBURN A MED STUDENT 7/21/22 1217:


Progress Note


HPI: This is a very complex 76yoWF who had been admitted 7/14 due to fall and 

found down at home for 18 hours after HH nurse found her (she had no phone 

access and her life alert had been turned off) following a 3 week hospital 

course at [place] (6/22/2022-7/14/2022) due to [facility] stay on 6/16/22 

due to confusion from sepsis from pelvic abscess required drainage by IR due to 

ileostomy reversal leak and subsequent abscess formation. She has a h/o subtotal

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 

ischemic bowel and that was performed by Dr Mitchell. The recent ileostomy reversal 

was performed by Dr Galicia July 2021. She was admitted for observation and IVF 

on med-surg and promptly moved to IRF due to weakness and need to strengthen 

prior to DC but shortly after she arrived in IRF she was found to have a fever 

of 38.6 and tachycardia of 142 and sepsis w/u initiated and found to have COVID 

infection with lactic acidosis not due to sepsis so the decision was made to 

move to ICU. Pt was placed on levofloxacin for sepsis and pelvic abscess. Pt has

hx of CHF, so spironolactone and lasix were also started. Lovenox started for 

DVT prophylaxis. General surgery was consulted for central line placement and 

possible drain removal. Dr. Zarate placed central line, but drain was not removed 

at this time. Pt remained stable in ICU and on 7/18 she was transferred to 4th 

floor and remained in isolation for COVID. Pt was not requiring oxygen at this 

time. On 7/19 pts colostomy bag began to leak and general surgery was consulted.

Dr. Galicia saw pt and plans to remove the drain at some point. Since pt is not 

requiring oxygen and vitals are within normal limits, decision was made to move 

pt to inpatient rehab facility. Chemistry panel showed corrected calcium of 7.5,

so pt started on Tums three times daily and vitamin D level will be checked. Pt 

will remain in isolation until 8/4 according to hospitals infection control 

specialist. Goals of care are rehabilitation and strength training to get pt 

closer to baseline. Pt medically stable for d/c to IRF today .





CAROLYNN HURST DO 7/21/22 2023:


Supervisory-Addendum Brief


Verification & Attestation


Participated in pt care:  history, MDM, physical


Personally performed:  exam, history, MDM, supervision of care


Care discussed with:  Medical Student


Procedures:  n/a


Results interpretation:  Verified all documentation


Verification and Attestation of Medical Student E/M Service





A medical student performed and documented this service in my presence. I 

reviewed and verified all information documented by the medical student and made

modifications to such information, when appropriate. I personally performed the 

physical exam and medical decision making. 





 Carolynn Hurst, Jul 21, 2022,20:23











ELDON BLACKBURN MED STUDENT    Jul 21, 2022 12:17


CAROLYNN HURST DO                Jul 21, 2022 20:23

## 2022-07-21 NOTE — PHYSICAL THERAPY EVALUATION
PT Evaluation-General


Medical Diagnosis


Admission Date


2022 at 13:25


Medical Diagnosis:  debility, covid 19


Onset Date:  2022





Therapy Diagnosis


Therapy Diagnosis:  impaired mobility, strength, endurance





Precautions


Precautions/Isolations:  Droplet Isolation, Fall Prevention, Standard 

Precautions





Referral


Physician:  Carolynn Trujillo DO


Reason for Referral:  Evaluation/Treatment





Medical History


Pertinent Medical History:  Atrial Fib, DM, HTN, Hypothroidism, Renal 

Insufficiency


Reviewed History:  Yes





Social History


Home:  Single Level


Current Living Status:  Alone


Entry Into Home:  Stairs With Railing


PT Steps Into Home:  3


Patient states her son lives only about a mile away.





Prior


Prior Level of Function


SCALE: Activities may be completed with or without assistive devices.





6-Indepedent-patient completes the activity by him/herself with no assistance 

from a helper.


5-Set-up or Clean-up Assistance-helper sets up or cleans up; patient completes 

activity. Hatch assists only prior to or  


    following the activity.


4-Supervision or Touching Assistance-helper provides verbal cues and/or 

touching/steadying and/or contact guard assistance as patient completes 

activity. Assistance may be provided   


    throughout the activity or intermittently.


3-Partial/Moderate Assistance-helper does LESS THAN HALF the effort. Hatch 

lifts, holds or supports trunk or limbs, but provides less than half the effort.


2-Substantial/Maximal Assistance-helper does MORE THAN HALF the effort. Hatch 

lifts or holds trunk or limbs and provides more than half the effort.


7-Lpxcbnntr-hpqprc does ALL the effort. Patient does none of the effort to 

complete the activity. Or, the assistance of 2 or more helpers is required for 

the patient to complete the  


    activity.


If activity was not attempted, code reason:


7-Patient Refused.


9-Not Applicable-not attempted and the patient did not perform the activity 

before the current illness, exacerbation or injury.


10-Not Attempted due to Environmental Limitations-(lack of equipment, weather 

restraints, etc.).


88-Not Attempted due to Medical Conditions or Safety Concerns.


Bed Mobility:  6


Transfers (B,C,W/C):  6


Gait:  6


Stairs:  6


Indoor Mobility (Ambulation):  Independent


Stairs:  Independent





PT Evaluation-Current


Subjective


Patient in bed pre tx, agrees to PT, has no complaints of pain.  Will be co-

treating with OT due to poor patient mobility, strength, endurance, severe 

debility, coordinate UE and LE during activity, safety and reduce risk of falls.





Pt/Family Goals


to be independent at home





Objective


Patient Orientation:  Person, Place, Situation





ROM/Strength


ROM Lower Extremities


WNL


Strength Lower Extremities


LLE (hip flexion 3+/5, knee flexion 3+/5, knee extension 4-/5, dorsiflexion 

3+/5), RLE (hip flexion 3+/5, knee flexion 3+/5, knee extension 4-/5, 

dorsiflexion 3+/5)





Sensory


Hearing:  Functional


Sensation Right Lower Extremit:  Intact


Sensation Left Lower Extremity:  Intact


Sensation Lower Extremities


Patient states she has intermittent tingling in her hands and feet





Transfers


Roll Left & Right (QC):  6


Sit to Lying (QC):  6


Lying to Sitting/Side of Bed(Q:  6


Sit to Stand (QC):  3


Chair/Bed-to-Chair Xfer(QC):  4


Toilet Transfer (QC):  4


Car Transfer (QC):  4


Patient performs rolling and supine <-> sit with independence, sit <-> stand min

assist, transfers CGA, car transfer CGA.  Patient needs occasional cues for 

safety and positioning.





Gait


Does the Patient Walk?:  Yes


Mode of Locomotion:  Walk


Anticipated Mode of Locomotion:  Walk


Walk 10 feet (QC):  4


Walk 50 ft with 2 Turns(QC):  4


Walk 150 ft (QC):  88


Walking 10ft/uneven surface-QC:  10


Distance:  50'x3


Gait Assistive Device:  FWW


Comments/Gait Description


Patient can ambulate 50' with a rolling walker with CGA (including 50' with at 

least 2 turns of 90 degrees), patient ambulates slowly, slightly uncoordinated, 

fatigues quickly





Wheelchair Training


Does the Pt Use a Wheelchair?:  No


Wheel 50 ft with 2 turns (QC):  9


Wheel 150 ft (QC):  9





Stairs


#of Steps:  1


1 Step (curb) (QC):  4


4 Steps (QC):  88


12 Steps (QC):  88


Walking Assistive Device:  Walker


Patient can go up and down 1 step using a rolling walker with CGA, cues for foot

placement and safety.





Balance


Sitting Static:  Normal


Sitting Dynamic:  Normal


Standing Static:  Fair


 Standing Dynamic:  Fair


Picking up an Object (QC):  88


Special Test Comments


Patient scored 19/28 on the Tinetti Assessment Tool.





Treatment


Patient also performed seated exercises x20 (AP, LAQ, marching), OT performed UE

exercises, standing at sink to perform ADL's, and dressing.





PT performed bed mobility and transfers, ambulation, LE exercise, standing 

during ADL's and standing and positioning during dressing, OT performed UE 

exercise, ADL's, dressing, UE positioning and safety during activity.





Assessment/Needs


Patient in recliner post tx with nurse call, phone, tray, all needs met.  

Patient has impaired mobility, strength, endurance.  Patient fatigues quickly 

with activity.


Rehab Potential:  Fair





PT Short Term Goals


Short Term Goals


Time Frame:  2022


Roll Left & Right:  6


Sit to lyin


Lying to sitting on side of be:  6


Sit to stand:  4 (CGA)


Chair/bed-to-chair transfer:  4 (SBA)


Walk 10 feet:  4 (SBA)


Walk 50 feet with two turns:  4 (SBA)





PT Long Term Goals


Long Term Goals


PT Long Term Goals Time Frame:  Aug 11, 2022


Roll Left & Right (QC):  6


Sit to Lying (QC):  6


Lying-Sitting on Side/Bed(QC):  6


Sit to Stand (QC):  6


Chair/Bed-to-Chair Xfer(QC):  6


Toilet Transfer (QC):  6


Car Transfer (QC):  6


Does the Patient Walk:  Yes


Walk 10 feet (QC):  6


Walk 50ft with 2 Turns (QC):  6


Walk 150 ft (QC):  6


Walking 10ft on Uneven Surface:  6


1 Step (curb) (QC):  6


4 Steps (QC):  6


12 Steps (QC):  88


Picking up an Object (QC):  6


Wheel 50 feet with 2 turns (QC:  9


Wheel 150 feet:  9





PT Plan


Problem List


Problem List:  Activity Tolerance, Functional Strength, Safety, Balance, Gait, 

Transfer, Bed Mobility, ROM





Treatment/Plan


Treatment Plan:  Continue Plan of Care


Treatment Plan:  Bed Mobility, Education, Functional Activity Cherise, Functional 

Strength, Group Therapy, Gait, Safety, Therapeutic Exercise, Transfers


Treatment Duration:  Aug 11, 2022


Frequency:  At least 5 of 7 days/Wk (IRF)


Estimated Hrs Per Day:  1.5 hours per day


Patient and/or Family Agrees t:  Yes





Safety Risks/Education


Patient Education:  Gait Training, Transfer Techniques, Steps, Correct 

Positioning, Safety Issues


Teaching Recipient:  Patient


Teaching Methods:  Demonstration, Discussion


Response to Teaching:  Reinforcement Needed





Discharge Recommendations


Plan


Patient will perform bed mobility and transfer training, balance and endurance 

training, functional strengthening, stair training, gait training, and 

education, to improve functional mobility and independence at home.


Therapy Discharge Recommendati:  Home & Family, Post Acute PT





Time/GCodes


Time In:  1335


Time Out:  1450


Total Billed Treatment Time:  75


Total Billed Treatment


1 visit


EVM 10'


EX 15'


FA 50'





PT eval from [id number], co-treat from [id number]











GORDON HOLCOMB PT                2022 14:54

## 2022-07-21 NOTE — OCCUPATIONAL THERAPY EVAL
OT Evaluation-General/PLF


Medical Diagnosis


Admission Date


2022 at 13:25


Medical Diagnosis:  Covid debility


Onset Date:  2022





Therapy Diagnosis


Therapy Diagnosis:  reduced adl status





Precautions


Precautions/Isolations:  Airborne Isolation, Contact Isolation (COVID 19), 

Droplet Isolation, Fall Prevention, Standard Precautions





Referral


Physician:  Ronnie Morris Reason:  Evaluation/Treatment





Medical History


Pertinent Medical History:  Atrial Fib, DM, HTN, Hypothroidism, Renal 

Insufficiency


Additional Medical History


afib, HTN, ovarian cancer, solitary kidney, RA, DM, hypothyroidism,


Current History


ED  d/t fall (18 hours down), recent 3 week course at [facility] (- 

following stay at [facility] for pelvic abscess drainage ). transferred to ARU, 

found to have fever, tachycardia, sepsis and tested positive for COVID. Pt 

transferred to ICU for higher level of care.


Per patient, she lives alone in a single story home. She reports being indep 

with adls and iadls. She was using a 4WW at baseline.


Reviewed History:  Yes





Social History


Home:  Single Level


Current Living Status:  Alone





ADL-Prior Level of Function


SCALE: Activities may be completed with or without assistive devices.





6-Indepedent-patient completes the activity by him/herself with no assistance 

from a helper.


5-Set-up or Clean-up Assistance-helper sets up or cleans up; patient completes 

activity. Elliott assists only prior to or  


    following the activity.


4-Supervision or Touching Assistance-helper provides verbal cues and/or 

touching/steadying and/or contact guard assistance as patient completes 

activity. Assistance may be provided   


    throughout the activity or intermittently.


3-Partial/Moderate Assistance-helper does LESS THAN HALF the effort. Elliott 

lifts, holds or supports trunk or limbs, but provides less than half the effort.


2-Substantial/Maximal Assistance-helper does MORE THAN HALF the effort. Elliott 

lifts or holds trunk or limbs and provides more than half the effort.


9-[id number]-ewflzb does ALL the effort. Patient does none of the effort to 

complete the activity. Or, the assistance of 2 or more helpers is required for 

the patient to complete the  


    activity.


If activity was not attempted, code reason:


7-Patient Refused.


9-Not Applicable-not attempted and the patient did not perform the activity 

before the current illness, exacerbation or injury.


10-Not Attempted due to Environmental Limitations-(lack of equipment, weather 

restraints, etc.).


88-Not Attempted due to Medical Conditions or Safety Concerns.


Self Care:  Independent


Functional Cognition:  Independent


DME/Equipment:  Bath Bench (built in seat), Grab Bars, Tub/Shower


Drive Self:  Yes





OT Current Status


Subjective


Pt denies pain, agreeable to evaluation.


Co-treat with PT for part of treatment ([id number]) secondary to poor endurance, 

severe debility and need of 2 skilled clinicians to progress indep and safety 

with adls and functional mobility.





Appearance


Pt returned to sitting in recliner, all needs within reach at therapy departure.





Mental Status/Objective


Patient Orientation:  Person, Place, Situation


Attachments:  Colostomy/Ileostomy, IV, Telemetry





Current


Glasses/Contacts:  Yes


Hearing Aids:  No


Dentures/Partials:  Yes (partial)


Hand Dominance:  Right


Upper Extremity ROM


WNL


Upper Extremity Strength


grossly 3/5





ADL-Treatment


Eating (QC):  5


Oral Hygiene (QC):  4


Shower/Bathe Self (QC):  4


Upper Body Dressing (QC):  4


Lower Body Dressing (QC):  4


On/Off Footwear (QC):  4


Toileting Hygiene (QC):  4


Supine>sit: Independent. Sponge bath performed seated at EOB. Pt able to reach 

all body parts but fatigues quickly, thus needing several rest breaks. CGA for 

safety as she stood to wash stefano area/buttocks. She donned brief over feet 

without difficulty and stood to manage up over hips with steadying assist. She 

requested to defer donning pants until drain from colostomy is removed (plan for

removal today). She stood at the sink to brush teeth, close supervision for 

safety. No LOB but pt does fatigue quickly and requires a sitting rest break 

before ambulating back to room. Due to isolation precautions, pt is unable to 

leave room at this time. She ambulated several bouts within room with SBA-CGA 

and use of walker. Pt only able to ambulate ~50 feet each time before requesting

to rest. Education provided on energy conservation strategies. Pt performed 

exercises (zero resistance) alternating between Upper and Lower extremities. 

Again, fatigues easily thus needing several short rest breaks.





Education


OT Patient Education:  Correct positioning, Energy conservation, Modified ADL 

techniques, Purpose of tx/functional activities, Safety issues, W/C management


Teaching Recipient:  Patient


Teaching Methods:  Discussion


Response to Teaching:  Verbalize Understanding, Return Demonstration, 

Reinforcement Needed





OT Short Term Goals


Short Term Goals


Time Frame:  2022


Eatin


Oral hygiene:  6


Toileting hygiene:  6


Shower/bathe self:  5


Upper body dressin


Lower body dressin


Putting on/taking off footwear:  6





OT Long Term Goals


Long Term Goals


Time Frame:  Aug 4, 2022


Eating (QC):  6


Oral Hygiene (QC):  6


Toileting Hygiene (QC):  6


Shower/Bathe Self (QC):  6


Upper Body Dressing (QC):  6


Lower Body Dressing (QC):  6


On/Off Footwear (QC):  6


1=Demonstrate adherence to instructed precautions during ADL tasks.


2=Patient will verbalize/demonstrate understanding of assistive darcie

harshil/modifications for ADL.


3=Patient will improve strength/tolerance for activity to enable patient to 

perform ADL's.





OT Education/Plan


Problem List/Assessment


Assessment:  Decreased Activ Tolerance, Decreased UE Strength, Impaired Funct 

Balance, Impaired I ADL's, Impaired Self-Care Skills





Discharge Recommendations


Plan/Recommendations:  Continue POC


Therapy Discharge Recommendati:  Home & Family





Treatment Plan/Plan of Care


Treatment,Training & Education:  Yes


Patient would benefit from OT for education, treatment and training to promote 

independence in ADL's, mobility, safety and/or upper extremity function for 

ADL's.


Plan of Care:  ADL Retraining, Concurrent Therapy, Functional Mobility, Group 

Exercise/Act as Ind, UE Funct Exercise/Act


Treatment Duration:  Aug 4, 2022


Frequency:  At least 5 of 7 days/Wk (IRF)


Estimated Hrs Per Day:  1.5 hours per day (75-90 min/day )


Agreement:  Yes


Rehab Potential:  Good





Time/GCodes


Start Time:  13:25


Stop Time:  14:50


Total Time Billed (hr/min):  75


Billed Treatment Time


1 visit


EVL (10 min)


ADL x3 (45 min)


EX (20 min) 





OT eval: [id number]


PT eval: [id number]


Co-treat: [id number]











Mackenzie De Oliveira OT                2022 14:50

## 2022-07-21 NOTE — PM&R POST ADMISSION ASSESSMENT
PM&R 


Date of Visit:  Jul 21, 2022


Time of Visit:  13:30


History of Present Illness


CC: COVID myopathy





HPI: This is a 76yoWF clinic patient of [facility] who presents to IRF after a lengthy 

hospital course after she was transferred from IRF last week after 2 hours when 

fever started and w/u revealed COVID infection with hypotension so she was moved

to ICU and ultimately moved to the floor. She remains in isolation. No O2 

required. Since she was in ICU but not requiring O2 she will still remain in 

isolation for 20 days. No pain reported. Drain hopefully will be removed by 

surgery Dr Galicia. Nutrition is very poor with pre-albumin at 11.








HPI: This is a very complex 76yoWF who had been admitted 7/14 due to fall and 

found down at home for 18 hours after HH nurse found her (she had no phone 

access and her life alert had been turned off) following a 3 week hospital 

course at [facility] (6/22/2022-7/14/2022) due to [facility] hospital stay on 6/16/22 

due to confusion from sepsis from pelvic abscess required drainage by IR due to 

ileostomy reversal leak and subsequent abscess formation. She has a h/o subtotal

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 

ischemic bowel and that was performed by Dr Beavers. The recent ileostomy reversal 

was performed by Dr Galicia July 2021. She was admitted for observation and IVF 

on med-surg and promptly moved to IRF due to weakness and need to strengthen 

prior to DC but shortly after she arrived in IRF she was found to have a fever 

of 38.6 and tachycardia of 142 and sepsis w/u initiated and found to have COVID 

infection with lactic acidosis not due to sepsis so the decision was made to 

move to ICU. Pt was placed on levofloxacin for sepsis and pelvic abscess. Pt has

hx of CHF, so spironolactone and lasix were also started. Lovenox started for 

DVT prophylaxis. General surgery was consulted for central line placement and 

possible drain removal. Dr. Zarate placed central line, but drain was not removed 

at this time. Pt remained stable in ICU and on 7/18 she was transferred to 4th 

floor and remained in isolation for COVID. Pt was not requiring oxygen at this 

time. On 7/19 pts colostomy bag began to leak and general surgery was consulted.

Dr. Galicia saw pt and plans to remove the drain at some point. Since pt is not 

requiring oxygen and vitals are within normal limits, decision was made to move 

pt to inpatient rehab facility. Chemistry panel showed corrected calcium of 7.5,

so pt started on Tums three times daily and vitamin D level will be checked. Pt 

will remain in isolation until 8/4 according to [facility] infection control s

pecialist. Goals of care are rehabilitation and strength training to get pt 

closer to baseline. Pt medically stable for d/c to IRF today .











ELDON BLACKBURN A MED STUDENT





Past Medical-Social-Family Hx


Past Med/Social Hx:  Reviewed Nursing Past Med/Soc Hx, Reviewed and Corrections 

made


Patient Social History


Marrital Status:  single


Employed/Student:  retired


Alcohol Use:  Denies Use


Smoking Status:  Former Smoker


Former Smoker, Quit:  Mar 30, 1985


Type Used:  Cigarettes


2nd Hand Smoke Exposure:  No


Recent Hopitalizations:  No





Immunizations Up To Date


Date of Pneumonia Vaccine:  Oct 8, 2020


Date of Influenza Vaccine:  Oct 7, 2020





Seasonal Allergies


Seasonal Allergies:  No





Past Medical History


Surgeries:  Abdominal, Bowel Surgery, Cardiac, Gallbladder, Hysterectomy, 

Nephrectomy, Oophorectomy, Tonsillectomy


Currently Using CPAP:  No


Cardiac:  Atrial Fibrillation, High Cholesterol, Hypertension


Reproductive:  Yes (OVARIAN CANCER)


Sexually Transmitted Disease:  No


HIV/AIDS:  No


Hysterectomy, Menopausal


Genitourinary:  Bladder Infection


solitary kidney


Gastrointestinal:  Colitis, Gastroesophageal Reflux, Liver Disease/Jaundice, C-

Diff, Gall Bladder Disease


Musculoskeletal:  Arthritis, Rheumatoid Arthritis


Endocrine:  Hypothyroidsim, Diabetes, Non-Insulin dep


Cancer:  Ovarian


Did You Recieve Any Treatments:  Yes


What Type of Treatment Did You:  Surgical Intervention


History of Blood Disorders:  No


Adverse Reaction to Blood Ferrari:  No





Family History





Arthritis


  19 MOTHER


Cardiovascular disease


  19 MOTHER


Diabetes mellitus


  19 FATHER


  19 MOTHER


No Pertinent Family Hx





PAST MEDICAL /SURGICAL HISTORY:


-09/2020--HAD SEVERE C. DIFFICILE COLITIS--ADMITTED TO [facility]


09/22/20. PT HAD SYNCOPAL EPISODE WITH LOSS OF PULSE AND CPR/RESUSCITATION


FOR APPROXIMATELY 2 MINUTES WITH [facility]C.


DURING THAT STAY SHE HAD ATRIAL FIBRILLATION WITH [facility] THAT REQUIRED


CARDIOVERSION


PT UNDERWENT SUBTOTAL COLECTOMY WITH PERMANENT ILEOSTOMY. 


PT REPORTS THAT SHE WAS ON VENTILATOR FOR 10 DAYS


PT WAS TRANSFERRED FROM [facility] TO [facility], AND WAS THERE FROM


10/10/20-10/30/20, THEN TRANSFERRED HERE FOR REHAB FROM 10/30/20-11/10/20.


SEEING WOUND CARE FOR DELAYED HEALING OF MIDLINE SURGICAL WOUND AND SKIN


BREAKDOWN AROUND STOMA.





ADDITIONAL PAST SURGICAL HISTORY:


-TONSILLECTOMY 1960


-APPENDECTOMY 1961


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


-OVARIAN CYSTECTOMY 1966


-OPEN TOTAL ABDOMINAL HYSTERECTOMY / BILATARAL SALPINGO-OOPHORECTOMY 1972


FOR OVARIAN CANCER


-COCCYX EXCISION 1983


-LAPAROSCOPIC CHOLECYSTECOMY 1997


-RIGHT NECK LIPOMA REMOVAL 01/2012 BY DR. ZARATE


-SEBACEOUS CYST OF ABDOMINAL WALL REMOVED 01/2012 BY DR. ZARATE


-SUBTOTAL COLECTOMY WITH PERMANENT ILEOSTOMY 09/2020 AT [facility] BY


DR. BEAVERS





Occupation:  retired





PM&R Allergy/Meds/Data Review


Allergies


Coded Allergies:  


     Penicillins (Verified  Allergy, Unknown, 4/22/21)


     raspberry (Verified  Allergy, Unknown, 4/22/21)





Home Medications


Scheduled


Amitriptyline HCl (Amitriptyline HCl), 100 MG PO HS, (Reported)


Atorvastatin Calcium (Atorvastatin Calcium), 40 MG PO 1700, (Reported)


Carvedilol (Carvedilol), 25 MG PO BID, (Reported)


Cetirizine HCl (Cetirizine HCl), 10 MG PO DAILY, (Reported)


Cholecalciferol (Vitamin D3) (Vitamin D3), 25 MCG PO DAILY, (Reported)


Docusate Sodium (Docusate Sodium), 100 MG PO BID, (Reported)


Famotidine (Famotidine), 20 MG PO DAILY, (Reported)


Fenofibrate (Fenofibrate), 160 MG PO HS, (Reported)


Ferrous Sulfate (Iron), 325 MG PO BID, (Reported)


Latanoprost (Xalatan), 1 DROP OU HS, (Reported)


Leflunomide (Leflunomide), 20 MG PO Q48H, (Reported)


Levothyroxine Sodium (Levothyroxine Sodium), 125 MCG PO DAILY, (Reported)


Nystatin (Nystatin), 5 ML PO QID, (Reported)


Oxybutynin Chloride (Oxybutynin Chloride), 5 MG PO BID, (Reported)


Saccharomyces Boulardii (Florastor), 250 MG PO DAILY, (Reported)


Timolol Maleate (Timolol Maleate 0.5%), 1 DROP OU HS, (Reported)





Scheduled PRN


Acetaminophen (Tylenol Arthritis), 650 MG PO Q6H PRN for PAIN-MILD (1-4) OR 

TEMPATURE, (Reported)





Discontinued Medications


Amlodipine Besylate (Amlodipine Besylate), 10 MG PO DAILY, (Reported)


   Discontinued Reason: No Longer Taking


Aspirin (Aspirin), 81 MG PO DAILY, (Reported)


   Discontinued Reason: No Longer Taking


Atorvastatin Calcium (Atorvastatin Calcium), 20 MG PO DAILY, (Reported)


   Discontinued Reason: Prescription changed


Carvedilol (Carvedilol), 12.5 MG PO BID, (Reported)


   Discontinued Reason: Prescription changed


Fish Oil/Dha/Epa (Fish Oil 1,200 mg Fish Oil), 2 EACH PO BID, (Reported)


   Discontinued Reason: No Longer Taking


Hydrocodone/Acetaminophen (Hydrocodone-Acetamin 5-325 mg), 1 TAB PO Q4H PRN for 

PAIN-MODERATE (5-7)


   Discontinued Reason: No Longer Taking


Levothyroxine Sodium (Levothyroxine Sodium), 112 MCG PO WHITE,TU,WE,THU,FR,SA, (Re

ported)


   Discontinued Reason: No Longer Taking


Pantoprazole Sodium (Pantoprazole Sodium), 40 MG PO DAILY, (Reported)


   Discontinued Reason: No Longer Taking


Pilocarpine (Salagen), 10 MG PO BID, (Reported)


   Discontinued Reason: No Longer Taking


Pioglitazone HCl (Pioglitazone HCl), 30 MG PO DAILY, (Reported)


   Discontinued Reason: No Longer Taking


Simethicone (Gas Relief 80), 80 MG PO UD PRN for GAS, (Reported)


   Discontinued Reason: No Longer Taking


Sodium Bicarbonate (Sodium Bicarbonate), 650 MG PO BID, (Reported)


   Discontinued Reason: No Longer Taking


Tramadol HCl (Tramadol HCl), 50 MG PO QID


   Discontinued Reason: No Longer Taking





Current Medications


Current Medications


Reviewed





Review of Systems


Constitutional:  see HPI, malaise, weakness


EENTM:  no symptoms reported


Respiratory:  no symptoms reported


Cardiovascular:  chest pain


Gastrointestinal:  no symptoms reported


Genitourinary:  no symptoms reported


Musculoskeletal:  back pain, joint pain


Skin:  no symptoms reported


Psychiatric/Neurological:  Anxiety, Depressed


All Other Systems Reviewed


Negative Unless Noted:  Yes





Physical Exam


Physical Exam


Vital Signs


Capillary Refill :


Height, Weight, BMI


Height: '"


Weight: lbs. oz. kg; 19.63 BMI


Method:Stated


General Appearance:  No Apparent Distress, WD/WN, Chronically ill, Thin


Eyes:  Bilateral Eye Normal Inspection, Bilateral Eye PERRL


HEENT:  PERRL/EOMI, Normal ENT Inspection, Pharynx Normal


Neck:  Full Range of Motion, Normal Inspection, Non Tender, Supple, Carotid 

Bruit


Respiratory:  Chest Non Tender, Lungs Clear, Normal Breath Sounds, No Accessory 

Muscle Use, No Respiratory Distress


Cardiovascular:  Regular Rate, Rhythm, No Edema, No Gallop, No JVD, No Murmur, 

Normal Peripheral Pulses


Gastrointestinal:  Normal Bowel Sounds, No Organomegaly, No Pulsatile Mass, Non 

Tender, Soft


Back:  Normal Inspection, No CVA Tenderness, No Vertebral Tenderness


Extremity:  Normal Capillary Refill, Normal Inspection, Normal Range of Motion, 

Non Tender, No Calf Tenderness, No Pedal Edema


Neurologic/Psychiatric:  Alert, Oriented x3, Normal Mood/Affect, CNs II-XII Norm

as Tested, Abnormal Gait, Depressed Affect, Motor Weakness (generalized)


Skin:  Normal Color, Warm/Dry


Lymphatic:  No Adenopathy





PM&R Medical Assessment & Plan


REHAB/MEDICAL ASSESSMENT AND PLAN:





REHAB IMPAIRMENT GROUP: 


COVID myopathy





ETIOLOGIC DIAGNOSIS: 


COVID myopathy





The comorbidities that impact the patients function and/or functional outcome 

by: malnutrition, weakness, COVID, drain from pelvic abscess placement 6/16/22





REHAB PLAN:


The patient is being admitted to our comprehensive inpatient rehabilitation 

facility and can tolerate the intensity of service consisting of at least:


      180 minutes of therapy a day, 5 out of 7 days a week 


    


Rehab treatment will consist of:   PT OT will focus on regaining function in 

order to build stamina while increasing nutrition and use of assistive devices





The patient/family has a good understanding of our discharge process and will be

nefit from an interdisciplinary inpatient rehabilitation program. The patient 

has potential to make improvement and is in need of at least two of the 

following multidisciplinary therapies including but not limited to physical, 

occupational, speech, and prosthetics and orthotics.  Additionally the patient 

will need services from respiratory, nutritional services, wound care, psychol

ogy, etc. (Customize this to each patient). Given the patients complex 

condition and risk of further medical complications, rehabilitation services 

cannot be safely or effectively provided at a lower level of care such as a 

skilled nursing facility.





BARRIERS TO DISCHARGE:


Malnutrition with weakness





ESTIMATED LOS:


10 days





DISPOSITION:


Home





RELEVANT CHANGES SINCE PREADMISSION SCREENING: 


I have compared the patients medical and functional status at the time of the 

preadmission screening and there are: 


     no changes





PROGNOSIS:


Guarded





REHABILITATION GOALS:  


1. PT OT will focus on regaining function in order to build stamina while 

increasing nutrition and use of assistive devices








All the above goals were reviewed with the patient and he/she is in agreement.


By signing this document, I acknowledge that I have personally performed a full 

physical examination on this patient within 24 hours of admission to this 

inpatient rehabilitation facility and have determined the patient to be able to 

tolerate the above course of treatment at an intensive level for a reasonable 

period of time. I will be completing a detailed individualized Plan of Care for 

this patient by day #4 of the patients stay based upon the Preadmission Screen,

the Post-Admission Evaluation, and the therapy evaluations.


Admission Dx/Comorbidities:  


(1) Physical debility


Status:  Acute


ICD Codes:  R53.81 - Other malaise


(2) COVID


ICD Codes:  U07.1 - COVID-19


(3) Ovarian cancer in remission


ICD Codes:  Z85.43 - Personal history of malignant neoplasm of ovary


(4) Solitary kidney


ICD Codes:  Q60.0 - Renal agenesis, unilateral


(5) Fall


Status:  Acute


ICD Codes:  W19.XXXA - Unspecified fall, initial encounter


(6) Rhabdomyolysis


Status:  Acute


ICD Codes:  M62.82 - Rhabdomyolysis


(7) Myopathy


ICD Codes:  G72.9 - Myopathy, unspecified


(8) Hypothyroidism


ICD Codes:  E03.9 - Hypothyroidism, unspecified


(9) Hypoglycemia


Status:  Acute


ICD Codes:  E16.2 - Hypoglycemia, unspecified


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


ICD Codes:  L98.8 - Other specified disorders of the skin and subcutaneous 

tissue


(11) Diabetes mellitus


ICD Codes:  E11.9 - Type 2 diabetes mellitus without complications


(12) Atrial fibrillation


ICD Codes:  I48.91 - Unspecified atrial fibrillation





Assessment/Plan


Assessment and Plan


Assess & Plan/Chief Complaint


Assessment:


Myopathy 


Malnutrition prealbumin 11


COVID infection with vaccination and 1 booster


   -Central line maintained


   -No oxygen required at this time


   -Currently on isolation for 20 days


S/p ileostomy reversal with fistula and abscess July 2022 Dr Galicia


   -General surgery consulted again for possible drain removal today, 7/21/22


   -Drain still in place from [facility] IR 6/16


   -Levofloxacin last day


CHF


   -Echo EF 20-25%


   -Lasix and spironolactone and Entresto


Type II MI


   -Cardiology following, appreciate their recs


   -Telemetry DC


Afib


   -Lovenox 


HTN


   -Well controlled currently


Diabetes


   -SSI


CKD3a


   -BUN/Cr wnl


Recent hospitalization at [facility] 6/22-7/14 following stay at [facility] 6/16 for 

pelvic abscess drainage. 





Diet: Regular


DVT prophylaxis: Lovenox and SCDs











TAMMY HURST DO                Jul 21, 2022 11:56

## 2022-07-21 NOTE — ST COGNITIVE LINGUISTIC EVAL
Speech Evaluation-General


Medical Diagnosis


Covid Debility


Onset Date:  Jul 14, 2022





Therapy Diagnosis


Therapy Diagnosis:  Mild Neurocognitive Impairment





Precautions


Precautions:  Fall, Pressure Ulcer


Precautions/Isolations:  Fall Prevention, Pressure Ulcer


COVID-19 Isolation.





Referral


Referring Physician:  Dr. Carolynn Trujillo


Reason for Referral:  Evaluation/Treatment





Medical History


Pertinent Medical History:  Atrial Fib, DM, HTN, Hypothroidism, Renal 

Insufficiency


Current History


The patient is a 76 year-old male with a past medical history of atrial 

fibrillation, DM, HTN, hypothyroidism, renal insufficiency, and ovarian cancer, 

who presented to [facility] on 7/14/22 following a fall. Prior to the

patient's fall, she was admitted to [facility] from 6/22/22 to 7/14/22 following a

stay at [facility] for a pelvic abscess drainage. The patient was transferred to ARU 

and found to have a fever, sepsis, and tachycardia. The patient was transferred 

to ICU, as she was found to have COVID 19. The patient was recently re-t

ransferred to ARU.








Reviewed History:  Yes





Social History


Current Living Status:  Alone





Speech PLF-Current Status


Prior Level of Function





The patient reported difficulties with her memory which initiated


approximately one month prior. The patient denied additional challenges or


concerns with her speech, language, or cognition at this time.





Subjective


The patient was seated upright in her recliner, awake and alert upon entrance to

her room. The patient greeted the clinician appropriately and was agreeable to 

participation in the cognitive linguistic assessment.





Language Eval: Auditory


Comprehends Simple Yes/No Ques:  Functional


Indent/Objects Multiple Fields:  Functional


Ident/Pics in Multiple Fields:  Functional


Follows 1-Step Commands:  Functional





Language Eval: Verbal Language


Completes Spontaneous Greeting:  Functional


Produces Auto, Serial Info:  Functional


Imitates Simple Words/Phrases:  Functional


Word Finding:  Functional


Requests Basic Needs:  Functional


States Basic Personal Info:  Functional





Language Evaluation: Reading


Follows Simple Written Direct:  Functional





Language Evaluation: Writing


Writes to Simple Dictation:  Functional





Cognitive


Patient Orientation


The patient was oriented to self, location, month, day of week, date, and year.





Objective Cognitive Domain


Attention:  Mild


Memory:  Mild


Problem Solving:  Mild


Composite Severity Rating:  Mild





Objective


Formal/Standardized Tests


[employer] Mental Status Exam (UMS)


Results


The patient demonstrated a result of +23/30 correlating to a mild neurocognitive

impairment.


Oral Motor/Speech Production


The patient does not display dysarthria or apraxia of speech at this time. The 

patient remained 100% intelligible in known and unknown contexts.


Impression


The patient demonstrated a mild neurocognitive impairment in the areas of memory

and attention.





Speech Patient Assess


Expression of Ideas/Wants:  Exhibits (3)


Understanding Verbal Content:  Usually Understands (3)


Brief Interview-Mental Status:  Yes


Repetition of Three Words:  Three (3)


Temporal Orientation: Year:  Correct (3)


Temporal Orientation: Month:  Accurate within 5 days(2)


Temporal Orientation: Day:  Correct (1)


Recall : Wear to say "Sock":  Yes, no cue required (2)


Recall : Color:  Yes, no cue required (2)


Recall : Bed:  Yes,after cueing (1)


Memory/Recall Ability:  Current season, That he or she is in a hsp/hsp unit





Speech Short Term Goals


Short Term Goals


Short Term Goals


1. The patient will demonstrate 80% accuracy with memory exercises with mild 

clinician verbal and visual cueing.


Time Frame-STG:  One Week.





Speech Long Term Goals


Long Term Goals


1. The patient will improve her cognitive linguistic skills for safe discharge 

to the least restrictive environment.


Time Frame:  Two Weeks.





Speech-Plan


Treatment Plan


Speech Therapy Treatment Plan:  Continue Plan of Care


Treatment Duration:  Aug 4, 2022


Frequency:  4 times per week (Four to five times per day.)


Estimated Hrs Per Day:  .5 hour per day


Rehab Potential:  Fair


Pt/Family Agrees to Plan:  Yes





Safety Risks/Education


Teaching Recipient:  Patient


Teaching Methods:  Discussion


Response to Teaching:  Verbalize Understanding


Education Topics Provided:  


SLUMS Results, Plan of Care





Time


Speech Therapy Time In:  14:50


Speech Therapy Time Out:  15:20


Total Billed Time:  30


Billed Treatment Time


1, CELIO SIMS [address]               Jul 21, 2022 15:19

## 2022-07-21 NOTE — PHYSICAL THERAPY DAILY NOTE
PT Daily Note-Current


Subjective


Patient agrees to PT.





Mental Status


Patient Orientation:  Normal For Age





Transfers


SCALE: Activities may be completed with or without assistive devices.





6-Indepedent-patient completes the activity by him/herself with no assistance 

from a helper.


5-Set-up or Clean-up Assistance-helper sets up or cleans up; patient completes 

activity. [place] assists only prior to or  


    following the activity.


4-Supervision or Touching Assistance-helper provides verbal cues and/or 

touching/steadying and/or contact guard assistance as patient completes 

activity. Assistance may be provided   


    throughout the activity or intermittently.


3-Partial/Moderate Assistance-helper does LESS THAN HALF the effort. [place] 

lifts, holds or supports trunk or limbs, but provides less than half the effort.


2-Substantial/Maximal Assistance-helper does MORE THAN HALF the effort. [place] 

lifts or holds trunk or limbs and provides more than half the effort.


9-Fzajfbcgh-erhqav does ALL the effort. Patient does none of the effort to 

complete the activity. Or, the assistance of 2 or more helpers is required for 

the patient to complete the  


    activity.


If activity was not attempted, code reason:


7-Patient Refused.


9-Not Applicable-not attempted and the patient did not perform the activity befo

re the current illness, exacerbation or injury.


10-Not Attempted due to Environmental Limitations-(lack of equipment, weather re

straints, etc.).


88-Not Attempted due to Medical Conditions or Safety Concerns.


Sit to Stand (QC):  4





Gait Training


Distance:  75'


Walk 10 feet (QC):  4


Walk 50 ft with 2 Turns(QC):  4


Walk 150 ft (QC):  7


Gait Assistive Device:  FWW


safe and functional with no deviation





Exercises


Seated Therapy Exercises:  Ankle pumps, Long arc quads


Seated Reps:  15





Assessment


Patient remains up in recliner with needs met.  Patient is currently at A  

with all mobility safely.





PT Long Term Goals


Long Term Goals


PT Long Term Goals Time Frame:  Jul 30, 2022


Roll Left & Right (QC):  6


Sit to Lying (QC):  6


Lying-Sitting on Side/Bed(QC):  6


Sit to Stand (QC):  6


Chair/Bed-to-Chair Xfer(QC):  6


Toilet Transfer (QC):  6


Walk 10 feet (QC):  6


Walk 50ft with 2 Turns (QC):  6


Walk 150 ft (QC):  6





PT Plan


Treatment/Plan


Treatment Plan:  Continue Plan of Care


Treatment Plan:  Bed Mobility, Education, Functional Activity Cherise, Functional 

Strength, Gait, Safety, Therapeutic Exercise, Transfers


Treatment Duration:  Jul 30, 2022


Frequency:  6 times per week


Estimated Hrs Per Day:  .25 hour per day


Patient and/or Family Agrees t:  Yes





Time/GCodes


Time In:  815


Time Out:  824


Total Billed Treatment Time:  9


Total Billed Treatment


1 visit


FA 9 min











ERIC VALLE PT              Jul 21, 2022 09:58 Bactrim Counseling:  I discussed with the patient the risks of sulfa antibiotics including but not limited to GI upset, allergic reaction, drug rash, diarrhea, dizziness, photosensitivity, and yeast infections.  Rarely, more serious reactions can occur including but not limited to aplastic anemia, agranulocytosis, methemoglobinemia, blood dyscrasias, liver or kidney failure, lung infiltrates or desquamative/blistering drug rashes.

## 2022-07-21 NOTE — DISCHARGE SUMMARY
Diagnosis/Chief Complaint


Date of Admission


Jul 15, 2022 at 16:45


Date of Discharge





Discharge Date:  Jul 21, 2022


Discharge Diagnosis


COVID infection with vaccination and 1 booster


   -Central line placed


   -No oxygen required at this time


   -Currently on isolation for 10 days making her an unlikely candidate for IRF 

at this time


S/p ileostomy reversal with fistula and abscess


   -General surgery consulted again for possible drain removal


   -Drain still in place from [facility] 6/16


   -Levofloxacin


CHF


   -Echo EF 20-25%


   -Lasix and spironolactone


Type II MI


   -Cardiology following, appreciate their recs


   -Telemetry


Afib


   -Lovenox 


HTN


   -Well controlled currently


Diabetes


   -SSI


CKD3a


   -BUN/Cr wnl


Recent hospitalization at [facility] 6/22-7/14 following stay at [facility] 6/16 for 

pelvic abscess drainage. 





Diet: Regular


DVT prophylaxis: Lovenox and SCDs





Discharge Summary


Discharge Physical Examination


Allergies:  


Coded Allergies:  


     Penicillins (Verified  Allergy, Unknown, 4/22/21)


     raspberry (Verified  Allergy, Unknown, 4/22/21)


Vitals & I&Os





Vital Signs








  Date Time  Temp Pulse Resp B/P (MAP) Pulse Ox O2 Delivery O2 Flow Rate FiO2


 


7/21/22 12:44  99      


 


7/21/22 12:00 36.9  18 96/53 (67) 98 Room Air  


 


7/20/22 08:39        21


 


7/16/22 19:36       0.00 








General Appearance:  Alert, Oriented X3, Cooperative


Respiratory:  Clear to Auscultation


Cardiovascular:  Regular Rate


Psych/Mental Status:  Mental Status NL





Hospital Course


Was the Problem List Reviewed?:  Yes


HPI: This is a very complex 76yoWF who had been admitted 7/14 due to fall and 

found down at home for 18 hours after HH nurse found her (she had no phone 

access and her life alert had been turned off) following a 3 week hospital 

course at [facility] (6/22/2022-7/14/2022) due to [facility] hospital stay on 6/16/22 

due to confusion from sepsis from pelvic abscess required drainage by IR due to 

ileostomy reversal leak and subsequent abscess formation. She has a h/o subtotal

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 

ischemic bowel and that was performed by Dr Mitchell. The recent ileostomy reversal 

was performed by Dr Galicia July 2021. She was admitted for observation and IVF 

on med-surg and promptly moved to IRF due to weakness and need to strengthen ashutosh

or to DC but shortly after she arrived in IRF she was found to have a fever of 

38.6 and tachycardia of 142 and sepsis w/u initiated and found to have COVID 

infection with lactic acidosis not due to sepsis so the decision was made to 

move to ICU. Pt was placed on levofloxacin for sepsis and pelvic abscess. Pt has

hx of CHF, so spironolactone and lasix were also started. Lovenox started for 

DVT prophylaxis. General surgery was consulted for central line placement and 

possible drain removal. Dr. Zarate placed central line, but drain was not removed 

at this time. Pt remained stable in ICU and on 7/18 she was transferred to 4th 

floor and remained in isolation for COVID. Pt was not requiring oxygen at this 

time. On 7/19 pts colostomy bag began to leak and general surgery was consulted.

Dr. Galicia saw pt and plans to remove the drain at some point. Since pt is not 

requiring oxygen and vitals are within normal limits, decision was made to move 

pt to inpatient rehab facility. Chemistry panel showed corrected calcium of 7.5,

so pt started on Tums three times daily and vitamin D level will be checked. Pt 

will remain in isolation until 8/4 according to hospitals infection control 

specialist. Goals of care are rehabilitation and strength training to get pt 

closer to baseline. Pt medically stable for d/c to IRF today .











ELDON BLACKBURN STUDENT


Labs (last 24 hrs)


Laboratory Tests


7/15/22 18:28: 


D-Dimer 1.94H, Sodium Level 140, Potassium Level 3.7, Chloride Level 111H, 

Carbon Dioxide Level 15L, Anion Gap 14, Blood Urea Nitrogen 6L, Creatinine 0.84,

Estimat Glomerular Filtration Rate 72, BUN/Creatinine Ratio 7, Glucose Level 

137H, Lactic Acid Level 2.99*H, Calcium Level 6.8L, Corrected Calcium 8.2L, 

Total Bilirubin 0.3, Aspartate Amino Transf (AST/SGOT) 84H, Alanine 

Aminotransferase (ALT/SGPT) 38, Alkaline Phosphatase 40, Creatine Kinase MB 6.2,

Total Protein 5.2L, Albumin 2.3L


7/16/22 04:35: 


Sodium Level 137, Potassium Level 4.2, Chloride Level 113H, Carbon Dioxide Level

12L, Anion Gap 12, Blood Urea Nitrogen 8, Creatinine 0.83, Estimat Glomerular 

Filtration Rate 73, BUN/Creatinine Ratio 10, Glucose Level 92, Lactic Acid Level

2.16*H, Calcium Level 6.9L, Corrected Calcium 8.1L, Total Bilirubin 0.4, 

Aspartate Amino Transf (AST/SGOT) 75H, Alanine Aminotransferase (ALT/SGPT) 38, 

Alkaline Phosphatase 44, Total Protein 5.8L, Albumin 2.5L, White Blood Count 

6.4, Red Blood Count 3.77L, Hemoglobin 11.0#L, Hematocrit 34L, Mean Corpuscular 

Volume 91, Mean Corpuscular Hemoglobin 29, Mean Corpuscular Hemoglobin Concent 

32, Red Cell Distribution Width 19.0H, Platelet Count 219, Mean Platelet Volume 

10.7, Immature Granulocyte % (Auto) 0, Neutrophils (%) (Auto) 63, Lymphocytes 

(%) (Auto) 30, Monocytes (%) (Auto) 5, Eosinophils (%) (Auto) 0, Basophils (%) 

(Auto) 1, Neutrophils # (Auto) 4.0, Lymphocytes # (Auto) 1.9, Monocytes # (Auto)

0.3, Eosinophils # (Auto) 0.0, Basophils # (Auto) 0.1, Immature Granulocyte # 

(Auto) 0.0, Phosphorus Level 1.4L, Magnesium Level 1.8


7/16/22 05:25: 


Blood Gas Puncture Site LEFT RADIAL, Blood Gas Patient Temperature 98.6, 

Arterial Blood pH 7.41, Arterial Blood Partial Pressure CO2 19*L, Arterial Blood

Partial Pressure O2 58L, Arterial Blood HCO3 12*L, Arterial Blood Total CO2 

12.5L, Arterial Blood Oxygen Saturation 91L, Arterial Blood Base Excess -11.9L, 

Amor Test YES-POS, Blood Gas Ventilator Setting NO, Blood Gas Inspired Oxygen 

ROOM AIR


7/16/22 07:42: Glucometer 107


7/16/22 11:42: Glucometer 159H


7/16/22 14:35: 


Lactic Acid Level 1.53, Troponin I 1.634*H


7/16/22 16:09: Glucometer 215H


7/16/22 20:28: Glucometer 233H


7/17/22 00:50: Glucometer 97


7/17/22 04:13: Glucometer 70


7/17/22 04:15: 


White Blood Count 5.5, Red Blood Count 3.11L, Hemoglobin 9.0L, Hematocrit 27L, 

Mean Corpuscular Volume 88, Mean Corpuscular Hemoglobin 29, Mean Corpuscular 

Hemoglobin Concent 33, Red Cell Distribution Width 18.7H, Platelet Count 165, 

Mean Platelet Volume 10.5, Immature Granulocyte % (Auto) 0, Neutrophils (%) 

(Auto) 64, Lymphocytes (%) (Auto) 29, Monocytes (%) (Auto) 5, Eosinophils (%) 

(Auto) 1, Basophils (%) (Auto) 1, Neutrophils # (Auto) 3.5, Lymphocytes # (Auto)

1.6, Monocytes # (Auto) 0.3, Eosinophils # (Auto) 0.0, Basophils # (Auto) 0.0, 

Immature Granulocyte # (Auto) 0.0, Sodium Level 138, Potassium Level 3.2L, 

Chloride Level 108H, Carbon Dioxide Level 20L, Anion Gap 10, Blood Urea Nitrogen

9, Creatinine 0.77, Estimat Glomerular Filtration Rate 80, BUN/Creatinine Ratio 

12, Glucose Level 68L, Lactic Acid Level 1.07, Calcium Level 6.2L, Corrected 

Calcium 7.8L, Phosphorus Level 1.6L, Magnesium Level 1.6, Total Bilirubin 0.4, 

Aspartate Amino Transf (AST/SGOT) 45H, Alanine Aminotransferase (ALT/SGPT) 25, 

Alkaline Phosphatase 36L, Troponin I 1.231*H, Total Protein 4.9L, Albumin 2.0L


7/17/22 05:30: 


Blood Gas Puncture Site L RAD, Blood Gas Patient Temperature 36.9, Arterial 

Blood pH 7.52H, Arterial Blood Partial Pressure CO2 24L, Arterial Blood Partial 

Pressure O2 93, Arterial Blood HCO3 20L, Arterial Blood Total CO2 20.8L, 

Arterial Blood Oxygen Saturation 97, Arterial Blood Base Excess -2.6L, Amor 

Test YES-POS, Blood Gas Ventilator Setting NO, Blood Gas Inspired Oxygen ROOM 

AIR


7/17/22 06:54: Glucometer 117H


7/17/22 07:47: Glucometer 100


7/17/22 11:47: Glucometer 206H


7/17/22 16:23: Glucometer 100


7/17/22 19:02: Glucometer 26*L


7/17/22 19:43: Glucometer 249H


7/17/22 22:05: Glucometer 178H


7/17/22 23:20: Glucometer 110


7/18/22 03:22: Glucometer 82


7/18/22 04:35: 


White Blood Count 4.3, Red Blood Count 2.95L, Hemoglobin 8.6L, Hematocrit 26L, 

Mean Corpuscular Volume 88, Mean Corpuscular Hemoglobin 29, Mean Corpuscular 

Hemoglobin Concent 33, Red Cell Distribution Width 18.4H, Platelet Count 122L, 

Mean Platelet Volume 10.8, Immature Granulocyte % (Auto) 0, Neutrophils (%) 

(Auto) 58, Lymphocytes (%) (Auto) 35, Monocytes (%) (Auto) 5, Eosinophils (%) 

(Auto) 2, Basophils (%) (Auto) 1, Neutrophils # (Auto) 2.5, Lymphocytes # (Auto)

1.5, Monocytes # (Auto) 0.2, Eosinophils # (Auto) 0.1, Basophils # (Auto) 0.0, 

Immature Granulocyte # (Auto) 0.0, Sodium Level 136, Potassium Level 3.4L, 

Chloride Level 104, Carbon Dioxide Level 23, Anion Gap 9, Blood Urea Nitrogen 

10, Creatinine 0.79, Estimat Glomerular Filtration Rate 78, BUN/Creatinine Ratio

13, Glucose Level 89, Calcium Level 6.1L, Corrected Calcium 7.8L, Phosphorus L

evel 1.2L, Magnesium Level 1.9, Total Bilirubin 0.4, Aspartate Amino Transf 

(AST/SGOT) 39H, Alanine Aminotransferase (ALT/SGPT) 22, Alkaline Phosphatase 39L

, Total Protein 4.6L, Albumin 1.9L


7/18/22 05:05: 


Blood Gas Puncture Site RIGHT RADIAL, Blood Gas Patient Temperature 36.8, 

Arterial Blood pH 7.51H, Arterial Blood Partial Pressure CO2 32L, Arterial Blood

Partial Pressure O2 57L, Arterial Blood HCO3 26, Arterial Blood Total CO2 26.4, 

Arterial Blood Oxygen Saturation 92L, Arterial Blood Base Excess 2.5, Amor Test

YES-POS, Blood Gas Ventilator Setting NO, Blood Gas Inspired Oxygen ROOM AIR


7/18/22 08:46: Glucometer 106


7/19/22 05:12: Glucometer 58*L


7/19/22 05:15: 


White Blood Count 5.3, Red Blood Count 3.29L, Hemoglobin 9.5L, Hematocrit 29L, 

Mean Corpuscular Volume 89, Mean Corpuscular Hemoglobin 29, Mean Corpuscular 

Hemoglobin Concent 32, Red Cell Distribution Width 17.9H, Platelet Count 142, 

Mean Platelet Volume 11.7, Immature Granulocyte % (Auto) 0, Neutrophils (%) 

(Auto) 71, Lymphocytes (%) (Auto) 24, Monocytes (%) (Auto) 3, Eosinophils (%) 

(Auto) 1, Basophils (%) (Auto) 0, Neutrophils # (Auto) 3.8, Lymphocytes # (Auto)

1.3, Monocytes # (Auto) 0.2, Eosinophils # (Auto) 0.1, Basophils # (Auto) 0.0, 

Immature Granulocyte # (Auto) 0.0, Sodium Level 138, Potassium Level 3.8, 

Chloride Level 103, Carbon Dioxide Level 22, Anion Gap 13, Blood Urea Nitrogen 

10, Creatinine 1.00, Estimat Glomerular Filtration Rate 59, BUN/Creatinine Ratio

10, Glucose Level 61L, Calcium Level 6.2L, Corrected Calcium 7.6L, Magnesium 

Level 1.7, Total Bilirubin 0.4, Aspartate Amino Transf (AST/SGOT) 40H, Alanine 

Aminotransferase (ALT/SGPT) 24, Alkaline Phosphatase 53, Total Protein 5.1L, 

Albumin 2.2L


7/19/22 11:04: Glucometer 242H


7/19/22 15:22: Glucometer 155H


7/19/22 20:32: Glucometer 162H


7/20/22 03:19: Glucometer 92


7/20/22 05:08: 


White Blood Count 5.8, Red Blood Count 3.19L, Hemoglobin 9.2L, Hematocrit 28L, 

Mean Corpuscular Volume 89, Mean Corpuscular Hemoglobin 29, Mean Corpuscular 

Hemoglobin Concent 32, Red Cell Distribution Width 17.7H, Platelet Count 152, 

Mean Platelet Volume 11.5, Immature Granulocyte % (Auto) 0, Neutrophils (%) 

(Auto) 61, Lymphocytes (%) (Auto) 34, Monocytes (%) (Auto) 4, Eosinophils (%) 

(Auto) 1, Basophils (%) (Auto) 0, Neutrophils # (Auto) 3.6, Lymphocytes # (Auto)

2.0, Monocytes # (Auto) 0.2, Eosinophils # (Auto) 0.0, Basophils # (Auto) 0.0, 

Immature Granulocyte # (Auto) 0.0, Sodium Level 139, Potassium Level 3.9, 

Chloride Level 104, Carbon Dioxide Level 21, Anion Gap 14, Blood Urea Nitrogen 

11, Creatinine 1.20, Estimat Glomerular Filtration Rate 47, BUN/Creatinine Ratio

9, Glucose Level 107H, Calcium Level 6.2L, Corrected Calcium 7.6L, Magnesium 

Level 1.7, Total Bilirubin 0.4, Aspartate Amino Transf (AST/SGOT) 32, Alanine 

Aminotransferase (ALT/SGPT) 18, Alkaline Phosphatase 56, Total Protein 5.4L, 

Albumin 2.3L


7/20/22 11:34: Glucometer 161H


7/20/22 16:35: Glucometer 156H


7/20/22 20:53: Glucometer 133H


7/21/22 04:38: 


White Blood Count 3.2L, Red Blood Count 2.81L, Hemoglobin 8.3L, Hematocrit 25L, 

Mean Corpuscular Volume 89, Mean Corpuscular Hemoglobin 30, Mean Corpuscular H

emoglobin Concent 33, Red Cell Distribution Width 17.4H, Platelet Count 116L, 

Mean Platelet Volume 11.9, Immature Granulocyte % (Auto) 0, Neutrophils (%) 

(Auto) 58, Lymphocytes (%) (Auto) 32, Monocytes (%) (Auto) 7, Eosinophils (%) 

(Auto) 2, Basophils (%) (Auto) 1, Neutrophils # (Auto) 1.9, Lymphocytes # (Auto)

1.0, Monocytes # (Auto) 0.2, Eosinophils # (Auto) 0.1, Basophils # (Auto) 0.0, 

Immature Granulocyte # (Auto) 0.0, Percent Immature Platelet Fraction 7.4, Sod

ium Level 138, Potassium Level 3.4L, Chloride Level 106, Carbon Dioxide Level 

21, Anion Gap 11, Blood Urea Nitrogen 11, Creatinine 0.97, Estimat Glomerular 

Filtration Rate 61, BUN/Creatinine Ratio 11, Glucose Level 80, Calcium Level 

6.0*L, Corrected Calcium 7.5L, Magnesium Level 1.6, Total Bilirubin 0.4, 

Aspartate Amino Transf (AST/SGOT) 26, Alanine Aminotransferase (ALT/SGPT) 17, 

Alkaline Phosphatase 51, Total Protein 4.8L, Albumin 2.1L


7/21/22 10:56: Glucometer 184H


7/21/22 17:00: 





Microbiology


7/15/22 MRSA Screen - Final, Complete


          MRSA not isolated





Pending Labs





Microbiology








 Date/Time


Source Procedure


Growth Status





 


 7/15/22 17:36


Nasal MRSA Screen - Final


MRSA not isolated Complete





Laboratory Tests


7/15/22 18:28: 


D-Dimer 1.94, Sodium Level 140, Potassium Level 3.7, Chloride Level 111, Carbon 

Dioxide Level 15, Anion Gap 14, Blood Urea Nitrogen 6, Creatinine 0.84, Estimat 

Glomerular Filtration Rate 72, BUN/Creatinine Ratio 7, Glucose Level 137, Lactic

Acid Level 2.99, Calcium Level 6.8, Corrected Calcium 8.2, Total Bilirubin 0.3, 

Aspartate Amino Transf (AST/SGOT) 84, Alanine Aminotransferase (ALT/SGPT) 38, 

Alkaline Phosphatase 40, Creatine Kinase MB 6.2, Total Protein 5.2, Albumin 2.3


7/16/22 04:35: 


Sodium Level 137, Potassium Level 4.2, Chloride Level 113, Carbon Dioxide Level 

12, Anion Gap 12, Blood Urea Nitrogen 8, Creatinine 0.83, Estimat Glomerular 

Filtration Rate 73, BUN/Creatinine Ratio 10, Glucose Level 92, Lactic Acid Level

2.16, Calcium Level 6.9, Corrected Calcium 8.1, Total Bilirubin 0.4, Aspartate 

Amino Transf (AST/SGOT) 75, Alanine Aminotransferase (ALT/SGPT) 38, Alkaline 

Phosphatase 44, Total Protein 5.8, Albumin 2.5, White Blood Count 6.4, Red Blood

Count 3.77, Hemoglobin 11.0, Hematocrit 34, Mean Corpuscular Volume 91, Mean 

Corpuscular Hemoglobin 29, Mean Corpuscular Hemoglobin Concent 32, Red Cell 

Distribution Width 19.0, Platelet Count 219, Mean Platelet Volume 10.7, Immature

Granulocyte % (Auto) 0, Neutrophils (%) (Auto) 63, Lymphocytes (%) (Auto) 30, 

Monocytes (%) (Auto) 5, Eosinophils (%) (Auto) 0, Basophils (%) (Auto) 1, 

Neutrophils # (Auto) 4.0, Lymphocytes # (Auto) 1.9, Monocytes # (Auto) 0.3, 

Eosinophils # (Auto) 0.0, Basophils # (Auto) 0.1, Immature Granulocyte # (Auto) 

0.0, Phosphorus Level 1.4, Magnesium Level 1.8


7/16/22 05:25: 


Blood Gas Puncture Site LEFT RADIAL, Blood Gas Patient Temperature 98.6, 

Arterial Blood pH 7.41, Arterial Blood Partial Pressure CO2 19, Arterial Blood 

Partial Pressure O2 58, Arterial Blood HCO3 12, Arterial Blood Total CO2 12.5, 

Arterial Blood Oxygen Saturation 91, Arterial Blood Base Excess -11.9, Amor 

Test YES-POS, Blood Gas Ventilator Setting NO, Blood Gas Inspired Oxygen ROOM 

AIR


7/16/22 07:42: Glucometer 107


7/16/22 11:42: Glucometer 159


7/16/22 14:35: 


Lactic Acid Level 1.53, Troponin I 1.634


7/16/22 16:09: Glucometer 215


7/16/22 20:28: Glucometer 233


7/17/22 00:50: Glucometer 97


7/17/22 04:13: Glucometer 70


7/17/22 04:15: 


White Blood Count 5.5, Red Blood Count 3.11, Hemoglobin 9.0, Hematocrit 27, Mean

Corpuscular Volume 88, Mean Corpuscular Hemoglobin 29, Mean Corpuscular 

Hemoglobin Concent 33, Red Cell Distribution Width 18.7, Platelet Count 165, 

Mean Platelet Volume 10.5, Immature Granulocyte % (Auto) 0, Neutrophils (%) 

(Auto) 64, Lymphocytes (%) (Auto) 29, Monocytes (%) (Auto) 5, Eosinophils (%) 

(Auto) 1, Basophils (%) (Auto) 1, Neutrophils # (Auto) 3.5, Lymphocytes # (Auto)

1.6, Monocytes # (Auto) 0.3, Eosinophils # (Auto) 0.0, Basophils # (Auto) 0.0, 

Immature Granulocyte # (Auto) 0.0, Sodium Level 138, Potassium Level 3.2, 

Chloride Level 108, Carbon Dioxide Level 20, Anion Gap 10, Blood Urea Nitrogen 

9, Creatinine 0.77, Estimat Glomerular Filtration Rate 80, BUN/Creatinine Ratio 

12, Glucose Level 68, Lactic Acid Level 1.07, Calcium Level 6.2, Corrected 

Calcium 7.8, Phosphorus Level 1.6, Magnesium Level 1.6, Total Bilirubin 0.4, 

Aspartate Amino Transf (AST/SGOT) 45, Alanine Aminotransferase (ALT/SGPT) 25, 

Alkaline Phosphatase 36, Troponin I 1.231, Total Protein 4.9, Albumin 2.0


7/17/22 05:30: 


Blood Gas Puncture Site L RAD, Blood Gas Patient Temperature 36.9, Arterial 

Blood pH 7.52, Arterial Blood Partial Pressure CO2 24, Arterial Blood Partial 

Pressure O2 93, Arterial Blood HCO3 20, Arterial Blood Total CO2 20.8, Arterial 

Blood Oxygen Saturation 97, Arterial Blood Base Excess -2.6, Amor Test YES-POS,

Blood Gas Ventilator Setting NO, Blood Gas Inspired Oxygen ROOM AIR


7/17/22 06:54: Glucometer 117


7/17/22 07:47: Glucometer 100


7/17/22 11:47: Glucometer 206


7/17/22 16:23: Glucometer 100


7/17/22 19:02: Glucometer 26


7/17/22 19:43: Glucometer 249


7/17/22 22:05: Glucometer 178


7/17/22 23:20: Glucometer 110


7/18/22 03:22: Glucometer 82


7/18/22 04:35: 


White Blood Count 4.3, Red Blood Count 2.95, Hemoglobin 8.6, Hematocrit 26, Mean

Corpuscular Volume 88, Mean Corpuscular Hemoglobin 29, Mean Corpuscular 

Hemoglobin Concent 33, Red Cell Distribution Width 18.4, Platelet Count 122, 

Mean Platelet Volume 10.8, Immature Granulocyte % (Auto) 0, Neutrophils (%) 

(Auto) 58, Lymphocytes (%) (Auto) 35, Monocytes (%) (Auto) 5, Eosinophils (%) 

(Auto) 2, Basophils (%) (Auto) 1, Neutrophils # (Auto) 2.5, Lymphocytes # (Auto)

1.5, Monocytes # (Auto) 0.2, Eosinophils # (Auto) 0.1, Basophils # (Auto) 0.0, 

Immature Granulocyte # (Auto) 0.0, Sodium Level 136, Potassium Level 3.4, 

Chloride Level 104, Carbon Dioxide Level 23, Anion Gap 9, Blood Urea Nitrogen 

10, Creatinine 0.79, Estimat Glomerular Filtration Rate 78, BUN/Creatinine Ratio

13, Glucose Level 89, Calcium Level 6.1, Corrected Calcium 7.8, Phosphorus Level

1.2, Magnesium Level 1.9, Total Bilirubin 0.4, Aspartate Amino Transf (AST/SGOT)

39, Alanine Aminotransferase (ALT/SGPT) 22, Alkaline Phosphatase 39, Total 

Protein 4.6, Albumin 1.9


7/18/22 05:05: 


Blood Gas Puncture Site RIGHT RADIAL, Blood Gas Patient Temperature 36.8, 

Arterial Blood pH 7.51, Arterial Blood Partial Pressure CO2 32, Arterial Blood 

Partial Pressure O2 57, Arterial Blood HCO3 26, Arterial Blood Total CO2 26.4, 

Arterial Blood Oxygen Saturation 92, Arterial Blood Base Excess 2.5, Amor Test 

YES-POS, Blood Gas Ventilator Setting NO, Blood Gas Inspired Oxygen ROOM AIR


7/18/22 08:46: Glucometer 106


7/19/22 05:12: Glucometer 58


7/19/22 05:15: 


White Blood Count 5.3, Red Blood Count 3.29, Hemoglobin 9.5, Hematocrit 29, Mean

Corpuscular Volume 89, Mean Corpuscular Hemoglobin 29, Mean Corpuscular 

Hemoglobin Concent 32, Red Cell Distribution Width 17.9, Platelet Count 142, 

Mean Platelet Volume 11.7, Immature Granulocyte % (Auto) 0, Neutrophils (%) 

(Auto) 71, Lymphocytes (%) (Auto) 24, Monocytes (%) (Auto) 3, Eosinophils (%) 

(Auto) 1, Basophils (%) (Auto) 0, Neutrophils # (Auto) 3.8, Lymphocytes # (Auto)

1.3, Monocytes # (Auto) 0.2, Eosinophils # (Auto) 0.1, Basophils # (Auto) 0.0, 

Immature Granulocyte # (Auto) 0.0, Sodium Level 138, Potassium Level 3.8, 

Chloride Level 103, Carbon Dioxide Level 22, Anion Gap 13, Blood Urea Nitrogen 

10, Creatinine 1.00, Estimat Glomerular Filtration Rate 59, BUN/Creatinine Ratio

10, Glucose Level 61, Calcium Level 6.2, Corrected Calcium 7.6, Magnesium Level 

1.7, Total Bilirubin 0.4, Aspartate Amino Transf (AST/SGOT) 40, Alanine 

Aminotransferase (ALT/SGPT) 24, Alkaline Phosphatase 53, Total Protein 5.1, 

Albumin 2.2


7/19/22 11:04: Glucometer 242


7/19/22 15:22: Glucometer 155


7/19/22 20:32: Glucometer 162


7/20/22 03:19: Glucometer 92


7/20/22 05:08: 


White Blood Count 5.8, Red Blood Count 3.19, Hemoglobin 9.2, Hematocrit 28, Mean

Corpuscular Volume 89, Mean Corpuscular Hemoglobin 29, Mean Corpuscular 

Hemoglobin Concent 32, Red Cell Distribution Width 17.7, Platelet Count 152, M

abiodun Platelet Volume 11.5, Immature Granulocyte % (Auto) 0, Neutrophils (%) 

(Auto) 61, Lymphocytes (%) (Auto) 34, Monocytes (%) (Auto) 4, Eosinophils (%) 

(Auto) 1, Basophils (%) (Auto) 0, Neutrophils # (Auto) 3.6, Lymphocytes # (Auto)

2.0, Monocytes # (Auto) 0.2, Eosinophils # (Auto) 0.0, Basophils # (Auto) 0.0, 

Immature Granulocyte # (Auto) 0.0, Sodium Level 139, Potassium Level 3.9, 

Chloride Level 104, Carbon Dioxide Level 21, Anion Gap 14, Blood Urea Nitrogen 

11, Creatinine 1.20, Estimat Glomerular Filtration Rate 47, BUN/Creatinine Ratio

9, Glucose Level 107, Calcium Level 6.2, Corrected Calcium 7.6, Magnesium Level 

1.7, Total Bilirubin 0.4, Aspartate Amino Transf (AST/SGOT) 32, Alanine 

Aminotransferase (ALT/SGPT) 18, Alkaline Phosphatase 56, Total Protein 5.4, 

Albumin 2.3


7/20/22 11:34: Glucometer 161


7/20/22 16:35: Glucometer 156


7/20/22 20:53: Glucometer 133


7/21/22 04:38: 


White Blood Count 3.2, Red Blood Count 2.81, Hemoglobin 8.3, Hematocrit 25, Mean

Corpuscular Volume 89, Mean Corpuscular Hemoglobin 30, Mean Corpuscular 

Hemoglobin Concent 33, Red Cell Distribution Width 17.4, Platelet Count 116, 

Mean Platelet Volume 11.9, Immature Granulocyte % (Auto) 0, Neutrophils (%) 

(Auto) 58, Lymphocytes (%) (Auto) 32, Monocytes (%) (Auto) 7, Eosinophils (%) 

(Auto) 2, Basophils (%) (Auto) 1, Neutrophils # (Auto) 1.9, Lymphocytes # (Auto)

1.0, Monocytes # (Auto) 0.2, Eosinophils # (Auto) 0.1, Basophils # (Auto) 0.0, 

Immature Granulocyte # (Auto) 0.0, Percent Immature Platelet Fraction 7.4, 

Sodium Level 138, Potassium Level 3.4, Chloride Level 106, Carbon Dioxide Level 

21, Anion Gap 11, Blood Urea Nitrogen 11, Creatinine 0.97, Estimat Glomerular 

Filtration Rate 61, BUN/Creatinine Ratio 11, Glucose Level 80, Calcium Level 

6.0, Corrected Calcium 7.5, Magnesium Level 1.6, Total Bilirubin 0.4, Aspartate 

Amino Transf (AST/SGOT) 26, Alanine Aminotransferase (ALT/SGPT) 17, Alkaline 

Phosphatase 51, Total Protein 4.8, Albumin 2.1


7/21/22 10:56: Glucometer 184


7/21/22 17:00: 


Iron Level [Pending], Total Iron Binding Capacity [Pending], Unsaturated Iron 

Binding Capacity [Pending], Transferrin % Saturation [Pending], Ferritin 

[Pending], Vitamin B12 Level [Pending], Vitamin D 25-Hydroxy [Pending]








Discharge


Home Medications:





Active Scripts


Active


Reported


Tylenol Arthritis (Acetaminophen) 650 Mg Tablet.er 650 Mg PO Q6H PRN


Vitamin D3 (Cholecalciferol (Vitamin D3)) 25 Mcg (1000 Unit) Capsule 25 Mcg PO 

DAILY


Timolol Maleate 0.5% (Timolol Maleate) 0.5 % Drops 1 Drop OU HS


Florastor (Saccharomyces Boulardii) 250 Mg Capsule 250 Mg PO DAILY


Oxybutynin Chloride 5 Mg Tablet 5 Mg PO BID


Nystatin 100,000 Unit/Ml Oral.susp 5 Ml PO QID


     SWISH AND SWALLOW


Levothyroxine Sodium 125 Mcg Tablet 125 Mcg PO DAILY


Xalatan (Latanoprost) 0.005 % Drops 1 Drop OU HS


Famotidine 20 Mg Tablet 20 Mg PO DAILY


Docusate Sodium 100 Mg Tablet 100 Mg PO BID


     HOLD FOR DIARRHEA


Carvedilol 25 Mg Tablet 25 Mg PO BID


     HOLD FOR SBP LESS THAN 100


Atorvastatin Calcium 40 Mg Tablet 40 Mg PO 1700


Iron (Ferrous Sulfate) 325 Mg Tablet 325 Mg PO BID


Leflunomide 20 Mg Tablet 20 Mg PO Q48H


Amitriptyline HCl 100 Mg Tablet 100 Mg PO HS


Fenofibrate 160 Mg Tablet 160 Mg PO HS


Cetirizine HCl 10 Mg Tablet 10 Mg PO DAILY





Instructions to patient/family


Please see electronic discharge instructions given to patient.





Diagnosis/Problems


Diagnosis/Problems





(1) COVID


(2) Physical debility


Status:  Acute


(3) Rhabdomyolysis


Status:  Acute


(4) Hypoglycemia


Status:  Acute


(5) Dehydration


Status:  Acute


(6) Fall


Status:  Acute


(7) Fistula


(8) Ovarian cancer in remission


(9) Solitary kidney


(10) Myopathy


(11) Hypothyroidism


(12) Diabetes mellitus


(13) Atrial fibrillation











TAMMY HURST DO                Jul 21, 2022 11:39

## 2022-07-21 NOTE — PROGRESS NOTE - SURGERY
Subjective


Time Seen by a Provider:  15:29


Subjective/Events-last exam


Pt seen and examined, she has no complaints.  Has been moved down to ARU.


Review of Systems


General:  Fatigue, Malaise


Pulmonary:  No Dyspnea, No Cough


Cardiovascular:  No: Chest Pain, Palpitations


Gastrointestinal:  Abdominal Pain





Objective


Exam





Vital Signs








  Date Time  Temp Pulse Resp B/P (MAP) Pulse Ox O2 Delivery O2 Flow Rate FiO2


 


7/21/22 14:56 35.5 98 18 96/67 (77) 99   


 


7/21/22 14:10      Room Air  





Capillary Refill :


General Appearance:  No Apparent Distress, Chronically ill, Thin


HEENT:  PERRL/EOMI, Moist Mucous Membranes


Respiratory:  No Accessory Muscle Use, No Respiratory Distress, Other (coarse 

breath sound)


Cardiovascular:  Regular Rate, Rhythm, No Murmur


Gastrointestinal:  soft; No distended; tenderness (around fistula)





Assessment/Plan


Enterocutaneous Fistula





Removed the drain today, will monitor output.  At some point will discuss need 

for strict NPO and TPN depending on continued output.  Pt understood and had no 

questions.  Pt encouraged to ambulate and work with PT.











LAURENCE MORRELL DO               Jul 21, 2022 16:38

## 2022-07-21 NOTE — PROGRESS NOTE - SURGERY
SANGITA ORTEGA 7/21/22 0750:


Subjective


Date Seen by a Provider:  Jul 21, 2022


Time Seen by a Provider:  07:16


Subjective/Events-last exam


Ms. Joy is being followed for a drain placed in her ventral abdomen. This 

morning she reports she is doing well with no pain. She reports minimal drainage

from her drain overnight that continues to be a mix of blood and liquid feces. 

She has no irritation or problems with her drain. She denies trouble breathing 

or problems related to COVID. She says she is okay with the drain being removed 

today.


Review of Systems


General:  No Chills, No Fatigue


HEENT:  No Head Aches, No Visual Changes


Pulmonary:  No Dyspnea; Cough (Minor)


Cardiovascular:  No: Chest Pain, Palpitations


Gastrointestinal:  No: Nausea, Vomiting, Abdominal Pain


Neurological:  No: Change in speech, Confusion





Objective


Exam





Vital Signs








  Date Time  Temp Pulse Resp B/P (MAP) Pulse Ox O2 Delivery O2 Flow Rate FiO2


 


7/21/22 04:49 37.2 95 18 104/65 (78) 97 Room Air  


 


7/21/22 01:10  96      


 


7/20/22 23:57 35.8 100 18 90/57 (68) 98 Room Air  


 


7/20/22 20:22     98 Room Air  


 


7/20/22 19:36 36.0 95 18 98/62 (74) 100 Room Air  


 


7/20/22 19:32     98 Room Air  


 


7/20/22 19:00  99      


 


7/20/22 16:29 36.8 93 18 112/96 (101) 95 Room Air  


 


7/20/22 12:30  91      


 


7/20/22 11:36 36.8 90 18 94/63 (73) 98 Room Air  


 


7/20/22 08:39  99   96   21


 


7/20/22 08:28     96 Room Air  














I & O 


 


 7/21/22





 07:00


 


Intake Total 1010 ml


 


Balance 1010 ml





Capillary Refill :


General Appearance:  No Apparent Distress, Chronically ill


HEENT:  PERRL/EOMI, Moist Mucous Membranes


Neck:  Non Tender, Supple


Respiratory:  Chest Non Tender, Lungs Clear, Normal Breath Sounds, No Accessory 

Muscle Use, No Respiratory Distress


Cardiovascular:  Regular Rate, Rhythm, No Murmur, Normal Peripheral Pulses


Peripheral Pulses:  2+ Radial Pulses (R), 2+ Radial Pulses (L)


Gastrointestinal:  non tender, soft; No distended, No guarding; other (midline 

opening with ostomy bag covering it and drain coming out)


Extremity:  No Calf Tenderness, No Pedal Edema


Neurologic/Psychiatric:  Alert, Oriented x3, Normal Mood/Affect


Skin:  Normal Color, Warm/Dry





Results


Lab


Laboratory Tests


7/20/22 11:34: Glucometer 161H


7/20/22 16:35: Glucometer 156H


7/20/22 20:53: Glucometer 133H


7/21/22 04:38: 


White Blood Count 3.2L, Red Blood Count 2.81L, Hemoglobin 8.3L, Hematocrit 25L, 

Mean Corpuscular Volume 89, Mean Corpuscular Hemoglobin 30, Mean Corpuscular 

Hemoglobin Concent 33, Red Cell Distribution Width 17.4H, Platelet Count 116L, 

Mean Platelet Volume 11.9, Immature Granulocyte % (Auto) 0, Neutrophils (%) 

(Auto) 58, Lymphocytes (%) (Auto) 32, Monocytes (%) (Auto) 7, Eosinophils (%) 

(Auto) 2, Basophils (%) (Auto) 1, Neutrophils # (Auto) 1.9, Lymphocytes # (Auto)

1.0, Monocytes # (Auto) 0.2, Eosinophils # (Auto) 0.1, Basophils # (Auto) 0.0, 

Immature Granulocyte # (Auto) 0.0, Percent Immature Platelet Fraction 7.4, 

Sodium Level 138, Potassium Level 3.4L, Chloride Level 106, Carbon Dioxide Level

21, Anion Gap 11, Blood Urea Nitrogen 11, Creatinine 0.97, Estimat Glomerular 

Filtration Rate 61, BUN/Creatinine Ratio 11, Glucose Level 80, Calcium Level 

6.0*L, Corrected Calcium 7.5L, Magnesium Level 1.6, Total Bilirubin 0.4, 

Aspartate Amino Transf (AST/SGOT) 26, Alanine Aminotransferase (ALT/SGPT) 17, 

Alkaline Phosphatase 51, Total Protein 4.8L, Albumin 2.1L





Microbiology


7/15/22 MRSA Screen - Final, Complete


          MRSA not isolated





Assessment/Plan


S/p ileostomy reversal with fistula and abscess


   Drain still in place from [facility] 6/16, on Levofloxacin.


   Will most likely pull tube and leave ostomy in place


   Likely will have to undergo TPN if surgery to close the fistula is to be 

attempted





COVID infection with vaccination and 1 booster


   -Central line placed


   -No oxygen required at this time


   -Currently on isolation for 10 days making her an unlikely candidate for IRF 

at this time


CHF


   -Echo EF 20-25%


   -Lasix and spironolactone


Type II MI


   


Afib


   -Lovenox 


HTN


   -Well controlled currently


Diabetes


   -SSI


CKD3a


   -BUN/Cr wnl


Recent hospitalization at [place] 6/22-7/14 following stay at [facility] 6/16 for 

pelvic abscess drainage.





BONILLA MORRELL DO 7/23/22 2247:


Supervisory-Addendum Brief


Verification & Attestation


Participated in pt care:  history, MDM, physical


Personally performed:  exam, history, MDM, supervision of care


Care discussed with:  Medical Student


Procedures:  n/a


Verification and Attestation of Medical Student E/M Service





A medical student performed and documented this service. I then reviewed and 

verified all information documented by the medical student and made 

modifications to such information, when appropriate. I personally performed a 

physical exam, medical decision making and then discussed any differences 

between the notes and made revisions as necessary to create one note.





Bonilla Morrell , 7/23/22 , 22:47











SANGITA ORTEGA                 Jul 21, 2022 07:50


BONILLA MORRELL DO               Jul 23, 2022 22:47

## 2022-07-22 VITALS — SYSTOLIC BLOOD PRESSURE: 123 MMHG | DIASTOLIC BLOOD PRESSURE: 82 MMHG

## 2022-07-22 VITALS — DIASTOLIC BLOOD PRESSURE: 73 MMHG | SYSTOLIC BLOOD PRESSURE: 114 MMHG

## 2022-07-22 VITALS — DIASTOLIC BLOOD PRESSURE: 84 MMHG | SYSTOLIC BLOOD PRESSURE: 163 MMHG

## 2022-07-22 LAB
ALBUMIN SERPL-MCNC: 2.1 GM/DL (ref 3.2–4.5)
ALP SERPL-CCNC: 60 U/L (ref 40–136)
ALT SERPL-CCNC: 17 U/L (ref 0–55)
BASOPHILS # BLD AUTO: 0 10^3/UL (ref 0–0.1)
BASOPHILS NFR BLD AUTO: 1 % (ref 0–10)
BILIRUB SERPL-MCNC: 0.3 MG/DL (ref 0.1–1)
BUN/CREAT SERPL: 12
CALCIUM SERPL-MCNC: 6.3 MG/DL (ref 8.5–10.1)
CHLORIDE SERPL-SCNC: 110 MMOL/L (ref 98–107)
CO2 SERPL-SCNC: 19 MMOL/L (ref 21–32)
CREAT SERPL-MCNC: 0.93 MG/DL (ref 0.6–1.3)
EOSINOPHIL # BLD AUTO: 0.1 10^3/UL (ref 0–0.3)
EOSINOPHIL NFR BLD AUTO: 3 % (ref 0–10)
GFR SERPLBLD BASED ON 1.73 SQ M-ARVRAT: 64 ML/MIN
GLUCOSE SERPL-MCNC: 74 MG/DL (ref 70–105)
HCT VFR BLD CALC: 25 % (ref 35–52)
HGB BLD-MCNC: 8.2 G/DL (ref 11.5–16)
LYMPHOCYTES # BLD AUTO: 1.3 10^3/UL (ref 1–4)
LYMPHOCYTES NFR BLD AUTO: 45 % (ref 12–44)
MANUAL DIFFERENTIAL PERFORMED BLD QL: NO
MCH RBC QN AUTO: 29 PG (ref 25–34)
MCHC RBC AUTO-ENTMCNC: 33 G/DL (ref 32–36)
MCV RBC AUTO: 89 FL (ref 80–99)
MONOCYTES # BLD AUTO: 0.3 10^3/UL (ref 0–1)
MONOCYTES NFR BLD AUTO: 9 % (ref 0–12)
NEUTROPHILS # BLD AUTO: 1.3 10^3/UL (ref 1.8–7.8)
NEUTROPHILS NFR BLD AUTO: 42 % (ref 42–75)
PLATELET # BLD: 148 10^3/UL (ref 130–400)
PMV BLD AUTO: 11.4 FL (ref 9–12.2)
POTASSIUM SERPL-SCNC: 3.8 MMOL/L (ref 3.6–5)
PROT SERPL-MCNC: 4.9 GM/DL (ref 6.4–8.2)
SODIUM SERPL-SCNC: 141 MMOL/L (ref 135–145)
WBC # BLD AUTO: 3 10^3/UL (ref 4.3–11)

## 2022-07-22 RX ADMIN — FUROSEMIDE SCH MG: 40 TABLET ORAL at 09:22

## 2022-07-22 RX ADMIN — SENNOSIDES SCH MG: 8.6 TABLET, FILM COATED ORAL at 21:06

## 2022-07-22 RX ADMIN — INSULIN ASPART SCH UNIT: 100 INJECTION, SOLUTION INTRAVENOUS; SUBCUTANEOUS at 21:00

## 2022-07-22 RX ADMIN — ENOXAPARIN SODIUM SCH MG: 30 INJECTION SUBCUTANEOUS at 21:06

## 2022-07-22 RX ADMIN — INSULIN ASPART SCH UNIT: 100 INJECTION, SOLUTION INTRAVENOUS; SUBCUTANEOUS at 16:01

## 2022-07-22 RX ADMIN — DOCUSATE SODIUM AND SENNOSIDES SCH EA: 8.6; 5 TABLET, FILM COATED ORAL at 21:06

## 2022-07-22 RX ADMIN — ONDANSETRON PRN MG: 4 TABLET, ORALLY DISINTEGRATING ORAL at 21:06

## 2022-07-22 RX ADMIN — DOCUSATE SODIUM SCH MG: 100 CAPSULE ORAL at 09:24

## 2022-07-22 RX ADMIN — SACUBITRIL AND VALSARTAN SCH TAB: 24; 26 TABLET, FILM COATED ORAL at 21:06

## 2022-07-22 RX ADMIN — INSULIN ASPART SCH UNIT: 100 INJECTION, SOLUTION INTRAVENOUS; SUBCUTANEOUS at 11:14

## 2022-07-22 RX ADMIN — SENNOSIDES SCH MG: 8.6 TABLET, FILM COATED ORAL at 09:23

## 2022-07-22 RX ADMIN — POTASSIUM CHLORIDE SCH MEQ: 1500 TABLET, EXTENDED RELEASE ORAL at 06:02

## 2022-07-22 RX ADMIN — ANTACID TABLETS SCH MG: 500 TABLET, CHEWABLE ORAL at 09:22

## 2022-07-22 RX ADMIN — ANTACID TABLETS SCH MG: 500 TABLET, CHEWABLE ORAL at 13:13

## 2022-07-22 RX ADMIN — POLYETHYLENE GLYCOL (3350) SCH GM: 17 POWDER, FOR SOLUTION ORAL at 09:23

## 2022-07-22 RX ADMIN — DOCUSATE SODIUM AND SENNOSIDES SCH EA: 8.6; 5 TABLET, FILM COATED ORAL at 09:23

## 2022-07-22 RX ADMIN — Medication SCH MCG: at 09:23

## 2022-07-22 RX ADMIN — POLYETHYLENE GLYCOL (3350) SCH GM: 17 POWDER, FOR SOLUTION ORAL at 21:00

## 2022-07-22 RX ADMIN — SACUBITRIL AND VALSARTAN SCH TAB: 24; 26 TABLET, FILM COATED ORAL at 09:22

## 2022-07-22 RX ADMIN — ASPIRIN 81 MG CHEWABLE TABLET SCH MG: 81 TABLET CHEWABLE at 09:22

## 2022-07-22 RX ADMIN — ANTACID TABLETS SCH MG: 500 TABLET, CHEWABLE ORAL at 21:06

## 2022-07-22 RX ADMIN — DOCUSATE SODIUM SCH MG: 100 CAPSULE ORAL at 21:06

## 2022-07-22 RX ADMIN — INSULIN ASPART SCH UNIT: 100 INJECTION, SOLUTION INTRAVENOUS; SUBCUTANEOUS at 06:30

## 2022-07-22 RX ADMIN — SPIRONOLACTONE SCH MG: 25 TABLET ORAL at 09:22

## 2022-07-22 NOTE — CARDIOLOGY PROGRESS NOTE
Progress Note-Cardiology


Events since last exam


Date Seen by Provider:  Jul 22, 2022


Time Seen by Provider:  16:44


Events since last exam


We are following her due to heart failure that was discovered in the setting of 

COVID infection.  On 7/21 she was transferred from the inpatient medical floor 

to inpatient rehabilitation.  She continues to slowly improve.  Her breathing is

better.  She denies chest discomfort, palpitations, or syncope.  She had some 

mild ankle edema but this has improved.





Certain portions of this document may have been dictated utilizing voice 

recognition technology.  Inherent to this technology, typographical and 

grammatical errors may exist.  As much as I am diligent to identify and correct 

these mistakes, some errors may remain in the document.





Vitals


Last set of Vitals Signs





Vital Signs








 7/22/22 7/22/22





 07:11 09:00


 


Temp 36.1 


 


Pulse 89 


 


Resp 18 


 


B/P (MAP) 114/73 (87) 


 


Pulse Ox 99 


 


O2 Delivery  Room Air











Labs


Labs


Laboratory Tests


7/22/22 06:00














Exam


Vital Signs





Vital Signs








  Date Time  Temp Pulse Resp B/P (MAP) Pulse Ox O2 Delivery O2 Flow Rate FiO2


 


7/22/22 09:00      Room Air  


 


7/22/22 07:11 36.1 89 18 114/73 (87) 99   








Physical Exam


Due to the patient's COVID status, I viewed the patient and spoke to her from 

the doorway.


General: The patient is not on a ventilator.


HENT: Normocephalic.  Atraumatic.


Skin: There is no pallor.


Neurologic: Oriented x3.  Cranial nerves III through XII grossly intact.  Moving

all 4 extremities.


Psychiatric: Appears cooperative.


Labs





Laboratory Tests








Test


 7/21/22


20:38 7/22/22


06:00 7/22/22


11:14 7/22/22


15:26 Range/Units


 


 


Glucometer 202 H  123 H 122 H   MG/DL


 


White Blood Count


 


 3.0 L


 


 


 4.3-11.0


10^3/uL


 


Red Blood Count


 


 2.80 L


 


 


 3.80-5.11


10^6/uL


 


Hemoglobin  8.2 L   11.5-16.0  g/dL


 


Hematocrit  25 L   35-52  %


 


Mean Corpuscular Volume  89    80-99  fL


 


Mean Corpuscular Hemoglobin  29    25-34  pg


 


Mean Corpuscular Hemoglobin


Concent 


 33 


 


 


 32-36  g/dL





 


Red Cell Distribution Width  17.3 H   10.0-14.5  %


 


Platelet Count


 


 148 


 


 


 130-400


10^3/uL


 


Mean Platelet Volume  11.4    9.0-12.2  fL


 


Immature Granulocyte % (Auto)  0     %


 


Neutrophils (%) (Auto)  42    42-75  %


 


Lymphocytes (%) (Auto)  45 H   12-44  %


 


Monocytes (%) (Auto)  9    0-12  %


 


Eosinophils (%) (Auto)  3    0-10  %


 


Basophils (%) (Auto)  1    0-10  %


 


Neutrophils # (Auto)


 


 1.3 L


 


 


 1.8-7.8


10^3/uL


 


Lymphocytes # (Auto)


 


 1.3 


 


 


 1.0-4.0


10^3/uL


 


Monocytes # (Auto)


 


 0.3 


 


 


 0.0-1.0


10^3/uL


 


Eosinophils # (Auto)


 


 0.1 


 


 


 0.0-0.3


10^3/uL


 


Basophils # (Auto)


 


 0.0 


 


 


 0.0-0.1


10^3/uL


 


Immature Granulocyte # (Auto)


 


 0.0 


 


 


 0.0-0.1


10^3/uL


 


Sodium Level  141    135-145  MMOL/L


 


Potassium Level  3.8    3.6-5.0  MMOL/L


 


Chloride Level  110 H     MMOL/L


 


Carbon Dioxide Level  19 L   21-32  MMOL/L


 


Anion Gap  12    5-14  MMOL/L


 


Blood Urea Nitrogen  11    7-18  MG/DL


 


Creatinine


 


 0.93 


 


 


 0.60-1.30


MG/DL


 


Estimat Glomerular Filtration


Rate 


 64 


 


 


  





 


BUN/Creatinine Ratio  12     


 


Glucose Level  74      MG/DL


 


Calcium Level  6.3 L   8.5-10.1  MG/DL


 


Corrected Calcium  7.8 L   8.5-10.1  MG/DL


 


Total Bilirubin  0.3    0.1-1.0  MG/DL


 


Aspartate Amino Transf


(AST/SGOT) 


 27 


 


 


 5-34  U/L





 


Alanine Aminotransferase


(ALT/SGPT) 


 17 


 


 


 0-55  U/L





 


Alkaline Phosphatase  60      U/L


 


Total Protein  4.9 L   6.4-8.2  GM/DL


 


Albumin  2.1 L   3.2-4.5  GM/DL











Diagnosis/Problems


Diagnosis/Problems





(1) Acute heart failure with reduced ejection fraction and diastolic dysfunction


Assessment & Plan:  Symptomatically improved.  She is on carvedilol, Entresto 

and spironolactone.





(2) Cardiomyopathy


Assessment & Plan:  Her most recent previous echocardiogram from 2021 showed 

mild left ventricular systolic dysfunction.  Now she has severe left ventricular

systolic dysfunction.  She may need an ischemic evaluation once she recovers 

from her COVID.  We may also want to consider a LifeVest prior to discharge.





(3) Primary hypertension


Assessment & Plan:  Blood pressure is reasonably controlled with the present 

combination of medications.  If anything, at times she is running on the low 

side.





(4) Mixed hyperlipidemia


Assessment & Plan:  It appears as though she may have been on both atorvastatin 

and fenofibrate at home.  I will resume the atorvastatin.  I will hold off on 

restarting fenofibrate due to her acute debility with potentially increased risk

for medication interactions between the atorvastatin and fenofibrate.














KALYAN FAY JR, MD         Jul 22, 2022 16:50

## 2022-07-22 NOTE — INDIVIDUALIZED PLAN OF CARE
Individualized Plan of Care


Rehab Nursing IPOC Order


Admission Date


Jul 21, 2022 at 13:25


Current Orders





Orders


Admission Order(Inpt,Obs,Sdc) (7/21/22 11:51)


Vital Signs: Per Unit Policy ( 08,16,00 (7/21/22 11:51)


Shahab Hose 09,21 (7/21/22 11:51)


Sequential Compression Device (7/21/22 11:51)


-Inpt Rehab Con (7/21/22 11:51)


Rehab Nursing Orders-Ipoc (7/21/22 11:51)


Physical Therapy Rehab Orders (7/21/22 11:51)


Occupational Therapy Rehab Ord (7/21/22 11:51)


Speech Therapy Rehab Orders (7/21/22 11:51)


Cbc With Automated Diff (7/22/22 06:00)


Comprehensive Metabolic Panel (7/22/22 06:00)


Precautions (Aru) (7/21/22 11:51)


Rehab-Intensity Of Therapy (7/21/22 11:51)


Alprazolam Tablet (Xanax Tablet) (7/21/22 12:00)


Calcium Carbonate Chew Tablet (Antacid C (7/21/22 12:00)


Diphenhydramine Tablet (Benadryl Tablet) (7/21/22 12:00)


Docusate Sodium Capsule (Colace Capsule) (7/21/22 21:00)


Docusate Sodium Capsule (Colace Capsule) (7/21/22 12:00)


Bisacodyl Suppository (Dulcolax Supposit (7/21/22 12:00)


Lactulose Oral Solution (Enulose Oral So (7/21/22 12:00)


Na Phos/Na Biphos Enema (Fleet Enema Guilherme (7/21/22 12:00)


Guaifenesin/Codeine Syrup (Robitussin Ac (7/21/22 12:00)


Loperamide Tablet (Imodium Tablet) (7/21/22 12:00)


Melatonin  Tablet (Melatonin  Tablet) (7/21/22 12:00)


Polyethylene Glycol Powder Pkt (Miralax (7/21/22 21:00)


Ondansetron  Oral Dissolve Tab (Zofran (7/21/22 12:00)


Senna S Tablet (Senokot S Tablet) (7/21/22 21:00)


Acetaminophen Tablet/Caplet (Tylenol  T (7/21/22 12:00)


Code/Resuscitation (7/21/22 11:51)


Dietary Consult (7/21/22 11:51)


Admission Arrival Bed Request (7/21/22 14:02)


Patient Visit (7/21/22 )


Speech Sound Lang Comp (7/21/22 )


Treat. Speech/Lang/Voice (7/21/22 )


Patient Visit (7/21/22 )


Pt Eval Moderate Complexity (7/21/22 )


Exercise Therap, Ea 15 Min (7/21/22 )


Functional Activities, Ea 15 (7/21/22 )


Code/Resuscitation (7/21/22 20:50)


General/Regular (7/22/22 Breakfast)


Aspirin Chewable Tablet (Baby Aspirin Ch (7/22/22 09:00)


Diphenhydramine Injection (Benadryl Inje (7/21/22 21:00)


Calcium Carbonate Chew Tablet (Antacid C (7/21/22 21:00)


Cholecalciferol Capsule/Tablet (Vitamin (7/22/22 09:00)


Docusate Sodium Capsule (Colace Capsule) (7/21/22 21:00)


Lactulose Oral Solution (Enulose Oral So (7/21/22 21:00)


Furosemide  Tablet (Lasix  Tablet) (7/22/22 09:00)


Magnesium Hydroxide Oral Susp (Mom Oral (7/21/22 21:00)


Polyethylene Glycol Powder Pkt (Miralax (7/21/22 21:00)


Morphine  Injection (Morphine  Injection (7/21/22 21:00)


Antacid  Suspension (Mylanta  Suspension (7/21/22 21:00)


Potassium Chloride (Tablet) (K Dur Table (7/22/22 07:00)


Prochlorperazine Injection (Compazine In (7/21/22 21:00)


Sacubitril/Valsartan 24/26 Mg (Entresto (7/21/22 21:00)


Sennosides Tablet (Senokot Tablet) (7/21/22 21:00)


Spironolactone Tablet (Aldactone Tablet) (7/22/22 09:00)


Acetaminophen Tablet/Caplet (Tylenol  T (7/21/22 21:00)


Carvedilol Tablet (Coreg  Tablet) (7/21/22 22:00)


Oxycodone Immediate Rel Tablet (Oxyir Ta (7/21/22 21:00)


Consult Cardiology (7/21/22 20:50)


Consult General Surgery (7/21/22 20:50)


Mat Initiate Protocol (7/21/22 20:50)


Accucheck Achs ACHS (7/21/22 20:56)


Insulin Aspart (Novolog) (Novolog (Charg (7/21/22 21:00)


Enoxaparin Injection (Lovenox Injection) (7/21/22 21:45)


Insulin Aspart (Novolog) (Novolog (Charg (7/21/22 21:48)


Carvedilol Tablet (Coreg  Tablet) (7/21/22 21:48)


Patient Visit (7/22/22 )


Treat. Speech/Lang/Voice (7/22/22 )


Glucerna (7/22/22 13:00)


Patient Visit (7/22/22 )


Exercise Therap, Ea 15 Min (7/22/22 )


Gait Training, Ea 15 Min (7/22/22 )


Atorvastatin Tablet (Lipitor Tablet) (7/22/22 17:00)





Rehab Nursing Orders:  Ongoing Assess. of Cognitive Status, Ongoing Assess. of 

Function Status, Bladder Management, Bladder Scan, Bladder Training, Bowel Man

agement, Bowel Training, Disease Management & Educaiton, DVT Prophylaxis, Fall 

Prevention, Fluid/Electrolyte/Nutrition Mgmt, Infection Prevention, Medication 

Management & Education, Management of Risks & Complications, Management of Skin 

Intergrity, Nutrition Management, Pain Management, Patient/Family Support, 

Safety Management, Wound Management





Intensity of Therapy to be met


Patient to be seen:  Min.3h per day/5 of 7d





PT IPOC


Problem List:  Activity Tolerance, Functional Strength, Safety, Balance, Gait, 

Transfer, Bed Mobility, ROM


Treatment Plan:  Continue Plan of Care


Bed Mobility, Education, Functional Activity Cherise, Functional Strength, Group 

Therapy, Gait, Safety, Therapeutic Exercise, Transfers


Treatment Duration:  Aug 11, 2022


Frequency:  At least 5 of 7 days/Wk (IRF)


Estimated Hrs Per Day:  1.5 hours per day





OT IPOC


Problems:  Decreased Activ Tolerance, Decreased UE Strength, Impaired Funct 

Balance, Impaired I ADL's, Impaired Self-Care Skills


OT Treatment, Training and Edu:  Yes


Plan of Care:  ADL Retraining, Concurrent Therapy, Functional Mobility, Group 

Exercise/Act as Ind, UE Funct Exercise/Act


Treatment Duration:  Aug 4, 2022


Frequency:  At least 5 of 7 days/Wk (IRF)


Estimated Hrs Per Day:  1.5 hours per day (75-90 min/day )





ST IP


Speech Therapy Treatment Plan:  Continue Plan of Care


Treatment Duration:  Aug 4, 2022


Frequency:  4 times per week (Four to five times per day.)


Estimated Hrs Per Day:  .5 hour per day





/Case Mgmt


/Case Managemen:  Discharge Planning





Dietitian/Nutritionist


Dietitian/Nutritionist to monitor nutritional status and make changes and/or 

recommendations as needed and work with speech pathology on dietary upgrades as 

the occur.





Physician IP


Medical Issues being managed closely and that require the 24 hour availability 

of a physician:





Recent critical illness with hypotension due to COVID infection with IR drain 

removal by DR Galicia s/p Chely abx will require close monitoring due to high 

risk for decompensation


Medical Issues:  Bowel/Bladder Function, DVT Prophylaxis, Falls Precautions, 

Fluid/Electrolyte/Nutrition Balance, Infection Protection, Pain Management, 

Wound Care


Brief Synthesis of Preadmission Screen, Post-Admission Evaluation, and Therapy 

Evaluations:





PT OT will require close monitoring due to COVID and frail status and will 

require aggressive therapy in order to use assistive devices in order to regain 

independence


Medical Prognosis:  Fair


Anticipated Length of Stay:  10 days











TAMMY HURST DO                Jul 22, 2022 06:20

## 2022-07-22 NOTE — OCCUPATIONAL THER DAILY NOTE
OT Current Status-Daily Note


Subjective


Pt agreeable to cotreatment, states she is worn out from shower earlier today.





ADL-Treatment


Therapy Code Descriptions/Definitions 





Functional [place] Measure:


0=Not Assessed/NA        4=Minimal Assistance


1=Total Assistance        5=Supervision or Setup


2=Maximal Assistance  6=Modified [place]


3=Moderate Assistance 7=Complete IndependenceSCALE: Activities may be completed 

with or without assistive devices.





6-Indepedent-patient completes the activity by him/herself with no assistance 

from a helper.


5-Set-up or Clean-up Assistance-helper sets up or cleans up; patient completes 

activity. [place] assists only prior to or  


    following the activity.


4-Supervision or Touching Assistance-helper provides verbal cues and/or 

touching/steadying and/or contact guard assistance as patient completes 

activity. Assistance may be provided   


    throughout the activity or intermittently.


3-Partial/Moderate Assistance-helper does LESS THAN HALF the effort. [place] 

lifts, holds or supports trunk or limbs, but provides less than half the effort.


2-Substantial/Maximal Assistance-helper does MORE THAN HALF the effort. [place] 

lifts or holds trunk or limbs and provides more than half the effort.


1-Eiqtbbvup-begnwh does ALL the effort. Patient does none of the effort to 

complete the activity. Or, the assistance of 2 or more helpers is required for 

the patient to complete the  


    activity.


If activity was not attempted, code reason:


7-Patient Refused.


9-Not Applicable-not attempted and the patient did not perform the activity 

before the current illness, exacerbation or injury.


10-Not Attempted due to Environmental Limitations-(lack of equipment, weather 

restraints, etc.).


88-Not Attempted due to Medical Conditions or Safety Concerns.





Other Treatment


OT/PT cotreat due to skill of 2 clinicians required which a rehab tech could not

perform in order to coordinate UE/LEs, decrease fall risk, focus on higher level

balance tasks, and due to pt's limitations in strength and activity tolerance. 

OT focused on UE placement, reaching activity and cues, PT focused on LE 

placement, gross overall movement, transfers/mobility. Pt stood at window ledge 

in room, erasing dry erase marker from window x3 trials. 1st trial pt erased all

letters in alphabetical order, 2nd trial alternated between letters and numbers,

and 3rd trial erasing numbers from high to low. Pt required moderate cues with 

2nd trial for correct sequencing. Pt took seated rest break between each round. 

Post tx, pt left with PT, all needs met.





Education


OT Patient Education:  Correct positioning, Modified ADL techniques, Progress 

toward Goal/Update tx plan, Purpose of tx/functional activities, Rehab process


Teaching Recipient:  Patient


Teaching Methods:  Discussion


Response to Teaching:  Verbalize Understanding





OT Short Term Goals


Short Term Goals


Time Frame:  2022


Eatin


Oral hygiene:  6


Toileting hygiene:  6


Shower/bathe self:  5


Upper body dressin


Lower body dressin


Putting on/taking off footwear:  6





OT Long Term Goals


Long Term Goals


Time Frame:  Aug 4, 2022


Eating (QC):  6


Oral Hygiene (QC):  6


Toileting Hygiene (QC):  6


Shower/Bathe Self (QC):  6


Upper Body Dressing (QC):  6


Lower Body Dressing (QC):  6


On/Off Footwear (QC):  6


1=Demonstrate adherence to instructed precautions during ADL tasks.


2=Patient will verbalize/demonstrate understanding of assistive 

devices/modifications for ADL.


3=Patient will improve strength/tolerance for activity to enable patient to 

perform ADL's.





OT Education/Plan


Problem List/Assessment


Assessment:  Decreased Activ Tolerance, Decreased UE Strength, Impaired Funct 

Balance, Impaired I ADL's, Impaired Self-Care Skills





Discharge Recommendations


Plan/Recommendations:  Continue POC





Treatment Plan/Plan of Care


Patient would benefit from OT for education, treatment and training to promote 

independence in ADL's, mobility, safety and/or upper extremity function for 

ADL's.


Plan of Care:  ADL Retraining, Concurrent Therapy, Functional Mobility, Group 

Exercise/Act as Ind, UE Funct Exercise/Act


Treatment Duration:  Aug 4, 2022


Frequency:  At least 5 of 7 days/Wk (IRF)


Estimated Hrs Per Day:  1.5 hours per day (75-90 min/day )


Agreement:  Yes


Rehab Potential:  Fair





Time/GCodes


Start Time:  11:00


Stop Time:  11:15


Total Time Billed (hr/min):  15


Billed Treatment Time


1YEIMI ADDISON OT          2022 11:29

## 2022-07-22 NOTE — PROGRESS NOTE - SURGERY
SANGITA ORTEGA 7/22/22 0826:


Subjective


Date Seen by a Provider:  Jul 22, 2022


Time Seen by a Provider:  08:15


Subjective/Events-last exam


Ms. Joy is being followed for a drain that was placed in her ventral 

abdomen. It was pulled yesterday. She has slight pain around the area where the 

drain was pulled. She would like her ostomy bag to be changed; it is filled with

loose, bloody fecal matter. She reports she is working with PT and doing well. 

Patient had a bowel movement this morning that looked the same as the material 

in her ostomy bag.


Review of Systems


General:  No Chills, No Fatigue


Pulmonary:  No Dyspnea, No Cough


Cardiovascular:  No: Chest Pain, Palpitations


Gastrointestinal:  No: Nausea, Vomiting, Abdominal Pain


Neurological:  No: Weakness, Confusion





Objective


Exam





Vital Signs








  Date Time  Temp Pulse Resp B/P (MAP) Pulse Ox O2 Delivery O2 Flow Rate FiO2


 


7/22/22 07:11 36.1 89 18 114/73 (87) 99 Room Air  


 


7/21/22 20:52      Room Air  


 


7/21/22 20:41 37.3 103 16 108/71 (83) 98 Room Air  


 


7/21/22 17:38      Room Air  


 


7/21/22 14:56 35.5 98 18 96/67 (77) 99   


 


7/21/22 14:10      Room Air  





Capillary Refill :


General Appearance:  No Apparent Distress, WD/WN, Thin


HEENT:  PERRL/EOMI, Moist Mucous Membranes


Neck:  Normal Inspection, Supple


Respiratory:  Chest Non Tender, Lungs Clear, Normal Breath Sounds, No Accessory 

Muscle Use, No Respiratory Distress


Cardiovascular:  Regular Rate, Rhythm, No Edema, Normal Peripheral Pulses


Peripheral Pulses:  2+ Radial Pulses (R), 2+ Radial Pulses (L)


Gastrointestinal:  soft; No distended; tenderness (around fistula)


Extremity:  Non Tender, No Pedal Edema


Neurologic/Psychiatric:  Alert, Oriented x3, Normal Mood/Affect


Skin:  Normal Color, Warm/Dry





Results


Lab


Laboratory Tests


7/21/22 16:21: Glucometer 101


7/21/22 20:38: Glucometer 202H


7/22/22 06:00: 


White Blood Count 3.0L, Red Blood Count 2.80L, Hemoglobin 8.2L, Hematocrit 25L, 

Mean Corpuscular Volume 89, Mean Corpuscular Hemoglobin 29, Mean Corpuscular 

Hemoglobin Concent 33, Red Cell Distribution Width 17.3H, Platelet Count 148, 

Mean Platelet Volume 11.4, Immature Granulocyte % (Auto) 0, Neutrophils (%) 

(Auto) 42, Lymphocytes (%) (Auto) 45H, Monocytes (%) (Auto) 9, Eosinophils (%) 

(Auto) 3, Basophils (%) (Auto) 1, Neutrophils # (Auto) 1.3L, Lymphocytes # 

(Auto) 1.3, Monocytes # (Auto) 0.3, Eosinophils # (Auto) 0.1, Basophils # (Auto)

0.0, Immature Granulocyte # (Auto) 0.0, Sodium Level 141, Potassium Level 3.8, 

Chloride Level 110H, Carbon Dioxide Level 19L, Anion Gap 12, Blood Urea Nitrogen

11, Creatinine 0.93, Estimat Glomerular Filtration Rate 64, BUN/Creatinine Ratio

12, Glucose Level 74, Calcium Level 6.3L, Corrected Calcium 7.8L, Total 

Bilirubin 0.3, Aspartate Amino Transf (AST/SGOT) 27, Alanine Aminotransferase 

(ALT/SGPT) 17, Alkaline Phosphatase 60, Total Protein 4.9L, Albumin 2.1L





Assessment/Plan


Enterocutaneous Fistula


Weakness


COVID





Removed the drain yesterday, will monitor output. 


At some point will discuss need for strict NPO and TPN depending on continued 

output.


Pt encouraged to ambulate and work with PT.





BONILLA MORRELL DO 7/22/22 1357:


Subjective


Time Seen by a Provider:  13:02


Subjective/Events-last exam


Pt seen and examined, states no new changes.


Review of Systems


General:  No Chills; Fatigue


Pulmonary:  No Dyspnea, No Cough


Cardiovascular:  No: Chest Pain, Palpitations


Gastrointestinal:  No: Nausea, Vomiting, Abdominal Pain





Objective


Exam


General Appearance:  No Apparent Distress, Thin


HEENT:  PERRL/EOMI, Moist Mucous Membranes


Respiratory:  Lungs Clear, Normal Breath Sounds, No Accessory Muscle Use, No 

Respiratory Distress


Cardiovascular:  Regular Rate, Rhythm, No Murmur


Gastrointestinal:  soft; No distended; tenderness (around fistula), other 

(fistula with minimal output in bag)


Neurologic/Psychiatric:  Alert, Oriented x3





Assessment/Plan


Enterocutaneous Fistula


Weakness


COVID





Removed the drain yesterday, will monitor output. 


At some point will discuss need for strict NPO and TPN depending on continued 

output.


Pt encouraged to ambulate and work with PT.





Supervisory-Addendum Brief


Verification & Attestation


Participated in pt care:  history, MDM, physical


Personally performed:  exam, history, MDM, supervision of care


Care discussed with:  Medical Student


Procedures:  n/a


Verification and Attestation of Medical Student E/M Service





A medical student performed and documented this service. I then reviewed and 

verified all information documented by the medical student and made 

modifications to such information, when appropriate. I personally performed a 

physical exam, medical decision making and then discussed any differences 

between the notes and made revisions as necessary to create one note.





Bonilla Morrell , 7/22/22 , 13:57











SANGITA ORTEGA                 Jul 22, 2022 08:26


BONILLA MORRELL DO               Jul 22, 2022 13:57

## 2022-07-22 NOTE — PHYSICAL THERAPY DAILY NOTE
PT Daily Note-Current


Subjective


Patient in bed pre tx, agrees to PT, has no complaints of pain.  Will be co-

treating with OT for part of tx to work on balance activity and due to severe 

debility, coordinate UE and LE during activity, safety and reduce risk of falls.





Appearance


Patient in recliner post tx with nurse call, phone, tray, all needs met.





Mental Status


Patient Orientation:  Person, Place, Situation





Transfers


SCALE: Activities may be completed with or without assistive devices.





6-Indepedent-patient completes the activity by him/herself with no assistance 

from a helper.


5-Set-up or Clean-up Assistance-helper sets up or cleans up; patient completes 

activity. [place] assists only prior to or  


    following the activity.


4-Supervision or Touching Assistance-helper provides verbal cues and/or 

touching/steadying and/or contact guard assistance as patient completes 

activity. Assistance may be provided   


    throughout the activity or intermittently.


3-Partial/Moderate Assistance-helper does LESS THAN HALF the effort. [place] 

lifts, holds or supports trunk or limbs, but provides less than half the effort.


2-Substantial/Maximal Assistance-helper does MORE THAN HALF the effort. [place] 

lifts or holds trunk or limbs and provides more than half the effort.


4-Eyaxvyimg-odsisr does ALL the effort. Patient does none of the effort to 

complete the activity. Or, the assistance of 2 or more helpers is required for 

the patient to complete the  


    activity.


If activity was not attempted, code reason:


7-Patient Refused.


9-Not Applicable-not attempted and the patient did not perform the activity 

before the current illness, exacerbation or injury.


10-Not Attempted due to Environmental Limitations-(lack of equipment, weather 

restraints, etc.).


88-Not Attempted due to Medical Conditions or Safety Concerns.


Roll Left & Right (QC):  6


Lying to Sitting/Side of Bed(Q:  6


Sit to Stand (QC):  3


Chair/Bed-to-Chair Xfer(QC):  4





Gait Training


Distance:  40'x3


Walk 10 feet (QC):  4


Gait Persons Needed:  1


Gait Assistive Device:  FWW


slow but steady ambulation, fatigues quickly





Exercises


Seated Therapy Exercises:  Ankle pumps, Hip flexion, Hip abd/add


Seated Reps:  20


LAQ alternating for 5 min, standing activity x3 with reaching and critical 

thinking





Treatments


PT performed bed mobility and transfers, ambulation, LE strengthening, balance 

and positioning during standing activity, OT performed standing activity, UE 

positioning and safety during activity.





Assessment


Current Status:  Fair Progress


fatigues quickly with activity, needs frequent rest breaks





PT Short Term Goals


Short Term Goals


Time Frame:  2022


Roll Left & Right:  6


Sit to lyin


Lying to sitting on side of be:  6


Sit to stand:  4 (CGA)


Chair/bed-to-chair transfer:  4 (SBA)


Walk 10 feet:  4 (SBA)


Walk 50 feet with two turns:  4 (SBA)





PT Long Term Goals


Long Term Goals


PT Long Term Goals Time Frame:  Aug 11, 2022


Roll Left & Right (QC):  6


Sit to Lying (QC):  6


Lying-Sitting on Side/Bed(QC):  6


Sit to Stand (QC):  6


Chair/Bed-to-Chair Xfer(QC):  6


Toilet Transfer (QC):  6


Car Transfer (QC):  6


Does the Patient Walk:  Yes


Walk 10 feet (QC):  6


Walk 50ft with 2 Turns (QC):  6


Walk 150 ft (QC):  6


Walking 10ft on Uneven Surface:  6


1 Step (curb) (QC):  6


4 Steps (QC):  6


12 Steps (QC):  88


Picking up an Object (QC):  6


Wheel 50 feet with 2 turns (QC:  9


Wheel 150 feet:  9





PT Plan


Problem List


Problem List:  Activity Tolerance, Functional Strength, Safety, Balance, Gait, 

Transfer, Bed Mobility, ROM





Treatment/Plan


Treatment Plan:  Continue Plan of Care


Treatment Plan:  Bed Mobility, Education, Functional Activity Cherise, Functional 

Strength, Group Therapy, Gait, Safety, Therapeutic Exercise, Transfers


Treatment Duration:  Aug 11, 2022


Frequency:  At least 5 of 7 days/Wk (IRF)


Estimated Hrs Per Day:  1.5 hours per day


Patient and/or Family Agrees t:  Yes





Safety Risks/Education


Patient Education:  Gait Training, Transfer Techniques, Correct Positioning, 

Safety Issues


Teaching Recipient:  Patient


Teaching Methods:  Demonstration, Discussion


Response to Teaching:  Reinforcement Needed





Time/GCodes


Time In:  1100


Time Out:  1200


Total Billed Treatment Time:  60


Total Billed Treatment


1 visit


GT 30'


EX 30'





co-treated with OT from [id number]











GORDON HOLCOMB PT                2022 12:45

## 2022-07-22 NOTE — OCCUPATIONAL THER DAILY NOTE
OT Current Status-Daily Note


Subjective


Pt denies pain, but does c/o mild tenderness around stoma.





Appearance


Pt requested to return to supine in bed at end of treatment. All needs within 

reach.





Mental Status/Objective


Patient Orientation:  Person, Place, Situation


Attachments:  IV





ADL-Treatment


Therapy Code Descriptions/Definitions 





Functional [place] Measure:


0=Not Assessed/NA        4=Minimal Assistance


1=Total Assistance        5=Supervision or Setup


2=Maximal Assistance  6=Modified [place]


3=Moderate Assistance 7=Complete IndependenceSCALE: Activities may be completed 

with or without assistive devices.





6-Indepedent-patient completes the activity by him/herself with no assistance 

from a helper.


5-Set-up or Clean-up Assistance-helper sets up or cleans up; patient completes 

activity. [place] assists only prior to or  


    following the activity.


4-Supervision or Touching Assistance-helper provides verbal cues and/or touchi

ng/steadying and/or contact guard assistance as patient completes activity. 

Assistance may be provided   


    throughout the activity or intermittently.


3-Partial/Moderate Assistance-helper does LESS THAN HALF the effort. [place] 

lifts, holds or supports trunk or limbs, but provides less than half the effort.


2-Substantial/Maximal Assistance-helper does MORE THAN HALF the effort. [place] 

lifts or holds trunk or limbs and provides more than half the effort.


0-Qzjnxyaze-nedhkc does ALL the effort. Patient does none of the effort to 

complete the activity. Or, the assistance of 2 or more helpers is required for 

the patient to complete the  


    activity.


If activity was not attempted, code reason:


7-Patient Refused.


9-Not Applicable-not attempted and the patient did not perform the activity 

before the current illness, exacerbation or injury.


10-Not Attempted due to Environmental Limitations-(lack of equipment, weather 

restraints, etc.).


88-Not Attempted due to Medical Conditions or Safety Concerns.


Oral Hygiene (QC):  5


Shower/Bathe Self (QC):  4


Upper Body Dressing (QC):  5


Lower Body Dressing (QC):  4


On/Off Footwear:  5


Toileting Hygiene (QC):  4


Toilet Transfer (QC):  3


Pt reports significant fatigue this date but still wanting to take a shower. 

100% of task completed in sitting. Pt often initiating several rest breaks 

throughout. She declined washing LB as she reports she is unable to tolerate any

more. Pt is able to reach feet without difficulty, but is limited by fatigue. 

Following shower, was able to change and clean colostomy bag with set up assist 

only. Clothing donned seated on toilet. Steadying assist required as she stood 

to pull LB clothing up to waist. Pt declines ambulating back to bed and request 

to transfer into w/c secondary to fatigue. All sit>stand transfers require min-

mod a for initial boost.





Education


OT Patient Education:  Disease process, Energy conservation, Modified ADL 

techniques, Progress toward Goal/Update tx plan, Purpose of tx/functional 

activities, Safety issues, Transfer techniques


Teaching Recipient:  Patient


Teaching Methods:  Discussion


Response to Teaching:  Verbalize Understanding, Return Demonstration, 

Reinforcement Needed





OT Short Term Goals


Short Term Goals


Time Frame:  2022


Eatin


Oral hygiene:  6


Toileting hygiene:  6


Shower/bathe self:  5


Upper body dressin


Lower body dressin


Putting on/taking off footwear:  6





OT Long Term Goals


Long Term Goals


Time Frame:  Aug 4, 2022


Eating (QC):  6


Oral Hygiene (QC):  6


Toileting Hygiene (QC):  6


Shower/Bathe Self (QC):  6


Upper Body Dressing (QC):  6


Lower Body Dressing (QC):  6


On/Off Footwear (QC):  6


1=Demonstrate adherence to instructed precautions during ADL tasks.


2=Patient will verbalize/demonstrate understanding of assistive 

devices/modifications for ADL.


3=Patient will improve strength/tolerance for activity to enable patient to 

perform ADL's.





OT Education/Plan


Problem List/Assessment


Assessment:  Decreased Activ Tolerance, Decreased UE Strength, Impaired Funct 

Balance, Impaired I ADL's, Impaired Self-Care Skills





Discharge Recommendations


Plan/Recommendations:  Continue POC





Treatment Plan/Plan of Care


Treatment,Training & Education:  Yes


Patient would benefit from OT for education, treatment and training to promote 

independence in ADL's, mobility, safety and/or upper extremity function for 

ADL's.


Plan of Care:  ADL Retraining, Concurrent Therapy, Functional Mobility, Group 

Exercise/Act as Ind, UE Funct Exercise/Act


Treatment Duration:  Aug 4, 2022


Frequency:  At least 5 of 7 days/Wk (IRF)


Estimated Hrs Per Day:  1.5 hours per day (75-90 min/day )


Agreement:  Yes


Rehab Potential:  Fair





Time/GCodes


Start Time:  09:05


Stop Time:  10:05


Total Time Billed (hr/min):  60


Billed Treatment Time


1 visit


ADL x4











Mackenzie De Oliveira OT                2022 09:59

## 2022-07-22 NOTE — SPEECH THERAPY DAILY NOTE
Speech Daily Progress Note


Subjective


Date Seen by Provider:  Jul 22, 2022


Time Seen by Provider:  08:30


The patient was seated upright in her recliner, awake and alert upon entrance to

her room by the clinician. The patient greeted the clinician appropriately and 

was agreeable to participation in the cognitive linguistic treatment session.





Objective


- Orientation: The patient remains oriented (independently) to month, day of the

week, year, and location. The patient required one verbal prompt for recall of 

the accurate date.





The patient participated in functional recall of recent therapeutic activities, 

as well as, her daily routine and responsibilities at home. As the patient lives

alone, she states she is responsible for meals, cleaning, care of her dog, and 

all ADL's. The patient stated she was driving prior to admission. The patient 

consistently remains on topic and appropriate throughout the structured 

conversation.





The patient required aid with set-up of her meal (opening butter packets, 

arranging meal tray, placing items in her tea). The patient consumed her meal 

without s/s of suspected aspiration (tea, water, oatmeal, lee, eggs).





Assessment


Assessment Current Status:  Fair Progress





Treatment Plan


Continue Plan of Care





Speech Short Term Goals


Short Term Goals


Short Term Goals


1. The patient will demonstrate 80% accuracy with memory exercises with mild 

clinician verbal and visual cueing.


Time Frame-STG:  One Week.





Speech Long Term Goals


Long Term Goals


1. The patient will improve her cognitive linguistic skills for safe discharge 

to the least restrictive environment.


Time Frame:  Two Weeks.





Speech-Plan


Treatment Plan


Speech Therapy Treatment Plan:  Continue Plan of Care


Treatment Duration:  Aug 4, 2022


Frequency:  4 times per week (Four to five times per day.)


Estimated Hrs Per Day:  .5 hour per day


Rehab Potential:  Fair





Safety Risks/Education


Teaching Recipient:  Patient


Teaching Methods:  Discussion


Response to Teaching:  Verbalize Understanding


Education Topics Provided:  


Safety in the Home, Responsibilities in the Home





Time


Speech Therapy Time In:  08:30


Speech Therapy Time Out:  09:00


Total Billed Time:  30


Billed Treatment Time


[address]               Jul 22, 2022 11:54

## 2022-07-22 NOTE — PHYSICAL THERAPY DAILY NOTE
PT Daily Note-Current


Subjective


Patient in restroom on toilet pre tx, agrees to PT, has no complaints of pain.





Appearance


Patient in bed post tx with nurse call, phone, tray, all needs met.





Mental Status


Patient Orientation:  Person, Place, Situation


Attachments:  Colostomy/Ileostomy





Transfers


SCALE: Activities may be completed with or without assistive devices.





6-Indepedent-patient completes the activity by him/herself with no assistance 

from a helper.


5-Set-up or Clean-up Assistance-helper sets up or cleans up; patient completes 

activity. [place] assists only prior to or  


    following the activity.


4-Supervision or Touching Assistance-helper provides verbal cues and/or 

touching/steadying and/or contact guard assistance as patient completes 

activity. Assistance may be provided   


    throughout the activity or intermittently.


3-Partial/Moderate Assistance-helper does LESS THAN HALF the effort. [place] 

lifts, holds or supports trunk or limbs, but provides less than half the effort.


2-Substantial/Maximal Assistance-helper does MORE THAN HALF the effort. [place] 

lifts or holds trunk or limbs and provides more than half the effort.


2-Breqtitkr-wtzpvu does ALL the effort. Patient does none of the effort to 

complete the activity. Or, the assistance of 2 or more helpers is required for 

the patient to complete the  


    activity.


If activity was not attempted, code reason:


7-Patient Refused.


9-Not Applicable-not attempted and the patient did not perform the activity 

before the current illness, exacerbation or injury.


10-Not Attempted due to Environmental Limitations-(lack of equipment, weather 

restraints, etc.).


88-Not Attempted due to Medical Conditions or Safety Concerns.


Sit to Stand (QC):  3


Chair/Bed-to-Chair Xfer(QC):  4





Gait Training


Distance:  10'


Walk 10 feet (QC):  4


Gait Persons Needed:  1


Gait Assistive Device:  FWW





Exercises


Supine Ex:  Ankle pumps, Quad Set, Glut sets, Heel Slides, Short Arc Quads, 

Straight leg raise (AAROM), Hip abd/add


Supine Reps:  20





Treatments


transfers, ambulation, LE strengthening





Assessment


Current Status:  Fair Progress


patient continues to have severe fatigue





PT Short Term Goals


Short Term Goals


Time Frame:  2022


Roll Left & Right:  6


Sit to lyin


Lying to sitting on side of be:  6


Sit to stand:  4 (CGA)


Chair/bed-to-chair transfer:  4 (SBA)


Walk 10 feet:  4 (SBA)


Walk 50 feet with two turns:  4 (SBA)





PT Long Term Goals


Long Term Goals


PT Long Term Goals Time Frame:  Aug 11, 2022


Roll Left & Right (QC):  6


Sit to Lying (QC):  6


Lying-Sitting on Side/Bed(QC):  6


Sit to Stand (QC):  6


Chair/Bed-to-Chair Xfer(QC):  6


Toilet Transfer (QC):  6


Car Transfer (QC):  6


Does the Patient Walk:  Yes


Walk 10 feet (QC):  6


Walk 50ft with 2 Turns (QC):  6


Walk 150 ft (QC):  6


Walking 10ft on Uneven Surface:  6


1 Step (curb) (QC):  6


4 Steps (QC):  6


12 Steps (QC):  88


Picking up an Object (QC):  6


Wheel 50 feet with 2 turns (QC:  9


Wheel 150 feet:  9





PT Plan


Problem List


Problem List:  Activity Tolerance, Functional Strength, Safety, Balance, Gait, 

Transfer, Bed Mobility, ROM





Treatment/Plan


Treatment Plan:  Continue Plan of Care


Treatment Plan:  Bed Mobility, Education, Functional Activity Cherise, Functional 

Strength, Group Therapy, Gait, Safety, Therapeutic Exercise, Transfers


Treatment Duration:  Aug 11, 2022


Frequency:  At least 5 of 7 days/Wk (IRF)


Estimated Hrs Per Day:  1.5 hours per day


Patient and/or Family Agrees t:  Yes





Safety Risks/Education


Patient Education:  Gait Training, Transfer Techniques, Correct Positioning, 

Safety Issues


Teaching Recipient:  Patient


Teaching Methods:  Demonstration, Discussion


Response to Teaching:  Reinforcement Needed





Time/GCodes


Time In:  1300


Time Out:  1315


Total Billed Treatment Time:  15


Total Billed Treatment


1 visit


EX 15'











GORDON HOLCOMB PT                2022 13:22

## 2022-07-22 NOTE — PM&R PROGRESS NOTE
Subjective


HPI/CC On Admission


Date Seen by Provider:  Jul 22, 2022


Time Seen by Provider:  12:30


Subjective/Events-last exam


7/22/2022:


Pt is doing well


No oxygen required


Dietary consult for poor nutrition


White count is 3


Hemoglobin is 8.2





Review of Systems


General:  Fatigue, Malaise





Objective


Exam


Vital Signs





Vital Signs








  Date Time  Temp Pulse Resp B/P (MAP) Pulse Ox O2 Delivery O2 Flow Rate FiO2


 


7/22/22 09:00      Room Air  


 


7/22/22 07:11 36.1 89 18 114/73 (87) 99   





Capillary Refill :


General Appearance:  No Apparent Distress, WD/WN, Chronically ill, Thin


HEENT:  PERRL/EOMI, Normal ENT Inspection, Pharynx Normal


Neck:  Full Range of Motion, Normal Inspection, Non Tender, Supple, Carotid 

Bruit


Respiratory:  Chest Non Tender, Lungs Clear, Normal Breath Sounds, No Accessory 

Muscle Use, No Respiratory Distress


Cardiovascular:  Regular Rate, Rhythm, No Edema, No Gallop, No JVD, No Murmur, 

Normal Peripheral Pulses


Gastrointestinal:  Normal Bowel Sounds, No Organomegaly, No Pulsatile Mass, Non 

Tender, Soft


Back:  Normal Inspection, No CVA Tenderness, No Vertebral Tenderness


Extremity:  Normal Capillary Refill, Normal Inspection, Normal Range of Motion, 

Non Tender, No Calf Tenderness, No Pedal Edema


Neurologic/Psychiatric:  Alert, Oriented x3, Normal Mood/Affect, CNs II-XII Norm

as Tested, Abnormal Gait, Depressed Affect, Motor Weakness (generalized)


Skin:  Normal Color, Warm/Dry


Lymphatic:  No Adenopathy





Results/Procedures


Lab


Laboratory Tests


7/22/22 06:00








Patient resulted labs reviewed.





FIM


Transfers


Therapy Code Descriptions/Definitions 





Functional [place] Measure:


0=Not Assessed/NA        4=Minimal Assistance


1=Total Assistance        5=Supervision or Setup


2=Maximal Assistance  6=Modified [place]


3=Moderate Assistance 7=Complete IndependenceSCALE: Activities may be completed 

with or without assistive devices.





6-Indepedent-patient completes the activity by him/herself with no assistance 

from a helper.


5-Set-up or Clean-up Assistance-helper sets up or cleans up; patient completes 

activity. [place] assists only prior to or  


    following the activity.


4-Supervision or Touching Assistance-helper provides verbal cues and/or 

touching/steadying and/or contact guard assistance as patient completes 

activity. Assistance may be provided   


    throughout the activity or intermittently.


3-Partial/Moderate Assistance-helper does LESS THAN HALF the effort. [place] 

lifts, holds or supports trunk or limbs, but provides less than half the effort.


2-Substantial/Maximal Assistance-helper does MORE THAN HALF the effort. [place] 

lifts or holds trunk or limbs and provides more than half the effort.


1-Ldqmneofq-vybaki does ALL the effort. Patient does none of the effort to 

complete the activity. Or, the assistance of 2 or more helpers is required for 

the patient to complete the  


    activity.


If activity was not attempted, code reason:


7-Patient Refused.


9-Not Applicable-not attempted and the patient did not perform the activity 

before the current illness, exacerbation or injury.


10-Not Attempted due to Environmental Limitations-(lack of equipment, weather 

restraints, etc.).


88-Not Attempted due to Medical Conditions or Safety Concerns.


Roll Left to Right (QC):  6


Sit to Lying (QC):  6


Sit to Stand (QC):  3


Chair/Bed-to-Chair Xfer(QC):  4


Car Transfer (QC):  4





Gait Training


Does the Patient Walk?:  Yes


Walk 10 feet (QC):  4


Walk 50 ft with 2 Turns(QC):  4


Walk 150 ft (QC):  88


Walking 10ft/uneven surface-QC:  10


Gait Assistive Device:  FWW





Wheelchair Training


Does the Pt Use a Wheelchair?:  No


Wheel 50 ft with 2 turns (QC):  9


Wheel 150 ft (QC):  9





Stair Training


#of Steps:  1


1 Step (curb) (QC):  4


4 Steps (QC):  88


12 Steps (QC):  88





Balance


Picking up an Object (QC):  88





ADL-Treatment


Eating (QC):  5


Oral Hygiene (QC):  4


Shower/Bathe Self (QC):  4


Upper Body Dressing (QC):  4


Lower Body Dressing (QC):  4


On/Off Footwear (QC):  4


Toileting Hygiene (QC):  4





Assessment/Plan


Assessment and Plan


Assess & Plan/Chief Complaint


Assessment:


Myopathy 


Malnutrition prealbumin 11


COVID infection with vaccination and 1 booster


   -Central line maintained


   -No oxygen required at this time


   -Currently on isolation for 20 days


S/p ileostomy reversal with fistula and abscess July 2022 Dr Galicia


   -General surgery consulted again for possible drain removal today, 7/21/22


   -Drain still in place from [facility] IR 6/16


   -Levofloxacin last day


CHF


   -Echo EF 20-25%


   -Lasix and spironolactone and Entresto


Type II MI


   -Cardiology following, appreciate their recs


   -Telemetry DC


Afib


   -Lovenox 


HTN


   -Well controlled currently


Diabetes


   -SSI


CKD3a


   -BUN/Cr wnl


Recent hospitalization at [place] 6/22-7/14 following stay at [facility] 6/16 for 

pelvic abscess drainage. 





Diet: Regular


DVT prophylaxis: Lovenox and SCDs








7/22/2022:


Dietary consult appreciated


Continue therapy





(1) Physical debility


Status:  Acute


(2) COVID


(3) Ovarian cancer in remission


(4) Solitary kidney


(5) Fall


Status:  Acute


(6) Rhabdomyolysis


Status:  Acute


(7) Myopathy


(8) Hypothyroidism


(9) Hypoglycemia


Status:  Acute


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


(11) Diabetes mellitus


(12) Atrial fibrillation











TAMMY HURST DO                Jul 22, 2022 06:20

## 2022-07-23 VITALS — SYSTOLIC BLOOD PRESSURE: 126 MMHG | DIASTOLIC BLOOD PRESSURE: 78 MMHG

## 2022-07-23 VITALS — SYSTOLIC BLOOD PRESSURE: 116 MMHG | DIASTOLIC BLOOD PRESSURE: 78 MMHG

## 2022-07-23 VITALS — SYSTOLIC BLOOD PRESSURE: 115 MMHG | DIASTOLIC BLOOD PRESSURE: 75 MMHG

## 2022-07-23 VITALS — SYSTOLIC BLOOD PRESSURE: 113 MMHG | DIASTOLIC BLOOD PRESSURE: 76 MMHG

## 2022-07-23 RX ADMIN — Medication SCH MCG: at 08:38

## 2022-07-23 RX ADMIN — DOCUSATE SODIUM AND SENNOSIDES SCH EA: 8.6; 5 TABLET, FILM COATED ORAL at 22:13

## 2022-07-23 RX ADMIN — Medication SCH ML: at 22:00

## 2022-07-23 RX ADMIN — ENOXAPARIN SODIUM SCH MG: 30 INJECTION SUBCUTANEOUS at 21:00

## 2022-07-23 RX ADMIN — INSULIN ASPART SCH UNIT: 100 INJECTION, SOLUTION INTRAVENOUS; SUBCUTANEOUS at 21:22

## 2022-07-23 RX ADMIN — INSULIN ASPART SCH UNIT: 100 INJECTION, SOLUTION INTRAVENOUS; SUBCUTANEOUS at 11:04

## 2022-07-23 RX ADMIN — DOCUSATE SODIUM SCH MG: 100 CAPSULE ORAL at 08:38

## 2022-07-23 RX ADMIN — SPIRONOLACTONE SCH MG: 25 TABLET ORAL at 08:30

## 2022-07-23 RX ADMIN — ASPIRIN 81 MG CHEWABLE TABLET SCH MG: 81 TABLET CHEWABLE at 08:30

## 2022-07-23 RX ADMIN — POLYETHYLENE GLYCOL (3350) SCH GM: 17 POWDER, FOR SOLUTION ORAL at 22:13

## 2022-07-23 RX ADMIN — DOCUSATE SODIUM AND SENNOSIDES SCH EA: 8.6; 5 TABLET, FILM COATED ORAL at 08:38

## 2022-07-23 RX ADMIN — ANTACID TABLETS SCH MG: 500 TABLET, CHEWABLE ORAL at 12:58

## 2022-07-23 RX ADMIN — SENNOSIDES SCH MG: 8.6 TABLET, FILM COATED ORAL at 22:13

## 2022-07-23 RX ADMIN — INSULIN ASPART SCH UNIT: 100 INJECTION, SOLUTION INTRAVENOUS; SUBCUTANEOUS at 16:07

## 2022-07-23 RX ADMIN — ANTACID TABLETS SCH MG: 500 TABLET, CHEWABLE ORAL at 08:33

## 2022-07-23 RX ADMIN — SACUBITRIL AND VALSARTAN SCH TAB: 24; 26 TABLET, FILM COATED ORAL at 08:30

## 2022-07-23 RX ADMIN — POLYETHYLENE GLYCOL (3350) SCH GM: 17 POWDER, FOR SOLUTION ORAL at 08:38

## 2022-07-23 RX ADMIN — ANTACID TABLETS SCH MG: 500 TABLET, CHEWABLE ORAL at 22:02

## 2022-07-23 RX ADMIN — DOCUSATE SODIUM SCH MG: 100 CAPSULE ORAL at 22:13

## 2022-07-23 RX ADMIN — SENNOSIDES SCH MG: 8.6 TABLET, FILM COATED ORAL at 08:38

## 2022-07-23 RX ADMIN — SACUBITRIL AND VALSARTAN SCH TAB: 24; 26 TABLET, FILM COATED ORAL at 22:02

## 2022-07-23 RX ADMIN — INSULIN ASPART SCH UNIT: 100 INJECTION, SOLUTION INTRAVENOUS; SUBCUTANEOUS at 06:27

## 2022-07-23 RX ADMIN — FUROSEMIDE SCH MG: 40 TABLET ORAL at 08:30

## 2022-07-23 RX ADMIN — POTASSIUM CHLORIDE SCH MEQ: 1500 TABLET, EXTENDED RELEASE ORAL at 08:38

## 2022-07-23 NOTE — PM&R PROGRESS NOTE
Subjective


HPI/CC On Admission


Date Seen by Provider:  Jul 23, 2022


Time Seen by Provider:  12:00


Subjective/Events-last exam


7/23/2022:


Doing well


Eating better today after emesis last night


Compazine was helpful


No pain reported


No dyspnea








7/22/2022:


Pt is doing well


No oxygen required


Dietary consult for poor nutrition


White count is 3


Hemoglobin is 8.2





Review of Systems


General:  Fatigue, Malaise


Gastrointestinal:  Nausea





Objective


Exam


Vital Signs





Vital Signs








  Date Time  Temp Pulse Resp B/P (MAP) Pulse Ox O2 Delivery O2 Flow Rate FiO2


 


7/23/22 09:59      Room Air  


 


7/23/22 08:39  72  116/78 (91)    


 


7/23/22 07:07 36.8  20  99   





Capillary Refill :


General Appearance:  No Apparent Distress, WD/WN, Chronically ill, Thin


HEENT:  PERRL/EOMI, Normal ENT Inspection, Pharynx Normal


Neck:  Full Range of Motion, Normal Inspection, Non Tender, Supple, Carotid 

Bruit


Respiratory:  Chest Non Tender, Lungs Clear, Normal Breath Sounds, No Accessory 

Muscle Use, No Respiratory Distress


Cardiovascular:  Regular Rate, Rhythm, No Edema, No Gallop, No JVD, No Murmur, 

Normal Peripheral Pulses


Gastrointestinal:  Normal Bowel Sounds, No Organomegaly, No Pulsatile Mass, Non 

Tender, Soft


Back:  Normal Inspection, No CVA Tenderness, No Vertebral Tenderness


Extremity:  Normal Capillary Refill, Normal Inspection, Normal Range of Motion, 

Non Tender, No Calf Tenderness, No Pedal Edema


Neurologic/Psychiatric:  Alert, Oriented x3, Normal Mood/Affect, CNs II-XII Norm

as Tested, Abnormal Gait, Depressed Affect, Motor Weakness (generalized)


Skin:  Normal Color, Warm/Dry


Lymphatic:  No Adenopathy





Results/Procedures


Lab


Patient resulted labs reviewed.





FIM


Transfers


Therapy Code Descriptions/Definitions 





Functional [place] Measure:


0=Not Assessed/NA        4=Minimal Assistance


1=Total Assistance        5=Supervision or Setup


2=Maximal Assistance  6=Modified [place]


3=Moderate Assistance 7=Complete IndependenceSCALE: Activities may be completed 

with or without assistive devices.





6-Indepedent-patient completes the activity by him/herself with no assistance 

from a helper.


5-Set-up or Clean-up Assistance-helper sets up or cleans up; patient completes 

activity. [place] assists only prior to or  


    following the activity.


4-Supervision or Touching Assistance-helper provides verbal cues and/or 

touching/steadying and/or contact guard assistance as patient completes 

activity. Assistance may be provided   


    throughout the activity or intermittently.


3-Partial/Moderate Assistance-helper does LESS THAN HALF the effort. [place] 

lifts, holds or supports trunk or limbs, but provides less than half the effort.


2-Substantial/Maximal Assistance-helper does MORE THAN HALF the effort. [place] 

lifts or holds trunk or limbs and provides more than half the effort.


3-Zwftpmmbk-kplhgl does ALL the effort. Patient does none of the effort to 

complete the activity. Or, the assistance of 2 or more helpers is required for 

the patient to complete the  


    activity.


If activity was not attempted, code reason:


7-Patient Refused.


9-Not Applicable-not attempted and the patient did not perform the activity 

before the current illness, exacerbation or injury.


10-Not Attempted due to Environmental Limitations-(lack of equipment, weather 

restraints, etc.).


88-Not Attempted due to Medical Conditions or Safety Concerns.


Roll Left to Right (QC):  6


Sit to Lying (QC):  6


Sit to Stand (QC):  3


Chair/Bed-to-Chair Xfer(QC):  4


Car Transfer (QC):  4





Gait Training


Does the Patient Walk?:  Yes


Distance:  10'


Walk 10 feet (QC):  4


Walk 50 ft with 2 Turns(QC):  4


Walk 150 ft (QC):  88


Walking 10ft/uneven surface-QC:  10


Gait Persons Needed:  1


Gait Assistive Device:  FWW





Wheelchair Training


Does the Pt Use a Wheelchair?:  No


Wheel 50 ft with 2 turns (QC):  9


Wheel 150 ft (QC):  9





Stair Training


#of Steps:  1


1 Step (curb) (QC):  4


4 Steps (QC):  88


12 Steps (QC):  88





Balance


Picking up an Object (QC):  88





ADL-Treatment


Eating (QC):  5


Oral Hygiene (QC):  5


Shower/Bathe Self (QC):  4


Upper Body Dressing (QC):  5


Lower Body Dressing (QC):  4


On/Off Footwear (QC):  5


Toileting Hygiene (QC):  4


Toilet Transfer (QC):  3





Assessment/Plan


Assessment and Plan


Assess & Plan/Chief Complaint


Assessment:


Myopathy 


Malnutrition prealbumin 11


COVID infection with vaccination and 1 booster


   -Central line maintained


   -No oxygen required at this time


   -Currently on isolation for 20 days


S/p ileostomy reversal with fistula and abscess July 2022 Dr Galicia


   -General surgery consulted again for possible drain removal today, 7/21/22


   -Drain removed 7/21/22 after placed from [facility] IR 6/16


   -Levofloxacin last day


CHF


   -Echo EF 20-25%


   -Lasix and spironolactone and Entresto


Type II MI


   -Cardiology following, appreciate their recs


   -Telemetry DC


Afib


   -Lovenox 


HTN


   -Well controlled currently


Diabetes


   -SSI


CKD3a


   -BUN/Cr wnl


Recent hospitalization at [place] 6/22-7/14 following stay at [facility] 6/16 for 

pelvic abscess drainage. 





Diet: Regular


DVT prophylaxis: Lovenox and SCDs








7/22/2022:


Dietary consult appreciated


Continue therapy





7/23/2022:


Monitor N/V


IS


PT OT





(1) Acute heart failure with reduced ejection fraction and diastolic dysfunction


Assessment & Plan:  Symptomatically improved.  She is on carvedilol, Entresto 

and spironolactone.





(2) Cardiomyopathy


Assessment & Plan:  Her most recent previous echocardiogram from 2021 showed 

mild left ventricular systolic dysfunction.  Now she has severe left ventricular

systolic dysfunction.  She may need an ischemic evaluation once she recovers 

from her COVID.  We may also want to consider a LifeVest prior to discharge.





(3) Primary hypertension


Assessment & Plan:  Blood pressure is reasonably controlled with the present 

combination of medications.  If anything, at times she is running on the low 

side.





(4) Mixed hyperlipidemia


Assessment & Plan:  It appears as though she may have been on both atorvastatin 

and fenofibrate at home.  I will resume the atorvastatin.  I will hold off on 

restarting fenofibrate due to her acute debility with potentially increased risk

for medication interactions between the atorvastatin and fenofibrate.














TAMMY HURST DO                Jul 23, 2022 07:12

## 2022-07-23 NOTE — PHYSICAL THERAPY DAILY NOTE
PT Daily Note-Current


Subjective


Pt supine in bed upon arrival to room, agreeable to PT treatment; however, she 

states that she would only like to complete bed exercises as she is tired due to

being up all night sick.





Appearance


Following session, pt supine in bed with call light, phone and tray table all 

within reach. All needs met at this time.





Mental Status


Patient Orientation:  Person, Place, Situation





Transfers


SCALE: Activities may be completed with or without assistive devices.





6-Indepedent-patient completes the activity by him/herself with no assistance 

from a helper.


5-Set-up or Clean-up Assistance-helper sets up or cleans up; patient completes 

activity. [place] assists only prior to or  


    following the activity.


4-Supervision or Touching Assistance-helper provides verbal cues and/or 

touching/steadying and/or contact guard assistance as patient completes 

activity. Assistance may be provided   


    throughout the activity or intermittently.


3-Partial/Moderate Assistance-helper does LESS THAN HALF the effort. [place] 

lifts, holds or supports trunk or limbs, but provides less than half the effort.


2-Substantial/Maximal Assistance-helper does MORE THAN HALF the effort. [place] 

lifts or holds trunk or limbs and provides more than half the effort.


5-Vlkmpfxrp-paetqv does ALL the effort. Patient does none of the effort to 

complete the activity. Or, the assistance of 2 or more helpers is required for 

the patient to complete the  


    activity.


If activity was not attempted, code reason:


7-Patient Refused.


9-Not Applicable-not attempted and the patient did not perform the activity 

before the current illness, exacerbation or injury.


10-Not Attempted due to Environmental Limitations-(lack of equipment, weather 

restraints, etc.).


88-Not Attempted due to Medical Conditions or Safety Concerns.





Exercises


Supine Ex:  Bridging, Ankle pumps, Pelvic tilt, Quad Set, Glut sets, Heel 

Slides, Straight leg raise, Hip abd/add


Supine Reps:  20





Assessment


Current Status:  Fair Progress


Only completed bed exercises this date due to pt being tired after being sick 

last night, will progress as able as pt tolerates





PT Short Term Goals


Short Term Goals


Time Frame:  2022


Roll Left & Right:  6


Sit to lyin


Lying to sitting on side of be:  6


Sit to stand:  4 (CGA)


Chair/bed-to-chair transfer:  4 (SBA)


Walk 10 feet:  4 (SBA)


Walk 50 feet with two turns:  4 (SBA)





PT Long Term Goals


Long Term Goals


PT Long Term Goals Time Frame:  Aug 11, 2022


Roll Left & Right (QC):  6


Sit to Lying (QC):  6


Lying-Sitting on Side/Bed(QC):  6


Sit to Stand (QC):  6


Chair/Bed-to-Chair Xfer(QC):  6


Toilet Transfer (QC):  6


Car Transfer (QC):  6


Does the Patient Walk:  Yes


Walk 10 feet (QC):  6


Walk 50ft with 2 Turns (QC):  6


Walk 150 ft (QC):  6


Walking 10ft on Uneven Surface:  6


1 Step (curb) (QC):  6


4 Steps (QC):  6


12 Steps (QC):  88


Picking up an Object (QC):  6


Wheel 50 feet with 2 turns (QC:  9


Wheel 150 feet:  9





PT Plan


Problem List


Problem List:  Activity Tolerance, Functional Strength, Safety, Balance, Gait, 

Transfer, Bed Mobility, ROM





Treatment/Plan


Treatment Plan:  Continue Plan of Care


Treatment Plan:  Bed Mobility, Education, Functional Activity Cherise, Functional 

Strength, Group Therapy, Gait, Safety, Therapeutic Exercise, Transfers


Treatment Duration:  Aug 11, 2022


Frequency:  At least 5 of 7 days/Wk (IRF)


Estimated Hrs Per Day:  1.5 hours per day


Patient and/or Family Agrees t:  Yes





Time/GCodes


Time In:  1044


Time Out:  1100


Total Billed Treatment


1 visit 


EX (15')











JOVANY REEVES PT                 2022 11:05

## 2022-07-23 NOTE — PROGRESS NOTE - SURGERY
SANGITA ORTEGA 7/23/22 0820:


Subjective


Date Seen by a Provider:  Jul 23, 2022


Time Seen by a Provider:  08:01


Subjective/Events-last exam


Ms. Joy is being followed for a drain that was placed in her ventral 

abdomen. It was pulled 2 days ago. She has a fistula in her abdomen that is 

covered with an ostomy bag. She continues to have a "little" output into her 

bag. Nursing reports they have not been measuring strict I&Os on the contents on

the ostomy bag but they will start doing that. She reports tenderness in her 

abdomen around the fistula but has no other concerns. She reports episodes of 

nausea and vomiting overnight that resolved with prochlorperazine.


Review of Systems


General:  No Chills, No Fatigue


HEENT:  No Head Aches, No Visual Changes


Pulmonary:  No Dyspnea; Cough


Cardiovascular:  No: Chest Pain, Palpitations


Gastrointestinal:  Nausea, Vomiting, Abdominal Pain


Neurological:  No: Weakness, Confusion





Objective


Exam





Vital Signs








  Date Time  Temp Pulse Resp B/P (MAP) Pulse Ox O2 Delivery O2 Flow Rate FiO2


 


7/23/22 07:36      Room Air  


 


7/23/22 07:07 36.8 92 20 115/75 (88) 99 Room Air  


 


7/22/22 21:00      Room Air  


 


7/22/22 20:49 36.8 87 16 123/82 (96) 99 Room Air  


 


7/22/22 09:00      Room Air  





Capillary Refill :


General Appearance:  No Apparent Distress, Chronically ill, Thin


HEENT:  PERRL/EOMI, Moist Mucous Membranes


Neck:  Non Tender, Supple


Respiratory:  Chest Non Tender, Lungs Clear, Normal Breath Sounds, No Accessory 

Muscle Use, No Respiratory Distress


Cardiovascular:  Regular Rate, Rhythm, Normal Peripheral Pulses


Peripheral Pulses:  2+ Radial Pulses (R), 2+ Radial Pulses (L)


Gastrointestinal:  soft; No distended; tenderness (Around fistula), other 

(Ostomy bag covering fistula)


Extremity:  Non Tender, No Pedal Edema


Neurologic/Psychiatric:  Alert, Oriented x3


Skin:  Normal Color, Warm/Dry





Results


Lab


Laboratory Tests


7/22/22 11:14: Glucometer 123H


7/22/22 15:26: Glucometer 122H


7/22/22 20:48: Glucometer 144H


7/23/22 06:18: Glucometer 84





Assessment/Plan


Enterocutaneous Fistula


Weakness


COVID





Removed the drain 2 days ago will monitor output. 


Monitor I&Os of fistula to assess actual output amount


At some point will discuss need for strict NPO and TPN depending on continued 

output.


Pt encouraged to ambulate and work with PT.





BONILLA MORRELL DO 7/23/22 1020:


Subjective


Time Seen by a Provider:  09:26


Subjective/Events-last exam


Pt seen and examined, resting comfortably in bed.  No new complaints.


Review of Systems


General:  No Chills


HEENT:  No Head Aches, No Visual Changes


Pulmonary:  No Dyspnea; Cough


Cardiovascular:  No: Chest Pain, Palpitations


Gastrointestinal:  Nausea, Vomiting, Abdominal Pain





Objective


Exam


General Appearance:  No Apparent Distress, Chronically ill, Thin


HEENT:  Moist Mucous Membranes


Respiratory:  Lungs Clear, Normal Breath Sounds, No Accessory Muscle Use, No 

Respiratory Distress


Cardiovascular:  No Edema, No Murmur


Gastrointestinal:  soft; No distended; tenderness (Around fistula), other 

(Ostomy bag covering fistula)





Assessment/Plan


Enterocutaneous Fistula


Weakness


COVID





Removed the drain 2 days ago will monitor output. 


Monitor I&Os, Nursing asked to please document fistula output amount


At some point will discuss need for strict NPO and TPN depending on continued 

output.


Pt encouraged to ambulate and work with PT.





Supervisory-Addendum Brief


Verification & Attestation


Participated in pt care:  history, MDM, physical


Personally performed:  exam, history, MDM, supervision of care


Care discussed with:  Medical Student


Procedures:  n/a


Verification and Attestation of Medical Student E/M Service





A medical student performed and documented this service. I then reviewed and 

verified all information documented by the medical student and made 

modifications to such information, when appropriate. I personally performed a 

physical exam, medical decision making and then discussed any differences 

between the notes and made revisions as necessary to create one note.





Bonilla Morrell , 7/23/22 , 10:20











SANGITA ORTEGA                 Jul 23, 2022 08:20


BONILLA MORRELL DO               Jul 23, 2022 10:20

## 2022-07-24 VITALS — SYSTOLIC BLOOD PRESSURE: 113 MMHG | DIASTOLIC BLOOD PRESSURE: 67 MMHG

## 2022-07-24 VITALS — SYSTOLIC BLOOD PRESSURE: 127 MMHG | DIASTOLIC BLOOD PRESSURE: 80 MMHG

## 2022-07-24 VITALS — SYSTOLIC BLOOD PRESSURE: 107 MMHG | DIASTOLIC BLOOD PRESSURE: 69 MMHG

## 2022-07-24 VITALS — DIASTOLIC BLOOD PRESSURE: 59 MMHG | SYSTOLIC BLOOD PRESSURE: 105 MMHG

## 2022-07-24 RX ADMIN — INSULIN ASPART SCH UNIT: 100 INJECTION, SOLUTION INTRAVENOUS; SUBCUTANEOUS at 15:45

## 2022-07-24 RX ADMIN — ANTACID TABLETS SCH MG: 500 TABLET, CHEWABLE ORAL at 22:31

## 2022-07-24 RX ADMIN — FUROSEMIDE SCH MG: 40 TABLET ORAL at 08:44

## 2022-07-24 RX ADMIN — SPIRONOLACTONE SCH MG: 25 TABLET ORAL at 08:45

## 2022-07-24 RX ADMIN — POLYETHYLENE GLYCOL (3350) SCH GM: 17 POWDER, FOR SOLUTION ORAL at 08:47

## 2022-07-24 RX ADMIN — DOCUSATE SODIUM SCH MG: 100 CAPSULE ORAL at 22:22

## 2022-07-24 RX ADMIN — ASPIRIN 81 MG CHEWABLE TABLET SCH MG: 81 TABLET CHEWABLE at 08:44

## 2022-07-24 RX ADMIN — Medication SCH ML: at 22:32

## 2022-07-24 RX ADMIN — ANTACID TABLETS SCH MG: 500 TABLET, CHEWABLE ORAL at 13:57

## 2022-07-24 RX ADMIN — INSULIN ASPART SCH UNIT: 100 INJECTION, SOLUTION INTRAVENOUS; SUBCUTANEOUS at 22:21

## 2022-07-24 RX ADMIN — Medication SCH ML: at 06:17

## 2022-07-24 RX ADMIN — SACUBITRIL AND VALSARTAN SCH TAB: 24; 26 TABLET, FILM COATED ORAL at 22:31

## 2022-07-24 RX ADMIN — DOCUSATE SODIUM AND SENNOSIDES SCH EA: 8.6; 5 TABLET, FILM COATED ORAL at 08:48

## 2022-07-24 RX ADMIN — INSULIN ASPART SCH UNIT: 100 INJECTION, SOLUTION INTRAVENOUS; SUBCUTANEOUS at 11:17

## 2022-07-24 RX ADMIN — Medication SCH ML: at 13:58

## 2022-07-24 RX ADMIN — ANTACID TABLETS SCH MG: 500 TABLET, CHEWABLE ORAL at 08:44

## 2022-07-24 RX ADMIN — POTASSIUM CHLORIDE SCH MEQ: 1500 TABLET, EXTENDED RELEASE ORAL at 06:17

## 2022-07-24 RX ADMIN — POLYETHYLENE GLYCOL (3350) SCH GM: 17 POWDER, FOR SOLUTION ORAL at 22:22

## 2022-07-24 RX ADMIN — SENNOSIDES SCH MG: 8.6 TABLET, FILM COATED ORAL at 08:48

## 2022-07-24 RX ADMIN — SACUBITRIL AND VALSARTAN SCH TAB: 24; 26 TABLET, FILM COATED ORAL at 08:44

## 2022-07-24 RX ADMIN — SENNOSIDES SCH MG: 8.6 TABLET, FILM COATED ORAL at 22:21

## 2022-07-24 RX ADMIN — DOCUSATE SODIUM AND SENNOSIDES SCH EA: 8.6; 5 TABLET, FILM COATED ORAL at 22:22

## 2022-07-24 RX ADMIN — INSULIN ASPART SCH UNIT: 100 INJECTION, SOLUTION INTRAVENOUS; SUBCUTANEOUS at 06:17

## 2022-07-24 RX ADMIN — DOCUSATE SODIUM SCH MG: 100 CAPSULE ORAL at 08:47

## 2022-07-24 RX ADMIN — Medication SCH MCG: at 08:44

## 2022-07-24 NOTE — PM&R PROGRESS NOTE
Subjective


HPI/CC On Admission


Date Seen by Provider:  Jul 24, 2022


Time Seen by Provider:  12:00


Subjective/Events-last exam


7/24/2022:


Doing well


No major concerns


Dr Galicia did evaluate the fistula


No cough of hypoxia 








7/23/2022:


Doing well


Eating better today after emesis last night


Compazine was helpful


No pain reported


No dyspnea








7/22/2022:


Pt is doing well


No oxygen required


Dietary consult for poor nutrition


White count is 3


Hemoglobin is 8.2





Review of Systems


General:  Fatigue, Malaise


Pulmonary:  Dyspnea





Objective


Exam


Vital Signs





Vital Signs








  Date Time  Temp Pulse Resp B/P (MAP) Pulse Ox O2 Delivery O2 Flow Rate FiO2


 


7/24/22 21:00      Room Air  


 


7/24/22 19:45 36.6 92 18 105/59 (74) 100   





Capillary Refill :


General Appearance:  No Apparent Distress, WD/WN, Chronically ill, Thin


HEENT:  PERRL/EOMI, Normal ENT Inspection, Pharynx Normal


Neck:  Full Range of Motion, Normal Inspection, Non Tender, Supple, Carotid 

Bruit


Respiratory:  Chest Non Tender, Lungs Clear, Normal Breath Sounds, No Accessory 

Muscle Use, No Respiratory Distress


Cardiovascular:  Regular Rate, Rhythm, No Edema, No Gallop, No JVD, No Murmur, 

Normal Peripheral Pulses


Gastrointestinal:  Normal Bowel Sounds, No Organomegaly, No Pulsatile Mass, Non 

Tender, Soft


Back:  Normal Inspection, No CVA Tenderness, No Vertebral Tenderness


Extremity:  Normal Capillary Refill, Normal Inspection, Normal Range of Motion, 

Non Tender, No Calf Tenderness, No Pedal Edema


Neurologic/Psychiatric:  Alert, Oriented x3, Normal Mood/Affect, CNs II-XII Norm

as Tested, Abnormal Gait, Depressed Affect, Motor Weakness (generalized)


Skin:  Normal Color, Warm/Dry


Lymphatic:  No Adenopathy





Results/Procedures


Lab


Patient resulted labs reviewed.





FIM


Transfers


Therapy Code Descriptions/Definitions 





Functional [place] Measure:


0=Not Assessed/NA        4=Minimal Assistance


1=Total Assistance        5=Supervision or Setup


2=Maximal Assistance  6=Modified [place]


3=Moderate Assistance 7=Complete IndependenceSCALE: Activities may be completed 

with or without assistive devices.





6-Indepedent-patient completes the activity by him/herself with no assistance 

from a helper.


5-Set-up or Clean-up Assistance-helper sets up or cleans up; patient completes 

activity. [place] assists only prior to or  


    following the activity.


4-Supervision or Touching Assistance-helper provides verbal cues and/or 

touching/steadying and/or contact guard assistance as patient completes 

activity. Assistance may be provided   


    throughout the activity or intermittently.


3-Partial/Moderate Assistance-helper does LESS THAN HALF the effort. [place] 

lifts, holds or supports trunk or limbs, but provides less than half the effort.


2-Substantial/Maximal Assistance-helper does MORE THAN HALF the effort. [place] 

lifts or holds trunk or limbs and provides more than half the effort.


8-Owpzjacup-wihmmr does ALL the effort. Patient does none of the effort to 

complete the activity. Or, the assistance of 2 or more helpers is required for 

the patient to complete the  


    activity.


If activity was not attempted, code reason:


7-Patient Refused.


9-Not Applicable-not attempted and the patient did not perform the activity 

before the current illness, exacerbation or injury.


10-Not Attempted due to Environmental Limitations-(lack of equipment, weather 

restraints, etc.).


88-Not Attempted due to Medical Conditions or Safety Concerns.


Roll Left to Right (QC):  6


Sit to Lying (QC):  6


Sit to Stand (QC):  3


Chair/Bed-to-Chair Xfer(QC):  4


Car Transfer (QC):  4





Gait Training


Does the Patient Walk?:  Yes


Distance:  10'


Walk 10 feet (QC):  4


Walk 50 ft with 2 Turns(QC):  4


Walk 150 ft (QC):  88


Walking 10ft/uneven surface-QC:  10


Gait Persons Needed:  1


Gait Assistive Device:  FWW





Wheelchair Training


Does the Pt Use a Wheelchair?:  No


Wheel 50 ft with 2 turns (QC):  9


Wheel 150 ft (QC):  9





Stair Training


#of Steps:  1


1 Step (curb) (QC):  4


4 Steps (QC):  88


12 Steps (QC):  88





Balance


Picking up an Object (QC):  88





ADL-Treatment


Eating (QC):  5


Oral Hygiene (QC):  5


Shower/Bathe Self (QC):  4


Upper Body Dressing (QC):  5


Lower Body Dressing (QC):  4


On/Off Footwear (QC):  5


Toileting Hygiene (QC):  4


Toilet Transfer (QC):  3





Assessment/Plan


Assessment and Plan


Assess & Plan/Chief Complaint


Assessment:


Myopathy 


Malnutrition prealbumin 11


COVID infection with vaccination and 1 booster


   -Central line maintained


   -No oxygen required at this time


   -Currently on isolation for 20 days


S/p ileostomy reversal with fistula and abscess July 2022 Dr Galicia


   -General surgery consulted again for possible drain removal today, 7/21/22


   -Drain removed 7/21/22 after placed from [facility] 6/16


   -Levofloxacin last day


CHF


   -Echo EF 20-25%


   -Lasix and spironolactone and Entresto


Type II MI


   -Cardiology following, appreciate their recs


   -Telemetry DC


Afib


   -Lovenox 


HTN


   -Well controlled currently


Diabetes


   -SSI


CKD3a


   -BUN/Cr wnl


Recent hospitalization at [place] 6/22-7/14 following stay at [facility] 6/16 for 

pelvic abscess drainage. 





Diet: Regular


DVT prophylaxis: Lovenox and SCDs








7/22/2022:


Dietary consult appreciated


Continue therapy





7/23/2022:


Monitor N/V


IS


PT OT





7/24/2022:


Monitor nutrition


Supportive care





(1) Acute heart failure with reduced ejection fraction and diastolic dysfunction


Assessment & Plan:  Symptomatically improved.  She is on carvedilol, Entresto 

and spironolactone.





(2) Cardiomyopathy


Assessment & Plan:  Her most recent previous echocardiogram from 2021 showed 

mild left ventricular systolic dysfunction.  Now she has severe left ventricular

systolic dysfunction.  She may need an ischemic evaluation once she recovers 

from her COVID.  We may also want to consider a LifeVest prior to discharge.





(3) Primary hypertension


Assessment & Plan:  Blood pressure is reasonably controlled with the present 

combination of medications.  If anything, at times she is running on the low 

side.





(4) Mixed hyperlipidemia


Assessment & Plan:  It appears as though she may have been on both atorvastatin 

and fenofibrate at home.  I will resume the atorvastatin.  I will hold off on 

restarting fenofibrate due to her acute debility with potentially increased risk

for medication interactions between the atorvastatin and fenofibrate.














TAMMY HURST DO                Jul 24, 2022 07:09

## 2022-07-24 NOTE — PROGRESS NOTE - SURGERY
SANGITA ORTEGA 7/24/22 1123:


Subjective


Date Seen by a Provider:  Jul 24, 2022


Time Seen by a Provider:  10:39


Subjective/Events-last exam


Ms. Joy is being followed for a drain that was placed in her ventral 

abdomen. It was pulled 3 days ago. She has a fistula in her abdomen that is 

covered with an ostomy bag. Output of the fistula has been monitored with 55 mL 

of output reported yesterday in total and 30 mL output reported so far today. 

She endorses minor pain around the site of the fistula but has no other 

complaints. She says her breathing is okay and she continues to work with PT.


Review of Systems


General:  No Chills, No Fatigue


HEENT:  No Head Aches, No Visual Changes


Pulmonary:  No Dyspnea; Cough


Cardiovascular:  No: Chest Pain, Palpitations


Gastrointestinal:  Abdominal Pain; No: Nausea, Vomiting


Neurological:  No: Weakness, Confusion





Objective


Exam





Vital Signs








  Date Time  Temp Pulse Resp B/P (MAP) Pulse Ox O2 Delivery O2 Flow Rate FiO2


 


7/24/22 09:48      Room Air  


 


7/24/22 08:44  93  127/80 (96)    


 


7/24/22 07:08 36.7 90 16 107/69 (82) 98 Room Air  


 


7/23/22 21:00      Room Air  


 


7/23/22 20:01 36.2 91 18 113/76 (88) 99 Room Air  


 


7/23/22 17:22  97  126/78 (94)    














I & O 


 


 7/24/22





 07:00


 


Intake Total 350 ml


 


Output Total 50 ml


 


Balance 300 ml





Capillary Refill :


General Appearance:  No Apparent Distress, Chronically ill, Thin


HEENT:  PERRL/EOMI, Moist Mucous Membranes


Neck:  Non Tender, Supple


Respiratory:  Chest Non Tender, Lungs Clear, Normal Breath Sounds, No Accessory 

Muscle Use, No Respiratory Distress


Cardiovascular:  Regular Rate, Rhythm, No Edema, Normal Peripheral Pulses


Peripheral Pulses:  2+ Radial Pulses (R), 2+ Radial Pulses (L)


Gastrointestinal:  soft; No distended, No guarding, No rebound; tenderness 

(Around fistula), other (Ostomy bag covering fistula)


Extremity:  Non Tender, No Pedal Edema


Neurologic/Psychiatric:  Alert, Oriented x3, Normal Mood/Affect


Skin:  Normal Color, Warm/Dry





Results


Lab


Laboratory Tests


7/23/22 16:05: Glucometer 102


7/23/22 20:33: Glucometer 191H


7/24/22 05:09: Glucometer 82


7/24/22 11:16: Glucometer 147H





Assessment/Plan


Enterocutaneous Fistula


Weakness


COVID





Removed the drain 3 days ago will monitor output. 


Monitor fistula output. Yesterday was 55 mL total with 30 mL so far today. 

Likely a low-output fistula.


At some point will discuss need for strict NPO and TPN depending on continued 

output.


Pt encouraged to ambulate and work with PT.





BONILLA MORRELL DO 7/24/22 1401:


Subjective


Time Seen by a Provider:  12:39


Subjective/Events-last exam


Pt seen and examined, denies any new problems.  States she feels ok.  Nurse is 

concerned because output from fistula looks "more red" today.  Pt is still 

having small BM's.


Review of Systems


General:  Fatigue


HEENT:  No Head Aches, No Visual Changes


Pulmonary:  No Dyspnea; Cough


Cardiovascular:  No: Chest Pain, Palpitations


Gastrointestinal:  Abdominal Pain; No: Nausea, Vomiting





Objective


Exam


General Appearance:  No Apparent Distress, Chronically ill, Thin


HEENT:  Moist Mucous Membranes


Respiratory:  Lungs Clear, Normal Breath Sounds, No Accessory Muscle Use, No 

Respiratory Distress


Cardiovascular:  Regular Rate, Rhythm, No Murmur


Gastrointestinal:  soft; No distended, No guarding, No rebound; tenderness 

(Around fistula), other (Ostomy bag covering fistula)





Assessment/Plan


Enterocutaneous Fistula


Weakness


COVID





Fistula output was 55 mL; over probably previous 12 hour shift and 30 mL so far 

today.  I talked to pt regarding fistulas and went over Low-output fistula vs 

High-output fistula.  The delineation is at 200ml per day; below is low output 

and what she has is most


likely a low-output fistula.  About 30-40% of these will close spontaneously in 

a few months.  I think she should continue eating, we will continue monitoring 

output and see if it can close on its own.  If there is not good enough 

improvement then at some point 


will discuss need for strict NPO and TPN.  Either way I talked to her about 

nutrition and how this will need to be optimized before we attempt anything 

surgical.  I also told her we would stop her Lovenox (for DVT prophylaxis) 

because she may be bleeding, based


on her fistula output and as long as she walks she doesn't need the Lovenox.  Pt

encouraged to ambulate (a lot in her room) and work with PT.





Supervisory-Addendum Brief


Verification & Attestation


Participated in pt care:  history, MDM, physical


Personally performed:  exam, history, MDM, supervision of care


Care discussed with:  Medical Student


Procedures:  n/a


Verification and Attestation of Medical Student E/M Service





A medical student performed and documented this service. I then reviewed and 

verified all information documented by the medical student and made 

modifications to such information, when appropriate. I personally performed a 

physical exam, medical decision making and then discussed any differences 

between the notes and made revisions as necessary to create one note.





Bonilla Morrell , 7/24/22 , 14:01











SANGITA ORTEGA                 Jul 24, 2022 11:23


BONILLA MORRELL DO               Jul 24, 2022 14:01

## 2022-07-25 VITALS — SYSTOLIC BLOOD PRESSURE: 116 MMHG | DIASTOLIC BLOOD PRESSURE: 75 MMHG

## 2022-07-25 VITALS — DIASTOLIC BLOOD PRESSURE: 77 MMHG | SYSTOLIC BLOOD PRESSURE: 119 MMHG

## 2022-07-25 VITALS — SYSTOLIC BLOOD PRESSURE: 111 MMHG | DIASTOLIC BLOOD PRESSURE: 75 MMHG

## 2022-07-25 VITALS — SYSTOLIC BLOOD PRESSURE: 123 MMHG | DIASTOLIC BLOOD PRESSURE: 79 MMHG

## 2022-07-25 LAB
ALBUMIN SERPL-MCNC: 2.3 GM/DL (ref 3.2–4.5)
ALP SERPL-CCNC: 81 U/L (ref 40–136)
ALT SERPL-CCNC: 20 U/L (ref 0–55)
BASOPHILS # BLD AUTO: 0 10^3/UL (ref 0–0.1)
BASOPHILS NFR BLD AUTO: 1 % (ref 0–10)
BILIRUB SERPL-MCNC: 0.4 MG/DL (ref 0.1–1)
BUN/CREAT SERPL: 13
CALCIUM SERPL-MCNC: 6.9 MG/DL (ref 8.5–10.1)
CHLORIDE SERPL-SCNC: 112 MMOL/L (ref 98–107)
CO2 SERPL-SCNC: 19 MMOL/L (ref 21–32)
CREAT SERPL-MCNC: 0.85 MG/DL (ref 0.6–1.3)
EOSINOPHIL # BLD AUTO: 0.2 10^3/UL (ref 0–0.3)
EOSINOPHIL NFR BLD AUTO: 7 % (ref 0–10)
GFR SERPLBLD BASED ON 1.73 SQ M-ARVRAT: 71 ML/MIN
GLUCOSE SERPL-MCNC: 84 MG/DL (ref 70–105)
HCT VFR BLD CALC: 28 % (ref 35–52)
HGB BLD-MCNC: 8.7 G/DL (ref 11.5–16)
LYMPHOCYTES # BLD AUTO: 1.1 10^3/UL (ref 1–4)
LYMPHOCYTES NFR BLD AUTO: 39 % (ref 12–44)
MANUAL DIFFERENTIAL PERFORMED BLD QL: NO
MCH RBC QN AUTO: 29 PG (ref 25–34)
MCHC RBC AUTO-ENTMCNC: 31 G/DL (ref 32–36)
MCV RBC AUTO: 91 FL (ref 80–99)
MONOCYTES # BLD AUTO: 0.3 10^3/UL (ref 0–1)
MONOCYTES NFR BLD AUTO: 10 % (ref 0–12)
NEUTROPHILS # BLD AUTO: 1.2 10^3/UL (ref 1.8–7.8)
NEUTROPHILS NFR BLD AUTO: 43 % (ref 42–75)
PLATELET # BLD: 180 10^3/UL (ref 130–400)
PMV BLD AUTO: 10.9 FL (ref 9–12.2)
POTASSIUM SERPL-SCNC: 4.1 MMOL/L (ref 3.6–5)
PROT SERPL-MCNC: 5.3 GM/DL (ref 6.4–8.2)
SODIUM SERPL-SCNC: 138 MMOL/L (ref 135–145)
WBC # BLD AUTO: 2.8 10^3/UL (ref 4.3–11)

## 2022-07-25 RX ADMIN — INSULIN ASPART SCH UNIT: 100 INJECTION, SOLUTION INTRAVENOUS; SUBCUTANEOUS at 20:31

## 2022-07-25 RX ADMIN — SACUBITRIL AND VALSARTAN SCH TAB: 24; 26 TABLET, FILM COATED ORAL at 20:37

## 2022-07-25 RX ADMIN — DOCUSATE SODIUM AND SENNOSIDES SCH EA: 8.6; 5 TABLET, FILM COATED ORAL at 19:35

## 2022-07-25 RX ADMIN — Medication SCH ML: at 07:59

## 2022-07-25 RX ADMIN — SENNOSIDES SCH MG: 8.6 TABLET, FILM COATED ORAL at 19:35

## 2022-07-25 RX ADMIN — SACUBITRIL AND VALSARTAN SCH TAB: 24; 26 TABLET, FILM COATED ORAL at 09:52

## 2022-07-25 RX ADMIN — INSULIN ASPART SCH UNIT: 100 INJECTION, SOLUTION INTRAVENOUS; SUBCUTANEOUS at 07:59

## 2022-07-25 RX ADMIN — ANTACID TABLETS SCH MG: 500 TABLET, CHEWABLE ORAL at 09:52

## 2022-07-25 RX ADMIN — Medication SCH MCG: at 09:52

## 2022-07-25 RX ADMIN — DOCUSATE SODIUM SCH MG: 100 CAPSULE ORAL at 10:06

## 2022-07-25 RX ADMIN — ANTACID TABLETS SCH MG: 500 TABLET, CHEWABLE ORAL at 20:37

## 2022-07-25 RX ADMIN — POLYETHYLENE GLYCOL (3350) SCH GM: 17 POWDER, FOR SOLUTION ORAL at 19:35

## 2022-07-25 RX ADMIN — ASPIRIN 81 MG CHEWABLE TABLET SCH MG: 81 TABLET CHEWABLE at 09:52

## 2022-07-25 RX ADMIN — DOCUSATE SODIUM AND SENNOSIDES SCH EA: 8.6; 5 TABLET, FILM COATED ORAL at 10:07

## 2022-07-25 RX ADMIN — POLYETHYLENE GLYCOL (3350) SCH GM: 17 POWDER, FOR SOLUTION ORAL at 10:06

## 2022-07-25 RX ADMIN — Medication SCH ML: at 14:01

## 2022-07-25 RX ADMIN — SENNOSIDES SCH MG: 8.6 TABLET, FILM COATED ORAL at 10:07

## 2022-07-25 RX ADMIN — INSULIN ASPART SCH UNIT: 100 INJECTION, SOLUTION INTRAVENOUS; SUBCUTANEOUS at 11:17

## 2022-07-25 RX ADMIN — ANTACID TABLETS SCH MG: 500 TABLET, CHEWABLE ORAL at 14:02

## 2022-07-25 RX ADMIN — FUROSEMIDE SCH MG: 40 TABLET ORAL at 09:52

## 2022-07-25 RX ADMIN — DOCUSATE SODIUM SCH MG: 100 CAPSULE ORAL at 19:35

## 2022-07-25 RX ADMIN — SPIRONOLACTONE SCH MG: 25 TABLET ORAL at 09:52

## 2022-07-25 RX ADMIN — INSULIN ASPART SCH UNIT: 100 INJECTION, SOLUTION INTRAVENOUS; SUBCUTANEOUS at 15:52

## 2022-07-25 RX ADMIN — POTASSIUM CHLORIDE SCH MEQ: 1500 TABLET, EXTENDED RELEASE ORAL at 07:59

## 2022-07-25 RX ADMIN — Medication SCH ML: at 20:38

## 2022-07-25 NOTE — PHYSICAL THERAPY DAILY NOTE
PT Daily Note-Current


Subjective


Pt sitting in recliner upon arrival.  Pt agrees to PT.





Pain





   Location:  No Pain Reported





Mental Status


Patient Orientation:  Person, Place, Time, Situation





Transfers


SCALE: Activities may be completed with or without assistive devices.





6-Indepedent-patient completes the activity by him/herself with no assistance 

from a helper.


5-Set-up or Clean-up Assistance-helper sets up or cleans up; patient completes 

activity. [place] assists only prior to or  


    following the activity.


4-Supervision or Touching Assistance-helper provides verbal cues and/or 

touching/steadying and/or contact guard assistance as patient completes 

activity. Assistance may be provided   


    throughout the activity or intermittently.


3-Partial/Moderate Assistance-helper does LESS THAN HALF the effort. [place] 

lifts, holds or supports trunk or limbs, but provides less than half the effort.


2-Substantial/Maximal Assistance-helper does MORE THAN HALF the effort. [place] 

lifts or holds trunk or limbs and provides more than half the effort.


9-Hqweazleg-wpsuve does ALL the effort. Patient does none of the effort to 

complete the activity. Or, the assistance of 2 or more helpers is required for 

the patient to complete the  


    activity.


If activity was not attempted, code reason:


7-Patient Refused.


9-Not Applicable-not attempted and the patient did not perform the activity bef

ore the current illness, exacerbation or injury.


10-Not Attempted due to Environmental Limitations-(lack of equipment, weather r

estraints, etc.).


88-Not Attempted due to Medical Conditions or Safety Concerns.


Sit to Stand (QC):  5





Weight Bearing


Full Weight Bearing


Full Weight Bearing





Gait Training


Does the Patient Walk?:  Yes


Distance:  40'


Walk 10 feet (QC):  5


Gait Assistive Device:  FWW





Stair Training


 Stair Training: Handrails/:  uses walker


#of Steps:  4


1 Step (curb) (QC):  5


4 Steps (QC):  5


Stairs:  Pattern:  Step to





Treatments


TF to standing and uses BR before resting at EOB.  Pt completes 4 steps using 

FWW over single step after instruction given by PTA.  Pt returns to EOB and lays

Supine to rest at end of tx.  All needs met, call light in hand.





Assessment


Current Status:  Good Progress


Pt needs RB as pt fatigues but able to complete steps w/little difficulty.





PT Short Term Goals


Short Term Goals


Time Frame:  2022


Roll Left & Right:  6


Sit to lyin


Lying to sitting on side of be:  6


Sit to stand:  4 (CGA)


Chair/bed-to-chair transfer:  4 (SBA)


Walk 10 feet:  4 (SBA)


Walk 50 feet with two turns:  4 (SBA)





PT Long Term Goals


Long Term Goals


PT Long Term Goals Time Frame:  Aug 11, 2022


Roll Left & Right (QC):  6


Sit to Lying (QC):  6


Lying-Sitting on Side/Bed(QC):  6


Sit to Stand (QC):  6


Chair/Bed-to-Chair Xfer(QC):  6


Toilet Transfer (QC):  6


Car Transfer (QC):  6


Does the Patient Walk:  Yes


Walk 10 feet (QC):  6


Walk 50ft with 2 Turns (QC):  6


Walk 150 ft (QC):  6


Walking 10ft on Uneven Surface:  6


1 Step (curb) (QC):  6


4 Steps (QC):  6


12 Steps (QC):  88


Picking up an Object (QC):  6


Wheel 50 feet with 2 turns (QC:  9


Wheel 150 feet:  9





PT Plan


Problem List


Problem List:  Activity Tolerance





Treatment/Plan


Treatment Plan:  Continue Plan of Care


Treatment Plan:  Bed Mobility, Education, Functional Activity Cherise, Functional 

Strength, Group Therapy, Gait, Safety, Therapeutic Exercise, Transfers


Treatment Duration:  Aug 11, 2022


Frequency:  At least 5 of 7 days/Wk (IRF)


Estimated Hrs Per Day:  1.5 hours per day


Patient and/or Family Agrees t:  Yes





Safety Risks/Education


Patient Education:  Steps


Teaching Recipient:  Patient


Teaching Methods:  Demonstration, Discussion


Response to Teaching:  Verbalize Understanding, Return Demonstration





Time/GCodes


Time In:  1300


Time Out:  1330


Total Billed Treatment Time:  30


Total Billed Treatment


1, FA x2 (30m)











MIRZA FRANCOIS PTA              2022 14:25

## 2022-07-25 NOTE — PROGRESS NOTE - SURGERY
SANGITA ORTEGA 7/25/22 0723:


Subjective


Date Seen by a Provider:  Jul 25, 2022


Time Seen by a Provider:  06:48


Subjective/Events-last exam


Ms. Joy is being followed for a drain that was placed in her ventral 

abdomen. It was pulled 4 days ago. She has a fistula in her abdomen that is 

covered with an ostomy bag. Output of the fistula has been monitored 60 mL of 

output yesterday. She endorses minor pain around the site of the fistula but has

no other complaints. She says her breathing is okay and she continues to work 

with PT.


Review of Systems


General:  No Chills, No Fatigue


HEENT:  No Head Aches, No Visual Changes


Pulmonary:  No Dyspnea, No Cough


Cardiovascular:  No: Chest Pain, Palpitations


Gastrointestinal:  Abdominal Pain; No: Nausea, Vomiting


Neurological:  No: Weakness, Confusion





Objective


Exam





Vital Signs








  Date Time  Temp Pulse Resp B/P (MAP) Pulse Ox O2 Delivery O2 Flow Rate FiO2


 


7/24/22 21:00      Room Air  


 


7/24/22 19:45 36.6 92 18 105/59 (74) 100 Room Air  


 


7/24/22 17:04  93  113/67 (82)    


 


7/24/22 09:48      Room Air  


 


7/24/22 08:44  93  127/80 (96)    














I & O 


 


 7/25/22





 06:59


 


Intake Total 1350 ml


 


Output Total 65 ml


 


Balance 1285 ml





Capillary Refill :


General Appearance:  No Apparent Distress, Chronically ill, Thin


HEENT:  PERRL/EOMI, Moist Mucous Membranes


Neck:  Non Tender, Supple


Respiratory:  Chest Non Tender, Lungs Clear, Normal Breath Sounds, No Accessory 

Muscle Use, No Respiratory Distress


Cardiovascular:  Regular Rate, Rhythm, No Edema, Normal Peripheral Pulses


Peripheral Pulses:  2+ Radial Pulses (R), 2+ Radial Pulses (L)


Gastrointestinal:  soft; No distended, No guarding, No rebound; tenderness 

(Around fistula), other (Ostomy bag covering fistula)


Extremity:  Non Tender, No Pedal Edema


Neurologic/Psychiatric:  Alert, Oriented x3, Normal Mood/Affect


Skin:  Normal Color, Warm/Dry





Results


Lab


Laboratory Tests


7/24/22 11:16: Glucometer 147H


7/24/22 15:43: Glucometer 116H


7/24/22 20:18: Glucometer 177H


7/25/22 06:23: 


White Blood Count 2.8L, Red Blood Count 3.04L, Hemoglobin 8.7L, Hematocrit 28L, 

Mean Corpuscular Volume 91, Mean Corpuscular Hemoglobin 29, Mean Corpuscular 

Hemoglobin Concent 31L, Red Cell Distribution Width 16.9H, Platelet Count 180, 

Mean Platelet Volume 10.9, Immature Granulocyte % (Auto) 0, Neutrophils (%) 

(Auto) 43, Lymphocytes (%) (Auto) 39, Monocytes (%) (Auto) 10, Eosinophils (%) 

(Auto) 7, Basophils (%) (Auto) 1, Neutrophils # (Auto) 1.2L, Lymphocytes # 

(Auto) 1.1, Monocytes # (Auto) 0.3, Eosinophils # (Auto) 0.2, Basophils # (Auto)

0.0, Immature Granulocyte # (Auto) 0.0, Sodium Level 138, Potassium Level 4.1, 

Chloride Level 112H, Carbon Dioxide Level 19L, Anion Gap 7, Blood Urea Nitrogen 

11, Creatinine 0.85, Estimat Glomerular Filtration Rate 71, BUN/Creatinine Ratio

13, Glucose Level 84, Calcium Level 6.9L, Corrected Calcium 8.3L, Total 

Bilirubin 0.4, Aspartate Amino Transf (AST/SGOT) 29, Alanine Aminotransferase 

(ALT/SGPT) 20, Alkaline Phosphatase 81, Total Protein 5.3L, Albumin 2.3L





Assessment/Plan


Enterocutaneous Fistula


Weakness


COVID





Removed the drain 4 days ago will monitor output. 


Monitor fistula output. Yesterday was 60 mL. Likely a low-output fistula.


At some point will discuss need for strict NPO and TPN depending on continued 

output.


Pt encouraged to ambulate and work with PT.





BONILLA MORRELL DO 7/25/22 1456:


Subjective


Time Seen by a Provider:  12:33


Subjective/Events-last exam


Pt seen and examined, no new complaints


Review of Systems


General:  No Chills


HEENT:  No Head Aches, No Visual Changes


Pulmonary:  No Dyspnea, No Cough


Cardiovascular:  No: Chest Pain, Palpitations


Gastrointestinal:  Abdominal Pain; No: Nausea, Vomiting





Objective


Exam


General Appearance:  No Apparent Distress, Chronically ill, Thin


HEENT:  Moist Mucous Membranes


Respiratory:  Lungs Clear, Normal Breath Sounds, No Accessory Muscle Use, No 

Respiratory Distress


Cardiovascular:  Regular Rate, Rhythm, No Murmur


Gastrointestinal:  soft; No distended, No guarding, No rebound; tenderness 

(Around fistula), other (Ostomy bag covering fistula)





Assessment/Plan


Enterocutaneous Fistula


Weakness


COVID


Malnutrition





Removed the drain 4 days ago will monitor fistula output. Yesterday was 60 mL. 

Likely a low-output fistula.


Will monitor and see if this continues to decrease on its own, still may need to

discuss NPO and TPN.


Will add protein shakes TID to help with nutrition


Pt encouraged to ambulate and work with PT





Supervisory-Addendum Brief


Verification & Attestation


Participated in pt care:  history, MDM, physical


Personally performed:  exam, history, MDM, supervision of care


Care discussed with:  Medical Student


Procedures:  n/a


Verification and Attestation of Medical Student E/M Service





A medical student performed and documented this service. I then reviewed and 

verified all information documented by the medical student and made 

modifications to such information, when appropriate. I personally performed a 

physical exam, medical decision making and then discussed any differences 

between the notes and made revisions as necessary to create one note.





Bonilla Morrell , 7/25/22 , 14:56











SANGITA ORTEGA                 Jul 25, 2022 07:23


BONILLA MORRELL DO               Jul 25, 2022 14:56

## 2022-07-25 NOTE — PHYSICIAN QUERY CLARIFICATION
PQ-Uncertain Diagnosis


Admission/Discharge


Admission Date: Jul 15, 2022 at 16:45 


Discharge Date:  Jul 21, 2022 at 16:02


 Dr. Trujillo,





The medical record reflects the following clinical scenario:





History/Risk Factors:


Covid, hx of sepsis w/pelvic abscess 6/16/22





Clinical Findings:


CT abd/pelvis -  Percutaneous drainage catheter within the midline abdominal


incision without obvious fluid collection.


2. No other acute abnormality in the abdomen or pelvis.


7/20 PN Dr. Galicia - Drain still in place from [facility] 6/16, on Levofloxacin.  

I ordered a CT (and viewed images myself) which did not show obvious intra-

abdominal fluid collection and the drain appeared to be all subQ (above fascia).

 However, it appears to be putting out the same


             output as BM.  I don't think the drain tube is doing anything, but 

want to go over the CT with Radiologist tomorrow.





Treatment:


IV Levofloxacin





Question:





Is pelvic abscess a clinically valid diagnosis?


Pelvic abscess was documented in the body of the DS but not included in the 

final diagnoses with no further documentation in the medical record. 





Please document a response in Progress Note or Discharge Summary.





   1.  Yes, clinically valid, condition resolved.





   2.  No, condition ruled out.





   3.  Other, with explanation of clinical findings.





   4.  Undetermined, no explanation for clinical findings.





PHYSICIAN RESPONSE


Diagnosis clinically valid:  No, conditon ruled out


In responding to this query, please exercise your independent professional 

judgment.  The purpose of this communication is to more accurately reflect the 

complexity of your patients condition. The fact that a question is asked does 

not imply that any particular answer is desired or expected.  





Thank you for your timely response to this clarification.      


   


Requestors name: Nasima   





Phone # [phone]








THIS PHYSICIAN QUERY FORM IS A PERMANENT PART OF THE MEDICAL RECORD











NASIMA KIM                 Jul 25, 2022 14:07


TAMMY TRUJILLO DO                Jul 25, 2022 20:50

## 2022-07-25 NOTE — PM&R PROGRESS NOTE
Subjective


HPI/CC On Admission


Date Seen by Provider:  Jul 25, 2022


Time Seen by Provider:  09:00


Subjective/Events-last exam


7/25/2022:


Pt is doing really well


Albumin of 2.3


Blood is coming out of the ostomy drainage site


Overall doing well


Weight is 108 lbs








7/24/2022:


Doing well


No major concerns


Dr Galicia did evaluate the fistula


No cough of hypoxia 








7/23/2022:


Doing well


Eating better today after emesis last night


Compazine was helpful


No pain reported


No dyspnea








7/22/2022:


Pt is doing well


No oxygen required


Dietary consult for poor nutrition


White count is 3


Hemoglobin is 8.2





Review of Systems


General:  Fatigue, Malaise





Objective


Exam


Vital Signs





Vital Signs








  Date Time  Temp Pulse Resp B/P (MAP) Pulse Ox O2 Delivery O2 Flow Rate FiO2


 


7/25/22 20:24 36.2 90 20 123/79 (94) 99 Room Air  





Capillary Refill :


General Appearance:  No Apparent Distress, WD/WN, Chronically ill, Thin


HEENT:  PERRL/EOMI, Normal ENT Inspection, Pharynx Normal


Neck:  Full Range of Motion, Normal Inspection, Non Tender, Supple, Carotid 

Bruit


Respiratory:  Chest Non Tender, Lungs Clear, Normal Breath Sounds, No Accessory 

Muscle Use, No Respiratory Distress


Cardiovascular:  Regular Rate, Rhythm, No Edema, No Gallop, No JVD, No Murmur, 

Normal Peripheral Pulses


Gastrointestinal:  Normal Bowel Sounds, No Organomegaly, No Pulsatile Mass, Non 

Tender, Soft


Back:  Normal Inspection, No CVA Tenderness, No Vertebral Tenderness


Extremity:  Normal Capillary Refill, Normal Inspection, Normal Range of Motion, 

Non Tender, No Calf Tenderness, No Pedal Edema


Neurologic/Psychiatric:  Alert, Oriented x3, Normal Mood/Affect, CNs II-XII Norm

as Tested, Abnormal Gait, Depressed Affect, Motor Weakness (generalized)


Skin:  Normal Color, Warm/Dry


Lymphatic:  No Adenopathy





Results/Procedures


Lab


Laboratory Tests


7/25/22 06:23








Patient resulted labs reviewed.





FIM


Transfers


Therapy Code Descriptions/Definitions 





Functional [place] Measure:


0=Not Assessed/NA        4=Minimal Assistance


1=Total Assistance        5=Supervision or Setup


2=Maximal Assistance  6=Modified [place]


3=Moderate Assistance 7=Complete IndependenceSCALE: Activities may be completed 

with or without assistive devices.





6-Indepedent-patient completes the activity by him/herself with no assistance 

from a helper.


5-Set-up or Clean-up Assistance-helper sets up or cleans up; patient completes 

activity. [place] assists only prior to or  


    following the activity.


4-Supervision or Touching Assistance-helper provides verbal cues and/or 

touching/steadying and/or contact guard assistance as patient completes 

activity. Assistance may be provided   


    throughout the activity or intermittently.


3-Partial/Moderate Assistance-helper does LESS THAN HALF the effort. [place] 

lifts, holds or supports trunk or limbs, but provides less than half the effort.


2-Substantial/Maximal Assistance-helper does MORE THAN HALF the effort. [place] 

lifts or holds trunk or limbs and provides more than half the effort.


4-Zpiymrcml-fiootv does ALL the effort. Patient does none of the effort to 

complete the activity. Or, the assistance of 2 or more helpers is required for 

the patient to complete the  


    activity.


If activity was not attempted, code reason:


7-Patient Refused.


9-Not Applicable-not attempted and the patient did not perform the activity 

before the current illness, exacerbation or injury.


10-Not Attempted due to Environmental Limitations-(lack of equipment, weather 

restraints, etc.).


88-Not Attempted due to Medical Conditions or Safety Concerns.


Roll Left to Right (QC):  6


Sit to Lying (QC):  6


Sit to Stand (QC):  3


Chair/Bed-to-Chair Xfer(QC):  4


Car Transfer (QC):  4





Gait Training


Does the Patient Walk?:  Yes


Distance:  10'


Walk 10 feet (QC):  4


Walk 50 ft with 2 Turns(QC):  4


Walk 150 ft (QC):  88


Walking 10ft/uneven surface-QC:  10


Gait Persons Needed:  1


Gait Assistive Device:  FWW





Wheelchair Training


Does the Pt Use a Wheelchair?:  No


Wheel 50 ft with 2 turns (QC):  9


Wheel 150 ft (QC):  9





Stair Training


#of Steps:  1


1 Step (curb) (QC):  4


4 Steps (QC):  88


12 Steps (QC):  88





Balance


Picking up an Object (QC):  88





ADL-Treatment


Eating (QC):  5


Oral Hygiene (QC):  5


Shower/Bathe Self (QC):  4


Upper Body Dressing (QC):  5


Lower Body Dressing (QC):  4


On/Off Footwear (QC):  5


Toileting Hygiene (QC):  4


Toilet Transfer (QC):  3





Assessment/Plan


Assessment and Plan


Assess & Plan/Chief Complaint


Assessment:


Myopathy 


Malnutrition prealbumin 11


COVID infection with vaccination and 1 booster


   -Central line maintained


   -No oxygen required at this time


   -Currently on isolation for 20 days


S/p ileostomy reversal with fistula and abscess July 2022 Dr Galicia


   -General surgery consulted again for possible drain removal today, 7/21/22


   -Drain removed 7/21/22 after placed from [facility] IR 6/16


   -Levofloxacin last day


CHF


   -Echo EF 20-25%


   -Lasix and spironolactone and Entresto


Type II MI


   -Cardiology following, appreciate their recs


   -Telemetry DC


Afib


   -Lovenox 


HTN


   -Well controlled currently


Diabetes


   -SSI


CKD3a


   -BUN/Cr wnl


Recent hospitalization at [facility] 6/22-7/14 following stay at [facility] 6/16 for 

pelvic abscess drainage. 





Diet: Regular


DVT prophylaxis: Lovenox and SCDs








7/22/2022:


Dietary consult appreciated


Continue therapy





7/23/2022:


Monitor N/V


IS


PT OT





7/24/2022:


Monitor nutrition


Supportive care





7/25/2022:


Increase nutrition





(1) Acute heart failure with reduced ejection fraction and diastolic dysfunction


Assessment & Plan:  Symptomatically improved.  She is on carvedilol, Entresto 

and spironolactone.





(2) Cardiomyopathy


Assessment & Plan:  Her most recent previous echocardiogram from 2021 showed 

mild left ventricular systolic dysfunction.  Now she has severe left ventricular

systolic dysfunction.  She may need an ischemic evaluation once she recovers 

from her COVID.  We may also want to consider a LifeVest prior to discharge.





(3) Primary hypertension


Assessment & Plan:  Blood pressure is reasonably controlled with the present 

combination of medications.  If anything, at times she is running on the low 

side.





(4) Mixed hyperlipidemia


Assessment & Plan:  It appears as though she may have been on both atorvastatin 

and fenofibrate at home.  I will resume the atorvastatin.  I will hold off on 

restarting fenofibrate due to her acute debility with potentially increased risk

for medication interactions between the atorvastatin and fenofibrate.














TAMMY HURST DO                Jul 25, 2022 06:01

## 2022-07-25 NOTE — OCCUPATIONAL THER DAILY NOTE
OT Current Status-Daily Note


Subjective


Pt alert, sitting in recliner.  Pt agrees to therapy.  No c/o pain, only 

fatigue.





Mental Status/Objective


Patient Orientation:  Person, Place, Time, Situation





ADL-Treatment


Pt takes increased time to complete all tasks due to low stamina.  Pt agrees to 

sponge bath.  Pt ambulates using FWW to bathroom with SBA.  Independent with 

toilet transfer using FWW and grabbars.  Pt able to manipulate clothing, cleanse

after voiding and manages colostomy by self.  Sitting at sink, pt complete 

sponge bath by self.  Pt soaks dentures at night.  Pt able to complete dressing 

by self after set up.  Independent with eating.  After session, pt lying in bed 

with call light/phone in reach.  All needs met in room.


Therapy Code Descriptions/Definitions 





Functional [place] Measure:


0=Not Assessed/NA        4=Minimal Assistance


1=Total Assistance        5=Supervision or Setup


2=Maximal Assistance  6=Modified [place]


3=Moderate Assistance 7=Complete IndependenceSCALE: Activities may be completed 

with or without assistive devices.





6-Indepedent-patient completes the activity by him/herself with no assistance 

from a helper.


5-Set-up or Clean-up Assistance-helper sets up or cleans up; patient completes 

activity. [place] assists only prior to or  


    following the activity.


4-Supervision or Touching Assistance-helper provides verbal cues and/or 

touching/steadying and/or contact guard assistance as patient completes 

activity. Assistance may be provided   


    throughout the activity or intermittently.


3-Partial/Moderate Assistance-helper does LESS THAN HALF the effort. [place] 

lifts, holds or supports trunk or limbs, but provides less than half the effort.


2-Substantial/Maximal Assistance-helper does MORE THAN HALF the effort. [place] 

lifts or holds trunk or limbs and provides more than half the effort.


2-Yqkbscjyr-zubgiz does ALL the effort. Patient does none of the effort to 

complete the activity. Or, the assistance of 2 or more helpers is required for 

the patient to complete the  


    activity.


If activity was not attempted, code reason:


7-Patient Refused.


9-Not Applicable-not attempted and the patient did not perform the activity 

before the current illness, exacerbation or injury.


10-Not Attempted due to Environmental Limitations-(lack of equipment, weather 

restraints, etc.).


88-Not Attempted due to Medical Conditions or Safety Concerns.


Eating (QC):  6


Shower/Bathe Self (QC):  4 (Supervision for safety)


Upper Body Dressing (QC):  5


Lower Body Dressing (QC):  4


Toileting Hygiene (QC):  5 (Set up to gather colostomy supplies.)


Toilet Transfer (QC):  6





OT Short Term Goals


Short Term Goals


Time Frame:  2022


Eatin


Oral hygiene:  6


Toileting hygiene:  6


Shower/bathe self:  5


Upper body dressin


Lower body dressin


Putting on/taking off footwear:  6





OT Long Term Goals


Long Term Goals


Time Frame:  Aug 4, 2022


Eating (QC):  6


Oral Hygiene (QC):  6


Toileting Hygiene (QC):  6


Shower/Bathe Self (QC):  6


Upper Body Dressing (QC):  6


Lower Body Dressing (QC):  6


On/Off Footwear (QC):  6


1=Demonstrate adherence to instructed precautions during ADL tasks.


2=Patient will verbalize/demonstrate understanding of assistive 

devices/modifications for ADL.


3=Patient will improve strength/tolerance for activity to enable patient to 

perform ADL's.





OT Education/Plan


Problem List/Assessment


Assessment:  Decreased Activ Tolerance, Decreased UE Strength





Discharge Recommendations


Plan/Recommendations:  Continue POC





Treatment Plan/Plan of Care


Patient would benefit from OT for education, treatment and training to promote 

independence in ADL's, mobility, safety and/or upper extremity function for 

ADL's.


Plan of Care:  ADL Retraining, Concurrent Therapy, Functional Mobility, Group 

Exercise/Act as Ind, UE Funct Exercise/Act


Treatment Duration:  Aug 4, 2022


Frequency:  At least 5 of 7 days/Wk (IRF)


Estimated Hrs Per Day:  1.5 hours per day (75-90 min/day )


Agreement:  Yes


Rehab Potential:  Fair





Time/GCodes


Start Time:  07:15


Stop Time:  08:30


Total Time Billed (hr/min):  75


Billed Treatment Time


1 visit-ADL 5 (75 min)











RONEL DAILEY               2022 08:31

## 2022-07-25 NOTE — PHYSICAL THERAPY DAILY NOTE
PT Daily Note-Current


Subjective


Pt laying Supine in bed upon arrival.  Pt agrees to PT.





Pain





   Location:  No Pain Reported





Mental Status


Patient Orientation:  Person, Place, Time, Situation





Transfers


SCALE: Activities may be completed with or without assistive devices.





6-Indepedent-patient completes the activity by him/herself with no assistance 

from a helper.


5-Set-up or Clean-up Assistance-helper sets up or cleans up; patient completes 

activity. [place] assists only prior to or  


    following the activity.


4-Supervision or Touching Assistance-helper provides verbal cues and/or 

touching/steadying and/or contact guard assistance as patient completes 

activity. Assistance may be provided   


    throughout the activity or intermittently.


3-Partial/Moderate Assistance-helper does LESS THAN HALF the effort. [place] 

lifts, holds or supports trunk or limbs, but provides less than half the effort.


2-Substantial/Maximal Assistance-helper does MORE THAN HALF the effort. [place] 

lifts or holds trunk or limbs and provides more than half the effort.


4-Suwovbzzq-ubremm does ALL the effort. Patient does none of the effort to 

complete the activity. Or, the assistance of 2 or more helpers is required for 

the patient to complete the  


    activity.


If activity was not attempted, code reason:


7-Patient Refused.


9-Not Applicable-not attempted and the patient did not perform the activity be

fore the current illness, exacerbation or injury.


10-Not Attempted due to Environmental Limitations-(lack of equipment, weather 

restraints, etc.).


88-Not Attempted due to Medical Conditions or Safety Concerns.


Sit to Stand (QC):  5


Toilet Transfer (QC):  5





Weight Bearing


Full Weight Bearing


Full Weight Bearing





Gait Training


Does the Patient Walk?:  Yes


Distance:  20'


Walk 10 feet (QC):  5


Gait Assistive Device:  FWW





Exercises


Seated Therapy Exercises:  Ankle pumps, Long arc quads, Hip flexion, Hip 

abd/add, Glut set


Seated Reps:  20 (2 sets)





Treatments


TF from Supine to EOB then standing and amb. to BR.  Pt amb in room before 

sitting at EOB to rest.  Pt completes Seated EX (2 sets) as pt & PTA discuss AE 

and stairs or other hindrances for home that can be worked on.  Pt returns to 

Supine in bed to rest at end of tx.  All needs met, call light in hand.





Assessment


Current Status:  Good Progress


Pt is able to complete tasks given but fatigues, needing RB often.





PT Short Term Goals


Short Term Goals


Time Frame:  2022


Roll Left & Right:  6


Sit to lyin


Lying to sitting on side of be:  6


Sit to stand:  4 (CGA)


Chair/bed-to-chair transfer:  4 (SBA)


Walk 10 feet:  4 (SBA)


Walk 50 feet with two turns:  4 (SBA)





PT Long Term Goals


Long Term Goals


PT Long Term Goals Time Frame:  Aug 11, 2022


Roll Left & Right (QC):  6


Sit to Lying (QC):  6


Lying-Sitting on Side/Bed(QC):  6


Sit to Stand (QC):  6


Chair/Bed-to-Chair Xfer(QC):  6


Toilet Transfer (QC):  6


Car Transfer (QC):  6


Does the Patient Walk:  Yes


Walk 10 feet (QC):  6


Walk 50ft with 2 Turns (QC):  6


Walk 150 ft (QC):  6


Walking 10ft on Uneven Surface:  6


1 Step (curb) (QC):  6


4 Steps (QC):  6


12 Steps (QC):  88


Picking up an Object (QC):  6


Wheel 50 feet with 2 turns (QC:  9


Wheel 150 feet:  9





PT Plan


Problem List


Problem List:  Activity Tolerance





Treatment/Plan


Treatment Plan:  Continue Plan of Care


Treatment Plan:  Bed Mobility, Education, Functional Activity Cherise, Functional 

Strength, Group Therapy, Gait, Safety, Therapeutic Exercise, Transfers


Treatment Duration:  Aug 11, 2022


Frequency:  At least 5 of 7 days/Wk (IRF)


Estimated Hrs Per Day:  1.5 hours per day


Patient and/or Family Agrees t:  Yes





Time/GCodes


Time In:  1000


Time Out:  1100


Total Billed Treatment Time:  60


Total Billed Treatment


1, FA x2 (25m) & EX x2 (35m)











MIRZA FRANCOIS PTA              2022 11:05

## 2022-07-25 NOTE — PHYSICIAN QUERY CLARIFICATION
PQ-Uncertain Diagnosis


Admission/Discharge


Admission Date: Jul 15, 2022 at 16:45 


Discharge Date:  Jul 21, 2022 at 16:02


 Dr. Trujillo,





The medical record reflects the following clinical scenario:





History/Risk Factors:


covid, hx sepsis with pelvic abscess 6/16/22





Clinical Findings:


T 38.6, P 142, R 19, WBC 6/4





Treatment:


IV Levaquin





Question:





Is sepsis a clinically valid diagnosis?


Sepsis was documented in the body of the DS but not included in the final 

diagnoses with no further documentation in the medical record. 





Please document a response in Progress Note or Discharge Summary.





   1.  Yes, clinically valid, condition resolved.





   2.  No, condition ruled out.





   3.  Other, with explanation of clinical findings.





   4.  Undetermined, no explanation for clinical findings.





PHYSICIAN RESPONSE


Diagnosis clinically valid:  No, conditon ruled out


In responding to this query, please exercise your independent professional 

judgment.  The purpose of this communication is to more accurately reflect the 

complexity of your patients condition. The fact that a question is asked does 

not imply that any particular answer is desired or expected.  





Thank you for your timely response to this clarification.      


   


Requestors name: Nasima   





Phone # [phone]








THIS PHYSICIAN QUERY FORM IS A PERMANENT PART OF THE MEDICAL RECORD











NASIMA KIM                 Jul 25, 2022 13:59


TAMMY TRUJILLO DO                Jul 25, 2022 20:50

## 2022-07-25 NOTE — SPEECH THERAPY DAILY NOTE
Speech Daily Progress Note


Subjective


Date Seen by Provider:  Jul 25, 2022


Time Seen by Provider:  09:00


The patient was lying in bed, sleeping upon entrance to her room. The patient 

greeted the clinician appropriately and was agreeable to participation in the 

cognitive linguistic treatment session. The patient reported fatigue, however, 

remained participatory throughout the therapy provided.





Objective


Orientation: The patient was independently oriented to month, day of the week, 

year, location, and city. The patient stated the date was the "23rd" which was 

two days prior.





The patient participated in immediate recall and delayed recall of five single 

words. The patient stated she is feeling "less confused" on this date. The 

patient was able to recall four of five single words immediately. The patient 

was able to recall five of five single words immediately with a category cue 

provided. The patient was able to recall five of five single words following a 

five minute delay.





Assessment


Assessment Current Status:  Fair Progress





Treatment Plan


Continue Plan of Care





Speech Short Term Goals


Short Term Goals


Short Term Goals


1. The patient will demonstrate 80% accuracy with memory exercises with mild 

clinician verbal and visual cueing.


Time Frame-STG:  One Week.





Speech Long Term Goals


Long Term Goals


1. The patient will improve her cognitive linguistic skills for safe discharge 

to the least restrictive environment.


Time Frame:  Two Weeks.





Speech-Plan


Treatment Plan


Speech Therapy Treatment Plan:  Continue Plan of Care


Treatment Duration:  Aug 4, 2022


Frequency:  4 times per week (Four to five times per day.)


Estimated Hrs Per Day:  .5 hour per day


Rehab Potential:  Fair





Safety Risks/Education


Teaching Recipient:  Patient


Teaching Methods:  Demonstration, Discussion


Response to Teaching:  Verbalize Understanding, Return Demonstration


Education Topics Provided:  


Internal Memory Strategies





Time


Speech Therapy Time In:  09:00


Speech Therapy Time Out:  09:30


Total Billed Time:  30


Billed Treatment Time


1MARQUITAYOLIE


Leandra CAMACHOEVA ST               Jul 25, 2022 11:14

## 2022-07-26 VITALS — DIASTOLIC BLOOD PRESSURE: 80 MMHG | SYSTOLIC BLOOD PRESSURE: 123 MMHG

## 2022-07-26 VITALS — DIASTOLIC BLOOD PRESSURE: 77 MMHG | SYSTOLIC BLOOD PRESSURE: 115 MMHG

## 2022-07-26 RX ADMIN — SPIRONOLACTONE SCH MG: 25 TABLET ORAL at 08:59

## 2022-07-26 RX ADMIN — SACUBITRIL AND VALSARTAN SCH TAB: 24; 26 TABLET, FILM COATED ORAL at 08:59

## 2022-07-26 RX ADMIN — Medication SCH ML: at 13:25

## 2022-07-26 RX ADMIN — INSULIN ASPART SCH UNIT: 100 INJECTION, SOLUTION INTRAVENOUS; SUBCUTANEOUS at 06:18

## 2022-07-26 RX ADMIN — Medication SCH MCG: at 08:59

## 2022-07-26 RX ADMIN — ANTACID TABLETS SCH MG: 500 TABLET, CHEWABLE ORAL at 20:52

## 2022-07-26 RX ADMIN — DOCUSATE SODIUM AND SENNOSIDES SCH EA: 8.6; 5 TABLET, FILM COATED ORAL at 20:52

## 2022-07-26 RX ADMIN — DOCUSATE SODIUM SCH MG: 100 CAPSULE ORAL at 20:52

## 2022-07-26 RX ADMIN — INSULIN ASPART SCH UNIT: 100 INJECTION, SOLUTION INTRAVENOUS; SUBCUTANEOUS at 11:36

## 2022-07-26 RX ADMIN — Medication SCH ML: at 06:45

## 2022-07-26 RX ADMIN — DOCUSATE SODIUM SCH MG: 100 CAPSULE ORAL at 09:00

## 2022-07-26 RX ADMIN — INSULIN ASPART SCH UNIT: 100 INJECTION, SOLUTION INTRAVENOUS; SUBCUTANEOUS at 20:53

## 2022-07-26 RX ADMIN — SACUBITRIL AND VALSARTAN SCH TAB: 24; 26 TABLET, FILM COATED ORAL at 20:52

## 2022-07-26 RX ADMIN — INSULIN ASPART SCH UNIT: 100 INJECTION, SOLUTION INTRAVENOUS; SUBCUTANEOUS at 16:02

## 2022-07-26 RX ADMIN — POTASSIUM CHLORIDE SCH MEQ: 1500 TABLET, EXTENDED RELEASE ORAL at 06:46

## 2022-07-26 RX ADMIN — Medication SCH ML: at 20:53

## 2022-07-26 RX ADMIN — DOCUSATE SODIUM AND SENNOSIDES SCH EA: 8.6; 5 TABLET, FILM COATED ORAL at 09:01

## 2022-07-26 RX ADMIN — POLYETHYLENE GLYCOL (3350) SCH GM: 17 POWDER, FOR SOLUTION ORAL at 09:01

## 2022-07-26 RX ADMIN — ONDANSETRON PRN MG: 4 TABLET, ORALLY DISINTEGRATING ORAL at 08:25

## 2022-07-26 RX ADMIN — FUROSEMIDE SCH MG: 40 TABLET ORAL at 08:59

## 2022-07-26 RX ADMIN — SENNOSIDES SCH MG: 8.6 TABLET, FILM COATED ORAL at 09:01

## 2022-07-26 RX ADMIN — ANTACID TABLETS SCH MG: 500 TABLET, CHEWABLE ORAL at 13:25

## 2022-07-26 RX ADMIN — ASPIRIN 81 MG CHEWABLE TABLET SCH MG: 81 TABLET CHEWABLE at 09:00

## 2022-07-26 RX ADMIN — ANTACID TABLETS SCH MG: 500 TABLET, CHEWABLE ORAL at 08:59

## 2022-07-26 RX ADMIN — POTASSIUM CHLORIDE SCH MEQ: 1500 TABLET, EXTENDED RELEASE ORAL at 09:00

## 2022-07-26 RX ADMIN — SENNOSIDES SCH MG: 8.6 TABLET, FILM COATED ORAL at 20:53

## 2022-07-26 RX ADMIN — POLYETHYLENE GLYCOL (3350) SCH GM: 17 POWDER, FOR SOLUTION ORAL at 20:52

## 2022-07-26 NOTE — CARDIOLOGY PROGRESS NOTE
Progress Note-Cardiology


Events since last exam


Date Seen by Provider:  Jul 26, 2022


Time Seen by Provider:  09:50


Events since last exam


We are following her due to acute heart failure with reduced ejection fraction 

in the setting of COVID infection.  She remains on the inpatient physical 

rehabilitation facility.  This morning she continues to feel weak and tired.  

However, her breathing has improved.  She denies chest discomfort, palpitations,

syncope, or ankle edema.  I did have a discussion with her about long-term 

treatment goals and she states that she would want everything done.





Certain portions of this document may have been dictated utilizing voice 

recognition technology.  Inherent to this technology, typographical and 

grammatical errors may exist.  As much as I am diligent to identify and correct 

these mistakes, some errors may remain in the document.





Vitals


Last set of Vitals Signs





Vital Signs








 7/26/22 7/26/22





 07:04 09:05


 


Temp 36.7 


 


Pulse 89 


 


Resp 20 


 


B/P (MAP) 123/80 (94) 


 


Pulse Ox 97 


 


O2 Delivery  Room Air











Exam


Vital Signs





Vital Signs








  Date Time  Temp Pulse Resp B/P (MAP) Pulse Ox O2 Delivery O2 Flow Rate FiO2


 


7/26/22 09:05      Room Air  


 


7/26/22 07:04 36.7 89 20 123/80 (94) 97   








Physical Exam


Due to the patient's COVID status, I spoke with the patient from the doorway.


General: The patient is not on a ventilator.


HENT: Normocephalic.  Atraumatic.


Skin: There is no pallor.


Neurologic: Oriented x3.  Cranial nerves III through XII grossly intact.  Moving

all 4 extremities.


Psychiatric: Appears cooperative.


Labs





Laboratory Tests








Test


 7/25/22


20:23 7/26/22


05:44 7/26/22


10:58 7/26/22


15:41 Range/Units


 


 


Glucometer 165 H 87  147 H 134 H   MG/DL











Diagnosis/Problems


Diagnosis/Problems





(1) Acute heart failure with reduced ejection fraction and diastolic dysfunction


Assessment & Plan:  Symptomatically improved.  She is on carvedilol, Entresto 

and spironolactone.  At some point, she may need an ischemic evaluation but we 

will wait until she recovers from the COVID infection for pursuing this further.





(2) Cardiomyopathy


Assessment & Plan:  Her most recent previous echocardiogram from 2021 showed 

mild left ventricular systolic dysfunction.  Now she has severe left ventricular

systolic dysfunction.  She may need an ischemic evaluation once she recovers fro

m her COVID.  We will continue the present guideline directed medical therapy.  

I have ordered a LifeVest that will be fitted possibly tomorrow.





(3) Primary hypertension


Assessment & Plan:  Blood pressure is reasonably controlled with the present 

combination of medications.  If anything, at times she is running on the low 

side.





(4) Mixed hyperlipidemia


Assessment & Plan:  It appears as though she may have been on both atorvastatin 

and fenofibrate at home.  I resumed the atorvastatin.  I will hold off on 

restarting fenofibrate due to her acute debility with potentially increased risk

for medication interactions between the atorvastatin and fenofibrate.














KALYAN FAY JR, MD         Jul 26, 2022 12:57

## 2022-07-26 NOTE — PROGRESS NOTE - SURGERY
SANGITA ORTEGA 7/26/22 0843:


Subjective


Date Seen by a Provider:  Jul 26, 2022


Time Seen by a Provider:  08:35


Subjective/Events-last exam


Ms. Joy is being followed for a drain that was placed in her ventral 

abdomen. It was pulled 5 days ago. She has a fistula in her abdomen that is 

covered with an ostomy bag. Output of the fistula has been monitored 50 mL of 

output yesterday. She endorses minor pain around the site of the fistula. She 

showered this morning and reports nausea and vomiting after that. She says her 

breathing is okay and she continues to work with PT.


Review of Systems


General:  No Chills, No Fatigue


HEENT:  No Head Aches, No Visual Changes


Pulmonary:  No Dyspnea, No Cough


Cardiovascular:  No: Chest Pain, Palpitations


Gastrointestinal:  Nausea, Vomiting, Abdominal Pain


Neurological:  No: Weakness, Confusion





Objective


Exam





Vital Signs








  Date Time  Temp Pulse Resp B/P (MAP) Pulse Ox O2 Delivery O2 Flow Rate FiO2


 


7/26/22 07:04 36.7 89 20 123/80 (94) 97 Room Air  


 


7/25/22 21:00      Room Air  


 


7/25/22 20:24 36.2 90 20 123/79 (94) 99 Room Air  


 


7/25/22 18:06  92  111/75 (87)    


 


7/25/22 09:56  92  119/77 (91)    


 


7/25/22 09:00      Room Air  














I & O 


 


 7/26/22





 07:00


 


Intake Total 962 ml


 


Output Total 15 ml


 


Balance 947 ml





Capillary Refill :


General Appearance:  No Apparent Distress, Chronically ill, Thin


HEENT:  PERRL/EOMI, Moist Mucous Membranes


Neck:  Non Tender, Supple


Respiratory:  Chest Non Tender, Lungs Clear, Normal Breath Sounds, No Accessory 

Muscle Use, No Respiratory Distress


Cardiovascular:  Regular Rate, Rhythm, No Edema, Normal Peripheral Pulses


Peripheral Pulses:  2+ Radial Pulses (R), 2+ Radial Pulses (L)


Gastrointestinal:  soft; No distended, No guarding, No rebound; tenderness 

(Around fistula), other (Ostomy bag covering fistula)


Extremity:  Non Tender, No Pedal Edema


Neurologic/Psychiatric:  Alert, Oriented x3, Normal Mood/Affect


Skin:  Normal Color, Warm/Dry





Results


Lab


Laboratory Tests


7/25/22 10:50: Glucometer 120H


7/25/22 15:41: Glucometer 115H


7/25/22 20:23: Glucometer 165H


7/26/22 05:44: Glucometer 87





Assessment/Plan


Enterocutaneous Fistula


Weakness


COVID


Malnutrition





Removed the drain 5 days ago will monitor fistula output. Yesterday was 50 mL. 

Likely a low-output fistula.


Will monitor and see if this continues to decrease on its own, still may need to

discuss NPO and TPN.


Will add protein shakes TID to help with nutrition


Pt encouraged to ambulate and work with PT





BONILLA MORRELL DO 7/26/22 1456:


Subjective


Time Seen by a Provider:  11:42


Subjective/Events-last exam


Pt seen and examined, states she had some vomiting this am.


Review of Systems


General:  No Chills; Fatigue


Pulmonary:  No Dyspnea, No Cough


Cardiovascular:  No: Chest Pain, Palpitations


Gastrointestinal:  Nausea, Vomiting, Abdominal Pain





Objective


Exam


General Appearance:  No Apparent Distress, Chronically ill, Thin


Respiratory:  Lungs Clear, Normal Breath Sounds, No Accessory Muscle Use, No 

Respiratory Distress


Cardiovascular:  Regular Rate, Rhythm, No Murmur


Gastrointestinal:  soft; No distended, No guarding, No rebound; tenderness 

(Around fistula), other (Ostomy bag covering fistula)


Neurologic/Psychiatric:  Alert, Oriented x3





Assessment/Plan


Enterocutaneous Fistula


Weakness


COVID


Malnutrition





Removed the drain 5 days ago will monitor fistula output. Yesterday was 50 mL. 

Likely a low-output fistula.


Will monitor and see if this continues to decrease on its own, still may need to

discuss NPO and TPN.


Pt encouraged to take her protein shakes TID to help with nutrition, Pt 

encouraged to ambulate and work with PT





Supervisory-Addendum Brief


Verification & Attestation


Participated in pt care:  history, MDM, physical


Personally performed:  exam, history, MDM, supervision of care


Care discussed with:  Medical Student


Procedures:  n/a


Verification and Attestation of Medical Student E/M Service





A medical student performed and documented this service. I then reviewed and 

verified all information documented by the medical student and made 

modifications to such information, when appropriate. I personally performed a 

physical exam, medical decision making and then discussed any differences 

between the notes and made revisions as necessary to create one note.





Bonilla Morrell , 7/26/22 , 14:56











SANGITA ORTEGA                 Jul 26, 2022 08:43


BONILLA MORRELL DO               Jul 26, 2022 14:56

## 2022-07-26 NOTE — SPEECH THERAPY DAILY NOTE
Speech Daily Progress Note


Subjective


Date Seen by Provider:  Jul 26, 2022


Time Seen by Provider:  09:00


The patient was lying in bed, awake and alert upon entrance to the patient's 

room. The patient greeted the clinician appropriately and was agreeable to 

participation in the cognitive linguistic treatment session. The patient does 

state she was recently feeling nauseated and received Zofran from the RN.





Objective


The patient completed multiple portions of the MoCA ([place] Cognitive 

Assessment) with the exception of "executive function" tasks due to the 

necessity of the patient re-positioning upright for completion of writing (the 

patient stated secondary to nausea, re-positioning would be difficult). The 

patient displayed a result of +24/25 displaying excellent cognitive function at 

this time. The patient received a one point deduction due to generative naming 

difficulty.





The patient stated she has experienced difficulty with generative naming and 

would like to word towards improved word finding skills throughout therapy.





Assessment


Assessment Current Status:  Good Progress





Treatment Plan


Continue Plan of Care





Speech Short Term Goals


Short Term Goals


Short Term Goals


1. The patient will demonstrate 80% accuracy with memory exercises with mild 

clinician verbal and visual cueing.


Time Frame-STG:  One Week.





Speech Long Term Goals


Long Term Goals


1. The patient will improve her cognitive linguistic skills for safe discharge 

to the least restrictive environment.


Time Frame:  Two Weeks.





Speech-Plan


Treatment Plan


Speech Therapy Treatment Plan:  Continue Plan of Care


Treatment Duration:  Aug 4, 2022


Frequency:  4 times per week (Four to five times per day.)


Estimated Hrs Per Day:  .5 hour per day


Rehab Potential:  Fair





Safety Risks/Education


Teaching Recipient:  Patient


Teaching Methods:  Discussion


Response to Teaching:  Verbalize Understanding


Education Topics Provided:  


Results of MoCA, Updated Goals for ST.





Time


Speech Therapy Time In:  09:00


Speech Therapy Time Out:  09:30


Total Billed Time:  30


Billed Treatment Time


1CELIO ELIZABETH                Jul 26, 2022 11:20

## 2022-07-26 NOTE — PHYSICAL THERAPY DAILY NOTE
PT Daily Note-Current


Subjective


Pt laying Supine in bed upon arrival.  Pt agrees to PT.





Mental Status


Patient Orientation:  Person, Place, Time, Situation





Transfers


SCALE: Activities may be completed with or without assistive devices.





6-Indepedent-patient completes the activity by him/herself with no assistance 

from a helper.


5-Set-up or Clean-up Assistance-helper sets up or cleans up; patient completes 

activity. [place] assists only prior to or  


    following the activity.


4-Supervision or Touching Assistance-helper provides verbal cues and/or 

touching/steadying and/or contact guard assistance as patient completes 

activity. Assistance may be provided   


    throughout the activity or intermittently.


3-Partial/Moderate Assistance-helper does LESS THAN HALF the effort. [place] 

lifts, holds or supports trunk or limbs, but provides less than half the effort.


2-Substantial/Maximal Assistance-helper does MORE THAN HALF the effort. [place] 

lifts or holds trunk or limbs and provides more than half the effort.


9-Patekvsxb-aotbsi does ALL the effort. Patient does none of the effort to 

complete the activity. Or, the assistance of 2 or more helpers is required for 

the patient to complete the  


    activity.


If activity was not attempted, code reason:


7-Patient Refused.


9-Not Applicable-not attempted and the patient did not perform the activity 

before the current illness, exacerbation or injury.


10-Not Attempted due to Environmental Limitations-(lack of equipment, weather 

restraints, etc.).


88-Not Attempted due to Medical Conditions or Safety Concerns.


Sit to Stand (QC):  5


Toilet Transfer (QC):  5





Weight Bearing


Full Weight Bearing


Full Weight Bearing





Gait Training


Does the Patient Walk?:  Yes


Distance:  25'


Walk 10 feet (QC):  5


Gait Assistive Device:  FWW





Exercises


Supine Ex:  Ankle pumps, Quad Set, Glut sets, Heel Slides, Hip abd/add


Supine Reps:  15





Treatments


TF to EOB then standing and uses BR.  Pt returns to bed to rest.  Pt completes 

EX then resting.  All needs met, call light in hand.





Assessment


Current Status:  Good Progress


Pt needs occasional RB for fatigue.





PT Short Term Goals


Short Term Goals


Time Frame:  2022


Roll Left & Right:  6


Sit to lyin


Lying to sitting on side of be:  6


Sit to stand:  4 (CGA)


Chair/bed-to-chair transfer:  4 (SBA)


Walk 10 feet:  4 (SBA)


Walk 50 feet with two turns:  4 (SBA)





PT Long Term Goals


Long Term Goals


PT Long Term Goals Time Frame:  Aug 11, 2022


Roll Left & Right (QC):  6


Sit to Lying (QC):  6


Lying-Sitting on Side/Bed(QC):  6


Sit to Stand (QC):  6


Chair/Bed-to-Chair Xfer(QC):  6


Toilet Transfer (QC):  6


Car Transfer (QC):  6


Does the Patient Walk:  Yes


Walk 10 feet (QC):  6


Walk 50ft with 2 Turns (QC):  6


Walk 150 ft (QC):  6


Walking 10ft on Uneven Surface:  6


1 Step (curb) (QC):  6


4 Steps (QC):  6


12 Steps (QC):  88


Picking up an Object (QC):  6


Wheel 50 feet with 2 turns (QC:  9


Wheel 150 feet:  9





PT Plan


Problem List


Problem List:  Activity Tolerance





Treatment/Plan


Treatment Plan:  Continue Plan of Care


Treatment Plan:  Bed Mobility, Education, Functional Activity Cherise, Functional 

Strength, Group Therapy, Gait, Safety, Therapeutic Exercise, Transfers


Treatment Duration:  Aug 11, 2022


Frequency:  At least 5 of 7 days/Wk (IRF)


Estimated Hrs Per Day:  1.5 hours per day


Patient and/or Family Agrees t:  Yes





Time/GCodes


Time In:  1300


Time Out:  1330


Total Billed Treatment Time:  30


Total Billed Treatment


1, FA (15m) & EX (15m)











MIRZA FRANCOIS PTA              2022 15:20

## 2022-07-26 NOTE — PHYSICAL THERAPY DAILY NOTE
PT Daily Note-Current


Subjective


Pt laying Supine in bed upon arrival.  Pt agrees to PT.





Pain





   Location:  No Pain Reported





Mental Status


Patient Orientation:  Person, Place, Time, Situation





Transfers


SCALE: Activities may be completed with or without assistive devices.





6-Indepedent-patient completes the activity by him/herself with no assistance 

from a helper.


5-Set-up or Clean-up Assistance-helper sets up or cleans up; patient completes 

activity. [place] assists only prior to or  


    following the activity.


4-Supervision or Touching Assistance-helper provides verbal cues and/or 

touching/steadying and/or contact guard assistance as patient completes 

activity. Assistance may be provided   


    throughout the activity or intermittently.


3-Partial/Moderate Assistance-helper does LESS THAN HALF the effort. [place] 

lifts, holds or supports trunk or limbs, but provides less than half the effort.


2-Substantial/Maximal Assistance-helper does MORE THAN HALF the effort. [place] 

lifts or holds trunk or limbs and provides more than half the effort.


8-Hoeuvixxo-ugnnqn does ALL the effort. Patient does none of the effort to 

complete the activity. Or, the assistance of 2 or more helpers is required for 

the patient to complete the  


    activity.


If activity was not attempted, code reason:


7-Patient Refused.


9-Not Applicable-not attempted and the patient did not perform the activity be

fore the current illness, exacerbation or injury.


10-Not Attempted due to Environmental Limitations-(lack of equipment, weather 

restraints, etc.).


88-Not Attempted due to Medical Conditions or Safety Concerns.


Sit to Stand (QC):  6


Toilet Transfer (QC):  6





Weight Bearing


Full Weight Bearing


Full Weight Bearing





Gait Training


Does the Patient Walk?:  Yes


Distance:  20'


Walk 10 feet (QC):  6


Gait Assistive Device:  FWW





Exercises


Supine Ex:  Ankle pumps, Quad Set, Glut sets, Heel Slides, Straight leg raise, 

Hip abd/add


Supine Reps:  15


Seated Therapy Exercises:  Ankle pumps, Long arc quads, Hip flexion, Hip 

abd/add, Glut set


Seated Reps:  15





Treatments


TF from Supine to EOB to standing.  Pt amb. to BR.  Pt is able to complete 

pericare then amb back to EOB to rest.  Pt completes Seated then Supine EX.  Pt 

rests in bed at end of tx with all needs met, call light in hand.





Assessment


Current Status:  Good Progress


Pt is able to complete tasks given but fatigues needing RB.





PT Short Term Goals


Short Term Goals


Time Frame:  2022


Roll Left & Right:  6


Sit to lyin


Lying to sitting on side of be:  6


Sit to stand:  4 (CGA)


Chair/bed-to-chair transfer:  4 (SBA)


Walk 10 feet:  4 (SBA)


Walk 50 feet with two turns:  4 (SBA)





PT Long Term Goals


Long Term Goals


PT Long Term Goals Time Frame:  Aug 11, 2022


Roll Left & Right (QC):  6


Sit to Lying (QC):  6


Lying-Sitting on Side/Bed(QC):  6


Sit to Stand (QC):  6


Chair/Bed-to-Chair Xfer(QC):  6


Toilet Transfer (QC):  6


Car Transfer (QC):  6


Does the Patient Walk:  Yes


Walk 10 feet (QC):  6


Walk 50ft with 2 Turns (QC):  6


Walk 150 ft (QC):  6


Walking 10ft on Uneven Surface:  6


1 Step (curb) (QC):  6


4 Steps (QC):  6


12 Steps (QC):  88


Picking up an Object (QC):  6


Wheel 50 feet with 2 turns (QC:  9


Wheel 150 feet:  9





PT Plan


Problem List


Problem List:  Activity Tolerance





Treatment/Plan


Treatment Plan:  Continue Plan of Care


Treatment Plan:  Bed Mobility, Education, Functional Activity Cherise, Functional 

Strength, Group Therapy, Gait, Safety, Therapeutic Exercise, Transfers


Treatment Duration:  Aug 11, 2022


Frequency:  At least 5 of 7 days/Wk (IRF)


Estimated Hrs Per Day:  1.5 hours per day


Patient and/or Family Agrees t:  Yes





Time/GCodes


Time In:  1000


Time Out:  1100


Total Billed Treatment Time:  60


Total Billed Treatment


1, FA x2 (30m) & EX x2 (30m)











MIRZA FRANCOIS PTA              2022 11:06

## 2022-07-26 NOTE — OCCUPATIONAL THER DAILY NOTE
OT Current Status-Daily Note


Subjective


Pt alert, sitting in recliner.  Pt agrees to therapy.  No c/o pain, c/o nausea 

and nrsg notified.





Mental Status/Objective


Patient Orientation:  Person, Place, Time, Situation


Attachments:  Colostomy/Ileostomy, IV





ADL-Treatment


Pt agrees to shower. Independent with eating. Pt ambulates to bathroom and 

transfers to toilet using FWW independently.  Supplies gathered for pt then pt 

completed ostomy care by self.  Clothing manipulation and hygiene after voiding 

independently.  Pt used FWW to gather clothing off of bed and take to area to 

dress.  Sitting on shower bench, pt able to complete shower by self, sitting 100

% of the time while showering.  Independent with oral care.  Independent with 

dressing.  After session, pt lying in bed with call light/phone in reach.  All 

needs met in room.


Therapy Code Descriptions/Definitions 





Functional [place] Measure:


0=Not Assessed/NA        4=Minimal Assistance


1=Total Assistance        5=Supervision or Setup


2=Maximal Assistance  6=Modified [place]


3=Moderate Assistance 7=Complete IndependenceSCALE: Activities may be completed 

with or without assistive devices.





6-Indepedent-patient completes the activity by him/herself with no assistance 

from a helper.


5-Set-up or Clean-up Assistance-helper sets up or cleans up; patient completes 

activity. [place] assists only prior to or  


    following the activity.


4-Supervision or Touching Assistance-helper provides verbal cues and/or 

touching/steadying and/or contact guard assistance as patient completes 

activity. Assistance may be provided   


    throughout the activity or intermittently.


3-Partial/Moderate Assistance-helper does LESS THAN HALF the effort. [place] 

lifts, holds or supports trunk or limbs, but provides less than half the effort.


2-Substantial/Maximal Assistance-helper does MORE THAN HALF the effort. [place] 

lifts or holds trunk or limbs and provides more than half the effort.


7-Swijqduhi-xbkyzt does ALL the effort. Patient does none of the effort to 

complete the activity. Or, the assistance of 2 or more helpers is required for 

the patient to complete the  


    activity.


If activity was not attempted, code reason:


7-Patient Refused.


9-Not Applicable-not attempted and the patient did not perform the activity 

before the current illness, exacerbation or injury.


10-Not Attempted due to Environmental Limitations-(lack of equipment, weather 

restraints, etc.).


88-Not Attempted due to Medical Conditions or Safety Concerns.


Eating (QC):  6


Oral Hygiene (QC):  6


Shower/Bathe Self (QC):  6


Upper Body Dressing (QC):  6


Lower Body Dressing (QC):  6


On/Off Footwear:  6


Toileting Hygiene (QC):  6


Toilet Transfer (QC):  6


Pt takes increased time to complete all tasks due to decreased stamina and 

requiring lengthy recovery breaks.





OT Short Term Goals


Short Term Goals


Time Frame:  2022


Eatin


Oral hygiene:  6


Toileting hygiene:  6


Shower/bathe self:  5


Upper body dressin


Lower body dressin


Putting on/taking off footwear:  6





OT Long Term Goals


Long Term Goals


Time Frame:  Aug 4, 2022


Eating (QC):  6 (met)


Oral Hygiene (QC):  6 (met)


Toileting Hygiene (QC):  6 (met)


Shower/Bathe Self (QC):  6 (met)


Upper Body Dressing (QC):  6 (met)


Lower Body Dressing (QC):  6 (met)


On/Off Footwear (QC):  6 (met)


1=Demonstrate adherence to instructed precautions during ADL tasks.


2=Patient will verbalize/demonstrate understanding of assistive 

devices/modifications for ADL.


3=Patient will improve strength/tolerance for activity to enable patient to 

perform ADL's.





OT Education/Plan


Problem List/Assessment


Assessment:  Decreased Activ Tolerance, Impaired Self-Care Skills





Discharge Recommendations


Plan/Recommendations:  Continue POC





Treatment Plan/Plan of Care


Patient would benefit from OT for education, treatment and training to promote 

independence in ADL's, mobility, safety and/or upper extremity function for 

ADL's.


Plan of Care:  ADL Retraining, Concurrent Therapy, Functional Mobility, Group 

Exercise/Act as Ind, UE Funct Exercise/Act


Treatment Duration:  Aug 4, 2022


Frequency:  At least 5 of 7 days/Wk (IRF)


Estimated Hrs Per Day:  1.5 hours per day (75-90 min/day )


Agreement:  Yes


Rehab Potential:  Fair





Time/GCodes


Start Time:  07:15


Stop Time:  08:30


Total Time Billed (hr/min):  75


Billed Treatment Time


1 visit-ADL 5 (75 min)











RONEL DAILEY               2022 08:23

## 2022-07-26 NOTE — PM&R PROGRESS NOTE
Subjective


HPI/CC On Admission


Date Seen by Provider:  Jul 26, 2022


Time Seen by Provider:  08:00


Subjective/Events-last exam


7/26/2022:


Doing ok


Had nausea earlier


Nutrition is a challenge


Dr Shaffer will arrange for Lifevest











7/25/2022:


Pt is doing really well


Albumin of 2.3


Blood is coming out of the ostomy drainage site


Overall doing well


Weight is 108 lbs








7/24/2022:


Doing well


No major concerns


Dr Galicia did evaluate the fistula


No cough of hypoxia 








7/23/2022:


Doing well


Eating better today after emesis last night


Compazine was helpful


No pain reported


No dyspnea








7/22/2022:


Pt is doing well


No oxygen required


Dietary consult for poor nutrition


White count is 3


Hemoglobin is 8.2





Review of Systems


General:  Fatigue, Malaise





Objective


Exam


Vital Signs





Vital Signs








  Date Time  Temp Pulse Resp B/P (MAP) Pulse Ox O2 Delivery O2 Flow Rate FiO2


 


7/26/22 20:26 36.8 85 16 115/77 (90) 99 Room Air  





Capillary Refill :


General Appearance:  No Apparent Distress, WD/WN, Chronically ill, Thin


HEENT:  PERRL/EOMI, Normal ENT Inspection, Pharynx Normal


Neck:  Full Range of Motion, Normal Inspection, Non Tender, Supple, Carotid 

Bruit


Respiratory:  Chest Non Tender, Lungs Clear, Normal Breath Sounds, No Accessory 

Muscle Use, No Respiratory Distress


Cardiovascular:  Regular Rate, Rhythm, No Edema, No Gallop, No JVD, No Murmur, 

Normal Peripheral Pulses


Gastrointestinal:  Normal Bowel Sounds, No Organomegaly, No Pulsatile Mass, Non 

Tender, Soft


Back:  Normal Inspection, No CVA Tenderness, No Vertebral Tenderness


Extremity:  Normal Capillary Refill, Normal Inspection, Normal Range of Motion, 

Non Tender, No Calf Tenderness, No Pedal Edema


Neurologic/Psychiatric:  Alert, Oriented x3, Normal Mood/Affect, CNs II-XII Norm

as Tested, Abnormal Gait, Depressed Affect, Motor Weakness (generalized)


Skin:  Normal Color, Warm/Dry


Lymphatic:  No Adenopathy





Results/Procedures


Lab


Patient resulted labs reviewed.





FIM


Transfers


Therapy Code Descriptions/Definitions 





Functional [place] Measure:


0=Not Assessed/NA        4=Minimal Assistance


1=Total Assistance        5=Supervision or Setup


2=Maximal Assistance  6=Modified [place]


3=Moderate Assistance 7=Complete IndependenceSCALE: Activities may be completed 

with or without assistive devices.





6-Indepedent-patient completes the activity by him/herself with no assistance 

from a helper.


5-Set-up or Clean-up Assistance-helper sets up or cleans up; patient completes 

activity. [place] assists only prior to or  


    following the activity.


4-Supervision or Touching Assistance-helper provides verbal cues and/or 

touching/steadying and/or contact guard assistance as patient completes 

activity. Assistance may be provided   


    throughout the activity or intermittently.


3-Partial/Moderate Assistance-helper does LESS THAN HALF the effort. [place] 

lifts, holds or supports trunk or limbs, but provides less than half the effort.


2-Substantial/Maximal Assistance-helper does MORE THAN HALF the effort. [place] 

lifts or holds trunk or limbs and provides more than half the effort.


4-Fauxyyecm-jhsixa does ALL the effort. Patient does none of the effort to 

complete the activity. Or, the assistance of 2 or more helpers is required for 

the patient to complete the  


    activity.


If activity was not attempted, code reason:


7-Patient Refused.


9-Not Applicable-not attempted and the patient did not perform the activity 

before the current illness, exacerbation or injury.


10-Not Attempted due to Environmental Limitations-(lack of equipment, weather 

restraints, etc.).


88-Not Attempted due to Medical Conditions or Safety Concerns.


Roll Left to Right (QC):  6


Sit to Lying (QC):  6


Sit to Stand (QC):  5


Chair/Bed-to-Chair Xfer(QC):  4


Car Transfer (QC):  4





Gait Training


Does the Patient Walk?:  Yes


Distance:  40'


Walk 10 feet (QC):  5


Walk 50 ft with 2 Turns(QC):  4


Walk 150 ft (QC):  88


Walking 10ft/uneven surface-QC:  10


Gait Persons Needed:  1


Gait Assistive Device:  FWW





Wheelchair Training


Does the Pt Use a Wheelchair?:  No


Wheel 50 ft with 2 turns (QC):  9


Wheel 150 ft (QC):  9





Stair Training


 Stair Training: Handrails/:  uses walker


#of Steps:  4


1 Step (curb) (QC):  5


4 Steps (QC):  5


12 Steps (QC):  88


Stairs:  Pattern:  Step to





Balance


Picking up an Object (QC):  88





ADL-Treatment


Eating (QC):  6


Oral Hygiene (QC):  5


Shower/Bathe Self (QC):  4 (Supervision for safety)


Upper Body Dressing (QC):  5


Lower Body Dressing (QC):  4


On/Off Footwear (QC):  5


Toileting Hygiene (QC):  5 (Set up to gather colostomy supplies.)


Toilet Transfer (QC):  6





Assessment/Plan


Assessment and Plan


Assess & Plan/Chief Complaint


Assessment:


Myopathy 


Malnutrition prealbumin 11


COVID infection with vaccination and 1 booster


   -Central line maintained


   -No oxygen required at this time


   -Currently on isolation for 20 days


S/p ileostomy reversal with fistula and abscess July 2022 Dr Galicia


   -General surgery consulted again for possible drain removal today, 7/21/22


   -Drain removed 7/21/22 after placed from [facility] 6/16


   -Levofloxacin last day


CHF


   -Echo EF 20-25%


   -Lasix and spironolactone and Entresto


Type II MI


   -Cardiology following, appreciate their recs


   -Telemetry DC


Afib


   -Lovenox 


HTN


   -Well controlled currently


Diabetes


   -SSI


CKD3a


   -BUN/Cr wnl


Recent hospitalization at [place] 6/22-7/14 following stay at [facility] 6/16 for 

pelvic abscess drainage. 





Diet: Regular


DVT prophylaxis: Lovenox and SCDs








7/22/2022:


Dietary consult appreciated


Continue therapy





7/23/2022:


Monitor N/V


IS


PT OT





7/24/2022:


Monitor nutrition


Supportive care





7/25/2022:


Increase nutrition





7/26/2022:


Life vest


Monitor closely





(1) Acute heart failure with reduced ejection fraction and diastolic dysfunction


Assessment & Plan:  Symptomatically improved.  She is on carvedilol, Entresto 

and spironolactone.





(2) Cardiomyopathy


Assessment & Plan:  Her most recent previous echocardiogram from 2021 showed 

mild left ventricular systolic dysfunction.  Now she has severe left ventricular

systolic dysfunction.  She may need an ischemic evaluation once she recovers 

from her COVID.  We may also want to consider a LifeVest prior to discharge.





(3) Primary hypertension


Assessment & Plan:  Blood pressure is reasonably controlled with the present 

combination of medications.  If anything, at times she is running on the low 

side.





(4) Mixed hyperlipidemia


Assessment & Plan:  It appears as though she may have been on both atorvastatin 

and fenofibrate at home.  I will resume the atorvastatin.  I will hold off on 

restarting fenofibrate due to her acute debility with potentially increased risk

for medication interactions between the atorvastatin and fenofibrate.














TAMMY HURST DO                Jul 26, 2022 06:05

## 2022-07-27 VITALS — SYSTOLIC BLOOD PRESSURE: 111 MMHG | DIASTOLIC BLOOD PRESSURE: 73 MMHG

## 2022-07-27 VITALS — DIASTOLIC BLOOD PRESSURE: 69 MMHG | SYSTOLIC BLOOD PRESSURE: 104 MMHG

## 2022-07-27 VITALS — SYSTOLIC BLOOD PRESSURE: 128 MMHG | DIASTOLIC BLOOD PRESSURE: 79 MMHG

## 2022-07-27 LAB
BUN/CREAT SERPL: 17
CALCIUM SERPL-MCNC: 7.7 MG/DL (ref 8.5–10.1)
CHLORIDE SERPL-SCNC: 108 MMOL/L (ref 98–107)
CO2 SERPL-SCNC: 19 MMOL/L (ref 21–32)
CREAT SERPL-MCNC: 0.88 MG/DL (ref 0.6–1.3)
GFR SERPLBLD BASED ON 1.73 SQ M-ARVRAT: 68 ML/MIN
GLUCOSE SERPL-MCNC: 93 MG/DL (ref 70–105)
POTASSIUM SERPL-SCNC: 4.6 MMOL/L (ref 3.6–5)
SODIUM SERPL-SCNC: 138 MMOL/L (ref 135–145)

## 2022-07-27 RX ADMIN — DOCUSATE SODIUM AND SENNOSIDES SCH EA: 8.6; 5 TABLET, FILM COATED ORAL at 09:42

## 2022-07-27 RX ADMIN — ANTACID TABLETS SCH MG: 500 TABLET, CHEWABLE ORAL at 14:23

## 2022-07-27 RX ADMIN — INSULIN ASPART SCH UNIT: 100 INJECTION, SOLUTION INTRAVENOUS; SUBCUTANEOUS at 16:11

## 2022-07-27 RX ADMIN — Medication SCH ML: at 20:39

## 2022-07-27 RX ADMIN — Medication SCH ML: at 14:24

## 2022-07-27 RX ADMIN — Medication SCH MCG: at 09:21

## 2022-07-27 RX ADMIN — POLYETHYLENE GLYCOL (3350) SCH GM: 17 POWDER, FOR SOLUTION ORAL at 20:25

## 2022-07-27 RX ADMIN — INSULIN ASPART SCH UNIT: 100 INJECTION, SOLUTION INTRAVENOUS; SUBCUTANEOUS at 20:27

## 2022-07-27 RX ADMIN — SACUBITRIL AND VALSARTAN SCH TAB: 24; 26 TABLET, FILM COATED ORAL at 20:38

## 2022-07-27 RX ADMIN — ANTACID TABLETS SCH MG: 500 TABLET, CHEWABLE ORAL at 20:38

## 2022-07-27 RX ADMIN — Medication SCH ML: at 05:03

## 2022-07-27 RX ADMIN — FUROSEMIDE SCH MG: 40 TABLET ORAL at 09:21

## 2022-07-27 RX ADMIN — SACUBITRIL AND VALSARTAN SCH TAB: 24; 26 TABLET, FILM COATED ORAL at 09:21

## 2022-07-27 RX ADMIN — ASPIRIN 81 MG CHEWABLE TABLET SCH MG: 81 TABLET CHEWABLE at 09:21

## 2022-07-27 RX ADMIN — DOCUSATE SODIUM SCH MG: 100 CAPSULE ORAL at 20:25

## 2022-07-27 RX ADMIN — SPIRONOLACTONE SCH MG: 25 TABLET ORAL at 09:22

## 2022-07-27 RX ADMIN — DOCUSATE SODIUM SCH MG: 100 CAPSULE ORAL at 09:42

## 2022-07-27 RX ADMIN — SENNOSIDES SCH MG: 8.6 TABLET, FILM COATED ORAL at 09:43

## 2022-07-27 RX ADMIN — INSULIN ASPART SCH UNIT: 100 INJECTION, SOLUTION INTRAVENOUS; SUBCUTANEOUS at 05:27

## 2022-07-27 RX ADMIN — POLYETHYLENE GLYCOL (3350) SCH GM: 17 POWDER, FOR SOLUTION ORAL at 09:42

## 2022-07-27 RX ADMIN — SENNOSIDES SCH MG: 8.6 TABLET, FILM COATED ORAL at 20:25

## 2022-07-27 RX ADMIN — DOCUSATE SODIUM AND SENNOSIDES SCH EA: 8.6; 5 TABLET, FILM COATED ORAL at 20:25

## 2022-07-27 RX ADMIN — INSULIN ASPART SCH UNIT: 100 INJECTION, SOLUTION INTRAVENOUS; SUBCUTANEOUS at 11:44

## 2022-07-27 RX ADMIN — ANTACID TABLETS SCH MG: 500 TABLET, CHEWABLE ORAL at 09:21

## 2022-07-27 NOTE — PM&R PROGRESS NOTE
Subjective


HPI/CC On Admission


Date Seen by Provider:  Jul 27, 2022


Time Seen by Provider:  09:00


Subjective/Events-last exam


7/27/2022:


Doing well


DC planned for Friday


Still in isolation for the full 10 days due to ICU admit but not due to hypoxia


No pain


Improved diet


Life vest will be fitted.








7/26/2022:


Doing ok


Had nausea earlier


Nutrition is a challenge


Dr Shaffer will arrange for Lifevest











7/25/2022:


Pt is doing really well


Albumin of 2.3


Blood is coming out of the ostomy drainage site


Overall doing well


Weight is 108 lbs








7/24/2022:


Doing well


No major concerns


Dr Galicia did evaluate the fistula


No cough of hypoxia 








7/23/2022:


Doing well


Eating better today after emesis last night


Compazine was helpful


No pain reported


No dyspnea








7/22/2022:


Pt is doing well


No oxygen required


Dietary consult for poor nutrition


White count is 3


Hemoglobin is 8.2





Review of Systems


General:  Fatigue, Malaise





Objective


Exam


Vital Signs





Vital Signs








  Date Time  Temp Pulse Resp B/P (MAP) Pulse Ox O2 Delivery O2 Flow Rate FiO2


 


7/27/22 20:48      Room Air  


 


7/27/22 19:25 36.8 93 18 128/79 (95) 98   





Capillary Refill :


General Appearance:  No Apparent Distress, WD/WN, Chronically ill, Thin


HEENT:  PERRL/EOMI, Normal ENT Inspection, Pharynx Normal


Neck:  Full Range of Motion, Normal Inspection, Non Tender, Supple, Carotid 

Bruit


Respiratory:  Chest Non Tender, Lungs Clear, Normal Breath Sounds, No Accessory 

Muscle Use, No Respiratory Distress


Cardiovascular:  Regular Rate, Rhythm, No Edema, No Gallop, No JVD, No Murmur, 

Normal Peripheral Pulses


Gastrointestinal:  Normal Bowel Sounds, No Organomegaly, No Pulsatile Mass, Non 

Tender, Soft


Back:  Normal Inspection, No CVA Tenderness, No Vertebral Tenderness


Extremity:  Normal Capillary Refill, Normal Inspection, Normal Range of Motion, 

Non Tender, No Calf Tenderness, No Pedal Edema


Neurologic/Psychiatric:  Alert, Oriented x3, Normal Mood/Affect, CNs II-XII Norm

as Tested, Abnormal Gait, Depressed Affect, Motor Weakness (generalized)


Skin:  Normal Color, Warm/Dry


Lymphatic:  No Adenopathy





Results/Procedures


Lab


Patient resulted labs reviewed.





FIM


Transfers


Therapy Code Descriptions/Definitions 





Functional [place] Measure:


0=Not Assessed/NA        4=Minimal Assistance


1=Total Assistance        5=Supervision or Setup


2=Maximal Assistance  6=Modified [place]


3=Moderate Assistance 7=Complete IndependenceSCALE: Activities may be completed 

with or without assistive devices.





6-Indepedent-patient completes the activity by him/herself with no assistance 

from a helper.


5-Set-up or Clean-up Assistance-helper sets up or cleans up; patient completes 

activity. [place] assists only prior to or  


    following the activity.


4-Supervision or Touching Assistance-helper provides verbal cues and/or 

touching/steadying and/or contact guard assistance as patient completes 

activity. Assistance may be provided   


    throughout the activity or intermittently.


3-Partial/Moderate Assistance-helper does LESS THAN HALF the effort. [place] 

lifts, holds or supports trunk or limbs, but provides less than half the effort.


2-Substantial/Maximal Assistance-helper does MORE THAN HALF the effort. [place] 

lifts or holds trunk or limbs and provides more than half the effort.


1-Yynxxsrut-mywefh does ALL the effort. Patient does none of the effort to 

complete the activity. Or, the assistance of 2 or more helpers is required for 

the patient to complete the  


    activity.


If activity was not attempted, code reason:


7-Patient Refused.


9-Not Applicable-not attempted and the patient did not perform the activity 

before the current illness, exacerbation or injury.


10-Not Attempted due to Environmental Limitations-(lack of equipment, weather 

restraints, etc.).


88-Not Attempted due to Medical Conditions or Safety Concerns.


Roll Left to Right (QC):  6


Sit to Lying (QC):  6


Sit to Stand (QC):  5


Chair/Bed-to-Chair Xfer(QC):  4


Car Transfer (QC):  4





Gait Training


Does the Patient Walk?:  Yes


Distance:  25'


Walk 10 feet (QC):  5


Walk 50 ft with 2 Turns(QC):  4


Walk 150 ft (QC):  88


Walking 10ft/uneven surface-QC:  10


Gait Persons Needed:  1


Gait Assistive Device:  FWW





Wheelchair Training


Does the Pt Use a Wheelchair?:  No


Wheel 50 ft with 2 turns (QC):  9


Wheel 150 ft (QC):  9





Stair Training


 Stair Training: Handrails/:  uses walker


#of Steps:  4


1 Step (curb) (QC):  5


4 Steps (QC):  5


12 Steps (QC):  88


Stairs:  Pattern:  Step to





Balance


Picking up an Object (QC):  88





ADL-Treatment


Eating (QC):  6


Oral Hygiene (QC):  6


Shower/Bathe Self (QC):  6


Upper Body Dressing (QC):  6


Lower Body Dressing (QC):  6


On/Off Footwear (QC):  6


Toileting Hygiene (QC):  6


Toilet Transfer (QC):  6





Assessment/Plan


Assessment and Plan


Assess & Plan/Chief Complaint


Assessment:


Myopathy 


Malnutrition prealbumin 11


COVID infection with vaccination and 1 booster


   -Central line maintained


   -No oxygen required at this time


   -Currently on isolation for 20 days


S/p ileostomy reversal with fistula and abscess July 2022 Dr Galicia


   -General surgery consulted again for possible drain removal today, 7/21/22


   -Drain removed 7/21/22 after placed from [facility] 6/16


   -Levofloxacin last day


CHF


   -Echo EF 20-25%


   -Lasix and spironolactone and Entresto


Type II MI


   -Cardiology following, appreciate their recs


   -Telemetry DC


Afib


   -Lovenox 


HTN


   -Well controlled currently


Diabetes


   -SSI


CKD3a


   -BUN/Cr wnl


Recent hospitalization at [place] 6/22-7/14 following stay at [facility] 6/16 for 

pelvic abscess drainage. 





Diet: Regular


DVT prophylaxis: Lovenox and SCDs








7/22/2022:


Dietary consult appreciated


Continue therapy





7/23/2022:


Monitor N/V


IS


PT OT





7/24/2022:


Monitor nutrition


Supportive care





7/25/2022:


Increase nutrition





7/26/2022:


Life vest


Monitor closely





7/27/2022:


Life vest fitting


DC Friday





(1) Acute heart failure with reduced ejection fraction and diastolic dysfunction


Assessment & Plan:  Symptomatically improved.  She is on carvedilol, Entresto 

and spironolactone.  At some point, she may need an ischemic evaluation but we 

will wait until she recovers from the COVID infection for pursuing this further.





(2) Cardiomyopathy


Assessment & Plan:  Her most recent previous echocardiogram from 2021 showed 

mild left ventricular systolic dysfunction.  Now she has severe left ventricular

systolic dysfunction.  She may need an ischemic evaluation once she recovers 

from her COVID.  We will continue the present guideline directed medical 

therapy.  I have ordered a LifeVest that will be fitted possibly tomorrow.





(3) Primary hypertension


Assessment & Plan:  Blood pressure is reasonably controlled with the present 

combination of medications.  If anything, at times she is running on the low 

side.





(4) Mixed hyperlipidemia


Assessment & Plan:  It appears as though she may have been on both atorvastatin 

and fenofibrate at home.  I resumed the atorvastatin.  I will hold off on 

restarting fenofibrate due to her acute debility with potentially increased risk

for medication interactions between the atorvastatin and fenofibrate.














TAMMY HURST DO                Jul 27, 2022 06:24

## 2022-07-27 NOTE — PROGRESS NOTE - SURGERY
SANGITA ORTEGA 7/27/22 0729:


Subjective


Date Seen by a Provider:  Jul 27, 2022


Time Seen by a Provider:  07:25


Subjective/Events-last exam


Ms. Joy is being followed for a drain that was placed in her ventral 

abdomen. It was pulled 6 days ago. She has a fistula in her abdomen that is 

covered with an ostomy bag. She reports the bag was changed yesterday and there 

has been no drainage in the last 24 hours. She is working with PT. The plan is 

for her to go home tomorrow.


Review of Systems


General:  No Chills, No Fatigue


HEENT:  No Head Aches, No Visual Changes


Pulmonary:  No Dyspnea, No Cough


Cardiovascular:  No: Chest Pain, Palpitations


Gastrointestinal:  Abdominal Pain; No: Nausea, Vomiting





Objective


Exam





Vital Signs








  Date Time  Temp Pulse Resp B/P (MAP) Pulse Ox O2 Delivery O2 Flow Rate FiO2


 


7/26/22 21:22      Room Air  


 


7/26/22 20:26 36.8 85 16 115/77 (90) 99 Room Air  


 


7/26/22 09:05      Room Air  














I & O 


 


 7/27/22





 07:00


 


Intake Total 950 ml


 


Output Total 0 ml


 


Balance 950 ml





Capillary Refill :


General Appearance:  No Apparent Distress, Chronically ill, Thin


HEENT:  PERRL/EOMI, Moist Mucous Membranes


Neck:  Non Tender, Supple


Respiratory:  Chest Non Tender, Lungs Clear, Normal Breath Sounds, No Accessory 

Muscle Use, No Respiratory Distress


Cardiovascular:  Regular Rate, Rhythm, No Edema, Normal Peripheral Pulses


Peripheral Pulses:  2+ Radial Pulses (R), 2+ Radial Pulses (L)


Gastrointestinal:  soft; No distended, No guarding, No rebound; tenderness 

(Around fistula), other (Ostomy bag covering fistula)


Extremity:  Non Tender, No Pedal Edema


Neurologic/Psychiatric:  Alert, Oriented x3


Skin:  Normal Color, Warm/Dry





Results


Lab


Laboratory Tests


7/26/22 10:58: Glucometer 147H


7/26/22 15:41: Glucometer 134H


7/26/22 20:24: Glucometer 144H


7/27/22 05:00: 


Sodium Level 138, Potassium Level 4.6, Chloride Level 108H, Carbon Dioxide Level

19L, Anion Gap 11, Blood Urea Nitrogen 15, Creatinine 0.88, Estimat Glomerular 

Filtration Rate 68, BUN/Creatinine Ratio 17, Glucose Level 93, Calcium Level 

7.7L





Assessment/Plan


Enterocutaneous Fistula


Weakness


COVID


Malnutrition





Removed the drain 6 days ago will monitor fistula output. Yesterday was 0 mL. 

Likely a low-output fistula.


Will monitor and see if this continues to decrease on its own, still may need to

discuss NPO and TPN.


Pt encouraged to take her protein shakes TID to help with nutrition, Pt 

encouraged to ambulate and work with PT





BONILLA MORRELL DO 7/27/22 2348:


Subjective


Time Seen by a Provider:  17:37


Subjective/Events-last exam


Pt seen and examined, no new complaints states she still has some nausea and 

can't eat everything.  Nurse stated she ate 85% of calories yesterday, but today

"won't even be close".


Review of Systems


General:  No Chills; Fatigue


Pulmonary:  No Dyspnea, No Cough


Cardiovascular:  No: Chest Pain, Palpitations


Gastrointestinal:  Abdominal Pain; No: Nausea, Vomiting





Objective


Exam


General Appearance:  No Apparent Distress, Chronically ill, Thin


Respiratory:  Chest Non Tender, Lungs Clear, Normal Breath Sounds, No Accessory 

Muscle Use, No Respiratory Distress


Cardiovascular:  Regular Rate, Rhythm, No Murmur


Gastrointestinal:  soft; No distended, No guarding, No rebound; tenderness 

(Around fistula), other (Ostomy bag covering fistula)





Assessment/Plan


Enterocutaneous Fistula


Weakness


COVID


Malnutrition





Removed the drain 6 days ago will monitor fistula output. Yesterday was 0 mL. 

Likely a low-output fistula.


Will monitor and see if this continues to decrease on its own, still may need to

discuss NPO and TPN.


Pt encouraged to take her protein shakes TID to help with nutrition, Pt 

encouraged to ambulate and work with PT





Supervisory-Addendum Brief


Verification & Attestation


Participated in pt care:  history, MDM, physical


Personally performed:  exam, history, MDM, supervision of care


Care discussed with:  Medical Student


Procedures:  n/a


Verification and Attestation of Medical Student E/M Service





A medical student performed and documented this service. I then reviewed and 

verified all information documented by the medical student and made 

modifications to such information, when appropriate. I personally performed a 

physical exam, medical decision making and then discussed any differences 

between the notes and made revisions as necessary to create one note.





Bonilla Morrell , 7/27/22 , 23:48











SANGITA ORTEGA                 Jul 27, 2022 07:29


BONILLA MORRELL DO               Jul 27, 2022 23:48

## 2022-07-27 NOTE — PHYSICAL THERAPY DAILY NOTE
PT Daily Note-Current


Subjective


Pt laying Supine in bed upon arrival.  Pt agrees to PT for QC scoring for 

possible d/c tomorrow.





Pain





   Location:  No Pain Reported





Mental Status


Patient Orientation:  Person, Place, Time, Situation





Transfers


SCALE: Activities may be completed with or without assistive devices.





6-Indepedent-patient completes the activity by him/herself with no assistance 

from a helper.


5-Set-up or Clean-up Assistance-helper sets up or cleans up; patient completes 

activity. [place] assists only prior to or  


    following the activity.


4-Supervision or Touching Assistance-helper provides verbal cues and/or 

touching/steadying and/or contact guard assistance as patient completes 

activity. Assistance may be provided   


    throughout the activity or intermittently.


3-Partial/Moderate Assistance-helper does LESS THAN HALF the effort. [place] 

lifts, holds or supports trunk or limbs, but provides less than half the effort.


2-Substantial/Maximal Assistance-helper does MORE THAN HALF the effort. [place] 

lifts or holds trunk or limbs and provides more than half the effort.


1-Ejrexiwor-mzpbzx does ALL the effort. Patient does none of the effort to 

complete the activity. Or, the assistance of 2 or more helpers is required for 

the patient to complete the  


    activity.


If activity was not attempted, code reason:


7-Patient Refused.


9-Not Applicable-not attempted and the patient did not perform the activity 

before the current illness, exacerbation or injury.


10-Not Attempted due to Environmental Limitations-(lack of equipment, weather 

restraints, etc.).


88-Not Attempted due to Medical Conditions or Safety Concerns.


Roll Left & Right (QC):  6


Sit to Lying (QC):  6


Lying to Sitting/Side of Bed(Q:  6


Sit to Stand (QC):  6


Chair/Bed-to-Chair Xfer(QC):  6


Toilet Transfer (QC):  6


Car Transfer (QC):  6





Weight Bearing


Full Weight Bearing


Full Weight Bearing





Gait Training


Does the Patient Walk?:  Yes


Distance:  50'


Walk 10 feet (QC):  6


Walk 50 ft with 2 Turns(QC):  6


Walking 10ft/uneven surface-QC:  6


Gait Assistive Device:  FWW





Stair Training


 Stair Training: Handrails/:  uses walker


#of Steps:  3


1 Step (curb) (QC):  6


4 Steps (QC):  7


12 Steps (QC):  7


Stairs:  Pattern:  Step to





Balance


Picking up an Object (QC):  6





Exercises


Supine Ex:  Ankle pumps, Quad Set, Glut sets, Heel Slides, Short Arc Quads, Hip 

abd/add


Supine Reps:  15


Seated Therapy Exercises:  Ankle pumps, Long arc quads, Hip flexion, Hip 

abd/add, Glut set


Seated Reps:  15





Treatments


Pt completes QC scoring items listed above including single step using FWW but 

has to rest at end of 3rd step.  Pt also able to fold and hang recently washed 

clothes and put into closet.  Pt is issued and reviewed written HEP for Supine &

Seated Ex.  Pt takes frequent RB between tasks for fatigue.  Pt resting in 

recliner awaiting lunch at end of tx.  All needs met, call light in hand.





Assessment


Current Status:  Good Progress


Pt is improving with mobility and activity tolerance but still requires frequent

rest breaks for fatigue.





PT Short Term Goals


Short Term Goals


Time Frame:  2022


Roll Left & Right:  6


Sit to lyin


Lying to sitting on side of be:  6


Sit to stand:  4 (CGA)


Chair/bed-to-chair transfer:  4 (SBA)


Walk 10 feet:  4 (SBA)


Walk 50 feet with two turns:  4 (SBA)





PT Long Term Goals


Long Term Goals


PT Long Term Goals Time Frame:  Aug 11, 2022


Roll Left & Right (QC):  6


Sit to Lying (QC):  6


Lying-Sitting on Side/Bed(QC):  6


Sit to Stand (QC):  6


Chair/Bed-to-Chair Xfer(QC):  6


Toilet Transfer (QC):  6


Car Transfer (QC):  6


Does the Patient Walk:  Yes


Walk 10 feet (QC):  6


Walk 50ft with 2 Turns (QC):  6


Walk 150 ft (QC):  6


Walking 10ft on Uneven Surface:  6


1 Step (curb) (QC):  6


4 Steps (QC):  6


12 Steps (QC):  88


Picking up an Object (QC):  6


Wheel 50 feet with 2 turns (QC:  9


Wheel 150 feet:  9





PT Plan


Problem List


Problem List:  Activity Tolerance





Treatment/Plan


Treatment Plan:  Continue Plan of Care


Treatment Plan:  Bed Mobility, Education, Functional Activity Cherise, Functional 

Strength, Group Therapy, Gait, Safety, Therapeutic Exercise, Transfers


Treatment Duration:  Aug 11, 2022


Frequency:  At least 5 of 7 days/Wk (IRF)


Estimated Hrs Per Day:  1.5 hours per day


Patient and/or Family Agrees t:  Yes





Safety Risks/Education


Patient Education:  Steps, Issued Written HEP, Correct Positioning


Teaching Recipient:  Patient


Teaching Methods:  Demonstration, Discussion


Response to Teaching:  Verbalize Understanding, Return Demonstration





Time/GCodes


Time In:  1015


Time Out:  1145


Total Billed Treatment Time:  90


Total Billed Treatment


1, GT (15m), EX (20m) & FA x4 (55m)











MIRZA FRANCOIS PTA              2022 11:56

## 2022-07-27 NOTE — OCCUPATIONAL THER DAILY NOTE
OT Current Status-Daily Note


Subjective


Pt alert, sitting in recliner.  Pt agrees to therapy.  No c/o pain.  Pt takes 

increased time to complete all tasks due to decreased stamina.  Pt is able to 

complete more steps of tasks before recovery break is needed.





Mental Status/Objective


Patient Orientation:  Person, Place, Time, Situation





ADL-Treatment


Pt independent with eating.  Independent with toilet transfer and toileting 

using FWW and grabbars.  Pt completed sponge bath sitting at sink, 

independently.  Pt completed dressing independently sitting EOB.  Oral care 

independently.


Therapy Code Descriptions/Definitions 





Functional [place] Measure:


0=Not Assessed/NA        4=Minimal Assistance


1=Total Assistance        5=Supervision or Setup


2=Maximal Assistance  6=Modified [place]


3=Moderate Assistance 7=Complete IndependenceSCALE: Activities may be completed 

with or without assistive devices.





6-Indepedent-patient completes the activity by him/herself with no assistance 

from a helper.


5-Set-up or Clean-up Assistance-helper sets up or cleans up; patient completes 

activity. [place] assists only prior to or  


    following the activity.


4-Supervision or Touching Assistance-helper provides verbal cues and/or 

touching/steadying and/or contact guard assistance as patient completes 

activity. Assistance may be provided   


    throughout the activity or intermittently.


3-Partial/Moderate Assistance-helper does LESS THAN HALF the effort. [place] 

lifts, holds or supports trunk or limbs, but provides less than half the effort.


2-Substantial/Maximal Assistance-helper does MORE THAN HALF the effort. [place] 

lifts or holds trunk or limbs and provides more than half the effort.


6-Wpitmvnkf-xjkesl does ALL the effort. Patient does none of the effort to 

complete the activity. Or, the assistance of 2 or more helpers is required for 

the patient to complete the  


    activity.


If activity was not attempted, code reason:


7-Patient Refused.


9-Not Applicable-not attempted and the patient did not perform the activity 

before the current illness, exacerbation or injury.


10-Not Attempted due to Environmental Limitations-(lack of equipment, weather 

restraints, etc.).


88-Not Attempted due to Medical Conditions or Safety Concerns.


Eating (QC):  6


Oral Hygiene (QC):  6


Shower/Bathe Self (QC):  6


Upper Body Dressing (QC):  6


Lower Body Dressing (QC):  6


On/Off Footwear:  6


Toileting Hygiene (QC):  6


Toilet Transfer (QC):  6





Other Treatment


Pt given HEP for B UE theraband exercises.  Skilled instruction for correct 

technique during exercises.  Pt fatigued and did not demonstrate understanding, 

did verbalize understanding.  Pt given energy conservation handout and 

verbalized understanding.  After session, pt sitting in recliner with call 

light/phone in reach.  All needs met in room





Education


OT Patient Education:  Energy conservation, Exercise program


Teaching Recipient:  Patient


Teaching Methods:  Demonstration, Handout, Discussion


Response to Teaching:  Verbalize Understanding





OT Short Term Goals


Short Term Goals


Time Frame:  2022


Eatin


Oral hygiene:  6


Toileting hygiene:  6


Shower/bathe self:  5


Upper body dressin


Lower body dressin


Putting on/taking off footwear:  6





OT Long Term Goals


Long Term Goals


Time Frame:  Aug 4, 2022


Eating (QC):  6 (met)


Oral Hygiene (QC):  6 (met)


Toileting Hygiene (QC):  6 (met)


Shower/Bathe Self (QC):  6 (met)


Upper Body Dressing (QC):  6 (met)


Lower Body Dressing (QC):  6 (met)


On/Off Footwear (QC):  6 (met)


1=Demonstrate adherence to instructed precautions during ADL tasks.


2=Patient will verbalize/demonstrate understanding of assistive devices/mod

ifications for ADL.


3=Patient will improve strength/tolerance for activity to enable patient to 

perform ADL's.





OT Education/Plan


Problem List/Assessment


Assessment:  Decreased Activ Tolerance





Discharge Recommendations


Plan/Recommendations:  Continue POC





Treatment Plan/Plan of Care


Patient would benefit from OT for education, treatment and training to promote 

independence in ADL's, mobility, safety and/or upper extremity function for 

ADL's.


Plan of Care:  ADL Retraining, Concurrent Therapy, Functional Mobility, Group 

Exercise/Act as Ind, UE Funct Exercise/Act


Treatment Duration:  Aug 4, 2022


Frequency:  At least 5 of 7 days/Wk (IRF)


Estimated Hrs Per Day:  1.5 hours per day (75-90 min/day )


Agreement:  Yes


Rehab Potential:  Fair





Time/GCodes


Start Time:  07:15


Stop Time:  08:30


Total Time Billed (hr/min):  75


Billed Treatment Time


1 visit-ADL 4 (60 min)  FA 1 (15 min)











RONEL DAILEY               2022 07:58

## 2022-07-27 NOTE — SPEECH THERAPY DAILY NOTE
Speech Daily Progress Note


Subjective


Date Seen by Provider:  Jul 27, 2022


Time Seen by Provider:  09:00


The patient was lying in her bed, awake and alert upon entrance to her room by 

the clinician. The patient greeted the clinician appropriately and was agreeable

to participation in the cognitive linguistic treatment session.





Objective


Orientation: The patient remains 100% independently oriented to self, location, 

month, day of the week, date, and year.





The patient participated in word finding exercises with 100% accuracy. The 

patient was provided a letter and a category. The patient was asked to find a 

word which fits into the category and begins with the letter. The patient did 

not require verbal cueing for the accuracy displayed.





Assessment


Assessment Current Status:  Good Progress





Treatment Plan


Continue Plan of Care





Speech Short Term Goals


Short Term Goals


Short Term Goals


1. The patient will demonstrate 80% accuracy with memory exercises with mild 

clinician verbal and visual cueing.


Time Frame-STG:  One Week.





Speech Long Term Goals


Long Term Goals


1. The patient will improve her cognitive linguistic skills for safe discharge 

to the least restrictive environment.


Time Frame:  Two Weeks.





Speech-Plan


Treatment Plan


Speech Therapy Treatment Plan:  Continue Plan of Care


Treatment Duration:  Aug 4, 2022


Frequency:  4 times per week (Four to five times per day.)


Estimated Hrs Per Day:  .5 hour per day


Rehab Potential:  Fair





Safety Risks/Education


Teaching Recipient:  Patient


Teaching Methods:  Discussion


Response to Teaching:  Verbalize Understanding


Education Topics Provided:  


Word Finding Strategies





Time


Speech Therapy Time In:  09:00


Speech Therapy Time Out:  09:30


Total Billed Time:  30


Billed Treatment Time


CELIO Holloway ELIZABETH                Jul 27, 2022 09:37

## 2022-07-28 VITALS — DIASTOLIC BLOOD PRESSURE: 75 MMHG | SYSTOLIC BLOOD PRESSURE: 121 MMHG

## 2022-07-28 VITALS — SYSTOLIC BLOOD PRESSURE: 113 MMHG | DIASTOLIC BLOOD PRESSURE: 73 MMHG

## 2022-07-28 RX ADMIN — FUROSEMIDE SCH MG: 40 TABLET ORAL at 08:39

## 2022-07-28 RX ADMIN — MINERAL OIL, PETROLATUM, PHENYLEPHRINE HCL PRN GM: 14; 74.9; .25 OINTMENT RECTAL at 15:59

## 2022-07-28 RX ADMIN — Medication SCH MCG: at 08:39

## 2022-07-28 RX ADMIN — ANTACID TABLETS SCH MG: 500 TABLET, CHEWABLE ORAL at 12:46

## 2022-07-28 RX ADMIN — DOCUSATE SODIUM SCH MG: 100 CAPSULE ORAL at 20:38

## 2022-07-28 RX ADMIN — Medication SCH ML: at 05:23

## 2022-07-28 RX ADMIN — ANTACID TABLETS PRN MG: 500 TABLET, CHEWABLE ORAL at 00:49

## 2022-07-28 RX ADMIN — Medication SCH ML: at 12:46

## 2022-07-28 RX ADMIN — INSULIN ASPART SCH UNIT: 100 INJECTION, SOLUTION INTRAVENOUS; SUBCUTANEOUS at 05:23

## 2022-07-28 RX ADMIN — POLYETHYLENE GLYCOL (3350) SCH GM: 17 POWDER, FOR SOLUTION ORAL at 20:38

## 2022-07-28 RX ADMIN — SACUBITRIL AND VALSARTAN SCH TAB: 24; 26 TABLET, FILM COATED ORAL at 20:37

## 2022-07-28 RX ADMIN — SENNOSIDES SCH MG: 8.6 TABLET, FILM COATED ORAL at 09:44

## 2022-07-28 RX ADMIN — ASPIRIN 81 MG CHEWABLE TABLET SCH MG: 81 TABLET CHEWABLE at 08:39

## 2022-07-28 RX ADMIN — ANTACID TABLETS SCH MG: 500 TABLET, CHEWABLE ORAL at 20:37

## 2022-07-28 RX ADMIN — LOPERAMIDE HYDROCHLORIDE PRN MG: 2 CAPSULE ORAL at 16:01

## 2022-07-28 RX ADMIN — SPIRONOLACTONE SCH MG: 25 TABLET ORAL at 08:41

## 2022-07-28 RX ADMIN — ANTACID TABLETS SCH MG: 500 TABLET, CHEWABLE ORAL at 08:41

## 2022-07-28 RX ADMIN — SACUBITRIL AND VALSARTAN SCH TAB: 24; 26 TABLET, FILM COATED ORAL at 08:39

## 2022-07-28 RX ADMIN — INSULIN ASPART SCH UNIT: 100 INJECTION, SOLUTION INTRAVENOUS; SUBCUTANEOUS at 16:00

## 2022-07-28 RX ADMIN — POLYETHYLENE GLYCOL (3350) SCH GM: 17 POWDER, FOR SOLUTION ORAL at 09:43

## 2022-07-28 RX ADMIN — INSULIN ASPART SCH UNIT: 100 INJECTION, SOLUTION INTRAVENOUS; SUBCUTANEOUS at 20:37

## 2022-07-28 RX ADMIN — DOCUSATE SODIUM SCH MG: 100 CAPSULE ORAL at 09:43

## 2022-07-28 RX ADMIN — POTASSIUM CHLORIDE SCH MEQ: 1500 TABLET, EXTENDED RELEASE ORAL at 05:23

## 2022-07-28 RX ADMIN — DOCUSATE SODIUM AND SENNOSIDES SCH EA: 8.6; 5 TABLET, FILM COATED ORAL at 09:43

## 2022-07-28 RX ADMIN — INSULIN ASPART SCH UNIT: 100 INJECTION, SOLUTION INTRAVENOUS; SUBCUTANEOUS at 11:02

## 2022-07-28 RX ADMIN — DOCUSATE SODIUM AND SENNOSIDES SCH EA: 8.6; 5 TABLET, FILM COATED ORAL at 20:37

## 2022-07-28 RX ADMIN — Medication SCH ML: at 20:37

## 2022-07-28 RX ADMIN — SENNOSIDES SCH MG: 8.6 TABLET, FILM COATED ORAL at 20:37

## 2022-07-28 NOTE — OCCUPATIONAL THER DAILY NOTE
OT Current Status-Daily Note


Subjective


Pt dozing in bed, woke easily to name.  Pt agrees to therapy.  Pt c/o fatigue, 

pt did not sleep last night due to diarrhea and needing to get up multiple times

last night and this morning.  OT/PT co-treat ([id number]), skills of 2 clinicians

required due to increased fatigue, increasing overall strength for daily tasks 

and activity tolerance.  PT focusing on ambulation and B LE strengthening while 

OT focusing on functional tasks and B UE strengthening.





Mental Status/Objective


Patient Orientation:  Person, Place, Time, Situation


Attachments:  IV





ADL-Treatment


Pt declines any ADLs.  Does ambulate to closet and gather clothing to don when 

pt feels better.  Uses FWW to transport clothing to bed.  Pt declines to eat 

food.


Therapy Code Descriptions/Definitions 





Functional [place] Measure:


0=Not Assessed/NA        4=Minimal Assistance


1=Total Assistance        5=Supervision or Setup


2=Maximal Assistance  6=Modified [place]


3=Moderate Assistance 7=Complete IndependenceSCALE: Activities may be completed 

with or without assistive devices.





6-Indepedent-patient completes the activity by him/herself with no assistance 

from a helper.


5-Set-up or Clean-up Assistance-helper sets up or cleans up; patient completes 

activity. [place] assists only prior to or  


    following the activity.


4-Supervision or Touching Assistance-helper provides verbal cues and/or 

touching/steadying and/or contact guard assistance as patient completes 

activity. Assistance may be provided   


    throughout the activity or intermittently.


3-Partial/Moderate Assistance-helper does LESS THAN HALF the effort. [place] 

lifts, holds or supports trunk or limbs, but provides less than half the effort.


2-Substantial/Maximal Assistance-helper does MORE THAN HALF the effort. [place] 

lifts or holds trunk or limbs and provides more than half the effort.


4-Bvwehhjvn-mfavat does ALL the effort. Patient does none of the effort to 

complete the activity. Or, the assistance of 2 or more helpers is required for 

the patient to complete the  


    activity.


If activity was not attempted, code reason:


7-Patient Refused.


9-Not Applicable-not attempted and the patient did not perform the activity 

before the current illness, exacerbation or injury.


10-Not Attempted due to Environmental Limitations-(lack of equipment, weather 

restraints, etc.).


88-Not Attempted due to Medical Conditions or Safety Concerns.


Toileting Hygiene (QC):  6 (Pt is getting up and completing toileting 

independently throughout day.)


Toilet Transfer (QC):  6





Other Treatment


Pt requires frequent and lengthy recovery breaks throughout session.  Pt 

completes 6 B UE strengthening exercises against gravity while in supine. 

Skilled instructions required for correct technique.  Pt tolerated 1 set 20 reps

of each.  After session, pt lying in bed with call light/phone in reach.  All 

needs met in room.





OT Short Term Goals


Short Term Goals


Time Frame:  2022


Eatin


Oral hygiene:  6


Toileting hygiene:  6


Shower/bathe self:  5


Upper body dressin


Lower body dressin


Putting on/taking off footwear:  6





OT Long Term Goals


Long Term Goals


Time Frame:  Aug 4, 2022


Eating (QC):  6 (met)


Oral Hygiene (QC):  6 (met)


Toileting Hygiene (QC):  6 (met)


Shower/Bathe Self (QC):  6 (met)


Upper Body Dressing (QC):  6 (met)


Lower Body Dressing (QC):  6 (met)


On/Off Footwear (QC):  6 (met)


1=Demonstrate adherence to instructed precautions during ADL tasks.


2=Patient will verbalize/demonstrate understanding of assistive 

devices/modifications for ADL.


3=Patient will improve strength/tolerance for activity to enable patient to 

perform ADL's.





OT Education/Plan


Problem List/Assessment


Assessment:  Decreased Activ Tolerance, Decreased UE Strength





Discharge Recommendations


Plan/Recommendations:  Continue POC





Treatment Plan/Plan of Care


Patient would benefit from OT for education, treatment and training to promote 

independence in ADL's, mobility, safety and/or upper extremity function for 

ADL's.


Plan of Care:  ADL Retraining, Concurrent Therapy, Functional Mobility, Group 

Exercise/Act as Ind, UE Funct Exercise/Act


Treatment Duration:  Aug 4, 2022


Frequency:  At least 5 of 7 days/Wk (IRF)


Estimated Hrs Per Day:  1.5 hours per day (75-90 min/day )


Agreement:  Yes


Rehab Potential:  Fair





Time/GCodes


Start Time:  09:00


Stop Time:  10:15


Total Time Billed (hr/min):  75


Billed Treatment Time


1 visit-EX 2 (30 min)  FA 3 (45 min)  co-treat with PT 75 min











RONEL DAILEY               2022 10:13

## 2022-07-28 NOTE — THERAPY TEAM DISCHARGE SUMMARY
Therapy Discharge Summary


Discharge Recommendations


Date of Discharge








Physical Therapy


Roll Left to Right (QC):  6


Sit to Lying (QC):  6


Lying to Sitting/Side of Bed(Q:  6


Sit to Stand (QC):  6


Chair/Bed-to-Chair Xfer(QC):  6


Toilet Transfer (QC):  5


Car Transfer (QC):  6


Does the Patient Walk:  Yes


Mode of Locomotion:  Walk


Anticipated Mode of Locomotion:  Walk


Walk 10 feet (QC):  6


Walk 50 ft with 2 Turns(QC):  6


Walk 150 ft (QC):  88


Walking 10ft on uneven surface:  6


Distance:  50'x3


Gait Assistive Device:  FWW


Does the Pt Use a Wheelchair:  No


Wheel 50 ft with 2 turns (QC):  9


Wheel 150 ft (QC):  9


#of Steps:  3


1 Step (curb) (QC):  6


4 Steps (QC):  7


12 Steps (QC):  7


Walking Assistive Device:  Walker


Balance Sitting Static:  Normal


Balance Sitting Dynamic:  Normal


Balance-Standing Static:  Fair


Picking up an Object (QC):  6





Occupational Therapy


Decreased Activ Tolerance


Eating (QC):  6


Oral Hygiene (QC):  6


Shower/Bathe Self (QC):  6


Upper Body Dressing (QC):  6


Lower Body Dressing (QC):  6


On/Off Footwear (QC):  6


Toileting Hygiene (QC):  6





Speech-Language Pathology


The patient has met speech pathology goals placed at admission and has displayed

excellent progression with cognitive skills. Per patient, "I think the infection

just made everything more difficult."





Expression of Ideas/Wants:  (4)


Understanding Verbal Content:  (4)


Brief Interview-Mental Status:  Yes


Repetition of Three Words:  Three (3)


Temporal Orientation: Year:  Correct (3)


Temporal Orientation: Month:  Accurate within 5 days(2)


Temporal Orientation: Day:  Correct (1)


Recall : Wear to say "Sock":  Yes, no cue required (2)


Recall : Color:  Yes, no cue required (2)


Recall : Bed:  Yes,after cueing (1)


Memory/Recall Ability:  Current season, That he or she is in a hsp/hsp unit, 

room number, staff names





PT Long Term Goals


Long Term Goals


PT Long Term Goals Time Frame:  Aug 11, 2022


Roll Left to Right (QC):  6


Sit to Lying (QC):  6


Lying-Sitting on Side/Bed(QC):  6


Sit to Stand (QC):  6


Chair/Bed-to-Chair Xfer(QC):  6


Car Transfer (QC):  6


Does the Patient Walk:  Yes


Walk 10 feet (QC):  6


Walk 10ft-Uneven Surface(QC):  6


Walk 50ft with 2 Turns (QC):  6


Walk 150 ft (QC):  6


Wheel 50 feet with 2 turns (QC:  9


1 Step (curb) (QC):  6


4 Steps (QC):  6


12 Steps (QC):  88


Picking up an Object (QC):  6





OT Long Term Goals


Long Term Goals


Time Frame:  Aug 4, 2022


Eating (QC):  6 (met)


Oral Hygiene (QC):  6 (met)


Shower/Bathe Self (QC):  6 (met)


Upper Body Dressing (QC):  6 (met)


Lower Body Dressing (QC):  6 (met)


On/Off Footwear (QC):  6 (met)


Toileting Hygiene (QC):  6 (met)


Toilet/Commode Transfer (QC):  6


1=Demonstrate adherence to instructed precautions during ADL tasks.


2=Patient will verbalize/demonstrate understanding of assistive 

devices/modifications for ADL.


3=Patient will improve strength/tolerance for activity to enable patient to 

perform ADL's.





Speech Long Term Goals


Long Term Goals


1. The patient will improve her cognitive linguistic skills for safe discharge 

to the least restrictive environment. MET: The patient has met speech pathology 

goals placed at admission and has displayed excellent progression with cognitive

skills. Per patient, "I think the infection just made everything more 

difficult."


Time Frame:  Two Weeks.











EVA CAMACHO               Jul 28, 2022 09:59

## 2022-07-28 NOTE — PHYSICAL THERAPY DAILY NOTE
PT Daily Note-Current


Subjective


Patient in bed pre tx, agrees to PT, has no complaints of pain but states she is

very tired and can't do much, feels ill.  Will be co-treating with OT due to 

poor patient mobility, strength, endurance, severe debility and fatigue, 

coordinate UE and LE with activity, safety and reduce risk of falls.





Appearance


Patient in bed post tx with nurse call, phone, tray, all needs met.





Mental Status


Patient Orientation:  Person, Place, Situation


Attachments:  Colostomy/Ileostomy





Transfers


SCALE: Activities may be completed with or without assistive devices.





6-Indepedent-patient completes the activity by him/herself with no assistance 

from a helper.


5-Set-up or Clean-up Assistance-helper sets up or cleans up; patient completes 

activity. [place] assists only prior to or  


    following the activity.


4-Supervision or Touching Assistance-helper provides verbal cues and/or 

touching/steadying and/or contact guard assistance as patient completes 

activity. Assistance may be provided   


    throughout the activity or intermittently.


3-Partial/Moderate Assistance-helper does LESS THAN HALF the effort. [place] 

lifts, holds or supports trunk or limbs, but provides less than half the effort.


2-Substantial/Maximal Assistance-helper does MORE THAN HALF the effort. [place] 

lifts or holds trunk or limbs and provides more than half the effort.


2-Pblhmrshg-vhfwkc does ALL the effort. Patient does none of the effort to 

complete the activity. Or, the assistance of 2 or more helpers is required for 

the patient to complete the  


    activity.


If activity was not attempted, code reason:


7-Patient Refused.


9-Not Applicable-not attempted and the patient did not perform the activity 

before the current illness, exacerbation or injury.


10-Not Attempted due to Environmental Limitations-(lack of equipment, weather 

restraints, etc.).


88-Not Attempted due to Medical Conditions or Safety Concerns.


Roll Left & Right (QC):  6


Sit to Lying (QC):  6


Lying to Sitting/Side of Bed(Q:  6


Sit to Stand (QC):  6


Chair/Bed-to-Chair Xfer(QC):  6





Weight Bearing


Full Weight Bearing


Full Weight Bearing





Gait Training


Distance:  80', 40'x3


Walk 10 feet (QC):  6


Walk 50 ft with 2 Turns(QC):  6


Gait Assistive Device:  FWW


slow but steady ambulation





Exercises


Supine Ex:  Ankle pumps, Quad Set, Glut sets, Heel Slides, Short Arc Quads, 

Straight leg raise, Hip abd/add


Supine Reps:  20





Treatments


PT performed bed mobility and transfers, ambulation, LE strengthening, OT 

performed UE strengthening, UE positioning and safety during activity.





Assessment


Current Status:  Poor Progress


Patient needed many long rest breaks due to fatigue.





PT Short Term Goals


Short Term Goals


Time Frame:  2022


Roll Left & Right:  6


Sit to lyin


Lying to sitting on side of be:  6


Sit to stand:  4 (CGA)


Chair/bed-to-chair transfer:  4 (SBA)


Walk 10 feet:  4 (SBA)


Walk 50 feet with two turns:  4 (SBA)





PT Long Term Goals


Long Term Goals


PT Long Term Goals Time Frame:  Aug 11, 2022


Roll Left & Right (QC):  6


Sit to Lying (QC):  6


Lying-Sitting on Side/Bed(QC):  6


Sit to Stand (QC):  6


Chair/Bed-to-Chair Xfer(QC):  6


Toilet Transfer (QC):  6


Car Transfer (QC):  6


Does the Patient Walk:  Yes


Walk 10 feet (QC):  6


Walk 50ft with 2 Turns (QC):  6


Walk 150 ft (QC):  6


Walking 10ft on Uneven Surface:  6


1 Step (curb) (QC):  6


4 Steps (QC):  6


12 Steps (QC):  88


Picking up an Object (QC):  6


Wheel 50 feet with 2 turns (QC:  9


Wheel 150 feet:  9





PT Plan


Problem List


Problem List:  Activity Tolerance, Functional Strength, Safety, Balance, Gait, 

Transfer, Bed Mobility, ROM





Treatment/Plan


Treatment Plan:  Continue Plan of Care


Treatment Plan:  Bed Mobility, Education, Functional Activity Cherise, Functional 

Strength, Group Therapy, Gait, Safety, Therapeutic Exercise, Transfers


Treatment Duration:  Aug 11, 2022


Frequency:  At least 5 of 7 days/Wk (IRF)


Estimated Hrs Per Day:  1.5 hours per day


Patient and/or Family Agrees t:  Yes





Safety Risks/Education


Patient Education:  Gait Training, Transfer Techniques, Correct Positioning, 

Safety Issues


Teaching Recipient:  Patient


Teaching Methods:  Demonstration, Discussion


Response to Teaching:  Reinforcement Needed





Time/GCodes


Time In:  0900


Time Out:  1015


Total Billed Treatment Time:  75


Total Billed Treatment


1 visit


FA 30'


EX 45'





co-treated with OT for 75'











GORDON HOLCOMB PT                2022 10:09

## 2022-07-28 NOTE — PM&R PROGRESS NOTE
Subjective


HPI/CC On Admission


Date Seen by Provider:  Jul 28, 2022


Time Seen by Provider:  12:00


Subjective/Events-last exam


7/28/2022:


Patient ready for discharge tomorrow


LifeVest will be fitted today


Checked meds and labs








7/27/2022:


Doing well


DC planned for Friday


Still in isolation for the full 10 days due to ICU admit but not due to hypoxia


No pain


Improved diet


Life vest will be fitted.








7/26/2022:


Doing ok


Had nausea earlier


Nutrition is a challenge


Dr Shaffer will arrange for Lifevest











7/25/2022:


Pt is doing really well


Albumin of 2.3


Blood is coming out of the ostomy drainage site


Overall doing well


Weight is 108 lbs








7/24/2022:


Doing well


No major concerns


Dr Galicia did evaluate the fistula


No cough of hypoxia 








7/23/2022:


Doing well


Eating better today after emesis last night


Compazine was helpful


No pain reported


No dyspnea








7/22/2022:


Pt is doing well


No oxygen required


Dietary consult for poor nutrition


White count is 3


Hemoglobin is 8.2





Review of Systems


General:  Fatigue, Malaise





Objective


Exam


Vital Signs





Vital Signs








  Date Time  Temp Pulse Resp B/P (MAP) Pulse Ox O2 Delivery O2 Flow Rate FiO2


 


7/28/22 20:22 36.7 89 18 113/73 (86) 100 Room Air  





Capillary Refill :


General Appearance:  No Apparent Distress, WD/WN, Chronically ill, Thin


HEENT:  PERRL/EOMI, Normal ENT Inspection, Pharynx Normal


Neck:  Full Range of Motion, Normal Inspection, Non Tender, Supple, Carotid Brui

t


Respiratory:  Chest Non Tender, Lungs Clear, Normal Breath Sounds, No Accessory 

Muscle Use, No Respiratory Distress


Cardiovascular:  Regular Rate, Rhythm, No Edema, No Gallop, No JVD, No Murmur, 

Normal Peripheral Pulses


Gastrointestinal:  Normal Bowel Sounds, No Organomegaly, No Pulsatile Mass, Non 

Tender, Soft


Back:  Normal Inspection, No CVA Tenderness, No Vertebral Tenderness


Extremity:  Normal Capillary Refill, Normal Inspection, Normal Range of Motion, 

Non Tender, No Calf Tenderness, No Pedal Edema


Neurologic/Psychiatric:  Alert, Oriented x3, Normal Mood/Affect, CNs II-XII Norm

as Tested, Abnormal Gait, Depressed Affect, Motor Weakness (generalized)


Skin:  Normal Color, Warm/Dry


Lymphatic:  No Adenopathy





Results/Procedures


Lab


Patient resulted labs reviewed.





FIM


Transfers


Therapy Code Descriptions/Definitions 





Functional [place] Measure:


0=Not Assessed/NA        4=Minimal Assistance


1=Total Assistance        5=Supervision or Setup


2=Maximal Assistance  6=Modified [place]


3=Moderate Assistance 7=Complete IndependenceSCALE: Activities may be completed 

with or without assistive devices.





6-Indepedent-patient completes the activity by him/herself with no assistance 

from a helper.


5-Set-up or Clean-up Assistance-helper sets up or cleans up; patient completes 

activity. [place] assists only prior to or  


    following the activity.


4-Supervision or Touching Assistance-helper provides verbal cues and/or 

touching/steadying and/or contact guard assistance as patient completes 

activity. Assistance may be provided   


    throughout the activity or intermittently.


3-Partial/Moderate Assistance-helper does LESS THAN HALF the effort. [place] 

lifts, holds or supports trunk or limbs, but provides less than half the effort.


2-Substantial/Maximal Assistance-helper does MORE THAN HALF the effort. [place] 

lifts or holds trunk or limbs and provides more than half the effort.


1-Wbtmlwayy-yklubo does ALL the effort. Patient does none of the effort to 

complete the activity. Or, the assistance of 2 or more helpers is required for 

the patient to complete the  


    activity.


If activity was not attempted, code reason:


7-Patient Refused.


9-Not Applicable-not attempted and the patient did not perform the activity 

before the current illness, exacerbation or injury.


10-Not Attempted due to Environmental Limitations-(lack of equipment, weather 

restraints, etc.).


88-Not Attempted due to Medical Conditions or Safety Concerns.


Roll Left to Right (QC):  6


Sit to Lying (QC):  6


Sit to Stand (QC):  6


Chair/Bed-to-Chair Xfer(QC):  6


Car Transfer (QC):  6





Gait Training


Does the Patient Walk?:  Yes


Distance:  50'


Walk 10 feet (QC):  6


Walk 50 ft with 2 Turns(QC):  6


Walk 150 ft (QC):  88


Walking 10ft/uneven surface-QC:  6


Gait Persons Needed:  1


Gait Assistive Device:  FWW





Wheelchair Training


Does the Pt Use a Wheelchair?:  No


Wheel 50 ft with 2 turns (QC):  9


Wheel 150 ft (QC):  9





Stair Training


 Stair Training: Handrails/:  uses walker


#of Steps:  3


1 Step (curb) (QC):  6


4 Steps (QC):  7


12 Steps (QC):  7


Stairs:  Pattern:  Step to





Balance


Picking up an Object (QC):  6





ADL-Treatment


Eating (QC):  6


Oral Hygiene (QC):  6


Shower/Bathe Self (QC):  6


Upper Body Dressing (QC):  6


Lower Body Dressing (QC):  6


On/Off Footwear (QC):  6


Toileting Hygiene (QC):  6


Toilet Transfer (QC):  6





Assessment/Plan


Assessment and Plan


Assess & Plan/Chief Complaint


Assessment:


Myopathy 


Malnutrition prealbumin 11


COVID infection with vaccination and 1 booster


   -Central line maintained


   -No oxygen required at this time


   -Currently on isolation for 20 days


S/p ileostomy reversal with fistula and abscess July 2022 Dr Galicia


   -General surgery consulted again for possible drain removal today, 7/21/22


   -Drain removed 7/21/22 after placed from [facility] 6/16


   -Levofloxacin last day


CHF


   -Echo EF 20-25%


   -Lasix and spironolactone and Entresto


Type II MI


   -Cardiology following, appreciate their recs


   -Telemetry DC


Afib


   -Lovenox 


HTN


   -Well controlled currently


Diabetes


   -SSI


CKD3a


   -BUN/Cr wnl


Recent hospitalization at [facility] 6/22-7/14 following stay at [facility] 6/16 for 

pelvic abscess drainage. 





Diet: Regular


DVT prophylaxis: Lovenox and SCDs








7/22/2022:


Dietary consult appreciated


Continue therapy





7/23/2022:


Monitor N/V


IS


PT OT





7/24/2022:


Monitor nutrition


Supportive care





7/25/2022:


Increase nutrition





7/26/2022:


Life vest


Monitor closely





7/27/2022:


Life vest fitting


DC Friday 7/28/2022:


LifeVest


Discharge home tomorrow





(1) Acute heart failure with reduced ejection fraction and diastolic dysfunction


Assessment & Plan:  Symptomatically improved.  She is on carvedilol, Entresto 

and spironolactone.  At some point, she may need an ischemic evaluation but we 

will wait until she recovers from the COVID infection for pursuing this further.





(2) Cardiomyopathy


Assessment & Plan:  Her most recent previous echocardiogram from 2021 showed 

mild left ventricular systolic dysfunction.  Now she has severe left ventricular

systolic dysfunction.  She may need an ischemic evaluation once she recovers 

from her COVID.  We will continue the present guideline directed medical 

therapy.  I have ordered a LifeVest that will be fitted possibly tomorrow.





(3) Primary hypertension


Assessment & Plan:  Blood pressure is reasonably controlled with the present 

combination of medications.  If anything, at times she is running on the low 

side.





(4) Mixed hyperlipidemia


Assessment & Plan:  It appears as though she may have been on both atorvastatin 

and fenofibrate at home.  I resumed the atorvastatin.  I will hold off on rest

arting fenofibrate due to her acute debility with potentially increased risk for

medication interactions between the atorvastatin and fenofibrate.














TAMMY HUSRT DO                Jul 28, 2022 05:52

## 2022-07-28 NOTE — PROGRESS NOTE - SURGERY
SANGITA ORTEGA 7/28/22 0750:


Subjective


Date Seen by a Provider:  Jul 28, 2022


Time Seen by a Provider:  07:12


Subjective/Events-last exam


Ms. Joy is being followed for a drain that was placed in her ventral 

abdomen. It was pulled 7 days ago. She has a fistula in her abdomen that is 

covered with an ostomy bag. She reports the bag was changed 2 days ago and there

has been no drainage in the last 48 hours. She is working with PT. She reports 

some diarrhea early this morning and she has not been able to go back to sleep 

after that. She also reports some heartburn with the Ensure drinks but not the 

Glucerna. The plan is for her to go home tomorrow.


Review of Systems


General:  No Chills, No Fatigue


HEENT:  No Head Aches, No Visual Changes


Pulmonary:  No Dyspnea, No Cough


Cardiovascular:  No: Chest Pain, Palpitations


Gastrointestinal:  Diarrhea, Other (Heartburn)


Neurological:  No: Weakness, Confusion





Objective


Exam





Vital Signs








  Date Time  Temp Pulse Resp B/P (MAP) Pulse Ox O2 Delivery O2 Flow Rate FiO2


 


7/28/22 07:23 36.8 93 18 121/75 (90) 100 Room Air  


 


7/27/22 20:48      Room Air  


 


7/27/22 19:25 36.8 93 18 128/79 (95) 98 Room Air  


 


7/27/22 18:02  94  104/69 (81)    


 


7/27/22 09:00      Room Air  














I & O 


 


 7/28/22





 07:00


 


Intake Total 1121 ml


 


Output Total 0 ml


 


Balance 1121 ml





Capillary Refill :


General Appearance:  No Apparent Distress, Chronically ill, Thin


HEENT:  PERRL/EOMI, Moist Mucous Membranes


Neck:  Non Tender, Supple


Respiratory:  Chest Non Tender, Lungs Clear, Normal Breath Sounds, No Accessory 

Muscle Use, No Respiratory Distress


Cardiovascular:  Regular Rate, Rhythm, No Edema, Normal Peripheral Pulses


Peripheral Pulses:  2+ Radial Pulses (R), 2+ Radial Pulses (L)


Gastrointestinal:  soft; No distended, No guarding, No rebound; tenderness 

(Around fistula), other (Ostomy bag covering fistula)


Extremity:  Non Tender, No Pedal Edema


Neurologic/Psychiatric:  Alert, Oriented x3, Normal Mood/Affect


Skin:  Normal Color, Warm/Dry





Results


Lab


Laboratory Tests


7/27/22 11:19: Glucometer 135H


7/27/22 15:29: Glucometer 130H


7/27/22 20:10: Glucometer 124H


7/28/22 05:22: Glucometer 96





Assessment/Plan


Enterocutaneous Fistula


Weakness


COVID


Malnutrition





Removed the drain 7 days ago will monitor fistula output. Last 48 hours was 0 

mL. Likely a low-output fistula.


Will monitor and see if this continues to decrease on its own, still may need to

discuss NPO and TPN.


Pt encouraged to take her protein shakes TID to help with nutrition, Pt 

encouraged to ambulate and work with PT


Home tomorrow.





BONILLA MORRELL DO 7/28/22 1055:


Subjective


Time Seen by a Provider:  10:49


Subjective/Events-last exam


Pt seen and examined, states she got woken up at 5am with diarrhea.  Also states

the shake she drank last night gave her "abdominal cramps".


Review of Systems


General:  No Chills; Fatigue


Pulmonary:  No Dyspnea, No Cough


Cardiovascular:  No: Chest Pain, Palpitations


Gastrointestinal:  Diarrhea, Other (Heartburn)





Objective


Exam


General Appearance:  No Apparent Distress, Chronically ill, Thin


HEENT:  Moist Mucous Membranes


Respiratory:  Chest Non Tender, Lungs Clear, Normal Breath Sounds, No Accessory 

Muscle Use, No Respiratory Distress


Cardiovascular:  Regular Rate, Rhythm, No Murmur


Gastrointestinal:  soft; No distended, No guarding, No rebound; tenderness 

(Around fistula), other (Ostomy bag covering fistula - no output)





Assessment/Plan


Enterocutaneous Fistula


Weakness


COVID


Malnutrition





Removed the drain 7 days ago will monitor fistula output. Last 48 hours was 0 

mL. Likely a low-output fistula.


Will monitor and see if this continues to decrease on its own, still may need to

discuss NPO and TPN.


Pt encouraged to take her protein shakes TID to help with nutrition, Pt 

encouraged to ambulate and work with PT


Home tomorrow.





Supervisory-Addendum Brief


Verification & Attestation


Participated in pt care:  history, MDM, physical


Personally performed:  exam, history, MDM, supervision of care


Care discussed with:  Medical Student


Procedures:  n/a


Verification and Attestation of Medical Student E/M Service





A medical student performed and documented this service. I then reviewed and 

verified all information documented by the medical student and made 

modifications to such information, when appropriate. I personally performed a 

physical exam, medical decision making and then discussed any differences 

between the notes and made revisions as necessary to create one note.





Bonilla Morrell , 7/28/22 , 10:55











SANGITA ORTEGA                 Jul 28, 2022 07:50


BONILLA MORRELL DO               Jul 28, 2022 10:55

## 2022-07-28 NOTE — SPEECH THERAPY DAILY NOTE
Speech Daily Progress Note


Subjective


Date Seen by Provider:  Jul 28, 2022


Time Seen by Provider:  08:30


The patient was transferring from the restroom to the bed upon entrance to her 

room. The patient was appropriately using her walker for safety and being aided 

by the RN. The patient greeted the clinician appropriately and was agreeable to 

participation in the cognitive linguistic treatment session. 





The patient reported increased weakness on this date, as she has experienced 

diarrhea throughout the morning. Per patient, the frequent trips to and from the

bathroom are resulting in the reported fatigue.





Objective


The patient completed word-finding exercises on this date, as she was provided 

three words and asked to provide the category of the three words, as well as, a 

fourth word which could be included in the category. The patient displayed 

excellent accuracy (100%), independently.





Expression of Ideas/Wants:  (4)


Understanding Verbal Content:  (4)


Brief Interview-Mental Status:  Yes


Repetition of Three Words:  Three (3)


Temporal Orientation: Year:  Correct (3)


Temporal Orientation: Month:  Accurate within 5 days(2)


Temporal Orientation: Day:  Correct (1)


Recall : Wear to say "Sock":  Yes, no cue required (2)


Recall : Color:  Yes, no cue required (2)


Recall : Bed:  Yes,after cueing (1)


Memory/Recall Ability:  Current season, That he or she is in a hsp/hsp unit, 

room number, staff names





Assessment


Assessment Current Status:  Good Progress





Treatment Plan


Discontinue ST





Speech Short Term Goals


Short Term Goals


Short Term Goals


1. The patient will demonstrate 80% accuracy with memory exercises with mild 

clinician verbal and visual cueing.


Time Frame-STG:  One Week.





Speech Long Term Goals


Long Term Goals


1. The patient will improve her cognitive linguistic skills for safe discharge 

to the least restrictive environment.


Time Frame:  Two Weeks.





Speech-Plan


Treatment Plan


Speech Therapy Treatment Plan:  Discontinue ST


Treatment Duration:  Aug 4, 2022


Frequency:  4 times per week (Four to five times per day.)


Estimated Hrs Per Day:  .5 hour per day


Rehab Potential:  Fair





Safety Risks/Education


Teaching Recipient:  Patient


Teaching Methods:  Discussion


Response to Teaching:  Verbalize Understanding


Education Topics Provided:  


Plan of Care





Time


Speech Therapy Time In:  08:30


Speech Therapy Time Out:  09:00


Total Billed Time:  30


Billed Treatment Time


[address] ST               Jul 28, 2022 09:58

## 2022-07-28 NOTE — D/C HH FACE TO FACE ORDER
D/C HH Face to Face Orders


Reconcile Patient Problems


Problems Reviewed?:  Yes





Instructions for Patient


HH


Patient Instructions/FollowUp:  


pcp 1 week


Physician to follow Patient:  CHC


Discharge Diet for Home:  No Restrictions


Patient Problems:  


Debility


COVID





Patient Data-Allergies,Ht & Wt


Patient Allergies:  


Coded Allergies:  


     Penicillins (Verified  Allergy, Unknown, 4/22/21)


     raspberry (Verified  Allergy, Unknown, 4/22/21)





Home Health Need/Face to Face


Date of Face to Face:  Jul 28, 2022


Clinical Findings:  Generalized weakness and fatigue, Instability, Muscle 

weakness, Unsteady gait


I have seen Pt face-to-face:  Yes


Discharged To:  Home


Diagnosis/Conditions:  


Debility


Patient is Homebound due to:  Sandra fall risk due to instabilty, Muscle weakness


Homebound Status


   Due to the above stated illness, injury or surgical procedure (medical 

condition or diagnosis) and associated clinical findings, the patient is 

homebound because of his/her inability to leave home except with aid of a 

supportive device and/or person AND leaving the home requires a considerable and

taxing effort or is medically contraindicated.


Pt req the following assistanc:  Walker





Home Health Nursing Orders


Home Health Services Order:  Nursing Services, Occupational Ther-Evaluate & 

Treat, Physical Therapy-Evaluate & Treat, Wound Care-Eval/Treat


Certify Stmt


I certify that this patient is under my care and that I, a nurse practitioner or

a physician; a assistant working with me, had a face to face encounter that -

meets the physician face to face encounter requirements with this patient as 

dated.











TAMMY HURST DO                Jul 28, 2022 21:37

## 2022-07-29 VITALS — SYSTOLIC BLOOD PRESSURE: 117 MMHG | DIASTOLIC BLOOD PRESSURE: 74 MMHG

## 2022-07-29 VITALS — DIASTOLIC BLOOD PRESSURE: 65 MMHG | SYSTOLIC BLOOD PRESSURE: 98 MMHG

## 2022-07-29 RX ADMIN — SACUBITRIL AND VALSARTAN SCH TAB: 24; 26 TABLET, FILM COATED ORAL at 08:22

## 2022-07-29 RX ADMIN — SENNOSIDES SCH MG: 8.6 TABLET, FILM COATED ORAL at 08:56

## 2022-07-29 RX ADMIN — FUROSEMIDE SCH MG: 40 TABLET ORAL at 08:12

## 2022-07-29 RX ADMIN — DOCUSATE SODIUM SCH MG: 100 CAPSULE ORAL at 08:54

## 2022-07-29 RX ADMIN — INSULIN ASPART SCH UNIT: 100 INJECTION, SOLUTION INTRAVENOUS; SUBCUTANEOUS at 05:33

## 2022-07-29 RX ADMIN — LOPERAMIDE HYDROCHLORIDE PRN MG: 2 CAPSULE ORAL at 08:12

## 2022-07-29 RX ADMIN — MINERAL OIL, PETROLATUM, PHENYLEPHRINE HCL PRN GM: 14; 74.9; .25 OINTMENT RECTAL at 08:15

## 2022-07-29 RX ADMIN — DOCUSATE SODIUM AND SENNOSIDES SCH EA: 8.6; 5 TABLET, FILM COATED ORAL at 08:55

## 2022-07-29 RX ADMIN — Medication SCH MCG: at 08:12

## 2022-07-29 RX ADMIN — POLYETHYLENE GLYCOL (3350) SCH GM: 17 POWDER, FOR SOLUTION ORAL at 08:55

## 2022-07-29 RX ADMIN — ONDANSETRON PRN MG: 4 TABLET, ORALLY DISINTEGRATING ORAL at 09:27

## 2022-07-29 RX ADMIN — SPIRONOLACTONE SCH MG: 25 TABLET ORAL at 08:13

## 2022-07-29 RX ADMIN — ASPIRIN 81 MG CHEWABLE TABLET SCH MG: 81 TABLET CHEWABLE at 08:12

## 2022-07-29 RX ADMIN — ANTACID TABLETS SCH MG: 500 TABLET, CHEWABLE ORAL at 08:12

## 2022-07-29 RX ADMIN — POTASSIUM CHLORIDE SCH MEQ: 1500 TABLET, EXTENDED RELEASE ORAL at 05:34

## 2022-07-29 RX ADMIN — Medication SCH ML: at 05:33

## 2022-07-29 NOTE — THERAPY TEAM DISCHARGE SUMMARY
Therapy Discharge Summary


Discharge Recommendations


Date of Discharge





Therapy D/C Recommendations:  Home Independently





Physical Therapy


Roll Left to Right (QC):  6


Sit to Lying (QC):  6


Lying to Sitting/Side of Bed(Q:  6


Sit to Stand (QC):  6


Chair/Bed-to-Chair Xfer(QC):  6


Toilet Transfer (QC):  5


Car Transfer (QC):  6


Does the Patient Walk:  Yes


Mode of Locomotion:  Walk


Anticipated Mode of Locomotion:  Walk


Walk 10 feet (QC):  6


Walk 50 ft with 2 Turns(QC):  6


Walk 150 ft (QC):  88


Walking 10ft on uneven surface:  6


Distance:  50'x3


Gait Assistive Device:  FWW


Does the Pt Use a Wheelchair:  No


Wheel 50 ft with 2 turns (QC):  9


Wheel 150 ft (QC):  9


#of Steps:  3


1 Step (curb) (QC):  6


4 Steps (QC):  7


12 Steps (QC):  7


Walking Assistive Device:  Walker


Balance Sitting Static:  Normal


Balance Sitting Dynamic:  Normal


Balance-Standing Static:  Fair


Picking up an Object (QC):  6





Occupational Therapy


Pt admitted to ARU with Covid debility. At time of evaluation, she was SBA-CGA 

for toileting, footwear, lower body dressing, upper body dressing, bathing, and 

oral care and set up for eating. During her rehab stay OT focused on endurance, 

standing tolerance, strengthening, energy conservation strategies, and safety in

order to improve performance and independence in adls and functional mobility. 

Pt made good progress and met all of her long term goals. See below for current 

levels of assist. Pt will be discharging from this facility today and will be 

discharge from OT at this time.


Decreased Activ Tolerance, Decreased UE Strength


Eating (QC):  6


Oral Hygiene (QC):  6


Shower/Bathe Self (QC):  6


Upper Body Dressing (QC):  6


Lower Body Dressing (QC):  6


On/Off Footwear (QC):  6


Toileting Hygiene (QC):  6 (Pt is getting up and completing toileting 

independently throughout day.)





PT Long Term Goals


Long Term Goals


PT Long Term Goals Time Frame:  Aug 11, 2022


Roll Left to Right (QC):  6


Sit to Lying (QC):  6


Lying-Sitting on Side/Bed(QC):  6


Sit to Stand (QC):  6


Chair/Bed-to-Chair Xfer(QC):  6


Car Transfer (QC):  6


Does the Patient Walk:  Yes


Walk 10 feet (QC):  6


Walk 10ft-Uneven Surface(QC):  6


Walk 50ft with 2 Turns (QC):  6


Walk 150 ft (QC):  6


Wheel 50 feet with 2 turns (QC:  9


1 Step (curb) (QC):  6


4 Steps (QC):  6


12 Steps (QC):  88


Picking up an Object (QC):  6





OT Long Term Goals


Long Term Goals


Time Frame:  Aug 4, 2022


Eating (QC):  6 (met)


Oral Hygiene (QC):  6 (met)


Shower/Bathe Self (QC):  6 (met)


Upper Body Dressing (QC):  6 (met)


Lower Body Dressing (QC):  6 (met)


On/Off Footwear (QC):  6 (met)


Toileting Hygiene (QC):  6 (met)


Toilet/Commode Transfer (QC):  6 (met)


1=Demonstrate adherence to instructed precautions during ADL tasks.


2=Patient will verbalize/demonstrate understanding of assistive 

devices/modifications for ADL.


3=Patient will improve strength/tolerance for activity to enable patient to 

perform ADL's.





Speech Long Term Goals


Long Term Goals


1. The patient will improve her cognitive linguistic skills for safe discharge 

to the least restrictive environment. MET: The patient has met speech pathology 

goals placed at admission and has displayed excellent progression with cognitive

skills. Per patient, "I think the infection just made everything more difficult.


Time Frame:  Two Weeks.











Mackenzie De Oliveira OT                Jul 29, 2022 10:32

## 2022-07-29 NOTE — CARDIOLOGY PROGRESS NOTE
Progress Note-Cardiology


Events since last exam


Date Seen by Provider:  Jul 29, 2022


Time Seen by Provider:  08:51


Events since last exam


I am following her due to heart failure and cardiomyopathy.  She had previously 

been following with Dr. Birch but not in a number of years.  She is planning to 

be discharged from inpatient rehab later this morning.  Her breathing is much 

improved.  She denies chest discomfort, palpitations, syncope, or ankle edema.





Certain portions of this document may have been dictated utilizing voice 

recognition technology.  Inherent to this technology, typographical and 

grammatical errors may exist.  As much as I am diligent to identify and correct 

these mistakes, some errors may remain in the document.





Vitals


Last set of Vitals Signs





Vital Signs








 7/29/22





 11:10


 


Temp 36.5


 


Pulse 92


 


Resp 18


 


B/P (MAP) 117/74


 


Pulse Ox 99


 


O2 Delivery Room Air











Exam


Vital Signs





Vital Signs








  Date Time  Temp Pulse Resp B/P (MAP) Pulse Ox O2 Delivery O2 Flow Rate FiO2


 


7/29/22 11:10 36.5 92 18 117/74 99 Room Air  








Physical Exam


Due to the patient's COVID status, I spoke with the patient from the doorway.


General: The patient is not on a ventilator.


HENT: Normocephalic.  Atraumatic.


Skin: There is no pallor.


Neurologic: Oriented x3.  Cranial nerves III through XII grossly intact.  Moving

all 4 extremities.


Psychiatric: Appears cooperative.


Labs





Laboratory Tests








Test


 7/28/22


15:26 7/28/22


20:17 7/29/22


05:32 Range/Units


 


 


Glucometer 189 H 91  96    MG/DL











Diagnosis/Problems


Diagnosis/Problems





(1) Acute heart failure with reduced ejection fraction and diastolic dysfunction


Assessment & Plan:  Symptomatically improved.  She is on carvedilol, Entresto 

and spironolactone.  At some point, she may need an ischemic evaluation but we 

will wait until she recovers from the COVID infection for pursuing this further.

 This can be arranged following discharge.  From a cardiac standpoint, she can 

be discharged home today.





(2) Cardiomyopathy


Assessment & Plan:  Her most recent previous echocardiogram from 2021 showed 

mild left ventricular systolic dysfunction.  Now she has severe left ventricular

systolic dysfunction.  She may need an ischemic evaluation once she recovers 

from her COVID.  We will continue the present guideline directed medical 

therapy.  She has been fitted and instructed on the use of LifeVest and she 

needs to be discharged with this equipment.





(3) Primary hypertension


Assessment & Plan:  Blood pressure is reasonably controlled with the present 

combination of medications.  If anything, at times she is running on the low 

side.





(4) Mixed hyperlipidemia


Assessment & Plan:  It appears as though she may have been on both atorvastatin 

and fenofibrate at home.  I resumed the atorvastatin.  I will hold off on 

restarting fenofibrate due to her acute debility with potentially increased risk

for medication interactions between the atorvastatin and fenofibrate.














KALYAN FAY JR, MD         Jul 29, 2022 08:52

## 2022-07-29 NOTE — DISCHARGE SUMMARY
Diagnosis/Chief Complaint


Date of Admission


Jul 21, 2022 at 13:25


Date of Discharge





Discharge Date:  Jul 29, 2022


Discharge Diagnosis


Assessment:


Myopathy 


Malnutrition prealbumin 11


COVID infection with vaccination and 1 booster


   -Central line maintained


   -No oxygen required at this time


   -Currently on isolation for 20 days


S/p ileostomy reversal with fistula and abscess July 2022 Dr Galicia


   -General surgery consulted again for possible drain removal today, 7/21/22


   -Drain removed 7/21/22 after placed from [facility] 6/16


   -Levofloxacin last day


CHF


   -Echo EF 20-25%


   -Lasix and spironolactone and Entresto


Type II MI


   -Cardiology following, appreciate their recs


   -Telemetry DC


Afib


   -Lovenox 


HTN


   -Well controlled currently


Diabetes


   -SSI


CKD3a


   -BUN/Cr wnl


Recent hospitalization at [place] 6/22-7/14 following stay at [facility] 6/16 for 

pelvic abscess drainage. 





Diet: Regular


DVT prophylaxis: Lovenox and SCDs








7/22/2022:


Dietary consult appreciated


Continue therapy





7/23/2022:


Monitor N/V


IS


PT OT





7/24/2022:


Monitor nutrition


Supportive care





7/25/2022:


Increase nutrition





7/26/2022:


Life vest


Monitor closely





7/27/2022:


Life vest fitting


DC Friday 7/28/2022:


LifeVest


Discharge home tomorrow





(1) Acute heart failure with reduced ejection fraction and diastolic dysfunction


Assessment & Plan:  Symptomatically improved.  She is on carvedilol, Entresto 

and spironolactone.  At some point, she may need an ischemic evaluation but we 

will wait until she recovers from the COVID infection for pursuing this further.





(2) Cardiomyopathy


Assessment & Plan:  Her most recent previous echocardiogram from 2021 showed 

mild left ventricular systolic dysfunction.  Now she has severe left ventricular

systolic dysfunction.  She may need an ischemic evaluation once she recovers 

from her COVID.  We will continue the present guideline directed medical 

therapy.  I have ordered a LifeVest that will be fitted possibly tomorrow.





(3) Primary hypertension


Assessment & Plan:  Blood pressure is reasonably controlled with the present 

combination of medications.  If anything, at times she is running on the low 

side.





(4) Mixed hyperlipidemia


Assessment & Plan:  It appears as though she may have been on both atorvastatin 

and fenofibrate at home.  I resumed the atorvastatin.  I will hold off on 

restarting fenofibrate due to her acute debility with potentially increased risk

for medication interactions between the atorvastatin and fenofibrate.





Discharge Summary


Discharge Physical Examination


Allergies:  


Coded Allergies:  


     Penicillins (Verified  Allergy, Unknown, 4/22/21)


     raspberry (Verified  Allergy, Unknown, 4/22/21)


Vitals & I&Os





Vital Signs








  Date Time  Temp Pulse Resp B/P (MAP) Pulse Ox O2 Delivery O2 Flow Rate FiO2


 


7/29/22 11:10 36.5 92 18 117/74 99 Room Air  








General Appearance:  Alert, Oriented X3, Cooperative


Respiratory:  Clear to Auscultation


Cardiovascular:  Regular Rate


Neuro:  Normal Gait, Normal Speech, Strength at 5/5 X4 Ext


Psych/Mental Status:  Mental Status NL





Hospital Course


Was the Problem List Reviewed?:  Yes


Standard hospital course after she was admitted following a diagnosis of COVID-

19 infection without hypoxia but she remained in isolation the entire rehab 

course.  Focused on nutrition due to severe malnutrition gastrointestinal 

surgeries.  Overall she regained enough function and independence in order to 

return back home to independent living with family checking in on her life alert

battery was replaced and she had her cell phone on at all times.  Ejection 

fraction noted to have 25% So LifeVest was placed before discharge.


Labs (last 24 hrs)


Laboratory Tests


7/21/22 16:21: Glucometer 101


7/21/22 20:38: Glucometer 202H


7/22/22 06:00: 


White Blood Count 3.0L, Red Blood Count 2.80L, Hemoglobin 8.2L, Hematocrit 25L, 

Mean Corpuscular Volume 89, Mean Corpuscular Hemoglobin 29, Mean Corpuscular 

Hemoglobin Concent 33, Red Cell Distribution Width 17.3H, Platelet Count 148, 

Mean Platelet Volume 11.4, Immature Granulocyte % (Auto) 0, Neutrophils (%) 

(Auto) 42, Lymphocytes (%) (Auto) 45H, Monocytes (%) (Auto) 9, Eosinophils (%) 

(Auto) 3, Basophils (%) (Auto) 1, Neutrophils # (Auto) 1.3L, Lymphocytes # 

(Auto) 1.3, Monocytes # (Auto) 0.3, Eosinophils # (Auto) 0.1, Basophils # (Auto)

0.0, Immature Granulocyte # (Auto) 0.0, Sodium Level 141, Potassium Level 3.8, 

Chloride Level 110H, Carbon Dioxide Level 19L, Anion Gap 12, Blood Urea Nitrogen

11, Creatinine 0.93, Estimat Glomerular Filtration Rate 64, BUN/Creatinine Ratio

12, Glucose Level 74, Calcium Level 6.3L, Corrected Calcium 7.8L, Total 

Bilirubin 0.3, Aspartate Amino Transf (AST/SGOT) 27, Alanine Aminotransferase 

(ALT/SGPT) 17, Alkaline Phosphatase 60, Total Protein 4.9L, Albumin 2.1L


7/22/22 11:14: Glucometer 123H


7/22/22 15:26: Glucometer 122H


7/22/22 20:48: Glucometer 144H


7/23/22 06:18: Glucometer 84


7/23/22 11:02: Glucometer 145H


7/23/22 16:05: Glucometer 102


7/23/22 20:33: Glucometer 191H


7/24/22 05:09: Glucometer 82


7/24/22 11:16: Glucometer 147H


7/24/22 15:43: Glucometer 116H


7/24/22 20:18: Glucometer 177H


7/25/22 06:23: 


White Blood Count 2.8L, Red Blood Count 3.04L, Hemoglobin 8.7L, Hematocrit 28L, 

Mean Corpuscular Volume 91, Mean Corpuscular Hemoglobin 29, Mean Corpuscular 

Hemoglobin Concent 31L, Red Cell Distribution Width 16.9H, Platelet Count 180, 

Mean Platelet Volume 10.9, Immature Granulocyte % (Auto) 0, Neutrophils (%) 

(Auto) 43, Lymphocytes (%) (Auto) 39, Monocytes (%) (Auto) 10, Eosinophils (%) 

(Auto) 7, Basophils (%) (Auto) 1, Neutrophils # (Auto) 1.2L, Lymphocytes # 

(Auto) 1.1, Monocytes # (Auto) 0.3, Eosinophils # (Auto) 0.2, Basophils # (Auto)

0.0, Immature Granulocyte # (Auto) 0.0, Sodium Level 138, Potassium Level 4.1, 

Chloride Level 112H, Carbon Dioxide Level 19L, Anion Gap 7, Blood Urea Nitrogen 

11, Creatinine 0.85, Estimat Glomerular Filtration Rate 71, BUN/Creatinine Ratio

13, Glucose Level 84, Calcium Level 6.9L, Corrected Calcium 8.3L, Total 

Bilirubin 0.4, Aspartate Amino Transf (AST/SGOT) 29, Alanine Aminotransferase 

(ALT/SGPT) 20, Alkaline Phosphatase 81, Total Protein 5.3L, Albumin 2.3L


7/25/22 10:50: Glucometer 120H


7/25/22 15:41: Glucometer 115H


7/25/22 20:23: Glucometer 165H


7/26/22 05:44: Glucometer 87


7/26/22 10:58: Glucometer 147H


7/26/22 15:41: Glucometer 134H


7/26/22 20:24: Glucometer 144H


7/27/22 05:00: 


Sodium Level 138, Potassium Level 4.6, Chloride Level 108H, Carbon Dioxide Level

19L, Anion Gap 11, Blood Urea Nitrogen 15, Creatinine 0.88, Estimat Glomerular 

Filtration Rate 68, BUN/Creatinine Ratio 17, Glucose Level 93, Calcium Level 

7.7L


7/27/22 11:19: Glucometer 135H


7/27/22 15:29: Glucometer 130H


7/27/22 20:10: Glucometer 124H


7/28/22 05:22: Glucometer 96


7/28/22 10:46: Glucometer 123H


7/28/22 15:26: Glucometer 189H


7/28/22 20:17: Glucometer 91


7/29/22 05:32: Glucometer 96





Pending Labs


Laboratory Tests


7/21/22 16:21: Glucometer 101


7/21/22 20:38: Glucometer 202


7/22/22 06:00: 


White Blood Count 3.0, Red Blood Count 2.80, Hemoglobin 8.2, Hematocrit 25, Mean

Corpuscular Volume 89, Mean Corpuscular Hemoglobin 29, Mean Corpuscular 

Hemoglobin Concent 33, Red Cell Distribution Width 17.3, Platelet Count 148, 

Mean Platelet Volume 11.4, Immature Granulocyte % (Auto) 0, Neutrophils (%) 

(Auto) 42, Lymphocytes (%) (Auto) 45, Monocytes (%) (Auto) 9, Eosinophils (%) 

(Auto) 3, Basophils (%) (Auto) 1, Neutrophils # (Auto) 1.3, Lymphocytes # (Auto)

1.3, Monocytes # (Auto) 0.3, Eosinophils # (Auto) 0.1, Basophils # (Auto) 0.0, 

Immature Granulocyte # (Auto) 0.0, Sodium Level 141, Potassium Level 3.8, 

Chloride Level 110, Carbon Dioxide Level 19, Anion Gap 12, Blood Urea Nitrogen 

11, Creatinine 0.93, Estimat Glomerular Filtration Rate 64, BUN/Creatinine Ratio

12, Glucose Level 74, Calcium Level 6.3, Corrected Calcium 7.8, Total Bilirubin 

0.3, Aspartate Amino Transf (AST/SGOT) 27, Alanine Aminotransferase (ALT/SGPT) 

17, Alkaline Phosphatase 60, Total Protein 4.9, Albumin 2.1


7/22/22 11:14: Glucometer 123


7/22/22 15:26: Glucometer 122


7/22/22 20:48: Glucometer 144


7/23/22 06:18: Glucometer 84


7/23/22 11:02: Glucometer 145


7/23/22 16:05: Glucometer 102


7/23/22 20:33: Glucometer 191


7/24/22 05:09: Glucometer 82


7/24/22 11:16: Glucometer 147


7/24/22 15:43: Glucometer 116


7/24/22 20:18: Glucometer 177


7/25/22 06:23: 


White Blood Count 2.8, Red Blood Count 3.04, Hemoglobin 8.7, Hematocrit 28, Mean

Corpuscular Volume 91, Mean Corpuscular Hemoglobin 29, Mean Corpuscular 

Hemoglobin Concent 31, Red Cell Distribution Width 16.9, Platelet Count 180, 

Mean Platelet Volume 10.9, Immature Granulocyte % (Auto) 0, Neutrophils (%) 

(Auto) 43, Lymphocytes (%) (Auto) 39, Monocytes (%) (Auto) 10, Eosinophils (%) 

(Auto) 7, Basophils (%) (Auto) 1, Neutrophils # (Auto) 1.2, Lymphocytes # (Auto)

1.1, Monocytes # (Auto) 0.3, Eosinophils # (Auto) 0.2, Basophils # (Auto) 0.0, 

Immature Granulocyte # (Auto) 0.0, Sodium Level 138, Potassium Level 4.1, 

Chloride Level 112, Carbon Dioxide Level 19, Anion Gap 7, Blood Urea Nitrogen 

11, Creatinine 0.85, Estimat Glomerular Filtration Rate 71, BUN/Creatinine Ratio

13, Glucose Level 84, Calcium Level 6.9, Corrected Calcium 8.3, Total Bilirubin 

0.4, Aspartate Amino Transf (AST/SGOT) 29, Alanine Aminotransferase (ALT/SGPT) 

20, Alkaline Phosphatase 81, Total Protein 5.3, Albumin 2.3


7/25/22 10:50: Glucometer 120


7/25/22 15:41: Glucometer 115


7/25/22 20:23: Glucometer 165


7/26/22 05:44: Glucometer 87


7/26/22 10:58: Glucometer 147


7/26/22 15:41: Glucometer 134


7/26/22 20:24: Glucometer 144


7/27/22 05:00: 


Sodium Level 138, Potassium Level 4.6, Chloride Level 108, Carbon Dioxide Level 

19, Anion Gap 11, Blood Urea Nitrogen 15, Creatinine 0.88, Estimat Glomerular 

Filtration Rate 68, BUN/Creatinine Ratio 17, Glucose Level 93, Calcium Level 7.7


7/27/22 11:19: Glucometer 135


7/27/22 15:29: Glucometer 130


7/27/22 20:10: Glucometer 124


7/28/22 05:22: Glucometer 96


7/28/22 10:46: Glucometer 123


7/28/22 15:26: Glucometer 189


7/28/22 20:17: Glucometer 91


7/29/22 05:32: Glucometer 96








Discharge


Home Medications:





Active Scripts


Active


Furosemide 40 Mg Tablet 40 Mg PO DAILY


Klor-Con M20 (Potassium Chloride) 20 Meq Tab.er.prt 20 Meq PO DAILY@0700


Children's Aspirin (Aspirin) 81 Mg Tab.chew 81 Mg PO DAILY


Spironolactone 25 Mg Tablet 25 Mg PO DAILY


Entresto 24 mg-26 mg Tablet (Sacubitril/Valsartan) 24 Mg-26 Mg Tablet 1 Tab PO 

BID


Carvedilol 3.125 Mg Tablet 3.125 Mg PO BID WITH MEALS


Lipitor (Atorvastatin Calcium) 40 Mg Tablet 40 Mg PO 1700


Reported


Levothyroxine Sodium 150 Mcg Tablet 150 Mcg PO DAILY


Tylenol Arthritis (Acetaminophen) 650 Mg Tablet.er 650 Mg PO Q6H PRN


Vitamin D3 (Cholecalciferol (Vitamin D3)) 25 Mcg (1000 Unit) Capsule 25 Mcg PO 

DAILY


Timolol Maleate 0.5% (Timolol Maleate) 0.5 % Drops 1 Drop OU HS


Oxybutynin Chloride 5 Mg Tablet 5 Mg PO BID


Xalatan (Latanoprost) 0.005 % Drops 1 Drop OU HS


Famotidine 20 Mg Tablet 20 Mg PO DAILY


Iron (Ferrous Sulfate) 325 Mg Tablet 325 Mg PO BID


Leflunomide 20 Mg Tablet 20 Mg PO Q48H


Amitriptyline HCl 100 Mg Tablet 100 Mg PO HS


Fenofibrate 160 Mg Tablet 160 Mg PO HS


Cetirizine HCl 10 Mg Tablet 10 Mg PO DAILY





Instructions to patient/family


Please see electronic discharge instructions given to patient.





Diagnosis/Problems


Diagnosis/Problems





(1) Physical debility


Status:  Acute


(2) COVID


(3) Ovarian cancer in remission


(4) Solitary kidney


(5) Fall


Status:  Acute


(6) Rhabdomyolysis


Status:  Acute


(7) Myopathy


(8) Hypothyroidism


(9) Hypoglycemia


Status:  Acute


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


(11) Diabetes mellitus


(12) Atrial fibrillation











TAMMY HURST DO                Jul 29, 2022 04:57

## 2022-07-29 NOTE — THERAPY TEAM DISCHARGE SUMMARY
Therapy Discharge Summary


Discharge Recommendations


Date of Discharge








Physical Therapy


Patient came to rehab with covid 19 debility.  Upon evaluation patient performs 

rolling and supine <-> sit with independence, sit <-> stand min assist, 

transfers CGA, car transfer CGA, ambulate 50' with a rolling walker with CGA (

including 50' with at least 2 turns of 90 degrees), can go up and down 1 step 

using a rolling walker with CGA.  Patient has been performing bed mobility and 

transfer training, balance and endurance training, functional strengthening, 

stair training, gait training, and education.  Patient has made fair progress 

but has not met her long term goals for distance of ambulation and number of 

stairs.  Now, patient performs rolling and supine <-> sit with independence, sit

<-> stand and transfers independent, ambulates 50' with a rolling walker with 

independence (including 50' with at least 2 turns of 90 degrees and 10' over an 

uneven surface), can go up and down 3 steps using a rolling walker with 

independence, and can  an object from the floor with independence.  

Patient is being discharged from this facility today and will be discharged from

PT at this time.


Roll Left to Right (QC):  6


Sit to Lying (QC):  6


Lying to Sitting/Side of Bed(Q:  6


Sit to Stand (QC):  6


Chair/Bed-to-Chair Xfer(QC):  6


Toilet Transfer (QC):  5


Car Transfer (QC):  6


Does the Patient Walk:  Yes


Mode of Locomotion:  Walk


Anticipated Mode of Locomotion:  Walk


Walk 10 feet (QC):  6


Walk 50 ft with 2 Turns(QC):  6


Walk 150 ft (QC):  88


Walking 10ft on uneven surface:  6


Distance:  50'x3


Gait Assistive Device:  FWW


Does the Pt Use a Wheelchair:  No


Wheel 50 ft with 2 turns (QC):  9


Wheel 150 ft (QC):  9


#of Steps:  3


1 Step (curb) (QC):  6


4 Steps (QC):  7


12 Steps (QC):  7


Walking Assistive Device:  Walker


Balance Sitting Static:  Normal


Balance Sitting Dynamic:  Normal


Balance-Standing Static:  Fair


Picking up an Object (QC):  6





Occupational Therapy


Decreased Activ Tolerance, Decreased UE Strength


Eating (QC):  6


Oral Hygiene (QC):  6


Shower/Bathe Self (QC):  6


Upper Body Dressing (QC):  6


Lower Body Dressing (QC):  6


On/Off Footwear (QC):  6


Toileting Hygiene (QC):  6 (Pt is getting up and completing toileting 

independently throughout day.)





PT Long Term Goals


Long Term Goals


PT Long Term Goals Time Frame:  Aug 11, 2022


Roll Left to Right (QC):  6


Sit to Lying (QC):  6


Lying-Sitting on Side/Bed(QC):  6


Sit to Stand (QC):  6


Chair/Bed-to-Chair Xfer(QC):  6


Car Transfer (QC):  6


Does the Patient Walk:  Yes


Walk 10 feet (QC):  6


Walk 10ft-Uneven Surface(QC):  6


Walk 50ft with 2 Turns (QC):  6


Walk 150 ft (QC):  6


Wheel 50 feet with 2 turns (QC:  9


1 Step (curb) (QC):  6


4 Steps (QC):  6


12 Steps (QC):  88


Picking up an Object (QC):  6





OT Long Term Goals


Long Term Goals


Time Frame:  Aug 4, 2022


Eating (QC):  6 (met)


Oral Hygiene (QC):  6 (met)


Shower/Bathe Self (QC):  6 (met)


Upper Body Dressing (QC):  6 (met)


Lower Body Dressing (QC):  6 (met)


On/Off Footwear (QC):  6 (met)


Toileting Hygiene (QC):  6 (met)


Toilet/Commode Transfer (QC):  6


1=Demonstrate adherence to instructed precautions during ADL tasks.


2=Patient will verbalize/demonstrate understanding of assistive 

devices/modifications for ADL.


3=Patient will improve strength/tolerance for activity to enable patient to perf

orm ADL's.





Speech Long Term Goals


Long Term Goals


1. The patient will improve her cognitive linguistic skills for safe discharge 

to the least restrictive environment. MET: The patient has met speech pathology 

goals placed at admission and has displayed excellent progression with cognitive

skills. Per patient, "I think the infection just made everything more difficult.


Time Frame:  Two Weeks.











GORDON HOLCOMB PT                Jul 29, 2022 10:37

## 2022-07-29 NOTE — PROGRESS NOTE - SURGERY
SANGITA ORTEGA 7/29/22 0847:


Subjective


Date Seen by a Provider:  Jul 29, 2022


Time Seen by a Provider:  08:01


Subjective/Events-last exam


Ms. Joy is being followed for a drain that was placed in her ventral 

abdomen. It was pulled 8 days ago. She has a fistula in her abdomen that is 

covered with an ostomy bag. She says she changed her own bag last night and it 

had a small amount of output that looked like chocolate milk. She threw away the

bag before it could be measured.. She is working with PT. She had nasuea and 

vomiting this morning. She is going home today at 1000.


Review of Systems


General:  No Chills, No Fatigue


HEENT:  No Head Aches, No Visual Changes


Pulmonary:  No Dyspnea, No Cough


Cardiovascular:  No: Chest Pain, Palpitations


Gastrointestinal:  Nausea, Vomiting, Abdominal Pain


Neurological:  No: Weakness, Confusion





Objective


Exam





Vital Signs








  Date Time  Temp Pulse Resp B/P (MAP) Pulse Ox O2 Delivery O2 Flow Rate FiO2


 


7/29/22 08:20  92  117/74 (88)    


 


7/29/22 07:09 36.5 94 18 98/65 (76) 99 Room Air  


 


7/28/22 20:47      Room Air  


 


7/28/22 20:22 36.7 89 18 113/73 (86) 100 Room Air  














I & O 


 


 7/29/22





 07:00


 


Intake Total 940 ml


 


Output Total 0 ml


 


Balance 940 ml





Capillary Refill :


General Appearance:  No Apparent Distress, WD/WN, Chronically ill, Thin


HEENT:  PERRL/EOMI, Moist Mucous Membranes


Neck:  Non Tender, Supple


Respiratory:  Chest Non Tender, Lungs Clear, Normal Breath Sounds, No Accessory 

Muscle Use, No Respiratory Distress


Cardiovascular:  Regular Rate, Rhythm, No Edema, Normal Peripheral Pulses


Peripheral Pulses:  2+ Radial Pulses (R), 2+ Radial Pulses (L)


Gastrointestinal:  soft; No distended, No guarding, No rebound; tenderness 

(Around fistula), other (Ostomy bag covering fistula)


Extremity:  Non Tender, No Pedal Edema


Neurologic/Psychiatric:  Alert, Oriented x3, Normal Mood/Affect


Skin:  Normal Color, Warm/Dry





Results


Lab


Laboratory Tests


7/28/22 10:46: Glucometer 123H


7/28/22 15:26: Glucometer 189H


7/28/22 20:17: Glucometer 91


7/29/22 05:32: Glucometer 96





Assessment/Plan


Enterocutaneous Fistula


Weakness


COVID


Malnutrition





Removed the drain 8 days ago will monitor fistula output. Small output that was 

not measured. Likely a low-output fistula.


Will monitor and see if this continues to decrease on its own, still may need to

discuss NPO and TPN.


Pt encouraged to take her protein shakes TID to help with nutrition, Pt encour

ed to ambulate and work with PT


Home today


Outpatient f/u





LAURENCE MORRELL DO 7/29/22 1205:


Supervisory-Addendum Brief


Verification & Attestation


Participated in pt care:  other (Pt left before I could see her today)


Personally performed:  other (Pt left before I could see her today)


Care discussed with:  Medical Student


Procedures:  n/a


Pt left before I could see her today











SANGITA ORTEGA                 Jul 29, 2022 08:47


LAURENCE MORRELL DO               Jul 29, 2022 12:05

## 2022-08-08 ENCOUNTER — HOSPITAL ENCOUNTER (OUTPATIENT)
Dept: HOSPITAL 75 - WOUNDCARE | Age: 77
End: 2022-08-08
Attending: FAMILY MEDICINE
Payer: MEDICARE

## 2022-08-08 DIAGNOSIS — D70.9: ICD-10-CM

## 2022-08-08 DIAGNOSIS — K63.2: Primary | ICD-10-CM

## 2022-08-08 DIAGNOSIS — D46.4: ICD-10-CM

## 2022-08-08 DIAGNOSIS — I95.2: ICD-10-CM

## 2022-08-08 DIAGNOSIS — E43: ICD-10-CM

## 2022-08-08 PROCEDURE — 99212 OFFICE O/P EST SF 10 MIN: CPT

## 2022-08-15 ENCOUNTER — HOSPITAL ENCOUNTER (INPATIENT)
Dept: HOSPITAL 75 - ER | Age: 77
LOS: 8 days | Discharge: NURSING, REHAB OR LONG-TERM CARE | DRG: 871 | End: 2022-08-23
Attending: INTERNAL MEDICINE | Admitting: FAMILY MEDICINE
Payer: MEDICARE

## 2022-08-15 VITALS — DIASTOLIC BLOOD PRESSURE: 61 MMHG | SYSTOLIC BLOOD PRESSURE: 81 MMHG

## 2022-08-15 VITALS — WEIGHT: 119.05 LBS | BODY MASS INDEX: 21.63 KG/M2 | HEIGHT: 62.01 IN

## 2022-08-15 VITALS — DIASTOLIC BLOOD PRESSURE: 64 MMHG | SYSTOLIC BLOOD PRESSURE: 104 MMHG

## 2022-08-15 VITALS — DIASTOLIC BLOOD PRESSURE: 64 MMHG | SYSTOLIC BLOOD PRESSURE: 94 MMHG

## 2022-08-15 VITALS — DIASTOLIC BLOOD PRESSURE: 73 MMHG | SYSTOLIC BLOOD PRESSURE: 117 MMHG

## 2022-08-15 VITALS — DIASTOLIC BLOOD PRESSURE: 64 MMHG | SYSTOLIC BLOOD PRESSURE: 97 MMHG

## 2022-08-15 DIAGNOSIS — Z83.3: ICD-10-CM

## 2022-08-15 DIAGNOSIS — Z88.0: ICD-10-CM

## 2022-08-15 DIAGNOSIS — Z85.43: ICD-10-CM

## 2022-08-15 DIAGNOSIS — E03.9: ICD-10-CM

## 2022-08-15 DIAGNOSIS — E87.1: ICD-10-CM

## 2022-08-15 DIAGNOSIS — K21.9: ICD-10-CM

## 2022-08-15 DIAGNOSIS — I13.0: ICD-10-CM

## 2022-08-15 DIAGNOSIS — I50.9: ICD-10-CM

## 2022-08-15 DIAGNOSIS — M19.91: ICD-10-CM

## 2022-08-15 DIAGNOSIS — E78.2: ICD-10-CM

## 2022-08-15 DIAGNOSIS — K65.9: ICD-10-CM

## 2022-08-15 DIAGNOSIS — Z79.82: ICD-10-CM

## 2022-08-15 DIAGNOSIS — E11.22: ICD-10-CM

## 2022-08-15 DIAGNOSIS — N17.9: ICD-10-CM

## 2022-08-15 DIAGNOSIS — Z22.8: ICD-10-CM

## 2022-08-15 DIAGNOSIS — N18.31: ICD-10-CM

## 2022-08-15 DIAGNOSIS — G72.9: ICD-10-CM

## 2022-08-15 DIAGNOSIS — I48.91: ICD-10-CM

## 2022-08-15 DIAGNOSIS — Z82.49: ICD-10-CM

## 2022-08-15 DIAGNOSIS — Z87.891: ICD-10-CM

## 2022-08-15 DIAGNOSIS — M06.9: ICD-10-CM

## 2022-08-15 DIAGNOSIS — Z91.018: ICD-10-CM

## 2022-08-15 DIAGNOSIS — E87.2: ICD-10-CM

## 2022-08-15 DIAGNOSIS — E86.0: ICD-10-CM

## 2022-08-15 DIAGNOSIS — A04.72: ICD-10-CM

## 2022-08-15 DIAGNOSIS — E46: ICD-10-CM

## 2022-08-15 DIAGNOSIS — A41.89: Primary | ICD-10-CM

## 2022-08-15 DIAGNOSIS — Z90.5: ICD-10-CM

## 2022-08-15 DIAGNOSIS — Z82.61: ICD-10-CM

## 2022-08-15 LAB
ALBUMIN SERPL-MCNC: 2.8 GM/DL (ref 3.2–4.5)
ALP SERPL-CCNC: 64 U/L (ref 40–136)
ALT SERPL-CCNC: 16 U/L (ref 0–55)
APTT PPP: YELLOW S
BACTERIA #/AREA URNS HPF: (no result) /HPF
BASOPHILS # BLD AUTO: 0.1 10^3/UL (ref 0–0.1)
BASOPHILS NFR BLD AUTO: 0 % (ref 0–10)
BILIRUB SERPL-MCNC: 0.7 MG/DL (ref 0.1–1)
BILIRUB UR QL STRIP: NEGATIVE
BUN/CREAT SERPL: 15
CALCIUM SERPL-MCNC: 7.8 MG/DL (ref 8.5–10.1)
CHLORIDE SERPL-SCNC: 102 MMOL/L (ref 98–107)
CO2 SERPL-SCNC: 18 MMOL/L (ref 21–32)
CREAT SERPL-MCNC: 3.62 MG/DL (ref 0.6–1.3)
EOSINOPHIL # BLD AUTO: 0 10^3/UL (ref 0–0.3)
EOSINOPHIL NFR BLD AUTO: 0 % (ref 0–10)
FIBRINOGEN PPP-MCNC: CLEAR MG/DL
GFR SERPLBLD BASED ON 1.73 SQ M-ARVRAT: 13 ML/MIN
GLUCOSE SERPL-MCNC: 166 MG/DL (ref 70–105)
GLUCOSE UR STRIP-MCNC: NEGATIVE MG/DL
HCT VFR BLD CALC: 33 % (ref 35–52)
HGB BLD-MCNC: 10.9 G/DL (ref 11.5–16)
HYALINE CASTS #/AREA URNS LPF: (no result) /LPF
KETONES UR QL STRIP: NEGATIVE
LEUKOCYTE ESTERASE UR QL STRIP: (no result)
LYMPHOCYTES # BLD AUTO: 1 10^3/UL (ref 1–4)
LYMPHOCYTES NFR BLD AUTO: 3 % (ref 12–44)
MAGNESIUM SERPL-MCNC: 1.4 MG/DL (ref 1.6–2.4)
MANUAL DIFFERENTIAL PERFORMED BLD QL: YES
MCH RBC QN AUTO: 30 PG (ref 25–34)
MCHC RBC AUTO-ENTMCNC: 33 G/DL (ref 32–36)
MCV RBC AUTO: 91 FL (ref 80–99)
MONOCYTES # BLD AUTO: 0.9 10^3/UL (ref 0–1)
MONOCYTES NFR BLD AUTO: 3 % (ref 0–12)
MONOCYTES NFR BLD: 1 %
NEUTROPHILS # BLD AUTO: 28.2 10^3/UL (ref 1.8–7.8)
NEUTROPHILS NFR BLD AUTO: 92 % (ref 42–75)
NEUTS BAND NFR BLD MANUAL: 95 %
NEUTS HYPERSEG BLD QL SMEAR: (no result)
NITRITE UR QL STRIP: NEGATIVE
OVALOCYTES BLD QL SMEAR: SLIGHT
PH UR STRIP: 5 [PH] (ref 5–9)
PLATELET # BLD: 247 10^3/UL (ref 130–400)
PMV BLD AUTO: 11.9 FL (ref 9–12.2)
POTASSIUM SERPL-SCNC: 4.9 MMOL/L (ref 3.6–5)
PROT SERPL-MCNC: 6.2 GM/DL (ref 6.4–8.2)
PROT UR QL STRIP: NEGATIVE
RBC #/AREA URNS HPF: (no result) /HPF
SODIUM SERPL-SCNC: 135 MMOL/L (ref 135–145)
SP GR UR STRIP: 1.02 (ref 1.02–1.02)
SQUAMOUS #/AREA URNS HPF: (no result) /HPF
STOMATOCYTES BLD QL SMEAR: SLIGHT
VARIANT LYMPHS NFR BLD MANUAL: 4 %
WBC # BLD AUTO: 30.7 10^3/UL (ref 4.3–11)
WBC #/AREA URNS HPF: (no result) /HPF

## 2022-08-15 PROCEDURE — 36600 WITHDRAWAL OF ARTERIAL BLOOD: CPT

## 2022-08-15 PROCEDURE — 87040 BLOOD CULTURE FOR BACTERIA: CPT

## 2022-08-15 PROCEDURE — 83605 ASSAY OF LACTIC ACID: CPT

## 2022-08-15 PROCEDURE — 85007 BL SMEAR W/DIFF WBC COUNT: CPT

## 2022-08-15 PROCEDURE — 87324 CLOSTRIDIUM AG IA: CPT

## 2022-08-15 PROCEDURE — 96367 TX/PROPH/DG ADDL SEQ IV INF: CPT

## 2022-08-15 PROCEDURE — 80053 COMPREHEN METABOLIC PANEL: CPT

## 2022-08-15 PROCEDURE — 81000 URINALYSIS NONAUTO W/SCOPE: CPT

## 2022-08-15 PROCEDURE — 80048 BASIC METABOLIC PNL TOTAL CA: CPT

## 2022-08-15 PROCEDURE — 74176 CT ABD & PELVIS W/O CONTRAST: CPT

## 2022-08-15 PROCEDURE — 36410 VNPNXR 3YR/> PHY/QHP DX/THER: CPT

## 2022-08-15 PROCEDURE — 96365 THER/PROPH/DIAG IV INF INIT: CPT

## 2022-08-15 PROCEDURE — 87205 SMEAR GRAM STAIN: CPT

## 2022-08-15 PROCEDURE — 71045 X-RAY EXAM CHEST 1 VIEW: CPT

## 2022-08-15 PROCEDURE — 82947 ASSAY GLUCOSE BLOOD QUANT: CPT

## 2022-08-15 PROCEDURE — 96361 HYDRATE IV INFUSION ADD-ON: CPT

## 2022-08-15 PROCEDURE — 76937 US GUIDE VASCULAR ACCESS: CPT

## 2022-08-15 PROCEDURE — 87070 CULTURE OTHR SPECIMN AEROBIC: CPT

## 2022-08-15 PROCEDURE — 87088 URINE BACTERIA CULTURE: CPT

## 2022-08-15 PROCEDURE — 85027 COMPLETE CBC AUTOMATED: CPT

## 2022-08-15 PROCEDURE — 83735 ASSAY OF MAGNESIUM: CPT

## 2022-08-15 PROCEDURE — 51702 INSERT TEMP BLADDER CATH: CPT

## 2022-08-15 PROCEDURE — 87449 NOS EACH ORGANISM AG IA: CPT

## 2022-08-15 PROCEDURE — 87636 SARSCOV2 & INF A&B AMP PRB: CPT

## 2022-08-15 PROCEDURE — 36415 COLL VENOUS BLD VENIPUNCTURE: CPT

## 2022-08-15 PROCEDURE — 87186 SC STD MICRODIL/AGAR DIL: CPT

## 2022-08-15 PROCEDURE — 87077 CULTURE AEROBIC IDENTIFY: CPT

## 2022-08-15 PROCEDURE — 82805 BLOOD GASES W/O2 SATURATION: CPT

## 2022-08-15 PROCEDURE — 85025 COMPLETE CBC W/AUTO DIFF WBC: CPT

## 2022-08-15 RX ADMIN — SODIUM CHLORIDE, SODIUM LACTATE, POTASSIUM CHLORIDE, AND CALCIUM CHLORIDE SCH MLS/HR: 600; 310; 30; 20 INJECTION, SOLUTION INTRAVENOUS at 22:24

## 2022-08-15 RX ADMIN — VANCOMYCIN HYDROCHLORIDE SCH MG: 125 CAPSULE ORAL at 23:31

## 2022-08-15 NOTE — DIAGNOSTIC IMAGING REPORT
INDICATION: Sepsis



AP view of the chest is obtained with comparison made study of

07/18/2022.



Heart size and pulmonary vascularity are within normal limits.

There is mild air trapping bilaterally. No pneumothorax or

consolidation is seen. There is no significant pleural fluid.



IMPRESSION: Bilateral air trapping is likely due to emphysema.

Otherwise, no acute abnormality is seen.



Dictated by: 



  Dictated on workstation # [id number]

## 2022-08-15 NOTE — ED GENERAL
General


Chief Complaint:  General Problems/Pain


Stated Complaint:  WEAKNESS,DIARRHEA


Source of Information:  Patient


Exam Limitations:  No Limitations





History of Present Illness


Date Seen by Provider:  Aug 15, 2022


Time Seen by Provider:  17:15


Initial Comments





To ER with general weakness and diarrhea watery and 3-4 episodes in total.  She 

arrives by [facility].  Symptoms present for 1 to 2 days.  She just got

out of [facility] about 2 weeks ago.  She states that she had an 

infection in her abdomen treated with a drain that they have subsequently 

removed and she is on a couple of antibiotics.


Timing/Duration:  1-2 Days


Severity:  Moderate


Associated Systoms:  Nausea/Vomiting





Allergies and Home Medications


Allergies


Coded Allergies:  


     Penicillins (Verified  Allergy, Unknown, 21)


     raspberry (Verified  Allergy, Unknown, 21)





Patient Home Medication List


Home Medication List Reviewed:  Yes


Acetaminophen (Tylenol Arthritis) 650 Mg Tablet.er, 650 MG PO Q6H PRN for PAIN-

MILD (1-4) OR TEMPATURE, (Reported)


   Entered as Reported by: GAGE LAU on 7/15/22 1145


Amitriptyline HCl (Amitriptyline HCl) 100 Mg Tablet, 100 MG PO HS, (Reported)


   Entered as Reported by: GAGE LAU on 11/3/20 1433


Aspirin (Children's Aspirin) 81 Mg Tab.chew, 81 MG PO DAILY


   Prescribed by: TAMMY HURST on 22


Atorvastatin Calcium (Lipitor) 40 Mg Tablet, 40 MG PO 1700


   Prescribed by: TAMMY HURST on 22


Carvedilol (Carvedilol) 3.125 Mg Tablet, 3.125 MG PO BID WITH MEALS


   Prescribed by: TAMMY HURST on 22


Cetirizine HCl (Cetirizine HCl) 10 Mg Tablet, 10 MG PO DAILY, (Reported)


   Entered as Reported by: GAGE LAU on 10/30/20 1109


Cholecalciferol (Vitamin D3) (Vitamin D3) 25 Mcg (1000 Unit) Capsule, 25 MCG PO 

DAILY, (Reported)


   Entered as Reported by: GAGE LAU on 7/15/22 1145


Famotidine (Famotidine) 20 Mg Tablet, 20 MG PO DAILY, (Reported)


   Entered as Reported by: GAGE LAU on 7/15/22 114


Fenofibrate (Fenofibrate) 160 Mg Tablet, 160 MG PO HS, (Reported)


   Entered as Reported by: GAGE LAU on 11/3/20 1433


Ferrous Sulfate (Iron) 325 Mg Tablet, 325 MG PO BID, (Reported)


   Entered as Reported by: GAGE LAU on 20 1256


Furosemide (Furosemide) 40 Mg Tablet, 40 MG PO DAILY


   Prescribed by: TAMMY HURST on 22


Latanoprost (Xalatan) 0.005 % Drops, 1 DROP OU HS, (Reported)


   Entered as Reported by: GAGE LAU on 7/15/22 114


Leflunomide (Leflunomide) 20 Mg Tablet, 20 MG PO Q48H, (Reported)


   Entered as Reported by: GAGE LAU on 11/3/20 143


Levothyroxine Sodium (Levothyroxine Sodium) 150 Mcg Tablet, 150 MCG PO DAILY, 

(Reported)


   Entered as Reported by: GAGE LAU on 22 1312


Oxybutynin Chloride (Oxybutynin Chloride) 5 Mg Tablet, 5 MG PO BID, (Reported)


   Entered as Reported by: GAGE LAU on 7/15/22 114


Potassium Chloride (Klor-Con M20) 20 Meq Tab.er.prt, 20 MEQ PO DAILY@0700


   Prescribed by: TAMMY HURST on 22


Sacubitril/Valsartan (Entresto 24 mg-26 mg Tablet) 24 Mg-26 Mg Tablet, 1 TAB PO 

BID


   Prescribed by: TAMMY HURST on 22


Spironolactone (Spironolactone) 25 Mg Tablet, 25 MG PO DAILY


   Prescribed by: TAMMY HURST on 22


Timolol Maleate (Timolol Maleate 0.5%) 0.5 % Drops, 1 DROP OU HS, (Reported)


   Entered as Reported by: GAGE ALU on 7/15/22 114





Review of Systems


Review of Systems


Constitutional:  see HPI


EENTM:  see HPI


Respiratory:  no symptoms reported


Cardiovascular:  no symptoms reported


Genitourinary:  no symptoms reported


Musculoskeletal:  no symptoms reported


Skin:  no symptoms reported


Psychiatric/Neurological:  No Symptoms Reported


Hematologic/Lymphatic:  No Symptoms Reported





Past Medical-Social-Family Hx


Immunizations Up To Date


First/Initial COVID19 Vaccinat:  


Second COVID19 Vaccination Osmany:  


Third COVID19 Vaccination Date:  





Seasonal Allergies


Seasonal Allergies:  No





Past Medical History


Surgery/Hospitalization HX:  


C-DIFF, MULTIPLE ABD SURGERY,. SURGERY. TAILBONE REMOVED. HYSTERECTOMY, GB  


REMOVED, T&A, APPY,  RIGHT KIDNEY REMOVED AT AGE 16 YRS (NON-FUCTIONAL  


KIDNEY) LIPOMA REMOVED FROM NECK, EYELID LIFT, GLAUCOMA IN BOTH EYES. 


ILOESTOMY BECAUSE OF C-DIFF , REVERSAL ILEOSTOMY 2021


Surgeries:  Yes (Abdominal, Bowel Surgery, Cardiac, Gallbladder, Hysterectomy, 

Nephrectomy, )


Abdominal, Bowel Surgery, Cardiac, Gallbladder, Hysterectomy, Nephrectomy, 

Oophorectomy, Tonsillectomy


Respiratory:  No


Currently Using CPAP:  No


Cardiac:  Yes (AFIB/RVR/CARDIOVERSION 2020; )


Atrial Fibrillation, High Cholesterol, Hypertension


Neurological:  No


Reproductive Disorders:  Yes (OVARIAN CANCER)


GYN History:  Hysterectomy, Menopausal


Sexually Transmitted Disease:  No


HIV/AIDS:  No


Genitourinary:  Yes (HAS ONLY 1 KIDNEY (WHEN 16 YRS OLD HAD REMOVED CAUSE BLOOD 

VESSELS WRAPPED )


Bladder Infection


Gastrointestinal:  Yes (PERMANENT ILEOSTOMY/COMPLETE COLECTOMY 2020;MOSER)


Colitis, Gastroesophageal Reflux, Liver Disease/Jaundice, C-Diff, Gall Bladder 

Disease


Musculoskeletal:  Yes (RA/OSTEOARTHRITIS)


Arthritis, Rheumatoid Arthritis


Endocrine:  Yes


Hypothyroidsim, Diabetes, Non-Insulin dep


HEENT:  No


Cancer:  Yes


Ovarian


Did You Recieve Any Treatments:  Yes


What Type of Treatment Did You:  Surgical Intervention


Psychosocial:  No


Integumentary:  Yes (DELAYED HEALING OF SURGICAL WOUND; PERISTOMAL SKIN 

BREAKDOWN)


Blood Disorders:  No


Adverse Reaction/Blood Tranf:  No





Family Medical History





Arthritis


  19 MOTHER


Cardiovascular disease


  19 MOTHER


Diabetes mellitus


  19 FATHER


  19 MOTHER


No Pertinent Family Hx





PAST MEDICAL /SURGICAL HISTORY:


-2020--HAD SEVERE C. DIFFICILE COLITIS--ADMITTED TO [facility]


20. PT HAD SYNCOPAL EPISODE WITH LOSS OF PULSE AND CPR/RESUSCITATION


FOR APPROXIMATELY 2 MINUTES WITH ROSC.


DURING THAT STAY SHE HAD ATRIAL FIBRILLATION WITH RVR THAT REQUIRED


CARDIOVERSION


PT UNDERWENT SUBTOTAL COLECTOMY WITH PERMANENT ILEOSTOMY. 


PT REPORTS THAT SHE WAS ON VENTILATOR FOR 10 DAYS


PT WAS TRANSFERRED FROM [facility] TO [facility], AND WAS THERE FROM


10/10/20-10/30/20, THEN TRANSFERRED HERE FOR REHAB FROM 10/30/20-11/10/20.


SEEING WOUND CARE FOR DELAYED HEALING OF MIDLINE SURGICAL WOUND AND SKIN


BREAKDOWN AROUND STOMA.





ADDITIONAL PAST SURGICAL HISTORY:


-TONSILLECTOMY 


-APPENDECTOMY 


-RIGHT NEPHRECTOMY  FOR CONGENITAL HYPOPLASTIC KIDNEY


-OVARIAN CYSTECTOMY 


-OPEN TOTAL ABDOMINAL HYSTERECTOMY / BILATARAL SALPINGO-OOPHORECTOMY 


FOR OVARIAN CANCER


-COCCYX EXCISION 


-LAPAROSCOPIC CHOLECYSTECOMY 


-RIGHT NECK LIPOMA REMOVAL 2012 BY DR. CRAWLEY


-SEBACEOUS CYST OF ABDOMINAL WALL REMOVED 2012 BY DR. CRAWLEY


-SUBTOTAL COLECTOMY WITH PERMANENT ILEOSTOMY 2020 AT [facility] BY


DR. BEAVERS





Physical Exam


Vital Signs





Vital Signs - First Documented








 8/15/22





 16:35


 


Temp 37.5


 


Pulse 124


 


Resp 16


 


B/P (MAP) 92/60 (71)


 


Pulse Ox 100





Capillary Refill :


Height, Weight, BMI


Height: '"


Weight: lbs. oz. kg; 18.54 BMI


Method:Stated


General Appearance:  No Apparent Distress, WD/WN, Thin (Very frail.  On arrival 

heart rate 122 sinus, blood pressure consistently 70s over 40s.  IV started)


HEENT:  PERRL/EOMI, TMs Normal


Neck:  Full Range of Motion, Normal Inspection


Respiratory:  No Accessory Muscle Use, No Respiratory Distress


Cardiovascular:  Regular Rate, Rhythm, Normal Peripheral Pulses


Gastrointestinal:  Normal Bowel Sounds, Non Tender, Soft


Extremity:  Normal Capillary Refill, Normal Inspection


Neurologic/Psychiatric:  Alert, Oriented x3


Skin:  Normal Color, Warm/Dry





Focused Exam


Lactate Level


8/15/22 16:50: Lactic Acid Level 1.67





Lactic Acid Level





Laboratory Tests








Test


 8/15/22


16:50


 


Lactic Acid Level


 1.67 MMOL/L


(0.50-2.00)











Progress/Results/Core Measures


Suspected Sepsis


SIRS


Temperature: 


Pulse:  


Respiratory Rate: 


 


Laboratory Tests


8/15/22 16:50: White Blood Count 30.7*H


Blood Pressure  / 


Mean: 


 


8/15/22 16:50: Lactic Acid Level 1.67


Laboratory Tests


8/15/22 16:50: 


Creatinine 3.62H, Platelet Count 247, Total Bilirubin 0.7








Results/Orders


Lab Results





Laboratory Tests








Test


 8/15/22


16:50 8/15/22


18:29 Range/Units


 


 


White Blood Count


 30.7 *H


 


 4.3-11.0


10^3/uL


 


Red Blood Count


 3.67 L


 


 3.80-5.11


10^6/uL


 


Hemoglobin 10.9 L  11.5-16.0  g/dL


 


Hematocrit 33 L  35-52  %


 


Mean Corpuscular Volume 91   80-99  fL


 


Mean Corpuscular Hemoglobin 30   25-34  pg


 


Mean Corpuscular Hemoglobin


Concent 33 


 


 32-36  g/dL





 


Red Cell Distribution Width 13.5   10.0-14.5  %


 


Platelet Count


 247 


 


 130-400


10^3/uL


 


Mean Platelet Volume 11.9   9.0-12.2  fL


 


Immature Granulocyte % (Auto) 2    %


 


Neutrophils (%) (Auto) 92 H  42-75  %


 


Lymphocytes (%) (Auto) 3 L  12-44  %


 


Monocytes (%) (Auto) 3   0-12  %


 


Eosinophils (%) (Auto) 0   0-10  %


 


Basophils (%) (Auto) 0   0-10  %


 


Neutrophils # (Auto)


 28.2 H


 


 1.8-7.8


10^3/uL


 


Lymphocytes # (Auto)


 1.0 


 


 1.0-4.0


10^3/uL


 


Monocytes # (Auto)


 0.9 


 


 0.0-1.0


10^3/uL


 


Eosinophils # (Auto)


 0.0 


 


 0.0-0.3


10^3/uL


 


Basophils # (Auto)


 0.1 


 


 0.0-0.1


10^3/uL


 


Immature Granulocyte # (Auto)


 0.5 H


 


 0.0-0.1


10^3/uL


 


Neutrophils % (Manual) 95    %


 


Lymphocytes % (Manual) 4    %


 


Monocytes % (Manual) 1    %


 


Hypersegmented Neutrophils MODERATE    


 


Stomatocytes SLIGHT    


 


Elliptocytes SLIGHT    


 


Sodium Level 135   135-145  MMOL/L


 


Potassium Level 4.9   3.6-5.0  MMOL/L


 


Chloride Level 102     MMOL/L


 


Carbon Dioxide Level 18 L  21-32  MMOL/L


 


Anion Gap 15 H  5-14  MMOL/L


 


Blood Urea Nitrogen 54 H  7-18  MG/DL


 


Creatinine


 3.62 H


 


 0.60-1.30


MG/DL


 


Estimat Glomerular Filtration


Rate 13 


 


  





 


BUN/Creatinine Ratio 15    


 


Glucose Level 166 H    MG/DL


 


Lactic Acid Level


 1.67 


 


 0.50-2.00


MMOL/L


 


Calcium Level 7.8 L  8.5-10.1  MG/DL


 


Corrected Calcium 8.8   8.5-10.1  MG/DL


 


Magnesium Level 1.4 L  1.6-2.4  MG/DL


 


Total Bilirubin 0.7   0.1-1.0  MG/DL


 


Aspartate Amino Transf


(AST/SGOT) 13 


 


 5-34  U/L





 


Alanine Aminotransferase


(ALT/SGPT) 16 


 


 0-55  U/L





 


Alkaline Phosphatase 64     U/L


 


Total Protein 6.2 L  6.4-8.2  GM/DL


 


Albumin 2.8 L  3.2-4.5  GM/DL


 


Urine Color  YELLOW   


 


Urine Clarity  CLEAR   


 


Urine pH  5.0  5-9  


 


Urine Specific Gravity  1.020  1.016-1.022  


 


Urine Protein  NEGATIVE  NEGATIVE  


 


Urine Glucose (UA)  NEGATIVE  NEGATIVE  


 


Urine Ketones  NEGATIVE  NEGATIVE  


 


Urine Nitrite  NEGATIVE  NEGATIVE  


 


Urine Bilirubin  NEGATIVE  NEGATIVE  


 


Urine Urobilinogen  0.2  < = 1.0  MG/DL


 


Urine Leukocyte Esterase  TRACE H NEGATIVE  


 


Urine RBC (Auto)  NEGATIVE  NEGATIVE  


 


Urine RBC  NONE   /HPF


 


Urine WBC  2-5   /HPF


 


Urine Squamous Epithelial


Cells 


 2-5 


  /HPF





 


Urine Crystals  NONE   /LPF


 


Urine Bacteria  TRACE   /HPF


 


Urine Casts  PRESENT   /LPF


 


Urine Hyaline Casts  5-10 H  /LPF


 


Urine Mucus  NEGATIVE   /LPF


 


Urine Culture Indicated  YES   








My Orders





Orders - JANIYA TATE


Cbc With Automated Diff (8/15/22 16:47)


Comprehensive Metabolic Panel (8/15/22 16:47)


Magnesium (8/15/22 16:47)


Ed Iv/Invasive Line Start (8/15/22 16:47)


Lactated Ringers (Lr 1000 Ml Iv Solution (8/15/22 16:53)


C Difficile Ag + Toxin A/B. (8/15/22 17:01)


Blood Culture (8/15/22 17:01)


Lactic Acid Analyzer (8/15/22 17:01)


Ed Iv/Invasive Line Start (8/15/22 17:01)


Lactated Ringers (Lr 1000 Ml Iv Solution (8/15/22 17:15)


Ct Abdomen/Pelvis Wo (8/15/22 17:11)


Ua Culture If Indicated (8/15/22 17:19)


Chest 1 View, Ap/Pa Only (8/15/22 17:19)


Manual Differential (8/15/22 16:50)


Lactated Ringers (Lr 1000 Ml Iv Solution (8/15/22 18:00)


Cefepime Injection (Maxipime Injection) (8/15/22 18:00)


Metronidazole 500mg/100ml Ivpb (Flagyl 5 (8/15/22 18:00)


[place] Cath (8/15/22 18:03)


Urine Culture (8/15/22 18:29)





Medications Given in ED





Current Medications








 Medications  Dose


 Ordered  Sig/Dianne


 Route  Start Time


 Stop Time Status Last Admin


Dose Admin


 


 Cefepime HCl 1000


 mg/Sodium Chloride  50 ml @ 


 100 mls/hr  ONCE  ONCE


 IV  8/15/22 18:00


 8/15/22 18:29 DC 8/15/22 18:20


100 MLS/HR


 


 Metronidazole  100 ml @ 


 100 mls/hr  ONCE  ONCE


 IV  8/15/22 18:00


 8/15/22 18:59 DC 8/15/22 18:59


100 MLS/HR








Vital Signs/I&O











 8/15/22





 16:35


 


Temp 37.5


 


Pulse 124


 


Resp 16


 


B/P (MAP) 92/60 (71)


 


Pulse Ox 100





Capillary Refill :





Departure


Communication (Admissions)


She has received a 2 L LR bolus here in the emergency room.  On arrival heart 

rate 122 blood pressure 70s.  After the bolus her heart rate is down to 105 her 

blood pressure is up to 117/80.  Oxygen 100% on room air.  Respiratory rate is 

12.  I will collect a culture of the draining abdominal wound just inferior to 

the umbilicus.  I will put her on cefepime and IV Flagyl to include anaerobic 

coverage until the cultures from this wound come back.  I will put her on p.o. 

vancomycin for suspected C. difficile colitis.  We will collect a stool sample a

s soon as possible.   She does wear a LifeVest started on 2022 for severe 

cardiomyopathy with reduced ejection fraction at 20 to 25% found on 

echocardiogram 2022.  She does wish to remain full CODE STATUS.  Spoke to 

Dr. Huerta, spoke with Dr. Valdez but Dr. Galicia is actually familiar with this 

patient.  Ill write to consult him in the morning.


NAME:   JOSE BARBER


[facility] REC#:   [id number]


ACCOUNT#:   [id number]


PT STATUS:   REG ER


:   1945


PHYSICIAN:   JANIYA TATE


ADMIT DATE:   08/15/22/ER


                                   ***Draft***


Date of Exam:08/15/22





CT ABDOMEN/PELVIS WO








PROCEDURE: CT abdomen and pelvis without contrast.





TECHNIQUE: Multiple contiguous axial images were obtained through


the abdomen and pelvis without the use of intravenous contrast.


Auto Exposure Controls were utilized during the CT exam to meet


ALARA standards for radiation dose reduction. 





INDICATION:  Periumbilical abdominal pain





COMPARISON: 2022





Unenhanced images of the liver and spleen reveal no focal


abnormality. No pancreatic, adrenal gland or left renal


abnormality is identified. Right kidney is absent. Gallbladder


has been removed. There is fluid distention of colon with diffuse


mural thickening. There is extensively thickened sigmoid colon


and rectal walls. Surgical findings are seen in the anterior


abdominal wall with small amount of gas and fluid along the


midline incision site. Unenhanced urinary bladder is distended


but otherwise unremarkable.





IMPRESSION: Extensive mural thickening of the distal colon and


rectum suggestive of proctocolitis. There is a small amount of


fluid within the anterior abdominal wall incision site, however,


no other focal fluid collection is seen to indicate abscess.





  Dictated on workstation # [id number]








Dict:   08/15/22 1758


Trans:   08/15/22 1808


Former[facility] [id number]





Interpreted by:     LUIS ZIMMERMAN MD


Electronically signed by:





Impression





   Primary Impression:  


   Sepsis


   Additional Impressions:  


   Proctocolitis


   Peritoneal fistula


Disposition:   ADMITTED AS INPATIENT


Condition:  Stable





Admissions


Decision to Admit Reason:  Admit from ER (General)


Decision to Admit/Date:  Aug 15, 2022


Time/Decision to Admit Time:  18:13





Departure-Patient Inst.


Referrals:  


JACE MABRY MD (PCP/Family)


Primary Care Physician











JANIYA TATE             Aug 15, 2022 17:17

## 2022-08-15 NOTE — DIAGNOSTIC IMAGING REPORT
PROCEDURE: CT abdomen and pelvis without contrast.



TECHNIQUE: Multiple contiguous axial images were obtained through

the abdomen and pelvis without the use of intravenous contrast.

Auto Exposure Controls were utilized during the CT exam to meet

ALARA standards for radiation dose reduction. 



INDICATION:  Periumbilical abdominal pain



COMPARISON: 07/20/2022



Unenhanced images of the liver and spleen reveal no focal

abnormality. No pancreatic, adrenal gland or left renal

abnormality is identified. Right kidney is absent. Gallbladder

has been removed. There is fluid distention of colon with diffuse

mural thickening. There is extensively thickened sigmoid colon

and rectal walls. Surgical findings are seen in the anterior

abdominal wall with small amount of gas and fluid along the

midline incision site. Unenhanced urinary bladder is distended

but otherwise unremarkable.



IMPRESSION: Extensive mural thickening of the distal colon and

rectum suggestive of proctocolitis. There is a small amount of

fluid within the anterior abdominal wall incision site, however,

no other focal fluid collection is seen to indicate abscess.



Dictated by: 



  Dictated on workstation # [id number]

## 2022-08-16 VITALS — DIASTOLIC BLOOD PRESSURE: 65 MMHG | SYSTOLIC BLOOD PRESSURE: 103 MMHG

## 2022-08-16 VITALS — DIASTOLIC BLOOD PRESSURE: 78 MMHG | SYSTOLIC BLOOD PRESSURE: 122 MMHG

## 2022-08-16 VITALS — DIASTOLIC BLOOD PRESSURE: 65 MMHG | SYSTOLIC BLOOD PRESSURE: 104 MMHG

## 2022-08-16 VITALS — DIASTOLIC BLOOD PRESSURE: 70 MMHG | SYSTOLIC BLOOD PRESSURE: 119 MMHG

## 2022-08-16 VITALS — DIASTOLIC BLOOD PRESSURE: 66 MMHG | SYSTOLIC BLOOD PRESSURE: 115 MMHG

## 2022-08-16 VITALS — SYSTOLIC BLOOD PRESSURE: 103 MMHG | DIASTOLIC BLOOD PRESSURE: 67 MMHG

## 2022-08-16 VITALS — DIASTOLIC BLOOD PRESSURE: 60 MMHG | SYSTOLIC BLOOD PRESSURE: 96 MMHG

## 2022-08-16 VITALS — SYSTOLIC BLOOD PRESSURE: 115 MMHG | DIASTOLIC BLOOD PRESSURE: 53 MMHG

## 2022-08-16 VITALS — DIASTOLIC BLOOD PRESSURE: 73 MMHG | SYSTOLIC BLOOD PRESSURE: 121 MMHG

## 2022-08-16 VITALS — DIASTOLIC BLOOD PRESSURE: 67 MMHG | SYSTOLIC BLOOD PRESSURE: 105 MMHG

## 2022-08-16 VITALS — DIASTOLIC BLOOD PRESSURE: 62 MMHG | SYSTOLIC BLOOD PRESSURE: 98 MMHG

## 2022-08-16 LAB
BASOPHILS # BLD AUTO: 0.1 10^3/UL (ref 0–0.1)
BASOPHILS NFR BLD AUTO: 0 % (ref 0–10)
BUN/CREAT SERPL: 17
CALCIUM SERPL-MCNC: 7.2 MG/DL (ref 8.5–10.1)
CHLORIDE SERPL-SCNC: 106 MMOL/L (ref 98–107)
CO2 SERPL-SCNC: 17 MMOL/L (ref 21–32)
CREAT SERPL-MCNC: 2.84 MG/DL (ref 0.6–1.3)
EOSINOPHIL # BLD AUTO: 0.2 10^3/UL (ref 0–0.3)
EOSINOPHIL NFR BLD AUTO: 1 % (ref 0–10)
GFR SERPLBLD BASED ON 1.73 SQ M-ARVRAT: 17 ML/MIN
GLUCOSE SERPL-MCNC: 88 MG/DL (ref 70–105)
HCT VFR BLD CALC: 30 % (ref 35–52)
HGB BLD-MCNC: 10.1 G/DL (ref 11.5–16)
LYMPHOCYTES # BLD AUTO: 1.1 10^3/UL (ref 1–4)
LYMPHOCYTES NFR BLD AUTO: 4 % (ref 12–44)
MANUAL DIFFERENTIAL PERFORMED BLD QL: NO
MCH RBC QN AUTO: 30 PG (ref 25–34)
MCHC RBC AUTO-ENTMCNC: 33 G/DL (ref 32–36)
MCV RBC AUTO: 89 FL (ref 80–99)
MONOCYTES # BLD AUTO: 0.7 10^3/UL (ref 0–1)
MONOCYTES NFR BLD AUTO: 3 % (ref 0–12)
NEUTROPHILS # BLD AUTO: 21.9 10^3/UL (ref 1.8–7.8)
NEUTROPHILS NFR BLD AUTO: 89 % (ref 42–75)
PLATELET # BLD: 212 10^3/UL (ref 130–400)
PMV BLD AUTO: 10.8 FL (ref 9–12.2)
POTASSIUM SERPL-SCNC: 4.5 MMOL/L (ref 3.6–5)
SODIUM SERPL-SCNC: 133 MMOL/L (ref 135–145)
WBC # BLD AUTO: 24.7 10^3/UL (ref 4.3–11)

## 2022-08-16 RX ADMIN — VANCOMYCIN HYDROCHLORIDE SCH MG: 125 CAPSULE ORAL at 05:30

## 2022-08-16 RX ADMIN — SODIUM CHLORIDE, SODIUM LACTATE, POTASSIUM CHLORIDE, AND CALCIUM CHLORIDE SCH MLS/HR: 600; 310; 30; 20 INJECTION, SOLUTION INTRAVENOUS at 02:09

## 2022-08-16 RX ADMIN — SODIUM CHLORIDE, SODIUM LACTATE, POTASSIUM CHLORIDE, AND CALCIUM CHLORIDE SCH MLS/HR: 600; 310; 30; 20 INJECTION, SOLUTION INTRAVENOUS at 23:14

## 2022-08-16 RX ADMIN — VANCOMYCIN HYDROCHLORIDE SCH MG: 125 CAPSULE ORAL at 23:15

## 2022-08-16 RX ADMIN — ENOXAPARIN SODIUM SCH MG: 30 INJECTION SUBCUTANEOUS at 23:16

## 2022-08-16 RX ADMIN — METRONIDAZOLE SCH MLS/HR: 5 INJECTION, SOLUTION INTRAVENOUS at 17:49

## 2022-08-16 RX ADMIN — METRONIDAZOLE SCH MLS/HR: 5 INJECTION, SOLUTION INTRAVENOUS at 00:50

## 2022-08-16 RX ADMIN — VANCOMYCIN HYDROCHLORIDE SCH MG: 125 CAPSULE ORAL at 18:26

## 2022-08-16 RX ADMIN — VANCOMYCIN HYDROCHLORIDE SCH MG: 125 CAPSULE ORAL at 13:03

## 2022-08-16 RX ADMIN — METRONIDAZOLE SCH MLS/HR: 5 INJECTION, SOLUTION INTRAVENOUS at 09:08

## 2022-08-16 RX ADMIN — SODIUM CHLORIDE, SODIUM LACTATE, POTASSIUM CHLORIDE, AND CALCIUM CHLORIDE SCH MLS/HR: 600; 310; 30; 20 INJECTION, SOLUTION INTRAVENOUS at 13:03

## 2022-08-16 NOTE — CONSULTATION - SURGERY
TONYA REED 8/16/22 0854:


History of Present Illness


History of Present Illness


Patient Consulted On(krishna/time)


8/16/22


 08:51


Date Seen by Provider:  Aug 16, 2022


Time Seen by Provider:  08:51


History of Present Illness


Consult requested by Dr. Huerta. Pt reports watery diarrhea for the last x 3 

days. She also has general weakness and mild abdominal pain.  Pt states 2 weeks 

ago she was seen at [facility] and was told she had "a mass growing 3 

bacteria." She was started on abx. Pt has a hx of C. diff infection with 

iliostomy in 2020 and reversal of iliostomy in June 2021, by Dr. Morrell. She has

no fever. She does have chills, nausea and states she gets confused when her 

heart rate elevates. She denies blood in stool, Chest pain, SOB, vomiting. She 

had a nephrectomy at age 16.





Allergies and Home Medications


Allergies


Coded Allergies:  


     Penicillins (Verified  Allergy, Unknown, 4/22/21)


     raspberry (Verified  Allergy, Unknown, 4/22/21)





Patient Home Medication List


Acetaminophen (Tylenol Arthritis) 650 Mg Tablet.er, 650 MG PO Q6H PRN for PAIN-

MILD (1-4) OR TEMPATURE, (Reported)


   Entered as Reported by: GAGE LAU on 7/15/22 1145


Amitriptyline HCl (Amitriptyline HCl) 100 Mg Tablet, 100 MG PO HS, (Reported)


   Entered as Reported by: GAGE LAU on 11/3/20 1433


Aspirin (Children's Aspirin) 81 Mg Tab.chew, 81 MG PO DAILY


   Prescribed by: TAMMY HURST on 7/28/22 2134


Atorvastatin Calcium (Lipitor) 40 Mg Tablet, 40 MG PO 1700


   Prescribed by: TAMMY HURST on 7/28/22 2134


Carvedilol (Carvedilol) 3.125 Mg Tablet, 3.125 MG PO BID WITH MEALS


   Prescribed by: TAMMY HURST on 7/28/22 2134


Cetirizine HCl (Cetirizine HCl) 10 Mg Tablet, 10 MG PO DAILY, (Reported)


   Entered as Reported by: GAGE LAU on 10/30/20 1109


Cholecalciferol (Vitamin D3) (Vitamin D3) 25 Mcg (1000 Unit) Capsule, 25 MCG PO 

DAILY, (Reported)


   Entered as Reported by: GAGE LAU on 7/15/22 1145


Famotidine (Famotidine) 20 Mg Tablet, 20 MG PO DAILY, (Reported)


   Entered as Reported by: GAGE LAU on 7/15/22 1145


Fenofibrate (Fenofibrate) 160 Mg Tablet, 160 MG PO HS, (Reported)


   Entered as Reported by: GAGE LAU on 11/3/20 1433


Ferrous Sulfate (Iron) 325 Mg Tablet, 325 MG PO BID, (Reported)


   Entered as Reported by: GAGE LAU on 11/4/20 1256


Furosemide (Furosemide) 40 Mg Tablet, 40 MG PO DAILY


   Prescribed by: TAMMY HURST on 7/28/22 2134


Latanoprost (Xalatan) 0.005 % Drops, 1 DROP OU HS, (Reported)


   Entered as Reported by: GAGE LAU on 7/15/22 1145


Leflunomide (Leflunomide) 20 Mg Tablet, 20 MG PO Q48H, (Reported)


   Entered as Reported by: GAGE LAU on 11/3/20 1434


Levothyroxine Sodium (Levothyroxine Sodium) 150 Mcg Tablet, 150 MCG PO DAILY, 

(Reported)


   Entered as Reported by: GAGE LAU on 7/28/22 1312


Oxybutynin Chloride (Oxybutynin Chloride) 5 Mg Tablet, 5 MG PO BID, (Reported)


   Entered as Reported by: GAGE LAU on 7/15/22 1145


Potassium Chloride (Klor-Con M20) 20 Meq Tab.er.prt, 20 MEQ PO DAILY@0700


   Prescribed by: TAMMY HURST on 7/28/22 2134


Sacubitril/Valsartan (Entresto 24 mg-26 mg Tablet) 24 Mg-26 Mg Tablet, 1 TAB PO 

BID


   Prescribed by: TAMMY HURST on 7/28/22 2134


Spironolactone (Spironolactone) 25 Mg Tablet, 25 MG PO DAILY


   Prescribed by: TAMMY HURST on 7/28/22 2134


Timolol Maleate (Timolol Maleate 0.5%) 0.5 % Drops, 1 DROP OU HS, (Reported)


   Entered as Reported by: GAGE LAU on 7/15/22 1145





Past Medical-Social-Family Hx


Patient Social History


Smoking Status:  Never a Smoker


Former Smoker, Quit:  Mar 30, 1985


Type Used:  Cigarettes


2nd Hand Smoke Exposure:  No


Recent Hopitalizations:  No


Alcohol Use?:  No


Have you traveled recently?:  No





Immunizations Up To Date


Date of Pneumonia Vaccine:  Oct 8, 2020


Date of Influenza Vaccine:  Oct 7, 2020





Seasonal Allergies


Seasonal Allergies:  No





Surgeries


History of Surgeries:  Yes (Abdominal, Bowel Surgery, Cardiac, Gallbladder, 

Hysterectomy, Nephrectomy, )


Surgeries:  Bowel Surgery (iliostomy and iliostomy reversal), Cardiac, 

Gallbladder, Hysterectomy, Nephrectomy, Oophorectomy, Tonsillectomy





Respiratory


History of Respiratory Disorde:  No





Cardiovascular


History of Cardiac Disorders:  Yes (AFIB/RVR/CARDIOVERSION 09/2020; )


Cardiac Disorders:  Atrial Fibrillation, High Cholesterol, Hypertension





Neurological


History of Neurological Disord:  No





Reproductive System


Hx Reproductive Disorders:  Yes (OVARIAN CANCER)


Sexually Transmitted Disease:  No


HIV/AIDS:  No


GYN History:  Hysterectomy, Menopausal





Genitourinary


History of Genitourinary Disor:  Yes (HAS ONLY 1 KIDNEY (WHEN 16 YRS OLD HAD 

REMOVED CAUSE BLOOD VESSELS WRAPPED )


Genitourinary Disorders:  Bladder Infection





Gastrointestinal


History of Gastrointestinal Di:  Yes (PERMANENT ILEOSTOMY/COMPLETE COLECTOMY 

09/2020;MOSER)


Gastrointestinal Disorders:  Colitis, Gastroesophageal Reflux, Liver 

Disease/Jaundice, C-Diff, Gall Bladder Disease





Musculoskeletal


History of Musculoskeletal Dis:  Yes (RA/OSTEOARTHRITIS)


Musculoskeletal Disorders:  Arthritis, Rheumatoid Arthritis





Endocrine


History of Endocrine Disorders:  Yes


Endocrine Disorders:  Hypothyroidsim, Diabetes, Non-Insulin dep





HEENT


History of HEENT Disorders:  No





Cancer


History of Cancer:  Yes


Cancer:  Ovarian





Psychosocial


History of Psychiatric Problem:  No





Integumentary


History of Skin or Integumenta:  Yes (DELAYED HEALING OF SURGICAL WOUND; 

PERISTOMAL SKIN BREAKDOWN)





Blood Transfusions


History of Blood Disorders:  No


Adverse Reaction to a Blood Tr:  No





Family Medical History


Significant Family History:  No Pertinent Family Hx, Diabetes, Hypertension


Family Medial History:  


Arthritis


  20 MOTHER


Cardiovascular disease


  20 MOTHER


Diabetes mellitus


  19 FATHER


  20 MOTHER





Review of Systems-General


Constitutional:  chills; No fever


EENTM:  No blurred vision, No double vision


Respiratory:  No cough, No short of breath


Cardiovascular:  No chest pain


Gastrointestinal:  abdominal pain (diffuse), diarrhea (watery ); No melena; 

nausea; No vomiting


Pregnant:  No


Skin:  No change in color


Psychiatric/Neurological:  Denies Headache





Physical Exam-General Problems


Physical Exam


Vital Signs





Vital Signs - First Documented








 8/15/22 8/15/22





 16:35 20:43


 


Temp 37.5 


 


Pulse 124 


 


Resp 16 


 


B/P (MAP) 92/60 (71) 


 


Pulse Ox 100 


 


O2 Delivery  Nasal Cannula


 


O2 Flow Rate  2.00





Capillary Refill :


General Appearance:  no apparent distress, thin


Eyes:  Bilateral Eye PERRL, Bilateral Eye EOMI


HEENT:  pharynx normal


Neck:  non-tender, supple


Respiratory:  lungs clear, normal breath sounds, no respiratory distress, no 

accessory muscle use


Cardiovascular:  regular rate, rhythm, no gallop, no JVD, no murmur


Peripheral Pulses:  2+ Radial Pulses (R), 2+ Radial Pulses (L)


Gastrointestinal:  normal bowel sounds, soft, tenderness (mild diffuse 

tenderness to palpation)


Back:  no CVA tenderness


Extremities:  no pedal edema, no calf tenderness


Neurologic/Psychiatric:  alert, oriented x 3


Skin:  normal color, warm/dry


Lymphatic:  no adenopathy





Data Review


Labs


Laboratory Tests


8/15/22 16:50: 


White Blood Count 30.7*H, Red Blood Count 3.67L, Hemoglobin 10.9L, Hematocrit 

33L, Mean Corpuscular Volume 91, Mean Corpuscular Hemoglobin 30, Mean 

Corpuscular Hemoglobin Concent 33, Red Cell Distribution Width 13.5, Platelet 

Count 247, Mean Platelet Volume 11.9, Immature Granulocyte % (Auto) 2, 

Neutrophils (%) (Auto) 92H, Lymphocytes (%) (Auto) 3L, Monocytes (%) (Auto) 3, 

Eosinophils (%) (Auto) 0, Basophils (%) (Auto) 0, Neutrophils # (Auto) 28.2H, 

Lymphocytes # (Auto) 1.0, Monocytes # (Auto) 0.9, Eosinophils # (Auto) 0.0, 

Basophils # (Auto) 0.1, Immature Granulocyte # (Auto) 0.5H, Neutrophils % 

(Manual) 95, Lymphocytes % (Manual) 4, Monocytes % (Manual) 1, Hypersegmented 

Neutrophils MODERATE, Stomatocytes SLIGHT, Elliptocytes SLIGHT, Sodium Level 

135, Potassium Level 4.9, Chloride Level 102, Carbon Dioxide Level 18L, Anion 

Gap 15H, Blood Urea Nitrogen 54H, Creatinine 3.62H, Estimat Glomerular 

Filtration Rate 13, BUN/Creatinine Ratio 15, Glucose Level 166H, Lactic Acid 

Level 1.67, Calcium Level 7.8L, Corrected Calcium 8.8, Magnesium Level 1.4L, 

Total Bilirubin 0.7, Aspartate Amino Transf (AST/SGOT) 13, Alanine 

Aminotransferase (ALT/SGPT) 16, Alkaline Phosphatase 64, Total Protein 6.2L, 

Albumin 2.8L


8/15/22 18:29: 


Urine Color YELLOW, Urine Clarity CLEAR, Urine pH 5.0, Urine Specific Gravity 

1.020, Urine Protein NEGATIVE, Urine Glucose (UA) NEGATIVE, Urine Ketones 

NEGATIVE, Urine Nitrite NEGATIVE, Urine Bilirubin NEGATIVE, Urine Urobilinogen 

0.2, Urine Leukocyte Esterase TRACEH, Urine RBC (Auto) NEGATIVE, Urine RBC NONE,

Urine WBC 2-5, Urine Squamous Epithelial Cells 2-5, Urine Crystals NONE, Urine 

Bacteria TRACE, Urine Casts PRESENT, Urine Hyaline Casts 5-10H, Urine Mucus 

NEGATIVE, Urine Culture Indicated YES


8/15/22 22:22: Glucometer 85


8/16/22 00:28: Glucometer 72


8/16/22 05:15: 


White Blood Count 24.7H, Red Blood Count 3.39L, Hemoglobin 10.1L, Hematocrit 30L

, Mean Corpuscular Volume 89, Mean Corpuscular Hemoglobin 30, Mean Corpuscular 

Hemoglobin Concent 33, Red Cell Distribution Width 13.4, Platelet Count 212, 

Mean Platelet Volume 10.8, Immature Granulocyte % (Auto) 3, Neutrophils (%) 

(Auto) 89H, Lymphocytes (%) (Auto) 4L, Monocytes (%) (Auto) 3, Eosinophils (%) 

(Auto) 1, Basophils (%) (Auto) 0, Neutrophils # (Auto) 21.9H, Lymphocytes # 

(Auto) 1.1, Monocytes # (Auto) 0.7, Eosinophils # (Auto) 0.2, Basophils # (Auto)

0.1, Immature Granulocyte # (Auto) 0.8H, Sodium Level 133L, Potassium Level 4.5,

Chloride Level 106, Carbon Dioxide Level 17L, Anion Gap 10, Blood Urea Nitrogen 

48H, Creatinine 2.84#H, Estimat Glomerular Filtration Rate 17, BUN/Creatinine 

Ratio 17, Glucose Level 88, Calcium Level 7.2L





Microbiology


8/15/22 C. difficile GDH Antigen & Toxins - Final, Complete





Assessment/Plan


Proctocolitis 


Low output fistula


C.diff infection  


Hyponatremia 





CT shows thickening of colon wall, but no evidence of focal fluid collection or 

abscess. No surgical intervention at this time. IV fluids, vancomycin, and 

electrolyte replacement. Continue to monitor labs.





LAURENCE MORRELL DO 8/16/22 1144:


History of Present Illness


History of Present Illness


Time Seen by Provider:  11:05


History of Present Illness


Surgery asked to consult regarding Colitis/Proctitis.





HPI per ED:  To ER with general weakness and diarrhea watery and 3-4 episodes in

total.  She arrives by [facility].  Symptoms present for 1 to 2 days.  

She just got out of [facility] about 2 weeks ago.  She states that 

she had an infection in her abdomen treated with a drain that they have 

subsequently removed and she is on a couple of antibiotics.





Pt is well known to me and has been recovering from fistula, which is low output

now.  States she did have drainage last night and new dressing placed.  She has 

been on ABX recently and has hx of C. Diff.





Allergies and Home Medications


Allergies


Coded Allergies:  


     Penicillins (Verified  Allergy, Unknown, 4/22/21)


     raspberry (Verified  Allergy, Unknown, 4/22/21)





Patient Home Medication List


Home Medication List Reviewed:  Yes


Acetaminophen (Tylenol Arthritis) 650 Mg Tablet.er, 650 MG PO Q6H PRN for PAIN-

MILD (1-4) OR TEMPATURE, (Reported)


   Entered as Reported by: GAGE LAU on 7/15/22 1145


Amitriptyline HCl (Amitriptyline HCl) 100 Mg Tablet, 100 MG PO HS, (Reported)


   Entered as Reported by: GAGE LAU on 11/3/20 1433


Aspirin (Children's Aspirin) 81 Mg Tab.chew, 81 MG PO DAILY


   Prescribed by: TAMMY HURST on 7/28/22 2134


Atorvastatin Calcium (Lipitor) 40 Mg Tablet, 40 MG PO 1700


   Prescribed by: TAMMY HURST on 7/28/22 2134


Carvedilol (Carvedilol) 3.125 Mg Tablet, 3.125 MG PO BID WITH MEALS


   Prescribed by: TAMMY HURST on 7/28/22 2134


Cetirizine HCl (Cetirizine HCl) 10 Mg Tablet, 10 MG PO DAILY, (Reported)


   Entered as Reported by: GAGE LAU on 10/30/20 1109


Cholecalciferol (Vitamin D3) (Vitamin D3) 25 Mcg (1000 Unit) Capsule, 25 MCG PO 

DAILY, (Reported)


   Entered as Reported by: GAGE LAU on 7/15/22 1145


Famotidine (Famotidine) 20 Mg Tablet, 20 MG PO DAILY, (Reported)


   Entered as Reported by: GAGE LAU on 7/15/22 1145


Fenofibrate (Fenofibrate) 160 Mg Tablet, 160 MG PO HS, (Reported)


   Entered as Reported by: GAGE LAU on 11/3/20 1433


Ferrous Sulfate (Iron) 325 Mg Tablet, 325 MG PO BID, (Reported)


   Entered as Reported by: GAGE LAU on 11/4/20 1256


Furosemide (Furosemide) 40 Mg Tablet, 40 MG PO DAILY


   Prescribed by: TAMMY HURST on 7/28/22 2134


Latanoprost (Xalatan) 0.005 % Drops, 1 DROP OU HS, (Reported)


   Entered as Reported by: GAGE LAU on 7/15/22 1145


Leflunomide (Leflunomide) 20 Mg Tablet, 20 MG PO Q48H, (Reported)


   Entered as Reported by: GAGE LAU on 11/3/20 1434


Levothyroxine Sodium (Levothyroxine Sodium) 150 Mcg Tablet, 150 MCG PO DAILY, 

(Reported)


   Entered as Reported by: GAGE LAU on 7/28/22 1312


Oxybutynin Chloride (Oxybutynin Chloride) 5 Mg Tablet, 5 MG PO BID, (Reported)


   Entered as Reported by: GAGE LAU on 7/15/22 1145


Potassium Chloride (Klor-Con M20) 20 Meq Tab.er.prt, 20 MEQ PO DAILY@0700


   Prescribed by: TAMMY HURST on 7/28/22 2134


Sacubitril/Valsartan (Entresto 24 mg-26 mg Tablet) 24 Mg-26 Mg Tablet, 1 TAB PO 

BID


   Prescribed by: TAMMY HURST on 7/28/22 2134


Spironolactone (Spironolactone) 25 Mg Tablet, 25 MG PO DAILY


   Prescribed by: TAMMY HURST on 7/28/22 2134


Timolol Maleate (Timolol Maleate 0.5%) 0.5 % Drops, 1 DROP OU HS, (Reported)


   Entered as Reported by: GAGE LAU on 7/15/22 1145





Past Medical-Social-Family Hx


Patient Social History


Smoking Status:  Former Smoker


Alcohol Use?:  No





Surgeries


History of Surgeries:  Yes


Surgeries:  Bowel Surgery (iliostomy and iliostomy reversal), Cardiac, 

Gallbladder, Hysterectomy, Nephrectomy, Oophorectomy, Tonsillectomy





Respiratory


History of Respiratory Disorde:  No





Cardiovascular


History of Cardiac Disorders:  Yes


Cardiac Disorders:  Atrial Fibrillation, High Cholesterol, Hypertension





Neurological


History of Neurological Disord:  Yes


Neurological Disorders:  Neuropathy





Genitourinary


History of Genitourinary Disor:  Yes (hx of nephrectomy)


Genitourinary Disorders:  UTI-Chronic





Gastrointestinal


History of Gastrointestinal Di:  Yes


Gastrointestinal Disorders:  Obstructive Bowel, C-Diff





Musculoskeletal


History of Musculoskeletal Dis:  Yes


Musculoskeletal Disorders:  Arthritis, Chronic Back Pain





Endocrine


History of Endocrine Disorders:  Yes


Endocrine Disorders:  Diabetes, Insulin dep, Hypothyroidsim





Cancer


History of Cancer:  Yes


Cancer:  Ovarian





Psychosocial


History of Psychiatric Problem:  Yes


Behavioral Health Disorders:  Depression





Integumentary


History of Skin or Integumenta:  No





Family Medical History


Significant Family History:  Diabetes, Hypertension


Family Medial History:  


Arthritis


  19 MOTHER


Cardiovascular disease


  19 MOTHER


Diabetes mellitus


  19 FATHER


  19 MOTHER





Review of Systems-General


Constitutional:  chills; No fever; malaise, weakness


EENTM:  No blurred vision, No double vision


Respiratory:  No cough, No short of breath


Cardiovascular:  No chest pain; palpitations


Gastrointestinal:  abdominal pain (diffuse but mild, maybe more lower 

quadrants), diarrhea (watery ); No melena; nausea; No vomiting


Pregnant:  No


Skin:  No change in color, No change in hair/nails


Psychiatric/Neurological:  Depressed; Denies Headache; Weakness





Physical Exam-General Problems


Physical Exam


General Appearance:  no apparent distress, cachetic, thin


Eyes:  Bilateral Eye PERRL, Bilateral Eye EOMI


HEENT:  pharynx normal; No scleral icterus (R), No scleral icterus (L)


Neck:  non-tender, supple


Respiratory:  lungs clear, normal breath sounds, no respiratory distress, no 

accessory muscle use


Cardiovascular:  regular rate, rhythm, no murmur


Gastrointestinal:  soft; No distended; tenderness (mild diffuse tenderness to 

palpation), other (has midline wound with scant drainage)


Back:  no CVA tenderness, no vertebral tenderness


Extremities:  no pedal edema, no calf tenderness


Neurologic/Psychiatric:  alert, oriented x 3


Skin:  warm/dry, pallor


Lymphatic:  no adenopathy (neck, axilla or groin)





Data Review


Radiology


Date of Exam:08/15/22





CT ABDOMEN/PELVIS WO








PROCEDURE: CT abdomen and pelvis without contrast.





TECHNIQUE: Multiple contiguous axial images were obtained through


the abdomen and pelvis without the use of intravenous contrast.


Auto Exposure Controls were utilized during the CT exam to meet


ALARA standards for radiation dose reduction. 





INDICATION:  Periumbilical abdominal pain





COMPARISON: 07/20/2022





Unenhanced images of the liver and spleen reveal no focal


abnormality. No pancreatic, adrenal gland or left renal


abnormality is identified. Right kidney is absent. Gallbladder


has been removed. There is fluid distention of colon with diffuse


mural thickening. There is extensively thickened sigmoid colon


and rectal walls. Surgical findings are seen in the anterior


abdominal wall with small amount of gas and fluid along the


midline incision site. Unenhanced urinary bladder is distended


but otherwise unremarkable.





IMPRESSION: Extensive mural thickening of the distal colon and


rectum suggestive of proctocolitis. There is a small amount of


fluid within the anterior abdominal wall incision site, however,


no other focal fluid collection is seen to indicate abscess.





Dictated by: 





  Dictated on workstation # [id number]








Dict:   08/15/22 [id number]


Trans:   08/15/22 1816


[facility] [id number]





Interpreted by:     LUIS ZIMMERMAN MD


Electronically signed by: LUIS ZIMMERMAN MD 08/15/22 1816





Assessment/Plan


C. Diff - causing Proctocolitis 


Low output fistula


Hyponatremia 





CT shows thickening of colon wall, but no evidence of focal fluid collection or 

abscess. No surgical intervention; will continue to monitor low output fistula. 

IV fluids, encourage oral intake (salvatore protein)  Oral vancomycin vs possible 

rectal to treat the C. Diff, and electrolyte replacement. Continue to monitor 

labs. I reviewed the CT films myself and discussed the case with Dr. Huerta





Supervisory-Addendum Brief


Verification & Attestation


Participated in pt care:  history, MDM, physical


Personally performed:  exam, history, MDM, supervision of care


Care discussed with:  Medical Student


Procedures:  n/a


Verification and Attestation of Medical Student E/M Service





A medical student performed and documented this service. I then reviewed and 

verified all information documented by the medical student and made 

modifications to such information, when appropriate. I personally performed a 

physical exam, medical decision making and then discussed any differences 

between the notes and made revisions as necessary to create one note.





Laurence Morrell , 8/16/22 , 11:46











TONYA REED                    Aug 16, 2022 08:54


LAURENCE MORRELL DO               Aug 16, 2022 11:44
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
## 2022-08-17 VITALS — DIASTOLIC BLOOD PRESSURE: 75 MMHG | SYSTOLIC BLOOD PRESSURE: 115 MMHG

## 2022-08-17 VITALS — DIASTOLIC BLOOD PRESSURE: 60 MMHG | SYSTOLIC BLOOD PRESSURE: 91 MMHG

## 2022-08-17 VITALS — DIASTOLIC BLOOD PRESSURE: 58 MMHG | SYSTOLIC BLOOD PRESSURE: 101 MMHG

## 2022-08-17 VITALS — SYSTOLIC BLOOD PRESSURE: 110 MMHG | DIASTOLIC BLOOD PRESSURE: 65 MMHG

## 2022-08-17 VITALS — SYSTOLIC BLOOD PRESSURE: 100 MMHG | DIASTOLIC BLOOD PRESSURE: 61 MMHG

## 2022-08-17 VITALS — SYSTOLIC BLOOD PRESSURE: 97 MMHG | DIASTOLIC BLOOD PRESSURE: 68 MMHG

## 2022-08-17 VITALS — SYSTOLIC BLOOD PRESSURE: 90 MMHG | DIASTOLIC BLOOD PRESSURE: 54 MMHG

## 2022-08-17 VITALS — SYSTOLIC BLOOD PRESSURE: 115 MMHG | DIASTOLIC BLOOD PRESSURE: 75 MMHG

## 2022-08-17 VITALS — DIASTOLIC BLOOD PRESSURE: 56 MMHG | SYSTOLIC BLOOD PRESSURE: 88 MMHG

## 2022-08-17 LAB
BUN/CREAT SERPL: 20
CALCIUM SERPL-MCNC: 7 MG/DL (ref 8.5–10.1)
CHLORIDE SERPL-SCNC: 107 MMOL/L (ref 98–107)
CO2 SERPL-SCNC: 16 MMOL/L (ref 21–32)
CREAT SERPL-MCNC: 2.04 MG/DL (ref 0.6–1.3)
GFR SERPLBLD BASED ON 1.73 SQ M-ARVRAT: 25 ML/MIN
GLUCOSE SERPL-MCNC: 61 MG/DL (ref 70–105)
HCT VFR BLD CALC: 28 % (ref 35–52)
HGB BLD-MCNC: 9.5 G/DL (ref 11.5–16)
MAGNESIUM SERPL-MCNC: 1.3 MG/DL (ref 1.6–2.4)
MCH RBC QN AUTO: 30 PG (ref 25–34)
MCHC RBC AUTO-ENTMCNC: 34 G/DL (ref 32–36)
MCV RBC AUTO: 89 FL (ref 80–99)
PLATELET # BLD: 227 10^3/UL (ref 130–400)
PMV BLD AUTO: 10.8 FL (ref 9–12.2)
POTASSIUM SERPL-SCNC: 3.9 MMOL/L (ref 3.6–5)
SODIUM SERPL-SCNC: 133 MMOL/L (ref 135–145)
WBC # BLD AUTO: 23.8 10^3/UL (ref 4.3–11)

## 2022-08-17 RX ADMIN — DEXTROSE MONOHYDRATE AND SODIUM CHLORIDE SCH MLS/HR: 5; .45 INJECTION, SOLUTION INTRAVENOUS at 16:18

## 2022-08-17 RX ADMIN — FERROUS SULFATE TAB 325 MG (65 MG ELEMENTAL FE) SCH MG: 325 (65 FE) TAB at 08:28

## 2022-08-17 RX ADMIN — FENOFIBRATE SCH MG: 134 CAPSULE ORAL at 21:17

## 2022-08-17 RX ADMIN — OXYBUTYNIN CHLORIDE SCH MG: 5 TABLET ORAL at 21:17

## 2022-08-17 RX ADMIN — DEXTROSE MONOHYDRATE AND SODIUM CHLORIDE SCH MLS/HR: 5; .45 INJECTION, SOLUTION INTRAVENOUS at 07:31

## 2022-08-17 RX ADMIN — FERROUS SULFATE TAB 325 MG (65 MG ELEMENTAL FE) SCH MG: 325 (65 FE) TAB at 21:17

## 2022-08-17 RX ADMIN — SODIUM CHLORIDE, SODIUM LACTATE, POTASSIUM CHLORIDE, AND CALCIUM CHLORIDE SCH MLS/HR: 600; 310; 30; 20 INJECTION, SOLUTION INTRAVENOUS at 06:00

## 2022-08-17 RX ADMIN — METRONIDAZOLE SCH MLS/HR: 5 INJECTION, SOLUTION INTRAVENOUS at 02:44

## 2022-08-17 RX ADMIN — LATANOPROST SCH DROP: 50 SOLUTION/ DROPS OPHTHALMIC at 21:18

## 2022-08-17 RX ADMIN — DEXTROSE MONOHYDRATE AND SODIUM CHLORIDE SCH MLS/HR: 5; .45 INJECTION, SOLUTION INTRAVENOUS at 21:27

## 2022-08-17 RX ADMIN — VANCOMYCIN HYDROCHLORIDE SCH MG: 125 CAPSULE ORAL at 17:25

## 2022-08-17 RX ADMIN — VANCOMYCIN HYDROCHLORIDE SCH MG: 125 CAPSULE ORAL at 06:00

## 2022-08-17 RX ADMIN — DEXTROSE MONOHYDRATE AND SODIUM CHLORIDE SCH MLS/HR: 5; .45 INJECTION, SOLUTION INTRAVENOUS at 23:00

## 2022-08-17 RX ADMIN — LORATADINE SCH MG: 10 TABLET ORAL at 08:27

## 2022-08-17 RX ADMIN — VANCOMYCIN HYDROCHLORIDE SCH MG: 125 CAPSULE ORAL at 11:25

## 2022-08-17 RX ADMIN — ASPIRIN 81 MG CHEWABLE TABLET SCH MG: 81 TABLET CHEWABLE at 08:27

## 2022-08-17 RX ADMIN — ENOXAPARIN SODIUM SCH MG: 30 INJECTION SUBCUTANEOUS at 21:17

## 2022-08-17 RX ADMIN — AMITRIPTYLINE HYDROCHLORIDE SCH MG: 25 TABLET, FILM COATED ORAL at 21:17

## 2022-08-17 RX ADMIN — METRONIDAZOLE SCH MLS/HR: 5 INJECTION, SOLUTION INTRAVENOUS at 10:48

## 2022-08-17 RX ADMIN — OXYBUTYNIN CHLORIDE SCH MG: 5 TABLET ORAL at 08:27

## 2022-08-17 RX ADMIN — LEVOTHYROXINE SODIUM SCH MCG: 150 TABLET ORAL at 08:28

## 2022-08-17 NOTE — PROGRESS NOTE - SURGERY
Subjective


Time Seen by a Provider:  14:21


Subjective/Events-last exam


Pt seen and examined, states she is feeling worse; she is talking about feeling 

weak.  She denied new abd pain and doesn't think there is a lot of fluid coming 

from fistula.


Review of Systems


General:  Fatigue, Malaise


Pulmonary:  No Dyspnea, No Cough


Cardiovascular:  No: Chest Pain


Gastrointestinal:  Abdominal Pain (minimal); No: Nausea, Vomiting





Focused Exam


Lactate Level


8/15/22 16:50: Lactic Acid Level 1.67





Objective


Exam





Vital Signs








  Date Time  Temp Pulse Resp B/P (MAP) Pulse Ox O2 Delivery O2 Flow Rate FiO2


 


8/17/22 16:06 36.0 85 20 110/65 (80) 100 Room Air  


 


8/17/22 12:35  83      


 


8/17/22 11:17 36.5 92 18 97/68 (78) 99 Room Air  


 


8/17/22 08:00     98 Room Air  


 


8/17/22 07:56 37.0 98 18 100/61 (74) 98 Room Air  


 


8/17/22 07:24  95      


 


8/17/22 04:30    90/54 (66)    


 


8/17/22 04:00 36.6 107 20 88/56 (67) 97 Room Air  


 


8/17/22 01:00  106      


 


8/17/22 00:00 36.7 110 22 91/60 (70) 94 Room Air  


 


8/16/22 21:00      Room Air  


 


8/16/22 19:20 36.5 107 19 104/65 (78) 95 Room Air  














I & O 


 


 8/17/22





 07:00


 


Intake Total 2550 ml


 


Output Total 1000 ml


 


Balance 1550 ml





Capillary Refill :


General Appearance:  No Apparent Distress, Chronically ill, Thin (Very frail.  

On arrival heart rate 122 sinus, blood pressure consistently 70s over 40s.  IV 

started)


HEENT:  PERRL/EOMI


Respiratory:  Lungs Clear, Normal Breath Sounds, No Accessory Muscle Use, No 

Respiratory Distress


Cardiovascular:  Regular Rate, Rhythm, Normal Peripheral Pulses


Peripheral Pulses:  2+ Radial Pulses (R), 2+ Radial Pulses (L)


Gastrointestinal:  normal bowel sounds, soft; No guarding, No rebound; 

tenderness, other (small fistula with minimal serous drainage noted)





Results


Lab


Laboratory Tests


8/16/22 23:45: Glucometer 82


8/17/22 05:23: 


White Blood Count 23.8H, Red Blood Count 3.18L, Hemoglobin 9.5L, Hematocrit 28L,

Mean Corpuscular Volume 89, Mean Corpuscular Hemoglobin 30, Mean Corpuscular 

Hemoglobin Concent 34, Red Cell Distribution Width 13.4, Platelet Count 227, 

Mean Platelet Volume 10.8, Sodium Level 133L, Potassium Level 3.9, Chloride 

Level 107, Carbon Dioxide Level 16L, Anion Gap 10, Blood Urea Nitrogen 40H, 

Creatinine 2.04H, Estimat Glomerular Filtration Rate 25, BUN/Creatinine Ratio 

20, Glucose Level 61L, Calcium Level 7.0L, Magnesium Level 1.3L


8/17/22 05:30: Glucometer 60*L


8/17/22 11:08: Glucometer 161H


8/17/22 18:23: Glucometer 164H





Microbiology


8/15/22 C. difficile GDH Antigen & Toxins - Final, Complete


          


8/15/22 Gram Stain - Final, Resulted


          


8/15/22 Wound Culture - Preliminary, Resulted


          Staphylococcus aureus


8/15/22 Urine Culture - Final, Complete


          NO GROWTH


8/15/22 Blood Culture - Preliminary, Resulted


          No growth





Assessment/Plan


C. Diff - causing Proctocolitis 


Low output fistula


Hyponatremia 





CT shows thickening of colon wall, but no evidence of focal fluid collection or 

abscess. No surgical intervention; will continue to monitor low output fistula. 

IV fluids, encourage oral intake (salvatore protein)  Oral vancomycin vs possible 

rectal to treat the C. Diff, and electrolyte replacement. Continue to monitor 

labs. I reviewed the CT films myself and discussed the case with LAURENCE Meade DO               Aug 17, 2022 19:18

## 2022-08-17 NOTE — PROGRESS NOTE
Subjective


Subjective/Events-last exam


Still feeling pretty weak, but a little better than yesterday. Wondering how 

long she will need to be here.





Focused Exam


Lactate Level


8/15/22 16:50: Lactic Acid Level 1.67





Objective


Exam


Last Set of Vital Signs





Vital Signs








  Date Time  Temp Pulse Resp B/P (MAP) Pulse Ox O2 Delivery O2 Flow Rate FiO2


 


8/17/22 08:00     98 Room Air  


 


8/17/22 07:56 37.0 98 18 100/61 (74)    


 


8/16/22 14:08       1.00 





Capillary Refill :


I&O











Intake and Output 


 


 8/17/22





 00:00


 


Intake Total 3760 ml


 


Output Total 876 ml


 


Balance 2884 ml


 


 


 


Intake Oral 1510 ml


 


IV Total 2250 ml


 


Output Urine Total 876 ml


 


# Bowel Movements 4


 


# Emeses 1








General:  Alert, No Acute Distress


Lungs:  Clear to Auscultation, Normal Air Movement


Heart:  Other (tachycardic)


Abdomen:  Normal Bowel Sounds, Soft


Extremities:  No Edema


Neuro:  Normal Speech


Psych/Mental Status:  Mood NL





Results/Procedures


Lab


Laboratory Tests


8/16/22 11:37: Glucometer 165H


8/16/22 17:43: Glucometer 141H


8/16/22 23:45: Glucometer 82


8/17/22 05:23: 


White Blood Count 23.8H, Red Blood Count 3.18L, Hemoglobin 9.5L, Hematocrit 28L,

Mean Corpuscular Volume 89, Mean Corpuscular Hemoglobin 30, Mean Corpuscular 

Hemoglobin Concent 34, Red Cell Distribution Width 13.4, Platelet Count 227, 

Mean Platelet Volume 10.8, Sodium Level 133L, Potassium Level 3.9, Chloride 

Level 107, Carbon Dioxide Level 16L, Anion Gap 10, Blood Urea Nitrogen 40H, 

Creatinine 2.04H, Estimat Glomerular Filtration Rate 25, BUN/Creatinine Ratio 

20, Glucose Level 61L, Calcium Level 7.0L, Magnesium Level 1.3L


8/17/22 05:30: Glucometer 60*L





Microbiology


8/15/22 C. difficile GDH Antigen & Toxins - Final, Complete


          


8/15/22 Gram Stain, Resulted


          Pending


8/15/22 Wound Culture - Preliminary, Resulted


          Staphylococcus aureus


8/15/22 Urine Culture - Preliminary, Resulted


          


8/15/22 Blood Culture - Preliminary, Resulted


          No growth


Radiology


CT abdomen: IMPRESSION: Extensive mural thickening of the distal colon and 

rectum suggestive of proctocolitis. There is a small amount of fluid within the 

anterior abdominal wall incision site, however, no other focal fluid collection 

is seen to indicate abscess.





Assessment/Plan


Assessment/Plan





(1) C. difficile colitis


Status:  Acute


Assessment & Plan:  PO Vancomycin, IVF





(2) Diabetes mellitus


Status:  Chronic


Qualifiers:  


   


(3) Fistula


Status:  Chronic


Assessment & Plan:  Surgery consulted, does not appear acutely infected





(4) Hypothyroidism


Status:  Chronic


Assessment & Plan:  Resume home levothyroxine





(5) Mixed hyperlipidemia


Status:  Chronic


(6) Primary hypertension


Status:  Chronic


(7) Cardiomyopathy


Status:  Acute


Assessment & Plan:  7/17/22 Echo with EF 25%, discharged with [facility] last 

hospitalization. Hold Entresto due to LISSETTE. Resume home carvedilol. Monitor fluid

status closely.





(8) Acute kidney insufficiency


Status:  Acute


Assessment & Plan:  Secondary to dehydration from diarrhea, IVF, monitor fluid 

status closely given CHF.


8/17 slowly improving





(9) DVT prophylaxis


Status:  Acute


Assessment & Plan:  Enoxaparin














DENICE WINTER MD             Aug 17, 2022 10:36

## 2022-08-18 VITALS — DIASTOLIC BLOOD PRESSURE: 63 MMHG | SYSTOLIC BLOOD PRESSURE: 96 MMHG

## 2022-08-18 VITALS — SYSTOLIC BLOOD PRESSURE: 110 MMHG | DIASTOLIC BLOOD PRESSURE: 70 MMHG

## 2022-08-18 VITALS — DIASTOLIC BLOOD PRESSURE: 63 MMHG | SYSTOLIC BLOOD PRESSURE: 107 MMHG

## 2022-08-18 VITALS — SYSTOLIC BLOOD PRESSURE: 108 MMHG | DIASTOLIC BLOOD PRESSURE: 69 MMHG

## 2022-08-18 VITALS — DIASTOLIC BLOOD PRESSURE: 70 MMHG | SYSTOLIC BLOOD PRESSURE: 111 MMHG

## 2022-08-18 LAB
BUN/CREAT SERPL: 18
CALCIUM SERPL-MCNC: 6.5 MG/DL (ref 8.5–10.1)
CHLORIDE SERPL-SCNC: 102 MMOL/L (ref 98–107)
CO2 SERPL-SCNC: 17 MMOL/L (ref 21–32)
CREAT SERPL-MCNC: 1.73 MG/DL (ref 0.6–1.3)
GFR SERPLBLD BASED ON 1.73 SQ M-ARVRAT: 30 ML/MIN
GLUCOSE SERPL-MCNC: 159 MG/DL (ref 70–105)
HCT VFR BLD CALC: 30 % (ref 35–52)
HGB BLD-MCNC: 10 G/DL (ref 11.5–16)
MAGNESIUM SERPL-MCNC: 1.3 MG/DL (ref 1.6–2.4)
MCH RBC QN AUTO: 29 PG (ref 25–34)
MCHC RBC AUTO-ENTMCNC: 33 G/DL (ref 32–36)
MCV RBC AUTO: 89 FL (ref 80–99)
PLATELET # BLD: 246 10^3/UL (ref 130–400)
PMV BLD AUTO: 11 FL (ref 9–12.2)
POTASSIUM SERPL-SCNC: 3.2 MMOL/L (ref 3.6–5)
SODIUM SERPL-SCNC: 128 MMOL/L (ref 135–145)
WBC # BLD AUTO: 13.6 10^3/UL (ref 4.3–11)

## 2022-08-18 RX ADMIN — VANCOMYCIN HYDROCHLORIDE SCH MG: 125 CAPSULE ORAL at 00:07

## 2022-08-18 RX ADMIN — LORATADINE SCH MG: 10 TABLET ORAL at 08:55

## 2022-08-18 RX ADMIN — AMITRIPTYLINE HYDROCHLORIDE SCH MG: 25 TABLET, FILM COATED ORAL at 21:00

## 2022-08-18 RX ADMIN — LEVOTHYROXINE SODIUM SCH MCG: 150 TABLET ORAL at 05:47

## 2022-08-18 RX ADMIN — VANCOMYCIN HYDROCHLORIDE SCH MG: 125 CAPSULE ORAL at 23:57

## 2022-08-18 RX ADMIN — FERROUS SULFATE TAB 325 MG (65 MG ELEMENTAL FE) SCH MG: 325 (65 FE) TAB at 08:55

## 2022-08-18 RX ADMIN — ENOXAPARIN SODIUM SCH MG: 30 INJECTION SUBCUTANEOUS at 21:00

## 2022-08-18 RX ADMIN — LATANOPROST SCH DROP: 50 SOLUTION/ DROPS OPHTHALMIC at 20:59

## 2022-08-18 RX ADMIN — VANCOMYCIN HYDROCHLORIDE SCH MG: 125 CAPSULE ORAL at 12:07

## 2022-08-18 RX ADMIN — MAGNESIUM SULFATE IN DEXTROSE SCH MLS/HR: 10 INJECTION, SOLUTION INTRAVENOUS at 12:07

## 2022-08-18 RX ADMIN — OXYBUTYNIN CHLORIDE SCH MG: 5 TABLET ORAL at 08:55

## 2022-08-18 RX ADMIN — OXYBUTYNIN CHLORIDE SCH MG: 5 TABLET ORAL at 21:00

## 2022-08-18 RX ADMIN — DEXTROSE MONOHYDRATE AND SODIUM CHLORIDE SCH MLS/HR: 5; .45 INJECTION, SOLUTION INTRAVENOUS at 05:45

## 2022-08-18 RX ADMIN — FENOFIBRATE SCH MG: 134 CAPSULE ORAL at 21:00

## 2022-08-18 RX ADMIN — MAGNESIUM SULFATE IN DEXTROSE SCH MLS/HR: 10 INJECTION, SOLUTION INTRAVENOUS at 08:56

## 2022-08-18 RX ADMIN — FERROUS SULFATE TAB 325 MG (65 MG ELEMENTAL FE) SCH MG: 325 (65 FE) TAB at 21:00

## 2022-08-18 RX ADMIN — SODIUM CHLORIDE SCH MLS/HR: 900 INJECTION, SOLUTION INTRAVENOUS at 16:40

## 2022-08-18 RX ADMIN — ASPIRIN 81 MG CHEWABLE TABLET SCH MG: 81 TABLET CHEWABLE at 08:55

## 2022-08-18 RX ADMIN — VANCOMYCIN HYDROCHLORIDE SCH MG: 125 CAPSULE ORAL at 05:47

## 2022-08-18 RX ADMIN — VANCOMYCIN HYDROCHLORIDE SCH MG: 125 CAPSULE ORAL at 16:40

## 2022-08-18 NOTE — PHYSICAL THERAPY EVALUATION
PT Evaluation-General


Medical Diagnosis


Admission Date


Aug 15, 2022 at 19:18


Medical Diagnosis:  Sepsis, LISSETTE, R/O C-diff


Onset Date:  Aug 15, 2022





Therapy Diagnosis


Therapy Diagnosis:  generalized weakness/debility





Precautions


Precautions/Isolations:  Fall Prevention, Contact/Enteric Isolation





Referral


Physician:  [facility]


Reason for Referral:  Evaluation/Treatment





Medical History


Pertinent Medical History:  Atrial Fib, Arthritis, DM, HTN, Hypothroidism, OA, 

Renal Insufficiency


Current History


ER secondary to generalized weakness and diarrhea


Reviewed History:  Yes





Social History


Home:  Single Level


Current Living Status:  Alone (grandson has been staying and assisting patient 

per her report)





Prior


Prior Level of Function


SCALE: Activities may be completed with or without assistive devices.





6-Indepedent-patient completes the activity by him/herself with no assistance 

from a helper.


5-Set-up or Clean-up Assistance-helper sets up or cleans up; patient completes 

activity. [place] assists only prior to or  


    following the activity.


4-Supervision or Touching Assistance-helper provides verbal cues and/or 

touching/steadying and/or contact guard assistance as patient completes 

activity. Assistance may be provided   


    throughout the activity or intermittently.


3-Partial/Moderate Assistance-helper does LESS THAN HALF the effort. [place] 

lifts, holds or supports trunk or limbs, but provides less than half the effort.


2-Substantial/Maximal Assistance-helper does MORE THAN HALF the effort. [place] 

lifts or holds trunk or limbs and provides more than half the effort.


2-[id number] does ALL the effort. Patient does none of the effort to 

complete the activity. Or, the assistance of 2 or more helpers is required for 

the patient to complete the  


    activity.


If activity was not attempted, code reason:


7-Patient Refused.


9-Not Applicable-not attempted and the patient did not perform the activity 

before the current illness, exacerbation or injury.


10-Not Attempted due to Environmental Limitations-(lack of equipment, weather 

restraints, etc.).


88-Not Attempted due to Medical Conditions or Safety Concerns.


Bed Mobility:  6


Transfers (B,C,W/C):  6


Gait:  6


Indoor Mobility (Ambulation):  Independent


Prior Devices Use:  Walker





PT Evaluation-Current


Subjective


Patient agrees to PT.





Objective


Patient Orientation:  Normal For Age


Attachments:  Dumont Catheter, IV


life vest





ROM/Strength


ROM Lower Extremities


bilateral LE WFL


Strength Lower Extremities


3-/5 grossly bilateral LE all planes





Integumentary/Posture


Integumentary


refer to nursing notes


Bladder Incontinence:  Dumont Cath


Posture


WFL





Neuromuscular


(Tone, Coordination, Reflexes)


grossly intact





Sensory


Vision:  Wears Glasses


Hearing:  Functional


Hand Dominance:  Right





Transfers


Sit to Lying (QC):  6


Lying to Sitting/Side of Bed(Q:  6


Sit to Stand (QC):  4





Gait


Mode of Locomotion:  Walk


Anticipated Mode of Locomotion:  Walk


Walk 10 feet (QC):  4


Walk 50 ft with 2 Turns(QC):  4


Walk 150 ft (QC):  88


Distance:  50'


Gait Assistive Device:  FWW


Comments/Gait Description


very slow, functional gait sequence





Balance


Sitting Static:  Normal


Sitting Dynamic:  Normal


Standing Static:  Fair


 Standing Dynamic:  Fair





Assessment/Needs


76 y.o. female, will benefit from skilled PT to address functional strength and 

mobility to improve current LOF.


Rehab Potential:  Fair





PT Long Term Goals


Long Term Goals


PT Long Term Goals Time Frame:  Sep 3, 2022


Roll Left & Right (QC):  6


Sit to Lying (QC):  6


Lying-Sitting on Side/Bed(QC):  6


Sit to Stand (QC):  6


Chair/Bed-to-Chair Xfer(QC):  6


Toilet Transfer (QC):  6


Walk 10 feet (QC):  6


Walk 50ft with 2 Turns (QC):  6





PT Plan


Problem List


Problem List:  Activity Tolerance, Functional Strength, Safety, Balance, Gait, 

Transfer, Bed Mobility





Treatment/Plan


Treatment Plan:  Continue Plan of Care


Treatment Plan:  Bed Mobility, Education, Functional Activity Cherise, Functional 

Strength, Gait, Safety, Therapeutic Exercise, Transfers


Treatment Duration:  Sep 3, 2022


Frequency:  6 times per week


Estimated Hrs Per Day:  .25 hour per day


Patient and/or Family Agrees t:  Yes





Time/GCodes


Time In:  1331


Time Out:  1346


Total Billed Treatment Time:  15


Total Billed Treatment


1 visit


EVModC 15 min











ERIC VALLE PT              Aug 18, 2022 14:35

## 2022-08-18 NOTE — OCCUPATIONAL THERAPY EVAL
OT Evaluation-General/PLF


Medical Diagnosis


Admission Date


Aug 15, 2022 at 19:18


Medical Diagnosis:  Sepsis, LISSETTE, R/O C-diff


Onset Date:  Aug 15, 2022





Therapy Diagnosis


Therapy Diagnosis:  reduced adl status





Precautions


Precautions/Isolations:  Fall Prevention, Contact/Enteric Isolation





Referral


Referral Reason:  Evaluation/Treatment





Medical History


Pertinent Medical History:  Atrial Fib, DM, HTN, Hypothroidism, OA, Renal 

Insufficiency


Current History


Pt confused on timing of events. She is recalling events that led up to last 

hospital stay. According to patient, she still lives alone in a single story 

home. However, her grandson has been coming over to assist with iadls. She 

reports indep with adls. Pt uses a 4WW at baseline.


Reviewed History:  Yes





Social History


Home:  Single Level


Current Living Status:  Alone





ADL-Prior Level of Function


SCALE: Activities may be completed with or without assistive devices.





6-Indepedent-patient completes the activity by him/herself with no assistance 

from a helper.


5-Set-up or Clean-up Assistance-helper sets up or cleans up; patient completes 

activity. [place] assists only prior to or  


    following the activity.


4-Supervision or Touching Assistance-helper provides verbal cues and/or 

touching/steadying and/or contact guard assistance as patient completes activ

ity. Assistance may be provided   


    throughout the activity or intermittently.


3-Partial/Moderate Assistance-helper does LESS THAN HALF the effort. [place] 

lifts, holds or supports trunk or limbs, but provides less than half the effort.


2-Substantial/Maximal Assistance-helper does MORE THAN HALF the effort. [place] 

lifts or holds trunk or limbs and provides more than half the effort.


3-[id number] does ALL the effort. Patient does none of the effort to 

complete the activity. Or, the assistance of 2 or more helpers is required for 

the patient to complete the  


    activity.


If activity was not attempted, code reason:


7-Patient Refused.


9-Not Applicable-not attempted and the patient did not perform the activity 

before the current illness, exacerbation or injury.


10-Not Attempted due to Environmental Limitations-(lack of equipment, weather 

restraints, etc.).


88-Not Attempted due to Medical Conditions or Safety Concerns.


Self Care:  Independent


Functional Cognition:  Unknown


DME/Equipment:  Grab Bars, Tub/Shower (built in shower seat)





OT Current Status


Subjective


Pt confused, extra time for processing.





Appearance


Pt returned to supine in bed, all needs within reach.





Mental Status/Objective


Patient Orientation:  Person, Confused


Attachments:  [place] Catheter, IV, Telemetry, Other-See Comments (Life[facility]est)





Current


Glasses/Contacts:  Yes


Hand Dominance:  Right


Upper Extremity Strength


debilitated





ADL-Treatment


Lower Body Dressing (QC):  2


On/Off Footwear (QC):  1


Toileting Hygiene (QC):  3


Pt incontinent of stools at OT arrival. C-diff pending. Min a to elevate torso 

secondary to weakness. Unable to reach feet to don socks, thus dependent to 

complete. She stood with min a and ambulated to/from bathroom with use of 

walker. Cues for safety and attention/awareness to IV line. While sitting, pt 

able to perform stefano care with set up/supervision. Dependent to don new brief 

over feet due to weakness and increased c/o dizziness when bending.  She was 

able to manage brief up to waist with min a to pull up completely in the back, 

steadying assist required. C/o lightheadedness in standing. BP: 82/57. PCA 

called in for safety when transferring back to bed. Simplification and 

repetition of commands required throughout treatment.





Education


OT Patient Education:  Correct positioning, Energy conservation, Purpose of 

tx/functional activities, Safety issues, Transfer techniques


Teaching Recipient:  Patient


Teaching Methods:  Demonstration, Discussion


Response to Teaching:  Reinforcement Needed





OT Long Term Goals


Long Term Goals


Time Frame:  Sep 8, 2022


Eating (QC):  5


Oral Hygiene (QC):  5


Toileting Hygiene (QC):  5


Shower/Bathe Self (QC):  4


Upper Body Dressing (QC):  5


Lower Body Dressing (QC):  4


On/Off Footwear (QC):  4


1=Demonstrate adherence to instructed precautions during ADL tasks.


2=Patient will verbalize/demonstrate understanding of assistive 

devices/modifications for ADL.


3=Patient will improve strength/tolerance for activity to enable patient to 

perform ADL's.





OT Education/Plan


Problem List/Assessment


Assessment:  Decreased Activ Tolerance, Decreased Safety Aware, Decreased UE 

Strength, Impaired Bed Mobility, Impaired Cognition, Impaired Funct Balance, 

Impaired I ADL's, Impaired Self-Care Skills





Discharge Recommendations


Plan/Recommendations:  Continue POC


Therapy Discharge Recommendati:  Post Acute OT





Treatment Plan/Plan of Care


Treatment,Training & Education:  Yes


Patient would benefit from OT for education, treatment and training to promote 

independence in ADL's, mobility, safety and/or upper extremity function for 

ADL's.


Plan of Care:  ADL Retraining, Cognitive Retraining, Functional Mobility, Group 

Exercise/Act as Ind, UE Funct Exercise/Act


Treatment Duration:  Sep 8, 2022


Frequency:  3 times per week (3-5x/week)


Estimated Hrs Per Day:  .25 hour per day


Agreement:  Yes


Rehab Potential:  Fair





Time/GCodes


Start Time:  12:46


Stop Time:  13:17


Total Time Billed (hr/min):  31


Billed Treatment Time


1 visit


EVH (10 min)


ADL (21 min)











Mackenzie De Oliveira OT                Aug 18, 2022 13:27

## 2022-08-18 NOTE — PROGRESS NOTE
Subjective


Subjective/Events-last exam


Felling a little better again today, although still weak.





Focused Exam


Lactate Level


8/15/22 16:50: Lactic Acid Level 1.67





Objective


Exam


Last Set of Vital Signs





Vital Signs








  Date Time  Temp Pulse Resp B/P (MAP) Pulse Ox O2 Delivery O2 Flow Rate FiO2


 


8/18/22 16:07 36.1 74 20 107/63 (78) 100 Room Air  


 


8/16/22 14:08       1.00 





Capillary Refill :


I&O











Intake and Output 


 


 8/18/22





 00:00


 


Intake Total 3260 ml


 


Output Total 375 ml


 


Balance 2885 ml


 


 


 


Intake Oral 960 ml


 


IV Total 2300 ml


 


Output Urine Total 375 ml


 


# Bowel Movements 3








General:  Alert, No Acute Distress


Lungs:  Clear to Auscultation, Normal Air Movement


Heart:  Regular Rate


Abdomen:  Normal Bowel Sounds, Soft, No Tenderness


Extremities:  No Edema


Neuro:  Normal Speech


Psych/Mental Status:  Mood NL





Results/Procedures


Lab


Laboratory Tests


8/17/22 18:23: Glucometer 164H


8/18/22 00:07: Glucometer 185H


8/18/22 06:46: 


White Blood Count 13.6H, Red Blood Count 3.41L, Hemoglobin 10.0L, Hematocrit 30L

, Mean Corpuscular Volume 89, Mean Corpuscular Hemoglobin 29, Mean Corpuscular 

Hemoglobin Concent 33, Red Cell Distribution Width 13.4, Platelet Count 246, 

Mean Platelet Volume 11.0, Sodium Level 128L, Potassium Level 3.2L, Chloride 

Level 102, Carbon Dioxide Level 17L, Anion Gap 9, Blood Urea Nitrogen 32H, 

Creatinine 1.73H, Estimat Glomerular Filtration Rate 30, BUN/Creatinine Ratio 

18, Glucose Level 159H, Calcium Level 6.5L, Magnesium Level 1.3L


8/18/22 11:37: Glucometer 158H





Microbiology


8/15/22 C. difficile GDH Antigen & Toxins - Final, Complete


          


8/15/22 Gram Stain - Final, Complete


          


8/15/22 Wound Culture - Final, Complete


          Gram Pos Mixed Bacterial Sandra


          Staphylococcus aureus


8/15/22 Urine Culture - Final, Complete


          NO GROWTH


8/15/22 Blood Culture - Preliminary, Resulted


          No growth


Radiology


CT abdomen: IMPRESSION: Extensive mural thickening of the distal colon and 

rectum suggestive of proctocolitis. There is a small amount of fluid within the 

anterior abdominal wall incision site, however, no other focal fluid collection 

is seen to indicate abscess.





Assessment/Plan


Assessment/Plan





(1) C. difficile colitis


Status:  Acute


Assessment & Plan:  PO Vancomycin, IVF





(2) Diabetes mellitus


Status:  Chronic


Qualifiers:  


   


(3) Fistula


Status:  Chronic


Assessment & Plan:  Surgery consulted, does not appear acutely infected





(4) Hypothyroidism


Status:  Chronic


Assessment & Plan:  Resume home levothyroxine





(5) Mixed hyperlipidemia


Status:  Chronic


(6) Primary hypertension


Status:  Chronic


(7) Cardiomyopathy


Status:  Acute


Assessment & Plan:  7/17/22 Echo with EF 25%, discharged with Lifevest last 

hospitalization. Hold Entresto due to LISSETTE. Resume home carvedilol. Monitor fluid

status closely.





(8) Acute kidney insufficiency


Status:  Acute


Assessment & Plan:  Secondary to dehydration from diarrhea, IVF, monitor fluid 

status closely given CHF.


8/17 slowly improving


8/18 Cr down to 1.73





(9) DVT prophylaxis


Status:  Acute


Assessment & Plan:  Enoxaparin














DENICE WINTER MD             Aug 18, 2022 16:19

## 2022-08-18 NOTE — PROGRESS NOTE - SURGERY
TONYA REED 8/18/22 0808:


Subjective


Date Seen by a Provider:  Aug 18, 2022


Time Seen by a Provider:  08:04


Subjective/Events-last exam


Pt resting comfortably in bed. States she slept much better last night. She had 

some diarrhea and one episode of vomiting yesterday evening, without any blood. 

Notes her weakness is better. She has some abdominal cramping, but denies fever,

chills, chest pain, SOB, or nausea this morning.


Review of Systems


General:  No Chills, No Night Sweats


HEENT:  No Visual Changes


Pulmonary:  No Cough


Cardiovascular:  No: Chest Pain


Gastrointestinal:  Vomiting; No: Nausea, Abdominal Pain


Genitourinary:  No Dysuria, No Retention


Neurological:  Weakness (improving)





Focused Exam


Lactate Level


8/15/22 16:50: Lactic Acid Level 1.67





Objective


Exam





Vital Signs








  Date Time  Temp Pulse Resp B/P (MAP) Pulse Ox O2 Delivery O2 Flow Rate FiO2


 


8/18/22 07:40 36.1 79 18 108/69 (82) 98 Room Air  


 


8/18/22 07:00  76      


 


8/18/22 03:20 36.6 83 18 96/63 (74) 99 Room Air  


 


8/18/22 01:00  83      


 


8/17/22 23:41 36.1 89 16 101/58 (72) 98 Room Air  


 


8/17/22 20:20      Room Air  


 


8/17/22 20:06 35.8 76 20 115/75 (88) 99 Room Air  


 


8/17/22 19:33 35.8 76 20 115/75 (88) 99 Room Air  


 


8/17/22 19:00  80      


 


8/17/22 16:06 36.0 85 20 110/65 (80) 100 Room Air  


 


8/17/22 12:35  83      


 


8/17/22 11:17 36.5 92 18 97/68 (78) 99 Room Air  














I & O 


 


 8/18/22





 06:59


 


Intake Total 3970 ml


 


Output Total 375 ml


 


Balance 3595 ml





Capillary Refill :


General Appearance:  No Apparent Distress, Chronically ill, Thin (Very frail.  

On arrival heart rate 122 sinus, blood pressure consistently 70s over 40s.  IV 

started)


HEENT:  PERRL/EOMI, Moist Mucous Membranes


Neck:  Non Tender, Supple


Respiratory:  Lungs Clear, Normal Breath Sounds, No Accessory Muscle Use, No 

Respiratory Distress


Cardiovascular:  Regular Rate, Rhythm, No Murmur


Peripheral Pulses:  2+ Radial Pulses (R), 2+ Radial Pulses (L)


Gastrointestinal:  normal bowel sounds, soft; No guarding, No rebound; 

tenderness (very mild diffuse tenderness), other (small fistula with minimal 

serous drainage noted)


Extremity:  No Calf Tenderness, No Pedal Edema


Neurologic/Psychiatric:  Alert, Oriented x3


Skin:  Normal Color, Warm/Dry


Lymphatic:  No Adenopathy (supraclavicular nodes)





Results


Lab


Laboratory Tests


8/17/22 11:08: Glucometer 161H


8/17/22 18:23: Glucometer 164H


8/18/22 00:07: Glucometer 185H


8/18/22 06:46: 


White Blood Count 13.6H, Red Blood Count 3.41L, Hemoglobin 10.0L, Hematocrit 30L

, Mean Corpuscular Volume 89, Mean Corpuscular Hemoglobin 29, Mean Corpuscular 

Hemoglobin Concent 33, Red Cell Distribution Width 13.4, Platelet Count 246, 

Mean Platelet Volume 11.0, Sodium Level 128L, Potassium Level 3.2L, Chloride 

Level 102, Carbon Dioxide Level 17L, Anion Gap 9, Blood Urea Nitrogen 32H, 

Creatinine 1.73H, Estimat Glomerular Filtration Rate 30, BUN/Creatinine Ratio 

18, Glucose Level 159H, Calcium Level 6.5L, Magnesium Level 1.3L





Microbiology


8/15/22 C. difficile GDH Antigen & Toxins - Final, Complete


          


8/15/22 Gram Stain - Final, Resulted


          


8/15/22 Wound Culture - Preliminary, Resulted


          Staphylococcus aureus


8/15/22 Urine Culture - Final, Complete


          NO GROWTH


8/15/22 Blood Culture - Preliminary, Resulted


          No growth





Assessment/Plan


C. Diff - causing Proctocolitis 


Low output fistula


Hyponatremia 





CT shows thickening of colon wall, but no evidence of focal fluid collection or 

abscess. No surgical intervention; will continue to monitor low output fistula. 

IV fluids, encourage oral intake (salvatore protein)  Oral vancomycin vs possible 

rectal to treat the C. Diff, and electrolyte replacement. Continue to monitor 

labs. I reviewed the CT films myself and discussed the case with BONILLA Meade DO 8/18/22 1101:


Subjective


Time Seen by a Provider:  10:12


Subjective/Events-last exam


Pt seen and examined, states feels a little better than yesterday.  Still weak 

and had some diarrhea.


Review of Systems


General:  No Chills, No Night Sweats; Fatigue, Malaise


Pulmonary:  No Cough


Cardiovascular:  No: Chest Pain


Gastrointestinal:  Vomiting; No: Nausea, Abdominal Pain


Genitourinary:  No Dysuria





Objective


Exam


General Appearance:  No Apparent Distress, Chronically ill, Thin (Very frail.  

On arrival heart rate 122 sinus, blood pressure consistently 70s over 40s.  IV 

started)


HEENT:  PERRL/EOMI, Moist Mucous Membranes


Respiratory:  Lungs Clear, Normal Breath Sounds, No Accessory Muscle Use, No 

Respiratory Distress


Cardiovascular:  Regular Rate, Rhythm, No Murmur


Gastrointestinal:  soft; No guarding, No rebound; tenderness (very mild diffuse 

tenderness), other (small fistula with minimal serous drainage noted)


Neurologic/Psychiatric:  Alert, Oriented x3





Assessment/Plan


C. Diff - causing Proctocolitis 


Low output fistula


Hyponatremia 





CT shows thickening of colon wall, but no evidence of focal fluid collection or 

abscess. No surgical intervention; will continue to monitor low output fistula. 

IV fluids, encourage oral intake (salvatore protein)  Oral vancomycin to treat the C. 

Diff, and electrolyte replacement. Continue to monitor labs.  





Supervisory-Addendum Brief


Verification & Attestation


Participated in pt care:  history, MDM, physical


Personally performed:  exam, history, MDM, supervision of care


Care discussed with:  Medical Student


Procedures:  n/a


Verification and Attestation of Medical Student E/M Service





A medical student performed and documented this service. I then reviewed and 

verified all information documented by the medical student and made 

modifications to such information, when appropriate. I personally performed a 

physical exam, medical decision making and then discussed any differences 

between the notes and made revisions as necessary to create one note.





Bonilla Galicia , 8/18/22 , 11:01











TONYA REED                    Aug 18, 2022 08:08


BONILLA GALICIA DO               Aug 18, 2022 11:01

## 2022-08-19 VITALS — SYSTOLIC BLOOD PRESSURE: 92 MMHG | DIASTOLIC BLOOD PRESSURE: 56 MMHG

## 2022-08-19 VITALS — DIASTOLIC BLOOD PRESSURE: 64 MMHG | SYSTOLIC BLOOD PRESSURE: 108 MMHG

## 2022-08-19 VITALS — DIASTOLIC BLOOD PRESSURE: 63 MMHG | SYSTOLIC BLOOD PRESSURE: 106 MMHG

## 2022-08-19 VITALS — DIASTOLIC BLOOD PRESSURE: 58 MMHG | SYSTOLIC BLOOD PRESSURE: 95 MMHG

## 2022-08-19 VITALS — SYSTOLIC BLOOD PRESSURE: 95 MMHG | DIASTOLIC BLOOD PRESSURE: 58 MMHG

## 2022-08-19 VITALS — DIASTOLIC BLOOD PRESSURE: 70 MMHG | SYSTOLIC BLOOD PRESSURE: 110 MMHG

## 2022-08-19 VITALS — DIASTOLIC BLOOD PRESSURE: 62 MMHG | SYSTOLIC BLOOD PRESSURE: 97 MMHG

## 2022-08-19 LAB
BUN/CREAT SERPL: 18
CALCIUM SERPL-MCNC: 6.4 MG/DL (ref 8.5–10.1)
CHLORIDE SERPL-SCNC: 109 MMOL/L (ref 98–107)
CO2 SERPL-SCNC: 11 MMOL/L (ref 21–32)
CREAT SERPL-MCNC: 1.3 MG/DL (ref 0.6–1.3)
GFR SERPLBLD BASED ON 1.73 SQ M-ARVRAT: 43 ML/MIN
GLUCOSE SERPL-MCNC: 61 MG/DL (ref 70–105)
HCT VFR BLD CALC: 32 % (ref 35–52)
HGB BLD-MCNC: 10.5 G/DL (ref 11.5–16)
MAGNESIUM SERPL-MCNC: 1.9 MG/DL (ref 1.6–2.4)
MCH RBC QN AUTO: 30 PG (ref 25–34)
MCHC RBC AUTO-ENTMCNC: 33 G/DL (ref 32–36)
MCV RBC AUTO: 91 FL (ref 80–99)
PLATELET # BLD: 221 10^3/UL (ref 130–400)
PMV BLD AUTO: 11.4 FL (ref 9–12.2)
POTASSIUM SERPL-SCNC: 4.2 MMOL/L (ref 3.6–5)
SODIUM SERPL-SCNC: 129 MMOL/L (ref 135–145)
WBC # BLD AUTO: 11 10^3/UL (ref 4.3–11)

## 2022-08-19 RX ADMIN — VANCOMYCIN HYDROCHLORIDE SCH MG: 125 CAPSULE ORAL at 05:35

## 2022-08-19 RX ADMIN — FERROUS SULFATE TAB 325 MG (65 MG ELEMENTAL FE) SCH MG: 325 (65 FE) TAB at 20:42

## 2022-08-19 RX ADMIN — LORATADINE SCH MG: 10 TABLET ORAL at 09:06

## 2022-08-19 RX ADMIN — SODIUM CHLORIDE SCH MLS/HR: 900 INJECTION, SOLUTION INTRAVENOUS at 23:33

## 2022-08-19 RX ADMIN — SODIUM CHLORIDE SCH MLS/HR: 900 INJECTION, SOLUTION INTRAVENOUS at 02:09

## 2022-08-19 RX ADMIN — FENOFIBRATE SCH MG: 134 CAPSULE ORAL at 20:42

## 2022-08-19 RX ADMIN — FERROUS SULFATE TAB 325 MG (65 MG ELEMENTAL FE) SCH MG: 325 (65 FE) TAB at 09:06

## 2022-08-19 RX ADMIN — AMITRIPTYLINE HYDROCHLORIDE SCH MG: 25 TABLET, FILM COATED ORAL at 20:42

## 2022-08-19 RX ADMIN — ASPIRIN 81 MG CHEWABLE TABLET SCH MG: 81 TABLET CHEWABLE at 09:06

## 2022-08-19 RX ADMIN — VANCOMYCIN HYDROCHLORIDE SCH MG: 125 CAPSULE ORAL at 12:07

## 2022-08-19 RX ADMIN — OXYBUTYNIN CHLORIDE SCH MG: 5 TABLET ORAL at 09:06

## 2022-08-19 RX ADMIN — LEVOTHYROXINE SODIUM SCH MCG: 150 TABLET ORAL at 05:36

## 2022-08-19 RX ADMIN — SODIUM CHLORIDE SCH MLS/HR: 900 INJECTION, SOLUTION INTRAVENOUS at 12:07

## 2022-08-19 RX ADMIN — OXYBUTYNIN CHLORIDE SCH MG: 5 TABLET ORAL at 20:42

## 2022-08-19 RX ADMIN — VANCOMYCIN HYDROCHLORIDE SCH MG: 125 CAPSULE ORAL at 17:36

## 2022-08-19 RX ADMIN — LATANOPROST SCH DROP: 50 SOLUTION/ DROPS OPHTHALMIC at 20:43

## 2022-08-19 RX ADMIN — ENOXAPARIN SODIUM SCH MG: 30 INJECTION SUBCUTANEOUS at 20:42

## 2022-08-19 RX ADMIN — VANCOMYCIN HYDROCHLORIDE SCH MG: 125 CAPSULE ORAL at 23:33

## 2022-08-19 NOTE — PHYSICIAN QUERY CLARIFICATION
Physician Query-General


Query to Physician:


Clinical Validation Clarification


Dr DAVID Huerta


Sepsis has been documented in the medical record. 


After study, has Sepsis been ruled out? If it has been ruled out, please 

document Sepsis ruled out" in the progress notes and/or discharge summary.





1. Yes Sepsis was ruled out/is not clinically valid


2. No Sepsis has not been ruled out/is clinically valid*


*Please document the clinical evidence supportive of this diagnosis (even if now

resolved) in the Progress Notes and Discharge Summary 


3. Other, with explanation of the clinical findings


4. Clinically undetermined, no explanation for the clinical findings





Additional information:  C diff colitis, fistula: Vital Signs: ,  RR 16, 

BP 92/60, SpO2 100% sat on room air T 37.5, WBC 30.7, lactic acid 1.67,  ER? 

Lactated 2 L bolus,  abdominal wound cultured , cefepime IV Flagyl IV, p.o. 

vancomycin for suspected C. difficile, BC X 2 negative 





In responding to this query, please exercise your independent professional 

judgment.  The purpose of this communication is to more accurately reflect the 

complexity of your patients condition. The fact that a question is asked does 

not imply that any particular answer is desired or expected.  





Thank you for your timely response to this clarification.





Abimbola Diaz MSN, RN


Clinical 


[phone]





PHYSICIAN RESPONSE:


Based on the clinical findings in the record, please respond to the query above 

on this document as an addendum. 








Physician Response:


Physician Response


2, sepsis is clinically valid- sepsis due to C diff














If you have questions please contact:


                   


:


Ext:





Thank you for your time and cooperation.


Clinical /








*********************This is a permanent part of the medical 

record*******************











ABIMBOLA DIAZ                   Aug 19, 2022 16:29


DENICE HUERTA MD             Aug 22, 2022 12:32

## 2022-08-19 NOTE — PROGRESS NOTE - SURGERY
TONYA REED 8/19/22 1027:


Subjective


Date Seen by a Provider:  Aug 19, 2022


Time Seen by a Provider:  10:23


Subjective/Events-last exam


Pt resting comfortably in chair, drinking protein shake. States she is feeling 

much better today. She had some diarrhea last night and this morning, but no 

blood was found in stool. Reports she was able to walk with her walker yesterday

and feels much less weak. She had one episode of N/V yesterday, but none this 

morning. She denies abdominal pain, fever, chills, chest pain, and SOB.


Review of Systems


General:  No Chills, No Night Sweats


HEENT:  No Visual Changes


Pulmonary:  No Cough


Cardiovascular:  No: Chest Pain


Gastrointestinal:  Diarrhea; No: Nausea, Vomiting, Abdominal Pain, Melena, 

Hematochezia


Genitourinary:  No Retention





Objective


Exam





Vital Signs








  Date Time  Temp Pulse Resp B/P (MAP) Pulse Ox O2 Delivery O2 Flow Rate FiO2


 


8/19/22 07:50 36.4 79 18 92/56 (68) 99 Room Air  


 


8/19/22 07:00  80      


 


8/19/22 04:03 36.8 79 16 97/62 (74) 97 Room Air  


 


8/19/22 01:00  80      


 


8/19/22 00:15 36.5 82 18 110/70 (83) 98 Room Air  


 


8/18/22 20:59      Room Air  


 


8/18/22 19:38 36.5 71 20 111/70 (84) 100 Room Air  


 


8/18/22 19:00  71      


 


8/18/22 16:07 36.1 74 20 107/63 (78) 100 Room Air  


 


8/18/22 13:00  84      


 


8/18/22 11:46 36.4 81 18 110/70 (83) 98 Room Air  














I & O 


 


 8/19/22





 07:00


 


Intake Total 2120 ml


 


Output Total 1250 ml


 


Balance 870 ml





Capillary Refill :


General Appearance:  No Apparent Distress, Chronically ill, Thin (Very frail.  

On arrival heart rate 122 sinus, blood pressure consistently 70s over 40s.  IV 

started)


HEENT:  PERRL/EOMI, Moist Mucous Membranes


Neck:  Non Tender, Supple


Respiratory:  Lungs Clear, Normal Breath Sounds, No Accessory Muscle Use, No 

Respiratory Distress


Cardiovascular:  Regular Rate, Rhythm, No Gallop, No Murmur


Peripheral Pulses:  2+ Radial Pulses (R), 2+ Radial Pulses (L)


Gastrointestinal:  normal bowel sounds, non tender, soft; No guarding, No 

rebound; other (small fistula no drainage this am)


Extremity:  No Calf Tenderness, No Pedal Edema


Neurologic/Psychiatric:  Alert, Oriented x3


Skin:  Normal Color, Warm/Dry


Lymphatic:  No Adenopathy (supraclavicular nodes)





Results


Lab


Laboratory Tests


8/18/22 11:37: Glucometer 158H


8/18/22 18:07: Glucometer 128H


8/19/22 05:01: Glucometer 70


8/19/22 05:21: 


White Blood Count 11.0, Red Blood Count 3.49L, Hemoglobin 10.5L, Hematocrit 32L,

Mean Corpuscular Volume 91, Mean Corpuscular Hemoglobin 30, Mean Corpuscular 

Hemoglobin Concent 33, Red Cell Distribution Width 13.7, Platelet Count 221, 

Mean Platelet Volume 11.4, Sodium Level 129L, Potassium Level 4.2, Chloride 

Level 109H, Carbon Dioxide Level 11L, Anion Gap 9, Blood Urea Nitrogen 24H, 

Creatinine 1.30, Estimat Glomerular Filtration Rate 43, BUN/Creatinine Ratio 18,

Glucose Level 61L, Calcium Level 6.4L, Magnesium Level 1.9


8/19/22 06:24: Glucometer 103





Microbiology


8/15/22 C. difficile GDH Antigen & Toxins - Final, Complete


          


8/15/22 Gram Stain - Final, Complete


          


8/15/22 Wound Culture - Final, Complete


          Gram Pos Mixed Bacterial Sandra


          Staphylococcus aureus


8/15/22 Urine Culture - Final, Complete


          NO GROWTH


8/15/22 Blood Culture - Preliminary, Resulted


          No growth





Assessment/Plan


C. Diff - causing Proctocolitis 


Low output fistula


Hyponatremia 





CT shows thickening of colon wall, but no evidence of focal fluid collection or 

abscess. No surgical intervention; will continue to monitor low output fistula. 

IV fluids, encourage oral intake (salvatore protein)  Oral vancomycin to treat the C. 

Diff, and electrolyte replacement. Continue to monitor labs.  





LAURENCE GALICIA DO 8/19/22 1448:


Subjective


Time Seen by a Provider:  14:29


Subjective/Events-last exam


Pt seen and examined, states she is feeling better today and a little bit 

stronger.  Tolerating more food.


Review of Systems


General:  No Chills, No Night Sweats


Pulmonary:  No Cough


Cardiovascular:  No: Chest Pain


Gastrointestinal:  No: Nausea, Vomiting, Abdominal Pain





Objective


Exam


General Appearance:  No Apparent Distress, Chronically ill, Thin (Very frail.  

On arrival heart rate 122 sinus, blood pressure consistently 70s over 40s.  IV 

started)


HEENT:  PERRL/EOMI


Respiratory:  Lungs Clear, Normal Breath Sounds, No Accessory Muscle Use, No 

Respiratory Distress


Cardiovascular:  Regular Rate, Rhythm, No Murmur


Gastrointestinal:  non tender, soft; No guarding, No rebound; other (small 

fistula no drainage this am)


Neurologic/Psychiatric:  Alert, Oriented x3





Assessment/Plan


C. Diff - causing Proctocolitis 


Low output fistula


Hyponatremia 





CT shows thickening of colon wall, but no evidence of focal fluid collection or 

abscess. No surgical intervention; will continue to monitor low output fistula. 

IV fluids, encourage oral intake (salvatore protein)  Oral vancomycin to treat the C. 

Diff, and electrolyte replacement. Continue to monitor labs.  





Supervisory-Addendum Brief


Verification & Attestation


Participated in pt care:  history, MDM, physical


Personally performed:  exam, history, MDM, supervision of care


Care discussed with:  Medical Student


Procedures:  n/a


Verification and Attestation of Medical Student E/M Service





A medical student performed and documented this service. I then reviewed and 

verified all information documented by the medical student and made 

modifications to such information, when appropriate. I personally performed a 

physical exam, medical decision making and then discussed any differences 

between the notes and made revisions as necessary to create one note.





Laurence Galicia , 8/19/22 , 14:48











TONYA REED                    Aug 19, 2022 10:27


LAURENCE GALICIA DO               Aug 19, 2022 14:48

## 2022-08-19 NOTE — PROGRESS NOTE
Subjective


Subjective/Events-last exam


Sitting up in chair. Pt states she feels better than yesterday but is still 

having frequent liquid stools, thought they were going to get some form last 

night but didn't. Saw a little bit of blood in stool yesterday.





Objective


Exam


Last Set of Vital Signs





Vital Signs








  Date Time  Temp Pulse Resp B/P (MAP) Pulse Ox O2 Delivery O2 Flow Rate FiO2


 


8/19/22 12:39  71      


 


8/19/22 11:43 36.4  18 95/58 (70) 99 Room Air  


 


8/16/22 14:08       1.00 





Capillary Refill :


I&O











Intake and Output 


 


 8/19/22





 00:00


 


Intake Total 3020 ml


 


Output Total 625 ml


 


Balance 2395 ml


 


 


 


Intake Oral 820 ml


 


IV Total 2200 ml


 


Output Urine Total 625 ml


 


# Bowel Movements 3








General:  Alert, No Acute Distress


Lungs:  Clear to Auscultation, Normal Air Movement


Heart:  Regular Rate, No Murmurs


Abdomen:  Normal Bowel Sounds, Other (mild diffuse ttp)


Extremities:  No Edema


Neuro:  Normal Speech


Psych/Mental Status:  Mood NL





Results/Procedures


Lab


Laboratory Tests


8/18/22 18:07: Glucometer 128H


8/19/22 05:01: Glucometer 70


8/19/22 05:21: 


White Blood Count 11.0, Red Blood Count 3.49L, Hemoglobin 10.5L, Hematocrit 32L,

Mean Corpuscular Volume 91, Mean Corpuscular Hemoglobin 30, Mean Corpuscular 

Hemoglobin Concent 33, Red Cell Distribution Width 13.7, Platelet Count 221, 

Mean Platelet Volume 11.4, Sodium Level 129L, Potassium Level 4.2, Chloride 

Level 109H, Carbon Dioxide Level 11L, Anion Gap 9, Blood Urea Nitrogen 24H, 

Creatinine 1.30, Estimat Glomerular Filtration Rate 43, BUN/Creatinine Ratio 18,

Glucose Level 61L, Calcium Level 6.4L, Magnesium Level 1.9


8/19/22 06:24: Glucometer 103


8/19/22 11:09: Glucometer 105





Microbiology


8/15/22 C. difficile GDH Antigen & Toxins - Final, Complete


          


8/15/22 Gram Stain - Final, Complete


          


8/15/22 Wound Culture - Final, Complete


          Gram Pos Mixed Bacterial Sandra


          Staphylococcus aureus


8/15/22 Urine Culture - Final, Complete


          NO GROWTH


8/15/22 Blood Culture - Preliminary, Resulted


          No growth


Radiology


CT abdomen: IMPRESSION: Extensive mural thickening of the distal colon and 

rectum suggestive of proctocolitis. There is a small amount of fluid within the 

anterior abdominal wall incision site, however, no other focal fluid collection 

is seen to indicate abscess.





Assessment/Plan


Assessment/Plan





(1) C. difficile colitis


Status:  Acute


Assessment & Plan:  PO Vancomycin, IVF





(2) Diabetes mellitus


Status:  Chronic


Qualifiers:  


   


(3) Fistula


Status:  Chronic


Assessment & Plan:  Surgery consulted, does not appear acutely infected





(4) Hypothyroidism


Status:  Chronic


Assessment & Plan:  Resume home levothyroxine





(5) Mixed hyperlipidemia


Status:  Chronic


(6) Primary hypertension


Status:  Chronic


(7) Cardiomyopathy


Status:  Acute


Assessment & Plan:  7/17/22 Echo with EF 25%, discharged with Lifevest last 

hospitalization. Hold Entresto due to LISSETTE. Resume home carvedilol. Monitor fluid

status closely.





(8) Acute kidney insufficiency


Status:  Acute


Assessment & Plan:  Secondary to dehydration from diarrhea, IVF, monitor fluid 

status closely given CHF.


8/17 slowly improving


8/18 Cr down to 1.73


8/19 Cr down to 1.3





(9) DVT prophylaxis


Status:  Acute


Assessment & Plan:  Enoxaparin














DENICE WINTER MD             Aug 19, 2022 13:12

## 2022-08-19 NOTE — OCCUPATIONAL THER DAILY NOTE
OT Current Status-Daily Note


Subjective


Pt continues to report she is "too weak" to do very much.





Appearance


Pt left sitting in recliner, all needs within reach, PCA notified.





Mental Status/Objective


Patient Orientation:  Person, Place


Attachments:  Dumont Catheter, IV, Telemetry


LifeVest





ADL-Treatment


Therapy Code Descriptions/Definitions 





Functional [place] Measure:


0=Not Assessed/NA        4=Minimal Assistance


1=Total Assistance        5=Supervision or Setup


2=Maximal Assistance  6=Modified [place]


3=Moderate Assistance 7=Complete IndependenceSCALE: Activities may be completed 

with or without assistive devices.





6-Indepedent-patient completes the activity by him/herself with no assistance 

from a helper.


5-Set-up or Clean-up Assistance-helper sets up or cleans up; patient completes 

activity. [place] assists only prior to or  


    following the activity.


4-Supervision or Touching Assistance-helper provides verbal cues and/or 

touching/steadying and/or contact guard assistance as patient completes 

activity. Assistance may be provided   


    throughout the activity or intermittently.


3-Partial/Moderate Assistance-helper does LESS THAN HALF the effort. [place] 

lifts, holds or supports trunk or limbs, but provides less than half the effort.


2-Substantial/Maximal Assistance-helper does MORE THAN HALF the effort. [place] 

lifts or holds trunk or limbs and provides more than half the effort.


2-Lzcfqvziz-vuakzi does ALL the effort. Patient does none of the effort to 

complete the activity. Or, the assistance of 2 or more helpers is required for 

the patient to complete the  


    activity.


If activity was not attempted, code reason:


7-Patient Refused.


9-Not Applicable-not attempted and the patient did not perform the activity 

before the current illness, exacerbation or injury.


10-Not Attempted due to Environmental Limitations-(lack of equipment, weather 

restraints, etc.).


88-Not Attempted due to Medical Conditions or Safety Concerns.


Eating (QC):  5


On/Off Footwear:  2


Pt sleeping at OT arrival, easy to rouse. She is very weak and takes significant

time for all transfers. Mod to elevate torso when sitting EOB. Max a to don 

bilateral socks after effortful (but failed) attempts given. She stood from bed 

with Min a and ambulated to recliner with CGA and walker. Cues for attention to 

IV line. Poor eccentric control when lowering, assist for safety needed. Assist 

for meal set up including opening all containers secondary to weakness. Pt able 

to demonstrate ability to bring utensils/cup to mouth without assist. Pt is 

currently on a liquid/clear diet.





Education


OT Patient Education:  Correct positioning, Modified ADL techniques, Progress 

toward Goal/Update tx plan, Purpose of tx/functional activities, Safety issues, 

Transfer techniques


Teaching Recipient:  Patient


Teaching Methods:  Demonstration, Discussion


Response to Teaching:  Verbalize Understanding, Reinforcement Needed





OT Long Term Goals


Long Term Goals


Time Frame:  Sep 8, 2022


Eating (QC):  5


Oral Hygiene (QC):  5


Toileting Hygiene (QC):  5


Shower/Bathe Self (QC):  4


Upper Body Dressing (QC):  5


Lower Body Dressing (QC):  4


On/Off Footwear (QC):  4


1=Demonstrate adherence to instructed precautions during ADL tasks.


2=Patient will verbalize/demonstrate understanding of assistive 

devices/modifications for ADL.


3=Patient will improve strength/tolerance for activity to enable patient to 

perform ADL's.





OT Education/Plan


Problem List/Assessment


Assessment:  Decreased Activ Tolerance, Decreased Safety Aware, Decreased UE 

Strength, Impaired Bed Mobility, Impaired Cognition, Impaired Funct Balance, 

Impaired I ADL's, Impaired Self-Care Skills





Discharge Recommendations


Plan/Recommendations:  Continue POC


Therapy Discharge Recommendati:  Post Acute OT





Treatment Plan/Plan of Care


Patient would benefit from OT for education, treatment and training to promote 

independence in ADL's, mobility, safety and/or upper extremity function for 

ADL's.


Plan of Care:  ADL Retraining, Cognitive Retraining, Functional Mobility, Group 

Exercise/Act as Ind, UE Funct Exercise/Act


Treatment Duration:  Sep 8, 2022


Frequency:  3 times per week (3-5x/week)


Estimated Hrs Per Day:  .25 hour per day


Agreement:  Yes


Rehab Potential:  Fair





Time/GCodes


Start Time:  08:29


Stop Time:  08:41


Total Time Billed (hr/min):  12


Billed Treatment Time


1 visit


Mackenzie Groves OT                Aug 19, 2022 09:03

## 2022-08-19 NOTE — PHYSICAL THERAPY DAILY NOTE
PT Daily Note-Current


Subjective


Patient agrees to PT.





Mental Status


Patient Orientation:  Normal For Age


Attachments:  Dumont Catheter, IV


life vest





Transfers


SCALE: Activities may be completed with or without assistive devices.





6-Indepedent-patient completes the activity by him/herself with no assistance 

from a helper.


5-Set-up or Clean-up Assistance-helper sets up or cleans up; patient completes 

activity. [place] assists only prior to or  


    following the activity.


4-Supervision or Touching Assistance-helper provides verbal cues and/or 

touching/steadying and/or contact guard assistance as patient completes 

activity. Assistance may be provided   


    throughout the activity or intermittently.


3-Partial/Moderate Assistance-helper does LESS THAN HALF the effort. [place] 

lifts, holds or supports trunk or limbs, but provides less than half the effort.


2-Substantial/Maximal Assistance-helper does MORE THAN HALF the effort. [place] 

lifts or holds trunk or limbs and provides more than half the effort.


2-Cvwisoiib-wvoabh does ALL the effort. Patient does none of the effort to 

complete the activity. Or, the assistance of 2 or more helpers is required for 

the patient to complete the  


    activity.


If activity was not attempted, code reason:


7-Patient Refused.


9-Not Applicable-not attempted and the patient did not perform the activity 

before the current illness, exacerbation or injury.


10-Not Attempted due to Environmental Limitations-(lack of equipment, weather 

restraints, etc.).


88-Not Attempted due to Medical Conditions or Safety Concerns.


Sit to Stand (QC):  4


Toilet Transfer (QC):  4





Gait Training


Distance:  50'


Walk 10 feet (QC):  4


Walk 50 ft with 2 Turns(QC):  4


Gait Assistive Device:  FWW


slow, steady





Assessment


Patient incontinent BM and on toilet. PT changed patient's brief.  Call light in

hand.  Increase activity as tolerated by patient.





PT Long Term Goals


Long Term Goals


PT Long Term Goals Time Frame:  Sep 3, 2022


Roll Left & Right (QC):  6


Sit to Lying (QC):  6


Lying-Sitting on Side/Bed(QC):  6


Sit to Stand (QC):  6


Chair/Bed-to-Chair Xfer(QC):  6


Toilet Transfer (QC):  6


Walk 10 feet (QC):  6


Walk 50ft with 2 Turns (QC):  6





PT Plan


Treatment/Plan


Treatment Plan:  Continue Plan of Care


Treatment Plan:  Bed Mobility, Education, Functional Activity Cherise, Functional 

Strength, Gait, Safety, Therapeutic Exercise, Transfers


Treatment Duration:  Sep 3, 2022


Frequency:  6 times per week


Estimated Hrs Per Day:  .25 hour per day


Patient and/or Family Agrees t:  Yes





Time/GCodes


Time In:  917


Time Out:  930


Total Billed Treatment Time:  13


Total Billed Treatment


1 visit


FA 13 min











ERIC VALLE PT              Aug 19, 2022 09:43

## 2022-08-20 VITALS — DIASTOLIC BLOOD PRESSURE: 63 MMHG | SYSTOLIC BLOOD PRESSURE: 116 MMHG

## 2022-08-20 VITALS — DIASTOLIC BLOOD PRESSURE: 61 MMHG | SYSTOLIC BLOOD PRESSURE: 117 MMHG

## 2022-08-20 VITALS — DIASTOLIC BLOOD PRESSURE: 64 MMHG | SYSTOLIC BLOOD PRESSURE: 109 MMHG

## 2022-08-20 VITALS — DIASTOLIC BLOOD PRESSURE: 65 MMHG | SYSTOLIC BLOOD PRESSURE: 119 MMHG

## 2022-08-20 VITALS — SYSTOLIC BLOOD PRESSURE: 110 MMHG | DIASTOLIC BLOOD PRESSURE: 66 MMHG

## 2022-08-20 VITALS — DIASTOLIC BLOOD PRESSURE: 71 MMHG | SYSTOLIC BLOOD PRESSURE: 120 MMHG

## 2022-08-20 LAB
ALBUMIN SERPL-MCNC: 1.7 GM/DL (ref 3.2–4.5)
ALP SERPL-CCNC: 50 U/L (ref 40–136)
ALT SERPL-CCNC: 14 U/L (ref 0–55)
BASOPHILS # BLD AUTO: 0.1 10^3/UL (ref 0–0.1)
BASOPHILS NFR BLD AUTO: 1 % (ref 0–10)
BILIRUB SERPL-MCNC: 0.2 MG/DL (ref 0.1–1)
BUN/CREAT SERPL: 18
CALCIUM SERPL-MCNC: 6.2 MG/DL (ref 8.5–10.1)
CHLORIDE SERPL-SCNC: 114 MMOL/L (ref 98–107)
CO2 SERPL-SCNC: 14 MMOL/L (ref 21–32)
CREAT SERPL-MCNC: 1.03 MG/DL (ref 0.6–1.3)
EOSINOPHIL # BLD AUTO: 0.3 10^3/UL (ref 0–0.3)
EOSINOPHIL NFR BLD AUTO: 4 % (ref 0–10)
GFR SERPLBLD BASED ON 1.73 SQ M-ARVRAT: 57 ML/MIN
GLUCOSE SERPL-MCNC: 99 MG/DL (ref 70–105)
HCT VFR BLD CALC: 27 % (ref 35–52)
HGB BLD-MCNC: 9 G/DL (ref 11.5–16)
LYMPHOCYTES # BLD AUTO: 0.9 10^3/UL (ref 1–4)
LYMPHOCYTES NFR BLD AUTO: 12 % (ref 12–44)
MANUAL DIFFERENTIAL PERFORMED BLD QL: NO
MCH RBC QN AUTO: 29 PG (ref 25–34)
MCHC RBC AUTO-ENTMCNC: 33 G/DL (ref 32–36)
MCV RBC AUTO: 89 FL (ref 80–99)
MONOCYTES # BLD AUTO: 0.4 10^3/UL (ref 0–1)
MONOCYTES NFR BLD AUTO: 5 % (ref 0–12)
NEUTROPHILS # BLD AUTO: 5.6 10^3/UL (ref 1.8–7.8)
NEUTROPHILS NFR BLD AUTO: 76 % (ref 42–75)
PLATELET # BLD: 228 10^3/UL (ref 130–400)
PMV BLD AUTO: 11.3 FL (ref 9–12.2)
POTASSIUM SERPL-SCNC: 4 MMOL/L (ref 3.6–5)
PROT SERPL-MCNC: 3.9 GM/DL (ref 6.4–8.2)
SODIUM SERPL-SCNC: 133 MMOL/L (ref 135–145)
WBC # BLD AUTO: 7.3 10^3/UL (ref 4.3–11)

## 2022-08-20 RX ADMIN — SODIUM BICARBONATE TAB 650 MG SCH MG: 650 TAB at 20:28

## 2022-08-20 RX ADMIN — OXYBUTYNIN CHLORIDE SCH MG: 5 TABLET ORAL at 08:33

## 2022-08-20 RX ADMIN — AMITRIPTYLINE HYDROCHLORIDE SCH MG: 25 TABLET, FILM COATED ORAL at 20:28

## 2022-08-20 RX ADMIN — LORATADINE SCH MG: 10 TABLET ORAL at 08:33

## 2022-08-20 RX ADMIN — SODIUM BICARBONATE TAB 650 MG SCH MG: 650 TAB at 12:12

## 2022-08-20 RX ADMIN — FERROUS SULFATE TAB 325 MG (65 MG ELEMENTAL FE) SCH MG: 325 (65 FE) TAB at 20:28

## 2022-08-20 RX ADMIN — OXYBUTYNIN CHLORIDE SCH MG: 5 TABLET ORAL at 20:28

## 2022-08-20 RX ADMIN — FERROUS SULFATE TAB 325 MG (65 MG ELEMENTAL FE) SCH MG: 325 (65 FE) TAB at 08:33

## 2022-08-20 RX ADMIN — LATANOPROST SCH DROP: 50 SOLUTION/ DROPS OPHTHALMIC at 20:27

## 2022-08-20 RX ADMIN — LEVOTHYROXINE SODIUM SCH MCG: 150 TABLET ORAL at 05:26

## 2022-08-20 RX ADMIN — VANCOMYCIN HYDROCHLORIDE SCH MG: 125 CAPSULE ORAL at 23:29

## 2022-08-20 RX ADMIN — FENOFIBRATE SCH MG: 134 CAPSULE ORAL at 20:28

## 2022-08-20 RX ADMIN — ENOXAPARIN SODIUM SCH MG: 30 INJECTION SUBCUTANEOUS at 20:28

## 2022-08-20 RX ADMIN — VANCOMYCIN HYDROCHLORIDE SCH MG: 125 CAPSULE ORAL at 05:26

## 2022-08-20 RX ADMIN — SODIUM CHLORIDE SCH MLS/HR: 900 INJECTION, SOLUTION INTRAVENOUS at 03:56

## 2022-08-20 RX ADMIN — VANCOMYCIN HYDROCHLORIDE SCH MG: 125 CAPSULE ORAL at 12:12

## 2022-08-20 RX ADMIN — VANCOMYCIN HYDROCHLORIDE SCH MG: 125 CAPSULE ORAL at 17:11

## 2022-08-20 RX ADMIN — SODIUM BICARBONATE TAB 650 MG SCH MG: 650 TAB at 08:33

## 2022-08-20 RX ADMIN — ASPIRIN 81 MG CHEWABLE TABLET SCH MG: 81 TABLET CHEWABLE at 08:33

## 2022-08-20 NOTE — PROGRESS NOTE - SURGERY
TONYA REED 8/20/22 1142:


Subjective


Date Seen by a Provider:  Aug 20, 2022


Time Seen by a Provider:  10:45


Subjective/Events-last exam


Pt resting comfortably. States she continues to feel stronger each day. She had 

a bowel movement this morning, that she believes is starting to firm up. She was

started on soft diet last night and is tolerating well. States she has been 

ambulating without difficulty. She denies fever, chills, N/V, abdominal pain, 

chest pain, sob, or any other complaints at this time. Pt fistula had no 

drainage yesterday or this morning.


Review of Systems


General:  No Chills, No Night Sweats


HEENT:  No Visual Changes


Pulmonary:  No Dyspnea, No Cough


Cardiovascular:  No: Chest Pain


Gastrointestinal:  Diarrhea; No: Nausea, Vomiting, Abdominal Pain, Melena


Genitourinary:  No Retention





Objective


Exam





Vital Signs








  Date Time  Temp Pulse Resp B/P (MAP) Pulse Ox O2 Delivery O2 Flow Rate FiO2


 


8/20/22 07:35 35.9 88 18 120/71 (87) 98 Room Air  


 


8/20/22 07:05      Room Air 0.00 


 


8/20/22 07:00  92      


 


8/20/22 04:03 36.3 90 16 110/66 (81) 99 Room Air  


 


8/20/22 01:00  86      


 


8/19/22 23:24 36.2 81 18 106/63 (77) 99 Room Air  


 


8/19/22 20:00      Room Air  


 


8/19/22 19:34 35.9 88 18 108/64 (79) 80 Room Air  


 


8/19/22 19:00  84      


 


8/19/22 15:39 35.9 73 18 95/58 (70) 97 Room Air  


 


8/19/22 12:39  71      


 


8/19/22 11:43 36.4 80 18 95/58 (70) 99 Room Air  














I & O 


 


 8/20/22





 07:00


 


Intake Total 1070 ml


 


Output Total 800 ml


 


Balance 270 ml





Capillary Refill :


General Appearance:  No Apparent Distress, Chronically ill


HEENT:  PERRL/EOMI, Moist Mucous Membranes


Neck:  Non Tender, Supple


Respiratory:  Lungs Clear, Normal Breath Sounds, No Accessory Muscle Use, No 

Respiratory Distress


Cardiovascular:  Regular Rate, Rhythm, No Gallop, No Murmur


Peripheral Pulses:  2+ Radial Pulses (R), 2+ Radial Pulses (L)


Gastrointestinal:  normal bowel sounds, non tender, soft; No guarding, No 

rebound; other (small fistula no drainage this am)


Extremity:  No Calf Tenderness, No Pedal Edema


Neurologic/Psychiatric:  Alert, Oriented x3, Normal Mood/Affect, Depressed 

Affect


Skin:  Normal Color, Warm/Dry


Lymphatic:  No Adenopathy (supraclavicular nodes)





Results


Lab


Laboratory Tests


8/19/22 18:17: Glucometer 53*L


8/19/22 19:16: Glucometer 88


8/19/22 23:28: Glucometer 124H


8/20/22 05:23: Glucometer 83


8/20/22 07:17: 


White Blood Count 7.3, Red Blood Count 3.06L, Hemoglobin 9.0L, Hematocrit 27L, 

Mean Corpuscular Volume 89, Mean Corpuscular Hemoglobin 29, Mean Corpuscular 

Hemoglobin Concent 33, Red Cell Distribution Width 13.7, Platelet Count 228, 

Mean Platelet Volume 11.3, Immature Granulocyte % (Auto) 2, Neutrophils (%) 

(Auto) 76H, Lymphocytes (%) (Auto) 12, Monocytes (%) (Auto) 5, Eosinophils (%) 

(Auto) 4, Basophils (%) (Auto) 1, Neutrophils # (Auto) 5.6, Lymphocytes # (Auto)

0.9L, Monocytes # (Auto) 0.4, Eosinophils # (Auto) 0.3, Basophils # (Auto) 0.1, 

Immature Granulocyte # (Auto) 0.1, Sodium Level 133L, Potassium Level 4.0, 

Chloride Level 114H, Carbon Dioxide Level 14L, Anion Gap 5, Blood Urea Nitrogen 

19H, Creatinine 1.03, Estimat Glomerular Filtration Rate 57, BUN/Creatinine 

Ratio 18, Glucose Level 99, Calcium Level 6.2L, Corrected Calcium 8.0L, Total 

Bilirubin 0.2, Aspartate Amino Transf (AST/SGOT) 18, Alanine Aminotransferase 

(ALT/SGPT) 14, Alkaline Phosphatase 50, Total Protein 3.9L, Albumin 1.7L


8/20/22 08:16: 


Bedside Blood Gas pH (LAB) 7.321, Bedside Blood Gas pCO2 (LAB) 29.0L, Bedside 

Blood Gas pO2 (LAB) 118H, Bedside Blood Gas HCO3 (LAB) 15.0*L, POC Blood Gas 

Total CO2 Calc 16L, Bedside Bl Gas O2 Saturation (Calc) 98, Bedside Arterial 

Blood Base Excess -11L





Microbiology


8/15/22 C. difficile GDH Antigen & Toxins - Final, Complete


          


8/15/22 Gram Stain - Final, Complete


          


8/15/22 Wound Culture - Final, Complete


          Gram Pos Mixed Bacterial Sandra


          Staphylococcus aureus


8/15/22 Urine Culture - Final, Complete


          NO GROWTH


8/15/22 Blood Culture - Preliminary, Resulted


          No growth





Assessment/Plan


C. Diff - causing Proctocolitis 


Low output fistula


Hyponatremia 





CT shows thickening of colon wall, but no evidence of focal fluid collection or 

abscess. No surgical intervention; will continue to monitor low output fistula. 

IV fluids, encourage oral intake (salvatore protein), she is tolerating soft diet 

well. Continue oral vancomycin to treat the C. Diff, and electrolyte 

replacement. Continue to monitor labs.  





LAURENCE GALICIA DO 8/20/22 1328:


Subjective


Time Seen by a Provider:  12:46


Subjective/Events-last exam


Pt seen and examined, no changes.  States she is feeling stronger and having 

more firm BMs.


Review of Systems


General:  No Chills, No Night Sweats; Fatigue


Pulmonary:  No Dyspnea, No Cough


Cardiovascular:  No: Chest Pain


Gastrointestinal:  Diarrhea; No: Nausea, Vomiting, Abdominal Pain


Neurological:  Weakness (mostly secondary to malnutrition)





Objective


Exam


General Appearance:  No Apparent Distress, Chronically ill, Thin


HEENT:  Moist Mucous Membranes


Respiratory:  Lungs Clear, Normal Breath Sounds, No Accessory Muscle Use, No 

Respiratory Distress


Cardiovascular:  Regular Rate, Rhythm, No Murmur


Gastrointestinal:  non tender, soft; No guarding, No rebound; other (small 

fistula no drainage this am)





Assessment/Plan


C. Diff - causing Proctocolitis 


Low output fistula


Hyponatremia 





CT shows thickening of colon wall, but no evidence of focal fluid collection or 

abscess. No surgical intervention; will continue to monitor low output fistula. 

IV fluids, encourage oral intake (salvatore protein), she is tolerating soft diet 

well. Continue oral vancomycin to treat the C. Diff, and electrolyte 

replacement. Continue to monitor labs.





Supervisory-Addendum Brief


Verification & Attestation


Participated in pt care:  history, MDM, physical


Personally performed:  exam, history, MDM, supervision of care


Care discussed with:  Medical Student


Procedures:  n/a


Verification and Attestation of Medical Student E/M Service





A medical student performed and documented this service. I then reviewed and 

verified all information documented by the medical student and made 

modifications to such information, when appropriate. I personally performed a 

physical exam, medical decision making and then discussed any differences 

between the notes and made revisions as necessary to create one note.





Laurence Galicia , 8/20/22 , 13:28











TONYA REED                    Aug 20, 2022 11:42


LAURENCE GALICIA DO               Aug 20, 2022 13:28

## 2022-08-20 NOTE — PHYSICAL THERAPY DAILY NOTE
PT Daily Note-Current


Subjective


Patient agrees to bed exercises.





Mental Status


Patient Orientation:  Normal For Age


Attachments:  IV


life vest





Transfers


SCALE: Activities may be completed with or without assistive devices.





6-Indepedent-patient completes the activity by him/herself with no assistance 

from a helper.


5-Set-up or Clean-up Assistance-helper sets up or cleans up; patient completes 

activity. [place] assists only prior to or  


    following the activity.


4-Supervision or Touching Assistance-helper provides verbal cues and/or 

touching/steadying and/or contact guard assistance as patient completes 

activity. Assistance may be provided   


    throughout the activity or intermittently.


3-Partial/Moderate Assistance-helper does LESS THAN HALF the effort. [place] 

lifts, holds or supports trunk or limbs, but provides less than half the effort.


2-Substantial/Maximal Assistance-helper does MORE THAN HALF the effort. [place] 

lifts or holds trunk or limbs and provides more than half the effort.


9-Mmuxiydfh-csmnsw does ALL the effort. Patient does none of the effort to 

complete the activity. Or, the assistance of 2 or more helpers is required for 

the patient to complete the  


    activity.


If activity was not attempted, code reason:


7-Patient Refused.


9-Not Applicable-not attempted and the patient did not perform the activity 

before the current illness, exacerbation or injury.


10-Not Attempted due to Environmental Limitations-(lack of equipment, weather 

restraints, etc.).


88-Not Attempted due to Medical Conditions or Safety Concerns.





Exercises


Supine Ex:  Bridging, Ankle pumps, Quad Set, Glut sets, Heel Slides, Straight 

leg raise


Supine Reps:  15





Assessment


Patient remains in bed with needs met.  Increase activity as tolerated by 

patient.





PT Long Term Goals


Long Term Goals


PT Long Term Goals Time Frame:  Sep 3, 2022


Roll Left & Right (QC):  6


Sit to Lying (QC):  6


Lying-Sitting on Side/Bed(QC):  6


Sit to Stand (QC):  6


Chair/Bed-to-Chair Xfer(QC):  6


Toilet Transfer (QC):  6


Walk 10 feet (QC):  6


Walk 50ft with 2 Turns (QC):  6





PT Plan


Treatment/Plan


Treatment Plan:  Continue Plan of Care


Treatment Plan:  Bed Mobility, Education, Functional Activity Cherise, Functional 

Strength, Gait, Safety, Therapeutic Exercise, Transfers


Treatment Duration:  Sep 3, 2022


Frequency:  6 times per week


Estimated Hrs Per Day:  .25 hour per day


Patient and/or Family Agrees t:  Yes





Time/GCodes


Time In:  1000


Time Out:  1008


Total Billed Treatment Time:  8


Total Billed Treatment


1 visit


EX 8 min











ERIC VALLE PT              Aug 20, 2022 10:11

## 2022-08-20 NOTE — PROGRESS NOTE - HOSPITALIST
Subjective


HPI/CC On Admission


Date Seen by Provider:  Aug 20, 2022


Time Seen by Provider:  06:00


Subjective/Events-last exam


Patient doing pretty well


She is going to nursing home at discharge


Metabolic acidosis noted so checking ABG


Starting sodium bicarb tablets


Loose stools continue


Appears to be very debilitated





Review of Systems


General:  Fatigue, Malaise


Gastrointestinal:  Diarrhea





Objective


Exam


Vital Signs





Vital Signs








  Date Time  Temp Pulse Resp B/P (MAP) Pulse Ox O2 Delivery O2 Flow Rate FiO2


 


8/20/22 07:35 35.9 88 18 120/71 (87) 98 Room Air  


 


8/20/22 07:05       0.00 





Capillary Refill :


General Appearance:  No Apparent Distress, WD/WN, Chronically ill


Respiratory:  Lungs Clear, Normal Breath Sounds


Cardiovascular:  Regular Rate, Rhythm


Neurologic/Psychiatric:  Alert, Oriented x3, Depressed Affect





Results/Procedures


Lab


Laboratory Tests


8/20/22 07:17








Patient resulted labs reviewed.





Assessment/Plan


Assessment and Plan


Assess & Plan/Chief Complaint


Assessment:


C. difficile colitis


Metabolic acidosis due to diarrhea and volume depletion


Myopathy 


Malnutrition 


Recent COVID infection with prior vaccination and 1 booster


S/p ileostomy reversal with fistula and abscess July 2022 Dr Galicia


CHF


   -Echo EF 20-25%


   -Lasix and spironolactone and Entresto and LifeVest


History of type II MI


Afib


HTN


Diabetes


CKD3a


Recent hospitalization at [facility] 6/22-7/14 following stay at [facility] 6/16 for 

pelvic abscess drainage. 





Plan:


Sodium bicarb addition


Ambulate


Decrease IV fluids


Needs nursing home at discharge











TAMMY HURST DO                Aug 20, 2022 07:39

## 2022-08-21 VITALS — SYSTOLIC BLOOD PRESSURE: 112 MMHG | DIASTOLIC BLOOD PRESSURE: 67 MMHG

## 2022-08-21 VITALS — SYSTOLIC BLOOD PRESSURE: 133 MMHG | DIASTOLIC BLOOD PRESSURE: 68 MMHG

## 2022-08-21 VITALS — DIASTOLIC BLOOD PRESSURE: 70 MMHG | SYSTOLIC BLOOD PRESSURE: 133 MMHG

## 2022-08-21 VITALS — SYSTOLIC BLOOD PRESSURE: 139 MMHG | DIASTOLIC BLOOD PRESSURE: 64 MMHG

## 2022-08-21 VITALS — SYSTOLIC BLOOD PRESSURE: 147 MMHG | DIASTOLIC BLOOD PRESSURE: 72 MMHG

## 2022-08-21 VITALS — DIASTOLIC BLOOD PRESSURE: 60 MMHG | SYSTOLIC BLOOD PRESSURE: 113 MMHG

## 2022-08-21 LAB
ALBUMIN SERPL-MCNC: 1.8 GM/DL (ref 3.2–4.5)
ALP SERPL-CCNC: 55 U/L (ref 40–136)
ALT SERPL-CCNC: 16 U/L (ref 0–55)
BASOPHILS # BLD AUTO: 0 10^3/UL (ref 0–0.1)
BASOPHILS NFR BLD AUTO: 1 % (ref 0–10)
BILIRUB SERPL-MCNC: 0.3 MG/DL (ref 0.1–1)
BUN/CREAT SERPL: 19
CALCIUM SERPL-MCNC: 7.1 MG/DL (ref 8.5–10.1)
CHLORIDE SERPL-SCNC: 116 MMOL/L (ref 98–107)
CO2 SERPL-SCNC: 15 MMOL/L (ref 21–32)
CREAT SERPL-MCNC: 0.89 MG/DL (ref 0.6–1.3)
EOSINOPHIL # BLD AUTO: 0.3 10^3/UL (ref 0–0.3)
EOSINOPHIL NFR BLD AUTO: 6 % (ref 0–10)
GFR SERPLBLD BASED ON 1.73 SQ M-ARVRAT: 68 ML/MIN
GLUCOSE SERPL-MCNC: 81 MG/DL (ref 70–105)
HCT VFR BLD CALC: 26 % (ref 35–52)
HGB BLD-MCNC: 8.6 G/DL (ref 11.5–16)
LYMPHOCYTES # BLD AUTO: 0.9 10^3/UL (ref 1–4)
LYMPHOCYTES NFR BLD AUTO: 20 % (ref 12–44)
MANUAL DIFFERENTIAL PERFORMED BLD QL: NO
MCH RBC QN AUTO: 29 PG (ref 25–34)
MCHC RBC AUTO-ENTMCNC: 33 G/DL (ref 32–36)
MCV RBC AUTO: 88 FL (ref 80–99)
MONOCYTES # BLD AUTO: 0.4 10^3/UL (ref 0–1)
MONOCYTES NFR BLD AUTO: 9 % (ref 0–12)
NEUTROPHILS # BLD AUTO: 2.7 10^3/UL (ref 1.8–7.8)
NEUTROPHILS NFR BLD AUTO: 62 % (ref 42–75)
PLATELET # BLD: 246 10^3/UL (ref 130–400)
PMV BLD AUTO: 11.3 FL (ref 9–12.2)
POTASSIUM SERPL-SCNC: 4.1 MMOL/L (ref 3.6–5)
PROT SERPL-MCNC: 4 GM/DL (ref 6.4–8.2)
SODIUM SERPL-SCNC: 138 MMOL/L (ref 135–145)
WBC # BLD AUTO: 4.4 10^3/UL (ref 4.3–11)

## 2022-08-21 RX ADMIN — FERROUS SULFATE TAB 325 MG (65 MG ELEMENTAL FE) SCH MG: 325 (65 FE) TAB at 07:38

## 2022-08-21 RX ADMIN — AMITRIPTYLINE HYDROCHLORIDE SCH MG: 25 TABLET, FILM COATED ORAL at 21:04

## 2022-08-21 RX ADMIN — VANCOMYCIN HYDROCHLORIDE SCH MG: 125 CAPSULE ORAL at 23:54

## 2022-08-21 RX ADMIN — SODIUM CHLORIDE SCH MLS/HR: 900 INJECTION, SOLUTION INTRAVENOUS at 05:23

## 2022-08-21 RX ADMIN — FENOFIBRATE SCH MG: 134 CAPSULE ORAL at 21:04

## 2022-08-21 RX ADMIN — LATANOPROST SCH DROP: 50 SOLUTION/ DROPS OPHTHALMIC at 21:05

## 2022-08-21 RX ADMIN — OXYBUTYNIN CHLORIDE SCH MG: 5 TABLET ORAL at 21:04

## 2022-08-21 RX ADMIN — SODIUM BICARBONATE TAB 650 MG SCH MG: 650 TAB at 11:32

## 2022-08-21 RX ADMIN — LEVOTHYROXINE SODIUM SCH MCG: 150 TABLET ORAL at 05:22

## 2022-08-21 RX ADMIN — ASPIRIN 81 MG CHEWABLE TABLET SCH MG: 81 TABLET CHEWABLE at 07:38

## 2022-08-21 RX ADMIN — SODIUM BICARBONATE TAB 650 MG SCH MG: 650 TAB at 07:38

## 2022-08-21 RX ADMIN — SODIUM BICARBONATE TAB 650 MG SCH MG: 650 TAB at 21:04

## 2022-08-21 RX ADMIN — SODIUM CHLORIDE SCH MLS/HR: 900 INJECTION, SOLUTION INTRAVENOUS at 17:48

## 2022-08-21 RX ADMIN — VANCOMYCIN HYDROCHLORIDE SCH MG: 125 CAPSULE ORAL at 11:32

## 2022-08-21 RX ADMIN — OXYBUTYNIN CHLORIDE SCH MG: 5 TABLET ORAL at 07:38

## 2022-08-21 RX ADMIN — FERROUS SULFATE TAB 325 MG (65 MG ELEMENTAL FE) SCH MG: 325 (65 FE) TAB at 21:04

## 2022-08-21 RX ADMIN — ENOXAPARIN SODIUM SCH MG: 30 INJECTION SUBCUTANEOUS at 21:04

## 2022-08-21 RX ADMIN — VANCOMYCIN HYDROCHLORIDE SCH MG: 125 CAPSULE ORAL at 05:22

## 2022-08-21 RX ADMIN — LORATADINE SCH MG: 10 TABLET ORAL at 07:38

## 2022-08-21 RX ADMIN — VANCOMYCIN HYDROCHLORIDE SCH MG: 125 CAPSULE ORAL at 16:54

## 2022-08-21 NOTE — PROGRESS NOTE - HOSPITALIST
Subjective


HPI/CC On Admission


Date Seen by Provider:  Aug 21, 2022


Time Seen by Provider:  11:30


Subjective/Events-last exam


Doing the same


No pain reported


SNF needed


Loose stools about the same


Lab reviewed





Review of Systems


General:  Fatigue, Malaise


Gastrointestinal:  Diarrhea





Objective


Exam


Vital Signs





Vital Signs








  Date Time  Temp Pulse Resp B/P (MAP) Pulse Ox O2 Delivery O2 Flow Rate FiO2


 


8/21/22 15:16 35.7 86 18 133/68 (89) 96 Room Air  


 


8/20/22 07:05       0.00 





Capillary Refill :


General Appearance:  No Apparent Distress, WD/WN, Chronically ill, Thin


Respiratory:  Lungs Clear, Normal Breath Sounds


Cardiovascular:  Regular Rate, Rhythm


Neurologic/Psychiatric:  Alert, Oriented x3, No Motor/Sensory Deficits, Normal 

Mood/Affect





Results/Procedures


Lab


Laboratory Tests


8/21/22 05:25








Patient resulted labs reviewed.





Assessment/Plan


Assessment and Plan


Assess & Plan/Chief Complaint


Assessment:


C. difficile colitis


Metabolic acidosis due to diarrhea and volume depletion


Myopathy 


Malnutrition 


Recent COVID infection with prior vaccination and 1 booster


S/p ileostomy reversal with fistula and abscess July 2022 Dr Galicia


CHF


   -Echo EF 20-25%


   -Lasix and spironolactone and Entresto and LifeVest


History of type II MI


Afib


HTN


Diabetes


CKD3a


Recent hospitalization at [facility] 6/22-7/14 following stay at [facility] 6/16 for 

pelvic abscess drainage. 





Plan:


Sodium bicarb addition


Ambulate


Decrease IV fluids


Needs nursing home at discharge











TAMMY HURST DO                Aug 21, 2022 07:59

## 2022-08-21 NOTE — PROGRESS NOTE - SURGERY
TONYA REED 8/21/22 1105:


Subjective


Date Seen by a Provider:  Aug 21, 2022


Time Seen by a Provider:  10:30


Subjective/Events-last exam


Pt was getting ready to take a shower. States she continues to have diarrhea. 

Her nurse noted minimal blood in her stool last night, but none this morning. Pt

states her weakness is better and she continues to tolerate increasing protein 

in her diet. She continues to ambulate and her weakness has improved. She denies

fever, chills, N/V, chest pain, or SOB.


Review of Systems


General:  No Chills, No Night Sweats


HEENT:  No Head Aches, No Visual Changes


Pulmonary:  No Cough


Cardiovascular:  No: Chest Pain


Gastrointestinal:  Diarrhea, Other (mild blood noted in stool yesterday. ); No: 

Nausea, Vomiting, Abdominal Pain


Genitourinary:  No Retention





Objective


Exam





Vital Signs








  Date Time  Temp Pulse Resp B/P (MAP) Pulse Ox O2 Delivery O2 Flow Rate FiO2


 


8/21/22 08:37      Room Air  


 


8/21/22 07:48 36.0 88 18 113/60 (77) 99 Room Air  


 


8/21/22 07:00  91      


 


8/21/22 03:40 36.0 96 16 139/64 (89) 98 Room Air  


 


8/21/22 01:00  87      


 


8/20/22 23:37 36.2 86 16 119/65 (83) 97 Room Air  


 


8/20/22 20:51      Room Air  


 


8/20/22 19:14 35.9 77 18 116/63 (80) 99 Room Air  


 


8/20/22 19:02  81      


 


8/20/22 16:14 36.2 80 18 109/64 (79) 94 Room Air  


 


8/20/22 12:28  87      


 


8/20/22 11:56 36.0 87 18 117/61 (79) 97 Room Air  














I & O 


 


 8/21/22





 07:00


 


Intake Total 1320 ml


 


Balance 1320 ml





Capillary Refill :


General Appearance:  No Apparent Distress, Chronically ill, Thin


HEENT:  PERRL/EOMI, Moist Mucous Membranes


Neck:  Non Tender, Supple


Respiratory:  No Respiratory Distress


Cardiovascular:  Regular Rate, Rhythm, No Murmur


Peripheral Pulses:  2+ Radial Pulses (R), 2+ Radial Pulses (L)


Gastrointestinal:  non tender, soft; No guarding, No rebound; other (small 

fistula no drainage this am)


Extremity:  No Calf Tenderness, No Pedal Edema


Neurologic/Psychiatric:  Alert, Oriented x3, Normal Mood/Affect, Depressed 

Affect


Skin:  Normal Color, Warm/Dry


Lymphatic:  No Adenopathy (supraclavicular nodes)





Results


Lab


Laboratory Tests


8/20/22 11:56: Glucometer 115H


8/20/22 20:40: Glucometer 129H


8/21/22 05:18: Glucometer 73


8/21/22 05:25: 


White Blood Count 4.4, Red Blood Count 2.93L, Hemoglobin 8.6L, Hematocrit 26L, 

Mean Corpuscular Volume 88, Mean Corpuscular Hemoglobin 29, Mean Corpuscular 

Hemoglobin Concent 33, Red Cell Distribution Width 13.7, Platelet Count 246, 

Mean Platelet Volume 11.3, Immature Granulocyte % (Auto) 3, Neutrophils (%) 

(Auto) 62, Lymphocytes (%) (Auto) 20, Monocytes (%) (Auto) 9, Eosinophils (%) 

(Auto) 6, Basophils (%) (Auto) 1, Neutrophils # (Auto) 2.7, Lymphocytes # (Auto)

0.9L, Monocytes # (Auto) 0.4, Eosinophils # (Auto) 0.3, Basophils # (Auto) 0.0, 

Immature Granulocyte # (Auto) 0.1, Sodium Level 138, Potassium Level 4.1, 

Chloride Level 116H, Carbon Dioxide Level 15L, Anion Gap 7, Blood Urea Nitrogen 

17, Creatinine 0.89, Estimat Glomerular Filtration Rate 68, BUN/Creatinine Ratio

19, Glucose Level 81, Calcium Level 7.1L, Corrected Calcium 8.9, Total Bilirubin

0.3, Aspartate Amino Transf (AST/SGOT) 31, Alanine Aminotransferase (ALT/SGPT) 

16, Alkaline Phosphatase 55, Total Protein 4.0L, Albumin 1.8L





Microbiology


8/15/22 C. difficile GDH Antigen & Toxins - Final, Complete


          


8/15/22 Gram Stain - Final, Complete


          


8/15/22 Wound Culture - Final, Complete


          Gram Pos Mixed Bacterial Sandra


          Staphylococcus aureus


8/15/22 Urine Culture - Final, Complete


          NO GROWTH


8/15/22 Blood Culture - Preliminary, Resulted


          No growth





Assessment/Plan


C. Diff - causing Proctocolitis 


Low output fistula


Hyponatremia 





She continues to tolerate protein and soft diet well. Continue oral vancomycin 

to treat the C. Diff, and electrolyte replacement. She may be getting 

transferred to a rehab facility in [place] on Monday per family request. 





BONILLA GALICIA DO 8/21/22 1202:


Subjective


Time Seen by a Provider:  11:23


Subjective/Events-last exam


Pt seen and examined, no changes.  States she feels like she is doing ok and is 

waiting to get sent to SNF.


Review of Systems


General:  No Chills, No Night Sweats


Pulmonary:  No Cough


Cardiovascular:  No: Chest Pain


Gastrointestinal:  No: Nausea, Vomiting, Abdominal Pain





Objective


Exam


General Appearance:  No Apparent Distress, Chronically ill


HEENT:  Moist Mucous Membranes


Respiratory:  Lungs Clear, Normal Breath Sounds, No Accessory Muscle Use, No 

Respiratory Distress


Cardiovascular:  Regular Rate, Rhythm, No Murmur


Gastrointestinal:  non tender, soft; No guarding, No rebound; other (small 

fistula no drainage this am)





Assessment/Plan


C. Diff - causing Proctocolitis 


Low output fistula


Hyponatremia 





She continues to tolerate protein and soft diet well. Continue oral vancomycin 

to treat the C. Diff, and electrolyte replacement. She may be getting 

transferred to a rehab facility in [place] on Monday per family request. 





Supervisory-Addendum Brief


Verification & Attestation


Participated in pt care:  history, MDM, physical


Personally performed:  exam, history, MDM, supervision of care


Care discussed with:  Medical Student


Procedures:  n/a


Verification and Attestation of Medical Student E/M Service





A medical student performed and documented this service. I then reviewed and 

verified all information documented by the medical student and made 

modifications to such information, when appropriate. I personally performed a 

physical exam, medical decision making and then discussed any differences 

between the notes and made revisions as necessary to create one note.





Bonilla Galicia , 8/21/22 , 12:02











TONYA REED                    Aug 21, 2022 11:05


BONILLA GALICIA DO               Aug 21, 2022 12:02

## 2022-08-22 VITALS — SYSTOLIC BLOOD PRESSURE: 120 MMHG | DIASTOLIC BLOOD PRESSURE: 67 MMHG

## 2022-08-22 VITALS — SYSTOLIC BLOOD PRESSURE: 101 MMHG | DIASTOLIC BLOOD PRESSURE: 59 MMHG

## 2022-08-22 VITALS — SYSTOLIC BLOOD PRESSURE: 108 MMHG | DIASTOLIC BLOOD PRESSURE: 60 MMHG

## 2022-08-22 VITALS — DIASTOLIC BLOOD PRESSURE: 60 MMHG | SYSTOLIC BLOOD PRESSURE: 110 MMHG

## 2022-08-22 VITALS — SYSTOLIC BLOOD PRESSURE: 148 MMHG | DIASTOLIC BLOOD PRESSURE: 78 MMHG

## 2022-08-22 VITALS — SYSTOLIC BLOOD PRESSURE: 121 MMHG | DIASTOLIC BLOOD PRESSURE: 75 MMHG

## 2022-08-22 LAB
ALBUMIN SERPL-MCNC: 1.9 GM/DL (ref 3.2–4.5)
ALP SERPL-CCNC: 58 U/L (ref 40–136)
ALT SERPL-CCNC: 18 U/L (ref 0–55)
BASOPHILS # BLD AUTO: 0.1 10^3/UL (ref 0–0.1)
BASOPHILS NFR BLD AUTO: 1 % (ref 0–10)
BILIRUB SERPL-MCNC: 0.2 MG/DL (ref 0.1–1)
BUN/CREAT SERPL: 14
CALCIUM SERPL-MCNC: 7.3 MG/DL (ref 8.5–10.1)
CHLORIDE SERPL-SCNC: 116 MMOL/L (ref 98–107)
CO2 SERPL-SCNC: 15 MMOL/L (ref 21–32)
CREAT SERPL-MCNC: 1.09 MG/DL (ref 0.6–1.3)
EOSINOPHIL # BLD AUTO: 0.2 10^3/UL (ref 0–0.3)
EOSINOPHIL NFR BLD AUTO: 4 % (ref 0–10)
GFR SERPLBLD BASED ON 1.73 SQ M-ARVRAT: 53 ML/MIN
GLUCOSE SERPL-MCNC: 83 MG/DL (ref 70–105)
HCT VFR BLD CALC: 27 % (ref 35–52)
HGB BLD-MCNC: 8.9 G/DL (ref 11.5–16)
LYMPHOCYTES # BLD AUTO: 0.8 10^3/UL (ref 1–4)
LYMPHOCYTES NFR BLD AUTO: 15 % (ref 12–44)
MANUAL DIFFERENTIAL PERFORMED BLD QL: NO
MCH RBC QN AUTO: 30 PG (ref 25–34)
MCHC RBC AUTO-ENTMCNC: 34 G/DL (ref 32–36)
MCV RBC AUTO: 88 FL (ref 80–99)
MONOCYTES # BLD AUTO: 0.3 10^3/UL (ref 0–1)
MONOCYTES NFR BLD AUTO: 5 % (ref 0–12)
NEUTROPHILS # BLD AUTO: 4.1 10^3/UL (ref 1.8–7.8)
NEUTROPHILS NFR BLD AUTO: 74 % (ref 42–75)
PLATELET # BLD: 282 10^3/UL (ref 130–400)
PMV BLD AUTO: 10.9 FL (ref 9–12.2)
POTASSIUM SERPL-SCNC: 4.2 MMOL/L (ref 3.6–5)
PROT SERPL-MCNC: 4.1 GM/DL (ref 6.4–8.2)
SODIUM SERPL-SCNC: 137 MMOL/L (ref 135–145)
WBC # BLD AUTO: 5.6 10^3/UL (ref 4.3–11)

## 2022-08-22 RX ADMIN — VANCOMYCIN HYDROCHLORIDE SCH MG: 125 CAPSULE ORAL at 05:51

## 2022-08-22 RX ADMIN — VANCOMYCIN HYDROCHLORIDE SCH MG: 125 CAPSULE ORAL at 11:44

## 2022-08-22 RX ADMIN — SODIUM CHLORIDE SCH MLS/HR: 900 INJECTION, SOLUTION INTRAVENOUS at 08:12

## 2022-08-22 RX ADMIN — FERROUS SULFATE TAB 325 MG (65 MG ELEMENTAL FE) SCH MG: 325 (65 FE) TAB at 20:43

## 2022-08-22 RX ADMIN — VANCOMYCIN HYDROCHLORIDE SCH MG: 125 CAPSULE ORAL at 23:26

## 2022-08-22 RX ADMIN — LEVOTHYROXINE SODIUM SCH MCG: 150 TABLET ORAL at 05:51

## 2022-08-22 RX ADMIN — VANCOMYCIN HYDROCHLORIDE SCH MG: 125 CAPSULE ORAL at 16:55

## 2022-08-22 RX ADMIN — OXYBUTYNIN CHLORIDE SCH MG: 5 TABLET ORAL at 20:43

## 2022-08-22 RX ADMIN — LORATADINE SCH MG: 10 TABLET ORAL at 08:13

## 2022-08-22 RX ADMIN — SODIUM BICARBONATE TAB 650 MG SCH MG: 650 TAB at 08:12

## 2022-08-22 RX ADMIN — SODIUM BICARBONATE TAB 650 MG SCH MG: 650 TAB at 11:44

## 2022-08-22 RX ADMIN — ASPIRIN 81 MG CHEWABLE TABLET SCH MG: 81 TABLET CHEWABLE at 08:12

## 2022-08-22 RX ADMIN — LATANOPROST SCH DROP: 50 SOLUTION/ DROPS OPHTHALMIC at 20:44

## 2022-08-22 RX ADMIN — OXYBUTYNIN CHLORIDE SCH MG: 5 TABLET ORAL at 08:12

## 2022-08-22 RX ADMIN — FERROUS SULFATE TAB 325 MG (65 MG ELEMENTAL FE) SCH MG: 325 (65 FE) TAB at 08:12

## 2022-08-22 RX ADMIN — FENOFIBRATE SCH MG: 134 CAPSULE ORAL at 20:43

## 2022-08-22 RX ADMIN — AMITRIPTYLINE HYDROCHLORIDE SCH MG: 25 TABLET, FILM COATED ORAL at 20:43

## 2022-08-22 RX ADMIN — SODIUM BICARBONATE TAB 650 MG SCH MG: 650 TAB at 20:43

## 2022-08-22 NOTE — DISCHARGE SUMMARY
Discharge Summary


Hospital Course


Hospital Course


Date of Admission: Aug 15, 2022 at 19:18 


Admission Diagnosis :  





Family Physician/Provider: Dawson Castellanos MD  





Date of Discharge: 8/22/22 


Discharge Diagnosis: [ ]








Hospital Course:


[ ]














Labs and Pending Lab Test:


Laboratory Tests


8/21/22 11:14: Glucometer 126H


8/21/22 15:27: Glucometer 148H


8/22/22 05:48: Glucometer 83


8/22/22 06:11: 


White Blood Count 5.6, Red Blood Count 3.02L, Hemoglobin 8.9L, Hematocrit 27L, 

Mean Corpuscular Volume 88, Mean Corpuscular Hemoglobin 30, Mean Corpuscular 

Hemoglobin Concent 34, Red Cell Distribution Width 14.1, Platelet Count 282, 

Mean Platelet Volume 10.9, Immature Granulocyte % (Auto) 2, Neutrophils (%) 

(Auto) 74, Lymphocytes (%) (Auto) 15, Monocytes (%) (Auto) 5, Eosinophils (%) 

(Auto) 4, Basophils (%) (Auto) 1, Neutrophils # (Auto) 4.1, Lymphocytes # (Auto)

0.8L, Monocytes # (Auto) 0.3, Eosinophils # (Auto) 0.2, Basophils # (Auto) 0.1, 

Immature Granulocyte # (Auto) 0.1, Sodium Level 137, Potassium Level 4.2, 

Chloride Level 116H, Carbon Dioxide Level 15L, Anion Gap 6, Blood Urea Nitrogen 

15, Creatinine 1.09, Estimat Glomerular Filtration Rate 53, BUN/Creatinine Ratio

14, Glucose Level 83, Calcium Level 7.3L, Corrected Calcium 9.0, Total Bilirubin

0.2, Aspartate Amino Transf (AST/SGOT) 33, Alanine Aminotransferase (ALT/SGPT) 

18, Alkaline Phosphatase 58, Total Protein 4.1L, Albumin 1.9L





Microbiology


8/15/22 C. difficile GDH Antigen & Toxins - Final, Complete


          


8/15/22 Gram Stain - Final, Complete


          


8/15/22 Wound Culture - Final, Complete


          Gram Pos Mixed Bacterial Sandra


          Staphylococcus aureus


8/15/22 Urine Culture - Final, Complete


          NO GROWTH


8/15/22 Blood Culture - Final, Complete


          No growth





Home Meds


Active


Sodium Bicarbonate 650 Mg Tablet 650 Mg PO TID 14 Days


Enoxaparin Sodium 40 Mg/0.4 Ml Syringe 40 Mg SC HS 14 Days


Vancomycin HCl 125 Mg Capsule 125 Mg PO QID 14 Days


Entresto 24 mg-26 mg Tablet (Sacubitril/Valsartan) 24 Mg-26 Mg Tablet 1 Tab PO 

BID 30 Days


Furosemide 40 Mg Tablet 40 Mg PO DAILY


Klor-Con M20 (Potassium Chloride) 20 Meq Tab.er.prt 20 Meq PO DAILY@0700


Children's Aspirin (Aspirin) 81 Mg Tab.chew 81 Mg PO DAILY


Spironolactone 25 Mg Tablet 25 Mg PO DAILY


Carvedilol 3.125 Mg Tablet 3.125 Mg PO BID WITH MEALS


Lipitor (Atorvastatin Calcium) 40 Mg Tablet 40 Mg PO 1700


Reported


Levothyroxine Sodium 150 Mcg Tablet 150 Mcg PO DAILY


Tylenol Arthritis (Acetaminophen) 650 Mg Tablet.er 650 Mg PO Q6H PRN


Oxybutynin Chloride 5 Mg Tablet 5 Mg PO BID


Xalatan (Latanoprost) 0.005 % Drops 1 Drop OU HS


Iron (Ferrous Sulfate) 325 Mg Tablet 325 Mg PO BID


Amitriptyline HCl 100 Mg Tablet 100 Mg PO HS


Fenofibrate 160 Mg Tablet 160 Mg PO HS


Cetirizine HCl 10 Mg Tablet 10 Mg PO DAILY





Discharge Instructions


Discharge Diet:  No Restrictions





Discharge Physical Examination


Vital Signs





Vital Signs








  Date Time  Temp Pulse Resp B/P (MAP) Pulse Ox O2 Delivery O2 Flow Rate FiO2


 


8/22/22 08:00 35.9 91 18 101/59 (73) 98 Room Air  


 


8/20/22 07:05       0.00 








Allergies:  


Coded Allergies:  


     Penicillins (Verified  Allergy, Unknown, 4/22/21)


     raspberry (Verified  Allergy, Unknown, 4/22/21)





Discharge Summary


Date of Admission


Aug 15, 2022 at 19:18


Date of Discharge





Discharge Date:  Aug 22, 2022


Discharge Diagnosis


Assessment:


C. difficile colitis


Metabolic acidosis due to diarrhea and volume depletion


Myopathy 


Malnutrition 


Recent COVID infection with prior vaccination and 1 booster


S/p ileostomy reversal with fistula and abscess July 2022 Dr Galicia


CHF


   -Echo EF 20-25%


   -Lasix and spironolactone and Entresto and LifeVest


History of type II MI


Afib


HTN


Diabetes


CKD3a


Recent hospitalization at [place] 6/22-7/14 following stay at [facility] 6/16 for 

pelvic abscess drainage. 





Plan:


Sodium bicarb addition


Ambulate


Decrease IV fluids


Needs nursing home at discharge











TAMMY HURST DO                Aug 22, 2022 10:29

## 2022-08-22 NOTE — PHYSICAL THERAPY DAILY NOTE
PT Daily Note-Current


Subjective


Patient agrees to PT.





Mental Status


Patient Orientation:  Normal For Age


Attachments:  IV


life vest





Transfers


SCALE: Activities may be completed with or without assistive devices.





6-Indepedent-patient completes the activity by him/herself with no assistance 

from a helper.


5-Set-up or Clean-up Assistance-helper sets up or cleans up; patient completes 

activity. Kimball assists only prior to or  


    following the activity.


4-Supervision or Touching Assistance-helper provides verbal cues and/or 

touching/steadying and/or contact guard assistance as patient completes 

activity. Assistance may be provided   


    throughout the activity or intermittently.


3-Partial/Moderate Assistance-helper does LESS THAN HALF the effort. Kimball 

lifts, holds or supports trunk or limbs, but provides less than half the effort.


2-Substantial/Maximal Assistance-helper does MORE THAN HALF the effort. Kimball 

lifts or holds trunk or limbs and provides more than half the effort.


0-[id number]-[id number] does ALL the effort. Patient does none of the effort to 

complete the activity. Or, the assistance of 2 or more helpers is required for 

the patient to complete the  


    activity.


If activity was not attempted, code reason:


7-Patient Refused.


9-Not Applicable-not attempted and the patient did not perform the activity 

before the current illness, exacerbation or injury.


10-Not Attempted due to Environmental Limitations-(lack of equipment, weather 

restraints, etc.).


88-Not Attempted due to Medical Conditions or Safety Concerns.


Sit to Stand (QC):  5


Chair/Bed-to-Chair Xfer(QC):  5


Toilet Transfer (QC):  5





Gait Training


Distance:  200' in room


Walk 10 feet (QC):  5


Walk 50 ft with 2 Turns(QC):  5


Walk 150 ft (QC):  5


Gait Assistive Device:  FWW


safe and functional with no deviation





Assessment


Patient in restroom for BM after PT.  Plan dismissal to  in OSH this week per 

report.





PT Long Term Goals


Long Term Goals


PT Long Term Goals Time Frame:  Sep 3, 2022


Roll Left & Right (QC):  6


Sit to Lying (QC):  6


Lying-Sitting on Side/Bed(QC):  6


Sit to Stand (QC):  6


Chair/Bed-to-Chair Xfer(QC):  6


Toilet Transfer (QC):  6


Walk 10 feet (QC):  6


Walk 50ft with 2 Turns (QC):  6





PT Plan


Treatment/Plan


Treatment Plan:  Continue Plan of Care


Treatment Plan:  Bed Mobility, Education, Functional Activity Cherise, Functional 

Strength, Gait, Safety, Therapeutic Exercise, Transfers


Treatment Duration:  Sep 3, 2022


Frequency:  6 times per week


Estimated Hrs Per Day:  .25 hour per day


Patient and/or Family Agrees t:  Yes





Time/GCodes


Time In:  1030


Time Out:  1044


Total Billed Treatment Time:  14


Total Billed Treatment


1 visit


FA 14 min











ERIC VALLE PT              Aug 22, 2022 11:39

## 2022-08-22 NOTE — DISCHARGE INST-SKILLED NURSING
Discharge Inst-Skilled NF


Reconcile Patient Problems


Problems Reviewed?:  Yes





Patient Instructions


Patient Problems:  


Debility


C diff


Entercutaneous fistula





Consult/Follow Up/Orders


Follow Up Appt.:  


PCP 1 week


Skilled NF Admit to:  


Certification (SNF)


I certify that SNF services are required to be given on an inpatient basis 

because of the above named patient's need for skilled nursing care on a 

continuing basis for the conditions(s) for which he/she was receiving inpatient 

hospital services prior to his/her transfer to the SNF.


Skilled Nursing Facility Order:  Nursing Services, Occupational Ther-Evaluate & 

Treat, Physical Therapy-Evaluate & Treat


Oxygen Delivery Method:  Room Air


Discharge Diet:  No Restrictions


Daily Activity as Tolerated:  Yes


Resuscitation Status:  Full Code





New & Resume Previous Orders


New Medications:  


Enoxaparin Sodium (Enoxaparin Sodium) 40 Mg/0.4 Ml Syringe


40 MG SC HS for 14 Days, SYRINGE





Sodium Bicarbonate (Sodium Bicarbonate) 650 Mg Tablet


650 MG PO TID for 14 Days, TAB





Vancomycin HCl (Vancomycin HCl) 125 Mg Capsule


125 MG PO QID for 14 Days, CAP





 


Continued Medications:  


Acetaminophen (Tylenol Arthritis) 650 Mg Tablet.er


650 MG PO Q6H PRN for PAIN-MILD (1-4) OR TEMPATURE, TAB





Amitriptyline HCl (Amitriptyline HCl) 100 Mg Tablet


100 MG PO HS, TAB





Aspirin (Children's Aspirin) 81 Mg Tab.chew


81 MG PO DAILY, #30 TAB





Atorvastatin Calcium (Lipitor) 40 Mg Tablet


40 MG PO 1700, #30 TAB





Carvedilol (Carvedilol) 3.125 Mg Tablet


3.125 MG PO BID WITH MEALS, #60 TAB





Cetirizine HCl (Cetirizine HCl) 10 Mg Tablet


10 MG PO DAILY, TAB





Fenofibrate (Fenofibrate) 160 Mg Tablet


160 MG PO HS, TAB





Ferrous Sulfate (Iron) 325 Mg Tablet


325 MG PO BID, TAB





Latanoprost (Xalatan) 0.005 % Drops


1 DROP OU HS, DROPS





Levothyroxine Sodium (Levothyroxine Sodium) 150 Mcg Tablet


150 MCG PO DAILY, TAB





Oxybutynin Chloride (Oxybutynin Chloride) 5 Mg Tablet


5 MG PO BID, TAB





Sacubitril/Valsartan (Entresto 24 mg-26 mg Tablet) 24 Mg-26 Mg Tablet


1 TAB PO BID for 30 Days, TAB (This prescription has been renewed)





 


Discontinued Medications:  


Furosemide (Furosemide) 40 Mg Tablet


40 MG PO DAILY, #30 TAB





Potassium Chloride (Klor-Con M20) 20 Meq Tab.er.prt


20 MEQ PO DAILY@0700, #30 EA





Spironolactone (Spironolactone) 25 Mg Tablet


25 MG PO DAILY, #30 TAB








Carolynn Trujillo 


Aug 22, 2022 


10:28











ACROLYNN TRUJILLO DO                Aug 22, 2022 10:29

## 2022-08-22 NOTE — OCCUPATIONAL THER DAILY NOTE
OT Current Status-Daily Note


Subjective


Pt alert, lying in bed.  Pt agrees to therapy.  Physician entered room for 

rounds and told pt that she may discharge to [facility] today.  No c/o pain.





Mental Status/Objective


Patient Orientation:  Person, Place, Time, Situation


Attachments:  IV, Telemetry, Other-See Comments (life vest)





ADL-Treatment


Pt stated she has already been to bathroom today.  Min A for supine to EOB due 

to weakness.  SBA to ambulate from EOB to recliner using FWW.  Pt then washed 

hands and face prior to eating breakfast.  Pt able to complete set up and use 

regular utensils to eat.  Pt fatigues quickly though no LOB noted with function

al activities.


Therapy Code Descriptions/Definitions 





Functional Phelps Measure:


0=Not Assessed/NA        4=Minimal Assistance


1=Total Assistance        5=Supervision or Setup


2=Maximal Assistance  6=Modified Phelps


3=Moderate Assistance 7=Complete IndependenceSCALE: Activities may be completed 

with or without assistive devices.





6-Indepedent-patient completes the activity by him/herself with no assistance 

from a helper.


5-Set-up or Clean-up Assistance-helper sets up or cleans up; patient completes 

activity. Marcy assists only prior to or  


    following the activity.


4-Supervision or Touching Assistance-helper provides verbal cues and/or 

touching/steadying and/or contact guard assistance as patient completes 

activity. Assistance may be provided   


    throughout the activity or intermittently.


3-Partial/Moderate Assistance-helper does LESS THAN HALF the effort. Marcy 

lifts, holds or supports trunk or limbs, but provides less than half the effort.


2-Substantial/Maximal Assistance-helper does MORE THAN HALF the effort. Marcy 

lifts or holds trunk or limbs and provides more than half the effort.


9-Oboigegqw-wikidh does ALL the effort. Patient does none of the effort to 

complete the activity. Or, the assistance of 2 or more helpers is required for 

the patient to complete the  


    activity.


If activity was not attempted, code reason:


7-Patient Refused.


9-Not Applicable-not attempted and the patient did not perform the activity 

before the current illness, exacerbation or injury.


10-Not Attempted due to Environmental Limitations-(lack of equipment, weather 

restraints, etc.).


88-Not Attempted due to Medical Conditions or Safety Concerns.





Other Treatment


Pt completed 2 B UE exercises against gravity, 1 set 10 reps. Skilled 

instruction for correct technique during exercises.  After session, pt sitting 

in recliner with call light/phone in reach.  All needs met in room.





OT Long Term Goals


Long Term Goals


Time Frame:  Sep 8, 2022


Eating (QC):  5


Oral Hygiene (QC):  5


Toileting Hygiene (QC):  5


Shower/Bathe Self (QC):  4


Upper Body Dressing (QC):  5


Lower Body Dressing (QC):  4


On/Off Footwear (QC):  4


1=Demonstrate adherence to instructed precautions during ADL tasks.


2=Patient will verbalize/demonstrate understanding of assistive 

devices/modifications for ADL.


3=Patient will improve strength/tolerance for activity to enable patient to 

perform ADL's.





OT Education/Plan


Problem List/Assessment


Assessment:  Decreased Activ Tolerance, Decreased UE Strength, Impaired Self-

Care Skills





Discharge Recommendations


Plan/Recommendations:  Continue POC





Treatment Plan/Plan of Care


Patient would benefit from OT for education, treatment and training to promote 

independence in ADL's, mobility, safety and/or upper extremity function for 

ADL's.


Plan of Care:  ADL Retraining, Cognitive Retraining, Functional Mobility, Group 

Exercise/Act as Ind, UE Funct Exercise/Act


Treatment Duration:  Sep 8, 2022


Frequency:  3 times per week (3-5x/week)


Estimated Hrs Per Day:  .25 hour per day


Agreement:  Yes


Rehab Potential:  Fair





Time/GCodes


Start Time:  09:43


Stop Time:  10:00


Total Time Billed (hr/min):  17


Billed Treatment Time


1 visit-FA 1 (17 min)











RONEL DAILEY               Aug 22, 2022 10:04

## 2022-08-22 NOTE — PROGRESS NOTE - SURGERY
JAYLA ALVAREZ 8/22/22 0753:


Subjective


Date Seen by a Provider:  Aug 22, 2022


Time Seen by a Provider:  07:41


Subjective/Events-last exam


Patient reports feeling well this morning. She had a normal, formed stool the 

other day but then it became watery again. She does feel as though it is getting

less watery each day. Denies any nausea/vomiting, sweats/chills or palpitations.


Review of Systems


General:  No Chills, No Night Sweats


Pulmonary:  No Cough


Gastrointestinal:  No: Nausea, Vomiting, Abdominal Pain





Objective


Exam





Vital Signs








  Date Time  Temp Pulse Resp B/P (MAP) Pulse Ox O2 Delivery O2 Flow Rate FiO2


 


8/22/22 03:06 36.5 104 18 110/60 (77) 98 Room Air  


 


8/22/22 01:00  101      


 


8/21/22 23:05 36.2 87 18 147/72 (97) 96 Room Air  


 


8/21/22 21:05      Room Air  


 


8/21/22 19:53 36.3 86 18 133/70 (91) 99 Room Air  


 


8/21/22 19:00  99      


 


8/21/22 15:16 35.7 86 18 133/68 (89) 96 Room Air  


 


8/21/22 12:54  98      


 


8/21/22 11:09 36.5 87 20 112/67 (82) 99 Room Air  


 


8/21/22 08:37      Room Air  














I & O 


 


 8/22/22





 06:59


 


Intake Total 1250 ml


 


Balance 1250 ml





Capillary Refill :


General Appearance:  No Apparent Distress, WD/WN, Chronically ill, Thin


HEENT:  Moist Mucous Membranes


Neck:  Non Tender, Supple


Respiratory:  Lungs Clear, Normal Breath Sounds


Cardiovascular:  Regular Rate, Rhythm


Peripheral Pulses:  2+ Radial Pulses (R), 2+ Radial Pulses (L)


Gastrointestinal:  non tender, soft, other (small fistula no drainage this am)


Extremity:  No Calf Tenderness, No Pedal Edema


Neurologic/Psychiatric:  Alert, Oriented x3, Normal Mood/Affect


Skin:  Normal Color, Warm/Dry


Lymphatic:  No Adenopathy (supraclavicular nodes)





Results


Lab


Laboratory Tests


8/21/22 11:14: Glucometer 126H


8/21/22 15:27: Glucometer 148H


8/22/22 05:48: Glucometer 83


8/22/22 06:11: 


White Blood Count 5.6, Red Blood Count 3.02L, Hemoglobin 8.9L, Hematocrit 27L, 

Mean Corpuscular Volume 88, Mean Corpuscular Hemoglobin 30, Mean Corpuscular He

moglobin Concent 34, Red Cell Distribution Width 14.1, Platelet Count 282, Mean 

Platelet Volume 10.9, Immature Granulocyte % (Auto) 2, Neutrophils (%) (Auto) 

74, Lymphocytes (%) (Auto) 15, Monocytes (%) (Auto) 5, Eosinophils (%) (Auto) 4,

Basophils (%) (Auto) 1, Neutrophils # (Auto) 4.1, Lymphocytes # (Auto) 0.8L, 

Monocytes # (Auto) 0.3, Eosinophils # (Auto) 0.2, Basophils # (Auto) 0.1, 

Immature Granulocyte # (Auto) 0.1, Sodium Level 137, Potassium Level 4.2, 

Chloride Level 116H, Carbon Dioxide Level 15L, Anion Gap 6, Blood Urea Nitrogen 

15, Creatinine 1.09, Estimat Glomerular Filtration Rate 53, BUN/Creatinine Ratio

14, Glucose Level 83, Calcium Level 7.3L, Corrected Calcium 9.0, Total Bilirubin

0.2, Aspartate Amino Transf (AST/SGOT) 33, Alanine Aminotransferase (ALT/SGPT) 

18, Alkaline Phosphatase 58, Total Protein 4.1L, Albumin 1.9L





Microbiology


8/15/22 C. difficile [facility] Antigen & Toxins - Final, Complete


          


8/15/22 Gram Stain - Final, Complete


          


8/15/22 Wound Culture - Final, Complete


          Gram Pos Mixed Bacterial Sandra


          Staphylococcus aureus


8/15/22 Urine Culture - Final, Complete


          NO GROWTH


8/15/22 Blood Culture - Final, Complete


          No growth





Assessment/Plan


C. Diff - causing Proctocolitis 


Low output fistula


Hyponatremia 





She continues to tolerate protein and soft diet well. Continue oral vancomycin 

to treat the C. Diff, and electrolyte replacement. She may be getting 

transferred to a rehab facility in [place] today per family request. 





BONILLA GALICIA DO 8/22/22 1414:


Subjective


Time Seen by a Provider:  13:18


Subjective/Events-last exam


Pt seen and examined, no changes.  States she is feeling stronger.


Review of Systems


General:  No Chills, No Night Sweats


Pulmonary:  No Cough


Cardiovascular:  No: Chest Pain


Gastrointestinal:  No: Nausea, Vomiting, Abdominal Pain





Objective


Exam


General Appearance:  No Apparent Distress, Chronically ill, Thin


HEENT:  Moist Mucous Membranes


Respiratory:  Lungs Clear, Normal Breath Sounds, No Accessory Muscle Use, No 

Respiratory Distress


Cardiovascular:  Regular Rate, Rhythm, No Murmur


Gastrointestinal:  non tender, soft, other (small fistula no drainage this am)


Extremity:  No Calf Tenderness


Neurologic/Psychiatric:  Alert, Oriented x3





Assessment/Plan


C. Diff - causing Proctocolitis 


Low output fistula


Hyponatremia 





She continues to tolerate protein and soft diet well. Continue oral vancomycin t

o treat the C. Diff, and electrolyte replacement. She may be getting transferred

to a rehab facility in [place] today per family request.





Supervisory-Addendum Brief


Verification & Attestation


Participated in pt care:  history, MDM, physical


Personally performed:  exam, history, MDM, supervision of care


Care discussed with:  Medical Student


Procedures:  n/a


Verification and Attestation of Medical Student E/M Service





A medical student performed and documented this service. I then reviewed and 

verified all information documented by the medical student and made 

modifications to such information, when appropriate. I personally performed a 

physical exam, medical decision making and then discussed any differences 

between the notes and made revisions as necessary to create one note.





Bonilla Galicia , 8/22/22 , 14:14











JAYLA ALVAREZ                   Aug 22, 2022 07:53


BONILLA GALICIA DO               Aug 22, 2022 14:14

## 2022-08-22 NOTE — PROGRESS NOTE
KIT ZUNIGA 8/22/22 1051:


Progress Note


Hospital course:


Dipti Joy is a 75y/o who was admitted on 8/15 after presenting to the ER 

due to diarrhea and generalized weakness. She was found to have c.diff and was 

started on oral vancomycin. Surgery was consulted for fistula present from 

ileostomy reversal done on 7/28/22. Abdominal CT done showed thickening of colon

wall but no evidence of fecal fluid collection or abscess. LISSETTE also present on 

admission due to dehydration from the diarrhea, treated with IVF. PT and OT 

started on 8/19. Metabolic acidosis found on 8/20 and she was then started on 

sodium bicarb tablets. Has received treatment for the c.diff w/ vancomycin and 

electrolytes have been managed throughout course of stay. Fistula has had 

minimal drainage. She will be hopefully transferring to [place] to a swing bed 

today.





CAROLYNN HURST DO 8/23/22 0517:


Supervisory-Addendum Brief


Verification & Attestation


Participated in pt care:  history, MDM, physical


Personally performed:  exam, history, MDM, supervision of care


Care discussed with:  Medical Student


Procedures:  n/a


Results interpretation:  Verified all documentation


Verification and Attestation of Medical Student E/M Service





A medical student performed and documented this service in my presence. I 

reviewed and verified all information documented by the medical student and made

modifications to such information, when appropriate. I personally performed the 

physical exam and medical decision making. 





 Carolynn Hurst, Aug 23, 2022,05:17











KIT ZUNIGA                   Aug 22, 2022 10:51


CAROLYNN HURST DO                Aug 23, 2022 05:17

## 2022-08-22 NOTE — PROGRESS NOTE - HOSPITALIST
Subjective


HPI/CC On Admission


Date Seen by Provider:  Aug 22, 2022


Time Seen by Provider:  09:30


Subjective/Events-last exam


Pt is doing well


Tentatively discharged to [facility] bed


Monitoring closely


Loose stools continue


Bicarb is 15, continue on salt sodium bicarb tablets


Heart rate is 100





Review of Systems


General:  Fatigue, Malaise





Objective


Exam


Vital Signs





Vital Signs








  Date Time  Temp Pulse Resp B/P (MAP) Pulse Ox O2 Delivery O2 Flow Rate FiO2


 


8/23/22 03:22 36.3 95 18 139/79 (99) 99 Room Air  


 


8/20/22 07:05       0.00 





Capillary Refill :


General Appearance:  No Apparent Distress, WD/WN, Chronically ill


Respiratory:  Lungs Clear, Normal Breath Sounds


Cardiovascular:  Regular Rate, Rhythm


Neurologic/Psychiatric:  Alert, Oriented x3, No Motor/Sensory Deficits, Normal 

Mood/Affect





Results/Procedures


Lab


Laboratory Tests


8/22/22 06:11








Patient resulted labs reviewed.





Assessment/Plan


Assessment and Plan


Assess & Plan/Chief Complaint


Assessment:


C. difficile colitis


Metabolic acidosis due to diarrhea and volume depletion


Myopathy 


Malnutrition 


Recent COVID infection with prior vaccination and 1 booster


S/p ileostomy reversal with fistula and abscess July 2022 Dr Galicia


CHF


   -Echo EF 20-25%


   -Lasix and spironolactone and Entresto and LifeVest


History of type II MI


Afib


HTN


Diabetes


CKD3a


Recent hospitalization at [place] 6/22-7/14 following stay at [facility] 6/16 for 

pelvic abscess drainage. 





Plan:


Sodium bicarb addition


Ambulate


Decrease IV fluids


Needs nursing home at discharge











TAMMY HURST DO                Aug 22, 2022 20:50

## 2022-08-23 VITALS — SYSTOLIC BLOOD PRESSURE: 121 MMHG | DIASTOLIC BLOOD PRESSURE: 65 MMHG

## 2022-08-23 VITALS — DIASTOLIC BLOOD PRESSURE: 53 MMHG | SYSTOLIC BLOOD PRESSURE: 96 MMHG

## 2022-08-23 VITALS — SYSTOLIC BLOOD PRESSURE: 139 MMHG | DIASTOLIC BLOOD PRESSURE: 79 MMHG

## 2022-08-23 VITALS — SYSTOLIC BLOOD PRESSURE: 96 MMHG | DIASTOLIC BLOOD PRESSURE: 53 MMHG

## 2022-08-23 VITALS — SYSTOLIC BLOOD PRESSURE: 101 MMHG | DIASTOLIC BLOOD PRESSURE: 58 MMHG

## 2022-08-23 LAB
ALBUMIN SERPL-MCNC: 1.7 GM/DL (ref 3.2–4.5)
ALP SERPL-CCNC: 49 U/L (ref 40–136)
ALT SERPL-CCNC: 15 U/L (ref 0–55)
BASOPHILS # BLD AUTO: 0.1 10^3/UL (ref 0–0.1)
BASOPHILS NFR BLD AUTO: 1 % (ref 0–10)
BILIRUB SERPL-MCNC: 0.2 MG/DL (ref 0.1–1)
BUN/CREAT SERPL: 17
CALCIUM SERPL-MCNC: 7.9 MG/DL (ref 8.5–10.1)
CHLORIDE SERPL-SCNC: 115 MMOL/L (ref 98–107)
CO2 SERPL-SCNC: 16 MMOL/L (ref 21–32)
CREAT SERPL-MCNC: 0.86 MG/DL (ref 0.6–1.3)
EOSINOPHIL # BLD AUTO: 0.3 10^3/UL (ref 0–0.3)
EOSINOPHIL NFR BLD AUTO: 5 % (ref 0–10)
GFR SERPLBLD BASED ON 1.73 SQ M-ARVRAT: 70 ML/MIN
GLUCOSE SERPL-MCNC: 79 MG/DL (ref 70–105)
HCT VFR BLD CALC: 25 % (ref 35–52)
HGB BLD-MCNC: 8.3 G/DL (ref 11.5–16)
LYMPHOCYTES # BLD AUTO: 1.2 10^3/UL (ref 1–4)
LYMPHOCYTES NFR BLD AUTO: 24 % (ref 12–44)
MANUAL DIFFERENTIAL PERFORMED BLD QL: NO
MCH RBC QN AUTO: 29 PG (ref 25–34)
MCHC RBC AUTO-ENTMCNC: 33 G/DL (ref 32–36)
MCV RBC AUTO: 88 FL (ref 80–99)
MONOCYTES # BLD AUTO: 0.4 10^3/UL (ref 0–1)
MONOCYTES NFR BLD AUTO: 9 % (ref 0–12)
NEUTROPHILS # BLD AUTO: 3.1 10^3/UL (ref 1.8–7.8)
NEUTROPHILS NFR BLD AUTO: 61 % (ref 42–75)
PLATELET # BLD: 293 10^3/UL (ref 130–400)
PMV BLD AUTO: 10.8 FL (ref 9–12.2)
POTASSIUM SERPL-SCNC: 4.3 MMOL/L (ref 3.6–5)
PROT SERPL-MCNC: 3.8 GM/DL (ref 6.4–8.2)
SODIUM SERPL-SCNC: 138 MMOL/L (ref 135–145)
WBC # BLD AUTO: 5 10^3/UL (ref 4.3–11)

## 2022-08-23 RX ADMIN — LORATADINE SCH MG: 10 TABLET ORAL at 10:07

## 2022-08-23 RX ADMIN — SODIUM CHLORIDE SCH MLS/HR: 900 INJECTION, SOLUTION INTRAVENOUS at 02:10

## 2022-08-23 RX ADMIN — LEVOTHYROXINE SODIUM SCH MCG: 150 TABLET ORAL at 05:44

## 2022-08-23 RX ADMIN — VANCOMYCIN HYDROCHLORIDE SCH MG: 125 CAPSULE ORAL at 05:44

## 2022-08-23 RX ADMIN — OXYBUTYNIN CHLORIDE SCH MG: 5 TABLET ORAL at 10:07

## 2022-08-23 RX ADMIN — FERROUS SULFATE TAB 325 MG (65 MG ELEMENTAL FE) SCH MG: 325 (65 FE) TAB at 10:07

## 2022-08-23 RX ADMIN — SODIUM BICARBONATE TAB 650 MG SCH MG: 650 TAB at 13:20

## 2022-08-23 RX ADMIN — ASPIRIN 81 MG CHEWABLE TABLET SCH MG: 81 TABLET CHEWABLE at 10:07

## 2022-08-23 RX ADMIN — VANCOMYCIN HYDROCHLORIDE SCH MG: 125 CAPSULE ORAL at 11:38

## 2022-08-23 RX ADMIN — SODIUM BICARBONATE TAB 650 MG SCH MG: 650 TAB at 10:07

## 2022-08-23 NOTE — DISCHARGE SUMMARY
Discharge Summary


Hospital Course


Was the Problem List Reviewed?:  Yes


Problems/Dx:  


(1) C. difficile colitis


Status:  Acute


(2) Atrial fibrillation


Status:  Chronic


(3) Physical debility


Status:  Chronic


(4) Cardiomyopathy


Status:  Acute


(5) Peritoneal fistula


Status:  Acute


Hospital Course


Date of Admission: Aug 15, 2022 at 19:18 


Admission Diagnosis :  





Family Physician/Provider: Dawson Castellanos MD  





Date of Discharge: 8/23/22 


Discharge Diagnosis: [ ]








Hospital Course:


Dipti Joy is a 77y/o who was admitted on 8/15 after presenting to the ER 

due to diarrhea and generalized weakness. She was found to have c.diff and was 

started on oral vancomycin. Surgery was consulted for fistula present from ileos

sharmin reversal done on 7/28/22. Abdominal CT done showed thickening of colon wall

but no evidence of fecal fluid collection or abscess. LISSETTE also present on 

admission due to dehydration from the diarrhea, treated with IVF. PT and OT 

started on 8/19. Metabolic acidosis found on 8/20 and she was then started on 

sodium bicarb tablets. Has received treatment for the c.diff w/ vancomycin and 

electrolytes have been managed throughout course of stay. Fistula has had 

minimal drainage. She will be hopefully transferring to [place] to a swing bed 

today.














Labs and Pending Lab Test:


Laboratory Tests


8/22/22 11:33: Glucometer 85


8/22/22 15:31: Glucometer 121H


8/22/22 20:26: Glucometer 146H


8/23/22 05:50: 


Sodium Level 138, Potassium Level 4.3, Chloride Level 115H, Carbon Dioxide Level

16L, Anion Gap 7, Blood Urea Nitrogen 15, Creatinine 0.86, Estimat Glomerular 

Filtration Rate 70, BUN/Creatinine Ratio 17, Glucose Level 79, Calcium Level 

7.9L, Corrected Calcium 9.7, Total Bilirubin 0.2, Aspartate Amino Transf 

(AST/SGOT) 31, Alanine Aminotransferase (ALT/SGPT) 15, Alkaline Phosphatase 49, 

Total Protein 3.8L, Albumin 1.7L


8/23/22 06:23: 


White Blood Count 5.0, Red Blood Count 2.83L, Hemoglobin 8.3L, Hematocrit 25L, 

Mean Corpuscular Volume 88, Mean Corpuscular Hemoglobin 29, Mean Corpuscular Hem

oglobin Concent 33, Red Cell Distribution Width 14.2, Platelet Count 293, Mean 

Platelet Volume 10.8, Immature Granulocyte % (Auto) 1, Neutrophils (%) (Auto) 

61, Lymphocytes (%) (Auto) 24, Monocytes (%) (Auto) 9, Eosinophils (%) (Auto) 5,

Basophils (%) (Auto) 1, Neutrophils # (Auto) 3.1, Lymphocytes # (Auto) 1.2, 

Monocytes # (Auto) 0.4, Eosinophils # (Auto) 0.3, Basophils # (Auto) 0.1, 

Immature Granulocyte # (Auto) 0.1





Microbiology


8/15/22 C. difficile [facility] Antigen & Toxins - Final, Complete


          


8/15/22 Gram Stain - Final, Complete


          


8/15/22 Wound Culture - Final, Complete


          Gram Pos Mixed Bacterial Sandra


          Staphylococcus aureus


8/15/22 Urine Culture - Final, Complete


          NO GROWTH


8/15/22 Blood Culture - Final, Complete


          No growth





Home Meds


Active


Sodium Bicarbonate 650 Mg Tablet 650 Mg PO TID 14 Days


Enoxaparin Sodium 40 Mg/0.4 Ml Syringe 40 Mg SC HS 14 Days


Vancomycin HCl 125 Mg Capsule 125 Mg PO QID 14 Days


Entresto 24 mg-26 mg Tablet (Sacubitril/Valsartan) 24 Mg-26 Mg Tablet 1 Tab PO 

BID 30 Days


Furosemide 40 Mg Tablet 40 Mg PO DAILY


Klor-Con M20 (Potassium Chloride) 20 Meq Tab.er.prt 20 Meq PO DAILY@0700


Children's Aspirin (Aspirin) 81 Mg Tab.chew 81 Mg PO DAILY


Spironolactone 25 Mg Tablet 25 Mg PO DAILY


Carvedilol 3.125 Mg Tablet 3.125 Mg PO BID WITH MEALS


Lipitor (Atorvastatin Calcium) 40 Mg Tablet 40 Mg PO 1700


Reported


Levothyroxine Sodium 150 Mcg Tablet 150 Mcg PO DAILY


Tylenol Arthritis (Acetaminophen) 650 Mg Tablet.er 650 Mg PO Q6H PRN


Oxybutynin Chloride 5 Mg Tablet 5 Mg PO BID


Xalatan (Latanoprost) 0.005 % Drops 1 Drop OU HS


Iron (Ferrous Sulfate) 325 Mg Tablet 325 Mg PO BID


Amitriptyline HCl 100 Mg Tablet 100 Mg PO HS


Fenofibrate 160 Mg Tablet 160 Mg PO HS


Cetirizine HCl 10 Mg Tablet 10 Mg PO DAILY


Assessment/Pt Instructions


[place] [facility]


Discharge Planning:  <30 minutes discharge planning





Discharge Instructions


Discharge Diet:  No Restrictions





Discharge Physical Examination


Vital Signs





Vital Signs








  Date Time  Temp Pulse Resp B/P (MAP) Pulse Ox O2 Delivery O2 Flow Rate FiO2


 


8/23/22 08:00 36.7 97 16 101/58 (72) 97 Room Air  


 


8/20/22 07:05       0.00 








General Appearance:  No Apparent Distress, WD/WN, Chronically ill, Thin


Allergies:  


Coded Allergies:  


     Penicillins (Verified  Allergy, Unknown, 4/22/21)


     raspberry (Verified  Allergy, Unknown, 4/22/21)





Discharge Summary


Date of Admission


Aug 15, 2022 at 19:18


Date of Discharge





Discharge Date:  Aug 23, 2022


Discharge Diagnosis


Assessment:


C. difficile colitis


Metabolic acidosis due to diarrhea and volume depletion


Myopathy 


Malnutrition 


Recent COVID infection with prior vaccination and 1 booster


S/p ileostomy reversal with fistula and abscess July 2022 Dr Galicia


CHF


   -Echo EF 20-25%


   -Lasix and spironolactone and Entresto and LifeVest


History of type II MI


Afib


HTN


Diabetes


CKD3a


Recent hospitalization at [place] 6/22-7/14 following stay at [facility] 6/16 for 

pelvic abscess drainage. 





Plan:


Sodium bicarb addition


Ambulate


Decrease IV fluids


Needs nursing home at discharge











TAMMY HURST DO                Aug 23, 2022 10:38

## 2022-08-23 NOTE — OCCUPATIONAL THER DAILY NOTE
OT Current Status-Daily Note


Subjective


Pt alert in bathroom. Pt agrees to therapy.





Mental Status/Objective


Patient Orientation:  Person, Place, Time, Situation


Attachments:  IV, Telemetry, Other-See Comments (Life Vest)





ADL-Treatment


Pt requested change of gown, due to tubing pt required min A to don gown. Pt 

independent in toilet hygiene. Pt CGA in lower body dressing. Pt required min A 

from sit to stand with FWW from toilet. Pt CGA, for hand hygiene standing at 

sink. Pt ambulated from bathroom to bed with FWW , CGA due to fatigue. Pt EOB to

Supine, Min A to assist with LE. Pt left supine in bed with call light/phone in 

reach. All needs met in room.


Therapy Code Descriptions/Definitions 





Functional [place] Measure:


0=Not Assessed/NA        4=Minimal Assistance


1=Total Assistance        5=Supervision or Setup


2=Maximal Assistance  6=Modified [place]


3=Moderate Assistance 7=Complete IndependenceSCALE: Activities may be completed 

with or without assistive devices.





6-Indepedent-patient completes the activity by him/herself with no assistance 

from a helper.


5-Set-up or Clean-up Assistance-helper sets up or cleans up; patient completes 

activity. P[place] assists only prior to or  


    following the activity.


4-Supervision or Touching Assistance-helper provides verbal cues and/or 

touching/steadying and/or contact guard assistance as patient completes 

activity. Assistance may be provided   


    throughout the activity or intermittently.


3-Partial/Moderate Assistance-helper does LESS THAN HALF the effort. Pisek 

lifts, holds or supports trunk or limbs, but provides less than half the effort.


2-Substantial/Maximal Assistance-helper does MORE THAN HALF the effort. Pisek 

lifts or holds trunk or limbs and provides more than half the effort.


5-Etllisagc-twpipv does ALL the effort. Patient does none of the effort to 

complete the activity. Or, the assistance of 2 or more helpers is required for 

the patient to complete the  


    activity.


If activity was not attempted, code reason:


7-Patient Refused.


9-Not Applicable-not attempted and the patient did not perform the activity 

before the current illness, exacerbation or injury.


10-Not Attempted due to Environmental Limitations-(lack of equipment, weather 

restraints, etc.).


88-Not Attempted due to Medical Conditions or Safety Concerns.


Upper Body Dressing (QC):  3 (Min A)


Lower Body Dressing (QC):  3 (Min A)


Toileting Hygiene (QC):  6


Toilet Transfer (QC):  3 (Min A)





OT Long Term Goals


Long Term Goals


Time Frame:  Sep 8, 2022


Eating (QC):  5


Oral Hygiene (QC):  5


Toileting Hygiene (QC):  5


Shower/Bathe Self (QC):  4


Upper Body Dressing (QC):  5


Lower Body Dressing (QC):  4


On/Off Footwear (QC):  4


1=Demonstrate adherence to instructed precautions during ADL tasks.


2=Patient will verbalize/demonstrate understanding of assistive 

devices/modifications for ADL.


3=Patient will improve strength/tolerance for activity to enable patient to 

perform ADL's.





OT Education/Plan


Problem List/Assessment


Assessment:  Decreased Activ Tolerance, Impaired Bed Mobility, Impaired 

Coordination, Impaired Self-Care Skills





Discharge Recommendations


Plan/Recommendations:  Continue POC





Treatment Plan/Plan of Care


Patient would benefit from OT for education, treatment and training to promote 

independence in ADL's, mobility, safety and/or upper extremity function for 

ADL's.


Plan of Care:  ADL Retraining, Cognitive Retraining, Functional Mobility, Group 

Exercise/Act as Ind, UE Funct Exercise/Act


Treatment Duration:  Sep 8, 2022


Frequency:  3 times per week (3-5x/week)


Estimated Hrs Per Day:  .25 hour per day


Agreement:  Yes


Rehab Potential:  Fair





Time/GCodes


Start Time:  11:09


Stop Time:  11:28


Total Time Billed (hr/min):  19


Billed Treatment Time


1 visit ADL 1 (19min)











RONEL DAILEY               Aug 23, 2022 11:41

## 2022-08-23 NOTE — PROGRESS NOTE - SURGERY
JAYLA ALVAREZ 8/23/22 0847:


Subjective


Date Seen by a Provider:  Aug 23, 2022


Time Seen by a Provider:  07:44


Subjective/Events-last exam


Patient reports feeling about the same today. She was still having runny bowel 

movements yesterday and thinks she saw a minimal amount of blood in the stool 

last night. She denies any nausea/vomiting or abdominal pain.


Review of Systems


General:  No Chills, No Night Sweats


Pulmonary:  No Cough


Cardiovascular:  No: Chest Pain


Gastrointestinal:  Diarrhea, Hematochezia; No: Nausea, Vomiting, Abdominal Pain





Objective


Exam





Vital Signs








  Date Time  Temp Pulse Resp B/P (MAP) Pulse Ox O2 Delivery O2 Flow Rate FiO2


 


8/23/22 08:00 36.7 97 16 101/58 (72) 97 Room Air  


 


8/23/22 07:13  91      


 


8/23/22 03:22 36.3 95 18 139/79 (99) 99 Room Air  


 


8/23/22 01:00  94      


 


8/22/22 23:25 36.6 89 18 120/67 (84) 98 Room Air  


 


8/22/22 20:45      Room Air  


 


8/22/22 20:23 36.0 90 19 148/78 (101) 100 Room Air  


 


8/22/22 19:00  91      


 


8/22/22 15:25 36.3 84 18 121/75 (90) 100 Room Air  


 


8/22/22 13:00  88      


 


8/22/22 11:34 36.0 85 18 108/60 (76) 96 Room Air  














I & O 


 


 8/23/22





 07:00


 


Intake Total 1397 ml


 


Balance 1397 ml





Capillary Refill :


General Appearance:  No Apparent Distress, WD/WN, Chronically ill


HEENT:  Moist Mucous Membranes


Neck:  Non Tender, Supple


Respiratory:  Lungs Clear, Normal Breath Sounds


Cardiovascular:  Regular Rate, Rhythm


Peripheral Pulses:  2+ Radial Pulses (R), 2+ Radial Pulses (L)


Gastrointestinal:  non tender, soft, other (small fistula no drainage this am)


Neurologic/Psychiatric:  Alert, Oriented x3, Normal Mood/Affect


Skin:  Normal Color, Warm/Dry


Lymphatic:  No Adenopathy (supraclavicular nodes)





Results


Lab


Laboratory Tests


8/22/22 11:33: Glucometer 85


8/22/22 15:31: Glucometer 121H


8/22/22 20:26: Glucometer 146H


8/23/22 05:50: 


Sodium Level 138, Potassium Level 4.3, Chloride Level 115H, Carbon Dioxide Level

16L, Anion Gap 7, Blood Urea Nitrogen 15, Creatinine 0.86, Estimat Glomerular 

Filtration Rate 70, BUN/Creatinine Ratio 17, Glucose Level 79, Calcium Level 

7.9L, Corrected Calcium 9.7, Total Bilirubin 0.2, Aspartate Amino Transf 

(AST/SGOT) 31, Alanine Aminotransferase (ALT/SGPT) 15, Alkaline Phosphatase 49, 

Total Protein 3.8L, Albumin 1.7L


8/23/22 06:23: 


White Blood Count 5.0, Red Blood Count 2.83L, Hemoglobin 8.3L, Hematocrit 25L, 

Mean Corpuscular Volume 88, Mean Corpuscular Hemoglobin 29, Mean Corpuscular 

Hemoglobin Concent 33, Red Cell Distribution Width 14.2, Platelet Count 293, 

Mean Platelet Volume 10.8, Immature Granulocyte % (Auto) 1, Neutrophils (%) 

(Auto) 61, Lymphocytes (%) (Auto) 24, Monocytes (%) (Auto) 9, Eosinophils (%) 

(Auto) 5, Basophils (%) (Auto) 1, Neutrophils # (Auto) 3.1, Lymphocytes # (Auto)

1.2, Monocytes # (Auto) 0.4, Eosinophils # (Auto) 0.3, Basophils # (Auto) 0.1, 

Immature Granulocyte # (Auto) 0.1





Microbiology


8/15/22 C. difficile [facility] Antigen & Toxins - Final, Complete


          


8/15/22 Gram Stain - Final, Complete


          


8/15/22 Wound Culture - Final, Complete


          Gram Pos Mixed Bacterial Sandra


          Staphylococcus aureus


8/15/22 Urine Culture - Final, Complete


          NO GROWTH


8/15/22 Blood Culture - Final, Complete


          No growth





Assessment/Plan


C. Diff - causing Proctocolitis 


Low output fistula


Hyponatremia 





She continues to tolerate protein and soft diet well. Continue oral vancomycin 

to treat the C. Diff, and electrolyte replacement. She may be getting 

transferred to a rehab facility in [place] today per family request.





BONILLA GALICIA DO 8/23/22 1346:


Subjective


Time Seen by a Provider:  11:41


Subjective/Events-last exam


Pt seen and examined, no new changes.


Review of Systems


General:  No Chills, No Night Sweats; Fatigue


Cardiovascular:  No: Chest Pain


Gastrointestinal:  Diarrhea; No: Nausea, Vomiting, Abdominal Pain





Objective


Exam


General Appearance:  No Apparent Distress, Chronically ill


HEENT:  Moist Mucous Membranes


Respiratory:  Lungs Clear, Normal Breath Sounds, No Accessory Muscle Use, No 

Respiratory Distress


Cardiovascular:  Regular Rate, Rhythm, No Murmur


Gastrointestinal:  non tender, other (small fistula no drainage this am)





Assessment/Plan


C. Diff - causing Proctocolitis 


Low output fistula


Hyponatremia 





She continues to tolerate protein and soft diet well. Continue oral vancomycin 

to treat the C. Diff, and electrolyte replacement. She may be getting 

transferred to a rehab facility in [place] today per family request.





Supervisory-Addendum Brief


Verification & Attestation


Participated in pt care:  history, MDM, physical


Personally performed:  exam, history, MDM, supervision of care


Care discussed with:  Medical Student


Procedures:  n/a


Verification and Attestation of Medical Student E/M Service





A medical student performed and documented this service. I then reviewed and 

verified all information documented by the medical student and made 

modifications to such information, when appropriate. I personally performed a 

physical exam, medical decision making and then discussed any differences 

between the notes and made revisions as necessary to create one note.





Bonilla Galicia , 8/23/22 , 13:46











JAYLA ALVAREZ                   Aug 23, 2022 08:47


BONILLA GALICIA DO               Aug 23, 2022 13:46

## 2022-08-23 NOTE — PROGRESS NOTE - HOSPITALIST
Subjective


HPI/CC On Admission


Date Seen by Provider:  Aug 23, 2022





Objective


Exam


Vital Signs





Vital Signs








  Date Time  Temp Pulse Resp B/P (MAP) Pulse Ox O2 Delivery O2 Flow Rate FiO2


 


8/23/22 15:39 36.8 90 17 121/65 (83) 96 Room Air  


 


8/23/22 15:28       0.00 





Capillary Refill :





Results/Procedures


Lab


Laboratory Tests


8/23/22 05:50








8/23/22 06:23








Patient resulted labs reviewed.





Assessment/Plan


Assessment and Plan


Assess & Plan/Chief Complaint


Assessment:


C. difficile colitis


Metabolic acidosis due to diarrhea and volume depletion


Myopathy 


Malnutrition 


Recent COVID infection with prior vaccination and 1 booster


S/p ileostomy reversal with fistula and abscess July 2022 Dr Galicia


CHF


   -Echo EF 20-25%


   -Lasix and spironolactone and Entresto and LifeVest


History of type II MI


Afib


HTN


Diabetes


CKD3a


Recent hospitalization at [facility] 6/22-7/14 following stay at [facility] 6/16 for 

pelvic abscess drainage. 





Plan:


Sodium bicarb addition


Ambulate


Decrease IV fluids


Needs nursing home at discharge











TAMMY HURST DO                Aug 23, 2022 10:26

## 2022-08-29 NOTE — PHYSICIAN QUERY CLARIFICATION
PQ-Intro New Diagnosis


Admission/Discharge


Admission Date: Aug 15, 2022 at 19:18 


Discharge Date:  Aug 23, 2022 at 16:13


 Dr. Trujillo,


The medical record reflects the following clinical scenario:





History/Risk Factors:


recent hx Covid, Sepsis, peritoneal fistula, c diff colitis





Clinical Findings:


generalized weakness, diarrhea, serology positive for covid on 8/23





Treatment:


IV Cefepime, IV Vancomycin





Question:  What condition best reflects the above clinical scenario? 


Please document a response in the Progress Noter or Discharge Summary.





1. Covid present on admission





2. Covid developed after admission





3. Hx covid not present on this admission





4. post covid syndrome





5. Other, with explanation of the clinical findings.





6. Clinically undetermined, no explanation for the clinical findings.





PHYSICIAN RESPONSE


What condition reflects above:  Other, explanation/clinical finding (colonized o

nly)


In responding to this query, please exercise your independent professional 

judgment.  The purpose of this communication is to more accurately reflect the 

complexity of your patients condition. The fact that a question is asked does 

not imply that any particular answer is desired or expected.  





Thank you for your timely response to this clarification.      


   


Requestors name: Nasima   





Phone # [phone]








THIS PHYSICIAN QUERY FORM IS A PERMANENT PART OF THE MEDICAL RECORD











NASIMA KIM                 Aug 29, 2022 10:04


TAMMY TRUJILLO DO                Aug 29, 2022 11:08

## 2022-08-30 ENCOUNTER — HOSPITAL ENCOUNTER (INPATIENT)
Dept: HOSPITAL 75 - ICU | Age: 77
LOS: 8 days | Discharge: NURSING, REHAB OR LONG-TERM CARE | DRG: 371 | End: 2022-09-07
Attending: FAMILY MEDICINE | Admitting: FAMILY MEDICINE
Payer: MEDICARE

## 2022-08-30 VITALS — SYSTOLIC BLOOD PRESSURE: 97 MMHG | DIASTOLIC BLOOD PRESSURE: 75 MMHG

## 2022-08-30 VITALS — HEIGHT: 62.05 IN | WEIGHT: 259.7 LBS | BODY MASS INDEX: 47.19 KG/M2

## 2022-08-30 VITALS — SYSTOLIC BLOOD PRESSURE: 131 MMHG | DIASTOLIC BLOOD PRESSURE: 65 MMHG

## 2022-08-30 VITALS — SYSTOLIC BLOOD PRESSURE: 96 MMHG | DIASTOLIC BLOOD PRESSURE: 73 MMHG

## 2022-08-30 VITALS — SYSTOLIC BLOOD PRESSURE: 131 MMHG | DIASTOLIC BLOOD PRESSURE: 70 MMHG

## 2022-08-30 DIAGNOSIS — Z86.16: ICD-10-CM

## 2022-08-30 DIAGNOSIS — M06.9: ICD-10-CM

## 2022-08-30 DIAGNOSIS — M54.9: ICD-10-CM

## 2022-08-30 DIAGNOSIS — K65.9: ICD-10-CM

## 2022-08-30 DIAGNOSIS — E78.2: ICD-10-CM

## 2022-08-30 DIAGNOSIS — G89.29: ICD-10-CM

## 2022-08-30 DIAGNOSIS — A04.71: Primary | ICD-10-CM

## 2022-08-30 DIAGNOSIS — Z88.0: ICD-10-CM

## 2022-08-30 DIAGNOSIS — N18.9: ICD-10-CM

## 2022-08-30 DIAGNOSIS — I48.91: ICD-10-CM

## 2022-08-30 DIAGNOSIS — R65.10: ICD-10-CM

## 2022-08-30 DIAGNOSIS — I13.0: ICD-10-CM

## 2022-08-30 DIAGNOSIS — D62: ICD-10-CM

## 2022-08-30 DIAGNOSIS — Z20.822: ICD-10-CM

## 2022-08-30 DIAGNOSIS — F32.A: ICD-10-CM

## 2022-08-30 DIAGNOSIS — E03.9: ICD-10-CM

## 2022-08-30 DIAGNOSIS — G72.9: ICD-10-CM

## 2022-08-30 DIAGNOSIS — E43: ICD-10-CM

## 2022-08-30 DIAGNOSIS — I50.22: ICD-10-CM

## 2022-08-30 DIAGNOSIS — K63.2: ICD-10-CM

## 2022-08-30 DIAGNOSIS — K92.1: ICD-10-CM

## 2022-08-30 DIAGNOSIS — E11.22: ICD-10-CM

## 2022-08-30 DIAGNOSIS — Z90.5: ICD-10-CM

## 2022-08-30 DIAGNOSIS — N17.9: ICD-10-CM

## 2022-08-30 DIAGNOSIS — R53.81: ICD-10-CM

## 2022-08-30 DIAGNOSIS — I25.5: ICD-10-CM

## 2022-08-30 LAB
ALBUMIN SERPL-MCNC: 1.9 GM/DL (ref 3.2–4.5)
ALP SERPL-CCNC: 41 U/L (ref 40–136)
ALT SERPL-CCNC: 13 U/L (ref 0–55)
BASOPHILS # BLD AUTO: 0 10^3/UL (ref 0–0.1)
BASOPHILS NFR BLD AUTO: 1 % (ref 0–10)
BILIRUB SERPL-MCNC: 0.2 MG/DL (ref 0.1–1)
BUN/CREAT SERPL: 17
CALCIUM SERPL-MCNC: 6.8 MG/DL (ref 8.5–10.1)
CHLORIDE SERPL-SCNC: 117 MMOL/L (ref 98–107)
CO2 SERPL-SCNC: 18 MMOL/L (ref 21–32)
CREAT SERPL-MCNC: 1.14 MG/DL (ref 0.6–1.3)
EOSINOPHIL # BLD AUTO: 0.1 10^3/UL (ref 0–0.3)
EOSINOPHIL NFR BLD AUTO: 2 % (ref 0–10)
GFR SERPLBLD BASED ON 1.73 SQ M-ARVRAT: 50 ML/MIN
GLUCOSE SERPL-MCNC: 71 MG/DL (ref 70–105)
HCT VFR BLD CALC: 20 % (ref 35–52)
HGB BLD-MCNC: 6.7 G/DL (ref 11.5–16)
LYMPHOCYTES # BLD AUTO: 1.4 10^3/UL (ref 1–4)
LYMPHOCYTES NFR BLD AUTO: 26 % (ref 12–44)
MANUAL DIFFERENTIAL PERFORMED BLD QL: NO
MCH RBC QN AUTO: 30 PG (ref 25–34)
MCHC RBC AUTO-ENTMCNC: 33 G/DL (ref 32–36)
MCV RBC AUTO: 89 FL (ref 80–99)
MONOCYTES # BLD AUTO: 0.5 10^3/UL (ref 0–1)
MONOCYTES NFR BLD AUTO: 10 % (ref 0–12)
NEUTROPHILS # BLD AUTO: 3.2 10^3/UL (ref 1.8–7.8)
NEUTROPHILS NFR BLD AUTO: 62 % (ref 42–75)
PLATELET # BLD: 255 10^3/UL (ref 130–400)
PMV BLD AUTO: 11 FL (ref 9–12.2)
POTASSIUM SERPL-SCNC: 4.3 MMOL/L (ref 3.6–5)
PROT SERPL-MCNC: 4.1 GM/DL (ref 6.4–8.2)
SODIUM SERPL-SCNC: 142 MMOL/L (ref 135–145)
WBC # BLD AUTO: 5.2 10^3/UL (ref 4.3–11)

## 2022-08-30 PROCEDURE — 83540 ASSAY OF IRON: CPT

## 2022-08-30 PROCEDURE — 86900 BLOOD TYPING SEROLOGIC ABO: CPT

## 2022-08-30 PROCEDURE — 36415 COLL VENOUS BLD VENIPUNCTURE: CPT

## 2022-08-30 PROCEDURE — 83735 ASSAY OF MAGNESIUM: CPT

## 2022-08-30 PROCEDURE — 36410 VNPNXR 3YR/> PHY/QHP DX/THER: CPT

## 2022-08-30 PROCEDURE — 87081 CULTURE SCREEN ONLY: CPT

## 2022-08-30 PROCEDURE — 80048 BASIC METABOLIC PNL TOTAL CA: CPT

## 2022-08-30 PROCEDURE — 94664 DEMO&/EVAL PT USE INHALER: CPT

## 2022-08-30 PROCEDURE — 83605 ASSAY OF LACTIC ACID: CPT

## 2022-08-30 PROCEDURE — 84100 ASSAY OF PHOSPHORUS: CPT

## 2022-08-30 PROCEDURE — 76937 US GUIDE VASCULAR ACCESS: CPT

## 2022-08-30 PROCEDURE — 86850 RBC ANTIBODY SCREEN: CPT

## 2022-08-30 PROCEDURE — 80053 COMPREHEN METABOLIC PANEL: CPT

## 2022-08-30 PROCEDURE — 86920 COMPATIBILITY TEST SPIN: CPT

## 2022-08-30 PROCEDURE — 87040 BLOOD CULTURE FOR BACTERIA: CPT

## 2022-08-30 PROCEDURE — 87636 SARSCOV2 & INF A&B AMP PRB: CPT

## 2022-08-30 PROCEDURE — 82947 ASSAY GLUCOSE BLOOD QUANT: CPT

## 2022-08-30 PROCEDURE — 83550 IRON BINDING TEST: CPT

## 2022-08-30 PROCEDURE — 86901 BLOOD TYPING SEROLOGIC RH(D): CPT

## 2022-08-30 PROCEDURE — 82728 ASSAY OF FERRITIN: CPT

## 2022-08-30 PROCEDURE — 85025 COMPLETE CBC W/AUTO DIFF WBC: CPT

## 2022-08-30 RX ADMIN — LATANOPROST SCH ML: 50 SOLUTION/ DROPS OPHTHALMIC at 20:53

## 2022-08-30 RX ADMIN — SACUBITRIL AND VALSARTAN SCH TAB: 24; 26 TABLET, FILM COATED ORAL at 21:01

## 2022-08-30 RX ADMIN — VANCOMYCIN HYDROCHLORIDE SCH MG: 125 CAPSULE ORAL at 23:41

## 2022-08-30 RX ADMIN — OXYBUTYNIN CHLORIDE SCH MG: 5 TABLET ORAL at 21:01

## 2022-08-30 RX ADMIN — VANCOMYCIN HYDROCHLORIDE SCH MG: 125 CAPSULE ORAL at 17:33
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
## 2022-08-30 NOTE — CONSULTATION - SURGERY
History of Present Illness


History of Present Illness


Patient Consulted On(krishna/time)


8/30/22


 20:59


Time Seen by Provider:  18:12


History of Present Illness


Surgery asked to consult regarding hx of fistula, C. Diff colitis, rectal bleed,

Anemia.





HPI per IM:Patient is a 77 yo female admitted for D. difficile infection and 

continued rectal bleeding. She states that she is still experiencing diarrhea 

but it has slowed down somewhat. She admits fatigue, gas pains, lightheadedness 

when she sits up, SOB, and bright red blood seen after bowel movements. She 

denies cough, headache, pain with bowel movement, dysuria or other urinary 

symptoms, and muscle pain. She recalls a previous occurrence of C diff. 

infection 2 years ago and was discharged on 8/23/2022 from [facility] after 

being treated for her current infection (vancomycin). She has a previous history

of peritoneal fistula.





Pt is well known to me and I was seeing her during last admit.  She was at a SNF

in [place] when she started having fatigue, near syncope and rectal bleeding.  

When I saw her tonight she stated she was getting some "stuff" out of fistula 

and it looked possibly bloody.  She was having minimal abdominal pain.  The 

nurse states she was admitted for sepsis.





Allergies and Home Medications


Allergies


Coded Allergies:  


     Penicillins (Verified  Allergy, Unknown, 4/22/21)


     raspberry (Verified  Allergy, Unknown, 4/22/21)





Patient Home Medication List


Home Medication List Reviewed:  Yes


Acetaminophen (Tylenol Arthritis) 650 Mg Tablet.er, 650 MG PO Q6H PRN for PAIN-

MILD (1-4) OR TEMPATURE, (Reported)


   Entered as Reported by: GAGE LAU on 7/15/22 1145


   Last Action: Continued


Amitriptyline HCl (Amitriptyline HCl) 100 Mg Tablet, 100 MG PO HS, (Reported)


   Entered as Reported by: GAGE LAU on 11/3/20 1433


   Last Action: Converted


Amlodipine Besylate (Amlodipine Besylate) 5 Mg Tablet, 5 MG PO DAILY, (Reported)


   Entered as Reported by: GAGE LAU on 8/30/22 1548


   Last Action: Reviewed


Atorvastatin Calcium (Lipitor) 40 Mg Tablet, 40 MG PO 1700


   Prescribed by: TAMMY HURST on 7/28/22 2134


   Last Action: Reviewed


Carvedilol (Carvedilol) 3.125 Mg Tablet, 3.125 MG PO BID WITH MEALS


   Prescribed by: TAMMY HURST on 7/28/22 2134


   Last Action: Continued


Cetirizine HCl (Cetirizine HCl) 10 Mg Tablet, 10 MG PO DAILY, (Reported)


   Entered as Reported by: GAGE LAU on 10/30/20 1109


   Last Action: Reviewed


Cholestyramine (with Sugar) (Cholestyramine Packet) 4 Gram Powd.pack, 4 GM PO 

BID, (Reported)


   Entered as Reported by: GAGE LAU on 8/30/22 1548


   Last Action: Reviewed


Ergocalciferol (Vitamin D2) (Vitamin D2) 1,250 Mcg (94928 Unit) Capsule, 1,250 

MCG PO WED, (Reported)


   Entered as Reported by: GAGE LAU on 8/30/22 1548


   Last Action: Reviewed


Fenofibrate (Fenofibrate) 160 Mg Tablet, 160 MG PO HS, (Reported)


   Entered as Reported by: GAGE LAU on 11/3/20 1433


   Last Action: Reviewed


Ferrous Sulfate (Iron) 325 Mg Tablet, 325 MG PO BID, (Reported)


   Entered as Reported by: GAGE LAU on 11/4/20 1256


   Last Action: Reviewed


Latanoprost (Xalatan) 0.005 % Drops, 1 DROP OU HS, (Reported)


   Entered as Reported by: GAGE LAU on 7/15/22 1145


   Last Action: Continued


Leflunomide (Leflunomide) 20 Mg Tablet, 20 MG PO DAILY, (Reported)


   Entered as Reported by: GAGE LAU on 8/30/22 1548


   Last Action: Reviewed


Levothyroxine Sodium (Levothyroxine Sodium) 125 Mcg Tablet, 125 MCG PO DAILY, 

(Reported)


   Entered as Reported by: GAGE LAU on 8/30/22 1548


   Last Action: Continued


Megestrol Acetate (Megestrol Acetate) 400 Mg/10 Ml (10 Ml) Oral.susp, 200 MG PO 

QIDACHS, (Reported)


   Entered as Reported by: GAGE LAU on 8/30/22 1548


   Last Action: Reviewed


Oxybutynin Chloride (Oxybutynin Chloride) 5 Mg Tablet, 5 MG PO BID, (Reported)


   Entered as Reported by: GAGE LAU on 7/15/22 1145


   Last Action: Continued


Pantoprazole Sodium (Pantoprazole Sodium) 40 Mg Tablet.dr, 40 MG PO DAILY


   Prescribed by: TAMMY HURST on 8/23/22 1038


   Last Action: Continued


Sacubitril/Valsartan (Entresto 24 mg-26 mg Tablet) 24 Mg-26 Mg Tablet, 1 TAB PO 

BID


   Prescribed by: TAMMY HURST on 8/22/22 1028


   Last Action: Continued


Sodium Bicarbonate (Sodium Bicarbonate) 650 Mg Tablet, 650 MG PO TID


   Prescribed by: TAMMY HURST on 8/22/22 1028


   Last Action: Reviewed


Discontinued Medications


Aspirin (Children's Aspirin) 81 Mg Tab.chew, 81 MG PO DAILY


   Discontinued Reason: No Longer Taking


   Prescribed by: TAMMY HURST on 7/28/22 2134


   Last Action: Discontinued


Enoxaparin Sodium (Enoxaparin Sodium) 40 Mg/0.4 Ml Syringe, 40 MG SC HS


   Discontinued Reason: No Longer Taking


   Prescribed by: TAMMY HURST on 8/22/22 1028


   Last Action: Discontinued


Levothyroxine Sodium (Levothyroxine Sodium) 150 Mcg Tablet, 150 MCG PO DAILY, 

(Reported)


   Discontinued Reason: No Longer Taking


   Entered as Reported by: GAGE LAU on 7/28/22 1312


   Last Action: Discontinued


Vancomycin HCl (Vancomycin HCl) 125 Mg Capsule, 125 MG PO QID


   Discontinued Reason: No Longer Taking


   Prescribed by: TAMMY HURST on 8/22/22 1028


   Last Action: Discontinued





Past Medical-Social-Family Hx


Patient Social History


Smoking Status:  Never a Smoker


Former Smoker, Quit:  Mar 30, 1985


Type Used:  Cigarettes


2nd Hand Smoke Exposure:  No


Recent Hopitalizations:  No


Alcohol Use?:  No


Have you traveled recently?:  No





Immunizations Up To Date


Date of Pneumonia Vaccine:  Oct 8, 2020


Date of Influenza Vaccine:  Oct 7, 2020





Seasonal Allergies


Seasonal Allergies:  No





Surgeries


History of Surgeries:  Yes


Surgeries:  Bowel Surgery, Cardiac, Gallbladder, Hysterectomy, Nephrectomy, 

Oophorectomy, Tonsillectomy





Respiratory


History of Respiratory Disorde:  No





Cardiovascular


History of Cardiac Disorders:  Yes


Cardiac Disorders:  Atrial Fibrillation, High Cholesterol, Hypertension





Neurological


History of Neurological Disord:  Yes


Neurological Disorders:  Neuropathy





Reproductive System


Hx Reproductive Disorders:  Yes (OVARIAN CANCER)


Sexually Transmitted Disease:  No


HIV/AIDS:  No


GYN History:  Hysterectomy, Menopausal





Genitourinary


History of Genitourinary Disor:  Yes (hx of nephrectomy)


Genitourinary Disorders:  UTI-Chronic





Gastrointestinal


History of Gastrointestinal Di:  Yes


Gastrointestinal Disorders:  Obstructive Bowel, C-Diff





Musculoskeletal


History of Musculoskeletal Dis:  Yes


Musculoskeletal Disorders:  Arthritis, Chronic Back Pain





Endocrine


History of Endocrine Disorders:  Yes


Endocrine Disorders:  Diabetes, Insulin dep, Hypothyroidsim





HEENT


History of HEENT Disorders:  No





Cancer


History of Cancer:  Yes


Cancer:  Ovarian





Psychosocial


History of Psychiatric Problem:  Yes


Behavioral Health Disorders:  Depression





Integumentary


History of Skin or Integumenta:  No





Blood Transfusions


History of Blood Disorders:  No


Adverse Reaction to a Blood Tr:  No





Family Medical History


Significant Family History:  Diabetes, Hypertension


Family Medial History:  


Arthritis


  19 MOTHER


Cardiovascular disease


  19 MOTHER


Diabetes mellitus


  19 FATHER


  19 MOTHER





Review of Systems-General


Constitutional:  No diaphoresis; malaise, weakness


EENTM:  No blurred vision, No double vision, No mouth swelling, No epistaxis


Respiratory:  No cough; dyspnea on exertion, short of breath


Cardiovascular:  No chest pain; palpitations


Gastrointestinal:  abdominal pain, other (hx of enterocutaneous fistula)


Genitourinary:  No dysuria, No frequency, No hematuria


Musculoskeletal:  joint pain, joint swelling, muscle pain, muscle stiffness


Skin:  No change in color, No change in hair/nails


Psychiatric/Neurological:  Anxiety; Denies Depressed, Denies Seizure





Physical Exam-General Problems


Physical Exam


Vital Signs





Vital Signs - First Documented








 8/30/22 8/30/22 8/30/22





 12:15 12:45 15:07


 


Pulse 95  


 


Resp  12 


 


B/P (MAP) 124/75 (91)  


 


O2 Delivery Room Air  


 


O2 Flow Rate   0.00


 


FiO2   21





Capillary Refill :


General Appearance:  mild distress, cachetic


Eyes:  Bilateral Eye PERRL, Bilateral Eye EOMI


HEENT:  pharynx normal; No scleral icterus (R), No scleral icterus (L); pale c

onjunctivae (R), pale conjunctivae (L)


Neck:  non-tender, supple


Respiratory:  lungs clear, normal breath sounds, no respiratory distress, no 

accessory muscle use


Cardiovascular:  regular rate, rhythm, no murmur


Gastrointestinal:  non tender, soft, other (small 2-3mm opening on abdomen, able

to push on the surrounding area and get a thin liquid with dark tint, does not 

look purulent)


Extremities:  no calf tenderness, pedal edema


Neurologic/Psychiatric:  alert, oriented x 3


Skin:  ecchymosis; No jaundice, No mottled; pallor


Lymphatic:  no adenopathy (neck, axilla or groin)





Data Review


Labs


Laboratory Tests


8/30/22 15:55: 


8/30/22 15:56: 


White Blood Count 5.2, Red Blood Count 2.27L, Hemoglobin 6.7*L, Hematocrit 20*L,

Mean Corpuscular Volume 89, Mean Corpuscular Hemoglobin 30, Mean Corpuscular 

Hemoglobin Concent 33, Red Cell Distribution Width 15.4H, Platelet Count 255, 

Mean Platelet Volume 11.0, Immature Granulocyte % (Auto) 0, Neutrophils (%) 

(Auto) 62, Lymphocytes (%) (Auto) 26, Monocytes (%) (Auto) 10, Eosinophils (%) 

(Auto) 2, Basophils (%) (Auto) 1, Neutrophils # (Auto) 3.2, Lymphocytes # (Auto)

1.4, Monocytes # (Auto) 0.5, Eosinophils # (Auto) 0.1, Basophils # (Auto) 0.0, 

Immature Granulocyte # (Auto) 0.0, Sodium Level 142, Potassium Level 4.3, 

Chloride Level 117H, Carbon Dioxide Level 18L, Anion Gap 7, Blood Urea Nitrogen 

19H, Creatinine 1.14, Estimat Glomerular Filtration Rate 50, BUN/Creatinine 

Ratio 17, Glucose Level 71, Lactic Acid Level 0.70, Calcium Level 6.8L, 

Corrected Calcium 8.5, Total Bilirubin 0.2, Aspartate Amino Transf (AST/SGOT) 

16, Alanine Aminotransferase (ALT/SGPT) 13, Alkaline Phosphatase 41, Total Prote

in 4.1L, Albumin 1.9L





Assessment/Plan


Anemia


C. Diff Colitis


Enterocutaneous fistula


Hx of Afib





I am not sure why pt was thought to be septic, I don't think this is the case.  

I think most of her symptoms are due to her anemia; most likely caused by C. 

Diff.  She will be getting a unit of blood and then will monitor H/H.  Pt told 

to increase protein intake, work with PT and move 


around as much as possible.  Continue oral vancomycin and use IS.  Supportive 

care.











LAURENCE MORRELL DO               Aug 30, 2022 21:05

## 2022-08-31 LAB
ALBUMIN SERPL-MCNC: 2 GM/DL (ref 3.2–4.5)
ALP SERPL-CCNC: 51 U/L (ref 40–136)
ALT SERPL-CCNC: 15 U/L (ref 0–55)
BASOPHILS # BLD AUTO: 0.1 10^3/UL (ref 0–0.1)
BASOPHILS NFR BLD AUTO: 1 % (ref 0–10)
BILIRUB SERPL-MCNC: 0.3 MG/DL (ref 0.1–1)
BUN/CREAT SERPL: 16
CALCIUM SERPL-MCNC: 6.6 MG/DL (ref 8.5–10.1)
CHLORIDE SERPL-SCNC: 120 MMOL/L (ref 98–107)
CO2 SERPL-SCNC: 15 MMOL/L (ref 21–32)
CREAT SERPL-MCNC: 0.99 MG/DL (ref 0.6–1.3)
EOSINOPHIL # BLD AUTO: 0.2 10^3/UL (ref 0–0.3)
EOSINOPHIL NFR BLD AUTO: 3 % (ref 0–10)
GFR SERPLBLD BASED ON 1.73 SQ M-ARVRAT: 59 ML/MIN
GLUCOSE SERPL-MCNC: 67 MG/DL (ref 70–105)
HCT VFR BLD CALC: 27 % (ref 35–52)
HGB BLD-MCNC: 9 G/DL (ref 11.5–16)
LYMPHOCYTES # BLD AUTO: 1.5 10^3/UL (ref 1–4)
LYMPHOCYTES NFR BLD AUTO: 22 % (ref 12–44)
MAGNESIUM SERPL-MCNC: 1.4 MG/DL (ref 1.6–2.4)
MANUAL DIFFERENTIAL PERFORMED BLD QL: NO
MCH RBC QN AUTO: 30 PG (ref 25–34)
MCHC RBC AUTO-ENTMCNC: 34 G/DL (ref 32–36)
MCV RBC AUTO: 88 FL (ref 80–99)
MONOCYTES # BLD AUTO: 0.6 10^3/UL (ref 0–1)
MONOCYTES NFR BLD AUTO: 9 % (ref 0–12)
NEUTROPHILS # BLD AUTO: 4.3 10^3/UL (ref 1.8–7.8)
NEUTROPHILS NFR BLD AUTO: 64 % (ref 42–75)
PHOSPHATE SERPL-MCNC: 1.6 MG/DL (ref 2.3–4.7)
PLATELET # BLD: 268 10^3/UL (ref 130–400)
PMV BLD AUTO: 11 FL (ref 9–12.2)
POTASSIUM SERPL-SCNC: 4.3 MMOL/L (ref 3.6–5)
PROT SERPL-MCNC: 4.2 GM/DL (ref 6.4–8.2)
SODIUM SERPL-SCNC: 144 MMOL/L (ref 135–145)
WBC # BLD AUTO: 6.7 10^3/UL (ref 4.3–11)

## 2022-08-31 RX ADMIN — VANCOMYCIN HYDROCHLORIDE SCH MG: 125 CAPSULE ORAL at 17:56

## 2022-08-31 RX ADMIN — OXYBUTYNIN CHLORIDE SCH MG: 5 TABLET ORAL at 08:09

## 2022-08-31 RX ADMIN — VANCOMYCIN HYDROCHLORIDE SCH MG: 125 CAPSULE ORAL at 06:04

## 2022-08-31 RX ADMIN — MAGNESIUM SULFATE IN DEXTROSE SCH MLS/HR: 10 INJECTION, SOLUTION INTRAVENOUS at 06:04

## 2022-08-31 RX ADMIN — PANTOPRAZOLE SODIUM SCH MG: 40 TABLET, DELAYED RELEASE ORAL at 08:08

## 2022-08-31 RX ADMIN — ACETAMINOPHEN PRN MG: 650 TABLET, FILM COATED, EXTENDED RELEASE ORAL at 00:07

## 2022-08-31 RX ADMIN — SACUBITRIL AND VALSARTAN SCH TAB: 24; 26 TABLET, FILM COATED ORAL at 08:09

## 2022-08-31 RX ADMIN — MAGNESIUM SULFATE IN DEXTROSE SCH MLS/HR: 10 INJECTION, SOLUTION INTRAVENOUS at 06:03

## 2022-08-31 RX ADMIN — MAGNESIUM SULFATE IN DEXTROSE SCH MLS/HR: 10 INJECTION, SOLUTION INTRAVENOUS at 21:50

## 2022-08-31 RX ADMIN — AMITRIPTYLINE HYDROCHLORIDE SCH MG: 25 TABLET, FILM COATED ORAL at 21:48

## 2022-08-31 RX ADMIN — POTASSIUM CHLORIDE SCH MLS/HR: 200 INJECTION, SOLUTION INTRAVENOUS at 05:47

## 2022-08-31 RX ADMIN — SACUBITRIL AND VALSARTAN SCH TAB: 24; 26 TABLET, FILM COATED ORAL at 21:48

## 2022-08-31 RX ADMIN — MAGNESIUM SULFATE IN DEXTROSE SCH MLS/HR: 10 INJECTION, SOLUTION INTRAVENOUS at 05:55

## 2022-08-31 RX ADMIN — LEVOTHYROXINE SODIUM SCH MCG: 125 TABLET ORAL at 06:04

## 2022-08-31 RX ADMIN — LATANOPROST SCH ML: 50 SOLUTION/ DROPS OPHTHALMIC at 21:48

## 2022-08-31 RX ADMIN — VANCOMYCIN HYDROCHLORIDE SCH MG: 125 CAPSULE ORAL at 12:06

## 2022-08-31 RX ADMIN — MAGNESIUM SULFATE IN DEXTROSE SCH MLS/HR: 10 INJECTION, SOLUTION INTRAVENOUS at 21:48

## 2022-08-31 RX ADMIN — OXYBUTYNIN CHLORIDE SCH MG: 5 TABLET ORAL at 21:48

## 2022-08-31 RX ADMIN — POTASSIUM CHLORIDE SCH MEQ: 1500 TABLET, EXTENDED RELEASE ORAL at 05:47

## 2022-08-31 NOTE — PROGRESS NOTE
Standard Progress Note


Progress Notes/Assess & Plan


Date Seen by a Provider:  Aug 31, 2022


Time Seen by a Provider:  02:25


Progress/Assessment & Plan


having mod to severe pain, takes Tramadol 50 mg at home will order





MD TRINA Mejia JOSEPH K MD             Aug 31, 2022 02:26

## 2022-08-31 NOTE — TELE-ICU PROGRESS NOTE
Subjective


Date Seen by a Provider:  Aug 31, 2022


Time Seen by a Provider:  11:58


Subjective/Events-last exam


(Tele-ICU Physician , Progress Note )








Available chart/ vitals / labs / Images reviewed


Video assessment done using   teleICU camera, rest of exam as per RN


Discussed with RN


Events overnight : 





Afebrile


hemodynamically stable


Respiratory - 


I/O =





Drips:


Pressors- no





Consultants:


Hospital course:


77 y/o female from snf with abnormal labs








A/P





Anemia


- Hb 6.7 , no active bleeding , not on AC , PLT WNL


- transfusion 1 u prbc , follow labs 


= w/up as per PCP 





recent C. difficile colitis


- no diarrhea , on Vanco po 





CHF


-Echo EF 20-25%


-Lasix and spironolactone and Entresto and LifeVest





Myopathy 


Malnutrition 


Recent COVID infection with prior vaccination and 1 booster


h/o Afib - in sinus now 


Diabetes


CKD


Recent hospitalization at [facility] 6/22-7/14 following stay at [facility] 6/16 for 

pelvic abscess drainage. 








Lines :    midline   , (Central Line Necessity Reviewed)


[place]: void


OG:


Nutrition:


Analgesia:


Anxiety/ delirium





VTE Prophylaxis: scd 


Stress Ulcer Prophylaxis:











Plans in collaboration with   bedside consultants and IM MDs.


Discussed with RN to reach out if any questions or concerns


A total of 20  minutes of critical care time was devoted to this patient today, 

required to treat and/or prevent further deterioration of  critical care 

condition ( as above ) .





Sepsis Event


Evaluation


Height, Weight, BMI


Height: '"


Weight: lbs. oz. kg; 22.78 BMI


Method:Stated





Focused Exam


Lactate Level


8/30/22 15:56: Lactic Acid Level 0.70





Exam


Exam


Patient acknowledged, consented, and participated in this virtual visit which 

was conducted using real time audio/video


Vital Signs








  Date Time  Temp Pulse Resp B/P (MAP) Pulse Ox O2 Delivery O2 Flow Rate FiO2


 


8/31/22 11:00  82 12 122/63 (82) 97 Room Air  


 


8/31/22 10:00  98 17 126/69 (88) 94 Room Air  


 


8/31/22 09:00  87 12 122/98 (106) 98 Room Air  


 


8/31/22 08:00     98 Room Air  


 


8/31/22 08:00  85 12 137/80 (99) 98 Room Air  


 


8/31/22 07:15  81      


 


8/31/22 07:00  88 18 128/67 (87) 96 Room Air  


 


8/31/22 06:00  89 13 108/60 (76) 96 Room Air  


 


8/31/22 05:00  89 14 111/61 (78) 96 Room Air  


 


8/31/22 04:19     99 Room Air  


 


8/31/22 04:18 36.8     Room Air  


 


8/31/22 04:00  90 14 131/74 (93) 97 Room Air  


 


8/31/22 03:00  95 16 136/79 (98) 99 Room Air  


 


8/31/22 02:00  97 14 133/73 (93) 98 Room Air  


 


8/31/22 01:00  94 16 133/78 (96) 97 Room Air  


 


8/31/22 01:00  94      


 


8/31/22 00:00  97 16 136/70 (92) 99 Room Air  


 


8/30/22 23:58 36.2       


 


8/30/22 23:56 36.2 101 15 96/73 99 Room Air  


 


8/30/22 23:42     99 Room Air  


 


8/30/22 23:00  101 16 139/75 (96) 98 Room Air  


 


8/30/22 22:00  101 20 140/72 (94) 99 Room Air  


 


8/30/22 21:00  103 15 136/76 (96) 99 Room Air  


 


8/30/22 20:43 36.2 104 15 131/70 99 Room Air  


 


8/30/22 20:42 36.2       


 


8/30/22 20:15 36.1 98 11 131/65 99 Room Air  


 


8/30/22 20:10 36.1     Room Air  


 


8/30/22 20:00     99 Room Air  


 


8/30/22 20:00  101 13 131/65 (87) 99 Room Air  


 


8/30/22 19:20      Room Air  


 


8/30/22 19:15     95 Room Air  


 


8/30/22 19:00  92      


 


8/30/22 19:00  92 13 137/81 (99) 98 Room Air  


 


8/30/22 18:00  98 9 130/42 (71) 100 Room Air  


 


8/30/22 17:00  98 14 125/68 (87) 100 Room Air  


 


8/30/22 16:00     92 Room Air  


 


8/30/22 16:00  93 23 133/60 (84) 97 Room Air  


 


8/30/22 15:15  93 23 118/75 (89) 98 Room Air  


 


8/30/22 15:07     97 Room Air 0.00 


 


8/30/22 15:07 36.1 93   97   21


 


8/30/22 14:30  98 23 97/75 (82)  Room Air  


 


8/30/22 14:00  98 23   Room Air  


 


8/30/22 13:45  101 18  98 Room Air  


 


8/30/22 13:30  90 12 126/81 (96) 99 Room Air  


 


8/30/22 13:15  95 14 126/73 (90) 93 Room Air  


 


8/30/22 13:00  92 11 134/80 (98) 98 Room Air  


 


8/30/22 12:47  93      


 


8/30/22 12:45  93 12 124/75 (91) 99 Room Air  


 


8/30/22 12:30     92 Room Air  


 


8/30/22 12:30 36.1     Room Air  


 


8/30/22 12:30     93 Room Air  


 


8/30/22 12:15  95  124/75 (91)  Room Air  














I & O 


 


 8/31/22





 07:00


 


Intake Total 1230 ml


 


Balance 1230 ml








Height & Weight


Height: '"


Weight: lbs. oz. kg; 22.78 BMI


Method:Stated


General Appearance:  No Apparent Distress


HEENT:  PERRL/EOMI, Moist Mucous Membranes


Neck:  Supple


Respiratory:  Lungs Clear, Normal Breath Sounds, No Accessory Muscle Use, No 

Respiratory Distress


Cardiovascular:  Regular Rate, Rhythm, No Murmur


Gastrointestinal:  normal bowel sounds, soft, tenderness (around fistula 

location)


Extremity:  No Pedal Edema


Neurologic/Psychiatric:  Alert, Oriented x3, Normal Mood/Affect


Skin:  Pallor





Results


Lab


Laboratory Tests


8/30/22 15:56








8/31/22 04:40











Assessment/Plan


Assessment/Plan


1











LEO EMERSON MD         Aug 31, 2022 11:58

## 2022-08-31 NOTE — PROGRESS NOTE - SURGERY
JAYLA ALVAREZ 8/31/22 0944:


Subjective


Date Seen by a Provider:  Aug 31, 2022


Time Seen by a Provider:  08:15


Subjective/Events-last exam


Patient was resting in bed this morning and states she feels well. She received 

one unit of blood last night after showing up to the ER with a Hgb of 6.7. She c

urrently denies any N/V, sweats/chills, or SOA. She is having many loose stools,

currently being treated for C. diff. She is not in any pain. She denies seeing 

any blood in her stool.


Review of Systems


General:  No Chills, No Night Sweats


Pulmonary:  No Dyspnea


Gastrointestinal:  No: Nausea, Vomiting, Abdominal Pain





Focused Exam


Lactate Level


8/30/22 15:56: Lactic Acid Level 0.70





Objective


Exam





Vital Signs








  Date Time  Temp Pulse Resp B/P (MAP) Pulse Ox O2 Delivery O2 Flow Rate FiO2


 


8/31/22 08:00     98 Room Air  


 


8/31/22 08:00  85 12 137/80 (99) 98 Room Air  


 


8/31/22 07:15  81      


 


8/31/22 07:00  88 18 128/67 (87) 96 Room Air  


 


8/31/22 06:00  89 13 108/60 (76) 96 Room Air  


 


8/31/22 05:00  89 14 111/61 (78) 96 Room Air  


 


8/31/22 04:19     99 Room Air  


 


8/31/22 04:18 36.8     Room Air  


 


8/31/22 04:00  90 14 131/74 (93) 97 Room Air  


 


8/31/22 03:00  95 16 136/79 (98) 99 Room Air  


 


8/31/22 02:00  97 14 133/73 (93) 98 Room Air  


 


8/31/22 01:00  94 16 133/78 (96) 97 Room Air  


 


8/31/22 01:00  94      


 


8/31/22 00:00  97 16 136/70 (92) 99 Room Air  


 


8/30/22 23:58 36.2       


 


8/30/22 23:56 36.2 101 15 96/73 99 Room Air  


 


8/30/22 23:42     99 Room Air  


 


8/30/22 23:00  101 16 139/75 (96) 98 Room Air  


 


8/30/22 22:00  101 20 140/72 (94) 99 Room Air  


 


8/30/22 21:00  103 15 136/76 (96) 99 Room Air  


 


8/30/22 20:43 36.2 104 15 131/70 99 Room Air  


 


8/30/22 20:42 36.2       


 


8/30/22 20:15 36.1 98 11 131/65 99 Room Air  


 


8/30/22 20:10 36.1     Room Air  


 


8/30/22 20:00     99 Room Air  


 


8/30/22 20:00  101 13 131/65 (87) 99 Room Air  


 


8/30/22 19:20      Room Air  


 


8/30/22 19:15     95 Room Air  


 


8/30/22 19:00  92      


 


8/30/22 19:00  92 13 137/81 (99) 98 Room Air  


 


8/30/22 18:00  98 9 130/42 (71) 100 Room Air  


 


8/30/22 17:00  98 14 125/68 (87) 100 Room Air  


 


8/30/22 16:00     92 Room Air  


 


8/30/22 16:00  93 23 133/60 (84) 97 Room Air  


 


8/30/22 15:15  93 23 118/75 (89) 98 Room Air  


 


8/30/22 15:07     97 Room Air 0.00 


 


8/30/22 15:07 36.1 93   97   21


 


8/30/22 14:30  98 23 97/75 (82)  Room Air  


 


8/30/22 14:00  98 23   Room Air  


 


8/30/22 13:45  101 18  98 Room Air  


 


8/30/22 13:30  90 12 126/81 (96) 99 Room Air  


 


8/30/22 13:15  95 14 126/73 (90) 93 Room Air  


 


8/30/22 13:00  92 11 134/80 (98) 98 Room Air  


 


8/30/22 12:47  93      


 


8/30/22 12:45  93 12 124/75 (91) 99 Room Air  


 


8/30/22 12:30     92 Room Air  


 


8/30/22 12:30 36.1     Room Air  


 


8/30/22 12:30     93 Room Air  


 


8/30/22 12:15  95  124/75 (91)  Room Air  














I & O 


 


 8/31/22





 06:59


 


Intake Total 1230 ml


 


Balance 1230 ml





Capillary Refill :


General Appearance:  No Apparent Distress


HEENT:  PERRL/EOMI, Moist Mucous Membranes


Neck:  Supple


Respiratory:  Lungs Clear, Normal Breath Sounds, No Accessory Muscle Use, No 

Respiratory Distress


Cardiovascular:  Regular Rate, Rhythm, No Murmur


Gastrointestinal:  normal bowel sounds, soft, tenderness (around fistula 

location)


Extremity:  No Pedal Edema


Neurologic/Psychiatric:  Alert, Oriented x3, Normal Mood/Affect


Skin:  Pallor





Results


Lab


Laboratory Tests


8/30/22 15:55: 


8/30/22 15:56: 


White Blood Count 5.2, Red Blood Count 2.27L, Hemoglobin 6.7*L, Hematocrit 20*L,

Mean Corpuscular Volume 89, Mean Corpuscular Hemoglobin 30, Mean Corpuscular 

Hemoglobin Concent 33, Red Cell Distribution Width 15.4H, Platelet Count 255, 

Mean Platelet Volume 11.0, Immature Granulocyte % (Auto) 0, Neutrophils (%) 

(Auto) 62, Lymphocytes (%) (Auto) 26, Monocytes (%) (Auto) 10, Eosinophils (%) 

(Auto) 2, Basophils (%) (Auto) 1, Neutrophils # (Auto) 3.2, Lymphocytes # (Auto)

1.4, Monocytes # (Auto) 0.5, Eosinophils # (Auto) 0.1, Basophils # (Auto) 0.0, 

Immature Granulocyte # (Auto) 0.0, Sodium Level 142, Potassium Level 4.3, 

Chloride Level 117H, Carbon Dioxide Level 18L, Anion Gap 7, Blood Urea Nitrogen 

19H, Creatinine 1.14, Estimat Glomerular Filtration Rate 50, BUN/Creatinine 

Ratio 17, Glucose Level 71, Lactic Acid Level 0.70, Calcium Level 6.8L, 

Corrected Calcium 8.5, Total Bilirubin 0.2, Aspartate Amino Transf (AST/SGOT) 

16, Alanine Aminotransferase (ALT/SGPT) 13, Alkaline Phosphatase 41, Total 

Protein 4.1L, Albumin 1.9L


8/31/22 04:40: 


White Blood Count 6.7, Red Blood Count 3.05L, Hemoglobin 9.0#L, Hematocrit 27L, 

Mean Corpuscular Volume 88, Mean Corpuscular Hemoglobin 30, Mean Corpuscular 

Hemoglobin Concent 34, Red Cell Distribution Width 14.8H, Platelet Count 268, 

Mean Platelet Volume 11.0, Immature Granulocyte % (Auto) 0, Neutrophils (%) 

(Auto) 64, Lymphocytes (%) (Auto) 22, Monocytes (%) (Auto) 9, Eosinophils (%) 

(Auto) 3, Basophils (%) (Auto) 1, Neutrophils # (Auto) 4.3, Lymphocytes # (Auto)

1.5, Monocytes # (Auto) 0.6, Eosinophils # (Auto) 0.2, Basophils # (Auto) 0.1, 

Immature Granulocyte # (Auto) 0.0, Sodium Level 144, Potassium Level 4.3, 

Chloride Level 120H, Carbon Dioxide Level 15L, Anion Gap 9, Blood Urea Nitrogen 

16, Creatinine 0.99, Estimat Glomerular Filtration Rate 59, BUN/Creatinine Ratio

16, Glucose Level 67L, Calcium Level 6.6L, Corrected Calcium 8.2L, Total 

Bilirubin 0.3, Aspartate Amino Transf (AST/SGOT) 25, Alanine Aminotransferase 

(ALT/SGPT) 15, Alkaline Phosphatase 51, Total Protein 4.2L, Albumin 2.0L, 

Phosphorus Level 1.6L, Magnesium Level 1.4L


8/31/22 08:05: Glucometer 75





Microbiology


8/30/22 MRSA Screen - Final, Complete


          MRSA not isolated





Assessment/Plan


Anemia


C. Diff Colitis


Enterocutaneous fistula


Hx of Afib





Pt received 1 unit of blood last night, Hgb improved from 6.7 to 9.0. I think 

most of her symptoms are due to her anemia; most likely caused by C. Diff. Will 

continue to trend Hgb and transfuse as needed. Pt told to increase protein 

intake, work with PT and move 


around as much as possible.  Continue oral vancomycin and use IS.  Supportive 

care.





BONILLA GALICIA DO 8/31/22 [id number]:


Subjective


Time Seen by a Provider:  14:25


Subjective/Events-last exam


Pt seen and examined, states she is doing much better than yesterday and feels 

stronger.


Review of Systems


General:  No Chills, No Night Sweats


Pulmonary:  No Dyspnea


Gastrointestinal:  No: Nausea, Vomiting, Abdominal Pain





Objective


Exam


General Appearance:  No Apparent Distress, Chronically ill, Thin


HEENT:  PERRL/EOMI, Moist Mucous Membranes


Respiratory:  Lungs Clear, Normal Breath Sounds, No Accessory Muscle Use, No 

Respiratory Distress


Cardiovascular:  Regular Rate, Rhythm, No Murmur


Gastrointestinal:  soft, tenderness (around fistula location)


Neurologic/Psychiatric:  Alert, Oriented x3, Normal Mood/Affect





Assessment/Plan


Anemia


C. Diff Colitis


Enterocutaneous fistula


Hx of Afib





Pt received 1 unit of blood last night, Hgb improved from 6.7 to 9.0. I think 

most of her symptoms are due to her anemia; most likely caused by C. Diff. Will 

continue to trend Hgb and transfuse as needed. Pt told to increase protein 

intake, work with PT and move 


around as much as possible.  Continue oral vancomycin and use IS.  Supportive 

care.





Supervisory-Addendum Brief


Verification & Attestation


Participated in pt care:  history, MDM, physical


Personally performed:  exam, history, MDM, supervision of care


Care discussed with:  Medical Student


Procedures:  n/a


Verification and Attestation of Medical Student E/M Service





A medical student performed and documented this service. I then reviewed and 

verified all information documented by the medical student and made modific

ations to such information, when appropriate. I personally performed a physical 

exam, medical decision making and then discussed any differences between the 

notes and made revisions as necessary to create one note.





Bonilla Galicia , 8/31/22 , 16:57











JAYLA ALVAREZ                   Aug 31, 2022 09:44


BONILLA GALICIA DO               Aug 31, 2022 16:57

## 2022-08-31 NOTE — PROGRESS NOTE
JOELLEN BACON 8/31/22 1307:


Subjective


Subjective/Events-last exam


Patient is a 76 year old female admitted for C. diff infection and rectal 

bleeding. Says she is feeling much better than yesterday and that her diarrhea 

is improving. She notes no blood in her stool since yesterday but states that 

prior to admission she would pass stools that were completely black. Patient 

admits fatigue, abdominal pain, and positional lightheadedness. She denies SOB, 

nausea, vomiting, headache, dysuria, cough, or chills.


Review of Systems


General:  No Chills; Fatigue


HEENT:  No Head Aches


Pulmonary:  No Cough, No Other (Denies SOB)


Cardiovascular:  Lt Headedness (Lightheadedness when sitting up on occasion); 

No: Chest Pain, Palpitations


Gastrointestinal:  Abdominal Pain, Diarrhea (Improving); No: Nausea, Vomiting, 

Hematochezia (No gross blood in stool since yesterday)


Genitourinary:  No Dysuria


Musculoskeletal:  No: other (Denies muscle soreness)


Neurological:  No: Change in speech, Confusion





Focused Exam


Sepsis Stage:  Ruled Out


Reason for ruling out sepsis:  Patient no longer meets SIRS criteria for sepsis 

diagnosis


Possible Source:  GI Tract/Intra-Abdominal (C. Diff)


Lactate Level


8/30/22 15:56: Lactic Acid Level 0.70


Time of Focused Exam:  08:15


Respiratory:  Chest Non Tender, Lungs Clear, Normal Breath Sounds, No Accessory 

Muscle Use, No Respiratory Distress


Cardiovascular:  Regular Rate, Rhythm, No Edema, No Gallop, No JVD, No Murmur


Skin:  normal color, warm/dry, other (Bilateral pitting edema in shins)





Objective


Exam


Last Set of Vital Signs





Vital Signs








  Date Time  Temp Pulse Resp B/P (MAP) Pulse Ox O2 Delivery O2 Flow Rate FiO2


 


8/31/22 12:00     98 Room Air  


 


8/31/22 11:00  82 12 122/63 (82)    


 


8/31/22 04:18 36.8       


 


8/30/22 15:07       0.00 


 


8/30/22 15:07        21





Capillary Refill :


I&O











Intake and Output 


 


 8/31/22





 00:00


 


Intake Total 880 ml


 


Balance 880 ml


 


 


 


Intake Oral 520 ml


 


IV Total 30 ml


 


Other 330 ml


 


# Voids 4


 


# Bowel Movements 3


 


Daily Weight Change Yes, 2-13 lbs








General:  Alert, Oriented X3, Cooperative, No Acute Distress


Neck:  Supple, No JVD


Lungs:  Clear to Auscultation, Normal Air Movement


Heart:  Regular Rate, Normal S1, Normal S2, No Murmurs


Abdomen:  Soft, Other (Slight tenderness to palpation throughout the abdomen 

with more tenderness noted in the RLQ)


Extremities:  Other (Bilateral pitting edema noted in shins)


Skin:  No Rashes, No Breakdown, No Significant Lesion


Neuro:  Normal Speech, Normal Tone


Psych/Mental Status:  Mental Status NL, Mood NL





Results/Procedures


Lab


Laboratory Tests


8/30/22 15:55: 


8/30/22 15:56: 


White Blood Count 5.2, Red Blood Count 2.27L, Hemoglobin 6.7*L, Hematocrit 20*L,

Mean Corpuscular Volume 89, Mean Corpuscular Hemoglobin 30, Mean Corpuscular 

Hemoglobin Concent 33, Red Cell Distribution Width 15.4H, Platelet Count 255, 

Mean Platelet Volume 11.0, Immature Granulocyte % (Auto) 0, Neutrophils (%) 

(Auto) 62, Lymphocytes (%) (Auto) 26, Monocytes (%) (Auto) 10, Eosinophils (%) 

(Auto) 2, Basophils (%) (Auto) 1, Neutrophils # (Auto) 3.2, Lymphocytes # (Auto)

1.4, Monocytes # (Auto) 0.5, Eosinophils # (Auto) 0.1, Basophils # (Auto) 0.0, 

Immature Granulocyte # (Auto) 0.0, Sodium Level 142, Potassium Level 4.3, 

Chloride Level 117H, Carbon Dioxide Level 18L, Anion Gap 7, Blood Urea Nitrogen 

19H, Creatinine 1.14, Estimat Glomerular Filtration Rate 50, BUN/Creatinine 

Ratio 17, Glucose Level 71, Lactic Acid Level 0.70, Calcium Level 6.8L, 

Corrected Calcium 8.5, Total Bilirubin 0.2, Aspartate Amino Transf (AST/SGOT) 

16, Alanine Aminotransferase (ALT/SGPT) 13, Alkaline Phosphatase 41, Total 

Protein 4.1L, Albumin 1.9L


8/31/22 04:40: 


White Blood Count 6.7, Red Blood Count 3.05L, Hemoglobin 9.0#L, Hematocrit 27L, 

Mean Corpuscular Volume 88, Mean Corpuscular Hemoglobin 30, Mean Corpuscular 

Hemoglobin Concent 34, Red Cell Distribution Width 14.8H, Platelet Count 268, 

Mean Platelet Volume 11.0, Immature Granulocyte % (Auto) 0, Neutrophils (%) 

(Auto) 64, Lymphocytes (%) (Auto) 22, Monocytes (%) (Auto) 9, Eosinophils (%) 

(Auto) 3, Basophils (%) (Auto) 1, Neutrophils # (Auto) 4.3, Lymphocytes # (Auto)

1.5, Monocytes # (Auto) 0.6, Eosinophils # (Auto) 0.2, Basophils # (Auto) 0.1, 

Immature Granulocyte # (Auto) 0.0, Sodium Level 144, Potassium Level 4.3, 

Chloride Level 120H, Carbon Dioxide Level 15L, Anion Gap 9, Blood Urea Nitrogen 

16, Creatinine 0.99, Estimat Glomerular Filtration Rate 59, BUN/Creatinine Ratio

16, Glucose Level 67L, Calcium Level 6.6L, Corrected Calcium 8.2L, Total 

Bilirubin 0.3, Aspartate Amino Transf (AST/SGOT) 25, Alanine Aminotransferase 

(ALT/SGPT) 15, Alkaline Phosphatase 51, Total Protein 4.2L, Albumin 2.0L, 

Phosphorus Level 1.6L, Magnesium Level 1.4L


8/31/22 08:05: Glucometer 75





Microbiology


8/30/22 MRSA Screen - Final, Complete


          MRSA not isolated


Radiology


PROCEDURE: CT abdomen and pelvis without contrast.





TECHNIQUE: Multiple contiguous axial images were obtained through


the abdomen and pelvis without the use of intravenous contrast.


Auto Exposure Controls were utilized during the CT exam to meet


ALARA standards for radiation dose reduction. 





INDICATION:  Periumbilical abdominal pain





COMPARISON: 07/20/2022





Unenhanced images of the liver and spleen reveal no focal


abnormality. No pancreatic, adrenal gland or left renal


abnormality is identified. Right kidney is absent. Gallbladder


has been removed. There is fluid distention of colon with diffuse


mural thickening. There is extensively thickened sigmoid colon


and rectal walls. Surgical findings are seen in the anterior


abdominal wall with small amount of gas and fluid along the


midline incision site. Unenhanced urinary bladder is distended


but otherwise unremarkable.





IMPRESSION: Extensive mural thickening of the distal colon and


rectum suggestive of proctocolitis. There is a small amount of


fluid within the anterior abdominal wall incision site, however,


no other focal fluid collection is seen to indicate abscess.





Dictated by: 





  Dictated on workstation # [id number]








Dict:   08/15/22 [id number]


Trans:   08/15/22 1816


[facility] [id number]





Interpreted by:     LUIS ZIMMERMAN MD


Electronically signed by: LUIS ZIMMERMAN MD 08/15/22 1816


Meds


As noted in Medications List





Assessment/Plan


Assessment/Plan


Admission Dx


C. Diff infection accompanied by rectal bleeding


Admission Status:  Inpatient Order (span 2 midnights)


Reason for Inpatient Admission:  


Inpatient admission required for close monitoring of vital signs,


infectious course, and administration of IV fluids, medications, and


transfusion of blood products.


Assessment & Plan


C. Diff - Continue Vancomycin


Anemia - Patient received transfusion with substantial improvement in CBC 

results: RBC (3.05 up from 2.27), HGB (9.0 up from 6.7), HCT (27 up from 20), 

RDW stable at 14.8. Continue monitoring with CBC results and iron studies to 

address secondary factors contributing to anemia


A. fib - continue medical management


Diabetes Mellitus - continue medical management


Hypertension - Sacubitril/valsartan





Hypothyroidism - levothyroxine


Hyperlipidemia - Atorvastatin





Clinical Quality Measures


Admission Status


Admission Dx


C. Diff infection accompanied by rectal bleeding


Admission Status:  Inpatient Order (span 2 midnights)


Reason for Inpatient Admission:  


Admission is necessary to administer IV fluids and blood products. Close


monitoring of infectious course and vital signs are necessary to ensure


patient safety





JOSSIE PERALTA MD 8/31/22 1943:


Subjective


Review of Systems


General:  Fatigue, Malaise


Pulmonary:  No Dyspnea, No Cough


Cardiovascular:  No: Chest Pain, Palpitations


Gastrointestinal:  Abdominal Pain, Diarrhea (Improving); No: Nausea, Vomiting


Genitourinary:  No Dysuria


Neurological:  Weakness, Incoordination





Objective


Exam


General:  Alert, Oriented X3, Cooperative, No Acute Distress


Lungs:  Clear to Auscultation, Normal Air Movement


Heart:  Regular Rate, No Murmurs


Abdomen:  Soft, No Tenderness, Other (ttp around fistula)


Extremities:  Other (2+ pitting edema)


Neuro:  Normal Speech, Cranial Nerves 3-12 NL





Supervisory-Addendum Brief


Verification & Attestation


Participated in pt care:  history, physical


Personally performed:  exam, history


Care discussed with:  Medical Student


Procedures:  n/a


Verification and Attestation of Medical Student E/M Service





A medical student performed and documented this service in my presence. I 

reviewed and verified all information documented by the medical student and made

modifications to such information, when appropriate. I personally performed the 

physical exam and medical decision making. 





 Jossie Peralta, Aug 31, 2022,19:18


  


77 yo F that presented with rectal bleeding and severe anemia





Recurrent C Diff Colitis


   - Continue PO Vancomycin


   - Upon arrival to hospital patient did not meet sepsis criteria





Lower GI bleed


Anemia of Acute Blood Loss


   - Type and Cross, will transfuse 1 unit pRBCs and then repeat CBC in AM


   - Iron panel pending


   - Consult Dr Galicia, appreciate recommendations


   - 8/31: Hgb 9.0, will continue to monitor, no further bleeding present





Acute on Chronic Renal Failure


   - Improved upon arrival with 1 L NS prior to discharge from [facility]





Chronic Systolic CHF


Atrial Fibrillation Rate controlled


   - Does not seem to be in acute exacerbation


   - Can not tolerated OAC due to GI bleeding


   - 8/31: Lasix given today for increase LE swelling





Severe PEM


   - Appreciate dietary recommendations





Peritoneal Fistula


   - General Surgery consulted











JOELLEN BACON                Aug 31, 2022 13:07


JOSSIE PERALTA MD               Aug 31, 2022 19:43 Quality 130: Documentation Of Current Medications In The Medical Record: Current Medications Documented Quality 431: Preventive Care And Screening: Unhealthy Alcohol Use - Screening: Patient not identified as an unhealthy alcohol user when screened for unhealthy alcohol use using a systematic screening method Quality 226: Preventive Care And Screening: Tobacco Use: Screening And Cessation Intervention: Tobacco Screening not Performed for Unknown Reasons Detail Level: Detailed

## 2022-09-01 VITALS — SYSTOLIC BLOOD PRESSURE: 133 MMHG | DIASTOLIC BLOOD PRESSURE: 88 MMHG

## 2022-09-01 VITALS — SYSTOLIC BLOOD PRESSURE: 183 MMHG | DIASTOLIC BLOOD PRESSURE: 95 MMHG

## 2022-09-01 VITALS — DIASTOLIC BLOOD PRESSURE: 88 MMHG | SYSTOLIC BLOOD PRESSURE: 158 MMHG

## 2022-09-01 VITALS — DIASTOLIC BLOOD PRESSURE: 76 MMHG | SYSTOLIC BLOOD PRESSURE: 139 MMHG

## 2022-09-01 VITALS — DIASTOLIC BLOOD PRESSURE: 84 MMHG | SYSTOLIC BLOOD PRESSURE: 138 MMHG

## 2022-09-01 LAB
ALBUMIN SERPL-MCNC: 2 GM/DL (ref 3.2–4.5)
ALP SERPL-CCNC: 45 U/L (ref 40–136)
ALT SERPL-CCNC: 14 U/L (ref 0–55)
BASOPHILS # BLD AUTO: 0.1 10^3/UL (ref 0–0.1)
BASOPHILS NFR BLD AUTO: 2 % (ref 0–10)
BILIRUB SERPL-MCNC: 0.3 MG/DL (ref 0.1–1)
BUN/CREAT SERPL: 16
CALCIUM SERPL-MCNC: 6.6 MG/DL (ref 8.5–10.1)
CHLORIDE SERPL-SCNC: 119 MMOL/L (ref 98–107)
CO2 SERPL-SCNC: 17 MMOL/L (ref 21–32)
CREAT SERPL-MCNC: 0.93 MG/DL (ref 0.6–1.3)
EOSINOPHIL # BLD AUTO: 0.2 10^3/UL (ref 0–0.3)
EOSINOPHIL NFR BLD AUTO: 4 % (ref 0–10)
GFR SERPLBLD BASED ON 1.73 SQ M-ARVRAT: 64 ML/MIN
GLUCOSE SERPL-MCNC: 86 MG/DL (ref 70–105)
HCT VFR BLD CALC: 29 % (ref 35–52)
HGB BLD-MCNC: 9.6 G/DL (ref 11.5–16)
LYMPHOCYTES # BLD AUTO: 1.3 10^3/UL (ref 1–4)
LYMPHOCYTES NFR BLD AUTO: 22 % (ref 12–44)
MAGNESIUM SERPL-MCNC: 2.2 MG/DL (ref 1.6–2.4)
MANUAL DIFFERENTIAL PERFORMED BLD QL: NO
MCH RBC QN AUTO: 29 PG (ref 25–34)
MCHC RBC AUTO-ENTMCNC: 33 G/DL (ref 32–36)
MCV RBC AUTO: 88 FL (ref 80–99)
MONOCYTES # BLD AUTO: 0.4 10^3/UL (ref 0–1)
MONOCYTES NFR BLD AUTO: 7 % (ref 0–12)
NEUTROPHILS # BLD AUTO: 4 10^3/UL (ref 1.8–7.8)
NEUTROPHILS NFR BLD AUTO: 66 % (ref 42–75)
PHOSPHATE SERPL-MCNC: 2.1 MG/DL (ref 2.3–4.7)
PLATELET # BLD: 243 10^3/UL (ref 130–400)
PMV BLD AUTO: 10.4 FL (ref 9–12.2)
POTASSIUM SERPL-SCNC: 4.7 MMOL/L (ref 3.6–5)
PROT SERPL-MCNC: 4.4 GM/DL (ref 6.4–8.2)
SODIUM SERPL-SCNC: 141 MMOL/L (ref 135–145)
WBC # BLD AUTO: 6.1 10^3/UL (ref 4.3–11)

## 2022-09-01 RX ADMIN — LATANOPROST SCH ML: 50 SOLUTION/ DROPS OPHTHALMIC at 20:50

## 2022-09-01 RX ADMIN — OXYBUTYNIN CHLORIDE SCH MG: 5 TABLET ORAL at 20:49

## 2022-09-01 RX ADMIN — SACUBITRIL AND VALSARTAN SCH TAB: 24; 26 TABLET, FILM COATED ORAL at 20:49

## 2022-09-01 RX ADMIN — OXYBUTYNIN CHLORIDE SCH MG: 5 TABLET ORAL at 09:27

## 2022-09-01 RX ADMIN — LEVOTHYROXINE SODIUM SCH MCG: 125 TABLET ORAL at 09:35

## 2022-09-01 RX ADMIN — PANTOPRAZOLE SODIUM SCH MG: 40 TABLET, DELAYED RELEASE ORAL at 09:27

## 2022-09-01 RX ADMIN — VANCOMYCIN HYDROCHLORIDE SCH MG: 125 CAPSULE ORAL at 17:47

## 2022-09-01 RX ADMIN — MAGNESIUM SULFATE IN DEXTROSE SCH MLS/HR: 10 INJECTION, SOLUTION INTRAVENOUS at 07:07

## 2022-09-01 RX ADMIN — VANCOMYCIN HYDROCHLORIDE SCH MG: 125 CAPSULE ORAL at 06:44

## 2022-09-01 RX ADMIN — VANCOMYCIN HYDROCHLORIDE SCH MG: 125 CAPSULE ORAL at 02:07

## 2022-09-01 RX ADMIN — POTASSIUM CHLORIDE SCH MLS/HR: 200 INJECTION, SOLUTION INTRAVENOUS at 07:07

## 2022-09-01 RX ADMIN — SACUBITRIL AND VALSARTAN SCH TAB: 24; 26 TABLET, FILM COATED ORAL at 09:27

## 2022-09-01 RX ADMIN — POTASSIUM CHLORIDE SCH MEQ: 1500 TABLET, EXTENDED RELEASE ORAL at 07:08

## 2022-09-01 RX ADMIN — AMITRIPTYLINE HYDROCHLORIDE SCH MG: 25 TABLET, FILM COATED ORAL at 20:50

## 2022-09-01 RX ADMIN — VANCOMYCIN HYDROCHLORIDE SCH MG: 125 CAPSULE ORAL at 09:27

## 2022-09-01 NOTE — PROGRESS NOTE
Subjective


Subjective/Events-last exam


Patient is feeling much better. Tolerating PO diet. She has been up walking 

around the room.


Review of Systems


Pulmonary:  Dyspnea


Cardiovascular:  No: Chest Pain, Palpitations


Gastrointestinal:  Abdominal Pain; No: Nausea, Vomiting


Neurological:  Weakness, Incoordination





Focused Exam


Lactate Level


8/30/22 15:56: Lactic Acid Level 0.70


Time of Focused Exam:  08:15





Objective


Exam


Last Set of Vital Signs





Vital Signs








  Date Time  Temp Pulse Resp B/P (MAP) Pulse Ox O2 Delivery O2 Flow Rate FiO2


 


9/1/22 16:05 36.3 84 20 139/76 (97) 100 Room Air  


 


8/30/22 15:07       0.00 


 


8/30/22 15:07        21





Capillary Refill :


I&O











Intake and Output 


 


 9/1/22





 00:00


 


Intake Total 1070 ml


 


Balance 1070 ml


 


 


 


Intake Oral 870 ml


 


IV Total 200 ml


 


# Voids 4


 


# Bowel Movements 2








General:  Alert, Oriented X3, No Acute Distress


Lungs:  Clear to Auscultation, Normal Air Movement


Heart:  Regular Rate, No Murmurs


Abdomen:  Normal Bowel Sounds, Soft


Extremities:  Other (2+ pitting edema)


Neuro:  Normal Speech





Results/Procedures


Lab


Laboratory Tests


9/1/22 06:17: 


White Blood Count 6.1, Red Blood Count 3.29L, Hemoglobin 9.6L, Hematocrit 29L, 

Mean Corpuscular Volume 88, Mean Corpuscular Hemoglobin 29, Mean Corpuscular 

Hemoglobin Concent 33, Red Cell Distribution Width 15.2H, Platelet Count 243, 

Mean Platelet Volume 10.4, Immature Granulocyte % (Auto) 0, Neutrophils (%) 

(Auto) 66, Lymphocytes (%) (Auto) 22, Monocytes (%) (Auto) 7, Eosinophils (%) 

(Auto) 4, Basophils (%) (Auto) 2, Neutrophils # (Auto) 4.0, Lymphocytes # (Auto)

1.3, Monocytes # (Auto) 0.4, Eosinophils # (Auto) 0.2, Basophils # (Auto) 0.1, 

Immature Granulocyte # (Auto) 0.0, Sodium Level 141, Potassium Level 4.7, 

Chloride Level 119H, Carbon Dioxide Level 17L, Anion Gap 5, Blood Urea Nitrogen 

15, Creatinine 0.93, Estimat Glomerular Filtration Rate 64, BUN/Creatinine Ratio

16, Glucose Level 86, Calcium Level 6.6L, Corrected Calcium 8.2L, Phosphorus 

Level 2.1L, Magnesium Level 2.2, Total Bilirubin 0.3, Aspartate Amino Transf 

(AST/SGOT) 18, Alanine Aminotransferase (ALT/SGPT) 14, Alkaline Phosphatase 45, 

Total Protein 4.4L, Albumin 2.0L





Microbiology


8/30/22 Blood Culture - Preliminary, Resulted


          No growth


8/30/22 MRSA Screen - Final, Complete


          MRSA not isolated


Radiology


PROCEDURE: CT abdomen and pelvis without contrast.





TECHNIQUE: Multiple contiguous axial images were obtained through


the abdomen and pelvis without the use of intravenous contrast.


Auto Exposure Controls were utilized during the CT exam to meet


ALARA standards for radiation dose reduction. 





INDICATION:  Periumbilical abdominal pain





COMPARISON: 07/20/2022





Unenhanced images of the liver and spleen reveal no focal


abnormality. No pancreatic, adrenal gland or left renal


abnormality is identified. Right kidney is absent. Gallbladder


has been removed. There is fluid distention of colon with diffuse


mural thickening. There is extensively thickened sigmoid colon


and rectal walls. Surgical findings are seen in the anterior


abdominal wall with small amount of gas and fluid along the


midline incision site. Unenhanced urinary bladder is distended


but otherwise unremarkable.





IMPRESSION: Extensive mural thickening of the distal colon and


rectum suggestive of proctocolitis. There is a small amount of


fluid within the anterior abdominal wall incision site, however,


no other focal fluid collection is seen to indicate abscess.





Dictated by: 





  Dictated on workstation # [id number]








Dict:   08/15/22 [id number]


Trans:   08/15/22 1816


[facility] [id number]





Interpreted by:     LUIS ZIMMERMAN MD


Electronically signed by: LUIS ZIMMERMAN MD 08/15/22 1816





Assessment/Plan


Assessment/Plan


Assessment & Plan


77 yo F that presented with rectal bleeding and severe anemia





Recurrent C Diff Colitis


   - Continue PO Vancomycin


   - Upon arrival to hospital patient did not meet sepsis criteria


   - 9/1: Plan to treat for 1 additional week





Lower GI bleed


Anemia of Acute Blood Loss


   - Type and Cross, will transfuse 1 unit pRBCs and then repeat CBC in AM


   - Iron panel pending


   - Consult Dr Galicia, appreciate recommendations


   - 8/31: Hgb 9.0, will continue to monitor, no further bleeding present


   - 9/1: Hgb trending up, normal iron panel





Acute on Chronic Renal Failure


   - Improved upon arrival with 1 L NS prior to discharge from [facility]





Chronic Systolic CHF


Atrial Fibrillation Rate controlled


   - Does not seem to be in acute exacerbation


   - Can not tolerated OAC due to GI bleeding


   - 8/31: Lasix given today for increase LE swelling





Severe PEM


   - Appreciate dietary recommendations





Peritoneal Fistula


   - General Surgery consulted





Discharge Planning: Plan to d/c to SNF tomorrow











JOSSIE PERALTA MD                Sep 1, 2022 18:53

## 2022-09-01 NOTE — OCCUPATIONAL THERAPY EVAL
OT Evaluation-General/PLF


Medical Diagnosis


Admission Date


Aug 30, 2022 at 12:13


Medical Diagnosis:  sepsis, GI bleed, C Diff


Onset Date:  Aug 30, 2022





Therapy Diagnosis


Therapy Diagnosis:  weakness, decreased ADL status





Precautions


Precautions/Isolations:  Contact Isolation, Fall Prevention, Pressure Ulcer





Referral


Physician:  Carmen Morris Reason:  Evaluation/Treatment





Medical History


Pertinent Medical History:  Atrial Fib, Arthritis, DM, HTN, Hypothroidism, OA, 

Renal Insufficiency


Additional Medical History


DM, HTN, HLD, RA, OA, single kidney, CKD, C-Diff, appendectomy, R kidney 

resection, cholecystectomy, tailbone resection, hysterectomy, colectomy with 

ileostomy, ileostomy reversal


Current History


admitted with c-diff and continued rectal bleeding.





Social History


Home:  Single Level


Current Living Status:  grandson


Entry Into Home:  Stairs With Railing


 Steps Into Home:  4





ADL-Prior Level of Function


SCALE: Activities may be completed with or without assistive devices.





6-Indepedent-patient completes the activity by him/herself with no assistance 

from a helper.


5-Set-up or Clean-up Assistance-helper sets up or cleans up; patient completes 

activity. [place] assists only prior to or  


    following the activity.


4-Supervision or Touching Assistance-helper provides verbal cues and/or 

touching/steadying and/or contact guard assistance as patient completes act

ivity. Assistance may be provided   


    throughout the activity or intermittently.


3-Partial/Moderate Assistance-helper does LESS THAN HALF the effort. [place] 

lifts, holds or supports trunk or limbs, but provides less than half the effort.


2-Substantial/Maximal Assistance-helper does MORE THAN HALF the effort. [place] 

lifts or holds trunk or limbs and provides more than half the effort.


9-Iypphxlbq-ifastp does ALL the effort. Patient does none of the effort to 

complete the activity. Or, the assistance of 2 or more helpers is required for 

the patient to complete the  


    activity.


If activity was not attempted, code reason:


7-Patient Refused.


9-Not Applicable-not attempted and the patient did not perform the activity 

before the current illness, exacerbation or injury.


10-Not Attempted due to Environmental Limitations-(lack of equipment, weather 

restraints, etc.).


88-Not Attempted due to Medical Conditions or Safety Concerns.


ADL PLOF Comments


Pt reports IND with ADLs and functional mobility with walker prior to recent 

hospitalizations. She has a walk in shower (6 inch step in), with built in seat.


Self Care:  Independent


Functional Cognition:  Independent





OT Current Status


Subjective


Pt in bathroom, agreeable to OT evaluation.





Mental Status/Objective


Patient Orientation:  Person, Place, Time, Situation


Attachments:  Telemetry, Other-See Comments (Life vest)





Current


Upper Extremity ROM


WFL


Upper Extremity Strength


grossly 3/5





ADL-Treatment


Eating (QC):  6 (Per pt report)


Shower/Bathe Self (QC):  3 (Per clinical judgment, assist with washing BLEs 

lower legs/feet for thoroughness)


On/Off Footwear (QC):  3 (Min A donning R gripper socks, pt able to don L and 

able to doff bilaterally.)


Toileting Hygiene (QC):  6 (IND with toileting)





Other Treatments


Pt in bathroom, completed toileting independently, and then stood at sink to w

sherwin her hands. Pt returned to EOB, holding onto walls/furniture. Walker provided

to pt, then she performed functional mobility in hallways, CGA with FWW for 

longer distance, 120'. Pt returned to room. Post tx, pt in bed, call light in 

reach and all needs met.





Education


OT Patient Education:  Correct positioning, Energy conservation, Modified ADL 

techniques, Progress toward Goal/Update tx plan, Purpose of tx/functional 

activities, Rehab process


Teaching Recipient:  Patient


Teaching Methods:  Discussion


Response to Teaching:  Verbalize Understanding





OT Long Term Goals


Long Term Goals


Time Frame:  Sep 9, 2022


Eating (QC):  6


Oral Hygiene (QC):  6


Toileting Hygiene (QC):  6


Shower/Bathe Self (QC):  6


Upper Body Dressing (QC):  6


Lower Body Dressing (QC):  6


On/Off Footwear (QC):  6


Additional Goals:  1-Demonstrate ADL Tasks, 2-Verbalize Understanding, 3-

ImproveStrength/Cherise


1=Demonstrate adherence to instructed precautions during ADL tasks.


2=Patient will verbalize/demonstrate understanding of assistive 

devices/modifications for ADL.


3=Patient will improve strength/tolerance for activity to enable patient to 

perform ADL's.





OT Education/Plan


Problem List/Assessment


Assessment:  Decreased Activ Tolerance, Decreased UE Strength, Impaired I ADL's


Pt would benefit from short term skilled OT services in order to increase BUE 

Strength and activity tolerance, and increase independence with ADLs and 

functional mobility in order to maximize LOF for safe discharge.





Discharge Recommendations


Plan/Recommendations:  Continue POC





Treatment Plan/Plan of Care


Patient would benefit from OT for education, treatment and training to promote 

independence in ADL's, mobility, safety and/or upper extremity function for 

ADL's.


Plan of Care:  ADL Retraining, Functional Mobility, UE Funct Exercise/Act


Treatment Duration:  Sep 9, 2022


Frequency:  3 times per week (3-5 times per week)


Estimated Hrs Per Day:  .25 hour per day


Agreement:  Yes


Rehab Potential:  Fair





Time/GCodes


Start Time:  15:35


Stop Time:  15:50


Total Time Billed (hr/min):  25


Billed Treatment Time


1DIAN ADDISON OT           Sep 1, 2022 16:00

## 2022-09-01 NOTE — DISCHARGE SUMMARY
Discharge Summary


Hospital Course


Problems Reviewed?:  Yes


Hospital Course


Date of Admission: Aug 30, 2022 at 12:13 


Admission Diagnosis :  C. difficile infection and severe anemia





Family Physician/Provider: Dawson Castellanos MD  





Date of Discharge: 9/1/22 


Discharge Diagnosis: C. Difficile Infection


Severe Anemia - resolved








Hospital Course:


Patient was readmitted to [facility] after her C. Difficile infection worsened 

at the [facility]. Upon admission, she was found to be severely anemic wi

th a HGB of 6.7 and HCT of 20. She received one unit of A positive leukocyte 

reduced RBCs correcting her HGB to 9.0 and HCT to 27. Her labs continued to 

improve throughout her stay and she remains on vancomycin for her C. diff 

infection. Patient to be discharged back to [facility].














Labs and Pending Lab Test:


Laboratory Tests


9/1/22 06:17: 


White Blood Count 6.1, Red Blood Count 3.29L, Hemoglobin 9.6L, Hematocrit 29L, 

Mean Corpuscular Volume 88, Mean Corpuscular Hemoglobin 29, Mean Corpuscular 

Hemoglobin Concent 33, Red Cell Distribution Width 15.2H, Platelet Count 243, 

Mean Platelet Volume 10.4, Immature Granulocyte % (Auto) 0, Neutrophils (%) 

(Auto) 66, Lymphocytes (%) (Auto) 22, Monocytes (%) (Auto) 7, Eosinophils (%) 

(Auto) 4, Basophils (%) (Auto) 2, Neutrophils # (Auto) 4.0, Lymphocytes # (Auto)

1.3, Monocytes # (Auto) 0.4, Eosinophils # (Auto) 0.2, Basophils # (Auto) 0.1, 

Immature Granulocyte # (Auto) 0.0, Sodium Level 141, Potassium Level 4.7, 

Chloride Level 119H, Carbon Dioxide Level 17L, Anion Gap 5, Blood Urea Nitrogen 

15, Creatinine 0.93, Estimat Glomerular Filtration Rate 64, BUN/Creatinine Ratio

16, Glucose Level 86, Calcium Level 6.6L, Corrected Calcium 8.2L, Phosphorus 

Level 2.1L, Magnesium Level 2.2, Total Bilirubin 0.3, Aspartate Amino Transf 

(AST/SGOT) 18, Alanine Aminotransferase (ALT/SGPT) 14, Alkaline Phosphatase 45, 

Total Protein 4.4L, Albumin 2.0L





Microbiology


8/30/22 Blood Culture - Preliminary, Resulted


          No growth


8/30/22 MRSA Screen - Final, Complete


          MRSA not isolated





Home Meds


Active


Pantoprazole Sodium 40 Mg Tablet.dr 40 Mg PO DAILY 30 Days


Sodium Bicarbonate 650 Mg Tablet 650 Mg PO TID 14 Days


Entresto 24 mg-26 mg Tablet (Sacubitril/Valsartan) 24 Mg-26 Mg Tablet 1 Tab PO 

BID 30 Days


Carvedilol 3.125 Mg Tablet 3.125 Mg PO BID WITH MEALS


Lipitor (Atorvastatin Calcium) 40 Mg Tablet 40 Mg PO 1700


Reported


Megestrol Acetate 400 Mg/10 Ml (10 Ml) Oral.susp 200 Mg PO QIDACHS


Levothyroxine Sodium 125 Mcg Tablet 125 Mcg PO DAILY


Leflunomide 20 Mg Tablet 20 Mg PO DAILY


Vitamin D2 (Ergocalciferol (Vitamin D2)) 1,250 Mcg (42746 Unit) Capsule 1,250 

Mcg PO WED


Cholestyramine Packet (Cholestyramine (with Sugar)) 4 Gram Powd.pack 4 Gm PO BID


Amlodipine Besylate 5 Mg Tablet 5 Mg PO DAILY


Tylenol Arthritis (Acetaminophen) 650 Mg Tablet.er 650 Mg PO Q6H PRN


Oxybutynin Chloride 5 Mg Tablet 5 Mg PO BID


Xalatan (Latanoprost) 0.005 % Drops 1 Drop OU HS


Iron (Ferrous Sulfate) 325 Mg Tablet 325 Mg PO BID


Amitriptyline HCl 100 Mg Tablet 100 Mg PO HS


Fenofibrate 160 Mg Tablet 160 Mg PO HS


Cetirizine HCl 10 Mg Tablet 10 Mg PO DAILY


Assessment/Pt DC Instructions


C. Diff infection - continue course of vancomycin


Anemia - severe anemia has resolved and serial cbcwd continue to show 

improvement in HGB and HCT. Her iron studies showed no marked abnormality. 

Vitals should continue to be monitored for signs of recurrence. Patient should 

continue iron supplementation


A. fib - continue medical management


Diabetes mellitus - continue medical management


Hypertension - continue medical management


Hypothyroidism - levothyroxine


Hyperlipidemia - atorvastatin


Patient to be discharged back to [facility].


Activity as Tolerated:  Yes





Discharge Physical Examination


Allergies:  


Coded Allergies:  


     Penicillins (Verified  Allergy, Unknown, 4/22/21)


     raspberry (Verified  Allergy, Unknown, 4/22/21)


General Appearance:  No Apparent Distress, WD/WN


Respiratory:  Chest Non Tender, Lungs Clear, Normal Breath Sounds, No Accessory 

Muscle Use, No Respiratory Distress


Cardiovascular:  Regular Rate, Rhythm, No Edema, No Gallop, No JVD, No Murmur


Gastrointestinal:  Normal Bowel Sounds, No Organomegaly, No Pulsatile Mass, 

Soft, Tenderness (Tenderness to palpation in RLQ)


Extremity:  Normal Inspection, Normal Range of Motion, Non Tender


Skin:  Normal Color, Warm/Dry


Neurologic/Psychiatric:  Alert, Oriented x3, No Motor/Sensory Deficits, Normal 

Mood/Affect





Discharge Summary


Date of Admission


Aug 30, 2022 at 12:13


Date of Discharge


09/01/2022


Discharge Date:  Sep 1, 2022


Admission Diagnosis


C. Difficile infection with severe anemia (initially met SIRS criteria but 

symptoms were more likely from anemia rather than sepsis)


Discharge Diagnosis


C. Difficile Infection 


Severe Anemia (resolved)











JOELLEN BACON                 Sep 1, 2022 11:03

## 2022-09-01 NOTE — PROGRESS NOTE - SURGERY
MIRIAN RHODES 9/1/22 0752:


Subjective


Date Seen by a Provider:  Sep 1, 2022


Time Seen by a Provider:  07:00


Subjective/Events-last exam


Patient is feeling better. She did complain of diffuse abdominal tenderness and 

said it felt more like a fullness. She claimed she has been urinating and having

bowel movements and denied any blood present in stool the last bowel movement 

she had. She is ambulating to go to the bathroom only, she did not have an IS in

her room this morning. She had a transient episode of nausea last night when 

sitting in her chair but it went away quickly. She reports the fistula is not 

draining as much.


Review of Systems


General:  No Chills, No Night Sweats


Pulmonary:  No Dyspnea, No Cough


Cardiovascular:  No: Chest Pain


Gastrointestinal:  Nausea, Abdominal Pain; No: Vomiting


Musculoskeletal:  No: neck pain





Focused Exam


Lactate Level


8/30/22 15:56: Lactic Acid Level 0.70


Time of Focused Exam:  08:15





Objective


Exam





Vital Signs








  Date Time  Temp Pulse Resp B/P (MAP) Pulse Ox O2 Delivery O2 Flow Rate FiO2


 


9/1/22 07:12  87      


 


9/1/22 04:12 36.0 80 20 141/81 (101) 99 Room Air  


 


9/1/22 01:00  88      


 


8/31/22 23:55 36.5 92 20 116/58 (77) 93 Room Air  


 


8/31/22 20:00      Room Air  


 


8/31/22 19:59 36.5 83 20 131/69 (89) 100 Room Air  


 


8/31/22 19:05  116      


 


8/31/22 18:52     100 Room Air  


 


8/31/22 16:04 36.7 81 18 134/69 (90) 100 Room Air  


 


8/31/22 14:42 36.4 84 16 125/59 (81) 99 Room Air  


 


8/31/22 14:30      Room Air  


 


8/31/22 14:27  85      


 


8/31/22 14:00  87 16 133/75 (94) 98 Room Air  


 


8/31/22 13:06  84      


 


8/31/22 13:00  83 11 135/74 (94) 98 Room Air  


 


8/31/22 12:00     98 Room Air  


 


8/31/22 12:00  82 14 123/65 (84) 99 Room Air  


 


8/31/22 11:00  82 12 122/63 (82) 97 Room Air  


 


8/31/22 10:00  98 17 126/69 (88) 94 Room Air  


 


8/31/22 09:00  87 12 122/98 (106) 98 Room Air  


 


8/31/22 08:00     98 Room Air  


 


8/31/22 08:00  85 12 137/80 (99) 98 Room Air  














I & O 


 


 9/1/22





 07:00


 


Intake Total 1680 ml


 


Balance 1680 ml





Capillary Refill :


General Appearance:  No Apparent Distress, Chronically ill, Thin


Neck:  Non Tender; No Lymphadenopathy (L), No Lymphadenopathy (R)


Respiratory:  Lungs Clear, Normal Breath Sounds, No Accessory Muscle Use, No 

Respiratory Distress


Cardiovascular:  Regular Rate, Rhythm, No Murmur, Normal Peripheral Pulses


Gastrointestinal:  soft, tenderness (diffuse tenderness to light palpation)


Extremity:  No Pedal Edema, Other (dorsalis pedis 2+ b/l. Sensation intact b/l 

in LE)


Neurologic/Psychiatric:  Alert, Oriented x3, Normal Mood/Affect





Results


Lab


Laboratory Tests


8/31/22 08:05: Glucometer 75


9/1/22 06:17: 


White Blood Count 6.1, Red Blood Count 3.29L, Hemoglobin 9.6L, Hematocrit 29L, 

Mean Corpuscular Volume 88, Mean Corpuscular Hemoglobin 29, Mean Corpuscular 

Hemoglobin Concent 33, Red Cell Distribution Width 15.2H, Platelet Count 243, 

Mean Platelet Volume 10.4, Immature Granulocyte % (Auto) 0, Neutrophils (%) 

(Auto) 66, Lymphocytes (%) (Auto) 22, Monocytes (%) (Auto) 7, Eosinophils (%) 

(Auto) 4, Basophils (%) (Auto) 2, Neutrophils # (Auto) 4.0, Lymphocytes # (Auto)

1.3, Monocytes # (Auto) 0.4, Eosinophils # (Auto) 0.2, Basophils # (Auto) 0.1, 

Immature Granulocyte # (Auto) 0.0, Sodium Level 141, Potassium Level 4.7, 

Chloride Level 119H, Carbon Dioxide Level 17L, Anion Gap 5, Blood Urea Nitrogen 

15, Creatinine 0.93, Estimat Glomerular Filtration Rate 64, BUN/Creatinine Ratio

16, Glucose Level 86, Calcium Level 6.6L, Corrected Calcium 8.2L, Phosphorus 

Level 2.1L, Magnesium Level 2.2, Total Bilirubin 0.3, Aspartate Amino Transf 

(AST/SGOT) 18, Alanine Aminotransferase (ALT/SGPT) 14, Alkaline Phosphatase 45, 

Total Protein 4.4L, Albumin 2.0L





Microbiology


8/30/22 Blood Culture - Preliminary, Resulted


          No growth


8/30/22 MRSA Screen - Final, Complete


          MRSA not isolated





Assessment/Plan


Anemia


C. Diff Colitis (Positive test on 8/15/2022)


Enterocutaneous fistula


Hx of Afib





Hgb stabilized at 9.6 from 9.0 yesterday. Check Hgb with AM labs daily, 

transfuse if level drops below 7. Maintain patient on oral vancomycin. Patient 

did not have an IS next to her bed today so ensure she receives and uses it. 

Ambulate as much as she can tolerate.





BONILLA GALICIA DO 9/1/22 1301:


Subjective


Time Seen by a Provider:  09:47


Subjective/Events-last exam


Pt seen and examined, states she feels good today and stronger.  Fistula with 

minimal output, "less than yesterday".


Review of Systems


General:  No Chills, No Night Sweats


Pulmonary:  No Dyspnea, No Cough


Cardiovascular:  No: Chest Pain


Gastrointestinal:  Nausea, Abdominal Pain (minimal); No: Vomiting





Objective


Exam


General Appearance:  No Apparent Distress, Chronically ill, Thin


Respiratory:  Lungs Clear, Normal Breath Sounds, No Accessory Muscle Use, No 

Respiratory Distress


Cardiovascular:  Regular Rate, Rhythm, No Murmur


Gastrointestinal:  soft, tenderness (minimal tenderness to light palpation)


Neurologic/Psychiatric:  Alert, Oriented x3





Assessment/Plan


Anemia


C. Diff Colitis (Positive test on 8/15/2022)


Enterocutaneous fistula


Hx of Afib





Hgb stabilized at 9.6 from 9.0 yesterday. Check Hgb with AM labs daily, 

transfuse if level drops below 7. Maintain patient on oral vancomycin. Patient 

did not have an IS next to her bed today so ensure she receives and uses it. 

Ambulate as much as she can tolerate.





Supervisory-Addendum Brief


Verification & Attestation


Participated in pt care:  history, MDM, physical


Personally performed:  exam, history, MDM, supervision of care


Care discussed with:  Medical Student


Procedures:  n/a


Verification and Attestation of Medical Student E/M Service





A medical student performed and documented this service. I then reviewed and 

verified all information documented by the medical student and made 

modifications to such information, when appropriate. I personally performed a 

physical exam, medical decision making and then discussed any differences 

between the notes and made revisions as necessary to create one note.





Bonilla Galicia , 9/1/22 , 13:01











MIRIAN RHODES                 Sep 1, 2022 07:52


BONILLA GALICIA DO                Sep 1, 2022 13:01

## 2022-09-01 NOTE — PHYSICAL THERAPY EVALUATION
PT Evaluation-General


Medical Diagnosis


Admission Date


Aug 30, 2022 at 12:13


Medical Diagnosis:  C. difficile infection and continued rectal bleeding


Onset Date:  Jul 30, 2012





Therapy Diagnosis


Therapy Diagnosis:  Gait deficit, strength defict





Precautions


Precautions/Isolations:  Contact Isolation, Fall Prevention, Pressure Ulcer





Weight Bear Status


Right Lower Extremity:  Right


Full Weight Bearing


Left Lower Extremity:  Left


Full Weight Bearing





Referral


Physician:  Dr. Morales


Reason for Referral:  Evaluation/Treatment





Medical History


Pertinent Medical History:  Atrial Fib, Arthritis, DM, HTN, Hypothroidism, OA, 

Renal Insufficiency





Social History


Home:  Single Level


Current Living Status:  Other Family (Grandson)


PT Steps Into Home:  4





Prior


Prior Level of Function


SCALE: Activities may be completed with or without assistive devices.





6-Indepedent-patient completes the activity by him/herself with no assistance 

from a helper.


5-Set-up or Clean-up Assistance-helper sets up or cleans up; patient completes 

activity. [place] assists only prior to or  


    following the activity.


4-Supervision or Touching Assistance-helper provides verbal cues and/or 

touching/steadying and/or contact guard assistance as patient completes 

activity. Assistance may be provided   


    throughout the activity or intermittently.


3-Partial/Moderate Assistance-helper does LESS THAN HALF the effort. [place] 

lifts, holds or supports trunk or limbs, but provides less than half the effort.


2-Substantial/Maximal Assistance-helper does MORE THAN HALF the effort. [place] 

lifts or holds trunk or limbs and provides more than half the effort.


8-Apfybzczv-vgqzln does ALL the effort. Patient does none of the effort to 

complete the activity. Or, the assistance of 2 or more helpers is required for 

the patient to complete the  


    activity.


If activity was not attempted, code reason:


7-Patient Refused.


9-Not Applicable-not attempted and the patient did not perform the activity 

before the current illness, exacerbation or injury.


10-Not Attempted due to Environmental Limitations-(lack of equipment, weather 

restraints, etc.).


88-Not Attempted due to Medical Conditions or Safety Concerns.


Bed Mobility:  6


Transfers (B,C,W/C):  6


Gait:  6


Stairs:  6


Indoor Mobility (Ambulation):  Independent


Stairs:  Independent


Prior Devices Use:  Walker





PT Evaluation-Current


Subjective


Patient in the bathroom upon PT arrival, agreeable to treatment.





Objective


Patient Orientation:  Person, Place, Time, Situation





ROM/Strength


ROM Lower Extremities


WFLs bilaterally all planes


Strength Lower Extremities


3+/5 bilaterally all planes





Sensory


Vision:  Functional


Hearing:  Functional


Sensation Right Lower Extremit:  Intact


Sensation Left Lower Extremity:  Intact





Transfers


Roll Left to Right (QC):  5


Sit to Lying (QC):  5


Lying to Sitting/Side of Bed(Q:  4


Sit to Stand (QC):  5


Chair/Bed-to-Chair Xfer(QC):  5


Toilet Transfer (QC):  5





Gait


Does the Patient Walk?:  Yes


Mode of Locomotion:  Walk


Anticipated Mode of Locomotion:  Walk


Walk 10 feet (QC):  6


Walk 50 ft with 2 Turns(QC):  4


Walk 150 ft (QC):  88


Distance:  120


Gait Assistive Device:  FWW





Balance


Sitting Static:  Fair


Sitting Dynamic:  Fair


Standing Static:  Fair


 Standing Dynamic:  Fair





Assessment/Needs


Patient tolerated treatment well.  Has been getting up to the bathroom I per 

nurse.  Patient in the bathroom upon PT arrival.  Patient performs all observed 

bed mobility and transfers with SBA/setup.  She demonstrates difficulty pulling 

her LEs into bed at end of treatment.  Patient ambulates 120 feet with FWW, with

CGA and verbal, cues for safety, progression, posture and conservation of 

energy.  Patient in bed post treatment with all needs met, nursing notified and 

call light in reach.


Rehab Potential:  Fair





PT Long Term Goals


Long Term Goals


PT Long Term Goals Time Frame:  Sep 9, 2022


Roll Left & Right (QC):  6


Sit to Lying (QC):  6


Lying-Sitting on Side/Bed(QC):  6


Sit to Stand (QC):  6


Chair/Bed-to-Chair Xfer(QC):  6


Toilet Transfer (QC):  6


Car Transfer (QC):  6


Does the Patient Walk:  Yes


Walk 10 feet (QC):  6


Walk 50ft with 2 Turns (QC):  6


Walk 150 ft (QC):  6





PT Plan


Problem List


Problem List:  Activity Tolerance, Functional Strength, Safety, Balance, Gait, 

Transfer, Bed Mobility, ROM





Treatment/Plan


Treatment Plan:  Continue Plan of Care


Treatment Plan:  Bed Mobility, Education, Functional Activity Cherise, Functional 

Strength, Group Therapy, Gait, Safety, Therapeutic Exercise, Transfers


Treatment Duration:  Sep 9, 2022


Frequency:  6 times per week


Estimated Hrs Per Day:  .25 hour per day


Patient and/or Family Agrees t:  Yes





Safety Risks/Education


Patient Education:  Gait Training, Transfer Techniques


Teaching Recipient:  Patient


Teaching Methods:  Demonstration, Discussion


Response to Teaching:  Verbalize Understanding, Return Demonstration





Time/GCodes


Time In:  1337


Time Out:  1353


Total Billed Treatment Time:  16


Total Billed Treatment


Visit, NOÉ Quesada PT                 Sep 1, 2022 15:59

## 2022-09-02 VITALS — SYSTOLIC BLOOD PRESSURE: 153 MMHG | DIASTOLIC BLOOD PRESSURE: 76 MMHG

## 2022-09-02 VITALS — DIASTOLIC BLOOD PRESSURE: 94 MMHG | SYSTOLIC BLOOD PRESSURE: 140 MMHG

## 2022-09-02 VITALS — SYSTOLIC BLOOD PRESSURE: 127 MMHG | DIASTOLIC BLOOD PRESSURE: 84 MMHG

## 2022-09-02 VITALS — SYSTOLIC BLOOD PRESSURE: 144 MMHG | DIASTOLIC BLOOD PRESSURE: 77 MMHG

## 2022-09-02 VITALS — DIASTOLIC BLOOD PRESSURE: 81 MMHG | SYSTOLIC BLOOD PRESSURE: 139 MMHG

## 2022-09-02 VITALS — DIASTOLIC BLOOD PRESSURE: 79 MMHG | SYSTOLIC BLOOD PRESSURE: 142 MMHG

## 2022-09-02 LAB
BASOPHILS # BLD AUTO: 0.1 10^3/UL (ref 0–0.1)
BASOPHILS NFR BLD AUTO: 1 % (ref 0–10)
BUN/CREAT SERPL: 16
CALCIUM SERPL-MCNC: 6.8 MG/DL (ref 8.5–10.1)
CHLORIDE SERPL-SCNC: 117 MMOL/L (ref 98–107)
CO2 SERPL-SCNC: 16 MMOL/L (ref 21–32)
CREAT SERPL-MCNC: 1.03 MG/DL (ref 0.6–1.3)
EOSINOPHIL # BLD AUTO: 0.2 10^3/UL (ref 0–0.3)
EOSINOPHIL NFR BLD AUTO: 3 % (ref 0–10)
GFR SERPLBLD BASED ON 1.73 SQ M-ARVRAT: 57 ML/MIN
GLUCOSE SERPL-MCNC: 141 MG/DL (ref 70–105)
HCT VFR BLD CALC: 30 % (ref 35–52)
HGB BLD-MCNC: 10.1 G/DL (ref 11.5–16)
LYMPHOCYTES # BLD AUTO: 1.1 10^3/UL (ref 1–4)
LYMPHOCYTES NFR BLD AUTO: 14 % (ref 12–44)
MAGNESIUM SERPL-MCNC: 2 MG/DL (ref 1.6–2.4)
MANUAL DIFFERENTIAL PERFORMED BLD QL: NO
MCH RBC QN AUTO: 29 PG (ref 25–34)
MCHC RBC AUTO-ENTMCNC: 33 G/DL (ref 32–36)
MCV RBC AUTO: 87 FL (ref 80–99)
MONOCYTES # BLD AUTO: 0.4 10^3/UL (ref 0–1)
MONOCYTES NFR BLD AUTO: 5 % (ref 0–12)
NEUTROPHILS # BLD AUTO: 6.1 10^3/UL (ref 1.8–7.8)
NEUTROPHILS NFR BLD AUTO: 77 % (ref 42–75)
PLATELET # BLD: 275 10^3/UL (ref 130–400)
PMV BLD AUTO: 10.4 FL (ref 9–12.2)
POTASSIUM SERPL-SCNC: 4.7 MMOL/L (ref 3.6–5)
SODIUM SERPL-SCNC: 141 MMOL/L (ref 135–145)
WBC # BLD AUTO: 7.9 10^3/UL (ref 4.3–11)

## 2022-09-02 RX ADMIN — VANCOMYCIN HYDROCHLORIDE SCH MG: 125 CAPSULE ORAL at 10:25

## 2022-09-02 RX ADMIN — OXYBUTYNIN CHLORIDE SCH MG: 5 TABLET ORAL at 20:21

## 2022-09-02 RX ADMIN — SACUBITRIL AND VALSARTAN SCH TAB: 24; 26 TABLET, FILM COATED ORAL at 09:19

## 2022-09-02 RX ADMIN — SACUBITRIL AND VALSARTAN SCH TAB: 24; 26 TABLET, FILM COATED ORAL at 20:21

## 2022-09-02 RX ADMIN — VANCOMYCIN HYDROCHLORIDE SCH MG: 125 CAPSULE ORAL at 00:26

## 2022-09-02 RX ADMIN — OXYBUTYNIN CHLORIDE SCH MG: 5 TABLET ORAL at 09:19

## 2022-09-02 RX ADMIN — VANCOMYCIN HYDROCHLORIDE SCH MG: 125 CAPSULE ORAL at 05:07

## 2022-09-02 RX ADMIN — VANCOMYCIN HYDROCHLORIDE SCH MG: 125 CAPSULE ORAL at 22:16

## 2022-09-02 RX ADMIN — ACETAMINOPHEN PRN MG: 650 TABLET, FILM COATED, EXTENDED RELEASE ORAL at 22:16

## 2022-09-02 RX ADMIN — LATANOPROST SCH ML: 50 SOLUTION/ DROPS OPHTHALMIC at 20:21

## 2022-09-02 RX ADMIN — VANCOMYCIN HYDROCHLORIDE SCH MG: 125 CAPSULE ORAL at 17:25

## 2022-09-02 RX ADMIN — POTASSIUM CHLORIDE SCH MEQ: 1500 TABLET, EXTENDED RELEASE ORAL at 07:45

## 2022-09-02 RX ADMIN — MAGNESIUM SULFATE IN DEXTROSE SCH MLS/HR: 10 INJECTION, SOLUTION INTRAVENOUS at 07:45

## 2022-09-02 RX ADMIN — AMITRIPTYLINE HYDROCHLORIDE SCH MG: 25 TABLET, FILM COATED ORAL at 20:22

## 2022-09-02 RX ADMIN — LEVOTHYROXINE SODIUM SCH MCG: 125 TABLET ORAL at 05:07

## 2022-09-02 RX ADMIN — PANTOPRAZOLE SODIUM SCH MG: 40 TABLET, DELAYED RELEASE ORAL at 09:19

## 2022-09-02 RX ADMIN — POTASSIUM CHLORIDE SCH MLS/HR: 200 INJECTION, SOLUTION INTRAVENOUS at 07:44

## 2022-09-02 NOTE — OCCUPATIONAL THER DAILY NOTE
OT Current Status-Daily Note


Subjective


Pt asleep, lying in bed. Pt easily woke to name. Pt agrees to therapy, states 

she is not getting out of bed.





Mental Status/Objective


Patient Orientation:  Person, Place, Time, Situation





ADL-Treatment


Pt refused to complete any OOB tasks. Pt refuses changing of clothes. Pt agrees 

to oral care in bed. After supplies gathered, pt completed oral care sitting up 

in bed. Pt left in room with call light/phone in reach. All needs met in room.


Therapy Code Descriptions/Definitions 





Functional [place] Measure:


0=Not Assessed/NA        4=Minimal Assistance


1=Total Assistance        5=Supervision or Setup


2=Maximal Assistance  6=Modified [place]


3=Moderate Assistance 7=Complete IndependenceSCALE: Activities may be completed 

with or without assistive devices.





6-Indepedent-patient completes the activity by him/herself with no assistance 

from a helper.


5-Set-up or Clean-up Assistance-helper sets up or cleans up; patient completes a

ctivity. [place] assists only prior to or  


    following the activity.


4-Supervision or Touching Assistance-helper provides verbal cues and/or 

touching/steadying and/or contact guard assistance as patient completes 

activity. Assistance may be provided   


    throughout the activity or intermittently.


3-Partial/Moderate Assistance-helper does LESS THAN HALF the effort. [place] 

lifts, holds or supports trunk or limbs, but provides less than half the effort.


2-Substantial/Maximal Assistance-helper does MORE THAN HALF the effort. [place] 

lifts or holds trunk or limbs and provides more than half the effort.


1-Kyrslupvx-olycys does ALL the effort. Patient does none of the effort to 

complete the activity. Or, the assistance of 2 or more helpers is required for 

the patient to complete the  


    activity.


If activity was not attempted, code reason:


7-Patient Refused.


9-Not Applicable-not attempted and the patient did not perform the activity 

before the current illness, exacerbation or injury.


10-Not Attempted due to Environmental Limitations-(lack of equipment, weather 

restraints, etc.).


88-Not Attempted due to Medical Conditions or Safety Concerns.


Oral Hygiene (QC):  5 (Set up)





OT Long Term Goals


Long Term Goals


Time Frame:  Sep 9, 2022


Eating (QC):  6


Oral Hygiene (QC):  6


Toileting Hygiene (QC):  6


Shower/Bathe Self (QC):  6


Upper Body Dressing (QC):  6


Lower Body Dressing (QC):  6


On/Off Footwear (QC):  6


Additional Goals:  1-Demonstrate ADL Tasks, 2-Verbalize Understanding, 3-

ImproveStrength/Cherise


1=Demonstrate adherence to instructed precautions during ADL tasks.


2=Patient will verbalize/demonstrate understanding of assistive 

devices/modifications for ADL.


3=Patient will improve strength/tolerance for activity to enable patient to 

perform ADL's.





OT Education/Plan


Problem List/Assessment


Assessment:  Decreased Activ Tolerance, Decreased UE Strength, Impaired Self-

Care Skills


Pt would benefit from short term skilled OT services in order to increase BUE 

Strength and activity tolerance, and increase independence with ADLs and 

functional mobility in order to maximize LOF for safe discharge.





Discharge Recommendations


Plan/Recommendations:  Continue POC





Treatment Plan/Plan of Care


Patient would benefit from OT for education, treatment and training to promote 

independence in ADL's, mobility, safety and/or upper extremity function for 

ADL's.


Plan of Care:  ADL Retraining, Functional Mobility, UE Funct Exercise/Act


Treatment Duration:  Sep 9, 2022


Frequency:  3 times per week (3-5 times per week)


Estimated Hrs Per Day:  .25 hour per day


Agreement:  Yes


Rehab Potential:  Fair





Time/GCodes


Start Time:  08:40


Stop Time:  08:53


Total Time Billed (hr/min):  13


Billed Treatment Time


1 visit ADL 1 (13 min)











RONEL DAILEY                Sep 2, 2022 09:16

## 2022-09-02 NOTE — PROGRESS NOTE
Subjective


Subjective/Events-last exam


Patient is a 77 yo Female admitted for C. difficile infection and anemia. She 

says she is feeling alright this morning. Notes that she had a little nausea 

yesterday w/o vomiting. Still experiencing abdominal pain, had 4 to 5 bowel 

movements yesterday. Diarrhea seems to be improving in consistency with soft but

formed stools; she noted dark stools yesterday but with no bright red blood 

visible. Denies SOB, headaches, lightheadedness.


Review of Systems


General:  No Chills, No Malaise


HEENT:  No Head Aches


Pulmonary:  No Cough


Cardiovascular:  No: Chest Pain, Palpitations, Lt Headedness


Gastrointestinal:  Nausea, Abdominal Pain (More prominent today in LLQ), 

Diarrhea (Formed but loose stools), Melena (Dark stool noted yesterday); No: 

Vomiting


Genitourinary:  No Dysuria, No Frequency


Neurological:  No: Change in speech





Focused Exam


Sepsis Stage:  Ruled Out


Reason for ruling out sepsis:  Patient does not meet SIRS criteria for sepsis 

diagnosis


Possible Source:  GI Tract/Intra-Abdominal


Lactate Level


8/30/22 15:56: Lactic Acid Level 0.70


Time of Focused Exam:  08:15





Objective


Exam


Last Set of Vital Signs





Vital Signs








  Date Time  Temp Pulse Resp B/P (MAP) Pulse Ox O2 Delivery O2 Flow Rate FiO2


 


9/2/22 08:51 36.6 103 18 144/77 (99) 97 Room Air  


 


8/30/22 15:07       0.00 


 


8/30/22 15:07        21





Capillary Refill :


I&O











Intake and Output 


 


 9/2/22





 00:00


 


Intake Total 2570 ml


 


Balance 2570 ml


 


 


 


Intake Oral 2370 ml


 


IV Total 200 ml


 


# Voids 8


 


# Bowel Movements 4








General:  Alert, Oriented X3, Cooperative, No Acute Distress


Neck:  Supple, No JVD


Lungs:  Clear to Auscultation, Normal Air Movement


Heart:  Regular Rate, Normal S1, Normal S2, No Murmurs


Abdomen:  Normal Bowel Sounds, Soft, No Masses, Other (Tenderness to palpation 

in LLQ)


Extremities:  Other (Bilateral pitting edema of the lower legs)


Skin:  No Rashes, No Breakdown, No Significant Lesion


Neuro:  Normal Speech, Normal Tone


Psych/Mental Status:  Mental Status NL, Mood NL





Results/Procedures


Lab


Laboratory Tests


9/2/22 06:09: 


White Blood Count 7.9, Red Blood Count 3.48L, Hemoglobin 10.1L, Hematocrit 30L, 

Mean Corpuscular Volume 87, Mean Corpuscular Hemoglobin 29, Mean Corpuscular 

Hemoglobin Concent 33, Red Cell Distribution Width 15.5H, Platelet Count 275, 

Mean Platelet Volume 10.4, Immature Granulocyte % (Auto) 0, Neutrophils (%) 

(Auto) 77H, Lymphocytes (%) (Auto) 14, Monocytes (%) (Auto) 5, Eosinophils (%) 

(Auto) 3, Basophils (%) (Auto) 1, Neutrophils # (Auto) 6.1, Lymphocytes # (Auto)

1.1, Monocytes # (Auto) 0.4, Eosinophils # (Auto) 0.2, Basophils # (Auto) 0.1, 

Immature Granulocyte # (Auto) 0.0, Sodium Level 141, Potassium Level 4.7, 

Chloride Level 117H, Carbon Dioxide Level 16L, Anion Gap 8, Blood Urea Nitrogen 

16, Creatinine 1.03, Estimat Glomerular Filtration Rate 57, BUN/Creatinine Ratio

16, Glucose Level 141H, Calcium Level 6.8L, Phosphorus Level 2.4, Magnesium 

Level 2.0





Microbiology


8/30/22 Blood Culture - Preliminary, Resulted


          No growth


8/30/22 MRSA Screen - Final, Complete


          MRSA not isolated


Radiology


PROCEDURE: CT abdomen and pelvis without contrast.





TECHNIQUE: Multiple contiguous axial images were obtained through


the abdomen and pelvis without the use of intravenous contrast.


Auto Exposure Controls were utilized during the CT exam to meet


ALARA standards for radiation dose reduction. 





INDICATION:  Periumbilical abdominal pain





COMPARISON: 07/20/2022





Unenhanced images of the liver and spleen reveal no focal


abnormality. No pancreatic, adrenal gland or left renal


abnormality is identified. Right kidney is absent. Gallbladder


has been removed. There is fluid distention of colon with diffuse


mural thickening. There is extensively thickened sigmoid colon


and rectal walls. Surgical findings are seen in the anterior


abdominal wall with small amount of gas and fluid along the


midline incision site. Unenhanced urinary bladder is distended


but otherwise unremarkable.





IMPRESSION: Extensive mural thickening of the distal colon and


rectum suggestive of proctocolitis. There is a small amount of


fluid within the anterior abdominal wall incision site, however,


no other focal fluid collection is seen to indicate abscess.





Dictated by: 





  Dictated on workstation # [id number]








Dict:   08/15/22 [id number]


Trans:   08/15/22 1816


SHARON [id number]





Interpreted by:     LUIS ZIMMERMAN MD


Electronically signed by: LUIS ZIMMERMAN MD 08/15/22 1816


Meds


See medication list.





Assessment/Plan


Assessment/Plan


Admission Dx


C. Difficile infection, 


Anemia


Admission Status:  Inpatient Order (span 2 midnights)


Reason for Inpatient Admission:  


Patient admitted to assess and treat anemia and to monitor infectious


course and vital signs. Transfusion was needed.


Assessment & Plan


77 yo F that presented with rectal bleeding and severe anemia





Recurrent C Diff Colitis


   - Continue PO Vancomycin


   - Upon arrival to hospital patient did not meet sepsis criteria


   - 9/1: Plan to treat for 1 additional week





Lower GI bleed


Anemia of Acute Blood Loss


   - Type and Cross, will transfuse 1 unit pRBCs and then repeat CBC in AM


   - Iron panel pending


   - Consult Dr Galicia, appreciate recommendations


   - 8/31: Hgb 9.0, will continue to monitor, no further bleeding present


   - 9/1: Hgb trending up, normal iron panel





Acute on Chronic Renal Failure


   - Improved upon arrival with 1 L NS prior to discharge from [facility]





Chronic Systolic CHF


Atrial Fibrillation Rate controlled


   - Does not seem to be in acute exacerbation


   - Can not tolerated OAC due to GI bleeding


   - 8/31: Lasix given today for increase LE swelling





Severe PEM


   - Appreciate dietary recommendations





Peritoneal Fistula


   - General Surgery consulted





Discharge Planning: Plan to d/c to SNF tomorrow





Clinical Quality Measures


Admission Status


Admission Dx


C. Diff infection accompanied by rectal bleeding











JOELLEN BACON                 Sep 2, 2022 10:02

## 2022-09-02 NOTE — PHYSICAL THERAPY PROGRESS NOTE
Therapy Progress Note


Patient adamantly declined any OOB activity and PT stating, "I had a bad day 

yesterday and a bad night. I'm not doing anything.  Maybe tomorrow."  PT 

attempted to educate patient on importance of OOB activity, however, patient 

continued to decline.  Will attempt in a.m.


1 ref











ERIC VALLE PT               Sep 2, 2022 08:56

## 2022-09-02 NOTE — PROGRESS NOTE - SURGERY
MIRIAN RHODES 9/2/22 0716:


Subjective


Date Seen by a Provider:  Sep 2, 2022


Time Seen by a Provider:  06:30


Subjective/Events-last exam


Patient is not feeling well this AM. Claims the abdominal pain has escalated to 

an 8/10 even with tramadol being given at 0500. Initially she said the pain was 

"just everywhere" but when asked she said the pain was associated with the 

fistula site. She has had nausea throughout the night. She had a BM this morning

around 0400 and she believes there was blood in it, has not had a BM since. She 

is ambulating much less than yesterday due to the pain, there is still not an IS

in the room.


Review of Systems


Pulmonary:  No Dyspnea, No Cough


Cardiovascular:  No: Chest Pain


Gastrointestinal:  Nausea, Abdominal Pain, Other (patient claims she had blood 

in stool)


Musculoskeletal:  No: neck pain





Focused Exam


Lactate Level


8/30/22 15:56: Lactic Acid Level 0.70


Time of Focused Exam:  08:15





Objective


Exam





Vital Signs








  Date Time  Temp Pulse Resp B/P (MAP) Pulse Ox O2 Delivery O2 Flow Rate FiO2


 


9/2/22 03:30 36.2 90 18 140/94 (109) 99 Room Air  


 


9/2/22 01:00  97      


 


9/1/22 23:30 36.1 92 18 138/84 (102) 97 Room Air  


 


9/1/22 20:00      Room Air  


 


9/1/22 19:28 36.4 89 18 183/95 (124) 100 Room Air  


 


9/1/22 19:00  84      


 


9/1/22 16:05 36.3 84 20 139/76 (97) 100 Room Air  


 


9/1/22 11:32 36.3 88 17 133/88 (103) 100 Room Air  


 


9/1/22 08:23 36.2 88 18 130/82 (98) 98 Room Air  


 


9/1/22 08:00      Room Air  














I & O 


 


 9/2/22





 07:00


 


Intake Total 1860 ml


 


Balance 1860 ml





Capillary Refill :


General Appearance:  Chronically ill, Thin


Neck:  Non Tender; No Lymphadenopathy (L), No Lymphadenopathy (R)


Respiratory:  Lungs Clear, Normal Breath Sounds, No Accessory Muscle Use, No 

Respiratory Distress


Cardiovascular:  Regular Rate, Rhythm, No Murmur


Peripheral Pulses:  2+ Radial Pulses (R), 2+ Radial Pulses (L)


Gastrointestinal:  normal bowel sounds, soft, tenderness (diffuse tenderness to 

light palpation)


Extremity:  No Pedal Edema, Other (dorsalis pedis 2+ b/l. Sensation intact b/l 

in LE)


Neurologic/Psychiatric:  Alert, Oriented x3





Results


Lab


Laboratory Tests


9/2/22 06:09: 


White Blood Count 7.9, Red Blood Count 3.48L, Hemoglobin 10.1L, Hematocrit 30L, 

Mean Corpuscular Volume 87, Mean Corpuscular Hemoglobin 29, Mean Corpuscular 

Hemoglobin Concent 33, Red Cell Distribution Width 15.5H, Platelet Count 275, 

Mean Platelet Volume 10.4, Immature Granulocyte % (Auto) 0, Neutrophils (%) 

(Auto) 77H, Lymphocytes (%) (Auto) 14, Monocytes (%) (Auto) 5, Eosinophils (%) 

(Auto) 3, Basophils (%) (Auto) 1, Neutrophils # (Auto) 6.1, Lymphocytes # (Auto)

1.1, Monocytes # (Auto) 0.4, Eosinophils # (Auto) 0.2, Basophils # (Auto) 0.1, 

Immature Granulocyte # (Auto) 0.0, Phosphorus Level 2.4





Microbiology


8/30/22 Blood Culture - Preliminary, Resulted


          No growth


8/30/22 MRSA Screen - Final, Complete


          MRSA not isolated





Assessment/Plan


Anemia


C. Diff Colitis (Positive test on 8/15/2022)


Enterocutaneous fistula


Hx of Afib





Hgb stabilized at 10.1 from 9.6 yesterday. Check Hgb with AM labs daily, 

transfuse if level drops below 7. Perform fecal occult blood due to pt. report 

of blood in stool. Maintain patient on oral vancomycin. Patient did not have an 

IS next to her bed today so ensure she receives and uses it. Ambulate as much as

she can tolerate. Pain control with tramadol.





BONILLA GALICIA DO 9/2/22 1013:


Subjective


Time Seen by a Provider:  09:27


Subjective/Events-last exam


Pt seen and examined, states she has more abdominal pain than yesterday.  Did h

ave BM and tolerating diet without N/V.


Review of Systems


Pulmonary:  No Dyspnea, No Cough


Cardiovascular:  No: Chest Pain


Gastrointestinal:  Abdominal Pain, Other (patient claims she had blood in 

stool); No: Nausea, Vomiting





Objective


Exam


General Appearance:  No Apparent Distress, Chronically ill, Thin


Respiratory:  Lungs Clear, Normal Breath Sounds, No Accessory Muscle Use, No 

Respiratory Distress


Cardiovascular:  Regular Rate, Rhythm, No Murmur


Gastrointestinal:  normal bowel sounds, tenderness (diffuse tenderness to light 

palpation), other (fistula hole has closed, abdomen feels tense but she is very 

thin)


Extremity:  No Pedal Edema


Neurologic/Psychiatric:  Alert, Oriented x3





Assessment/Plan


Anemia


C. Diff Colitis (Positive test on 8/15/2022)


Enterocutaneous fistula


Hx of Afib





Hgb stabilized at 10.1 from 9.6 yesterday. May need to do CT if abdominal pain 

gets worse, could be collection of fluid from fistula and pt may get relief if 

it drains. Maintain patient on oral vancomycin. Patient did not have an IS next 

to her bed today so ensure she receives and uses it. Ambulate as much as she can

tolerate. Pain control with tramadol and Fentanyl for breakthrough.





Supervisory-Addendum Brief


Verification & Attestation


Participated in pt care:  history, MDM, physical


Personally performed:  exam, history, MDM, supervision of care


Care discussed with:  Medical Student


Procedures:  n/a


Verification and Attestation of Medical Student E/M Service





A medical student performed and documented this service. I then reviewed and 

verified all information documented by the medical student and made 

modifications to such information, when appropriate. I personally performed a 

physical exam, medical decision making and then discussed any differences 

between the notes and made revisions as necessary to create one note.





Bonilla Galicia , 9/2/22 , 10:13











MIRIAN RHODES                 Sep 2, 2022 07:16


BONILLA GALICIA DO                Sep 2, 2022 10:13

## 2022-09-03 VITALS — DIASTOLIC BLOOD PRESSURE: 72 MMHG | SYSTOLIC BLOOD PRESSURE: 122 MMHG

## 2022-09-03 VITALS — DIASTOLIC BLOOD PRESSURE: 85 MMHG | SYSTOLIC BLOOD PRESSURE: 173 MMHG

## 2022-09-03 VITALS — DIASTOLIC BLOOD PRESSURE: 91 MMHG | SYSTOLIC BLOOD PRESSURE: 158 MMHG

## 2022-09-03 VITALS — DIASTOLIC BLOOD PRESSURE: 77 MMHG | SYSTOLIC BLOOD PRESSURE: 141 MMHG

## 2022-09-03 VITALS — DIASTOLIC BLOOD PRESSURE: 78 MMHG | SYSTOLIC BLOOD PRESSURE: 139 MMHG

## 2022-09-03 VITALS — SYSTOLIC BLOOD PRESSURE: 128 MMHG | DIASTOLIC BLOOD PRESSURE: 81 MMHG

## 2022-09-03 VITALS — DIASTOLIC BLOOD PRESSURE: 81 MMHG | SYSTOLIC BLOOD PRESSURE: 139 MMHG

## 2022-09-03 LAB
ALBUMIN SERPL-MCNC: 2 GM/DL (ref 3.2–4.5)
ALP SERPL-CCNC: 41 U/L (ref 40–136)
ALT SERPL-CCNC: 12 U/L (ref 0–55)
BASOPHILS # BLD AUTO: 0.1 10^3/UL (ref 0–0.1)
BASOPHILS NFR BLD AUTO: 1 % (ref 0–10)
BILIRUB SERPL-MCNC: 0.3 MG/DL (ref 0.1–1)
BUN/CREAT SERPL: 17
CALCIUM SERPL-MCNC: 6.8 MG/DL (ref 8.5–10.1)
CHLORIDE SERPL-SCNC: 117 MMOL/L (ref 98–107)
CO2 SERPL-SCNC: 16 MMOL/L (ref 21–32)
CREAT SERPL-MCNC: 0.95 MG/DL (ref 0.6–1.3)
EOSINOPHIL # BLD AUTO: 0.2 10^3/UL (ref 0–0.3)
EOSINOPHIL NFR BLD AUTO: 4 % (ref 0–10)
GFR SERPLBLD BASED ON 1.73 SQ M-ARVRAT: 62 ML/MIN
GLUCOSE SERPL-MCNC: 102 MG/DL (ref 70–105)
HCT VFR BLD CALC: 27 % (ref 35–52)
HGB BLD-MCNC: 8.8 G/DL (ref 11.5–16)
LYMPHOCYTES # BLD AUTO: 1.2 10^3/UL (ref 1–4)
LYMPHOCYTES NFR BLD AUTO: 23 % (ref 12–44)
MANUAL DIFFERENTIAL PERFORMED BLD QL: NO
MCH RBC QN AUTO: 29 PG (ref 25–34)
MCHC RBC AUTO-ENTMCNC: 33 G/DL (ref 32–36)
MCV RBC AUTO: 88 FL (ref 80–99)
MONOCYTES # BLD AUTO: 0.4 10^3/UL (ref 0–1)
MONOCYTES NFR BLD AUTO: 7 % (ref 0–12)
NEUTROPHILS # BLD AUTO: 3.3 10^3/UL (ref 1.8–7.8)
NEUTROPHILS NFR BLD AUTO: 64 % (ref 42–75)
PLATELET # BLD: 240 10^3/UL (ref 130–400)
PMV BLD AUTO: 10.2 FL (ref 9–12.2)
POTASSIUM SERPL-SCNC: 4.9 MMOL/L (ref 3.6–5)
PROT SERPL-MCNC: 4.3 GM/DL (ref 6.4–8.2)
SODIUM SERPL-SCNC: 142 MMOL/L (ref 135–145)
WBC # BLD AUTO: 5.2 10^3/UL (ref 4.3–11)

## 2022-09-03 RX ADMIN — OXYBUTYNIN CHLORIDE SCH MG: 5 TABLET ORAL at 09:14

## 2022-09-03 RX ADMIN — OXYBUTYNIN CHLORIDE SCH MG: 5 TABLET ORAL at 20:07

## 2022-09-03 RX ADMIN — VANCOMYCIN HYDROCHLORIDE SCH MG: 125 CAPSULE ORAL at 05:24

## 2022-09-03 RX ADMIN — SACUBITRIL AND VALSARTAN SCH TAB: 24; 26 TABLET, FILM COATED ORAL at 20:07

## 2022-09-03 RX ADMIN — SACUBITRIL AND VALSARTAN SCH TAB: 24; 26 TABLET, FILM COATED ORAL at 09:14

## 2022-09-03 RX ADMIN — LATANOPROST SCH ML: 50 SOLUTION/ DROPS OPHTHALMIC at 20:07

## 2022-09-03 RX ADMIN — AMITRIPTYLINE HYDROCHLORIDE SCH MG: 25 TABLET, FILM COATED ORAL at 20:07

## 2022-09-03 RX ADMIN — LEVOTHYROXINE SODIUM SCH MCG: 125 TABLET ORAL at 05:25

## 2022-09-03 RX ADMIN — VANCOMYCIN HYDROCHLORIDE SCH MG: 125 CAPSULE ORAL at 13:17

## 2022-09-03 RX ADMIN — VANCOMYCIN HYDROCHLORIDE SCH MG: 125 CAPSULE ORAL at 17:53

## 2022-09-03 RX ADMIN — PANTOPRAZOLE SODIUM SCH MG: 40 TABLET, DELAYED RELEASE ORAL at 09:14

## 2022-09-03 RX ADMIN — VANCOMYCIN HYDROCHLORIDE SCH MG: 125 CAPSULE ORAL at 23:14

## 2022-09-03 RX ADMIN — ACETAMINOPHEN PRN MG: 650 TABLET, FILM COATED, EXTENDED RELEASE ORAL at 23:14

## 2022-09-03 NOTE — PROGRESS NOTE - HOSPITALIST
Subjective


HPI/CC On Admission


Date Seen by Provider:  Sep 3, 2022


Time Seen by Provider:  11:00


Subjective/Events-last exam


No major issues


Steroid suppository ordered by Dr Galicia


Labs stable





Review of Systems


Gastrointestinal:  Melena





Focused Exam


Time of Focused Exam:  08:15





Objective


Exam


Vital Signs





Vital Signs








  Date Time  Temp Pulse Resp B/P (MAP) Pulse Ox O2 Delivery O2 Flow Rate FiO2


 


9/3/22 12:35  90      


 


9/3/22 11:17 36.3  18 139/78 (98) 97 Room Air  


 


8/30/22 15:07       0.00 


 


8/30/22 15:07        21





Capillary Refill :


General Appearance:  No Apparent Distress, WD/WN, Chronically ill


Respiratory:  Lungs Clear, Normal Breath Sounds


Cardiovascular:  Regular Rate, Rhythm


Neurologic/Psychiatric:  Alert, Oriented x3, No Motor/Sensory Deficits, Normal 

Mood/Affect





Results/Procedures


Lab


Laboratory Tests


9/3/22 04:36








Patient resulted labs reviewed.





Assessment/Plan


Assessment and Plan


Assess & Plan/Chief Complaint


Assessment:


Colitis


Hematochezia


Recent C diff


Recent COVID


Debility


Ischemic cardiomyopathy Life vest maintained





Plan:


Pain control











TAMMY HURST DO                 Sep 3, 2022 08:02

## 2022-09-03 NOTE — PROGRESS NOTE - SURGERY
MIRIAN RHODES 9/3/22 1112:


Subjective


Date Seen by a Provider:  Sep 3, 2022


Time Seen by a Provider:  07:30


Subjective/Events-last exam


Patient is feeling much better today than she did yesterday. Abdominal pain is 

still present at fistula sight but not as bad as yesterday. She reports that she

had a small amount of bright red blood in her stool this morning. She continues 

to ambulate and move from bed to chair. RN for the patient said he was going to 

get her an IS.


Review of Systems


General:  Chills; No Night Sweats


Pulmonary:  No Dyspnea, No Cough


Cardiovascular:  No: Chest Pain


Gastrointestinal:  Nausea, Abdominal Pain; No: Vomiting


Genitourinary:  No Dysuria, No Frequency


Musculoskeletal:  No: neck pain





Focused Exam


Time of Focused Exam:  08:15





Objective


Exam





Vital Signs








  Date Time  Temp Pulse Resp B/P (MAP) Pulse Ox O2 Delivery O2 Flow Rate FiO2


 


9/3/22 08:00      Room Air  


 


9/3/22 07:32 36.3 90 18 122/72 (89) 98 Room Air  


 


9/3/22 07:00  93      


 


9/3/22 03:33 36.1 93 16 128/81 (97) 98 Room Air  


 


9/3/22 02:34 36.7 95   100   


 


9/3/22 01:01  95      


 


9/2/22 23:13 36.7 104 16 139/81 (100) 100 Room Air  


 


9/2/22 20:39      Room Air  


 


9/2/22 19:52 36.3 84 18 142/79 (100) 100 Room Air  


 


9/2/22 19:00  97      


 


9/2/22 16:00 36.6 87 18 153/76 (101) 99 Room Air  


 


9/2/22 12:42  114      


 


9/2/22 12:00 36.1 94 18 127/84 (98) 98 Room Air  














I & O 


 


 9/3/22





 07:00


 


Intake Total 1360 ml


 


Balance 1360 ml





Capillary Refill :


General Appearance:  No Apparent Distress, Chronically ill, Thin


Neck:  Non Tender; No Lymphadenopathy (L), No Lymphadenopathy (R)


Respiratory:  Lungs Clear, Normal Breath Sounds, No Accessory Muscle Use, No 

Respiratory Distress


Cardiovascular:  Regular Rate, Rhythm, No Murmur


Peripheral Pulses:  2+ Radial Pulses (R), 2+ Radial Pulses (L)


Gastrointestinal:  normal bowel sounds, soft, tenderness (still has diffuse 

tenderness but more tender near fistula sight)


Neurologic/Psychiatric:  Alert





Results


Lab


Laboratory Tests


9/3/22 04:36: 


White Blood Count 5.2, Red Blood Count 3.07L, Hemoglobin 8.8L, Hematocrit 27L, 

Mean Corpuscular Volume 88, Mean Corpuscular Hemoglobin 29, Mean Corpuscular 

Hemoglobin Concent 33, Red Cell Distribution Width 15.7H, Platelet Count 240, 

Mean Platelet Volume 10.2, Immature Granulocyte % (Auto) 0, Neutrophils (%) 

(Auto) 64, Lymphocytes (%) (Auto) 23, Monocytes (%) (Auto) 7, Eosinophils (%) 

(Auto) 4, Basophils (%) (Auto) 1, Neutrophils # (Auto) 3.3, Lymphocytes # (Auto)

1.2, Monocytes # (Auto) 0.4, Eosinophils # (Auto) 0.2, Basophils # (Auto) 0.1, 

Immature Granulocyte # (Auto) 0.0, Sodium Level 142, Potassium Level 4.9, 

Chloride Level 117H, Carbon Dioxide Level 16L, Anion Gap 9, Blood Urea Nitrogen 

16, Creatinine 0.95, Estimat Glomerular Filtration Rate 62, BUN/Creatinine Ratio

17, Glucose Level 102, Calcium Level 6.8L, Corrected Calcium 8.4L, Total 

Bilirubin 0.3, Aspartate Amino Transf (AST/SGOT) 13, Alanine Aminotransferase 

(ALT/SGPT) 12, Alkaline Phosphatase 41, Total Protein 4.3L, Albumin 2.0L





Microbiology


8/30/22 Blood Culture - Preliminary, Resulted


          No growth


8/30/22 MRSA Screen - Final, Complete


          MRSA not isolated





Assessment/Plan


Anemia


C. Diff Colitis (Positive test on 8/15/2022)


Enterocutaneous fistula


Hx of Afib





Hgb dropped to 8.8 from 10.1 yesterday, monitor with AM labs. Maintain patient 

on oral vancomycin. Patient did not have an IS next to her bed today so ensure 

she receives and uses it. Ambulate as much as she can tolerate. Pain control 

with tramadol.





BONILLA GALICIA DO 9/3/22 1147:


Subjective


Time Seen by a Provider:  10:34


Subjective/Events-last exam


Pt seen and examined, states she feels better than yesterday.


Review of Systems


General:  Chills; No Night Sweats


Pulmonary:  No Dyspnea, No Cough


Cardiovascular:  No: Chest Pain


Gastrointestinal:  Nausea, Abdominal Pain; No: Vomiting


Genitourinary:  No Dysuria, No Frequency


Musculoskeletal:  neck pain





Objective


Exam


General Appearance:  No Apparent Distress, Chronically ill, Thin


Respiratory:  Lungs Clear, Normal Breath Sounds, No Accessory Muscle Use, No 

Respiratory Distress


Cardiovascular:  Regular Rate, Rhythm, No Murmur


Gastrointestinal:  normal bowel sounds, soft, tenderness (still has diffuse 

tenderness but more tender near fistula sight, less than yesterday)


Neurologic/Psychiatric:  Alert





Assessment/Plan


Anemia


C. Diff Colitis (Positive test on 8/15/2022)


Enterocutaneous fistula


Hx of Afib





Hgb dropped to 8.8 from 10.1 yesterday, monitor with AM labs. Maintain patient 

on oral vancomycin and will try a steroid rectal suppository. Patient did not 

have an IS next to her bed today so ensure she receives and uses it. Ambulate as

much as she can tolerate. Pain control with tramadol.





Supervisory-Addendum Brief


Verification & Attestation


Participated in pt care:  history, MDM, physical


Personally performed:  exam, history, MDM, supervision of care


Care discussed with:  Medical Student


Procedures:  n/a


Verification and Attestation of Medical Student E/M Service





A medical student performed and documented this service. I then reviewed and 

verified all information documented by the medical student and made 

modifications to such information, when appropriate. I personally performed a 

physical exam, medical decision making and then discussed any differences 

between the notes and made revisions as necessary to create one note.





Bonilla Galicia , 9/3/22 , 11:51











MIRIAN RHODES                 Sep 3, 2022 11:12


BONILLA GALICIA DO                Sep 3, 2022 11:47

## 2022-09-03 NOTE — PHYSICAL THERAPY PROGRESS NOTE
Therapy Progress Note


Pt refused therapy as she has just returned from sitting and using the restroom.

 0948











KEYLA ROJAS PT             Sep 3, 2022 10:52

## 2022-09-04 VITALS — SYSTOLIC BLOOD PRESSURE: 163 MMHG | DIASTOLIC BLOOD PRESSURE: 87 MMHG

## 2022-09-04 VITALS — DIASTOLIC BLOOD PRESSURE: 90 MMHG | SYSTOLIC BLOOD PRESSURE: 160 MMHG

## 2022-09-04 VITALS — DIASTOLIC BLOOD PRESSURE: 92 MMHG | SYSTOLIC BLOOD PRESSURE: 136 MMHG

## 2022-09-04 VITALS — SYSTOLIC BLOOD PRESSURE: 160 MMHG | DIASTOLIC BLOOD PRESSURE: 93 MMHG

## 2022-09-04 VITALS — DIASTOLIC BLOOD PRESSURE: 83 MMHG | SYSTOLIC BLOOD PRESSURE: 137 MMHG

## 2022-09-04 VITALS — DIASTOLIC BLOOD PRESSURE: 86 MMHG | SYSTOLIC BLOOD PRESSURE: 152 MMHG

## 2022-09-04 VITALS — SYSTOLIC BLOOD PRESSURE: 157 MMHG | DIASTOLIC BLOOD PRESSURE: 84 MMHG

## 2022-09-04 LAB
ALBUMIN SERPL-MCNC: 2.1 GM/DL (ref 3.2–4.5)
ALP SERPL-CCNC: 41 U/L (ref 40–136)
ALT SERPL-CCNC: 13 U/L (ref 0–55)
BASOPHILS # BLD AUTO: 0.1 10^3/UL (ref 0–0.1)
BASOPHILS NFR BLD AUTO: 1 % (ref 0–10)
BILIRUB SERPL-MCNC: 0.4 MG/DL (ref 0.1–1)
BUN/CREAT SERPL: 16
CALCIUM SERPL-MCNC: 7.3 MG/DL (ref 8.5–10.1)
CHLORIDE SERPL-SCNC: 118 MMOL/L (ref 98–107)
CO2 SERPL-SCNC: 17 MMOL/L (ref 21–32)
CREAT SERPL-MCNC: 0.99 MG/DL (ref 0.6–1.3)
EOSINOPHIL # BLD AUTO: 0.3 10^3/UL (ref 0–0.3)
EOSINOPHIL NFR BLD AUTO: 5 % (ref 0–10)
GFR SERPLBLD BASED ON 1.73 SQ M-ARVRAT: 59 ML/MIN
GLUCOSE SERPL-MCNC: 95 MG/DL (ref 70–105)
HCT VFR BLD CALC: 27 % (ref 35–52)
HGB BLD-MCNC: 9 G/DL (ref 11.5–16)
LYMPHOCYTES # BLD AUTO: 1.4 10^3/UL (ref 1–4)
LYMPHOCYTES NFR BLD AUTO: 23 % (ref 12–44)
MANUAL DIFFERENTIAL PERFORMED BLD QL: NO
MCH RBC QN AUTO: 29 PG (ref 25–34)
MCHC RBC AUTO-ENTMCNC: 33 G/DL (ref 32–36)
MCV RBC AUTO: 89 FL (ref 80–99)
MONOCYTES # BLD AUTO: 0.4 10^3/UL (ref 0–1)
MONOCYTES NFR BLD AUTO: 6 % (ref 0–12)
NEUTROPHILS # BLD AUTO: 3.9 10^3/UL (ref 1.8–7.8)
NEUTROPHILS NFR BLD AUTO: 65 % (ref 42–75)
PLATELET # BLD: 273 10^3/UL (ref 130–400)
PMV BLD AUTO: 10.4 FL (ref 9–12.2)
POTASSIUM SERPL-SCNC: 4.9 MMOL/L (ref 3.6–5)
PROT SERPL-MCNC: 4.6 GM/DL (ref 6.4–8.2)
SODIUM SERPL-SCNC: 144 MMOL/L (ref 135–145)
WBC # BLD AUTO: 5.9 10^3/UL (ref 4.3–11)

## 2022-09-04 RX ADMIN — AMITRIPTYLINE HYDROCHLORIDE SCH MG: 25 TABLET, FILM COATED ORAL at 19:43

## 2022-09-04 RX ADMIN — OXYBUTYNIN CHLORIDE SCH MG: 5 TABLET ORAL at 19:43

## 2022-09-04 RX ADMIN — VANCOMYCIN HYDROCHLORIDE SCH MG: 125 CAPSULE ORAL at 05:37

## 2022-09-04 RX ADMIN — VANCOMYCIN HYDROCHLORIDE SCH MG: 125 CAPSULE ORAL at 16:43

## 2022-09-04 RX ADMIN — OXYBUTYNIN CHLORIDE SCH MG: 5 TABLET ORAL at 08:47

## 2022-09-04 RX ADMIN — VANCOMYCIN HYDROCHLORIDE SCH MG: 125 CAPSULE ORAL at 11:59

## 2022-09-04 RX ADMIN — LEVOTHYROXINE SODIUM SCH MCG: 125 TABLET ORAL at 05:36

## 2022-09-04 RX ADMIN — PANTOPRAZOLE SODIUM SCH MG: 40 TABLET, DELAYED RELEASE ORAL at 08:47

## 2022-09-04 RX ADMIN — SACUBITRIL AND VALSARTAN SCH TAB: 24; 26 TABLET, FILM COATED ORAL at 19:43

## 2022-09-04 RX ADMIN — SACUBITRIL AND VALSARTAN SCH TAB: 24; 26 TABLET, FILM COATED ORAL at 08:47

## 2022-09-04 RX ADMIN — LATANOPROST SCH ML: 50 SOLUTION/ DROPS OPHTHALMIC at 19:44

## 2022-09-04 RX ADMIN — HYDROCORTISONE ACETATE SCH EA: 25 SUPPOSITORY RECTAL at 17:59

## 2022-09-04 NOTE — PROGRESS NOTE - SURGERY
MIRIAN RHODES 9/4/22 0909:


Subjective


Date Seen by a Provider:  Sep 4, 2022


Time Seen by a Provider:  07:40


Subjective/Events-last exam


Patient claims she is feeling better today. Pain in abdomen associated with 

fistula site is a 5/10 but improving. She had a BM this morning and reported a 

small amount of bright red blood, she did not receive steroid suppository 

yesterday. She continues to ambulate and she expressed that she would like to 

move around more. Dressing over fistula was changed last night and was still 

clean this AM.


Review of Systems


General:  No Chills, No Night Sweats


Pulmonary:  No Dyspnea


Cardiovascular:  No: Chest Pain


Gastrointestinal:  Nausea, Abdominal Pain; No: Vomiting


Musculoskeletal:  No: neck pain





Focused Exam


Time of Focused Exam:  08:15





Objective


Exam





Vital Signs








  Date Time  Temp Pulse Resp B/P (MAP) Pulse Ox O2 Delivery O2 Flow Rate FiO2


 


9/4/22 07:19 36.6 98 16 137/83 (101) 97 Room Air  


 


9/4/22 07:00  98      


 


9/4/22 03:11 36.3 95 18 160/90 (113) 96 Room Air  


 


9/4/22 00:31 36.3 90 16 152/86 (108) 97 Room Air  


 


9/3/22 22:09  96  158/91 (113)    


 


9/3/22 21:00 37.0 90 22 173/85 (114) 96 Room Air  


 


9/3/22 19:56      Room Air  


 


9/3/22 19:00  100      


 


9/3/22 15:58 36.6 83 18 141/77 (98) 100 Room Air  


 


9/3/22 12:35  90      


 


9/3/22 11:17 36.3 96 18 139/78 (98) 97 Room Air  














I & O 


 


 9/4/22





 07:00


 


Intake Total 970 ml


 


Balance 970 ml





Capillary Refill :


General Appearance:  No Apparent Distress, Chronically ill, Thin


Neck:  Non Tender; No Lymphadenopathy (L), No Lymphadenopathy (R)


Respiratory:  Lungs Clear, Normal Breath Sounds, No Accessory Muscle Use, No 

Respiratory Distress


Cardiovascular:  Regular Rate, Rhythm, No Murmur


Peripheral Pulses:  2+ Radial Pulses (R), 2+ Radial Pulses (L)


Gastrointestinal:  normal bowel sounds, soft, tenderness (diffuse, less than 

yesterday), other (Fistula site closed, no blood or purulent fluid on dressing)


Neurologic/Psychiatric:  Alert, Oriented x3





Results


Lab


Laboratory Tests


9/4/22 05:02: 


White Blood Count 5.9, Red Blood Count 3.07L, Hemoglobin 9.0L, Hematocrit 27L, 

Mean Corpuscular Volume 89, Mean Corpuscular Hemoglobin 29, Mean Corpuscular 

Hemoglobin Concent 33, Red Cell Distribution Width 15.9H, Platelet Count 273, 

Mean Platelet Volume 10.4, Immature Granulocyte % (Auto) 0, Neutrophils (%) 

(Auto) 65, Lymphocytes (%) (Auto) 23, Monocytes (%) (Auto) 6, Eosinophils (%) 

(Auto) 5, Basophils (%) (Auto) 1, Neutrophils # (Auto) 3.9, Lymphocytes # (Auto)

1.4, Monocytes # (Auto) 0.4, Eosinophils # (Auto) 0.3, Basophils # (Auto) 0.1, 

Immature Granulocyte # (Auto) 0.0, Sodium Level 144, Potassium Level 4.9, 

Chloride Level 118H, Carbon Dioxide Level 17L, Anion Gap 9, Blood Urea Nitrogen 

16, Creatinine 0.99, Estimat Glomerular Filtration Rate 59, BUN/Creatinine Ratio

16, Glucose Level 95, Calcium Level 7.3L, Corrected Calcium 8.8, Total Bilirubin

0.4, Aspartate Amino Transf (AST/SGOT) 15, Alanine Aminotransferase (ALT/SGPT) 

13, Alkaline Phosphatase 41, Total Protein 4.6L, Albumin 2.1L





Microbiology


8/30/22 Blood Culture - Preliminary, Resulted


          No growth


8/30/22 MRSA Screen - Final, Complete


          MRSA not isolated





Assessment/Plan


Anemia


C. Diff Colitis (Positive test on 8/15/2022)


Enterocutaneous fistula


Hx of Afib





Hgb stable at 9.0 today from 8.8 yesterday, monitor with AM labs. Maintain 

patient on oral vancomycin and will try a steroid rectal suppository. Patient 

did not have an IS next to her bed today so ensure she receives and uses it. 

Ambulate as much as she can tolerate. Pain control with tramadol.





BONILLA GALICIA DO 9/4/22 1640:


Subjective


Time Seen by a Provider:  12:31


Subjective/Events-last exam


Pt seen and examined, states she has more abdominal pain today.  She is not 

getting up to move; "I feel tired and have pain".  Tolerating diet, thinks the 

BM was formed and much less blood than previous BM.


Review of Systems


General:  Fatigue, Malaise


Pulmonary:  No Dyspnea


Cardiovascular:  No: Chest Pain


Gastrointestinal:  Abdominal Pain; No: Nausea, Vomiting





Objective


Exam


General Appearance:  Chronically ill, Thin


Neck:  Non Tender


Respiratory:  Lungs Clear, Normal Breath Sounds, No Accessory Muscle Use, No Res

piratory Distress


Cardiovascular:  Regular Rate, Rhythm, No Murmur


Gastrointestinal:  normal bowel sounds, soft, tenderness (diffuse, less than 

yesterday), other (Fistula site closed, no blood or fluid on dressing)





Assessment/Plan


Anemia


C. Diff Colitis (Positive test on 8/15/2022)


Enterocutaneous fistula


Hx of Afib





Hgb stable at 9.0 today from 8.8 yesterday, monitor with AM labs. Maintain 

patient on oral vancomycin and will try a steroid rectal suppository. Patient 

did not have an IS next to her bed today so ensure she receives and uses it. 

Ambulate as much as she can tolerate. Steroid suppository ordered, pain control 

with tramadol.





Supervisory-Addendum Brief


Verification & Attestation


Participated in pt care:  history, MDM, physical


Personally performed:  exam, history, MDM, supervision of care


Care discussed with:  Medical Student


Procedures:  n/a


Verification and Attestation of Medical Student E/M Service





A medical student performed and documented this service. I then reviewed and 

verified all information documented by the medical student and made 

modifications to such information, when appropriate. I personally performed a 

physical exam, medical decision making and then discussed any differences 

between the notes and made revisions as necessary to create one note.





Bonilla Galicia , 9/4/22 , 16:40











MIRIAN RHODES                 Sep 4, 2022 09:09


BONILLA GALICIA DO                Sep 4, 2022 16:40

## 2022-09-04 NOTE — PROGRESS NOTE - HOSPITALIST
Subjective


HPI/CC On Admission


Date Seen by Provider:  Sep 4, 2022


Time Seen by Provider:  11:00


Subjective/Events-last exam


No major issues


No blood in stool at this time


Check meds labs


No other concerns





Review of Systems


General:  Fatigue, Malaise





Focused Exam


Time of Focused Exam:  08:15





Objective


Exam


Vital Signs





Vital Signs








  Date Time  Temp Pulse Resp B/P (MAP) Pulse Ox O2 Delivery O2 Flow Rate FiO2


 


9/4/22 16:23 37.2 96 18 157/84 (108) 99 Room Air  


 


8/30/22 15:07       0.00 


 


8/30/22 15:07        21





Capillary Refill :


General Appearance:  No Apparent Distress, WD/WN, Chronically ill


Respiratory:  Lungs Clear, Normal Breath Sounds


Cardiovascular:  Regular Rate, Rhythm





Results/Procedures


Lab


Laboratory Tests


9/4/22 05:02








Patient resulted labs reviewed.





Assessment/Plan


Assessment and Plan


Assess & Plan/Chief Complaint


Assessment:


Colitis


Hematochezia


Recent C diff


Recent COVID


Debility


Ischemic cardiomyopathy Life vest maintained





Plan:


Pain control











TAMMY HURST DO                 Sep 4, 2022 06:40

## 2022-09-05 VITALS — DIASTOLIC BLOOD PRESSURE: 91 MMHG | SYSTOLIC BLOOD PRESSURE: 163 MMHG

## 2022-09-05 VITALS — SYSTOLIC BLOOD PRESSURE: 139 MMHG | DIASTOLIC BLOOD PRESSURE: 81 MMHG

## 2022-09-05 VITALS — SYSTOLIC BLOOD PRESSURE: 132 MMHG | DIASTOLIC BLOOD PRESSURE: 86 MMHG

## 2022-09-05 VITALS — SYSTOLIC BLOOD PRESSURE: 156 MMHG | DIASTOLIC BLOOD PRESSURE: 88 MMHG

## 2022-09-05 VITALS — DIASTOLIC BLOOD PRESSURE: 93 MMHG | SYSTOLIC BLOOD PRESSURE: 166 MMHG

## 2022-09-05 VITALS — DIASTOLIC BLOOD PRESSURE: 90 MMHG | SYSTOLIC BLOOD PRESSURE: 152 MMHG

## 2022-09-05 VITALS — SYSTOLIC BLOOD PRESSURE: 159 MMHG | DIASTOLIC BLOOD PRESSURE: 77 MMHG

## 2022-09-05 VITALS — DIASTOLIC BLOOD PRESSURE: 86 MMHG | SYSTOLIC BLOOD PRESSURE: 132 MMHG

## 2022-09-05 LAB
ALBUMIN SERPL-MCNC: 2.2 GM/DL (ref 3.2–4.5)
ALP SERPL-CCNC: 57 U/L (ref 40–136)
ALT SERPL-CCNC: 15 U/L (ref 0–55)
BASOPHILS # BLD AUTO: 0.1 10^3/UL (ref 0–0.1)
BASOPHILS NFR BLD AUTO: 1 % (ref 0–10)
BILIRUB SERPL-MCNC: 0.5 MG/DL (ref 0.1–1)
BUN/CREAT SERPL: 16
CALCIUM SERPL-MCNC: 7.8 MG/DL (ref 8.5–10.1)
CHLORIDE SERPL-SCNC: 116 MMOL/L (ref 98–107)
CO2 SERPL-SCNC: 16 MMOL/L (ref 21–32)
CREAT SERPL-MCNC: 1 MG/DL (ref 0.6–1.3)
EOSINOPHIL # BLD AUTO: 0.2 10^3/UL (ref 0–0.3)
EOSINOPHIL NFR BLD AUTO: 3 % (ref 0–10)
GFR SERPLBLD BASED ON 1.73 SQ M-ARVRAT: 59 ML/MIN
GLUCOSE SERPL-MCNC: 106 MG/DL (ref 70–105)
HCT VFR BLD CALC: 29 % (ref 35–52)
HGB BLD-MCNC: 9.5 G/DL (ref 11.5–16)
LYMPHOCYTES # BLD AUTO: 1 10^3/UL (ref 1–4)
LYMPHOCYTES NFR BLD AUTO: 14 % (ref 12–44)
MANUAL DIFFERENTIAL PERFORMED BLD QL: NO
MCH RBC QN AUTO: 29 PG (ref 25–34)
MCHC RBC AUTO-ENTMCNC: 33 G/DL (ref 32–36)
MCV RBC AUTO: 90 FL (ref 80–99)
MONOCYTES # BLD AUTO: 0.3 10^3/UL (ref 0–1)
MONOCYTES NFR BLD AUTO: 4 % (ref 0–12)
NEUTROPHILS # BLD AUTO: 5.7 10^3/UL (ref 1.8–7.8)
NEUTROPHILS NFR BLD AUTO: 78 % (ref 42–75)
PLATELET # BLD: 277 10^3/UL (ref 130–400)
PMV BLD AUTO: 9.6 FL (ref 9–12.2)
POTASSIUM SERPL-SCNC: 4.7 MMOL/L (ref 3.6–5)
PROT SERPL-MCNC: 4.8 GM/DL (ref 6.4–8.2)
SODIUM SERPL-SCNC: 141 MMOL/L (ref 135–145)
WBC # BLD AUTO: 7.3 10^3/UL (ref 4.3–11)

## 2022-09-05 RX ADMIN — OXYBUTYNIN CHLORIDE SCH MG: 5 TABLET ORAL at 08:36

## 2022-09-05 RX ADMIN — VANCOMYCIN HYDROCHLORIDE SCH MG: 125 CAPSULE ORAL at 23:06

## 2022-09-05 RX ADMIN — HYDROCORTISONE ACETATE SCH EA: 25 SUPPOSITORY RECTAL at 00:22

## 2022-09-05 RX ADMIN — LEVOTHYROXINE SODIUM SCH MCG: 125 TABLET ORAL at 05:42

## 2022-09-05 RX ADMIN — HYDROCORTISONE ACETATE SCH EA: 25 SUPPOSITORY RECTAL at 08:36

## 2022-09-05 RX ADMIN — OXYBUTYNIN CHLORIDE SCH MG: 5 TABLET ORAL at 19:57

## 2022-09-05 RX ADMIN — SACUBITRIL AND VALSARTAN SCH TAB: 24; 26 TABLET, FILM COATED ORAL at 19:57

## 2022-09-05 RX ADMIN — VANCOMYCIN HYDROCHLORIDE SCH MG: 125 CAPSULE ORAL at 11:37

## 2022-09-05 RX ADMIN — VANCOMYCIN HYDROCHLORIDE SCH MG: 125 CAPSULE ORAL at 00:22

## 2022-09-05 RX ADMIN — VANCOMYCIN HYDROCHLORIDE SCH MG: 125 CAPSULE ORAL at 05:42

## 2022-09-05 RX ADMIN — VANCOMYCIN HYDROCHLORIDE SCH MG: 125 CAPSULE ORAL at 17:14

## 2022-09-05 RX ADMIN — PANTOPRAZOLE SODIUM SCH MG: 40 TABLET, DELAYED RELEASE ORAL at 08:35

## 2022-09-05 RX ADMIN — SACUBITRIL AND VALSARTAN SCH TAB: 24; 26 TABLET, FILM COATED ORAL at 08:35

## 2022-09-05 RX ADMIN — HYDROCORTISONE ACETATE SCH EA: 25 SUPPOSITORY RECTAL at 23:05

## 2022-09-05 RX ADMIN — HYDROCORTISONE ACETATE SCH EA: 25 SUPPOSITORY RECTAL at 16:29

## 2022-09-05 RX ADMIN — LATANOPROST SCH ML: 50 SOLUTION/ DROPS OPHTHALMIC at 19:57

## 2022-09-05 RX ADMIN — AMITRIPTYLINE HYDROCHLORIDE SCH MG: 25 TABLET, FILM COATED ORAL at 19:57

## 2022-09-05 NOTE — PROGRESS NOTE - SURGERY
MIRIAN RHODES 9/5/22 0938:


Subjective


Date Seen by a Provider:  Sep 5, 2022


Time Seen by a Provider:  08:10


Subjective/Events-last exam


Patient says she is doing better today with less abdominal pain than yesterday. 

She had a BM last night that she said had a small amount of BRB. She did not wa

nt the steroid suppository. She is ambulating to use the restroom and to get 

into chair. She is eating a normal diet but says she does not have much of an 

appetite because it hurts when she eats. She denied the pain being retrosternal,

said it localizes to the "middle of her stomach".


Review of Systems


General:  No Chills; Night Sweats, Fatigue, Appetite (lack of appetite)


HEENT:  No Head Aches, No Dysphasia, No Sore Throat


Pulmonary:  No Dyspnea, No Cough


Cardiovascular:  No: Chest Pain, Lt Headedness


Gastrointestinal:  Diarrhea; No: Nausea, Vomiting, Abdominal Pain, Melena


Genitourinary:  No Dysuria; Frequency; No Hematuria


Musculoskeletal:  neck pain; No: shoulder pain, back pain


Neurological:  Weakness; No: Numbness, Confusion





Focused Exam


Time of Focused Exam:  08:15





Objective


Exam





Vital Signs








  Date Time  Temp Pulse Resp B/P (MAP) Pulse Ox O2 Delivery O2 Flow Rate FiO2


 


9/5/22 07:32 36.9 100 18 159/77 (104) 97 Room Air  


 


9/5/22 07:29      Room Air  


 


9/5/22 07:00  101      


 


9/5/22 03:50 36.9 102 16 152/90 (110) 97 Room Air  


 


9/5/22 01:00  103      


 


9/4/22 23:42 36.5 100 18 136/92 (107) 99 Room Air  


 


9/4/22 22:25      Room Air  


 


9/4/22 20:20      Room Air  


 


9/4/22 19:35 36.8 95 18 163/87 (112) 99 Room Air  


 


9/4/22 19:03      Room Air  


 


9/4/22 19:00  95      


 


9/4/22 16:23 37.2 96 18 157/84 (108) 99 Room Air  


 


9/4/22 12:36  92      


 


9/4/22 12:10 36.8 93 16 160/93 (115) 96 Room Air  














I & O 


 


 9/5/22





 07:00


 


Intake Total 718 ml


 


Balance 718 ml





Capillary Refill :


General Appearance:  No Apparent Distress, Chronically ill, Thin


HEENT:  PERRL/EOMI, Moist Mucous Membranes


Neck:  Supple, Tender Lateral (on right superior SCM, felt like a tight muscle)


Respiratory:  Lungs Clear, Normal Breath Sounds, No Accessory Muscle Use, No 

Respiratory Distress


Cardiovascular:  No Murmur, Tachycardia


Peripheral Pulses:  2+ Dorsalis Pedis (R), 2+ Left Dors-Pedis (L), 2+ Radial 

Pulses (R), 2+ Radial Pulses (L)


Gastrointestinal:  normal bowel sounds, non tender, soft, other (Fistula site 

draining mild amount of blood)


Extremity:  Calf Tenderness (b/l), Pedal Edema (1+ b/l)


Neurologic/Psychiatric:  Alert, Oriented x3, Depressed Affect


Skin:  Warm/Dry, Pallor


Lymphatic:  No Adenopathy





Results


Lab


Laboratory Tests


9/5/22 05:27: 


White Blood Count 7.3, Red Blood Count 3.23L, Hemoglobin 9.5L, Hematocrit 29L, 

Mean Corpuscular Volume 90, Mean Corpuscular Hemoglobin 29, Mean Corpuscular H

emoglobin Concent 33, Red Cell Distribution Width 15.8H, Platelet Count 277, 

Mean Platelet Volume 9.6, Immature Granulocyte % (Auto) 0, Neutrophils (%) 

(Auto) 78H, Lymphocytes (%) (Auto) 14, Monocytes (%) (Auto) 4, Eosinophils (%) 

(Auto) 3, Basophils (%) (Auto) 1, Neutrophils # (Auto) 5.7, Lymphocytes # (Auto)

1.0, Monocytes # (Auto) 0.3, Eosinophils # (Auto) 0.2, Basophils # (Auto) 0.1, 

Immature Granulocyte # (Auto) 0.0, Sodium Level 141, Potassium Level 4.7, 

Chloride Level 116H, Carbon Dioxide Level 16L, Anion Gap 9, Blood Urea Nitrogen 

16, Creatinine 1.00, Estimat Glomerular Filtration Rate 59, BUN/Creatinine Ratio

16, Glucose Level 106H, Calcium Level 7.8L, Corrected Calcium 9.2, Total 

Bilirubin 0.5, Aspartate Amino Transf (AST/SGOT) 24, Alanine Aminotransferase 

(ALT/SGPT) 15, Alkaline Phosphatase 57, Total Protein 4.8L, Albumin 2.2L





Microbiology


8/30/22 Blood Culture - Preliminary, Resulted


          No growth


8/30/22 MRSA Screen - Final, Complete


          MRSA not isolated





Assessment/Plan


Anemia


C. Diff Colitis (Positive test on 8/15/2022)


Enterocutaneous fistula


Hx of Afib





Hgb stable at 9.5 today from 9.0 yesterday, monitor with AM labs. Maintain 

patient on oral vancomycin. Patient did not want the steroid rectal suppository 

today. Encouraged patient to ambulate as much as possible. Pain controlled with 

tramadol.











BONILLA GALICIA DO 9/5/22 1222:


Subjective


Time Seen by a Provider:  09:43


Subjective/Events-last exam


Pt seen and examined, no new complaints and she thinks she is a little stronger 

today.  She was sitting up in chair.


Review of Systems


General:  No Chills; Night Sweats, Fatigue, Appetite (lack of appetite)


HEENT:  No Head Aches


Pulmonary:  No Dyspnea, No Cough


Cardiovascular:  No: Chest Pain


Gastrointestinal:  Diarrhea; No: Nausea, Vomiting, Abdominal Pain, Melena


Genitourinary:  No Dysuria; Frequency; No Hematuria


Musculoskeletal:  neck pain


Neurological:  Weakness; No: Numbness, Confusion





Objective


Exam


General Appearance:  No Apparent Distress, Chronically ill, Thin


HEENT:  PERRL/EOMI, Moist Mucous Membranes


Respiratory:  Lungs Clear, Normal Breath Sounds, No Accessory Muscle Use, No 

Respiratory Distress


Cardiovascular:  No Murmur, Tachycardia


Gastrointestinal:  normal bowel sounds, non tender, soft, other (Fistula site 

draining mild amount of blood)


Extremity:  Calf Tenderness (b/l), Pedal Edema (1+ b/l)


Neurologic/Psychiatric:  Alert, Oriented x3, Depressed Affect


Skin:  Warm/Dry, Pallor





Assessment/Plan


Anemia


C. Diff Colitis (Positive test on 8/15/2022)


Enterocutaneous fistula


Hx of Afib





Hgb stable at 9.5 today from 9.0 yesterday, monitor with AM labs. Maintain 

patient on oral vancomycin. Patient did not want the steroid rectal suppository 

today. Encouraged patient to ambulate as much as possible. Pain controlled with 

tramadol.  Will discuss with SW, getting pt back to SNF.





Supervisory-Addendum Brief


Verification & Attestation


Participated in pt care:  history, MDM, physical


Personally performed:  exam, history, MDM, supervision of care


Care discussed with:  Medical Student


Procedures:  n/a


Verification and Attestation of Medical Student E/M Service





A medical student performed and documented this service. I then reviewed and 

verified all information documented by the medical student and made modif

ications to such information, when appropriate. I personally performed a 

physical exam, medical decision making and then discussed any differences 

between the notes and made revisions as necessary to create one note.





Bonilla Galicia , 9/5/22 , 12:22











MIRIAN RHODES                 Sep 5, 2022 09:38


BONILLA GALICIA DO                Sep 5, 2022 12:22

## 2022-09-05 NOTE — OCCUPATIONAL THER DAILY NOTE
OT Current Status-Daily Note


Subjective


Pt alert, lying in bed.  Pt agrees to therapy.  No c/o pain only fatigue and 

weakness.





Mental Status/Objective


Patient Orientation:  Person, Place, Time, Situation


Attachments:  IV, Telemetry, Other-See Comments (life vest)





ADL-Treatment


Independent with bed mobility.  Independent with toileting.  Pt threads first LE

into briefs then requires assistance for 2nd LE then pt is able to complete by 

self.  Pt ambulated to sink and washed hands independently.  Alerted nrsg that 

pt wanted nrsg to look at surgical site.  After therapy, pt lying in bed with 

call light/phone in reach.  All needs met in room.


Therapy Code Descriptions/Definitions 





Functional [place] Measure:


0=Not Assessed/NA        4=Minimal Assistance


1=Total Assistance        5=Supervision or Setup


2=Maximal Assistance  6=Modified [place]


3=Moderate Assistance 7=Complete IndependenceSCALE: Activities may be completed 

with or without assistive devices.





6-Indepedent-patient completes the activity by him/herself with no assistance 

from a helper.


5-Set-up or Clean-up Assistance-helper sets up or cleans up; patient completes 

activity. [place] assists only prior to or  


    following the activity.


4-Supervision or Touching Assistance-helper provides verbal cues and/or 

touching/steadying and/or contact guard assistance as patient completes 

activity. Assistance may be provided   


    throughout the activity or intermittently.


3-Partial/Moderate Assistance-helper does LESS THAN HALF the effort. [place] 

lifts, holds or supports trunk or limbs, but provides less than half the effort.


2-Substantial/Maximal Assistance-helper does MORE THAN HALF the effort. [place] 

lifts or holds trunk or limbs and provides more than half the effort.


8-Fntnmkowd-aebgwa does ALL the effort. Patient does none of the effort to 

complete the activity. Or, the assistance of 2 or more helpers is required for 

the patient to complete the  


    activity.


If activity was not attempted, code reason:


7-Patient Refused.


9-Not Applicable-not attempted and the patient did not perform the activity 

before the current illness, exacerbation or injury.


10-Not Attempted due to Environmental Limitations-(lack of equipment, weather 

restraints, etc.).


88-Not Attempted due to Medical Conditions or Safety Concerns.





OT Long Term Goals


Long Term Goals


Time Frame:  Sep 9, 2022


Eating (QC):  6


Oral Hygiene (QC):  6


Toileting Hygiene (QC):  6


Shower/Bathe Self (QC):  6


Upper Body Dressing (QC):  6


Lower Body Dressing (QC):  6


On/Off Footwear (QC):  6


Additional Goals:  1-Demonstrate ADL Tasks, 2-Verbalize Understanding, 3-

ImproveStrength/Cherise


1=Demonstrate adherence to instructed precautions during ADL tasks.


2=Patient will verbalize/demonstrate understanding of assistive 

devices/modifications for ADL.


3=Patient will improve strength/tolerance for activity to enable patient to 

perform ADL's.





OT Education/Plan


Problem List/Assessment


Assessment:  Decreased Activ Tolerance, Impaired Self-Care Skills


Pt would benefit from short term skilled OT services in order to increase BUE 

Strength and activity tolerance, and increase independence with ADLs and 

functional mobility in order to maximize LOF for safe discharge.





Discharge Recommendations


Plan/Recommendations:  Continue POC





Treatment Plan/Plan of Care


Patient would benefit from OT for education, treatment and training to promote 

independence in ADL's, mobility, safety and/or upper extremity function for 

ADL's.


Plan of Care:  ADL Retraining, Functional Mobility, UE Funct Exercise/Act


Treatment Duration:  Sep 9, 2022


Frequency:  3 times per week (3-5 times per week)


Estimated Hrs Per Day:  .25 hour per day


Agreement:  Yes


Rehab Potential:  Fair





Time/GCodes


Start Time:  12:39


Stop Time:  12:55


Total Time Billed (hr/min):  16


Billed Treatment Time


1 visit-ADL 1 (16 min)











RONEL DAILEY                Sep 5, 2022 13:00

## 2022-09-05 NOTE — PROGRESS NOTE - HOSPITALIST
Subjective


HPI/CC On Admission


Date Seen by Provider:  Sep 5, 2022


Time Seen by Provider:  11:00


Subjective/Events-last exam


No complaints


Labs reviewed


Lifevest on


No falls





Focused Exam


Time of Focused Exam:  08:15





Objective


Exam


Vital Signs





Vital Signs








  Date Time  Temp Pulse Resp B/P (MAP) Pulse Ox O2 Delivery O2 Flow Rate FiO2


 


9/5/22 16:08 36.7 95 20 166/93 (117) 100 Room Air  


 


8/30/22 15:07       0.00 


 


8/30/22 15:07        21





Capillary Refill :


General Appearance:  No Apparent Distress, WD/WN, Chronically ill


Respiratory:  Lungs Clear


Cardiovascular:  Regular Rate, Rhythm


Neurologic/Psychiatric:  Alert, Oriented x3, No Motor/Sensory Deficits, Normal 

Mood/Affect





Results/Procedures


Lab


Laboratory Tests


9/5/22 05:27








Patient resulted labs reviewed.





Assessment/Plan


Assessment and Plan


Assess & Plan/Chief Complaint


Assessment:


Colitis


Hematochezia


Recent C diff


Recent COVID


Debility


Ischemic cardiomyopathy Life vest maintained





Plan:


Pain control











TAMMY HURST DO                 Sep 5, 2022 06:53

## 2022-09-05 NOTE — PHYSICAL THERAPY DAILY NOTE
PT Daily Note-Current


Subjective


Patient lying supine in bed upon PT arrival, agreeable to treatment.





Transfers


SCALE: Activities may be completed with or without assistive devices.





6-Indepedent-patient completes the activity by him/herself with no assistance 

from a helper.


5-Set-up or Clean-up Assistance-helper sets up or cleans up; patient completes 

activity. [place] assists only prior to or  


    following the activity.


4-Supervision or Touching Assistance-helper provides verbal cues and/or 

touching/steadying and/or contact guard assistance as patient completes act

ivity. Assistance may be provided   


    throughout the activity or intermittently.


3-Partial/Moderate Assistance-helper does LESS THAN HALF the effort. [place] 

lifts, holds or supports trunk or limbs, but provides less than half the effort.


2-Substantial/Maximal Assistance-helper does MORE THAN HALF the effort. [place] 

lifts or holds trunk or limbs and provides more than half the effort.


2-Xhigabvyz-itvnug does ALL the effort. Patient does none of the effort to 

complete the activity. Or, the assistance of 2 or more helpers is required for 

the patient to complete the  


    activity.


If activity was not attempted, code reason:


7-Patient Refused.


9-Not Applicable-not attempted and the patient did not perform the activity 

before the current illness, exacerbation or injury.


10-Not Attempted due to Environmental Limitations-(lack of equipment, weather 

restraints, etc.).


88-Not Attempted due to Medical Conditions or Safety Concerns.


Roll Left & Right (QC):  5


Sit to Lying (QC):  5


Lying to Sitting/Side of Bed(Q:  5


Sit to Stand (QC):  5


Chair/Bed-to-Chair Xfer(QC):  5





Weight Bearing


Right Lower Extremity:  Right


Full Weight Bearing


Left Lower Extremity:  Left


Full Weight Bearing





Gait Training


Does the Patient Walk?:  Yes


Distance:  130 feet


Walk 10 feet (QC):  4


Walk 50 ft with 2 Turns(QC):  4


Gait Assistive Device:  FWW





Exercises


Supine Ex:  Ankle pumps, Quad Set, Glut sets


Supine Reps:  20





Assessment


Current Status:  Fair Progress


Patient tolerated treatment well.  Demonstrates setup for all bed mobility and 

transfers.   Performs LE therapeutic exercise as listed.  Patient ambulates 130 

feet with FWW, with setup and verbal cues for conservation of energy.  Patient 

in chair post treatment with all needs met, nursing notified, call light in 

hand.





PT Long Term Goals


Long Term Goals


PT Long Term Goals Time Frame:  Sep 9, 2022


Roll Left & Right (QC):  6


Sit to Lying (QC):  6


Lying-Sitting on Side/Bed(QC):  6


Sit to Stand (QC):  6


Chair/Bed-to-Chair Xfer(QC):  6


Toilet Transfer (QC):  6


Car Transfer (QC):  6


Does the Patient Walk:  Yes


Walk 10 feet (QC):  6


Walk 50ft with 2 Turns (QC):  6


Walk 150 ft (QC):  6





PT Plan


Treatment/Plan


Treatment Plan:  Continue Plan of Care


Treatment Plan:  Bed Mobility, Education, Functional Activity Cherise, Functional 

Strength, Group Therapy, Gait, Safety, Therapeutic Exercise, Transfers


Treatment Duration:  Sep 9, 2022


Frequency:  6 times per week


Estimated Hrs Per Day:  .25 hour per day


Patient and/or Family Agrees t:  Yes





Safety Risks/Education


Patient Education:  Gait Training, Transfer Techniques


Teaching Recipient:  Patient


Teaching Methods:  Demonstration, Discussion


Response to Teaching:  Verbalize Understanding, Return Demonstration





Time/GCodes


Time In:  819


Time Out:  845


Total Billed Treatment Time:  24


Total Billed Treatment


Visit, Ex, Gait











NOÉ DOUGLAS PT                 Sep 5, 2022 08:49

## 2022-09-06 VITALS — DIASTOLIC BLOOD PRESSURE: 87 MMHG | SYSTOLIC BLOOD PRESSURE: 136 MMHG

## 2022-09-06 VITALS — SYSTOLIC BLOOD PRESSURE: 183 MMHG | DIASTOLIC BLOOD PRESSURE: 93 MMHG

## 2022-09-06 VITALS — DIASTOLIC BLOOD PRESSURE: 79 MMHG | SYSTOLIC BLOOD PRESSURE: 145 MMHG

## 2022-09-06 VITALS — SYSTOLIC BLOOD PRESSURE: 132 MMHG | DIASTOLIC BLOOD PRESSURE: 68 MMHG

## 2022-09-06 VITALS — DIASTOLIC BLOOD PRESSURE: 80 MMHG | SYSTOLIC BLOOD PRESSURE: 141 MMHG

## 2022-09-06 VITALS — DIASTOLIC BLOOD PRESSURE: 83 MMHG | SYSTOLIC BLOOD PRESSURE: 162 MMHG

## 2022-09-06 LAB
ALBUMIN SERPL-MCNC: 2.1 GM/DL (ref 3.2–4.5)
ALP SERPL-CCNC: 60 U/L (ref 40–136)
ALT SERPL-CCNC: 15 U/L (ref 0–55)
BASOPHILS # BLD AUTO: 0 10^3/UL (ref 0–0.1)
BASOPHILS NFR BLD AUTO: 1 % (ref 0–10)
BILIRUB SERPL-MCNC: 0.5 MG/DL (ref 0.1–1)
BUN/CREAT SERPL: 17
CALCIUM SERPL-MCNC: 8.1 MG/DL (ref 8.5–10.1)
CHLORIDE SERPL-SCNC: 116 MMOL/L (ref 98–107)
CO2 SERPL-SCNC: 19 MMOL/L (ref 21–32)
CREAT SERPL-MCNC: 0.93 MG/DL (ref 0.6–1.3)
EOSINOPHIL # BLD AUTO: 0.3 10^3/UL (ref 0–0.3)
EOSINOPHIL NFR BLD AUTO: 4 % (ref 0–10)
GFR SERPLBLD BASED ON 1.73 SQ M-ARVRAT: 64 ML/MIN
GLUCOSE SERPL-MCNC: 89 MG/DL (ref 70–105)
HCT VFR BLD CALC: 27 % (ref 35–52)
HGB BLD-MCNC: 8.8 G/DL (ref 11.5–16)
LYMPHOCYTES # BLD AUTO: 1 10^3/UL (ref 1–4)
LYMPHOCYTES NFR BLD AUTO: 14 % (ref 12–44)
MANUAL DIFFERENTIAL PERFORMED BLD QL: NO
MCH RBC QN AUTO: 30 PG (ref 25–34)
MCHC RBC AUTO-ENTMCNC: 33 G/DL (ref 32–36)
MCV RBC AUTO: 91 FL (ref 80–99)
MONOCYTES # BLD AUTO: 0.3 10^3/UL (ref 0–1)
MONOCYTES NFR BLD AUTO: 5 % (ref 0–12)
NEUTROPHILS # BLD AUTO: 5.5 10^3/UL (ref 1.8–7.8)
NEUTROPHILS NFR BLD AUTO: 77 % (ref 42–75)
PLATELET # BLD: 280 10^3/UL (ref 130–400)
PMV BLD AUTO: 10.6 FL (ref 9–12.2)
POTASSIUM SERPL-SCNC: 4.2 MMOL/L (ref 3.6–5)
PROT SERPL-MCNC: 4.6 GM/DL (ref 6.4–8.2)
SODIUM SERPL-SCNC: 141 MMOL/L (ref 135–145)
WBC # BLD AUTO: 7.1 10^3/UL (ref 4.3–11)

## 2022-09-06 RX ADMIN — VANCOMYCIN HYDROCHLORIDE SCH MG: 125 CAPSULE ORAL at 05:56

## 2022-09-06 RX ADMIN — VANCOMYCIN HYDROCHLORIDE SCH MG: 125 CAPSULE ORAL at 13:44

## 2022-09-06 RX ADMIN — OXYBUTYNIN CHLORIDE SCH MG: 5 TABLET ORAL at 21:37

## 2022-09-06 RX ADMIN — LATANOPROST SCH ML: 50 SOLUTION/ DROPS OPHTHALMIC at 21:37

## 2022-09-06 RX ADMIN — VANCOMYCIN HYDROCHLORIDE SCH MG: 125 CAPSULE ORAL at 17:11

## 2022-09-06 RX ADMIN — AMITRIPTYLINE HYDROCHLORIDE SCH MG: 25 TABLET, FILM COATED ORAL at 21:37

## 2022-09-06 RX ADMIN — SACUBITRIL AND VALSARTAN SCH TAB: 24; 26 TABLET, FILM COATED ORAL at 08:37

## 2022-09-06 RX ADMIN — OXYBUTYNIN CHLORIDE SCH MG: 5 TABLET ORAL at 08:37

## 2022-09-06 RX ADMIN — HYDROCORTISONE ACETATE SCH EA: 25 SUPPOSITORY RECTAL at 13:21

## 2022-09-06 RX ADMIN — SACUBITRIL AND VALSARTAN SCH TAB: 24; 26 TABLET, FILM COATED ORAL at 21:37

## 2022-09-06 RX ADMIN — LEVOTHYROXINE SODIUM SCH MCG: 125 TABLET ORAL at 05:56

## 2022-09-06 RX ADMIN — HYDROCORTISONE ACETATE SCH EA: 25 SUPPOSITORY RECTAL at 09:19

## 2022-09-06 RX ADMIN — PANTOPRAZOLE SODIUM SCH MG: 40 TABLET, DELAYED RELEASE ORAL at 08:38

## 2022-09-06 NOTE — OCC THERAPY PROGRESS NOTE
Therapy Progress Note


Pt stated she was waiting for someone to tell her where she was going next and 

did not want to participate in therapy today. Pt was offered numerous different 

tasks and declined all. 


1 Refusal











RONEL DAILEY                Sep 6, 2022 13:19

## 2022-09-06 NOTE — PROGRESS NOTE - SURGERY
MIRIAN RHODES 9/6/22 0733:


Subjective


Date Seen by a Provider:  Sep 6, 2022


Time Seen by a Provider:  06:50


Subjective/Events-last exam


Patient says she is doing better today. She reports better pain control, rated 

at a 5/10. She has been ambulating well. She reported her last BM had no BRB in 

it.


Review of Systems


General:  No Chills, No Night Sweats


HEENT:  No Head Aches, No Dysphasia, No Sore Throat


Pulmonary:  No Dyspnea, No Cough


Cardiovascular:  Lt Headedness (on standing); No: Chest Pain


Gastrointestinal:  Abdominal Pain (at incision), Diarrhea; No: Nausea, Vomiting,

Melena


Genitourinary:  No Dysuria, No Frequency, No Hematuria


Musculoskeletal:  No: neck pain, shoulder pain, back pain


Neurological:  Weakness; No: Numbness





Focused Exam


Time of Focused Exam:  08:15





Objective


Exam





Vital Signs








  Date Time  Temp Pulse Resp B/P (MAP) Pulse Ox O2 Delivery O2 Flow Rate FiO2


 


9/6/22 07:25      Room Air  


 


9/6/22 07:14  103      


 


9/6/22 04:17 36.6 102 18 162/83 (109) 98 Room Air  


 


9/6/22 01:00  106      


 


9/6/22 00:11 37.1 104 20 183/93 (123) 98 Room Air  


 


9/5/22 20:06      Room Air  


 


9/5/22 19:41 36.5 97 18 132/86 (101) 99 Room Air  


 


9/5/22 19:21 36.7 95   100   


 


9/5/22 19:00  114      


 


9/5/22 18:57    132/86 (101)    


 


9/5/22 16:08 36.7 95 20 166/93 (117) 100 Room Air  


 


9/5/22 14:00    156/88 (110)    


 


9/5/22 12:00  99      


 


9/5/22 11:37 36.7 95 18 163/91 (115) 96 Room Air  


 


9/5/22 07:32 36.9 100 18 159/77 (104) 97 Room Air  


 


9/5/22 07:29      Room Air  














I & O 


 


 9/6/22





 07:00


 


Intake Total 1100 ml


 


Balance 1100 ml





Capillary Refill :


General Appearance:  No Apparent Distress, Chronically ill, Thin


HEENT:  PERRL/EOMI, Moist Mucous Membranes


Neck:  Non Tender, Supple


Respiratory:  Lungs Clear, Normal Breath Sounds, No Accessory Muscle Use, No 

Respiratory Distress


Cardiovascular:  Regular Rate, Rhythm, Tachycardia


Peripheral Pulses:  2+ Dorsalis Pedis (R), 2+ Left Dors-Pedis (L), 2+ Radial 

Pulses (R), 2+ Radial Pulses (L)


Gastrointestinal:  non tender, soft, other (Fistula site draining brown fluid, 

dressing soaked through, changed last night)


Extremity:  Calf Tenderness (b/l), Pedal Edema (1+ b/l)


Neurologic/Psychiatric:  Alert, Oriented x3, No Motor/Sensory Deficits, Normal 

Mood/Affect


Skin:  Warm/Dry, Pallor


Lymphatic:  No Adenopathy





Results


Lab


Laboratory Tests


9/6/22 05:12: 


White Blood Count 7.1, Red Blood Count 2.97L, Hemoglobin 8.8L, Hematocrit 27L, 

Mean Corpuscular Volume 91, Mean Corpuscular Hemoglobin 30, Mean Corpuscular 

Hemoglobin Concent 33, Red Cell Distribution Width 15.8H, Platelet Count 280, 

Mean Platelet Volume 10.6, Immature Granulocyte % (Auto) 0, Neutrophils (%) 

(Auto) 77H, Lymphocytes (%) (Auto) 14, Monocytes (%) (Auto) 5, Eosinophils (%) 

(Auto) 4, Basophils (%) (Auto) 1, Neutrophils # (Auto) 5.5, Lymphocytes # (Auto)

1.0, Monocytes # (Auto) 0.3, Eosinophils # (Auto) 0.3, Basophils # (Auto) 0.0, 

Immature Granulocyte # (Auto) 0.0, Sodium Level 141, Potassium Level 4.2, 

Chloride Level 116H, Carbon Dioxide Level 19L, Anion Gap 6, Blood Urea Nitrogen 

16, Creatinine 0.93, Estimat Glomerular Filtration Rate 64, BUN/Creatinine Ratio

17, Glucose Level 89, Calcium Level 8.1L, Corrected Calcium 9.6, Total Bilirubin

0.5, Aspartate Amino Transf (AST/SGOT) 32, Alanine Aminotransferase (ALT/SGPT) 

15, Alkaline Phosphatase 60, Total Protein 4.6L, Albumin 2.1L





Microbiology


8/30/22 Blood Culture - Final, Complete


          No growth


8/30/22 MRSA Screen - Final, Complete


          MRSA not isolated





Assessment/Plan


Anemia


C. Diff Colitis (Positive test on 8/15/2022)


Enterocutaneous fistula


Hx of Afib





Hgb stable at 8.8 today from 9.5 yesterday, monitor with AM labs. Maintain 

patient on oral vancomycin. Encouraged patient to ambulate as much as possible. 

Pain controlled with tramadol.  Will discuss with SW, getting pt back to SNF.





BONILLA GALICIA DO 9/6/22 1153:


Subjective


Time Seen by a Provider:  08:59


Subjective/Events-last exam


Pt seen and examined, states she is doing better and looks better.  She does 

think she has more fluid coming out from fistula.  She was also a little 

concerned because she had been having solid BMs and today it was very soft.


Review of Systems


General:  No Chills, No Night Sweats; Fatigue


HEENT:  No Head Aches


Pulmonary:  No Dyspnea, No Cough


Cardiovascular:  Lt Headedness (on standing); No: Chest Pain


Gastrointestinal:  Abdominal Pain (at incision), Diarrhea; No: Nausea, Vomiting,

Melena


Genitourinary:  No Dysuria; Frequency; No Hematuria





Objective


Exam


General Appearance:  No Apparent Distress, Chronically ill, Thin


HEENT:  PERRL/EOMI, Moist Mucous Membranes


Respiratory:  Lungs Clear, Normal Breath Sounds, No Accessory Muscle Use, No 

Respiratory Distress


Cardiovascular:  No Murmur, Tachycardia


Gastrointestinal:  non tender, soft, other (Fistula site draining brown fluid, 

dressing soaked through, changed last night)


Extremity:  Calf Tenderness (b/l), Pedal Edema (1+ b/l)


Neurologic/Psychiatric:  Alert, Oriented x3


Skin:  Pallor





Assessment/Plan


Anemia


C. Diff Colitis (Positive test on 8/15/2022)


Enterocutaneous fistula


Hx of Afib





Hgb stable at 8.8 today from 9.5 yesterday, monitor with AM labs. Maintain 

patient on oral vancomycin.  We will continue to monitor fistula, since it 

appears to be low output.  Encouraged patient to ambulate as much as possible. 

Pain controlled with tramadol.  Will discuss with SW, getting pt back to SNF.





Supervisory-Addendum Brief


Verification & Attestation


Participated in pt care:  history, MDM, physical


Personally performed:  exam, history, MDM, supervision of care


Care discussed with:  Medical Student


Procedures:  n/a


Verification and Attestation of Medical Student E/M Service





A medical student performed and documented this service. I then reviewed and 

verified all information documented by the medical student and made mod

ifications to such information, when appropriate. I personally performed a 

physical exam, medical decision making and then discussed any differences 

between the notes and made revisions as necessary to create one note.





Bonilla Galicia , 9/6/22 , 11:53











MIRIAN RHODES                 Sep 6, 2022 07:33


BONILLA GALICIA DO                Sep 6, 2022 11:53

## 2022-09-06 NOTE — PROGRESS NOTE - HOSPITALIST
Subjective


HPI/CC On Admission


Date Seen by Provider:  Sep 6, 2022


Time Seen by Provider:  09:30


Subjective/Events-last exam


Doing well


NO complaints


[facility] accepted for tomorrow





Review of Systems


General:  Fatigue, Malaise





Focused Exam


Time of Focused Exam:  08:15





Objective


Exam


Vital Signs





Vital Signs








  Date Time  Temp Pulse Resp B/P (MAP) Pulse Ox O2 Delivery O2 Flow Rate FiO2


 


9/6/22 19:37 36.3 100 18 136/87 (103) 100 Room Air  





Capillary Refill :


General Appearance:  No Apparent Distress, WD/WN


Respiratory:  Lungs Clear, Normal Breath Sounds


Cardiovascular:  Regular Rate, Rhythm


Neurologic/Psychiatric:  Alert, Oriented x3, No Motor/Sensory Deficits, Normal 

Mood/Affect





Results/Procedures


Lab


Laboratory Tests


9/6/22 05:12








Patient resulted labs reviewed.





Assessment/Plan


Assessment and Plan


Assess & Plan/Chief Complaint


Assessment:


Colitis


Hematochezia


Recent C diff


Recent COVID


Debility


Ischemic cardiomyopathy Life vest maintained





Plan:


Pain control


DC tomorrow











TAMMY HURST DO                 Sep 6, 2022 06:59

## 2022-09-06 NOTE — PHYSICAL THERAPY DAILY NOTE
PT Daily Note-Current


Subjective


Patient agrees to PT.  Incontinent BM





Mental Status


Patient Orientation:  Normal For Age





Transfers


SCALE: Activities may be completed with or without assistive devices.





6-Indepedent-patient completes the activity by him/herself with no assistance 

from a helper.


5-Set-up or Clean-up Assistance-helper sets up or cleans up; patient completes 

activity. Fork assists only prior to or  


    following the activity.


4-Supervision or Touching Assistance-helper provides verbal cues and/or 

touching/steadying and/or contact guard assistance as patient completes 

activity. Assistance may be provided   


    throughout the activity or intermittently.


3-Partial/Moderate Assistance-helper does LESS THAN HALF the effort. Fork 

lifts, holds or supports trunk or limbs, but provides less than half the effort.


2-Substantial/Maximal Assistance-helper does MORE THAN HALF the effort. Fork 

lifts or holds trunk or limbs and provides more than half the effort.


2-Xjnqfoesl-fedrxb does ALL the effort. Patient does none of the effort to 

complete the activity. Or, the assistance of 2 or more helpers is required for 

the patient to complete the  


    activity.


If activity was not attempted, code reason:


7-Patient Refused.


9-Not Applicable-not attempted and the patient did not perform the activity 

before the current illness, exacerbation or injury.


10-Not Attempted due to Environmental Limitations-(lack of equipment, weather 

restraints, etc.).


88-Not Attempted due to Medical Conditions or Safety Concerns.


Lying to Sitting/Side of Bed(Q:  6


Sit to Stand (QC):  5


Chair/Bed-to-Chair Xfer(QC):  4


Toilet Transfer (QC):  3





Weight Bearing


Right Lower Extremity:  Right


Full Weight Bearing


Left Lower Extremity:  Left


Full Weight Bearing





Gait Training


Distance:  200'


Walk 10 feet (QC):  4


Walk 50 ft with 2 Turns(QC):  4


Walk 150 ft (QC):  4


Gait Assistive Device:  FWW


slow, steady with no deviation





Assessment


Patient is up in recliner with needs met.  Patient improved with increase in 

ambulation distance.





PT Long Term Goals


Long Term Goals


PT Long Term Goals Time Frame:  Sep 9, 2022


Roll Left & Right (QC):  6


Sit to Lying (QC):  6


Lying-Sitting on Side/Bed(QC):  6


Sit to Stand (QC):  6


Chair/Bed-to-Chair Xfer(QC):  6


Toilet Transfer (QC):  6


Car Transfer (QC):  6


Does the Patient Walk:  Yes


Walk 10 feet (QC):  6


Walk 50ft with 2 Turns (QC):  6


Walk 150 ft (QC):  6





PT Plan


Treatment/Plan


Treatment Plan:  Continue Plan of Care


Treatment Plan:  Bed Mobility, Education, Functional Activity Cherise, Functional 

Strength, Group Therapy, Gait, Safety, Therapeutic Exercise, Transfers


Treatment Duration:  Sep 9, 2022


Frequency:  6 times per week


Estimated Hrs Per Day:  .25 hour per day


Patient and/or Family Agrees t:  Yes





Time/GCodes


Time In:  824


Time Out:  836


Total Billed Treatment Time:  12


Total Billed Treatment


1 visit


FA 12 min











ERCI VALLE PT               Sep 6, 2022 10:17

## 2022-09-07 VITALS — SYSTOLIC BLOOD PRESSURE: 122 MMHG | DIASTOLIC BLOOD PRESSURE: 66 MMHG

## 2022-09-07 VITALS — SYSTOLIC BLOOD PRESSURE: 133 MMHG | DIASTOLIC BLOOD PRESSURE: 66 MMHG

## 2022-09-07 VITALS — DIASTOLIC BLOOD PRESSURE: 66 MMHG | SYSTOLIC BLOOD PRESSURE: 122 MMHG

## 2022-09-07 LAB
ALBUMIN SERPL-MCNC: 1.9 GM/DL (ref 3.2–4.5)
ALP SERPL-CCNC: 47 U/L (ref 40–136)
ALT SERPL-CCNC: 14 U/L (ref 0–55)
BASOPHILS # BLD AUTO: 0 10^3/UL (ref 0–0.1)
BASOPHILS NFR BLD AUTO: 1 % (ref 0–10)
BILIRUB SERPL-MCNC: 0.3 MG/DL (ref 0.1–1)
BUN/CREAT SERPL: 14
CALCIUM SERPL-MCNC: 7.7 MG/DL (ref 8.5–10.1)
CHLORIDE SERPL-SCNC: 115 MMOL/L (ref 98–107)
CO2 SERPL-SCNC: 18 MMOL/L (ref 21–32)
CREAT SERPL-MCNC: 1.01 MG/DL (ref 0.6–1.3)
EOSINOPHIL # BLD AUTO: 0.1 10^3/UL (ref 0–0.3)
EOSINOPHIL NFR BLD AUTO: 2 % (ref 0–10)
GFR SERPLBLD BASED ON 1.73 SQ M-ARVRAT: 58 ML/MIN
GLUCOSE SERPL-MCNC: 109 MG/DL (ref 70–105)
HCT VFR BLD CALC: 23 % (ref 35–52)
HGB BLD-MCNC: 7.6 G/DL (ref 11.5–16)
LYMPHOCYTES # BLD AUTO: 0.8 10^3/UL (ref 1–4)
LYMPHOCYTES NFR BLD AUTO: 13 % (ref 12–44)
MANUAL DIFFERENTIAL PERFORMED BLD QL: NO
MCH RBC QN AUTO: 29 PG (ref 25–34)
MCHC RBC AUTO-ENTMCNC: 33 G/DL (ref 32–36)
MCV RBC AUTO: 89 FL (ref 80–99)
MONOCYTES # BLD AUTO: 0.3 10^3/UL (ref 0–1)
MONOCYTES NFR BLD AUTO: 5 % (ref 0–12)
NEUTROPHILS # BLD AUTO: 5 10^3/UL (ref 1.8–7.8)
NEUTROPHILS NFR BLD AUTO: 79 % (ref 42–75)
PLATELET # BLD: 243 10^3/UL (ref 130–400)
PMV BLD AUTO: 9.8 FL (ref 9–12.2)
POTASSIUM SERPL-SCNC: 4 MMOL/L (ref 3.6–5)
PROT SERPL-MCNC: 4.2 GM/DL (ref 6.4–8.2)
SODIUM SERPL-SCNC: 140 MMOL/L (ref 135–145)
WBC # BLD AUTO: 6.3 10^3/UL (ref 4.3–11)

## 2022-09-07 RX ADMIN — VANCOMYCIN HYDROCHLORIDE SCH MG: 125 CAPSULE ORAL at 00:43

## 2022-09-07 RX ADMIN — PANTOPRAZOLE SODIUM SCH MG: 40 TABLET, DELAYED RELEASE ORAL at 08:25

## 2022-09-07 RX ADMIN — SACUBITRIL AND VALSARTAN SCH TAB: 24; 26 TABLET, FILM COATED ORAL at 08:25

## 2022-09-07 RX ADMIN — HYDROCORTISONE ACETATE SCH EA: 25 SUPPOSITORY RECTAL at 00:43

## 2022-09-07 RX ADMIN — OXYBUTYNIN CHLORIDE SCH MG: 5 TABLET ORAL at 08:25

## 2022-09-07 RX ADMIN — VANCOMYCIN HYDROCHLORIDE SCH MG: 125 CAPSULE ORAL at 05:39

## 2022-09-07 RX ADMIN — LEVOTHYROXINE SODIUM SCH MCG: 125 TABLET ORAL at 05:39

## 2022-09-07 RX ADMIN — HYDROCORTISONE ACETATE SCH EA: 25 SUPPOSITORY RECTAL at 08:25

## 2022-09-07 NOTE — DISCHARGE INST-SKILLED NURSING
Discharge Inst-Skilled NF


Reconcile Patient Problems


Problems Reviewed?:  Yes





Patient Instructions


Patient Problems:  


Debility


Goal:  


[place]





Consult/Follow Up/Orders


Follow Up Appt.:  


PCP NH rounds


Skilled NF Admit to:  [facility]


Certification (SNF)


I certify that SNF services are required to be given on an inpatient basis 

because of the above named patient's need for skilled nursing care on a 

continuing basis for the conditions(s) for which he/she was receiving inpatient 

hospital services prior to his/her transfer to the SNF.


Skilled Nursing Facility Order:  Nursing Services, Occupational Ther-Evaluate & 

Treat, Physical Therapy-Evaluate & Treat


Oxygen Delivery Method:  Room Air


Discharge Diet:  No Restrictions


Resuscitation Status:  Full Code





New & Resume Previous Orders


New Medications:  


Vancomycin HCl (Vancomycin HCl) 125 Mg Capsule


125 MG PO BID, #14 CAP





Hydrocortisone Acetate (Hydrocortisone Acetate) 25 Mg Supp.rect


1 EA TN Q8H, #12 SUPP.RECT





Magnesium Carbonate/Al Hydrox (Gaviscon Es Tablet Chew) 105 Mg-160 Mg Tab.chew


2 EACH PO QID PRN for INDIGESTION, #30 TAB





Tramadol HCl (Tramadol HCl) 50 Mg Tablet


50 MG PO TID PRN for PAIN-SEVERE (8-10), #30 TAB





 


Changed Medications:  


Ferrous Sulfate (Iron) 325 Mg (65 Mg Iron) Tablet


325 MG PO BID, #60 TAB (Changed from: Ferrous Sulfate (Iron) 325 Mg Tablet 325 

Mg PO BID)





Sacubitril/Valsartan (Entresto 24 mg-26 mg Tablet) 24 Mg-26 Mg Tablet


1 MG PO BID for 30 Days, #60 TAB (Changed from: 1 TAB)





 


Continued Medications:  


Acetaminophen (Tylenol Arthritis) 650 Mg Tablet.er


650 MG PO Q6H PRN for PAIN-MILD (1-4) OR TEMPATURE, #30 TAB (This prescription 

has been renewed)





Amitriptyline HCl (Amitriptyline HCl) 100 Mg Tablet


100 MG PO HS, #30 TAB (This prescription has been renewed)





Amlodipine Besylate (Amlodipine Besylate) 5 Mg Tablet


5 MG PO DAILY, #30 TAB (This prescription has been renewed)





Atorvastatin Calcium (Lipitor) 40 Mg Tablet


40 MG PO 1700, #30 TAB (This prescription has been renewed)





Carvedilol (Carvedilol) 3.125 Mg Tablet


3.125 MG PO BID WITH MEALS, #60 TAB (This prescription has been renewed)





Cetirizine HCl (Cetirizine HCl) 10 Mg Tablet


10 MG PO DAILY, #30 TAB (This prescription has been renewed)





Cholestyramine (with Sugar) (Cholestyramine Packet) 4 Gram Powd.pack


4 GM PO BID, #60 EACH (This prescription has been renewed)





Ergocalciferol (Vitamin D2) (Vitamin D2) 1,250 Mcg (55992 Unit) Capsule


1250 MCG PO WED, #4 CAP (This prescription has been renewed)





Fenofibrate (Fenofibrate) 160 Mg Tablet


160 MG PO HS, #30 TAB (This prescription has been renewed)





Latanoprost (Xalatan) 0.005 % Drops


1 DROP OU HS, #1 EA (This prescription has been renewed)





Leflunomide (Leflunomide) 20 Mg Tablet


20 MG PO DAILY, #30 TAB (This prescription has been renewed)





Levothyroxine Sodium (Levothyroxine Sodium) 125 Mcg Tablet


125 MCG PO DAILY, #30 TAB (This prescription has been renewed)





Megestrol Acetate (Megestrol Acetate) 400 Mg/10 Ml (10 Ml) Oral.susp


200 MG PO QIDACHS, #240 ML (This prescription has been renewed)





Oxybutynin Chloride (Oxybutynin Chloride) 5 Mg Tablet


5 MG PO BID, #60 TAB (This prescription has been renewed)





Pantoprazole Sodium (Pantoprazole Sodium) 40 Mg Tablet.dr


40 MG PO DAILY, #30 TAB (This prescription has been renewed)





Sodium Bicarbonate (Sodium Bicarbonate) 650 Mg Tablet


650 MG PO TID, #90 TAB (This prescription has been renewed)








Carolynn Trujillo 


Sep 7, 2022 


06:07











CAROLYNN TRUJILLO DO                 Sep 7, 2022 06:07

## 2022-09-07 NOTE — PROGRESS NOTE - SURGERY
MIRIAN RHODES 9/7/22 0646:


Subjective


Date Seen by a Provider:  Sep 7, 2022


Time Seen by a Provider:  06:20


Subjective/Events-last exam


Patient says she is feeling better today. She claimed she has no pain in her 

abdomen. She has been ambulating to sit in her chair and for the restroom. Her 

last BM was this morning and she claimed there was no BRB in it.


Review of Systems


General:  No Chills, No Night Sweats


HEENT:  No Head Aches, No Dysphasia, No Sore Throat


Pulmonary:  No Dyspnea, No Cough


Cardiovascular:  No: Chest Pain, Lt Headedness


Gastrointestinal:  Diarrhea, Other (no BRB); No: Nausea, Vomiting, Abdominal 

Pain, Melena


Genitourinary:  No Dysuria, No Frequency, No Hematuria


Musculoskeletal:  No: neck pain, back pain, leg pain


Neurological:  No: Weakness, Confusion





Focused Exam


Time of Focused Exam:  08:15





Objective


Exam





Vital Signs








  Date Time  Temp Pulse Resp B/P (MAP) Pulse Ox O2 Delivery O2 Flow Rate FiO2


 


9/7/22 01:00  103      


 


9/7/22 00:45 36.4 101 18 133/66 (88) 98 Room Air  


 


9/6/22 20:00      Room Air  


 


9/6/22 19:37 36.3 100 18 136/87 (103) 100 Room Air  


 


9/6/22 19:02  103      


 


9/6/22 15:41 36.2 90 18 145/79 (101) 100 Room Air  


 


9/6/22 12:24  100      


 


9/6/22 11:57 36.3 92 18 132/68 (89) 99 Room Air  


 


9/6/22 07:38 36.4 101 18 141/80 (100) 97 Room Air  


 


9/6/22 07:25      Room Air  


 


9/6/22 07:14  103      














I & O 


 


 9/7/22





 07:00


 


Intake Total 1030 ml


 


Balance 1030 ml





Capillary Refill :


General Appearance:  No Apparent Distress, Chronically ill


HEENT:  PERRL/EOMI, Moist Mucous Membranes


Neck:  Non Tender, Supple


Respiratory:  Lungs Clear, Normal Breath Sounds, No Accessory Muscle Use, No 

Respiratory Distress


Cardiovascular:  Regular Rate, Rhythm, No Murmur


Peripheral Pulses:  2+ Dorsalis Pedis (R), 2+ Left Dors-Pedis (L), 2+ Radial 

Pulses (R), 2+ Radial Pulses (L)


Gastrointestinal:  non tender, soft, other (Fistula site draining increased 

amount of brown fluid, dressing soaked through, changed last night)


Extremity:  Calf Tenderness (right calf), Pedal Edema (2+ b/l)


Neurologic/Psychiatric:  Alert, Oriented x3, Normal Mood/Affect


Skin:  Warm/Dry, Pallor


Lymphatic:  No Adenopathy





Results


Lab


Laboratory Tests


9/7/22 05:35: 


White Blood Count 6.3, Red Blood Count 2.62L, Hemoglobin 7.6L, Hematocrit 23L, 

Mean Corpuscular Volume 89, Mean Corpuscular Hemoglobin 29, Mean Corpuscular 

Hemoglobin Concent 33, Red Cell Distribution Width 15.8H, Platelet Count 243, 

Mean Platelet Volume 9.8, Immature Granulocyte % (Auto) 0, Neutrophils (%) 

(Auto) 79H, Lymphocytes (%) (Auto) 13, Monocytes (%) (Auto) 5, Eosinophils (%) 

(Auto) 2, Basophils (%) (Auto) 1, Neutrophils # (Auto) 5.0, Lymphocytes # (Auto)

0.8L, Monocytes # (Auto) 0.3, Eosinophils # (Auto) 0.1, Basophils # (Auto) 0.0, 

Immature Granulocyte # (Auto) 0.0, Sodium Level 140, Potassium Level 4.0, 

Chloride Level 115H, Carbon Dioxide Level 18L, Anion Gap 7, Blood Urea Nitrogen 

14, Creatinine 1.01, Estimat Glomerular Filtration Rate 58, BUN/Creatinine Ratio

14, Glucose Level 109H, Calcium Level 7.7L, Corrected Calcium 9.4, Total 

Bilirubin 0.3, Aspartate Amino Transf (AST/SGOT) 18, Alanine Aminotransferase 

(ALT/SGPT) 14, Alkaline Phosphatase 47, Total Protein 4.2L, Albumin 1.9L


9/7/22 06:15: 





Microbiology


8/30/22 Blood Culture - Final, Complete


          No growth


8/30/22 MRSA Screen - Final, Complete


          MRSA not isolated





Assessment/Plan


Anemia


C. Diff Colitis (Positive test on 8/15/2022)


Enterocutaneous fistula


Hx of Afib





Hgb decreased to 7.6 from 8.8 yesterday, monitor with AM labs. Maintain patient 

on oral vancomycin.  We will continue to monitor fistula, since it appears to be

low output.  Encouraged patient to ambulate as much as possible. Pain controlled

with tramadol.  Will discuss with SW, getting pt back to SNF.





BONILLA GALICIA DO 9/7/22 1129:


Subjective


Time Seen by a Provider:  09:48


Subjective/Events-last exam


Pt seen and examined, states feels good today and is getting to medical lodge.


Review of Systems


General:  No Chills, No Night Sweats


HEENT:  No Head Aches


Pulmonary:  No Dyspnea, No Cough


Cardiovascular:  No: Chest Pain


Gastrointestinal:  Diarrhea, Other (no BRB); No: Nausea, Vomiting, Abdominal 

Pain, Melena





Objective


Exam


General Appearance:  No Apparent Distress, Chronically ill


HEENT:  Moist Mucous Membranes


Respiratory:  Lungs Clear, Normal Breath Sounds, No Accessory Muscle Use, No 

Respiratory Distress


Cardiovascular:  Regular Rate, Rhythm, No Murmur


Gastrointestinal:  non tender, soft, other (Fistula site draining increased 

amount of brown fluid, dressing soaked through, changed last night)


Extremity:  Calf Tenderness (right calf), Pedal Edema (2+ b/l)





Assessment/Plan


Anemia


C. Diff Colitis (Positive test on 8/15/2022)


Enterocutaneous fistula


Hx of Afib





Hgb decreased to 7.6 from 8.8 yesterday, monitor with AM labs. Maintain patient 

on oral vancomycin.  We will continue to monitor fistula, since it appears to be

low output.  Encouraged patient to ambulate as much as possible. Pain controlled

with tramadol.  Will discuss with SW, getting pt back to SNF.





Supervisory-Addendum Brief


Verification & Attestation


Participated in pt care:  history, MDM, physical


Personally performed:  exam, history, MDM, supervision of care


Care discussed with:  Medical Student


Procedures:  n/a


Verification and Attestation of Medical Student E/M Service





A medical student performed and documented this service. I then reviewed and 

verified all information documented by the medical student and made 

modifications to such information, when appropriate. I personally performed a 

physical exam, medical decision making and then discussed any differences 

between the notes and made revisions as necessary to create one note.





Bonilla Galicia , 9/7/22 , 11:29











MIRIAN RHODES                 Sep 7, 2022 06:46


BONILLA GALICIA DO                Sep 7, 2022 11:29

## 2022-09-07 NOTE — DISCHARGE SUMMARY
Discharge Summary


Hospital Course


Was the Problem List Reviewed?:  Yes


Problems/Dx:  


(1) Sepsis


Status:  Acute


(2) Peritoneal fistula


Status:  Acute


(3) Acute kidney insufficiency


Status:  Acute


(4) Proctocolitis


Status:  Acute


(5) Acute heart failure with reduced ejection fraction and diastolic dysfunction


(6) Cardiomyopathy


Status:  Acute


(7) Primary hypertension


Status:  Chronic


(8) Mixed hyperlipidemia


Status:  Chronic


(9) Ovarian cancer in remission


Status:  Chronic


(10) Physical debility


Status:  Chronic


(11) Solitary kidney


(12) Hypothyroidism


Status:  Chronic


(13) Diabetes mellitus


Status:  Chronic


(14) Atrial fibrillation


Status:  Chronic


Hospital Course


Date of Admission: Aug 30, 2022 at 12:13 


Admission Diagnosis :  





Family Physician/Provider: Dawson Castellanos MD  





Date of Discharge: 9/7/22 


Discharge Diagnosis: Debility, sepsis, colitis, c diff








Hospital Course:


Pt had a lengthy hospital course for 9 days after she was admitted for presumed 

sepsis with GI bleed and C. Diff issues. She underwent aggressive IV fluid 

resuscitation. 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 

enterocutaneous fistula. She was found to have no evidence of active GI 

bleeding. She was moved to the floor and awaited placement at the nursing home 

at [facility]. Overall poor prognosis due to the chronic severe 

debility. 














Labs and Pending Lab Test:


Laboratory Tests


9/7/22 05:35: 


White Blood Count 6.3, Red Blood Count 2.62L, Hemoglobin 7.6L, Hematocrit 23L, 

Mean Corpuscular Volume 89, Mean Corpuscular Hemoglobin 29, Mean Corpuscular 

Hemoglobin Concent 33, Red Cell Distribution Width 15.8H, Platelet Count 243, 

Mean Platelet Volume 9.8, Immature Granulocyte % (Auto) 0, Neutrophils (%) 

(Auto) 79H, Lymphocytes (%) (Auto) 13, Monocytes (%) (Auto) 5, Eosinophils (%) 

(Auto) 2, Basophils (%) (Auto) 1, Neutrophils # (Auto) 5.0, Lymphocytes # (Auto)

0.8L, Monocytes # (Auto) 0.3, Eosinophils # (Auto) 0.1, Basophils # (Auto) 0.0, 

Immature Granulocyte # (Auto) 0.0, Sodium Level 140, Potassium Level 4.0, 

Chloride Level [Pending], Carbon Dioxide Level 18L, Anion Gap 7, Blood Urea 

Nitrogen 14, Creatinine 1.01, Estimat Glomerular Filtration Rate 58, 

BUN/Creatinine Ratio 14, Glucose Level 109H, Calcium Level 7.7L, Corrected 

Calcium 9.4, Total Bilirubin 0.3, Aspartate Amino Transf (AST/SGOT) 18, Alanine 

Aminotransferase (ALT/SGPT) 14, Alkaline Phosphatase 47, Total Protein 4.2L, 

Albumin 1.9L





Microbiology


8/30/22 Blood Culture - Final, Complete


          No growth


8/30/22 MRSA Screen - Final, Complete


          MRSA not isolated





Home Meds


Active


Vancomycin HCl 125 Mg Capsule 125 Mg PO BID


Hydrocortisone Acetate 25 Mg Supp.rect 1 Ea ID Q8H


Gaviscon Es Tablet Chew (Magnesium Carbonate/Al Hydrox) 105 Mg-160 Mg Tab.chew 2

 Each PO QID PRN


Tramadol HCl 50 Mg Tablet 50 Mg PO TID PRN


Megestrol Acetate 400 Mg/10 Ml (10 Ml) Oral.susp 200 Mg PO QIDACHS


Levothyroxine Sodium 125 Mcg Tablet 125 Mcg PO DAILY


Leflunomide 20 Mg Tablet 20 Mg PO DAILY


Vitamin D2 (Ergocalciferol (Vitamin D2)) 1,250 Mcg (28201 Unit) Capsule 1,250 

Mcg PO WED


Cholestyramine Packet (Cholestyramine (with Sugar)) 4 Gram Powd.pack 4 Gm PO BID


Amlodipine Besylate 5 Mg Tablet 5 Mg PO DAILY


Pantoprazole Sodium 40 Mg Tablet.dr 40 Mg PO DAILY


Sodium Bicarbonate 650 Mg Tablet 650 Mg PO TID


Entresto 24 mg-26 mg Tablet (Sacubitril/Valsartan) 24 Mg-26 Mg Tablet 1 Mg PO 

BID 30 Days


Carvedilol 3.125 Mg Tablet 3.125 Mg PO BID WITH MEALS


Lipitor (Atorvastatin Calcium) 40 Mg Tablet 40 Mg PO 1700


Tylenol Arthritis (Acetaminophen) 650 Mg Tablet.er 650 Mg PO Q6H PRN


Oxybutynin Chloride 5 Mg Tablet 5 Mg PO BID


Xalatan (Latanoprost) 0.005 % Drops 1 Drop OU HS


Iron (Ferrous Sulfate) 325 Mg (65 Mg Iron) Tablet 325 Mg PO BID


Amitriptyline HCl 100 Mg Tablet 100 Mg PO HS


Fenofibrate 160 Mg Tablet 160 Mg PO HS


Cetirizine HCl 10 Mg Tablet 10 Mg PO DAILY


Assessment/Pt Instructions


PCP NH rounds


Discharge Planning:  <30 minutes discharge planning





Discharge Instructions


Discharge Diet:  No Restrictions


Activity as Tolerated:  Yes





Discharge Physical Examination


Vital Signs





Vital Signs








  Date Time  Temp Pulse Resp B/P (MAP) Pulse Ox O2 Delivery O2 Flow Rate FiO2


 


9/7/22 01:00  103      


 


9/7/22 00:45 36.4  18 133/66 (88) 98 Room Air  








General Appearance:  No Apparent Distress, WD/WN, Chronically ill, Thin


Respiratory:  Lungs Clear, Normal Breath Sounds


Cardiovascular:  Regular Rate, Rhythm


Neurologic/Psychiatric:  Alert, Oriented x3, No Motor/Sensory Deficits, Normal 

Mood/Affect


Allergies:  


Coded Allergies:  


     Penicillins (Verified  Allergy, Unknown, 4/22/21)


     raspberry (Verified  Allergy, Unknown, 4/22/21)





Discharge Summary


Date of Admission


Aug 30, 2022 at 12:13


Date of Discharge





Discharge Date:  Sep 7, 2022


Admission Diagnosis


C. Difficile infection with severe anemia (initially met SIRS criteria but s

ymptoms were more likely from anemia rather than sepsis)


Discharge Diagnosis


Assessment:


Colitis


Hematochezia


Recent C diff


Recent COVID


Debility


Ischemic cardiomyopathy Life vest maintained





Plan:


Pain control


GINGER tomorrow











TAMMY HURST DO                 Sep 7, 2022 06:07

## 2022-09-26 ENCOUNTER — HOSPITAL ENCOUNTER (OUTPATIENT)
Dept: HOSPITAL 75 - ER | Age: 77
Setting detail: OBSERVATION
LOS: 2 days | Discharge: HOME HEALTH | End: 2022-09-28
Attending: INTERNAL MEDICINE | Admitting: INTERNAL MEDICINE
Payer: MEDICARE

## 2022-09-26 ENCOUNTER — HOSPITAL ENCOUNTER (OUTPATIENT)
Dept: HOSPITAL 75 - RAD | Age: 77
End: 2022-09-26
Attending: NURSE PRACTITIONER
Payer: MEDICARE

## 2022-09-26 VITALS — SYSTOLIC BLOOD PRESSURE: 105 MMHG | DIASTOLIC BLOOD PRESSURE: 69 MMHG

## 2022-09-26 VITALS — SYSTOLIC BLOOD PRESSURE: 118 MMHG | DIASTOLIC BLOOD PRESSURE: 68 MMHG

## 2022-09-26 VITALS — DIASTOLIC BLOOD PRESSURE: 69 MMHG | SYSTOLIC BLOOD PRESSURE: 105 MMHG

## 2022-09-26 VITALS — BODY MASS INDEX: 18.46 KG/M2 | WEIGHT: 100.31 LBS | HEIGHT: 61.97 IN

## 2022-09-26 DIAGNOSIS — I48.91: ICD-10-CM

## 2022-09-26 DIAGNOSIS — Z79.899: ICD-10-CM

## 2022-09-26 DIAGNOSIS — R42: ICD-10-CM

## 2022-09-26 DIAGNOSIS — Z28.311: ICD-10-CM

## 2022-09-26 DIAGNOSIS — N18.31: ICD-10-CM

## 2022-09-26 DIAGNOSIS — I50.20: ICD-10-CM

## 2022-09-26 DIAGNOSIS — R06.00: Primary | ICD-10-CM

## 2022-09-26 DIAGNOSIS — R53.1: ICD-10-CM

## 2022-09-26 DIAGNOSIS — A04.71: Primary | ICD-10-CM

## 2022-09-26 DIAGNOSIS — N17.9: ICD-10-CM

## 2022-09-26 DIAGNOSIS — E03.9: ICD-10-CM

## 2022-09-26 DIAGNOSIS — I13.0: ICD-10-CM

## 2022-09-26 DIAGNOSIS — E78.5: ICD-10-CM

## 2022-09-26 LAB
APTT PPP: YELLOW S
BACTERIA #/AREA URNS HPF: NEGATIVE /HPF
BILIRUB UR QL STRIP: NEGATIVE
FIBRINOGEN PPP-MCNC: CLEAR MG/DL
GLUCOSE UR STRIP-MCNC: NEGATIVE MG/DL
KETONES UR QL STRIP: NEGATIVE
LEUKOCYTE ESTERASE UR QL STRIP: NEGATIVE
NITRITE UR QL STRIP: NEGATIVE
PH UR STRIP: 5.5 [PH] (ref 5–9)
PROT UR QL STRIP: (no result)
RBC #/AREA URNS HPF: (no result) /HPF
SP GR UR STRIP: 1.02 (ref 1.02–1.02)
SQUAMOUS #/AREA URNS HPF: (no result) /HPF
WBC #/AREA URNS HPF: (no result) /HPF

## 2022-09-26 PROCEDURE — 84484 ASSAY OF TROPONIN QUANT: CPT

## 2022-09-26 PROCEDURE — 71046 X-RAY EXAM CHEST 2 VIEWS: CPT

## 2022-09-26 PROCEDURE — 96376 TX/PRO/DX INJ SAME DRUG ADON: CPT

## 2022-09-26 PROCEDURE — 85379 FIBRIN DEGRADATION QUANT: CPT

## 2022-09-26 PROCEDURE — 84145 PROCALCITONIN (PCT): CPT

## 2022-09-26 PROCEDURE — 86141 C-REACTIVE PROTEIN HS: CPT

## 2022-09-26 PROCEDURE — 80048 BASIC METABOLIC PNL TOTAL CA: CPT

## 2022-09-26 PROCEDURE — 85007 BL SMEAR W/DIFF WBC COUNT: CPT

## 2022-09-26 PROCEDURE — 85027 COMPLETE CBC AUTOMATED: CPT

## 2022-09-26 PROCEDURE — 81000 URINALYSIS NONAUTO W/SCOPE: CPT

## 2022-09-26 PROCEDURE — 36415 COLL VENOUS BLD VENIPUNCTURE: CPT

## 2022-09-26 PROCEDURE — 96372 THER/PROPH/DIAG INJ SC/IM: CPT

## 2022-09-26 PROCEDURE — 78580 LUNG PERFUSION IMAGING: CPT

## 2022-09-26 PROCEDURE — 87324 CLOSTRIDIUM AG IA: CPT

## 2022-09-26 PROCEDURE — 96375 TX/PRO/DX INJ NEW DRUG ADDON: CPT

## 2022-09-26 PROCEDURE — 85025 COMPLETE CBC W/AUTO DIFF WBC: CPT

## 2022-09-26 PROCEDURE — 87040 BLOOD CULTURE FOR BACTERIA: CPT

## 2022-09-26 PROCEDURE — 83605 ASSAY OF LACTIC ACID: CPT

## 2022-09-26 PROCEDURE — 99284 EMERGENCY DEPT VISIT MOD MDM: CPT

## 2022-09-26 PROCEDURE — 97165 OT EVAL LOW COMPLEX 30 MIN: CPT

## 2022-09-26 PROCEDURE — 97162 PT EVAL MOD COMPLEX 30 MIN: CPT

## 2022-09-26 PROCEDURE — 87493 C DIFF AMPLIFIED PROBE: CPT

## 2022-09-26 PROCEDURE — 87449 NOS EACH ORGANISM AG IA: CPT

## 2022-09-26 RX ADMIN — HYDROCORTISONE ACETATE SCH EA: 25 SUPPOSITORY RECTAL at 22:55

## 2022-09-26 NOTE — DIAGNOSTIC IMAGING REPORT
EXAMINATION: Chest 2 view



HISTORY: DYSPNEA DIZZINESS



COMPARISON: 08/15/2022



FINDINGS: 



Heart size and pulmonary vasculature are normal. The lungs are

clear without consolidation, pleural effusion, or pneumothorax.

Degenerative changes of the thoracic spine. Osseous structures

are otherwise intact. Multiple metallic device overlying the

lower chest.



IMPRESSION: 



1. No acute radiographic abnormality in the chest.



Dictated by: 



  Dictated on workstation # [id number]

## 2022-09-26 NOTE — ED GENERAL
General


Chief Complaint:  Altered Mental Status


Stated Complaint:  AMS - SOA


Nursing Triage Note:  


Pt to ed from [facility] with staff with c/o AMS, SOA and hypoxia. state she 


was 88% on 2l O2, pt does not usually wear O2


Source of Information:  Patient


Exam Limitations:  No Limitations





History of Present Illness


Date Seen by Provider:  Sep 26, 2022


Time Seen by Provider:  11:45


Initial Comments


Patient here from nursing home with report of shortness of air, chest pain, 

weakness and low O2 despite application of oxygen at 2 L via nasal cannula.  

Apparently her outpatient provider was trying to work her up in the outpatient 

setting.  She did have labs drawn this morning including CBC and CMP.  Patient 

was sent here to the hospital for chest x-ray as well as D-dimer and troponin 

evaluation.  D-dimer was noted to be elevated and patient was noted to be 

hypoxic on return to nursing home.  She was then sent to the emergency 

department for further evaluation.  She apparently has had recent GI bleed.  She

has known history of atrial fibrillation but is off anticoagulation due to 

bleeding.  Overall patient denies chest pain currently as well as denies 

shortness of air.  She is on 2 L via nasal cannula and satting 98 to 100%.  

Patient admits to being weak and feeling dehydrated.  Patient does have 

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  She recently completed 

treatment for C. difficile infection.  She has noted the smell of stool and have

stool leakage from dressing into adult briefs around the stefano-area.


Timing/Duration:  24 Hours


Severity:  Moderate


Associated Systoms:  Chest Pain; No Cough, No Fever/Chills, No Nausea/Vomiting, 

No Rash; Shortness of Air, Weakness





Allergies and Home Medications


Allergies


Coded Allergies:  


     Penicillins (Verified  Allergy, Unknown, 21)


     raspberry (Verified  Allergy, Unknown, 21)





Patient Home Medication List


Home Medication List Reviewed:  Yes


Acetaminophen (Tylenol Arthritis) 650 Mg Tablet.er, 650 MG PO Q6H PRN for PAIN-

MILD (1-4) OR TEMPATURE


   Prescribed by: TAMMY HURST on 22


Amitriptyline HCl (Amitriptyline HCl) 100 Mg Tablet, 100 MG PO HS


   Prescribed by: TAMMY UHRST on 22


Amlodipine Besylate (Amlodipine Besylate) 5 Mg Tablet, 5 MG PO DAILY


   Prescribed by: TAMMY HURST on 22


Atorvastatin Calcium (Lipitor) 40 Mg Tablet, 40 MG PO 1700


   Prescribed by: TAMMY HURST on 22


Carvedilol (Carvedilol) 3.125 Mg Tablet, 3.125 MG PO BID WITH MEALS


   Prescribed by: TAMMY HURST on 22


Cetirizine HCl (Cetirizine HCl) 10 Mg Tablet, 10 MG PO DAILY


   Prescribed by: TAMMY HURST on 22


Cholestyramine (with Sugar) (Cholestyramine Packet) 4 Gram Powd.pack, 4 GM PO 

BID


   Prescribed by: TAMMY HURST on 22


Ergocalciferol (Vitamin D2) (Vitamin D2) 1,250 Mcg (90357 Unit) Capsule, 1,250 

MCG PO WED


   Prescribed by: TAMMY HURST on 22


Fenofibrate (Fenofibrate) 160 Mg Tablet, 160 MG PO HS


   Prescribed by: TAMMY HURST on 22


Ferrous Sulfate (Iron) 325 Mg (65 Mg Iron) Tablet, 325 MG PO BID


   Prescribed by: TAMMY HURST on 22


Hydrocortisone Acetate (Hydrocortisone Acetate) 25 Mg Supp.rect, 1 EA OH Q8H


   Prescribed by: TAMMY HURST on 22


Latanoprost (Xalatan) 0.005 % Drops, 1 DROP OU HS


   Prescribed by: TAMMY HURST on 22


Leflunomide (Leflunomide) 20 Mg Tablet, 20 MG PO DAILY


   Prescribed by: TAMMY HURST on 22


Levothyroxine Sodium (Levothyroxine Sodium) 125 Mcg Tablet, 125 MCG PO DAILY


   Prescribed by: TAMMY HURST on 22


Magnesium Carbonate/Al Hydrox (Gaviscon Es Tablet Chew) 105 Mg-160 Mg Tab.chew, 

2 EACH PO QID PRN for INDIGESTION


   Prescribed by: TAMMY HURST on 22


Megestrol Acetate (Megestrol Acetate) 400 Mg/10 Ml (10 Ml) Oral.susp, 200 MG PO 

QIDACHS


   Prescribed by: TAMMY HURST on 22


Oxybutynin Chloride (Oxybutynin Chloride) 5 Mg Tablet, 5 MG PO BID


   Prescribed by: TAMMY HURST on 22


Pantoprazole Sodium (Pantoprazole Sodium) 40 Mg Tablet.dr, 40 MG PO DAILY


   Prescribed by: TAMMY HURST on 22


Sacubitril/Valsartan (Entresto 24 mg-26 mg Tablet) 24 Mg-26 Mg Tablet, 1 MG PO 

BID


   Prescribed by: TAMMY HURST on 22


Sodium Bicarbonate (Sodium Bicarbonate) 650 Mg Tablet, 650 MG PO TID


   Prescribed by: TAMMY HURST on 22


Tramadol HCl (Tramadol HCl) 50 Mg Tablet, 50 MG PO TID PRN for PAIN-SEVERE (8-

10)


   Prescribed by: TAMMY HURST on 22


Vancomycin HCl (Vancomycin HCl) 125 Mg Capsule, 125 MG PO BID


   Prescribed by: TAMMY HURST on 22





Review of Systems


Review of Systems


Constitutional:  see HPI; No chills, No fever; weakness


EENTM:  No nose congestion, No throat pain


Respiratory:  No cough; short of breath


Cardiovascular:  chest pain; No edema


Gastrointestinal:  see HPI; No nausea, No vomiting


Genitourinary:  No dysuria, No pain


Musculoskeletal:  No back pain; muscle weakness


Skin:  see HPI; No change in color


Hematologic/Lymphatic:  Other (Recent GI bleed)





All Other Systems Reviewed


Negative Unless Noted:  Yes





Past Medical-Social-Family Hx


Patient Social History


Tobacco Use?:  No


Substance use?:  No


Alcohol Use?:  No





Immunizations Up To Date


First/Initial COVID19 Vaccinat:  


Second COVID19 Vaccination Osmany:  


Third COVID19 Vaccination Date:  





Seasonal Allergies


Seasonal Allergies:  No





Past Medical History


Surgery/Hospitalization HX:  


several


Surgeries:  Yes


Bowel Surgery, Cardiac, Gallbladder, Hysterectomy, Nephrectomy, Oophorectomy, 

Tonsillectomy


Respiratory:  No


Currently Using CPAP:  No


Cardiac:  Yes


Atrial Fibrillation, High Cholesterol, Hypertension


Neurological:  Yes


Neuropathy


Reproductive Disorders:  Yes (OVARIAN CANCER)


GYN History:  Hysterectomy, Menopausal


Sexually Transmitted Disease:  No


HIV/AIDS:  No


Genitourinary:  Yes (hx of nephrectomy)


UTI-Chronic


Gastrointestinal:  Yes


Obstructive Bowel, C-Diff


Musculoskeletal:  Yes


Arthritis, Chronic Back Pain


Endocrine:  Yes


Diabetes, Insulin dep, Hypothyroidsim


HEENT:  No


Cancer:  Yes


Ovarian


Did You Recieve Any Treatments:  Yes


What Type of Treatment Did You:  Surgical Intervention


Psychosocial:  Yes


Depression


Integumentary:  No


Blood Disorders:  No


Adverse Reaction/Blood Tranf:  No





Family Medical History


Reviewed Nursing Family Hx





Arthritis


  19 MOTHER


Cardiovascular disease


  19 MOTHER


Diabetes mellitus


  19 FATHER


  19 MOTHER


Diabetes, Hypertension





PAST MEDICAL /SURGICAL HISTORY:


-2020--HAD SEVERE C. DIFFICILE COLITIS--ADMITTED TO [facility]


20. PT HAD SYNCOPAL EPISODE WITH LOSS OF PULSE AND CPR/RESUSCITATION


FOR APPROXIMATELY 2 MINUTES WITH [facility].


DURING THAT STAY SHE HAD ATRIAL FIBRILLATION WITH [facility] THAT REQUIRED


CARDIOVERSION


PT UNDERWENT SUBTOTAL COLECTOMY WITH PERMANENT ILEOSTOMY. 


PT REPORTS THAT SHE WAS ON VENTILATOR FOR 10 DAYS


PT WAS TRANSFERRED FROM [facility] TO [facility], AND WAS THERE FROM


10/10/20-10/30/20, THEN TRANSFERRED HERE FOR REHAB FROM 10/30/20-11/10/20.


SEEING WOUND CARE FOR DELAYED HEALING OF MIDLINE SURGICAL WOUND AND SKIN


BREAKDOWN AROUND STOMA.





ADDITIONAL PAST SURGICAL HISTORY:


-TONSILLECTOMY 


-APPENDECTOMY 


-RIGHT NEPHRECTOMY  FOR CONGENITAL HYPOPLASTIC KIDNEY


-OVARIAN CYSTECTOMY 


-OPEN TOTAL ABDOMINAL HYSTERECTOMY / BILATARAL SALPINGO-OOPHORECTOMY 


FOR OVARIAN CANCER


-COCCYX EXCISION 


-LAPAROSCOPIC CHOLECYSTECOMY 


-RIGHT NECK LIPOMA REMOVAL 2012 BY DR. CRAWLEY


-SEBACEOUS CYST OF ABDOMINAL WALL REMOVED 2012 BY DR. CRAWLEY


-SUBTOTAL COLECTOMY WITH PERMANENT ILEOSTOMY 2020 AT [facility] BY


DR. BEAVERS





Physical Exam-Suspected Sepsis


Physical Exam


Vital Signs





Vital Signs - First Documented








 22





 11:34


 


Temp 35.2


 


Pulse 87


 


Resp 14


 


B/P (MAP) 101/74 (83)


 


Pulse Ox 98


 


O2 Delivery Nasal Cannula


 


O2 Flow Rate 2.00





Capillary Refill : Less Than 3 Seconds








Blood Pressure Mean:                    83








Height, Weight, BMI


Height: '"


Weight: lbs. oz. kg; 22.00 BMI


Method:Stated


General Appearance:  No Apparent Distress, Thin


HEENT:  PERRL/EOMI, Pharynx Normal


Neck:  Non Tender, Supple


Respiratory:  Lungs Clear, Normal Breath Sounds


Cardiovascular:  Regular Rate, Rhythm, No Murmur


Gastrointestinal:  Non Tender, Soft, Other (Enterocutaneous fistula central 

lower abdomen draining feculent fluid)


Genital/Rectal:  Other (Fecal material noted in the perivaginal area)


Back:  Normal Inspection, No CVA Tenderness, No Vertebral Tenderness


Extremity:  Non Tender, No Calf Tenderness, No Pedal Edema


Neurologic/Psychiatric:  Alert, Oriented x3, No Motor/Sensory Deficits


Skin:  warm/dry; No rash, No ulcerations





Focused Exam


Lactate Level


22 11:50: Lactic Acid Level 1.27





Lactic Acid Level





Laboratory Tests








Test


 22


11:50


 


Lactic Acid Level


 1.27 MMOL/L


(0.50-2.00)











Progress/Results/Core Measures


Suspected Sepsis


SIRS


Temperature: 


Pulse: 87 


Respiratory Rate: 14


 


Blood Pressure 101 /74 


Mean: 83


 


22 11:50: Lactic Acid Level 1.27








Results/Orders


Lab Results





Laboratory Tests








Test


 22


11:50 22


12:22 Range/Units


 


 


Lactic Acid Level


 1.27 


 


 0.50-2.00


MMOL/L


 


C-Reactive Protein High


Sensitivity 0.19 


 


 0.00-0.50


MG/DL


 


Procalcitonin 0.10 H  <0.10  NG/ML


 


Urine Color  YELLOW   


 


Urine Clarity  CLEAR   


 


Urine pH  5.5  5-9  


 


Urine Specific Gravity  1.025 H 1.016-1.022  


 


Urine Protein  TRACE H NEGATIVE  


 


Urine Glucose (UA)  NEGATIVE  NEGATIVE  


 


Urine Ketones  NEGATIVE  NEGATIVE  


 


Urine Nitrite  NEGATIVE  NEGATIVE  


 


Urine Bilirubin  NEGATIVE  NEGATIVE  


 


Urine Urobilinogen  0.2  < = 1.0  MG/DL


 


Urine Leukocyte Esterase  NEGATIVE  NEGATIVE  


 


Urine RBC (Auto)  NEGATIVE  NEGATIVE  


 


Urine RBC  NONE   /HPF


 


Urine WBC  NONE   /HPF


 


Urine Squamous Epithelial


Cells 


 RARE 


  /HPF





 


Urine Crystals  NONE   /LPF


 


Urine Bacteria  NEGATIVE   /HPF


 


Urine Casts  NONE   /LPF


 


Urine Mucus  NEGATIVE   /LPF


 


Urine Culture Indicated  NO   








My Orders





Orders - ANGEL MCKINNEY MD


Hs C Reactive Protein (22 11:54)


Lactic Acid Analyzer (22 11:54)


Procalcitonin (Pct) (22 11:54)


Blood Culture (22 11:54)


Ed Iv/Invasive Line Start (22 11:54)


Ua Culture If Indicated (22 12:00)


Ns Iv 500 Ml (Sodium Chloride 0.9%) (22 12:15)





Medications Given in ED





Current Medications








 Medications  Dose


 Ordered  Sig/Dianne


 Route  Start Time


 Stop Time Status Last Admin


Dose Admin


 


 Sodium Chloride  500 ml @ 0


 mls/hr  Q0M ONCE


 IV  22 12:15


 22 12:16 DC 22 12:38


0 MLS/HR








Vital Signs/I&O











 22





 11:34


 


Temp 35.2


 


Pulse 87


 


Resp 14


 


B/P (MAP) 101/74 (83)


 


Pulse Ox 98


 


O2 Delivery Nasal Cannula


 


O2 Flow Rate 2.00





Capillary Refill : Less Than 3 Seconds








Blood Pressure Mean:                    83








Progress Note :  


Progress Note


Seen and evaluated.  IV ordered.  Labs reviewed from [facility] lab that were drawn 

this morning and shows CBC with white count of 15.7, hemoglobin of 9.5, 

platelets of 311 with ANC of 14.1.  Chemistry from [facility] lab today shows sodium 

140, potassium 5.2, chloride 120, BUN 29, creatinine 1.6, LFTs in normal range 

and glucose of 125.  I did review troponin and D-dimer that was drawn here 

earlier today.  I have reviewed chest x-ray results done earlier today.  We will

check blood culture, lactic acid and UA with culture now.  Normal saline 500 mL 

bolus ordered.  We will check urine via straight cath UA due to perivaginal 

contamination concerns.  Patient has LifeVest on and we will keep that in place 

at this time.  1410: I did discuss the case with Dr. Salamanca.  Patient does have 

elevated D-dimer from today.  Currently her oxygen saturation is upper 90s to 

100% on 2 L.  There is concerns about initiating anticoagulation due to history 

of GI bleed as recently as a few weeks ago.  She has acute renal insufficiency 

preventing CT angiogram.  We will continue hydration and recheck labs tomorrow 

and then he can order appropriate evaluation at that time.  I believe she is 

lower risk for pulmonary embolism given current physical exam findings and vital

sign findings and risk of anticoagulation exceeds benefit given her history with

current findings.  This may change based on further results.  Admit, observation

status.  Dr. Salamanca agrees to plan.  Patient agrees to plan.





Diagnostic Imaging





   Diagonstic Imaging:  Xray


Comments


                 [facility].


                                [place], [place]





NAME:   JOSE BARBER [facility] REC#:   [id number]


ACCOUNT#:   [id number]


PT STATUS:   REG CLI


:   1945


PHYSICIAN:   LANCE GROSS


ADMIT DATE:   22/RAD


                                  ***Signed***


Date of Exam:22





CHEST PA/LAT (2 VIEW)








EXAMINATION: Chest 2 view





HISTORY: DYSPNEA DIZZINESS





COMPARISON: 08/15/2022





FINDINGS: 





Heart size and pulmonary vasculature are normal. The lungs are


clear without consolidation, pleural effusion, or pneumothorax.


Degenerative changes of the thoracic spine. Osseous structures


are otherwise intact. Multiple metallic device overlying the


lower chest.





IMPRESSION: 





1. No acute radiographic abnormality in the chest.





Dictated by: 





  Dictated on workstation # [id number]








Dict:   22 1029


Trans:   22 1032


[facility] [id number]





Interpreted by:     JC MCFADDEN DO


Electronically signed by: JC MCFADDEN DO 22 1032


   Reviewed:  Reviewed by Me





Departure


Communication (Admissions)


Time/Spoke to Admitting Phy:  14:10





Impression





   Primary Impression:  


   Acute kidney insufficiency


   Additional Impression:  


   Elevated d-dimer


Disposition:   ADMITTED AS INPATIENT


Condition:  Stable





Admissions


Decision to Admit Reason:  Admit from ER (General)


Decision to Admit/Date:  Sep 26, 2022


Time/Decision to Admit Time:  14:10





Departure-Patient Inst.


Referrals:  


NOÉ STEWART MD (PCP/Family)


Primary Care Physician











ANGEL MCKINNEY MD          Sep 26, 2022 12:07

## 2022-09-27 VITALS — SYSTOLIC BLOOD PRESSURE: 127 MMHG | DIASTOLIC BLOOD PRESSURE: 68 MMHG

## 2022-09-27 VITALS — DIASTOLIC BLOOD PRESSURE: 71 MMHG | SYSTOLIC BLOOD PRESSURE: 122 MMHG

## 2022-09-27 VITALS — DIASTOLIC BLOOD PRESSURE: 66 MMHG | SYSTOLIC BLOOD PRESSURE: 115 MMHG

## 2022-09-27 VITALS — DIASTOLIC BLOOD PRESSURE: 68 MMHG | SYSTOLIC BLOOD PRESSURE: 127 MMHG

## 2022-09-27 VITALS — DIASTOLIC BLOOD PRESSURE: 59 MMHG | SYSTOLIC BLOOD PRESSURE: 119 MMHG

## 2022-09-27 VITALS — DIASTOLIC BLOOD PRESSURE: 84 MMHG | SYSTOLIC BLOOD PRESSURE: 131 MMHG

## 2022-09-27 VITALS — DIASTOLIC BLOOD PRESSURE: 76 MMHG | SYSTOLIC BLOOD PRESSURE: 124 MMHG

## 2022-09-27 VITALS — DIASTOLIC BLOOD PRESSURE: 72 MMHG | SYSTOLIC BLOOD PRESSURE: 128 MMHG

## 2022-09-27 LAB
%HYPO/RBC NFR BLD AUTO: SLIGHT %
ANISOCYTOSIS BLD QL SMEAR: SLIGHT
BASOPHILS # BLD AUTO: 0.1 10^3/UL (ref 0–0.1)
BASOPHILS NFR BLD AUTO: 0 % (ref 0–10)
BASOPHILS NFR BLD MANUAL: 0 %
BUN/CREAT SERPL: 18
CALCIUM SERPL-MCNC: 8.9 MG/DL (ref 8.5–10.1)
CHLORIDE SERPL-SCNC: 124 MMOL/L (ref 98–107)
CO2 SERPL-SCNC: 11 MMOL/L (ref 21–32)
CREAT SERPL-MCNC: 1.73 MG/DL (ref 0.6–1.3)
EOSINOPHIL # BLD AUTO: 0.1 10^3/UL (ref 0–0.3)
EOSINOPHIL NFR BLD AUTO: 1 % (ref 0–10)
EOSINOPHIL NFR BLD MANUAL: 0 %
GFR SERPLBLD BASED ON 1.73 SQ M-ARVRAT: 30 ML/MIN
GLUCOSE SERPL-MCNC: 83 MG/DL (ref 70–105)
HCT VFR BLD CALC: 30 % (ref 35–52)
HGB BLD-MCNC: 9 G/DL (ref 11.5–16)
LYMPHOCYTES # BLD AUTO: 0.8 10^3/UL (ref 1–4)
LYMPHOCYTES NFR BLD AUTO: 4 % (ref 12–44)
MANUAL DIFFERENTIAL PERFORMED BLD QL: YES
MCH RBC QN AUTO: 29 PG (ref 25–34)
MCHC RBC AUTO-ENTMCNC: 30 G/DL (ref 32–36)
MCV RBC AUTO: 94 FL (ref 80–99)
MONOCYTES # BLD AUTO: 0.6 10^3/UL (ref 0–1)
MONOCYTES NFR BLD AUTO: 3 % (ref 0–12)
MONOCYTES NFR BLD: 1 %
NEUTROPHILS # BLD AUTO: 20 10^3/UL (ref 1.8–7.8)
NEUTROPHILS NFR BLD AUTO: 93 % (ref 42–75)
NEUTS BAND NFR BLD MANUAL: 94 %
NEUTS BAND NFR BLD: 2 %
PLATELET # BLD: 274 10^3/UL (ref 130–400)
PMV BLD AUTO: 11.3 FL (ref 9–12.2)
POTASSIUM SERPL-SCNC: 4.7 MMOL/L (ref 3.6–5)
SODIUM SERPL-SCNC: 144 MMOL/L (ref 135–145)
VARIANT LYMPHS NFR BLD MANUAL: 3 %
WBC # BLD AUTO: 21.6 10^3/UL (ref 4.3–11)

## 2022-09-27 RX ADMIN — OXYBUTYNIN CHLORIDE SCH MG: 5 TABLET ORAL at 08:48

## 2022-09-27 RX ADMIN — VANCOMYCIN HYDROCHLORIDE SCH MG: 125 CAPSULE ORAL at 18:33

## 2022-09-27 RX ADMIN — HYDROCORTISONE ACETATE SCH EA: 25 SUPPOSITORY RECTAL at 05:52

## 2022-09-27 RX ADMIN — OXYBUTYNIN CHLORIDE SCH MG: 5 TABLET ORAL at 20:23

## 2022-09-27 RX ADMIN — SODIUM BICARBONATE TAB 650 MG SCH MG: 650 TAB at 13:26

## 2022-09-27 RX ADMIN — SODIUM BICARBONATE TAB 650 MG SCH MG: 650 TAB at 08:48

## 2022-09-27 RX ADMIN — SODIUM CHLORIDE SCH MLS/HR: 900 INJECTION, SOLUTION INTRAVENOUS at 08:52

## 2022-09-27 RX ADMIN — VANCOMYCIN HYDROCHLORIDE SCH MG: 125 CAPSULE ORAL at 23:52

## 2022-09-27 RX ADMIN — SODIUM CHLORIDE SCH MLS/HR: 900 INJECTION, SOLUTION INTRAVENOUS at 18:34

## 2022-09-27 RX ADMIN — HYDROCORTISONE ACETATE SCH EA: 25 SUPPOSITORY RECTAL at 13:26

## 2022-09-27 RX ADMIN — HYDROCORTISONE ACETATE SCH EA: 25 SUPPOSITORY RECTAL at 20:23

## 2022-09-27 RX ADMIN — METRONIDAZOLE SCH MLS/HR: 5 INJECTION, SOLUTION INTRAVENOUS at 23:52

## 2022-09-27 RX ADMIN — SACUBITRIL AND VALSARTAN SCH TAB: 24; 26 TABLET, FILM COATED ORAL at 08:48

## 2022-09-27 RX ADMIN — FERROUS SULFATE TAB 325 MG (65 MG ELEMENTAL FE) SCH MG: 325 (65 FE) TAB at 08:48

## 2022-09-27 RX ADMIN — SODIUM BICARBONATE TAB 650 MG SCH MG: 650 TAB at 20:23

## 2022-09-27 RX ADMIN — CHOLESTYRAMINE SCH GM: 4 POWDER, FOR SUSPENSION ORAL at 08:48

## 2022-09-27 RX ADMIN — LORATADINE SCH MG: 10 TABLET ORAL at 08:48

## 2022-09-27 RX ADMIN — SACUBITRIL AND VALSARTAN SCH TAB: 24; 26 TABLET, FILM COATED ORAL at 20:23

## 2022-09-27 RX ADMIN — PANTOPRAZOLE SODIUM SCH MG: 40 TABLET, DELAYED RELEASE ORAL at 05:52

## 2022-09-27 RX ADMIN — AMLODIPINE BESYLATE SCH MG: 5 TABLET ORAL at 08:48

## 2022-09-27 RX ADMIN — CHOLESTYRAMINE SCH GM: 4 POWDER, FOR SUSPENSION ORAL at 20:23

## 2022-09-27 RX ADMIN — LEVOTHYROXINE SODIUM SCH MCG: 125 TABLET ORAL at 05:52

## 2022-09-27 RX ADMIN — FERROUS SULFATE TAB 325 MG (65 MG ELEMENTAL FE) SCH MG: 325 (65 FE) TAB at 20:23
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
## 2022-09-27 NOTE — DIAGNOSTIC IMAGING REPORT
INDICATION: Hypoxia.



Perfusion imaging performed following intravenous administration

of 5.5 mCi technetium 99m MAA.



This study is interpreted in correlation with a 2 view chest

x-ray one day prior.



FINDINGS: There is no segmental pleural-based perfusion defect to

suggest scintigraphic evidence for PE. Some mild generalized

heterogeneous distribution of the radiopharmacy in keeping with

radiographic features of COPD.



IMPRESSION: Low probability study for PE. 



Dictated by: 



  Dictated on workstation # [id number]

## 2022-09-28 VITALS — DIASTOLIC BLOOD PRESSURE: 77 MMHG | SYSTOLIC BLOOD PRESSURE: 139 MMHG

## 2022-09-28 VITALS — SYSTOLIC BLOOD PRESSURE: 128 MMHG | DIASTOLIC BLOOD PRESSURE: 70 MMHG

## 2022-09-28 VITALS — DIASTOLIC BLOOD PRESSURE: 75 MMHG | SYSTOLIC BLOOD PRESSURE: 131 MMHG

## 2022-09-28 VITALS — SYSTOLIC BLOOD PRESSURE: 131 MMHG | DIASTOLIC BLOOD PRESSURE: 75 MMHG

## 2022-09-28 VITALS — SYSTOLIC BLOOD PRESSURE: 122 MMHG | DIASTOLIC BLOOD PRESSURE: 77 MMHG

## 2022-09-28 LAB
BASOPHILS # BLD AUTO: 0.1 10^3/UL (ref 0–0.1)
BASOPHILS NFR BLD AUTO: 1 % (ref 0–10)
BUN/CREAT SERPL: 16
CALCIUM SERPL-MCNC: 8.5 MG/DL (ref 8.5–10.1)
CHLORIDE SERPL-SCNC: 124 MMOL/L (ref 98–107)
CO2 SERPL-SCNC: 11 MMOL/L (ref 21–32)
CREAT SERPL-MCNC: 1.46 MG/DL (ref 0.6–1.3)
EOSINOPHIL # BLD AUTO: 0.1 10^3/UL (ref 0–0.3)
EOSINOPHIL NFR BLD AUTO: 1 % (ref 0–10)
GFR SERPLBLD BASED ON 1.73 SQ M-ARVRAT: 37 ML/MIN
GLUCOSE SERPL-MCNC: 101 MG/DL (ref 70–105)
HCT VFR BLD CALC: 28 % (ref 35–52)
HGB BLD-MCNC: 8.8 G/DL (ref 11.5–16)
LYMPHOCYTES # BLD AUTO: 0.9 10^3/UL (ref 1–4)
LYMPHOCYTES NFR BLD AUTO: 10 % (ref 12–44)
MANUAL DIFFERENTIAL PERFORMED BLD QL: NO
MCH RBC QN AUTO: 29 PG (ref 25–34)
MCHC RBC AUTO-ENTMCNC: 32 G/DL (ref 32–36)
MCV RBC AUTO: 92 FL (ref 80–99)
MONOCYTES # BLD AUTO: 0.3 10^3/UL (ref 0–1)
MONOCYTES NFR BLD AUTO: 4 % (ref 0–12)
NEUTROPHILS # BLD AUTO: 8.2 10^3/UL (ref 1.8–7.8)
NEUTROPHILS NFR BLD AUTO: 85 % (ref 42–75)
PLATELET # BLD: 211 10^3/UL (ref 130–400)
PMV BLD AUTO: 11 FL (ref 9–12.2)
POTASSIUM SERPL-SCNC: 4.2 MMOL/L (ref 3.6–5)
SODIUM SERPL-SCNC: 148 MMOL/L (ref 135–145)
WBC # BLD AUTO: 9.6 10^3/UL (ref 4.3–11)

## 2022-09-28 RX ADMIN — FERROUS SULFATE TAB 325 MG (65 MG ELEMENTAL FE) SCH MG: 325 (65 FE) TAB at 08:21

## 2022-09-28 RX ADMIN — VANCOMYCIN HYDROCHLORIDE SCH MG: 125 CAPSULE ORAL at 06:16

## 2022-09-28 RX ADMIN — PANTOPRAZOLE SODIUM SCH MG: 40 TABLET, DELAYED RELEASE ORAL at 06:16

## 2022-09-28 RX ADMIN — SACUBITRIL AND VALSARTAN SCH TAB: 24; 26 TABLET, FILM COATED ORAL at 08:21

## 2022-09-28 RX ADMIN — SODIUM BICARBONATE TAB 650 MG SCH MG: 650 TAB at 08:21

## 2022-09-28 RX ADMIN — HYDROCORTISONE ACETATE SCH EA: 25 SUPPOSITORY RECTAL at 11:55

## 2022-09-28 RX ADMIN — VANCOMYCIN HYDROCHLORIDE SCH MG: 125 CAPSULE ORAL at 12:03

## 2022-09-28 RX ADMIN — OXYBUTYNIN CHLORIDE SCH MG: 5 TABLET ORAL at 08:21

## 2022-09-28 RX ADMIN — CHOLESTYRAMINE SCH GM: 4 POWDER, FOR SUSPENSION ORAL at 08:22

## 2022-09-28 RX ADMIN — HYDROCORTISONE ACETATE SCH EA: 25 SUPPOSITORY RECTAL at 01:11

## 2022-09-28 RX ADMIN — LORATADINE SCH MG: 10 TABLET ORAL at 08:21

## 2022-09-28 RX ADMIN — SODIUM BICARBONATE TAB 650 MG SCH MG: 650 TAB at 12:03

## 2022-09-28 RX ADMIN — SODIUM CHLORIDE SCH MLS/HR: 900 INJECTION, SOLUTION INTRAVENOUS at 02:37

## 2022-09-28 RX ADMIN — AMLODIPINE BESYLATE SCH MG: 5 TABLET ORAL at 08:21

## 2022-09-28 RX ADMIN — METRONIDAZOLE SCH MLS/HR: 5 INJECTION, SOLUTION INTRAVENOUS at 08:21

## 2022-09-28 RX ADMIN — LEVOTHYROXINE SODIUM SCH MCG: 125 TABLET ORAL at 06:16

## 2022-09-28 NOTE — OCCUPATIONAL THERAPY EVAL
OT Evaluation-General/PLF


Medical Diagnosis


Admission Date


Sep 26, 2022 at 14:10


Medical Diagnosis:  LISSETTE and CKD


Onset Date:  Sep 26, 2022





Therapy Diagnosis


Therapy Diagnosis:  Reduced ADL status





Precautions


Precautions/Isolations:  Contact Isolation, Fall Prevention





Referral


Physician:  Cheikh Morris Reason:  Evaluation/Treatment





Medical History


Pertinent Medical History:  Atrial Fib, Arthritis, DM, HTN, Hypothroidism, OA, 

Renal Insufficiency


Current History


[facility]MS, SOB, weakness and low O2 stats. 

The day she came to the ER, was [facility] day [facility] before returning 

home. She will be returning home after this hospital stay. She will be living at

home with her grandson. She was independent with all ADLs prior to 

hospitalization and confusion. She was using a walker/ cane at baseline.


Reviewed History:  Yes





Social History


Home:  Single Level


Current Living Status:  Other Family (Grandson)





ADL-Prior Level of Function


SCALE: Activities may be completed with or without assistive devices.





6-Indepedent-patient completes the activity by him/herself with no assistance 

from a helper.


5-Set-up or Clean-up Assistance-helper sets up or cleans up; patient completes 

activity. Lequire assists only prior to or  


    following the activity.


4-Supervision or Touching Assistance-helper provides verbal cues and/or 

touching/steadying and/or contact guard assistance as patient completes 

activity. Assistance may be provided   


    throughout the activity or intermittently.


3-Partial/Moderate Assistance-helper does LESS THAN HALF the effort. Lequire 

lifts, holds or supports trunk or limbs, but provides less than half the effort.


2-Substantial/Maximal Assistance-helper does MORE THAN HALF the effort. Lequire 

lifts or holds trunk or limbs and provides more than half the effort.


0-Bcndceckc-ngsjsr does ALL the effort. Patient does none of the effort to 

complete the activity. Or, the assistance of 2 or more helpers is required for 

the patient to complete the  


    activity.


If activity was not attempted, code reason:


7-Patient Refused.


9-Not Applicable-not attempted and the patient did not perform the activity 

before the current illness, exacerbation or injury.


10-Not Attempted due to Environmental Limitations-(lack of equipment, weather 

restraints, etc.).


88-Not Attempted due to Medical Conditions or Safety Concerns.


Self Care:  Independent


Functional Cognition:  Needed Some Help


DME/Equipment:  Bath Chair





OT Current Status


Subjective


Pt sitting in recliner upon arrival and agreeable to therapy eval. Pt and 

nursing stated d/c for today.





Appearance


Pt left sitting in recliner with all needs within reach.





Mental Status/Objective


Patient Orientation:  Person, Confused, Place


Attachments:  IV





Current


Glasses/Contacts:  Yes


Upper Extremity ROM


WFL


~165-170 shoulder flexion


Upper Extremity Strength


3+/5 shoulder 


 strength: impaired





ADL-Treatment


Eating (QC):  6


Oral Hygiene (QC):  6 (per clinical judgment)


Upper Body Dressing (QC):  6 (per pt report)


Lower Body Dressing (QC):  6 (per pt report)


On/Off Footwear (QC):  6


Toileting Hygiene (QC):  6


Per pt report, she was able to get dressed with independence this morning. 

Sit<>stand transfers: Sup. Pt ambulated to bathroom, performed toilet transfer 

with supervision and washed her hands with independence. Pt denies any self care

concerns at this time. OT will discharge.





Education


OT Patient Education:  Correct positioning, Modified ADL techniques, Progress 

toward Goal/Update tx plan, Purpose of tx/functional activities, Reviewed prec

autions, Rehab process


Teaching Recipient:  Patient


Teaching Methods:  Discussion


Response to Teaching:  Verbalize Understanding, Return Demonstration





OT Long Term Goals


Long Term Goals


1=Demonstrate adherence to instructed precautions during ADL tasks.


2=Patient will verbalize/demonstrate understanding of assistive 

devices/modifications for ADL.


3=Patient will improve strength/tolerance for activity to enable patient to 

perform ADL's.





OT Education/Plan


Problem List/Assessment


Assessment:  No Skilled OT Needs ID'd





Discharge Recommendations


Plan/Recommendations:  Discontinue OT


Therapy Discharge Recommendati:  Homemaker Support, Home & Family


Equpiment Recommendations-D/C:  Bath Chair





Treatment Plan/Plan of Care


Treatment,Training & Education:  Yes


Patient would benefit from OT for education, treatment and training to promote 

independence in ADL's, mobility, safety and/or upper extremity function for 

ADL's.


Plan of Care:  ADL Retraining, Functional Mobility


Treatment Duration:  Sep 28, 2022


Frequency:  1 time per week


Estimated Hrs Per Day:  .25 hour per day


Agreement:  Yes


Rehab Potential:  Fair





Time/GCodes


Start Time:  11:39


Stop Time:  11:53


Total Time Billed (hr/min):  14


Billed Treatment Time


1 visit


Mackenzie Agiuar OT                Sep 28, 2022 12:35
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
## 2022-09-28 NOTE — DISCHARGE SUMMARY
Discharge Summary


Reconcile Patient Problems


Problems Reviewed?:  Yes





Instructions for Patient


[facility] [employer], [phone]


Assessment/Instructions


See instructions


Physician to follow Patient:  Maximus Castellanos


Discharge Diet for Home:  Low Sodium Diet


Hospital Course


Date of Admission: Sep 26, 2022 at 14:10 


Admission Diagnosis :  Acute kidney injury





Family Physician/Provider: Emanuel  





Date of Discharge: 9/28/22 


Discharge Diagnosis:  Recurrent C diff diarrhea, LISSETTE on CKD








Hospital Course:


Dipti oJy is a 77 year old female who presented with weakness and was 

admitted with LISSETTE on CKD 3a. She was having some diarrhea and has a history of C

diff. She was found to have a second recurrence of C diff. She was started on 

oral Vancomycin and will complete a prolonged taper. Her kidney function 

improved with IV fluids. Her diarrhea improved. She was eating and drinking. She

was ambulating well with therapy. She was set up with home health care. She was 

given information regarding C diff by infection control nurse, Ryley Lira. 

She should follow up at [facility] in about a week. She was discharged home in stable 

condition.














Labs and Pending Lab Test:


Laboratory Tests


9/28/22 05:12: 


White Blood Count 9.6, Red Blood Count 3.01L, Hemoglobin 8.8L, Hematocrit 28L, 

Mean Corpuscular Volume 92, Mean Corpuscular Hemoglobin 29, Mean Corpuscular 

Hemoglobin Concent 32, Red Cell Distribution Width 17.5H, Platelet Count 211, 

Mean Platelet Volume 11.0, Immature Granulocyte % (Auto) 0, Neutrophils (%) 

(Auto) 85H, Lymphocytes (%) (Auto) 10L, Monocytes (%) (Auto) 4, Eosinophils (%) 

(Auto) 1, Basophils (%) (Auto) 1, Neutrophils # (Auto) 8.2H, Lymphocytes # 

(Auto) 0.9L, Monocytes # (Auto) 0.3, Eosinophils # (Auto) 0.1, Basophils # 

(Auto) 0.1, Immature Granulocyte # (Auto) 0.0, Sodium Level 148H, Potassium 

Level 4.2, Chloride Level 124H, Carbon Dioxide Level 11L, Anion Gap 13, Blood 

Urea Nitrogen 24H, Creatinine 1.46H, Estimat Glomerular Filtration Rate 37, 

BUN/Creatinine Ratio 16, Glucose Level 101, Calcium Level 8.5





Microbiology


9/27/22 C. difficile DNA Amplification - Final, Complete


          


9/27/22 C. difficile [facility] Antigen & Toxins - Final, Complete


          


9/26/22 Blood Culture - Preliminary, Resulted


          No growth





Home Meds


Active


Vancomycin HCl 125 Mg Capsule 250 Mg PO Q6HR 35 Days


     TAKE 125MG 4X DAILY FOR 10 DAYS, THEN 125MG 2X DAILY FOR 7 DAYS, THEN


     125MG DAILY FOR 7 DAYS, THEN 125MG EVERY 3 DAYS FOR TWO WEEKS.


Hydrocortisone Acetate 25 Mg Supp.rect 1 Ea DC Q8H


Gaviscon Es Tablet Chew (Magnesium Carbonate/Al Hydrox) 105 Mg-160 Mg Tab.chew 2

Each PO QID PRN


Tramadol HCl 50 Mg Tablet 50 Mg PO TID PRN


Levothyroxine Sodium 125 Mcg Tablet 125 Mcg PO DAILY


Leflunomide 20 Mg Tablet 20 Mg PO DAILY


Vitamin D2 (Ergocalciferol (Vitamin D2)) 1,250 Mcg (46907 Unit) Capsule 1,250 

Mcg PO WED


Cholestyramine Packet (Cholestyramine (with Sugar)) 4 Gram Powd.pack 4 Gm PO BID


Amlodipine Besylate 5 Mg Tablet 5 Mg PO DAILY


Pantoprazole Sodium 40 Mg Tablet.dr 40 Mg PO DAILY


Sodium Bicarbonate 650 Mg Tablet 650 Mg PO TID


Entresto 24 mg-26 mg Tablet (Sacubitril/Valsartan) 24 Mg-26 Mg Tablet 1 Mg PO 

BID 30 Days


Carvedilol 3.125 Mg Tablet 3.125 Mg PO BID WITH MEALS


Tylenol Arthritis (Acetaminophen) 650 Mg Tablet.er 650 Mg PO Q6H PRN


Oxybutynin Chloride 5 Mg Tablet 5 Mg PO BID


Xalatan (Latanoprost) 0.005 % Drops 1 Drop OU HS


Iron (Ferrous Sulfate) 325 Mg (65 Mg Iron) Tablet 325 Mg PO BID


Amitriptyline HCl 100 Mg Tablet 100 Mg PO HS


Fenofibrate 160 Mg Tablet 160 Mg PO HS


Cetirizine HCl 10 Mg Tablet 10 Mg PO DAILY


Reported


Megestrol Acetate 400 Mg/10 Ml (40 Mg/Ml) Oral.susp 20 Ml PO ACHS


Patient Allergies:  


Coded Allergies:  


     Penicillins (Verified  Allergy, Unknown, 9/26/22)


     raspberry (Verified  Allergy, Unknown, 9/26/22)





Home Health Need/Face to Face


Date of Face to Face:  Sep 28, 2022


Clinical Findings:  Generalized weakness and fatigue, Instability, Muscle 

weakness


I have seen Pt face-to-face:  Yes


Discharged To:  Home


Diagnosis/Conditions:  


C diff diarrhea


LISSETTE on CKD


HFrEF


T2DM


Fistula


Problems/Diagnosis/Condition:  


(1) Recurrent Clostridioides difficile diarrhea


(2) Stage 3a chronic kidney disease


(3) Atrial fibrillation


(4) Diabetes mellitus


(5) Fistula


Patient is Homebound due to:  Sandra fall risk due to instabilty, Muscle weakness


Homebound Status


   Due to the above stated illness, injury or surgical procedure (medical 

condition or diagnosis) and associated clinical findings, the patient is 

homebound because of his/her inability to leave home except with aid of a 

supportive device and/or person AND leaving the home requires a considerable and

taxing effort or is medically contraindicated.


Pt req the following assistanc:  Aid of another person, Walker





Home Health Nursing Orders


Home Health Services Order:  Nursing Services, Occupational Ther-Evaluate & 

Treat, Physical Therapy-Evaluate & Treat, Wound Care-Eval/Treat





Home Health Infusion Therapy


Line Start Date:  Sep 26, 2022





Therapy Orders


Therapy Orders:  OT (must have SN or PT order), Physical Therapy


Therapy Specific Orders:  Eval assistive deivces, Teach strategies/cognitive 

deficits, Teach enviro modifications/safety, Gait training, Increase 

strength/endurance


Certify Stmt


I certify that this patient is under my care and that I, a nurse practitioner or

a physician; a assistant working with me, had a face to face encounter that -

meets the physician face to face encounter requirements with this patient as 

dated.





Discharge Physical Exam


General:  Alert, Cooperative, No Acute Distress


HEENT:  Atraumatic, EOMI, Mucous Memb Moist/Pink


Lungs:  Clear to Auscultation, Normal Air Movement


Heart:  Regular Rate, No Murmurs


Abdomen:  Normal Bowel Sounds, Soft, No Tenderness


Extremities:  No Edema, No Tenderness/Swelling


Psych/Mental Status:  Mental Status NL, Mood NL











YOSELIN NORIEGA MD              Sep 28, 2022 15:07

## 2022-10-19 ENCOUNTER — HOSPITAL ENCOUNTER (OUTPATIENT)
Dept: HOSPITAL 75 - CARD | Age: 77
End: 2022-10-19
Attending: INTERNAL MEDICINE
Payer: MEDICARE

## 2022-10-19 DIAGNOSIS — I50.9: Primary | ICD-10-CM

## 2022-10-19 PROCEDURE — 93306 TTE W/DOPPLER COMPLETE: CPT

## 2022-11-09 ENCOUNTER — HOSPITAL ENCOUNTER (OUTPATIENT)
Dept: HOSPITAL 75 - CATH | Age: 77
Discharge: HOME | End: 2022-11-09
Attending: INTERNAL MEDICINE
Payer: MEDICARE

## 2022-11-09 VITALS — DIASTOLIC BLOOD PRESSURE: 88 MMHG | SYSTOLIC BLOOD PRESSURE: 153 MMHG

## 2022-11-09 VITALS — SYSTOLIC BLOOD PRESSURE: 139 MMHG | DIASTOLIC BLOOD PRESSURE: 66 MMHG

## 2022-11-09 VITALS — SYSTOLIC BLOOD PRESSURE: 143 MMHG | DIASTOLIC BLOOD PRESSURE: 96 MMHG

## 2022-11-09 VITALS — DIASTOLIC BLOOD PRESSURE: 71 MMHG | SYSTOLIC BLOOD PRESSURE: 138 MMHG

## 2022-11-09 VITALS — SYSTOLIC BLOOD PRESSURE: 149 MMHG | DIASTOLIC BLOOD PRESSURE: 82 MMHG

## 2022-11-09 VITALS — BODY MASS INDEX: 18.71 KG/M2 | WEIGHT: 102.96 LBS | HEIGHT: 62.01 IN

## 2022-11-09 VITALS — DIASTOLIC BLOOD PRESSURE: 70 MMHG | SYSTOLIC BLOOD PRESSURE: 128 MMHG

## 2022-11-09 VITALS — SYSTOLIC BLOOD PRESSURE: 133 MMHG | DIASTOLIC BLOOD PRESSURE: 73 MMHG

## 2022-11-09 VITALS — SYSTOLIC BLOOD PRESSURE: 114 MMHG | DIASTOLIC BLOOD PRESSURE: 66 MMHG

## 2022-11-09 VITALS — DIASTOLIC BLOOD PRESSURE: 77 MMHG | SYSTOLIC BLOOD PRESSURE: 137 MMHG

## 2022-11-09 DIAGNOSIS — E11.9: ICD-10-CM

## 2022-11-09 DIAGNOSIS — I25.10: Primary | ICD-10-CM

## 2022-11-09 DIAGNOSIS — Z85.43: ICD-10-CM

## 2022-11-09 DIAGNOSIS — I50.22: ICD-10-CM

## 2022-11-09 DIAGNOSIS — E78.2: ICD-10-CM

## 2022-11-09 DIAGNOSIS — Z28.311: ICD-10-CM

## 2022-11-09 DIAGNOSIS — Z79.84: ICD-10-CM

## 2022-11-09 DIAGNOSIS — Z87.891: ICD-10-CM

## 2022-11-09 DIAGNOSIS — I48.0: ICD-10-CM

## 2022-11-09 DIAGNOSIS — I11.0: ICD-10-CM

## 2022-11-09 DIAGNOSIS — Z79.899: ICD-10-CM

## 2022-11-09 DIAGNOSIS — Z79.82: ICD-10-CM

## 2022-11-09 LAB
ALBUMIN SERPL-MCNC: 3.2 GM/DL (ref 3.2–4.5)
ALP SERPL-CCNC: 39 U/L (ref 40–136)
ALT SERPL-CCNC: 9 U/L (ref 0–55)
APTT BLD: 36 SEC (ref 24–35)
APTT PPP: YELLOW S
BACTERIA #/AREA URNS HPF: (no result) /HPF
BILIRUB SERPL-MCNC: 0.3 MG/DL (ref 0.1–1)
BILIRUB UR QL STRIP: NEGATIVE
BUN/CREAT SERPL: 9
CALCIUM SERPL-MCNC: 9 MG/DL (ref 8.5–10.1)
CHLORIDE SERPL-SCNC: 107 MMOL/L (ref 98–107)
CHOLEST SERPL-MCNC: 125 MG/DL (ref ?–200)
CO2 SERPL-SCNC: 25 MMOL/L (ref 21–32)
CREAT SERPL-MCNC: 1.59 MG/DL (ref 0.6–1.3)
FIBRINOGEN PPP-MCNC: CLEAR MG/DL
GFR SERPLBLD BASED ON 1.73 SQ M-ARVRAT: 33 ML/MIN
GLUCOSE SERPL-MCNC: 77 MG/DL (ref 70–105)
GLUCOSE UR STRIP-MCNC: NEGATIVE MG/DL
HCT VFR BLD CALC: 29 % (ref 35–52)
HDLC SERPL-MCNC: 46 MG/DL (ref 40–60)
HGB BLD-MCNC: 8.9 G/DL (ref 11.5–16)
INR PPP: 1 (ref 0.8–1.4)
KETONES UR QL STRIP: NEGATIVE
LEUKOCYTE ESTERASE UR QL STRIP: (no result)
MCH RBC QN AUTO: 28 PG (ref 25–34)
MCHC RBC AUTO-ENTMCNC: 31 G/DL (ref 32–36)
MCV RBC AUTO: 91 FL (ref 80–99)
NITRITE UR QL STRIP: POSITIVE
PH UR STRIP: 5.5 [PH] (ref 5–9)
PLATELET # BLD: 343 10^3/UL (ref 130–400)
PMV BLD AUTO: 9.5 FL (ref 9–12.2)
POTASSIUM SERPL-SCNC: 4 MMOL/L (ref 3.6–5)
PROT SERPL-MCNC: 6.2 GM/DL (ref 6.4–8.2)
PROT UR QL STRIP: (no result)
PROTHROMBIN TIME: 14 SEC (ref 12.2–14.7)
RBC #/AREA URNS HPF: (no result) /HPF
SODIUM SERPL-SCNC: 141 MMOL/L (ref 135–145)
SP GR UR STRIP: 1.01 (ref 1.02–1.02)
SQUAMOUS #/AREA URNS HPF: (no result) /HPF
TRIGL SERPL-MCNC: 87 MG/DL (ref ?–150)
VLDLC SERPL CALC-MCNC: 17 MG/DL (ref 5–40)
WBC # BLD AUTO: 8.9 10^3/UL (ref 4.3–11)
WBC #/AREA URNS HPF: (no result) /HPF

## 2022-11-09 PROCEDURE — 87081 CULTURE SCREEN ONLY: CPT

## 2022-11-09 PROCEDURE — 87077 CULTURE AEROBIC IDENTIFY: CPT

## 2022-11-09 PROCEDURE — 87186 SC STD MICRODIL/AGAR DIL: CPT

## 2022-11-09 PROCEDURE — 85730 THROMBOPLASTIN TIME PARTIAL: CPT

## 2022-11-09 PROCEDURE — 93458 L HRT ARTERY/VENTRICLE ANGIO: CPT

## 2022-11-09 PROCEDURE — 85027 COMPLETE CBC AUTOMATED: CPT

## 2022-11-09 PROCEDURE — 93005 ELECTROCARDIOGRAM TRACING: CPT

## 2022-11-09 PROCEDURE — 71045 X-RAY EXAM CHEST 1 VIEW: CPT

## 2022-11-09 PROCEDURE — 80053 COMPREHEN METABOLIC PANEL: CPT

## 2022-11-09 PROCEDURE — 81000 URINALYSIS NONAUTO W/SCOPE: CPT

## 2022-11-09 PROCEDURE — 36415 COLL VENOUS BLD VENIPUNCTURE: CPT

## 2022-11-09 PROCEDURE — 80061 LIPID PANEL: CPT

## 2022-11-09 PROCEDURE — 87088 URINE BACTERIA CULTURE: CPT

## 2022-11-09 PROCEDURE — 85610 PROTHROMBIN TIME: CPT

## 2022-11-09 PROCEDURE — 93571 IV DOP VEL&/PRESS C FLO 1ST: CPT

## 2022-11-09 NOTE — DISCHARGE INST-POST CATH
Discharge Inst-CATH/EP


Problems Reviewed?:  Yes


Post Cardiac Cath/EP D/C Inst


Follow Up/Plan


Appointment with Dr. Birch's office in 2 to 4 weeks


<b>CARDIAC CATH/EP PROCEDURE DISCHARGE INSTRUCTIONS</b>





ACTIVITY





* Go Home directly and rest.


* Limit activity of the leg (or wrist if it was used) for 7 days including aer

obics, swimming,


   jogging, bicycling, etc.


* Restrict stair-climbing for 7 days if possible, if not, climb up with your 

non-cath leg, then


   bring together on the same step.


* Avoid lifting, pushing, pulling or excessive movement of the affected extremi

ty for 7 days.


* Customary sexual activity may be resumed after 2 days-use caution not to use a

position  


   that strains or causes pain to the affected extremity.


* No driving for 24 hours.


* NO SMOKING. 


* Avoid straining for bowel movements for 7 days.


* Gentle walking on level ground is allowed.


* Returning to work will depend on the type of procedure and the results. Your 

doctor will discuss


   this with you.





CALL YOUR DOCTOR FOR ANY OF THE FOLLOWING:





*If bleeding from the puncture site occurs- Apply gentle pressure to site with 

clean cloth and call


   your doctor or EMS.


* If a knot or lump forms under the skin, increases in size, or causes pain.


* If bruising appears to be worsening or moving further down your leg instead of

disappearing.


* Temperature above 101 F.





CARE OF YOUR GROIN INCISION;





* Bruising or purple discoloration of the skin near the puncture site is common.


* You may shower only, no bathtub bathing for 5 days.  Be careful to avoid 

slipping as your


   leg may feel stiff.


* If a closure device was used on your femoral artery, please see the attached 

guide regarding


   care of the device and your leg.


* Leave dressing on FOR 24 hours.





CARE OF YOUR WRIST INCISION;





* Bruising or purple discoloration of the skin near the puncture site is common.


* You may shower.


* DO NOT submerge wrist.


* Leave dressing on FOR 24 hours.











MELI BIRCH MD               Nov 9, 2022 11:44

## 2022-11-09 NOTE — DIAGNOSTIC IMAGING REPORT
EXAMINATION: Chest 1 view



HISTORY: Preoperative evaluation.



COMPARISON: 8/15/2022



FINDINGS: 



Heart size and pulmonary vasculature are normal. The lungs are

clear without consolidation, pleural effusion, or pneumothorax.

The osseous structures are intact.



IMPRESSION: 



1. No acute radiographic abnormality in the chest.



Dictated by: 



  Dictated on workstation # [id number]

## 2022-11-09 NOTE — CARDIAC CATH REPORT
Cardiac Cath Report


Physician (s)/Assistant (s)


Physician


MELI BRITO MD





Pre-Procedure Diagnosis


Pre-Procedure Diagnosis:  CAD





Post-Procedure Note


Procedure Start Date:  Nov 9, 2022


Name of Procedure:  


Left heart catheterization


IFR to the right coronary artery


Findings/Procedure Note


PROCEDURE NOTE:


77-year-old lady with history of hypertension, paroxysmal atrial fibrillation, 

congestive heart failure.  Cardiac catheterization was advised to rule out 

underlying coronary artery disease.


After explaining the procedure to the patient, all pros and cons were explained,

all questions were answered.  The patient signed the consent and then she  was 

placed on the cardiac catheterization laboratory. Groin was prepped SL fashion 

local anesthesia was used. Sheath placed in the right radial artery, [place] 

catheter was advanced to the left ventricular cavity, pressure was measured, 

pullback LV to aorta was done, engage the right and left coronary system, 

angiogram was done.  Then I advanced the IFR wire through the right coronary 

artery and did IFR.  Initial IFR was 0.88 then repeat IFR after flushing the 

catheter was 1.05.  Wire was removed, no complication noted.


At the end of the procedure the sheath was removed.  Vascular band was used





FINDINGS:





Hemodynamics 


/14, end-diastolic pressure 14


Aorta 109/71 mean of 90





ANATOMY:


Left Main is free of obstructive disease


Left Anterior Descending has mild disease nonobstructive disease


Left Circumflex has mild disease nonobstructive disease


Right Coronary Artery is a large dominant artery with 50% stenosis at the 

proximal and distal portion, IFR through both lesions was 1.05.


LV Gram was not done, pressure was measured





CONCLUSION:


1.  50% stenosis and the mid and distal right coronary artery with IFR 1.05.  

Nonobstructive disease


2.  Otherwise mild coronary artery disease nonobstructive disease


3.  Normal left ventricular end-diastolic pressure





DISCUSSION AND RECOMMENDATION:


Continue with medical therapy no intervention is needed


Anesthesia Type:  Conscious Sedation


Estimated blood loss (mL):  15 ml


Contrast Amount:  15 ml


Total Radiation Dose:  77 mGy





Post-Procedure Diagnosis


Post-operative diagnosis:  


Coronary artery disease


Congestive heart failure, chronic compensated left ventricular systolic


dysfunction


Paroxysmal atrial fibrillation


Hypertension











MELI BRITO MD               Nov 9, 2022 11:44

## 2022-11-09 NOTE — CARDIAC PROCEDURE NOTE-CS/ASA
Pre-Procedure Note


Pre-Op Procedure Note


Date of Available H&P:  Nov 8, 2022


Date H&P Reviewed:  Nov 9, 2022


Time H&P Reviewed:  10:45


History & Physical:  H&P Reviewed, Patient Examed, No changes noted


Pre-Operative Diagnosis:  CAD





Conscious Sedation Pre-Proced


Time


10:45





ASA Score


3


For ASA 3 and 4: Consider anesthesia and medical clearance. Also, for patients

with a history of failed moderate sedation consider anesthesia.

















Airway 


 


Lungs 


 


Heart 


 


 ASA score


 


 ASA 1: a normal healthy patient


 


 ASA 2:  a patient with a mild systemic disease (mid diabetes, controlled 

hypertension, obesity 


 


 ASA 3:  a patient with a severe systemic disease that limits activity  (angina,

COPD, prior Myocardial infarction)


 


 ASA 4:  a patient with an incapacitating disease that is a constant threat to 

life (CHF, renal failure)


 


 ASA 5:  a moribund patient not expected to survive 24 hrs.  (ruptured aneurysm)


 


 ASA 6:  a declared brain-dead patient whose organs are being harvested.


 


 For emergent operations, add the letter E after the classification











Mallampati Classification


Grade 3





Sedation Plan


Analgesia, Amnesia, Plan communicated to team members, Discussed options with 

patient/fam, Discussed risks with patient/fam


The patient is an appropriate candidate to undergo the planned procedure, 

sedation, and anesthesia.





The patient immediately re-assessed prior to indication.











MELI BRITO MD               Nov 9, 2022 10:45

## 2023-02-16 NOTE — D/C HH FACE TO FACE ORDER
[address] ED  EMERGENCY DEPARTMENT ENCOUNTER      Pt Name: Lico Mcneal  MRN: [id number]  [place] 1950  Date of evaluation: 2/16/2023  Provider: HUSSEIN Barrera CNP    CHIEF COMPLAINT       Chief Complaint   Patient presents with    Insect Bite         HISTORY OF PRESENT ILLNESS   (Location/Symptom, Timing/Onset, Context/Setting, Quality, Duration, Modifying Factors, Severity)  Note limiting factors. Lico Mcneal is a 71 y.o. female who presents to the emergency department for evaluation of an insect bite to the right upper arm that she noticed 3 days ago. She states that she felt it bite her and it has been a little itchy. Yesterday she noticed some red streaking going towards her armpit and felt a little lightheaded so she wanted to make sure it wasn't infected. No nausea, no vomiting, no fevers. Nursing Notes were reviewed. REVIEW OF SYSTEMS    (2-9 systems for level 4, 10 or more for level 5)     Review of Systems   Skin:  Positive for wound. All other systems reviewed and are negative. Except as noted above the remainder of the review of systems was reviewed and negative. PAST MEDICAL HISTORY     Past Medical History:   Diagnosis Date    Depression     Hyperlipidemia     Hypertension          SURGICAL HISTORY       Past Surgical History:   Procedure Laterality Date    CHOLECYSTECTOMY      EYE SURGERY      orbital blowout    MENISCECTOMY           CURRENT MEDICATIONS       Previous Medications    ASPIRIN 81 MG TABLET    Take 81 mg by mouth daily. ATORVASTATIN (LIPITOR) 10 MG TABLET    Take by mouth daily Dose unknown    BUPROPION HCL (WELLBUTRIN PO)    Take  by mouth daily. CALCIUM CARBONATE (OSCAL) 500 MG TABS TABLET    Take 500 mg by mouth daily. CARVEDILOL (COREG) 12.5 MG TABLET    Take 12.5 mg by mouth daily. CITALOPRAM (CELEXA) 20 MG TABLET    Take 20 mg by mouth daily.     DESVENLAFAXINE SUCCINATE (PRISTIQ) 50 MG TB24 EXTENDED RELEASE TABLET D/C  Face to Face Orders


Reconcile Patient Problems


Problems Reviewed?:  Yes





Instructions for Patient


[facility], [phone]


Patient Instructions/FollowUp:  


Steven Leyva 1 week


Dr Mitchell


Cardiology


Physician to follow Patient:  [facility]


Discharge Diet for Home:  No Restrictions


Patient Problems:  


Ileostomy


C diff hx





Patient Data-Allergies,Ht & Wt


Patient Allergies:  


Coded Allergies:  


     Penicillins (Verified  Allergy, Unknown, 10/30/20)


     raspberry (Verified  Allergy, Unknown, 10/30/20)





Home Health Need/Face to Face


Date of Face to Face:  Nov 10, 2020


Clinical Findings:  Generalized weakness and fatigue, Instability, Muscle 

weakness, Unsteady gait, Non-healing wound


I have seen Pt face-to-face:  Yes


Discharged To:  Home


Diagnosis/Conditions:  


Ileostomy


C diff hx


Patient is Homebound due to:  Sandra fall risk due to instabilty


Homebound Status


   Due to the above stated illness, injury or surgical procedure (medical 

condition or diagnosis) and associated clinical findings, the patient is 

homebound because of his/her inability to leave home except with aid of a 

supportive device and/or person AND leaving the home requires a considerable and

taxing effort or is medically contraindicated.


Pt req the following assistanc:  Walker





Home Health Nursing Orders


Home Health Services Order:  Nursing Services, Occupational Ther-Evaluate & 

Treat, Physical Therapy-Evaluate & Treat, Wound Care-Eval/Treat


Certify Stmt


I certify that this patient is under my care and that I, a nurse practitioner or

a physician; a assistant working with me, had a face to face encounter that -

meets the physician face to face encounter requirements with this patient as 

dated.











TAMMY HURST DO                Nov 10, 2020 05:59 Take 50 mg by mouth daily    FOLIC ACID (FOLVITE) 1 MG TABLET    Take 1 mg by mouth daily. LEVOTHYROXINE SODIUM (SYNTHROID PO)    Take  by mouth daily. MULTIPLE VITAMINS-MINERALS (THERAPEUTIC MULTIVITAMIN-MINERALS) TABLET    Take 1 tablet by mouth daily. OMEPRAZOLE (PRILOSEC) 20 MG CAPSULE    Take 20 mg by mouth daily. PRAVASTATIN (PRAVACHOL) 20 MG TABLET    Take 20 mg by mouth daily. ALLERGIES     Patient has no known allergies. FAMILY HISTORY     History reviewed. No pertinent family history. SOCIAL HISTORY       Social History     Socioeconomic History    Marital status:      Spouse name: None    Number of children: None    Years of education: None    Highest education level: None   Tobacco Use    Smoking status: Never    Smokeless tobacco: Never   Substance and Sexual Activity    Alcohol use: Not Currently       SCREENINGS                               CIWA Assessment  BP: (!) 154/83  Heart Rate: 76                 PHYSICAL EXAM    (up to 7 for level 4, 8 or more for level 5)     ED Triage Vitals   BP Temp Temp src Heart Rate Resp SpO2 Height Weight   02/16/23 1814 02/16/23 1812 -- 02/16/23 1812 02/16/23 1811 02/16/23 1819 02/16/23 1811 02/16/23 1811   (!) 154/83 97.9 °F (36.6 °C)  76 14 98 % 5' 3\" (1.6 m) 175 lb (79.4 kg)       Physical Exam  Vitals and nursing note reviewed. Constitutional:       General: She is not in acute distress. Appearance: Normal appearance. She is not toxic-appearing. HENT:      Head: Normocephalic and atraumatic. Right Ear: External ear normal.      Left Ear: External ear normal.      Nose: Nose normal.      Mouth/Throat:      Mouth: Mucous membranes are moist.      Pharynx: Oropharynx is clear. Eyes:      Extraocular Movements: Extraocular movements intact. Conjunctiva/sclera: Conjunctivae normal.   Cardiovascular:      Rate and Rhythm: Normal rate and regular rhythm. Pulses: Normal pulses.       Heart sounds: Normal heart sounds. No murmur heard. Pulmonary:      Effort: Pulmonary effort is normal. No respiratory distress. Breath sounds: Normal breath sounds. Abdominal:      General: Abdomen is flat. There is no distension. Palpations: Abdomen is soft. There is no mass (no pulsitile mass). Tenderness: There is no abdominal tenderness. Musculoskeletal:         General: No swelling or tenderness. Normal range of motion. Cervical back: Normal range of motion and neck supple. No rigidity or tenderness. Skin:     General: Skin is warm and dry. Capillary Refill: Capillary refill takes less than 2 seconds. Comments: Small 1 cm erythematous indurated area surrounding a scabbed center with some surrounding erythema noted to the underside of the right arm. I do not notice any proximal streaking and there is no palpable lymphadenopathy in the right axilla   Neurological:      General: No focal deficit present. Mental Status: She is alert and oriented to person, place, and time. Psychiatric:         Mood and Affect: Mood normal.       DIAGNOSTIC RESULTS     EKG: All EKG's are interpreted by the Emergency Department Physician who either signs or Co-signs this chart in the absence of a cardiologist.        RADIOLOGY:   Non-plain film images such as CT, Ultrasound and MRI are read by the radiologist. Plain radiographic images are visualized and preliminarily interpreted by the emergency physician with the below findings:        Interpretation per the Radiologist below, if available at the time of this note:    No orders to display         ED BEDSIDE ULTRASOUND:   Performed by ED Physician - none    LABS:  Labs Reviewed - No data to display    All other labs were within normal range or not returned as of this dictation.     EMERGENCY DEPARTMENT COURSE and DIFFERENTIAL DIAGNOSIS/MDM:   Vitals:    Vitals:    02/16/23 1811 02/16/23 1812 02/16/23 1814 02/16/23 1819   BP:   (!) 154/83    Pulse:  76     Resp: 14 Temp:  97.9 °F (36.6 °C)     SpO2:    98%   Weight: 79.4 kg (175 lb)      Height: 5' 3\" (1.6 m)              Medical Decision Making  Risk  Prescription drug management. I do not notice any significant proximal streaking but I do note some surrounding erythema and we will treat with antibiotics. We discussed returning for symptoms that worsen, proximal streaking, fevers, nausea vomiting or any other worsening symptoms or concerns. She will follow with primary care physician for a wound check and return for any other problems    REASSESSMENT          CRITICAL CARE TIME       CONSULTS:  None    PROCEDURES:  Unless otherwise noted below, none     Procedures        FINAL IMPRESSION      1. Insect bite of right upper arm with infection, initial encounter          DISPOSITION/PLAN   DISPOSITION Decision To Discharge 02/16/2023 06:26:31 PM      PATIENT REFERRED TO:  Shelley Payne MD  [address]  [phone]    In 2 days  For wound re-check    [facility] ED  [address] [place] [address]  [phone]    If symptoms worsen      DISCHARGE MEDICATIONS:  New Prescriptions    CEPHALEXIN (KEFLEX) 500 MG CAPSULE    Take 1 capsule by mouth 2 times daily for 7 days     Controlled Substances Monitoring:     No flowsheet data found.     (Please note that portions of this note were completed with a voice recognition program.  Efforts were made to edit the dictations but occasionally words are mis-transcribed.)    HUSSEIN Fuller CNP (electronically signed)  Attending Emergency Physician           HUSSEIN Fuller CNP  02/16/23 [id number]

## 2023-10-18 ENCOUNTER — HOSPITAL ENCOUNTER (OUTPATIENT)
Dept: HOSPITAL 75 - RAD | Age: 78
End: 2023-10-18
Attending: SURGERY
Payer: MEDICARE

## 2023-10-18 DIAGNOSIS — K63.2: Primary | ICD-10-CM

## 2023-10-18 DIAGNOSIS — Z53.9: ICD-10-CM

## 2023-10-18 PROCEDURE — 74176 CT ABD & PELVIS W/O CONTRAST: CPT

## 2023-10-19 NOTE — DIAGNOSTIC IMAGING REPORT
PROCEDURE: CT abdomen and pelvis without contrast.



TECHNIQUE: Multiple contiguous axial images were obtained through

the abdomen and pelvis without the use of intravenous contrast.

Auto Exposure Controls were utilized during the CT exam to meet

ALARA standards for radiation dose reduction. 



INDICATION: Draining wound in the midline anterior abdomen. Study

is performed to evaluate for enterocutaneous fistula.



FINDINGS: Precontrast axial imaging through the abdomen and

pelvis was performed. Next, the patient's tiny draining wound in

the midline abdomen in the left paramidline location was

catheterized using a Yueh catheter. A small amount of Omnipaque

240 contrast was injected. After injection, axial imaging through

the abdomen and pelvis was performed. Next, the right paramidline

tiny draining wound was catheterized with a Yueh catheter, and a

small volume of Omnipaque 240 contrast was injected. After

injection, axial imaging through the abdomen and pelvis was

performed.



After injection of the right paramidline drainage site, there is

filling of a fistulous tract with contrast to a small cavity in

the left paramidline anterior pelvis. No definite communication

with the small or large bowel is identified.



After injection of the right paramidline draining wound, there is

filling of the same cavity as the left-sided injection with

contrast. There is a separate fistulous tract extending to the

right in the anterior pelvis and appears to be communicating with

the small bowel loop. There is also a tract extending from the

cavity posteriorly with contrast seen within a portion of the

sigmoid colon.



Lung bases are clear. The liver is unremarkable. Gallbladder is

surgically absent. The pancreas, spleen, adrenal glands, and left

kidney are unremarkable. Right kidney appears to be absent. Aorta

is nonaneurysmal. There is no free fluid.



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

colocutaneous and enterocutaneous fistula. The right paramidline

drainage site communicates with the small cavity in the left

paramidline anterior pelvis.



Dictated by: 



  Dictated on workstation # [id number]